# Patient Record
Sex: FEMALE | Race: WHITE | NOT HISPANIC OR LATINO | Employment: PART TIME | URBAN - METROPOLITAN AREA
[De-identification: names, ages, dates, MRNs, and addresses within clinical notes are randomized per-mention and may not be internally consistent; named-entity substitution may affect disease eponyms.]

---

## 2017-01-10 ENCOUNTER — TELEPHONE (OUTPATIENT)
Dept: BARIATRICS | Facility: CLINIC | Age: 67
End: 2017-01-10

## 2017-01-30 ENCOUNTER — OFFICE VISIT (OUTPATIENT)
Dept: BARIATRICS | Facility: CLINIC | Age: 67
End: 2017-01-30
Payer: COMMERCIAL

## 2017-01-30 VITALS
BODY MASS INDEX: 30.02 KG/M2 | HEIGHT: 62 IN | SYSTOLIC BLOOD PRESSURE: 122 MMHG | DIASTOLIC BLOOD PRESSURE: 70 MMHG | HEART RATE: 76 BPM | WEIGHT: 163.13 LBS

## 2017-01-30 DIAGNOSIS — R73.09 ELEVATED GLUCOSE: ICD-10-CM

## 2017-01-30 DIAGNOSIS — E78.00 PURE HYPERCHOLESTEROLEMIA: ICD-10-CM

## 2017-01-30 DIAGNOSIS — E66.3 OVERWEIGHT (BMI 25.0-29.9): Primary | ICD-10-CM

## 2017-01-30 DIAGNOSIS — I10 ESSENTIAL HYPERTENSION: ICD-10-CM

## 2017-01-30 PROCEDURE — 99999 PR PBB SHADOW E&M-EST. PATIENT-LVL III: CPT | Mod: PBBFAC,,, | Performed by: INTERNAL MEDICINE

## 2017-01-30 PROCEDURE — 99213 OFFICE O/P EST LOW 20 MIN: CPT | Mod: S$GLB,,, | Performed by: INTERNAL MEDICINE

## 2017-01-30 RX ORDER — TRIAMTERENE AND HYDROCHLOROTHIAZIDE 37.5; 25 MG/1; MG/1
CAPSULE ORAL
Qty: 90 CAPSULE | Refills: 3 | Status: SHIPPED | OUTPATIENT
Start: 2017-01-30 | End: 2017-12-19 | Stop reason: SDUPTHER

## 2017-01-30 RX ORDER — DIETHYLPROPION HYDROCHLORIDE 75 MG/1
75 TABLET, EXTENDED RELEASE ORAL DAILY
Qty: 30 TABLET | Refills: 0 | Status: SHIPPED | OUTPATIENT
Start: 2017-01-30 | End: 2017-03-02 | Stop reason: SDUPTHER

## 2017-01-30 NOTE — PROGRESS NOTES
"Subjective:       Patient ID: Carmen Tamayo is a 66 y.o. female.    Chief Complaint: Follow-up    HPI Comments: Pt here today for follow up. Shehas lost 9 lbs. Has been on diethylpropion. Denies significant SE.  She feels like she has been doing well with watching what she eats and increased chores at home and steps.     Review of Systems   Constitutional: Negative for chills and fever.   Respiratory: Negative for apnea and shortness of breath.         + snores   Cardiovascular: Negative for chest pain and leg swelling.   Gastrointestinal: Negative for constipation and diarrhea.        Denies HB   Genitourinary: Negative for dysuria.   Musculoskeletal: Positive for arthralgias. Negative for back pain.        Left foot OA   Neurological: Negative for dizziness and light-headedness.   Psychiatric/Behavioral: Negative for dysphoric mood. The patient is not nervous/anxious.         Doing well on Lexapro       Objective:       Visit Vitals    /70    Pulse 76    Ht 5' 2" (1.575 m)    Wt 74 kg (163 lb 2.3 oz)    BMI 29.84 kg/m2       Physical Exam   Constitutional: She is oriented to person, place, and time. She appears well-developed. No distress.   Obese     HENT:   Head: Normocephalic and atraumatic.   Eyes: EOM are normal. Pupils are equal, round, and reactive to light. No scleral icterus.   Neck: Normal range of motion. Neck supple.   Cardiovascular: Normal rate and normal heart sounds.  Exam reveals no gallop and no friction rub.    No murmur heard.  Pulmonary/Chest: Effort normal and breath sounds normal. No respiratory distress. She has no wheezes.   Musculoskeletal: Normal range of motion. She exhibits no edema.   Neurological: She is alert and oriented to person, place, and time. No cranial nerve deficit.   Skin: Skin is warm and dry. No erythema.   Psychiatric: She has a normal mood and affect. Her behavior is normal. Judgment normal.   Vitals reviewed.      Assessment:       1. Overweight " (BMI 25.0-29.9)    2. Pure hypercholesterolemia    3. Elevated glucose    4. Essential hypertension        Plan:         1. Overweight (BMI 25.0-29.9)  Doing well  - diethylpropion 75 mg TbSR; Take 75 mg by mouth once daily.  Dispense: 30 tablet; Refill: 0    2. Pure hypercholesterolemia  Recheck after 10% TBW lost.       3. Elevated glucose  Recheck after 10% TBW lost.       4. Essential hypertension  The current medical regimen is effective;  continue present plan and medications.        3 meals a day made up of the following:  Unlimited green vegetables, tomatoes, mushrooms, spaghetti squash, cauliflower, meat, poultry, seafood, eggs and hard cheeses.   Avoid fried foods  Dressings, seasonings, condiments, etc should have less than 2 g sugars.    beans or nuts can have 1 x a day.   1-2 servings of citrus fruits, berries, pineapple or melon a day (1/2 cup)  Milk and plain yogurt    Can use a protein bar or shake for a meal.    [email or web address]   moderate carb intake    Add some type of resistance training 2-3 days a week. These can be body weight exercises, light weight or elastic bands. [employer] and [employer] are great sources for free work out plans and videos.     Patient warned of common side effects of diethylpropion including anxiety, insomnia, palpitations and increased blood pressure. It was also explained that it is for short-term usage along with diet and exercise, and that stopping the medication without making lifestyle changes will result in regain of weight. Patient states understanding. Caution with age > 64yo discussed.        Return in about 4 weeks

## 2017-01-30 NOTE — PATIENT INSTRUCTIONS
3 meals a day made up of the following:  Unlimited green vegetables, tomatoes, mushrooms, spaghetti squash, cauliflower, meat, poultry, seafood, eggs and hard cheeses.   Avoid fried foods  Dressings, seasonings, condiments, etc should have less than 2 g sugars.    beans or nuts can have 1 x a day.   1-2 servings of citrus fruits, berries, pineapple or melon a day (1/2 cup)  Milk and plain yogurt    Can use a protein bar or shake for a meal.    [email or web address]   moderate carb intake    Add some type of resistance training 2-3 days a week. These can be body weight exercises, light weight or elastic bands. [employer] and [employer] are great sources for free work out plans and videos.     Patient warned of common side effects of diethylpropion including anxiety, insomnia, palpitations and increased blood pressure. It was also explained that it is for short-term usage along with diet and exercise, and that stopping the medication without making lifestyle changes will result in regain of weight. Patient states understanding. Caution with age > 66yo discussed.        Return in about 4 weeks

## 2017-01-30 NOTE — MR AVS SNAPSHOT
[facility] Bariatric Surgery  [address]  [place] [address]  Phone: [phone]  Fax: [phone]                  Carmen Tamayo   2017 1:30 PM   Office Visit    Description:  Female : 1950   Provider:  Bri Wild MD   Department:  [facility] - Bariatric Surgery           Reason for Visit     Follow-up           Diagnoses this Visit        Comments    Overweight (BMI 25.0-29.9)    -  Primary     Pure hypercholesterolemia         Elevated glucose         Essential hypertension                To Do List           Future Appointments        Provider Department Dept Phone    2017 11:30 AM [facility] MAMMO2 SCREEN [facility] [phone]    3/2/2017 1:00 PM Bri Wild MD [facility] Bariatric [facility] [phone]      Goals (5 Years of Data)     None       These Medications        Disp Refills Start End    diethylpropion 75 mg TbSR 30 tablet 0 2017     Take 75 mg by mouth once daily. - Oral    Pharmacy: [facility] - [place], [place] - [address] Ph #: [phone]         [facility]     [facility] Nurse Care [facility] -  Assistance  Registered nurses in the [facility] provide clinical advisement, health education, appointment booking, and other advisory services.  Call for this free service at [phone].             Medications           Message regarding Medications     Verify the changes and/or additions to your medication regime listed below are the same as discussed with your clinician today.  If any of these changes or additions are incorrect, please notify your healthcare provider.             Verify that the below list of medications is an accurate representation of the medications you are currently taking.  If none reported, the list may be blank. If incorrect, please contact your healthcare provider. Carry this list with you in case of emergency.           Current  "Medications     aspirin (ECOTRIN) 81 MG EC tablet Take 81 mg by mouth once daily.      biotin 300 mcg Tab Take 1 tablet by mouth once daily.    diclofenac (VOLTAREN) 75 MG EC tablet TAKE ONE TABLET BY MOUTH TWICE A DAY    diethylpropion 75 mg TbSR Take 75 mg by mouth once daily.    escitalopram oxalate (LEXAPRO) 10 MG tablet Take 1 tablet (10 mg total) by mouth once daily.    metoprolol succinate (TOPROL-XL) 50 MG 24 hr tablet TAKE ONE TABLET BY MOUTH EVERY DAY    simvastatin (ZOCOR) 40 MG tablet Take 1 tablet (40 mg total) by mouth every evening.    tretinoin (RETIN-A) 0.1 % cream APPLY A THIN FILM TO AFFECTED AREA EVERY EVENING AFTER MOISTURIZING.    triamterene-hydrochlorothiazide 37.5-25 mg (DYAZIDE) 37.5-25 mg per capsule Take 1 capsule by mouth once daily.    vitamin D 1000 units Tab Take 2,000 mg by mouth 2 (two) times daily.            Clinical Reference Information           Vital Signs - Last Recorded  Most recent update: 1/30/2017  1:43 PM by Smith Nails MA    BP Pulse Ht Wt BMI    122/70 76 5' 2" (1.575 m) 74 kg (163 lb 2.3 oz) 29.84 kg/m2      Blood Pressure          Most Recent Value    BP  122/70      Allergies as of 1/30/2017     No Known Allergies      Immunizations Administered on Date of Encounter - 1/30/2017     None      Instructions    3 meals a day made up of the following:  Unlimited green vegetables, tomatoes, mushrooms, spaghetti squash, cauliflower, meat, poultry, seafood, eggs and hard cheeses.   Avoid fried foods  Dressings, seasonings, condiments, etc should have less than 2 g sugars.    beans or nuts can have 1 x a day.   1-2 servings of citrus fruits, berries, pineapple or melon a day (1/2 cup)  Milk and plain yogurt    Can use a protein bar or shake for a meal.    [email or web address]   moderate carb intake    Add some type of resistance training 2-3 days a week. These can be body weight exercises, light weight or elastic bands. [employer] and [employer] are great sources for free work " out plans and videos.     Patient warned of common side effects of diethylpropion including anxiety, insomnia, palpitations and increased blood pressure. It was also explained that it is for short-term usage along with diet and exercise, and that stopping the medication without making lifestyle changes will result in regain of weight. Patient states understanding. Caution with age > 66yo discussed.        Return in about 4 weeks

## 2017-02-21 ENCOUNTER — HOSPITAL ENCOUNTER (OUTPATIENT)
Dept: RADIOLOGY | Facility: HOSPITAL | Age: 67
Discharge: HOME | End: 2017-02-21
Attending: INTERNAL MEDICINE
Payer: COMMERCIAL

## 2017-02-21 DIAGNOSIS — Z12.31 ENCOUNTER FOR SCREENING MAMMOGRAM FOR BREAST CANCER: ICD-10-CM

## 2017-02-21 PROCEDURE — 77067 SCR MAMMO BI INCL CAD: CPT | Mod: 26,,, | Performed by: RADIOLOGY

## 2017-02-21 PROCEDURE — 77063 BREAST TOMOSYNTHESIS BI: CPT | Mod: 26,,, | Performed by: RADIOLOGY

## 2017-02-21 PROCEDURE — 77067 SCR MAMMO BI INCL CAD: CPT | Mod: TC

## 2017-03-02 ENCOUNTER — OFFICE VISIT (OUTPATIENT)
Dept: BARIATRICS | Facility: CLINIC | Age: 67
End: 2017-03-02
Payer: COMMERCIAL

## 2017-03-02 VITALS
HEIGHT: 62 IN | HEART RATE: 76 BPM | DIASTOLIC BLOOD PRESSURE: 76 MMHG | BODY MASS INDEX: 30.02 KG/M2 | SYSTOLIC BLOOD PRESSURE: 128 MMHG | WEIGHT: 163.13 LBS

## 2017-03-02 DIAGNOSIS — I10 ESSENTIAL HYPERTENSION: ICD-10-CM

## 2017-03-02 DIAGNOSIS — E66.3 OVERWEIGHT (BMI 25.0-29.9): Primary | ICD-10-CM

## 2017-03-02 PROCEDURE — 99999 PR PBB SHADOW E&M-EST. PATIENT-LVL III: CPT | Mod: PBBFAC,,, | Performed by: INTERNAL MEDICINE

## 2017-03-02 PROCEDURE — 99213 OFFICE O/P EST LOW 20 MIN: CPT | Mod: S$GLB,,, | Performed by: INTERNAL MEDICINE

## 2017-03-02 RX ORDER — DIETHYLPROPION HYDROCHLORIDE 75 MG/1
75 TABLET, EXTENDED RELEASE ORAL DAILY
Qty: 30 TABLET | Refills: 0 | Status: SHIPPED | OUTPATIENT
Start: 2017-03-02 | End: 2017-03-30

## 2017-03-02 NOTE — PROGRESS NOTES
"Subjective:       Patient ID: Carmen Tamayo is a 66 y.o. female.    Chief Complaint: Follow-up    HPI Comments: Pt here today for follow up.Weight is unchanged from previous visit, net 9 lbs neg total. Has been on diethylpropion. Denies significant SE.  Was home for 10 days, and feels she was eating different, and not as much walking. Was under stress as well.     Review of Systems   Constitutional: Negative for chills and fever.   Respiratory: Negative for apnea and shortness of breath.         + snores   Cardiovascular: Negative for chest pain and leg swelling.   Gastrointestinal: Negative for constipation and diarrhea.        Denies HB   Genitourinary: Negative for dysuria.   Musculoskeletal: Positive for arthralgias. Negative for back pain.        Left foot OA   Neurological: Negative for dizziness and light-headedness.   Psychiatric/Behavioral: Negative for dysphoric mood. The patient is not nervous/anxious.         Doing well on Lexapro       Objective:       /76  Pulse 76  Ht 5' 2" (1.575 m)  Wt 74 kg (163 lb 2.3 oz)  BMI 29.84 kg/m2    Physical Exam   Constitutional: She is oriented to person, place, and time. She appears well-developed. No distress.   Obese     HENT:   Head: Normocephalic and atraumatic.   Eyes: EOM are normal. Pupils are equal, round, and reactive to light. No scleral icterus.   Neck: Normal range of motion. Neck supple.   Cardiovascular: Normal rate.    Pulmonary/Chest: Effort normal.   Musculoskeletal: Normal range of motion. She exhibits no edema.   Neurological: She is alert and oriented to person, place, and time. No cranial nerve deficit.   Skin: Skin is warm and dry. No erythema.   Psychiatric: She has a normal mood and affect. Her behavior is normal. Judgment normal.   Vitals reviewed.      Assessment:       1. Overweight (BMI 25.0-29.9)    2. Essential hypertension        Plan:          1. Overweight (BMI 25.0-29.9)  She is back to her routine  - diethylpropion " 75 mg TbSR; Take 75 mg by mouth once daily.  Dispense: 30 tablet; Refill: 0    2. Essential hypertension  Expect improvement with weight loss          3 meals a day made up of the following:  Unlimited green vegetables, tomatoes, mushrooms, spaghetti squash, cauliflower, meat, poultry, seafood, eggs and hard cheeses.   Avoid fried foods  Dressings, seasonings, condiments, etc should have less than 2 g sugars.    beans or nuts can have 1 x a day.   1-2 servings of citrus fruits, berries, pineapple or melon a day (1/2 cup)  Milk and plain yogurt    Can use a protein bar or shake for a meal.    [email or web address]   moderate carb intake    Add some type of resistance training 2-3 days a week. These can be body weight exercises, light weight or elastic bands. fish[employer]me and [employer] are great sources for free work out plans and videos.     Patient warned of common side effects of diethylpropion including anxiety, insomnia, palpitations and increased blood pressure. It was also explained that it is for short-term usage along with diet and exercise, and that stopping the medication without making lifestyle changes will result in regain of weight. Patient states understanding. Caution with age > 64yo discussed.        Return in about 4 weeks

## 2017-03-02 NOTE — PATIENT INSTRUCTIONS
3 meals a day made up of the following:  Unlimited green vegetables, tomatoes, mushrooms, spaghetti squash, cauliflower, meat, poultry, seafood, eggs and hard cheeses.   Avoid fried foods  Dressings, seasonings, condiments, etc should have less than 2 g sugars.    beans or nuts can have 1 x a day.   1-2 servings of citrus fruits, berries, pineapple or melon a day (1/2 cup)  Milk and plain yogurt    Can use a protein bar or shake for a meal.    [email or web address]   moderate carb intake    Add some type of resistance training 2-3 days a week. These can be body weight exercises, light weight or elastic bands. [employer] and [employer] are great sources for free work out plans and videos.     Patient warned of common side effects of diethylpropion including anxiety, insomnia, palpitations and increased blood pressure. It was also explained that it is for short-term usage along with diet and exercise, and that stopping the medication without making lifestyle changes will result in regain of weight. Patient states understanding. Caution with age > 64yo discussed.        Return in about 4 weeks

## 2017-03-02 NOTE — MR AVS SNAPSHOT
[facility] - Bariatric Surgery  [address]  [place] [address]  Phone: [phone]  Fax: [phone]                  Carmen Tamayo   3/2/2017 1:00 PM   Office Visit    Description:  Female : 1950   Provider:  Bri Wild MD   Department:  [facility] - Bariatric Surgery           Reason for Visit     Follow-up           Diagnoses this Visit        Comments    Overweight (BMI 25.0-29.9)    -  Primary     Essential hypertension                To Do List           Future Appointments        Provider Department Dept Phone    2017 1:30 PM Bri Wild MD [facility] Bariatric Surgery [phone]      Goals (5 Years of Data)     None       These Medications        Disp Refills Start End    diethylpropion 75 mg TbSR 30 tablet 0 3/2/2017     Take 75 mg by mouth once daily. - Oral    Pharmacy: [facility] - [place], [place] - [address] Ph #: [phone]         [facility]     [facility] Nurse Care [facility] -  Assistance  Registered nurses in the [facility] provide clinical advisement, health education, appointment booking, and other advisory services.  Call for this free service at [phone].             Medications           Message regarding Medications     Verify the changes and/or additions to your medication regime listed below are the same as discussed with your clinician today.  If any of these changes or additions are incorrect, please notify your healthcare provider.             Verify that the below list of medications is an accurate representation of the medications you are currently taking.  If none reported, the list may be blank. If incorrect, please contact your healthcare provider. Carry this list with you in case of emergency.           Current Medications     aspirin (ECOTRIN) 81 MG EC tablet Take 81 mg by mouth once daily.      biotin 300 mcg Tab Take 1 tablet by mouth once daily.     diclofenac (VOLTAREN) 75 MG EC tablet TAKE ONE TABLET BY MOUTH TWICE A DAY    diethylpropion 75 mg TbSR Take 75 mg by mouth once daily.    escitalopram oxalate (LEXAPRO) 10 MG tablet Take 1 tablet (10 mg total) by mouth once daily.    metoprolol succinate (TOPROL-XL) 50 MG 24 hr tablet TAKE ONE TABLET BY MOUTH EVERY DAY    simvastatin (ZOCOR) 40 MG tablet Take 1 tablet (40 mg total) by mouth every evening.    tretinoin (RETIN-A) 0.1 % cream APPLY A THIN FILM TO AFFECTED AREA EVERY EVENING AFTER MOISTURIZING.    triamterene-hydrochlorothiazide 37.5-25 mg (DYAZIDE) 37.5-25 mg per capsule TAKE ONE CAPSULE BY MOUTH EVERY DAY    vitamin D 1000 units Tab Take 2,000 mg by mouth 2 (two) times daily.            Clinical Reference Information           Your Vitals Were     BP                   128/76           Blood Pressure          Most Recent Value    BP  128/76      Allergies as of 3/2/2017     No Known Allergies      Immunizations Administered on Date of Encounter - 3/2/2017     None      Instructions    3 meals a day made up of the following:  Unlimited green vegetables, tomatoes, mushrooms, spaghetti squash, cauliflower, meat, poultry, seafood, eggs and hard cheeses.   Avoid fried foods  Dressings, seasonings, condiments, etc should have less than 2 g sugars.    beans or nuts can have 1 x a day.   1-2 servings of citrus fruits, berries, pineapple or melon a day (1/2 cup)  Milk and plain yogurt    Can use a protein bar or shake for a meal.    [email or web address]   moderate carb intake    Add some type of resistance training 2-3 days a week. These can be body weight exercises, light weight or elastic bands. [employer] and [employer] are great sources for free work out plans and videos.     Patient warned of common side effects of diethylpropion including anxiety, insomnia, palpitations and increased blood pressure. It was also explained that it is for short-term usage along with diet and exercise, and that stopping the  medication without making lifestyle changes will result in regain of weight. Patient states understanding. Caution with age > 64yo discussed.        Return in about 4 weeks          Language Assistance Services     ATTENTION: Language assistance services are available, free of charge. Please call [phone].      ATENCIÓN: Si habla español, tiene a patel disposición servicios gratuitos de asistencia lingüística. Llame al [phone].     CHÚ Ý: N?u b?n nói Ti?ng Vi?t, có các d?ch v? h? tr? ngôn ng? mi?n phí dành cho b?n. G?i s? [phone].         [facility] complies with applicable Federal civil rights laws and does not discriminate on the basis of race, color, national origin, age, disability, or sex.

## 2017-03-30 ENCOUNTER — TELEPHONE (OUTPATIENT)
Dept: BARIATRICS | Facility: CLINIC | Age: 67
End: 2017-03-30

## 2017-03-30 DIAGNOSIS — E66.3 OVERWEIGHT (BMI 25.0-29.9): ICD-10-CM

## 2017-03-30 RX ORDER — PHENTERMINE HYDROCHLORIDE 37.5 MG/1
37.5 TABLET ORAL
Qty: 30 TABLET | Refills: 0 | Status: SHIPPED | OUTPATIENT
Start: 2017-03-30 | End: 2017-04-29

## 2017-03-30 NOTE — TELEPHONE ENCOUNTER
Pt will run out of her Medication before her next appt can she please get a refill. Her apt is Tuesday

## 2017-03-30 NOTE — TELEPHONE ENCOUNTER
Since she has been on her current med for 3 months I will have to switch her to a different one. I would usually go to phentermine. Side effects are similar to what she is on. She would take a half tab until I see her Tuesday.

## 2017-04-05 RX ORDER — DICLOFENAC SODIUM 10 MG/G
2 GEL TOPICAL 4 TIMES DAILY
Qty: 500 G | Refills: 4 | Status: SHIPPED | OUTPATIENT
Start: 2017-04-05 | End: 2017-04-06 | Stop reason: SDUPTHER

## 2017-04-06 RX ORDER — DICLOFENAC SODIUM 10 MG/G
2 GEL TOPICAL 4 TIMES DAILY
Qty: 500 G | Refills: 4 | Status: SHIPPED | OUTPATIENT
Start: 2017-04-06 | End: 2017-05-06

## 2017-05-02 ENCOUNTER — TELEPHONE (OUTPATIENT)
Dept: BARIATRICS | Facility: CLINIC | Age: 67
End: 2017-05-02

## 2017-05-02 RX ORDER — PHENTERMINE HYDROCHLORIDE 37.5 MG/1
37.5 TABLET ORAL
Qty: 30 TABLET | Refills: 0 | Status: SHIPPED | OUTPATIENT
Start: 2017-05-02 | End: 2017-05-08 | Stop reason: SDUPTHER

## 2017-05-02 NOTE — TELEPHONE ENCOUNTER
Pt will run out of meds before her next appt May 8th she would like to  a script. please call and let her know ext [phone] or [phone]

## 2017-05-08 ENCOUNTER — OFFICE VISIT (OUTPATIENT)
Dept: BARIATRICS | Facility: CLINIC | Age: 67
End: 2017-05-08
Payer: COMMERCIAL

## 2017-05-08 VITALS
DIASTOLIC BLOOD PRESSURE: 70 MMHG | BODY MASS INDEX: 28.97 KG/M2 | WEIGHT: 157.44 LBS | SYSTOLIC BLOOD PRESSURE: 130 MMHG | HEIGHT: 62 IN

## 2017-05-08 DIAGNOSIS — E66.3 OVERWEIGHT (BMI 25.0-29.9): Primary | ICD-10-CM

## 2017-05-08 PROCEDURE — 99999 PR PBB SHADOW E&M-EST. PATIENT-LVL III: CPT | Mod: PBBFAC,,, | Performed by: INTERNAL MEDICINE

## 2017-05-08 PROCEDURE — 99213 OFFICE O/P EST LOW 20 MIN: CPT | Mod: S$GLB,,, | Performed by: INTERNAL MEDICINE

## 2017-05-08 RX ORDER — PHENTERMINE HYDROCHLORIDE 37.5 MG/1
37.5 TABLET ORAL
Qty: 30 TABLET | Refills: 0 | Status: SHIPPED | OUTPATIENT
Start: 2017-05-08 | End: 2017-06-07

## 2017-05-08 NOTE — MR AVS SNAPSHOT
[facility] Bariatric Surgery  [address]  [place] [address]  Phone: [phone]  Fax: [phone]                  Carmen Tamayo   2017 1:30 PM   Office Visit    Description:  Female : 1950   Provider:  Bri Wild MD   Department:  [facility] - Bariatric Surgery           Reason for Visit     Follow-up           Diagnoses this Visit        Comments    Overweight (BMI 25.0-29.9)    -  Primary            To Do List           Future Appointments        Provider Department Dept Phone    2017 1:30 PM Bri Wild MD [facility] Bariatric Surgery [phone]      Goals (5 Years of Data)     None       These Medications        Disp Refills Start End    phentermine (ADIPEX-P) 37.5 mg tablet 30 tablet 0 2017    Take 1 tablet (37.5 mg total) by mouth before breakfast. - Oral    Pharmacy: [facility] - [place], [place] - [address] Ph #: [phone]         [facility]     [facility] Nurse Care Line -  Assistance  Unless otherwise directed by your provider, please contact [facility], our nurse care line that is available for  assistance.     Registered nurses in the [facility] provide: appointment scheduling, clinical advisement, health education, and other advisory services.  Call: [phone] (toll free)               Medications           Message regarding Medications     Verify the changes and/or additions to your medication regime listed below are the same as discussed with your clinician today.  If any of these changes or additions are incorrect, please notify your healthcare provider.             Verify that the below list of medications is an accurate representation of the medications you are currently taking.  If none reported, the list may be blank. If incorrect, please contact your healthcare provider. Carry this list with you in case of emergency.          "  Current Medications     aspirin (ECOTRIN) 81 MG EC tablet Take 81 mg by mouth once daily.      biotin 300 mcg Tab Take 1 tablet by mouth once daily.    escitalopram oxalate (LEXAPRO) 10 MG tablet Take 1 tablet (10 mg total) by mouth once daily.    metoprolol succinate (TOPROL-XL) 50 MG 24 hr tablet TAKE ONE TABLET BY MOUTH EVERY DAY    simvastatin (ZOCOR) 40 MG tablet Take 1 tablet (40 mg total) by mouth every evening.    tretinoin (RETIN-A) 0.1 % cream APPLY A THIN FILM TO AFFECTED AREA EVERY EVENING AFTER MOISTURIZING.    triamterene-hydrochlorothiazide 37.5-25 mg (DYAZIDE) 37.5-25 mg per capsule TAKE ONE CAPSULE BY MOUTH EVERY DAY    vitamin D 1000 units Tab Take 2,000 mg by mouth 2 (two) times daily.     diclofenac (VOLTAREN) 75 MG EC tablet TAKE ONE TABLET BY MOUTH TWICE A DAY    phentermine (ADIPEX-P) 37.5 mg tablet Take 1 tablet (37.5 mg total) by mouth before breakfast.           Clinical Reference Information           Your Vitals Were     BP Height Weight BMI       130/70 5' 2" (1.575 m) 71.4 kg (157 lb 6.5 oz) 28.79 kg/m2       Blood Pressure          Most Recent Value    BP  130/70      Allergies as of 5/8/2017     No Known Allergies      Immunizations Administered on Date of Encounter - 5/8/2017     None      Instructions    3 meals a day made up of the following:  Unlimited green vegetables, tomatoes, mushrooms, spaghetti squash, cauliflower, meat, poultry, seafood, eggs and hard cheeses.   Milk and plain yogurt  Dressings, seasonings, condiments, etc should have less than 2 g sugars.   beans or nuts can have 1 x a day.   1-2 servings of citrus fruits, berries, pineapple or melon a day (1/2 cup)  Avoid fried foods    No grains, rice, pasta, potatoes or bread    No soda, sweet tea, juices or lemonade    Can use a protein bar or shake for a meal.    [email or web address]   moderate carb intake    Add some type of resistance training 2-3 days a week. These can be body weight exercises, light weight or " elastic bands. [employer] and [employer] are great sources for free work out plans and videos.     Patient warned of common side effects of phentermine including anxiety, insomnia, palpitations and increased blood pressure. It was also explained that it is for short-term usage along with diet and exercise, and that stopping the medication without making lifestyle changes will result in regain of weight. Patient states understanding.   Caution with age > 64yo discussed.        Return in about 6 weeks          Language Assistance Services     ATTENTION: Language assistance services are available, free of charge. Please call [phone].      ATENCIÓN: Si habla español, tiene a patel disposición servicios gratuitos de asistencia lingüística. Llame al [phone].     ERICA Ý: N?u b?n nói Ti?ng Vi?t, có các d?ch v? h? tr? ngôn ng? mi?n phí dành cho b?n. G?i s? [phone].         [facility] complies with applicable Federal civil rights laws and does not discriminate on the basis of race, color, national origin, age, disability, or sex.

## 2017-05-08 NOTE — PROGRESS NOTES
"Subjective:       Patient ID: Carmen Tamayo is a 66 y.o. female.    Chief Complaint: Follow-up    HPI Comments: Pt here today for follow up. Has lost 6 lbs from previous visit, net 15 lbs neg total. Has been on phentermine in the past couple of weeks. . Denies significant SE. She finds that it is working well. She has a garden she is working on and a bike she can start riding.     Review of Systems   Constitutional: Negative for chills and fever.   Respiratory: Negative for apnea and shortness of breath.         + snores   Cardiovascular: Negative for chest pain and leg swelling.   Gastrointestinal: Negative for constipation and diarrhea.        Denies HB   Genitourinary: Negative for dysuria.   Musculoskeletal: Positive for arthralgias. Negative for back pain.        Left foot OA   Neurological: Negative for dizziness and light-headedness.   Psychiatric/Behavioral: Negative for dysphoric mood. The patient is not nervous/anxious.         Doing well on Lexapro       Objective:       /70  Ht 5' 2" (1.575 m)  Wt 71.4 kg (157 lb 6.5 oz)  BMI 28.79 kg/m2    Physical Exam   Constitutional: She is oriented to person, place, and time. She appears well-developed. No distress.   Obese     HENT:   Head: Normocephalic and atraumatic.   Eyes: EOM are normal. Pupils are equal, round, and reactive to light. No scleral icterus.   Neck: Normal range of motion. Neck supple.   Cardiovascular: Normal rate.    Pulmonary/Chest: Effort normal.   Musculoskeletal: Normal range of motion. She exhibits no edema.   Neurological: She is alert and oriented to person, place, and time. No cranial nerve deficit.   Skin: Skin is warm and dry. No erythema.   Psychiatric: She has a normal mood and affect. Her behavior is normal. Judgment normal.   Vitals reviewed.      Assessment:       No diagnosis found.    Plan:          1. Overweight (BMI 25.0-29.9)  She is back to her routine  - diethylpropion 75 mg TbSR; Take 75 mg by mouth once " daily.  Dispense: 30 tablet; Refill: 0    2. Essential hypertension  Expect improvement with weight loss          3 meals a day made up of the following:  Unlimited green vegetables, tomatoes, mushrooms, spaghetti squash, cauliflower, meat, poultry, seafood, eggs and hard cheeses.   Milk and plain yogurt  Dressings, seasonings, condiments, etc should have less than 2 g sugars.   beans or nuts can have 1 x a day.   1-2 servings of citrus fruits, berries, pineapple or melon a day (1/2 cup)  Avoid fried foods    No grains, rice, pasta, potatoes or bread    No soda, sweet tea, juices or lemonade    Can use a protein bar or shake for a meal.    [email or web address]   moderate carb intake    Add some type of resistance training 2-3 days a week. These can be body weight exercises, light weight or elastic bands. [employer] and [email or web address] are great sources for free work out plans and videos.     Patient warned of common side effects of phentermine including anxiety, insomnia, palpitations and increased blood pressure. It was also explained that it is for short-term usage along with diet and exercise, and that stopping the medication without making lifestyle changes will result in regain of weight. Patient states understanding.   Caution with age > 66yo discussed.        Return in about 6 weeks

## 2017-05-08 NOTE — PATIENT INSTRUCTIONS
3 meals a day made up of the following:  Unlimited green vegetables, tomatoes, mushrooms, spaghetti squash, cauliflower, meat, poultry, seafood, eggs and hard cheeses.   Milk and plain yogurt  Dressings, seasonings, condiments, etc should have less than 2 g sugars.   beans or nuts can have 1 x a day.   1-2 servings of citrus fruits, berries, pineapple or melon a day (1/2 cup)  Avoid fried foods    No grains, rice, pasta, potatoes or bread    No soda, sweet tea, juices or lemonade    Can use a protein bar or shake for a meal.    [email or web address]   moderate carb intake    Add some type of resistance training 2-3 days a week. These can be body weight exercises, light weight or elastic bands. [employer] and [employer] are great sources for free work out plans and videos.     Patient warned of common side effects of phentermine including anxiety, insomnia, palpitations and increased blood pressure. It was also explained that it is for short-term usage along with diet and exercise, and that stopping the medication without making lifestyle changes will result in regain of weight. Patient states understanding.   Caution with age > 66yo discussed.        Return in about 6 weeks

## 2017-05-09 ENCOUNTER — TELEPHONE (OUTPATIENT)
Dept: INTERNAL MEDICINE | Facility: CLINIC | Age: 67
End: 2017-05-09

## 2017-05-09 DIAGNOSIS — E55.9 MILD VITAMIN D DEFICIENCY: ICD-10-CM

## 2017-05-09 DIAGNOSIS — R73.09 ELEVATED GLUCOSE: Primary | ICD-10-CM

## 2017-05-09 DIAGNOSIS — I10 ESSENTIAL HYPERTENSION: ICD-10-CM

## 2017-05-09 NOTE — TELEPHONE ENCOUNTER
----- Message from Tomasa Noble MA sent at 5/9/2017  1:25 PM CDT -----  Contact: [phone]  An appointment for an annual physical has been scheduled for 6/22/17.    The patient is requesting prior labs.    A lab appointment is scheduled for 6/13/17.  Please link labs to the scheduled appointment.    If the lab appointment is not appropriate, please cancel appointment and notify the patient.    Thank you!

## 2017-05-09 NOTE — TELEPHONE ENCOUNTER
Procedures Ordered This Visit     CBC W/ AUTO DIFFERENTIAL         Cancel    Comprehensive metabolic panel         Cancel    Hemoglobin A1c         Cancel    Lipid panel         Cancel    VITAMIN D

## 2017-05-19 ENCOUNTER — TELEPHONE (OUTPATIENT)
Dept: INTERNAL MEDICINE | Facility: CLINIC | Age: 67
End: 2017-05-19

## 2017-05-19 NOTE — TELEPHONE ENCOUNTER
Left message and in regards to rescheduling appointment on 6/22.     Dr Edmondson will not be in office

## 2017-06-13 ENCOUNTER — LAB VISIT (OUTPATIENT)
Dept: LAB | Facility: HOSPITAL | Age: 67
End: 2017-06-13
Payer: COMMERCIAL

## 2017-06-13 DIAGNOSIS — I10 ESSENTIAL HYPERTENSION: ICD-10-CM

## 2017-06-13 DIAGNOSIS — R73.09 ELEVATED GLUCOSE: ICD-10-CM

## 2017-06-13 DIAGNOSIS — E55.9 MILD VITAMIN D DEFICIENCY: ICD-10-CM

## 2017-06-13 LAB
25(OH)D3+25(OH)D2 SERPL-MCNC: 72 NG/ML
ALBUMIN SERPL BCP-MCNC: 3.9 G/DL
ALP SERPL-CCNC: 98 U/L
ALT SERPL W/O P-5'-P-CCNC: 15 U/L
ANION GAP SERPL CALC-SCNC: 11 MMOL/L
AST SERPL-CCNC: 20 U/L
BASOPHILS # BLD AUTO: 0.04 K/UL
BASOPHILS NFR BLD: 0.5 %
BILIRUB SERPL-MCNC: 0.4 MG/DL
BUN SERPL-MCNC: 16 MG/DL
CALCIUM SERPL-MCNC: 9.9 MG/DL
CHLORIDE SERPL-SCNC: 103 MMOL/L
CHOLEST/HDLC SERPL: 3 {RATIO}
CO2 SERPL-SCNC: 23 MMOL/L
CREAT SERPL-MCNC: 0.8 MG/DL
DIFFERENTIAL METHOD: NORMAL
EOSINOPHIL # BLD AUTO: 0.1 K/UL
EOSINOPHIL NFR BLD: 1.8 %
ERYTHROCYTE [DISTWIDTH] IN BLOOD BY AUTOMATED COUNT: 12.3 %
EST. GFR  (AFRICAN AMERICAN): >60 ML/MIN/1.73 M^2
EST. GFR  (NON AFRICAN AMERICAN): >60 ML/MIN/1.73 M^2
ESTIMATED AVG GLUCOSE: 117 MG/DL
GLUCOSE SERPL-MCNC: 102 MG/DL
HBA1C MFR BLD HPLC: 5.7 %
HCT VFR BLD AUTO: 38.8 %
HDL/CHOLESTEROL RATIO: 32.9 %
HDLC SERPL-MCNC: 216 MG/DL
HDLC SERPL-MCNC: 71 MG/DL
HGB BLD-MCNC: 12.8 G/DL
LDLC SERPL CALC-MCNC: 129 MG/DL
LYMPHOCYTES # BLD AUTO: 1.8 K/UL
LYMPHOCYTES NFR BLD: 24.9 %
MCH RBC QN AUTO: 29.4 PG
MCHC RBC AUTO-ENTMCNC: 33 %
MCV RBC AUTO: 89 FL
MONOCYTES # BLD AUTO: 0.5 K/UL
MONOCYTES NFR BLD: 7.2 %
NEUTROPHILS # BLD AUTO: 4.8 K/UL
NEUTROPHILS NFR BLD: 65.5 %
NONHDLC SERPL-MCNC: 145 MG/DL
PLATELET # BLD AUTO: 349 K/UL
PMV BLD AUTO: 9.2 FL
POTASSIUM SERPL-SCNC: 4.8 MMOL/L
PROT SERPL-MCNC: 7.8 G/DL
RBC # BLD AUTO: 4.35 M/UL
SODIUM SERPL-SCNC: 137 MMOL/L
TRIGL SERPL-MCNC: 80 MG/DL
WBC # BLD AUTO: 7.35 K/UL

## 2017-06-13 PROCEDURE — 83036 HEMOGLOBIN GLYCOSYLATED A1C: CPT

## 2017-06-13 PROCEDURE — 85025 COMPLETE CBC W/AUTO DIFF WBC: CPT

## 2017-06-13 PROCEDURE — 80061 LIPID PANEL: CPT

## 2017-06-13 PROCEDURE — 80053 COMPREHEN METABOLIC PANEL: CPT

## 2017-06-13 PROCEDURE — 82306 VITAMIN D 25 HYDROXY: CPT

## 2017-06-13 PROCEDURE — 36415 COLL VENOUS BLD VENIPUNCTURE: CPT

## 2017-06-21 ENCOUNTER — OFFICE VISIT (OUTPATIENT)
Dept: INTERNAL MEDICINE | Facility: CLINIC | Age: 67
End: 2017-06-21
Payer: COMMERCIAL

## 2017-06-21 ENCOUNTER — OFFICE VISIT (OUTPATIENT)
Dept: BARIATRICS | Facility: CLINIC | Age: 67
End: 2017-06-21
Payer: COMMERCIAL

## 2017-06-21 VITALS
BODY MASS INDEX: 28.88 KG/M2 | SYSTOLIC BLOOD PRESSURE: 124 MMHG | HEIGHT: 62 IN | DIASTOLIC BLOOD PRESSURE: 72 MMHG | WEIGHT: 156.94 LBS | HEART RATE: 76 BPM

## 2017-06-21 VITALS
BODY MASS INDEX: 29.17 KG/M2 | SYSTOLIC BLOOD PRESSURE: 128 MMHG | OXYGEN SATURATION: 97 % | WEIGHT: 158.5 LBS | HEIGHT: 62 IN | DIASTOLIC BLOOD PRESSURE: 78 MMHG | HEART RATE: 82 BPM

## 2017-06-21 DIAGNOSIS — E66.3 OVERWEIGHT (BMI 25.0-29.9): Primary | ICD-10-CM

## 2017-06-21 DIAGNOSIS — M17.12 OSTEOARTHRITIS OF LEFT KNEE, UNSPECIFIED OSTEOARTHRITIS TYPE: ICD-10-CM

## 2017-06-21 DIAGNOSIS — R73.09 ELEVATED GLUCOSE: ICD-10-CM

## 2017-06-21 DIAGNOSIS — E78.00 PURE HYPERCHOLESTEROLEMIA: ICD-10-CM

## 2017-06-21 DIAGNOSIS — I10 ESSENTIAL HYPERTENSION: Primary | ICD-10-CM

## 2017-06-21 PROBLEM — M17.9 OSTEOARTHRITIS OF KNEE: Status: ACTIVE | Noted: 2017-06-21

## 2017-06-21 PROCEDURE — 1159F MED LIST DOCD IN RCRD: CPT | Mod: S$GLB,,, | Performed by: INTERNAL MEDICINE

## 2017-06-21 PROCEDURE — 99213 OFFICE O/P EST LOW 20 MIN: CPT | Mod: S$GLB,,, | Performed by: INTERNAL MEDICINE

## 2017-06-21 PROCEDURE — 99397 PER PM REEVAL EST PAT 65+ YR: CPT | Mod: S$GLB,,, | Performed by: INTERNAL MEDICINE

## 2017-06-21 PROCEDURE — 99999 PR PBB SHADOW E&M-EST. PATIENT-LVL III: CPT | Mod: PBBFAC,,, | Performed by: INTERNAL MEDICINE

## 2017-06-21 RX ORDER — PHENTERMINE HYDROCHLORIDE 37.5 MG/1
37.5 TABLET ORAL
Qty: 30 TABLET | Refills: 0 | Status: SHIPPED | OUTPATIENT
Start: 2017-06-21 | End: 2017-07-21

## 2017-06-21 RX ORDER — DICLOFENAC SODIUM 10 MG/G
2 GEL TOPICAL DAILY
COMMUNITY
End: 2017-06-21 | Stop reason: SDUPTHER

## 2017-06-21 RX ORDER — DIETHYLPROPION HYDROCHLORIDE 75 MG/1
75 TABLET, EXTENDED RELEASE ORAL DAILY
Qty: 30 TABLET | Refills: 0 | Status: SHIPPED | OUTPATIENT
Start: 2017-06-21 | End: 2017-08-16

## 2017-06-21 RX ORDER — DICLOFENAC SODIUM 10 MG/G
2 GEL TOPICAL DAILY
Qty: 5 TUBE | Refills: 3 | Status: SHIPPED | OUTPATIENT
Start: 2017-06-21 | End: 2019-01-14 | Stop reason: SDUPTHER

## 2017-06-21 RX ORDER — IBUPROFEN 800 MG/1
800 TABLET ORAL EVERY 6 HOURS PRN
COMMUNITY
End: 2018-04-06

## 2017-06-21 NOTE — PROGRESS NOTES
"Subjective:       Patient ID: Carmen Tamayo is a 67 y.o. female.    Chief Complaint: Follow-up    Pt here today for follow up. Has lost 1 lbs from previous visit, net 16 lbs neg total. Has been on phentermine. Had oral surgery last week on ibuprofen and Amoxicillin. Has had some constipation. Has taken ducolax.      . Denies significant SE. She finds that it is working well. She has a garden she is working on and a bike she can start riding.       Review of Systems   Constitutional: Negative for chills and fever.   Respiratory: Negative for apnea and shortness of breath.         + snores   Cardiovascular: Negative for chest pain and leg swelling.   Gastrointestinal: Negative for constipation and diarrhea.        Denies HB   Genitourinary: Negative for dysuria.   Musculoskeletal: Positive for arthralgias. Negative for back pain.        Left foot OA   Neurological: Negative for dizziness and light-headedness.   Psychiatric/Behavioral: Negative for dysphoric mood. The patient is not nervous/anxious.         Doing well on Lexapro       Objective:       /72   Pulse 76   Ht 5' 2" (1.575 m)   Wt 71.2 kg (156 lb 15.5 oz)   BMI 28.71 kg/m²     Physical Exam   Constitutional: She is oriented to person, place, and time. She appears well-developed. No distress.   Obese     HENT:   Head: Normocephalic and atraumatic.   Eyes: EOM are normal. Pupils are equal, round, and reactive to light. No scleral icterus.   Neck: Normal range of motion. Neck supple.   Cardiovascular: Normal rate.    Pulmonary/Chest: Effort normal.   Musculoskeletal: Normal range of motion. She exhibits no edema.   Neurological: She is alert and oriented to person, place, and time. No cranial nerve deficit.   Skin: Skin is warm and dry. No erythema.   Psychiatric: She has a normal mood and affect. Her behavior is normal. Judgment normal.   Vitals reviewed.      Assessment:       1. Overweight (BMI 25.0-29.9)        Plan:          1. Overweight " (BMI 25.0-29.9)  Soft foods list given for while she is recovering from dental work.               3 meals a day made up of the following:  Unlimited green vegetables, tomatoes, mushrooms, spaghetti squash, cauliflower, meat, poultry, seafood, eggs and hard cheeses.   Milk and plain yogurt  Dressings, seasonings, condiments, etc should have less than 2 g sugars.   beans or nuts can have 1 x a day.   1-2 servings of citrus fruits, berries, pineapple or melon a day (1/2 cup)  Avoid fried foods    No grains, rice, pasta, potatoes or bread    No soda, sweet tea, juices or lemonade    Can use a protein bar or shake for a meal.    [email or web address]   moderate carb intake    Add some type of resistance training 2-3 days a week. These can be body weight exercises, light weight or elastic bands. [employer] and [employer] are great sources for free work out plans and videos.     Patient warned of common side effects of phentermine including anxiety, insomnia, palpitations and increased blood pressure. It was also explained that it is for short-term usage along with diet and exercise, and that stopping the medication without making lifestyle changes will result in regain of weight. Patient states understanding.   Caution with age > 64yo discussed.        Return in about 6 weeks

## 2017-06-21 NOTE — PATIENT INSTRUCTIONS
Patient warned of common side effects of phentermine including anxiety, insomnia, palpitations and increased blood pressure. It was also explained that it is for short-term usage along with diet and exercise, and that stopping the medication without making lifestyle changes will result in regain of weight. Patient states understanding.     Patient warned of common side effects of diethylpropion including anxiety, insomnia, palpitations and increased blood pressure. It was also explained that it is for short-term usage along with diet and exercise, and that stopping the medication without making lifestyle changes will result in regain of weight. Patient states understanding.      Bariatric Soft Diet             Adding Vegetables and Fruits:    As long as you are consuming >80g total protein daily from combination of foods and protein drinks, you may start adding small bites of fruits and vegetables to your meals. Cooked, tender vegetables and ripe fruits without the peel are tolerated best.    Avoid fruit canned in syrup, sugary fruit juices, and vegetables cooked with oil, butter or giordano.  Bariatric SOFT Diet    EAT THESE FOODS AVOID THESE FOODS   High in Protein: High in Fat/Sugar:   ? Canned tuna or chicken (packed in water)  ? Lean ground turkey breast or ground round  ? Turkey or chicken (no skin); cooked tender and cut in small pieces  ? Lean pork or beef (cook in crock pot until very tender; cut in small pieces  ? Scrambled, poached, or boiled eggs  ? Baked, broiled, grilled or boiled fish and seafood (not fried!)  ? Silken tofu, Edamame (soybeans)  ? Beans, hummus and lentils  ? Lean deli meats (turkey and chicken breast, ham, roast beef)  ? 1% or Skim Milk, Lactaid, or Soymilk  ? Low-fat or fat-free cottage cheese, soft cheese, mozzarella string cheese, or ricotta  ? Light yogurt, Greek yogurt, SF pudding High fat milk (whole, 2%)  Butter, margarine, oil, mayonnaise  Sour cream, cream cheese, salad  dressing  Ice Cream  Cakes, cookies, pies, desserts  Candy  Luncheon meats (bologna, salami, chopped ham)  Sausage, Anaya  Gravy  Fried Foods  ___________________________________  Tough/Crunchy--------------------------------  Tough or dry meats  Corn   Granola/cereal with nuts  Shredded Coconut    Raw veggies  Lettuce  Plain, Unsalted Nuts and Seeds  Protein bars with 0-4 grams of sugar   As long as you are getting >80g PRO: Starchy Carbohydrates. At goal weight, some may include whole grains in small amounts.   Cooked tender vegetables without peel  Ripe fruits without peel  Frozen fruits with no added sugar  Fruit canned in its own juice or in water  Fat free, sugar free, frozen yogurt White and wheat Bread, Rice, Pasta   Cereals (including grits, oatmeal)   Crackers, Pretzels, Chips, Granola  Corn, Popcorn, Peas  White Potatoes, Sweet potatoes  Flour and corn tortillas     Fluids: Always Avoid:   Skim/1% milk, Lactaid, Soymilk  Water and Sugar-free beverages  (decaf and non-carbonated)  Decaf coffee & decaf tea  Sugary drinks  Carbonated drinks  Alcohol  Drinking through straws     Protein Content of Foods Recommended after                   Weight Loss Surgery    Food Name Portion Calories Protein (gms)   Almonds (unsalted) 1/4 cup 160 6   [place] milk, unsweetened 1 cup 30  1   Beef, Roast 1 oz 46 8   Beef, Steak, sirloin, trimmed 1 oz 55 9   Catfish, broiled or baked 1 oz 30 5   Cheese, American FF 1 oz 40 6   Cheese, Cottage 1% fat ¼ cup 41 7   Cheese, Parmesan, grated ¼ cup 128 12   Cheese, Mozzarella, part skim 1 oz 78 8   Cheese, part skim Ricotta ¼ cup 90 8   Chicken, white breast w/o skin 1 oz 46 9   Chicken, leg w/o skin 1 oz 54 7   Crab, steamed ¼ cup  40 9   Crawfish tails, boiled ¼ cup 35 8   Edamame, shelled ¼ cup 50 4   Egg 1 78 6   Ham, lean 5% 1 oz 44 7   Hamburger, lean 1 oz 56 7   Hummus ¼ cup 100 5   Lobster, steamed 1 oz 26 5   Milk, skim or 1%, soy  1 cup 90 8   Pork Tenderloin 1 oz 46 7    Pudding, SF 1 serv 60 2   Red beans ¼ cup 56 4   Refried beans, fat free ¼ cup 65 4   [place], baked 1 oz 52 7   Shrimp, steamed 1 oz 28 6   Soymilk, plain ½ cup 40 3   Tilapia, white fish, cooked 1 oz 36 8   Tofu ¼ cup 47 5   Trout 1 oz 48 7   Tuna, canned in water 1 oz 37 8   Turkey, white meat 1 oz 35 7   Veal Loin 1 oz 50 7   Yogurt, SF, frozen vanilla 3 oz 72 3.5   Yogurt, Fruit, FF, light 3 oz 40 2.5   Yogurt, Greek 3 oz 70 8     *Abbreviations: SF=sugar free, LF=low fat, FF= fat free, gms=grams  *3oz of cooked meat/protein = size of deck of cards or ladies palm   *1oz cheese = 1inch cube or 1 slice American cheese    Sample Menu for Bariatric Soft Diet              3 meals + 2 protein drinks  Remember: No drinking with meals.    Time of Day Day 1 Day 2   7am:    1 egg (or ¼ cup Egg Beaters) ¼ cup low-fat cottage cheese, 1 tbsp berries   8am: 1 cup water/SF beverage     9am: 1 cup water/SF beverage     10am:  Protein drink  Protein drink   11am: 1 cup water/SF beverage     12pm:    1-2 oz grilled shrimp, ¼ cup green beans   1-2oz canned chicken, shredded cheese, 1 tbsp salsa   1pm: 1 cup water/SF beverage     3pm:  Protein drink   Protein drink   4pm: 1 cup water/SF beverage     6pm:  ½ cup low fat chili, 1oz low-fat cheese, ¼ cup broccoli 2 oz grilled fish,  ¼  cup lima beans   7pm: 1 cup water/SF beverage       This sample menu provides approx. 80g protein total, including about 40g protein from foods and at least 40g protein from protein drinks.  Drinking protein drinks daily helps decrease muscle loss, increase weight loss, and prevent hair loss.    ? Sip fluids continuously in between meals.    ? For fluids: 1 cup = 8 oz   ? For food: ¼ cup = 4 tablespoons = 1oz  ? 3oz meat is approx. the size of a deck of cards.    ? A food scale will help you determine portion size (Can be purchased at [employer])

## 2017-06-21 NOTE — PROGRESS NOTES
"Subjective:       Patient ID: Carmen Tamayo is a 67 y.o. female.    Chief Complaint: Follow-up    HPI   Patient is here for a six-month follow-up of hypertension and hypercholesterolemia as well as an elevated glucose.  We reviewed her medication and she is taking them regularly.  She has lost about 14 pounds since the last appointment.    Diabetes type 2    /78   Pulse 82   Ht 5' 2" (1.575 m)   Wt 71.9 kg (158 lb 8.2 oz)   SpO2 97%   BMI 28.99 kg/m² ,   Hemoglobin A1C   Date Value Ref Range Status   06/13/2017 5.7 (H) 4.0 - 5.6 % Final     Comment:     According to ADA guidelines, hemoglobin A1c <7.0% represents  optimal control in non-pregnant diabetic patients. Different  metrics may apply to specific patient populations.   Standards of Medical Care in Diabetes-2016.  For the purpose of screening for the presence of diabetes:  <5.7%     Consistent with the absence of diabetes  5.7-6.4%  Consistent with increasing risk for diabetes   (prediabetes)  >or=6.5%  Consistent with diabetes  Currently, no consensus exists for use of hemoglobin A1c  for diagnosis of diabetes for children.  This Hemoglobin A1c assay has significant interference with fetal   hemoglobin   (HbF). The results are invalid for patients with abnormal amounts of   HbF,   including those with known Hereditary Persistence   of Fetal Hemoglobin. Heterozygous hemoglobin variants (HbAS, HbAC,   HbAD, HbAE, HbA2) do not significantly interfere with this assay;   however, presence of multiple variants in a sample may impact the %   interference.     12/13/2016 5.5 4.5 - 6.2 % Final     Comment:     According to ADA guidelines, hemoglobin A1C <7.0% represents  optimal control in non-pregnant diabetic patients.  Different  metrics may apply to specific populations.   Standards of Medical Care in Diabetes - 2016.  For the purpose of screening for the presence of diabetes:  <5.7%     Consistent with the absence of diabetes  5.7-6.4%  " Consistent with increasing risk for diabetes   (prediabetes)  >or=6.5%  Consistent with diabetes  Currently no consensus exists for use of hemoglobin A1C  for diagnosis of diabetes for children.     06/08/2016 5.3 4.5 - 6.2 % Final   ,   Creatinine   Date Value Ref Range Status   06/13/2017 0.8 0.5 - 1.4 mg/dL Final   12/13/2016 0.8 0.5 - 1.4 mg/dL Final   06/08/2016 0.9 0.5 - 1.4 mg/dL Final       Lab Results   Component Value Date    LDLCALC 129.0 06/13/2017    LDLCALC 80.0 12/13/2016    LDLCALC 94.8 06/08/2016         Review of Systems   Constitutional: Negative for chills, fever and unexpected weight change.   HENT: Negative for trouble swallowing.    Respiratory: Negative for cough, shortness of breath and wheezing.    Cardiovascular: Negative for chest pain and palpitations.   Gastrointestinal: Negative for abdominal distention, abdominal pain, blood in stool and vomiting.   Musculoskeletal: Negative for back pain.       Objective:      Physical Exam   Constitutional: She is oriented to person, place, and time. She appears well-developed and well-nourished. No distress.   Neck: Carotid bruit is not present. No thyromegaly present.   Cardiovascular: Normal rate, regular rhythm and normal heart sounds.  PMI is not displaced.    Pulmonary/Chest: Effort normal and breath sounds normal. No respiratory distress.   Abdominal: Soft. Bowel sounds are normal. She exhibits no distension. There is no tenderness.   Musculoskeletal: She exhibits no edema.   Neurological: She is alert and oriented to person, place, and time.       Assessment:       1. Essential hypertension    2. Pure hypercholesterolemia    3. Elevated glucose    4. Osteoarthritis of left knee, unspecified osteoarthritis type        Plan:       Carmen was seen today for follow-up.    Diagnoses and all orders for this visit:    Essential hypertension: Well controlled continue same medications    Pure hypercholesterolemia well controlled continue same  medication    Elevated glucose A1c has improved    Osteoarthritis of left knee, unspecified osteoarthritis type diclofenac prescribed      Return in about 6 months (around 12/20/2017) for  Annual exam.    New Prescriptions    No medications on file       Modified Medications    No medications on file       No orders of the defined types were placed in this encounter.      Labs, studies and consults associated with this visit were reviewed

## 2017-07-06 DIAGNOSIS — E78.5 HYPERLIPIDEMIA: ICD-10-CM

## 2017-07-06 RX ORDER — SIMVASTATIN 40 MG/1
TABLET, FILM COATED ORAL
Qty: 90 TABLET | Refills: 3 | Status: SHIPPED | OUTPATIENT
Start: 2017-07-06 | End: 2018-10-05 | Stop reason: SDUPTHER

## 2017-07-09 RX ORDER — ESCITALOPRAM OXALATE 10 MG/1
TABLET ORAL
Qty: 30 TABLET | Refills: 12 | Status: SHIPPED | OUTPATIENT
Start: 2017-07-09 | End: 2017-07-18 | Stop reason: SDUPTHER

## 2017-07-18 RX ORDER — ESCITALOPRAM OXALATE 10 MG/1
10 TABLET ORAL DAILY
Qty: 30 TABLET | Refills: 12 | Status: SHIPPED | OUTPATIENT
Start: 2017-07-18 | End: 2018-08-20

## 2017-07-18 NOTE — TELEPHONE ENCOUNTER
----- Message from Thomas Bartlett sent at 7/18/2017 10:15 AM CDT -----  Contact: Self [phone]  Type: Rx    Name of medication(s): escitalopram oxalate (LEXAPRO) 10 MG tablet    Is this a refill? New rx? Yes    Who prescribed medication? Dr Edmondson    Pharmacy Name, Phone, & Location: [facility]     Comments:stated that she's no longer in [place], and would like it  sent to the [facility] [place], she's completely out, advice    Thanks

## 2017-08-16 ENCOUNTER — OFFICE VISIT (OUTPATIENT)
Dept: BARIATRICS | Facility: CLINIC | Age: 67
End: 2017-08-16
Payer: COMMERCIAL

## 2017-08-16 VITALS
BODY MASS INDEX: 28.48 KG/M2 | SYSTOLIC BLOOD PRESSURE: 126 MMHG | HEIGHT: 62 IN | HEART RATE: 73 BPM | DIASTOLIC BLOOD PRESSURE: 78 MMHG | WEIGHT: 154.75 LBS

## 2017-08-16 DIAGNOSIS — I10 ESSENTIAL HYPERTENSION: ICD-10-CM

## 2017-08-16 DIAGNOSIS — E66.3 OVERWEIGHT (BMI 25.0-29.9): Primary | ICD-10-CM

## 2017-08-16 PROCEDURE — 3078F DIAST BP <80 MM HG: CPT | Mod: S$GLB,,, | Performed by: INTERNAL MEDICINE

## 2017-08-16 PROCEDURE — 99213 OFFICE O/P EST LOW 20 MIN: CPT | Mod: S$GLB,,, | Performed by: INTERNAL MEDICINE

## 2017-08-16 PROCEDURE — 1159F MED LIST DOCD IN RCRD: CPT | Mod: S$GLB,,, | Performed by: INTERNAL MEDICINE

## 2017-08-16 PROCEDURE — 3074F SYST BP LT 130 MM HG: CPT | Mod: S$GLB,,, | Performed by: INTERNAL MEDICINE

## 2017-08-16 PROCEDURE — 1126F AMNT PAIN NOTED NONE PRSNT: CPT | Mod: S$GLB,,, | Performed by: INTERNAL MEDICINE

## 2017-08-16 PROCEDURE — 99999 PR PBB SHADOW E&M-EST. PATIENT-LVL III: CPT | Mod: PBBFAC,,, | Performed by: INTERNAL MEDICINE

## 2017-08-16 RX ORDER — PHENTERMINE HYDROCHLORIDE 37.5 MG/1
37.5 TABLET ORAL
Qty: 30 TABLET | Refills: 1 | Status: SHIPPED | OUTPATIENT
Start: 2017-08-16 | End: 2017-09-15

## 2017-08-16 NOTE — PROGRESS NOTES
"Subjective:       Patient ID: Carmen Tamayo is a 67 y.o. female.    Chief Complaint: Follow-up    Pt here today for follow up. Has lost 2 lbs from previous visit, net 18 lbs neg total. Has been on diethylpropion x 1 months.    . Denies significant SE. She finds that it is working well. She has a garden she is working on and a bike she can start riding, but the weather has not allowed her to do it much.        Review of Systems   Constitutional: Negative for chills and fever.   Respiratory: Negative for apnea and shortness of breath.         + snores   Cardiovascular: Negative for chest pain and leg swelling.   Gastrointestinal: Negative for constipation and diarrhea.        Denies HB   Genitourinary: Negative for dysuria.   Musculoskeletal: Positive for arthralgias. Negative for back pain.        Left foot OA   Neurological: Negative for dizziness and light-headedness.   Psychiatric/Behavioral: Negative for dysphoric mood. The patient is not nervous/anxious.         Doing well on Lexapro       Objective:       /78   Pulse 73   Ht 5' 2" (1.575 m)   Wt 70.2 kg (154 lb 12.2 oz)   BMI 28.31 kg/m²     Physical Exam   Constitutional: She is oriented to person, place, and time. She appears well-developed. No distress.   Obese     HENT:   Head: Normocephalic and atraumatic.   Eyes: EOM are normal. Pupils are equal, round, and reactive to light. No scleral icterus.   Neck: Normal range of motion. Neck supple.   Cardiovascular: Normal rate.    Pulmonary/Chest: Effort normal.   Musculoskeletal: Normal range of motion. She exhibits no edema.   Neurological: She is alert and oriented to person, place, and time. No cranial nerve deficit.   Skin: Skin is warm and dry. No erythema.   Psychiatric: She has a normal mood and affect. Her behavior is normal. Judgment normal.   Vitals reviewed.      Assessment:       No diagnosis found.    Plan:          1. Overweight (BMI 25.0-29.9)   She is doing well. Increasing " exercise will help weight loss to be a bit quicker.     Comfort food recipes given.           3 meals a day made up of the following:  Unlimited green vegetables, tomatoes, mushrooms, spaghetti squash, cauliflower, meat, poultry, seafood, eggs and hard cheeses.   Milk and plain yogurt  Dressings, seasonings, condiments, etc should have less than 2 g sugars.   beans or nuts can have 1 x a day.   1-2 servings of citrus fruits, berries, pineapple or melon a day (1/2 cup)  Avoid fried foods    No grains, rice, pasta, potatoes or bread    No soda, sweet tea, juices or lemonade    Can use a protein bar or shake for a meal.    [email or web address]   moderate carb intake    Add some type of resistance training 2-3 days a week. These can be body weight exercises, light weight or elastic bands. [employer] and [employer] are great sources for free work out plans and videos.     Patient warned of common side effects of phentermine including anxiety, insomnia, palpitations and increased blood pressure. It was also explained that it is for short-term usage along with diet and exercise, and that stopping the medication without making lifestyle changes will result in regain of weight. Patient states understanding.   Caution with age > 64yo discussed.        Return in about 8 weeks

## 2017-08-16 NOTE — PATIENT INSTRUCTIONS
3 meals a day made up of the following:  Unlimited green vegetables, tomatoes, mushrooms, spaghetti squash, cauliflower, meat, poultry, seafood, eggs and hard cheeses.   Milk and plain yogurt  Dressings, seasonings, condiments, etc should have less than 2 g sugars.   beans or nuts can have 1 x a day.   1-2 servings of citrus fruits, berries, pineapple or melon a day (1/2 cup)  Avoid fried foods    No grains, rice, pasta, potatoes or bread    No soda, sweet tea, juices or lemonade    Can use a protein bar or shake for a meal.    [email or web address] Healthy World   moderate carb intake    Add some type of resistance training 2-3 days a week. These can be body weight exercises, light weight or elastic bands. "[employer] and [employer] are great sources for free work out plans and videos.     Patient warned of common side effects of phentermine including anxiety, insomnia, palpitations and increased blood pressure. It was also explained that it is for short-term usage along with diet and exercise, and that stopping the medication without making lifestyle changes will result in regain of weight. Patient states understanding.   Caution with age > 66yo discussed.       Lower Carb Comfort Food Dupes      Skinny Bell Pepper Ezequiel Boats  Yields: 18 boats  Servin boats  Calories: 145  Total Fat: 9g  Saturated Fat: 4g  Trans Fat: 0g  Cholesterol: 50mg  Sodium: 293mg  Carbohydrates: 4g  Fiber: 1g  Sugars: 2g  Protein: 13g  SmartPoints: 4     Ingredients   1 pound lean ground turkey   1 teaspoons chili powder   1 teaspoon cumin   1/2 teaspoon black pepper   1/4 teaspoon kosher or sea salt   3/4 cup salsa, no sugar added   1 cup grated cheddar cheese, reduced-fat   3 bell peppers  Directions  Remove seeds, core, and membrane from bell peppers then slice each one into 6 verticle pieces where they dip down. Set sliced bell peppers aside.  Cook ground turkey over medium-high heat, breaking up as it cooks. Cook until the turkey  loses it's pink color and is cooked through. Drain off any fat.  Preheat oven to 375 degrees.  Combine cooked turkey with spices and salsa. Evenly distribute mixture into the bell pepper boats, top with cheese. Bake on a parchment lined baking sheet for 10 minutes or until cheese is melted and peppers are hot.  NOTE: If you prefer much softer bell peppers, add a few tablespoons water to the bottom of a large casserole dish, add filled nachos, cover tightly with foil and bake 15 minutes.  Remove from the oven and add additional toppings, If desired.  Optional ingredients: sliced Jalepeno peppers, diced avocado, fat-free Greek yogurt or sour cream, or sliced green onions.    [place] Chicken Spaghetti Squash  Yields: 4 servings  Calories: 457  Total Fat: 23g  Saturated Fat: 8g  Trans Fat: 0g  Cholesterol: 201mg  Sodium: 1146mg  Carbohydrates: 19g  Fiber: 4g  Sugar: 9g  Protein: 44g  SmartPoints: 13    Ingredients   1 large spaghetti squash   1 small onion, diced   2 medium carrots, diced   2 celery stalks, diced   1 pound cooked chicken, shredded   1/2 cup hot sauce   1/4 cup Homemade Ranch dressing   1/2 teaspoon garlic powder   salt and pepper to taste   1 cup low-fat shredded cheddar cheese   2 eggs   1/4 cup green onion, chopped  Directions  Preheat oven to 400 degrees F and spray a baking sheet with cooking spray.  Slice your spaghetti squash in half lengthwise and scoop out the seeds, then spray the cut side of the squash with a little olive oil cooking spray and place cut side down on the baking pan.  Roast spaghetti squash for 30-45 minutes or until it is tender.  While the squash is cooking, sauté the onion, celery, and carrots until softened and mostly cooked through.  In a large bowl, combine the sauteed vegetables, chicken, hot sauce, ranch dressing, garlic powder, salt, pepper, eggs, and cheese.  Once the squash is cooked through, allow to cool slightly then use a fork to scrape the  insides into your chicken mixture, making sure not to tear the skins.  Make sure that the spaghetti squash is well incorporated with the other ingredients, then divide the mixture between the spaghetti squash halves.  Bake at 350 degrees for 30-35 minutes, or until hot and bubbly.  Remove from the oven and garnish with the green onions and additional ranch or hot sauce if desired.    Vikashclaudine Zucchini Boats  Servings: 8 zucchini boats  Ingredients   Report this ad    4 medium zucchini (2 1/2 lbs), sliced into halves through the length*   1 cup (8.6 oz) part-skim ricotta cheese   1 large egg   1 1/2 Tbsp chopped fresh parsley , plus more for garnish   1 1/4 cups (5 oz) shredded mozzarella cheese   1/2 cup (2 oz) finely shredded parmesan cheese   8 oz 93% lean ground beef or lean ground turkey   4 tsp olive oil , divided   Salt and freshly ground black pepper   1 3/4 cup roasted garlic marinara sauce (low sugar)   1 Tbsp chopped fresh basil , plus more for garnish  Instructions  1. Preheat oven to 400 degrees. Using a spoon, scoop centers from zucchini while leaving a 1/4-inch rim to create boats. Set aside.  2. In a mixing bowl stir together ricotta cheese, egg and 1 1/2 Tbsp of the parsley. Season lightly with salt and pepper. Stir in 1/2 cup of the mozzarella cheese and the parmesan cheese. Set aside.  3. Heat 2 tsp of the olive oil in a large non-stick skillet over medium-high heat. Crumble beef into pan, season with salt and pepper and cook, stirring occasionally and breaking up beef when stirring, until browned (there shouldn't be any excess fat but if you happened to use a fattier beef then just drain excess rendered fat). Stir in marinara sauce and 1 Tbsp of the basil, remove from heat.  4. To assemble boats, brush both sides of of zucchini lightly with remaining 2 tsp olive oil and place in two baking pans (I used a 13 by 9 and a 9 by 9). Divide cheese mixture among zucchini spooning about 2 1/2  Tbsp into each, then spread cheese mixture into and even layer. Divide sauce among zucchini adding a few heaping spoonfuls to each. Cover baking dishes with foil and place in oven side by side and bake in preheated oven 30 minutes. Remove from oven, sprinkle tops with remaining 3/4 cup mozzarella, return to oven and bake until cheese has melted and zucchini is tender, about 5 minutes. Sprinkle tops with with fresh basil and parsley and serve warm.  5. *Look for zucchini that is wider and more uniform in width. The skinnier zucchini won't fit much filling.  6. Recipe source: [employer]    Chicken Avocado Lime Soup  Prep Time: 15 minutes  Cook Time: 20 minutes  Ingredients   Report this ad    1 1/2 lbs boneless skinless chicken breasts*   1 Tbsp olive oil   1 cup chopped green onions (including whites, mince the whites)   2 jalapeños , seeded and minced (leave seeds if you want soup spicy, omit if you don't like heat)   2 cloves garlic , minced   4 (14.5 oz) cans low-sodium chicken broth   2 Shayla tomatoes , seeded and diced   1/2 tsp ground cumin   Salt and freshly ground black pepper   1/3 cup chopped cilantro   3 Tbsp fresh lime juice   3 medium avocados , peeled, cored and diced   Tortilla chips , migel joe cheese, sour cream for serving (optional)    Instructions  1. In a large pot heat 1 Tbsp olive oil over medium heat. Once hot, add green onions and jalapenos and saute until tender, about 2 minutes, adding garlic during last 30 seconds of sauteing. Add chicken broth, tomatoes, cumin, season with salt and pepper to taste and add chicken breasts. Bring mixture to a boil over medium-high heat. Then reduce heat to medium, cover with lid and allow to cook, stirring occasionally, until chicken has cooked through 10 - 15 minutes (cook time will vary based on thickness of chicken breasts). Reduce burner to warm heat, remove chicken from pan and let rest on a cutting board 5 minutes, then shred  "chicken and return to soup. Stir in cilantro and lime juice. Add avocados to soup just before serving (if you don't plan on serving the soup right away, I would recommend adding the avocados to each bowl individually, about 1/2 an avocado per serving). Serve with tortilla chips, cheese and sour cream if desired.  2. *For thicker chicken breasts, cut breasts in half through the length (thickness) of the breasts, they will cook faster and more evenly.  3. Recipe Source: adapted slightly from [facility]        CAULIFLOWER "MAC" AND CHEESE    INGREDIENTS   1 small head cauliflower cut into small florets about 5-6 cups   1/2 small onion, diced   1 teaspoon olive oil   Kosher salt   Freshly ground black pepper   2 tablespoons parsley   1 teaspoon paprika   2 tablespoons butter   2 tablespoons flour   1 1/4 cups milk, (whole milk or coconut is best)   1/2 teaspoon granulated garlic   1 teaspoon mustard (optional)   1/2 teaspoon paprika   2 1/2 cups grated extra sharp cheddar cheese (reserve 1/2 cup)     PREPARATION  1. Preheat oven to 400°F. Place cauliflower florets on a baking sheet, mix with diced onion and oil, sprinkle with salt and pepper. Roast for 20-25 minutes tossing half way through until browned.  2. Warm the milk in the microwave, so it is just heated through (this helps prevent the cheese sauce from clumping). Heat a medium saucepan over medium med-high heat. Add 2 tablespoons butter and flour then whisk for 2 minutes (until a smooth tiffany is made), add milk and continue whisking until sauce thickens. Once smooth add salt and pepper and other spices. Then add the cheese reserving 1/2 cup. Mix with spatula and add cauliflower, stir gently. Now add the reserved cheese, mix just enough to evenly distribute.   4. Place mixture into a 8x8 inch greased casserole dish and cover with paprika and parsley. Bake in oven for 20 minutes until toasty and bubbly.         "

## 2017-09-08 ENCOUNTER — TELEPHONE (OUTPATIENT)
Dept: BARIATRICS | Facility: CLINIC | Age: 67
End: 2017-09-08

## 2017-09-12 NOTE — TELEPHONE ENCOUNTER
[facility] and they have it on file they will fill for pt and I called pt and left her a msg to let her know she can  in pharm

## 2017-10-16 ENCOUNTER — TELEPHONE (OUTPATIENT)
Dept: INTERNAL MEDICINE | Facility: CLINIC | Age: 67
End: 2017-10-16

## 2017-10-16 DIAGNOSIS — Z00.00 WELLNESS EXAMINATION: Primary | ICD-10-CM

## 2017-10-16 NOTE — TELEPHONE ENCOUNTER
Procedures Ordered This Visit    CBC auto differential              Comprehensive metabolic panel              Hemoglobin A1c              Lipid panel

## 2017-10-16 NOTE — TELEPHONE ENCOUNTER
----- Message from Roseline Condon sent at 10/16/2017 10:16 AM CDT -----  Contact: Pt at [phone]  Doctor appointment and lab have been scheduled.  Please link lab orders to the lab appointment.  Date of doctor appointment:  1/9  Physical or EP:  physical  Date of lab appointment:  1/3  Comments:

## 2017-10-24 ENCOUNTER — TELEPHONE (OUTPATIENT)
Dept: BARIATRICS | Facility: CLINIC | Age: 67
End: 2017-10-24

## 2017-10-25 ENCOUNTER — OFFICE VISIT (OUTPATIENT)
Dept: BARIATRICS | Facility: CLINIC | Age: 67
End: 2017-10-25
Payer: COMMERCIAL

## 2017-10-25 VITALS
WEIGHT: 156.06 LBS | HEART RATE: 87 BPM | SYSTOLIC BLOOD PRESSURE: 130 MMHG | BODY MASS INDEX: 28.72 KG/M2 | DIASTOLIC BLOOD PRESSURE: 60 MMHG | HEIGHT: 62 IN

## 2017-10-25 DIAGNOSIS — E66.3 OVERWEIGHT (BMI 25.0-29.9): Primary | ICD-10-CM

## 2017-10-25 PROCEDURE — 99999 PR PBB SHADOW E&M-EST. PATIENT-LVL III: CPT | Mod: PBBFAC,,, | Performed by: INTERNAL MEDICINE

## 2017-10-25 PROCEDURE — 99213 OFFICE O/P EST LOW 20 MIN: CPT | Mod: S$GLB,,, | Performed by: INTERNAL MEDICINE

## 2017-10-25 RX ORDER — DIETHYLPROPION HYDROCHLORIDE 75 MG/1
75 TABLET, EXTENDED RELEASE ORAL DAILY
Qty: 30 TABLET | Refills: 2 | Status: SHIPPED | OUTPATIENT
Start: 2017-10-25 | End: 2018-01-18

## 2017-10-25 NOTE — PROGRESS NOTES
"Subjective:       Patient ID: Carmen Tamayo is a 67 y.o. female.    Chief Complaint: Follow-up    Pt here today for follow up. Has gained 2 lbs from previous visit, net 16 lbs neg total. Has been on phentermine x 3 months.    . Denies significant SE. She finds that it is working well. She has a garden she is working on and a bike she can start riding, but the weather has not allowed her to do it much.        Review of Systems   Constitutional: Negative for chills and fever.   Respiratory: Negative for apnea and shortness of breath.         + snores   Cardiovascular: Negative for chest pain and leg swelling.   Gastrointestinal: Negative for constipation and diarrhea.        Denies HB   Genitourinary: Negative for dysuria.   Musculoskeletal: Positive for arthralgias. Negative for back pain.        Left foot OA   Neurological: Negative for dizziness and light-headedness.   Psychiatric/Behavioral: Negative for dysphoric mood. The patient is not nervous/anxious.         Doing well on Lexapro       Objective:       /60   Pulse 87   Ht 5' 2" (1.575 m)   Wt 70.8 kg (156 lb 1.4 oz)   BMI 28.55 kg/m²     Physical Exam   Constitutional: She is oriented to person, place, and time. She appears well-developed. No distress.   Obese     HENT:   Head: Normocephalic and atraumatic.   Eyes: EOM are normal. Pupils are equal, round, and reactive to light. No scleral icterus.   Neck: Normal range of motion. Neck supple.   Cardiovascular: Normal rate.    Pulmonary/Chest: Effort normal.   Musculoskeletal: Normal range of motion. She exhibits no edema.   Neurological: She is alert and oriented to person, place, and time. No cranial nerve deficit.   Skin: Skin is warm and dry. No erythema.   Psychiatric: She has a normal mood and affect. Her behavior is normal. Judgment normal.   Vitals reviewed.      Assessment:       1. Overweight (BMI 25.0-29.9)        Plan:          1. Overweight (BMI 25.0-29.9)   Will se how she is " progressing in a few months, and decide if we need to change course with meds.           3 meals a day made up of the following:  Unlimited green vegetables, tomatoes, mushrooms, spaghetti squash, cauliflower, meat, poultry, seafood, eggs and hard cheeses.   Milk and plain yogurt  Dressings, seasonings, condiments, etc should have less than 2 g sugars.   beans or nuts can have 1 x a day.   1-2 servings of citrus fruits, berries, pineapple or melon a day (1/2 cup)  Avoid fried foods    No grains, rice, pasta, potatoes or bread    No soda, sweet tea, juices or lemonade    Can use a protein bar or shake for a meal.    [email or web address]   moderate carb intake    Add some type of resistance training 2-3 days a week. These can be body weight exercises, light weight or elastic bands. [employer] and [employer] are great sources for free work out plans and videos.     Patient warned of common side effects of phentermine including anxiety, insomnia, palpitations and increased blood pressure. It was also explained that it is for short-term usage along with diet and exercise, and that stopping the medication without making lifestyle changes will result in regain of weight. Patient states understanding.   Caution with age > 64yo discussed.

## 2017-10-25 NOTE — PATIENT INSTRUCTIONS
3 meals a day made up of the following:  Unlimited green vegetables, tomatoes, mushrooms, spaghetti squash, cauliflower, meat, poultry, seafood, eggs and hard cheeses.   Milk and plain yogurt  Dressings, seasonings, condiments, etc should have less than 2 g sugars.   beans or nuts can have 1 x a day.   1-2 servings of citrus fruits, berries, pineapple or melon a day (1/2 cup)  Avoid fried foods    No grains, rice, pasta, potatoes or bread    No soda, sweet tea, juices or lemonade    Can use a protein bar or shake for a meal.    Www.dietdoctor[employer]   moderate carb intake    Add some type of resistance training 2-3 days a week. These can be body weight exercises, light weight or elastic bands. [employer] and [employer] are great sources for free work out plans and videos.     Patient warned of common side effects of diethylpropion including anxiety, insomnia, palpitations and increased blood pressure. It was also explained that it is for short-term usage along with diet and exercise, and that stopping the medication without making lifestyle changes will result in regain of weight. Patient states understanding.    Weight loss medications are controlled substances.  They require routine follow up. Prescription or pills that are lost or destroyed will not be replaced.      Caution with age > 64yo discussed.     Helpful tips to survive the holidays:  - Dont save yourself for the meal: Make sure you continue to eat high protein small meals every 3-4 hours to ensure to do not become over-hungry. Avoid temptation by not showing up to a holiday party or gathering hungry.   - Plan ahead. Bring a dish to a party if you know there may not be an appropriate option.   - Choose sugar-free drinks: Stick to water or other sugar-free beverages and make sure you are getting 6-8 cups of fluid each day (but not with meals!). Avoid alcohol, carbonated beverages, and high-fat/high-sugar beverages like hot chocolate and eggnog. Try sugar-free  hot cocoa made with skim milk or water, or sugar-free spiced tea to add some holiday flair to your beverage (see sugar-free mulled cider recipe below)  - Take your time: Eat mindfully. Dont graze on food throughout the day. Sit down to enjoy your small meals. Chew slowly and thoroughly. Cut your food into small pieces before eating.  - Listen to your body: Stop eating as soon as you feel full. Do not feel pressured to try certain (or all) foods or to eat all of the food on your plate. Listen to your hunger cues.   - REMEMBER: Make your holidays about spending time with family and friends instead of focusing gatherings around food.  - Keep up your exercise routine: Make sure you continue to get regular exercise throughout the holiday season. Encourage friends and family to be active by taking a walk together after a meal, to look at holiday lights, or to window-shop.    Good Holiday Meal Options:  - Roasted Turkey, NO skin. Use low sodium broth instead of gravy.   - Stuffed Bell Peppers made WITHOUT bread crumbs or Rice. Try using parmesan cheese instead  - Gumbo, NO rice. Try picking out mostly the meat/seafood and vegetables with little broth.   - Green Bean Casserole made with 98% fat free cream of mushroom soup and crushed almonds/pecans instead of fried onions  - Side salad w/ low fat dressing. Try a different kind of salad maybe use Kale or spinach.   - Roasted non-starchy vegetables like brussel sprouts, broccoli, green beans, zucchini, butternut squash, cauliflower  - Cauliflower Mash (steam or roast cauliflower, puree w/ low fat cheese, dash of fat free milk and 2-3 sprays of I cant believe its not butter spray. Add garlic powder and black pepper to season). Use Low sodium broth instead of gravy.   - Try Loaded Cauliflower Mash (Make cauliflower like above cauliflower mash. Top with diced turkey giordano, ¼ cup low fat cheddar cheese and bake @ 350* F for 5-10 minutes, until cheese is melted. Top with minced  chives, black pepper and garlic to taste).   - Homemade cranberry sauce using Splenda or another alternative sweetener. Boil fresh cranberries and add splenda to taste. Boil until cranberries break open and then simmer until it reaches the consistency you want (less time for more watery sauce and simmer for longer to create a thicker sauce).   - Deviled eggs: make using low fat gottlieb, mustard, DILL relish (not sweet relish).   - Vegetable tray w/ Greek yogurt Ranch Dip. Mix 1 packet of [place] ranch dip mix w/ 16 oz low fat plain greek yogurt.     Good Holiday Dessert Options:  - High protein Pumpkin Cheesecake (see recipe below)  - Pumpkin Whip (see recipe below)  - Quest Apple Pie or Cinnamon Roll flavored protein bar (warm in microwave for 10-15 seconds)  - Eggnog Protein shake (see recipe below)  - Fresh fruit w/ low fat cheese  - Sugar-free Jello Parfaits. Layer Red and Green sugar-free jello in cups and top w/ 2 tbsp Sugar-free cool-whip    Pumpkin Cheesecake    8 ounces fat free cream cheese, softened   2 scoops of vanilla protein powder (<4 g sugar per serving)   ¼ tsp Fine salt   2 eggs, at room temperature   1/3 cup fat free sour cream  1/3 cup fat free half and half  1 15 -ounce can pure pumpkin puree   1 tablespoon pumpkin pie spice, plus more for dusting   Unsalted nuts, crushed  *Add splenda to taste    Directions     1. Preheat the oven to 300 degrees F. Line 18 muffin cups with paper liners. Sprinkle 1 tsp crushed unsalted nuts at the bottom of each of muffin cup liner.     2. In a large bowl, beat the cream cheese, vanilla protein powder and 1/4 teaspoon fine salt on medium-high speed until smooth and creamy, 2 to 3 minutes. Scrape down the sides, reduce speed to low and beat in the eggs, 1 at a time, until combined. Beat in 1/3 cup fat free sour cream and fat free half and half. Stir in the pumpkin puree and pumpkin pie spice until smooth. Divide evenly among cookie-lined paper cups, filling  almost all the way to the top.     3. Bake until the filling is just set, 40 to 45 minutes. A sharp knife inserted into the center will come out moist, but clean. Cool completely in tins on a wire rack. Refrigerate until cold, 4 hours, or overnight. Top with a dusting of pumpkin pie spice.    Recipe altered from the following recipe: http://www.[employer]/recipes/food-network-jorge/mini-pumpkin-cheesecakes-recipe.print.html?oc=linkback    Pumpkin Whip    Box of sugar-free vanilla pudding  Can of pumpkin puree  Pumpkin Pie spice (sprinkle to taste)  ½ cup of sugar-free Cool Whip    Directions:  Make sugar-free pudding according to package directions using fat free or 1% milk. Stir in pumpkin and cool whip. Add pumpkin pie spice to taste.     Egg Nog Protein shake    8 oz skim or 1% milk  1 scoop vanilla protein powder  1 tbsp sugar-free vanilla pudding mix  ½ tsp butter flavor extract  ½ tsp rum extract  ½ tsp cinnamon     Shake together or blend with ice and serve.     Sugar-Free Mulled Cider    3 oz diet cran-apple juice  6 oz water  1 packet sugar-free apple cider mix  ½ tsp apple pie spice  ½ tsp butter flavor extract  1 tbsp Sugar-free Syrup    Mix together. Warm if needed and serve w/ orange wedge and cinnamon stick.       Eating well to be healthy and lose weight does not have to be hard. It also does not have to be time consuming or expensive. There a lots of ways you can work in healthy choices into your day. Many of these are easy, quick and even family friendly!    Homemade hazelnut au lait  Brew your favorite brand of hazelnut flavored coffee ([employer] makes a good one). Microwave 1/2 cup of milk that fits your eating plan (whole, skim or sugar-free almond milk can all work). Add half to 1 oz sugar free hazelnut syrup.     Quick and easy breakfast  1-2 boiled eggs or mini-frittatas with a tangerine. The boiled eggs and mini-frittatas can both be made ahead and last for up to 4 days in the  refrigerator. Bonus if you portion them out in ready to go containers or zipper bags.     Breakfast Egg Muffins with Giordano and Spinach  Makes 12 muffins  Ingredients    6 eggs  ¼ cup milk  ¼ teaspoon salt  2 cups grated cheddar cheese  3/4 cup spinach, cooked and drained (about 8 oz fresh spinach)  6 giordano slices, cooked, drained of fat, and chopped  1/2 cup grated Parmesan cheese (optional)    Instructions      Preheat oven to 350 degrees. Use a regular 12-cup muffin pan. Spray the muffin pan with non-stick cooking spray.  In a large bowl, beat eggs until smooth. Add milk, salt, Cheddar cheese and mix. Stir spinach, cooked giordano into the egg mixture. Ladle the egg mixture into greased muffin cups ¾ full.  Top each muffin cup with grated Parmesan cheese.  Bake for 25 minutes. Remove from the oven, let the muffins cool for 30 minutes before removing them from the pan.      Be a brown bagger! When you make dinner, plan for an extra helping. When you serve your plate for dinner, serve an additional helping into a container that you can take with you the next day. If you don't have a refrigerator available during the day, an insulated lunch bag and ice packs will help you safely store you lunch.     Cold Brewed Iced tea. Fill a pitcher with 64 oz filtered water. Add either 4 regular tea bags of your choice or a large iced tea bag. Refrigerate over night then remove the tea bags. The tea will not be bitter and is super flavorful. Get creative! Try combinations like green tea and hibiscus tea or black tea with lemon zinger. Add orange or lemon slices for even more flavor.     Snack wisely. Protein filled snacks will fill you up, allowing you to get by with fewer calories. String cheese, pork skins (chicharrones), turkey pepperoni, or celery with cream cheese will all fit the bill.       Ditch the take out. Turkey tacos (with or without a low carb tortilla), burgers (without the bun), or fun stir fries are all quick and  easy. The whole family will be happy, and you can save calories and money.      Orange Chicken Stir botello with asparagus   Makes 6 servings  Ingredients:    1.5 lbs boneless skinless chicken breast/tenders, diced into 1-inch pieces  1 Tbsp extra virgin olive or avocado oil, divided  2 lb asparagus, end portions trimmed and remainder diced into 1 1/2-inch pieces  1 small yellow onion, sliced into thin strips  8 oz button mushrooms, sliced  1 Tbsp peeled and finely grated fresh phan  4 cloves garlic, minced  1/2 cup low-sodium chicken broth  Juice of 2 fresh oranges  2 Tbsp low sodium soy sauce  2 Tbsp cornstarch  Sea salt and freshly ground black pepper    Directions:    In a 12-inch non-stick wok, heat 1/2 oil over moderately high heat. Once oil is hot, add diced chicken and season lightly with salt and pepper. Sauté until cooked through, tossing occasionally, about 5-6 minutes.  Place chicken on a large plate and set aside. Return wok, reduce to medium-high heat, add remaining oil.  Once oil is hot, add asparagus, yellow onion and mushrooms, and sauté until tender-crisp, about 4 - 5 minutes, adding in garlic and phan during the last 1 minute of sautéing.  Meanwhile, in a mixing bowl whisk together chicken broth, orange juice, soy sauce and cornstarch until well blended.  Pour chicken broth mixture into skillet with veggies, season with salt and pepper to taste, and bring mixture to a light boil, stirring constantly. Allow mixture to gently boil, stirring constantly, until thickened, about 1 minute.  Toss chicken into mixture and serve immediately over cauliflower rice or Shirataki noodles.      Skinny Chicken Tortilla Soup  Makes 7 servings    2 teaspoons olive oil  1 cup onion, chopped (about 1 small)  2 cups celery, sliced (about 4 medium stalks)  4 garlic cloves, minced  4 medium tomatoes, chopped  2 cups water  4 cups low-sodium organic chicken broth  3 cups chopped and/or shredded rotisserie chicken,  skinless  2 cups sliced carrots (about 3 medium)  1 teaspoon dried oregano leaves  2 teaspoons chili powder  1 teaspoon garlic powder  2 teaspoons cumin  ½ teaspoon cayenne pepper (add less or omit, if you don't want a spicy soup)  ½ teaspoon sea salt + more to taste  ½ teaspoon pepper + more to taste    Directions:   Put all ingredients into a large crock pot. Cook on low for 5-6 hours.     Optional garnish with chopped avocado, chopped fresh cilantro, crumbled Cotija cheese, sour cream, Greek yogurt, your favorite hot sauce.           Vegan Avocado Banana Chocolate Pudding  Makes 4 servings  Ingredients    1 1/2 ripe avocados  2 ripe bananas  6 tbsp raw cacao powder or unsweetened cocoa powder  2-3 tbsp maple syrup (or calorie free sweetener)  1/4 cup almond milk  Instructions    Blend everything together in a  until the consistency is smooth and velvety. Taste and see if more sweetener is needed and stir to make sure everything is evenly mixed. Blend a second time if needed.  Top with banana slices, raw cacao nibs, almond butter, or any other toppings and enjoy!

## 2017-11-13 DIAGNOSIS — E66.3 OVERWEIGHT (BMI 25.0-29.9): ICD-10-CM

## 2017-11-13 RX ORDER — DIETHYLPROPION HYDROCHLORIDE 75 MG/1
75 TABLET, EXTENDED RELEASE ORAL DAILY
Qty: 30 TABLET | Refills: 2 | Status: CANCELLED | OUTPATIENT
Start: 2017-11-13

## 2017-12-08 RX ORDER — METOPROLOL SUCCINATE 50 MG/1
TABLET, EXTENDED RELEASE ORAL
Qty: 90 TABLET | Refills: 3 | Status: SHIPPED | OUTPATIENT
Start: 2017-12-08 | End: 2019-01-21

## 2017-12-19 RX ORDER — TRIAMTERENE AND HYDROCHLOROTHIAZIDE 37.5; 25 MG/1; MG/1
CAPSULE ORAL
Qty: 90 CAPSULE | Refills: 3 | Status: SHIPPED | OUTPATIENT
Start: 2017-12-19 | End: 2019-01-08 | Stop reason: SDUPTHER

## 2017-12-20 DIAGNOSIS — L81.4 LENTIGO: Primary | ICD-10-CM

## 2017-12-20 RX ORDER — TRETINOIN 1 MG/G
CREAM TOPICAL
Qty: 45 G | Refills: 3 | Status: SHIPPED | OUTPATIENT
Start: 2017-12-20 | End: 2019-01-14 | Stop reason: SDUPTHER

## 2018-01-02 ENCOUNTER — DOCUMENTATION ONLY (OUTPATIENT)
Dept: DERMATOLOGY | Facility: CLINIC | Age: 68
End: 2018-01-02

## 2018-01-03 ENCOUNTER — LAB VISIT (OUTPATIENT)
Dept: LAB | Facility: HOSPITAL | Age: 68
End: 2018-01-03
Attending: INTERNAL MEDICINE
Payer: COMMERCIAL

## 2018-01-03 DIAGNOSIS — Z00.00 WELLNESS EXAMINATION: ICD-10-CM

## 2018-01-03 LAB
ALBUMIN SERPL BCP-MCNC: 4 G/DL
ALP SERPL-CCNC: 93 U/L
ALT SERPL W/O P-5'-P-CCNC: 24 U/L
ANION GAP SERPL CALC-SCNC: 8 MMOL/L
AST SERPL-CCNC: 25 U/L
BASOPHILS # BLD AUTO: 0.04 K/UL
BASOPHILS NFR BLD: 0.6 %
BILIRUB SERPL-MCNC: 0.5 MG/DL
BUN SERPL-MCNC: 13 MG/DL
CALCIUM SERPL-MCNC: 9.7 MG/DL
CHLORIDE SERPL-SCNC: 100 MMOL/L
CHOLEST SERPL-MCNC: 237 MG/DL
CHOLEST/HDLC SERPL: 3 {RATIO}
CO2 SERPL-SCNC: 28 MMOL/L
CREAT SERPL-MCNC: 0.7 MG/DL
DIFFERENTIAL METHOD: ABNORMAL
EOSINOPHIL # BLD AUTO: 0.2 K/UL
EOSINOPHIL NFR BLD: 2.5 %
ERYTHROCYTE [DISTWIDTH] IN BLOOD BY AUTOMATED COUNT: 12.4 %
EST. GFR  (AFRICAN AMERICAN): >60 ML/MIN/1.73 M^2
EST. GFR  (NON AFRICAN AMERICAN): >60 ML/MIN/1.73 M^2
ESTIMATED AVG GLUCOSE: 111 MG/DL
GLUCOSE SERPL-MCNC: 127 MG/DL
HBA1C MFR BLD HPLC: 5.5 %
HCT VFR BLD AUTO: 37.9 %
HDLC SERPL-MCNC: 78 MG/DL
HDLC SERPL: 32.9 %
HGB BLD-MCNC: 12.8 G/DL
LDLC SERPL CALC-MCNC: 142.4 MG/DL
LYMPHOCYTES # BLD AUTO: 1.6 K/UL
LYMPHOCYTES NFR BLD: 25.3 %
MCH RBC QN AUTO: 29.8 PG
MCHC RBC AUTO-ENTMCNC: 33.8 G/DL
MCV RBC AUTO: 88 FL
MONOCYTES # BLD AUTO: 0.6 K/UL
MONOCYTES NFR BLD: 9.6 %
NEUTROPHILS # BLD AUTO: 4 K/UL
NEUTROPHILS NFR BLD: 61.8 %
NONHDLC SERPL-MCNC: 159 MG/DL
PLATELET # BLD AUTO: 314 K/UL
PMV BLD AUTO: 9.1 FL
POTASSIUM SERPL-SCNC: 4.4 MMOL/L
PROT SERPL-MCNC: 7.5 G/DL
RBC # BLD AUTO: 4.29 M/UL
SODIUM SERPL-SCNC: 136 MMOL/L
TRIGL SERPL-MCNC: 83 MG/DL
WBC # BLD AUTO: 6.49 K/UL

## 2018-01-03 PROCEDURE — 85025 COMPLETE CBC W/AUTO DIFF WBC: CPT

## 2018-01-03 PROCEDURE — 80061 LIPID PANEL: CPT

## 2018-01-03 PROCEDURE — 83036 HEMOGLOBIN GLYCOSYLATED A1C: CPT

## 2018-01-03 PROCEDURE — 80053 COMPREHEN METABOLIC PANEL: CPT

## 2018-01-03 PROCEDURE — 36415 COLL VENOUS BLD VENIPUNCTURE: CPT

## 2018-01-09 ENCOUNTER — OFFICE VISIT (OUTPATIENT)
Dept: INTERNAL MEDICINE | Facility: CLINIC | Age: 68
End: 2018-01-09
Payer: COMMERCIAL

## 2018-01-09 VITALS
BODY MASS INDEX: 29.21 KG/M2 | DIASTOLIC BLOOD PRESSURE: 78 MMHG | WEIGHT: 158.75 LBS | HEIGHT: 62 IN | OXYGEN SATURATION: 96 % | HEART RATE: 78 BPM | SYSTOLIC BLOOD PRESSURE: 124 MMHG

## 2018-01-09 DIAGNOSIS — Z23 IMMUNIZATION DUE: ICD-10-CM

## 2018-01-09 DIAGNOSIS — I10 ESSENTIAL HYPERTENSION: ICD-10-CM

## 2018-01-09 DIAGNOSIS — Z00.00 PHYSICAL EXAM: Primary | ICD-10-CM

## 2018-01-09 DIAGNOSIS — Z83.719 FAMILY HISTORY OF COLONIC POLYPS: ICD-10-CM

## 2018-01-09 DIAGNOSIS — R73.09 ELEVATED GLUCOSE: ICD-10-CM

## 2018-01-09 DIAGNOSIS — Z12.11 COLON CANCER SCREENING: ICD-10-CM

## 2018-01-09 DIAGNOSIS — Z12.31 ENCOUNTER FOR SCREENING MAMMOGRAM FOR BREAST CANCER: ICD-10-CM

## 2018-01-09 DIAGNOSIS — E78.00 PURE HYPERCHOLESTEROLEMIA: ICD-10-CM

## 2018-01-09 PROCEDURE — 90471 IMMUNIZATION ADMIN: CPT | Mod: S$GLB,,, | Performed by: INTERNAL MEDICINE

## 2018-01-09 PROCEDURE — 99999 PR PBB SHADOW E&M-EST. PATIENT-LVL IV: CPT | Mod: PBBFAC,,, | Performed by: INTERNAL MEDICINE

## 2018-01-09 PROCEDURE — 90732 PPSV23 VACC 2 YRS+ SUBQ/IM: CPT | Mod: S$GLB,,, | Performed by: INTERNAL MEDICINE

## 2018-01-09 PROCEDURE — 99397 PER PM REEVAL EST PAT 65+ YR: CPT | Mod: 25,S$GLB,, | Performed by: INTERNAL MEDICINE

## 2018-01-09 NOTE — PROGRESS NOTES
Subjective:      Patient ID: Carmen Tamayo is a 67 y.o. female.    Chief Complaint: Annual Exam    HPI:  HPI   Patient is here for her annual exam:    Annual exam: 1/9/2018  Colonoscopy: 8/2009 with follow up 8/2014: Sister had polyps. 1/27/2015:likely follow up 5 years ( although 10 years is on the colonoscopy). 1/2018 FIT  Mammogram: due 2/2017  Gyn: Dr. Mary Xie scheduled  Optho:2/2016 [place]: Dr. Darin Izquierdo due in 5/2018, needs follow up with Dr. Dunne. due  Derm: Dr. Chacko  appt 2018  Flu:2017  Tetanus: 12/15/2015  Shingles done  Pneumovax: 66 due today  Prevnar 65 6/15/2016  Bone Density:  Carotid ultrasound: 1/15/2014 normal    Labs:  Lipids:needs adherence to medication  CBC : normal  CMP: normal  A1C 5.5      Patient Active Problem List   Diagnosis    Hypertension    Hyperlipidemia    Anxiety    Screening for colon cancer    Primary osteoarthritis of left foot    Age-related macular degeneration, dry, both eyes    Posterior vitreous detachment of both eyes    Nuclear cataract of both eyes    Elevated glucose    Osteoarthritis of knee    Family history of colonic polyps     Past Medical History:   Diagnosis Date    Anxiety     Hyperlipidemia     Hypertension     Mitral valve prolapse      Past Surgical History:   Procedure Laterality Date    TONSILLECTOMY       Family History   Problem Relation Age of Onset    Cancer Mother      lung    Stroke Mother     Heart disease Father     Diabetes Father     Diabetes Brother     Aortic aneurysm Brother      surgery 5/2012    Kidney disease Brother      on dialysis    Melanoma Neg Hx     Breast cancer Neg Hx     Colon cancer Neg Hx     Ovarian cancer Neg Hx      Review of Systems   Constitutional: Negative for chills, fever and unexpected weight change.   HENT: Negative for ear pain, nosebleeds, sore throat, trouble swallowing and voice change.    Eyes: Negative for photophobia and visual disturbance.  "  Respiratory: Negative for cough, shortness of breath and wheezing.    Cardiovascular: Negative for chest pain, palpitations and leg swelling.   Gastrointestinal: Negative for abdominal distention, abdominal pain and blood in stool.   Genitourinary: Negative for difficulty urinating, dysuria, flank pain and hematuria.   Musculoskeletal: Negative for back pain, gait problem and joint swelling.   Skin: Negative for color change and rash.   Neurological: Negative for seizures and headaches.   Hematological: Negative for adenopathy. Does not bruise/bleed easily.   Psychiatric/Behavioral: Negative for dysphoric mood and suicidal ideas.     Objective:     Vitals:    01/09/18 1303   BP: 124/78   Pulse: 78   SpO2: 96%   Weight: 72 kg (158 lb 11.7 oz)   Height: 5' 2" (1.575 m)   PainSc: 0-No pain     Body mass index is 29.03 kg/m².  Physical Exam   Constitutional: She is oriented to person, place, and time. She appears well-developed and well-nourished. No distress.   HENT:   Right Ear: Tympanic membrane and ear canal normal.   Left Ear: Tympanic membrane and ear canal normal.   Nose: No sinus tenderness or nasal deformity.   Mouth/Throat: No oropharyngeal exudate or posterior oropharyngeal erythema.   Eyes: Conjunctivae, EOM and lids are normal. Pupils are equal, round, and reactive to light.   Neck: Carotid bruit is not present. No thyromegaly present.   Cardiovascular: Normal rate, regular rhythm and intact distal pulses.    Pulmonary/Chest: Effort normal and breath sounds normal. No respiratory distress.   Abdominal: Soft. Bowel sounds are normal. There is no tenderness.   Musculoskeletal: She exhibits no edema or tenderness.   Lymphadenopathy:        Head (right side): No submandibular adenopathy present.        Head (left side): No submandibular adenopathy present.     She has no cervical adenopathy.     She has no axillary adenopathy.        Right: No inguinal adenopathy present.        Left: No inguinal adenopathy " present.   Neurological: She is alert and oriented to person, place, and time. She has normal strength.   Skin: Skin is intact. No lesion noted.   Psychiatric: She has a normal mood and affect. Her speech is normal and behavior is normal.     Assessment:     1. Physical exam    2. Colon cancer screening    3. Encounter for screening mammogram for breast cancer    4. Elevated glucose    5. Pure hypercholesterolemia    6. Essential hypertension    7. Family history of colonic polyps    8. Immunization due      Plan:   Carmen was seen today for annual exam.    Diagnoses and all orders for this visit:    Physical exam: updated and reviewed    Colon cancer screening  -     Fecal Immunochemical Test (iFOBT); Future    Encounter for screening mammogram for breast cancer  -     Mammo Digital Screening Bilat with Tomosynthesis CAD; Future    Elevated glucose: A1C in normal range    Pure hypercholesterolemia: need adherence to meds    Essential hypertension: well controlled    Family history of colonic polyps: FIT Test    Immunization due  -     (In Office Administered) Pneumococcal Polysaccharide Vaccine (23 Valent) (SQ/IM)      Return in about 6 months (around 7/9/2018) for Follow up 6 months, Follow up with cbc, cmp, and lipid.     Medication List          Accurate as of 1/9/18  1:35 PM. If you have any questions, ask your nurse or doctor.               CONTINUE taking these medications    aspirin 81 MG EC tablet  Commonly known as:  ECOTRIN     biotin 300 mcg Tab     diclofenac sodium 1 % Gel  Apply 2 g topically once daily.     diethylpropion 75 mg Tbsr  Take 75 mg by mouth once daily.     escitalopram oxalate 10 MG tablet  Commonly known as:  LEXAPRO  Take 1 tablet (10 mg total) by mouth once daily.     ibuprofen 800 MG tablet  Commonly known as:  ADVIL,MOTRIN     metoprolol succinate 50 MG 24 hr tablet  Commonly known as:  TOPROL-XL  TAKE 1 TABLET BY MOUTH DAILY     simvastatin 40 MG tablet  Commonly known as:   ZOCOR  TAKE 1 TABLET BY MOUTH EVERY EVENING.     tretinoin 0.1 % cream  Commonly known as:  RETIN-A  APPLY A THIN FILM TO AFFECTED AREA EVERY EVENING AFTER MOISTURIZING.     triamterene-hydrochlorothiazide 37.5-25 mg 37.5-25 mg per capsule  Commonly known as:  DYAZIDE  TAKE ONE CAPSULE BY MOUTH EVERY DAY     vitamin D 1000 units Tab

## 2018-01-11 ENCOUNTER — OFFICE VISIT (OUTPATIENT)
Dept: DERMATOLOGY | Facility: CLINIC | Age: 68
End: 2018-01-11
Payer: COMMERCIAL

## 2018-01-11 DIAGNOSIS — D18.00 ANGIOMA: ICD-10-CM

## 2018-01-11 DIAGNOSIS — D22.9 NEVUS: Primary | ICD-10-CM

## 2018-01-11 DIAGNOSIS — L82.1 SK (SEBORRHEIC KERATOSIS): ICD-10-CM

## 2018-01-11 DIAGNOSIS — D48.5 NEOPLASM OF UNCERTAIN BEHAVIOR OF SKIN: ICD-10-CM

## 2018-01-11 DIAGNOSIS — L81.4 LENTIGO: ICD-10-CM

## 2018-01-11 PROCEDURE — 99999 PR PBB SHADOW E&M-EST. PATIENT-LVL II: CPT | Mod: PBBFAC,,, | Performed by: DERMATOLOGY

## 2018-01-11 PROCEDURE — 99214 OFFICE O/P EST MOD 30 MIN: CPT | Mod: S$GLB,,, | Performed by: DERMATOLOGY

## 2018-01-11 NOTE — PROGRESS NOTES
"  Subjective:       Patient ID:  Carmen Tamayo is a 67 y.o. female who presents for   Chief Complaint   Patient presents with    Lesion    Skin Check     Patient is here today for a "mole" check.   Pt has a history of  moderate sun exposure in the past.   Pt recalls several blistering sunburns in the past- non  Pt has history of tanning bed use- non  Pt has  had moles removed in the past- non  Pt has history of melanoma in first degree relatives-  None  C/o lesion chin x 2 motnhs.  Denies pain or bleeding. No tx.        Lesion         Review of Systems   Constitutional: Negative for fever, chills, fatigue and malaise.   Skin: Positive for daily sunscreen use.   Hematologic/Lymphatic: Bruises/bleeds easily.        Objective:    Physical Exam   Constitutional: She appears well-developed and well-nourished. No distress.   Neurological: She is alert and oriented to person, place, and time. She is not disoriented.   Psychiatric: She has a normal mood and affect.   Skin:   Areas Examined (abnormalities noted in diagram):   Scalp / Hair Palpated and Inspected  Head / Face Inspection Performed  Neck Inspection Performed  Chest / Axilla Inspection Performed  Abdomen Inspection Performed  Genitals / Buttocks / Groin Inspection Performed  Back Inspection Performed  RUE Inspected  LUE Inspection Performed  RLE Inspected  LLE Inspection Performed  Nails and Digits Inspection Performed                           Diagram Legend     Erythematous scaling macule/papule c/w actinic keratosis       Vascular papule c/w angioma      Pigmented verrucoid papule/plaque c/w seborrheic keratosis      Yellow umbilicated papule c/w sebaceous hyperplasia      Irregularly shaped tan macule c/w lentigo     1-2 mm smooth white papules consistent with Milia      Movable subcutaneous cyst with punctum c/w epidermal inclusion cyst      Subcutaneous movable cyst c/w pilar cyst      Firm pink to brown papule c/w dermatofibroma      Pedunculated " fleshy papule(s) c/w skin tag(s)      Evenly pigmented macule c/w junctional nevus     Mildly variegated pigmented, slightly irregular-bordered macule c/w mildly atypical nevus      Flesh colored to evenly pigmented papule c/w intradermal nevus       Pink pearly papule/plaque c/w basal cell carcinoma      Erythematous hyperkeratotic cursted plaque c/w SCC      Surgical scar with no sign of skin cancer recurrence      Open and closed comedones      Inflammatory papules and pustules      Verrucoid papule consistent consistent with wart     Erythematous eczematous patches and plaques     Dystrophic onycholytic nail with subungual debris c/w onychomycosis     Umbilicated papule    Erythematous-base heme-crusted tan verrucoid plaque consistent with inflamed seborrheic keratosis     Erythematous Silvery Scaling Plaque c/w Psoriasis     See annotation      Assessment / Plan:        Nevus  Discussed ABCDE's of nevi.  Monitor for new mole or moles that are becoming bigger, darker, irritated, or developing irregular borders. Brochure provided.    Lentigo  This is a benign hyperpigmented sun induced lesion. Daily sun protection will reduce the number of new lesions. Treatment of these benign lesions are considered cosmetic.  The nature of sun-induced photo-aging and skin cancers is discussed.  Sun avoidance, protective clothing, and the use of 30-SPF sunscreens is advised. Observe closely for skin damage/changes, and call if such occurs.    Angioma  These are benign vascular lesions that are inherited.  Treatment is not necessary.    SK (seborrheic keratosis)  These are benign inherited growths without a malignant potential. Reassurance given to patient. No treatment is necessary.     Neoplasm of uncertain behavior of skin- excoriated papule post scalp  D/c manipuulation   If does not resolve will do bx     Lip rhytides- refer to dr bennett, cont retin a to this area    Total body skin examination performed today including at  least 12 points as noted in physical examination. No lesions suspicious for malignancy noted.               Return in about 1 year (around 1/11/2019) for sooner if excoriation on post scalp does not resolve.

## 2018-01-18 ENCOUNTER — OFFICE VISIT (OUTPATIENT)
Dept: BARIATRICS | Facility: CLINIC | Age: 68
End: 2018-01-18
Payer: COMMERCIAL

## 2018-01-18 VITALS
WEIGHT: 157.63 LBS | SYSTOLIC BLOOD PRESSURE: 120 MMHG | HEART RATE: 73 BPM | DIASTOLIC BLOOD PRESSURE: 66 MMHG | HEIGHT: 62 IN | BODY MASS INDEX: 29.01 KG/M2

## 2018-01-18 DIAGNOSIS — E66.3 OVERWEIGHT (BMI 25.0-29.9): ICD-10-CM

## 2018-01-18 PROCEDURE — 99999 PR PBB SHADOW E&M-EST. PATIENT-LVL III: CPT | Mod: PBBFAC,,, | Performed by: INTERNAL MEDICINE

## 2018-01-18 PROCEDURE — 99213 OFFICE O/P EST LOW 20 MIN: CPT | Mod: S$GLB,,, | Performed by: INTERNAL MEDICINE

## 2018-01-18 RX ORDER — DIETHYLPROPION HYDROCHLORIDE 75 MG/1
75 TABLET, EXTENDED RELEASE ORAL DAILY
Qty: 30 TABLET | Refills: 2 | Status: SHIPPED | OUTPATIENT
Start: 2018-01-18 | End: 2018-04-30

## 2018-01-18 NOTE — PATIENT INSTRUCTIONS
3 meals a day made up of the following:  Unlimited green vegetables, tomatoes, mushrooms, spaghetti squash, cauliflower, meat, poultry, seafood, eggs and hard cheeses.   Milk and plain yogurt  Dressings, seasonings, condiments, etc should have less than 2 g sugars.   beans or nuts can have 1 x a day.   1-2 servings of citrus fruits, berries, pineapple or melon a day (1/2 cup)  Avoid fried foods    No grains, rice, pasta, potatoes or bread    No soda, sweet tea, juices or lemonade    Can use a protein bar or shake for a meal.    [email or web address]   moderate carb intake    Add some type of resistance training 2-3 days a week. These can be body weight exercises, light weight or elastic bands. [employer] and [employer] are great sources for free work out plans and videos.     Patient warned of common side effects of diethylpropion including anxiety, insomnia, palpitations and increased blood pressure. It was also explained that it is for short-term usage along with diet and exercise, and that stopping the medication without making lifestyle changes will result in regain of weight. Patient states understanding.  Caution with age > 66yo discussed.     Weight loss medications are controlled substances.  They require routine follow up. Prescription or pills that are lost or destroyed will not be replaced.       Dinner in Under 30 minutes and 400 calories.     Baked Chicken breast with Broccoli Cheese Casserole  2-3 chicken breast (approximately 6-8 oz each)    2 tsp each garlic and herb . Wilfredo seasoning   2 tsp your favorite creole seasoning  2 tablespoons of balsamic vinegar  2 - 10 oz packages of frozen broccoli  1 egg   ½ cup skim milk  ½ tsp paprika   ½ tsp cayenne pepper   1 can fat free cream of mushroom soup.   1 .5 cups of shredded cheddar cheese    Pre-heat oven to 450 degrees. Toss 2-3 chicken breast (approximately 6-8 oz each) in 2 tsp each garlic and herb . Dash seasoning, 2 tsp your favorite creole  seasoning, and 2 tablespoons of balsamic vinegar. This can also be done up to 24 hours ahead of time, and the chicken can be left in the refrigerator to marinate. Place on a baking sheet sprayed with non-stick cooking spray and bake on 450 degrees for 10 min, then reduce heat to 350 degrees and cook an addition 10-15 minutes until chicken is cooked through.   While chicken is baking, microwave safe dish, thaw 2 - 10 oz packages of frozen broccoli. Drain any excess water, season with salt, pepper, all-purpose seasoning of your choice. In another bowl, whisk together 1 egg, ½ cup skim milk, ½ tsp paprika, ½ tsp cayenne pepper and 1 can fat free cream of mushroom soup. Combine broccoli, soup mixture, 1 cup of shredded cheddar cheese in baking dish sprayed with cooking spray. Top with remaining cheese. Bake in the oven with chicken for about 20 min or until set cheese is melted and quevedo.     Makes 4 servings. 389 calories per serving.10 g fat, 13 g carbs, 54g protein     Sheet Pan [place] Shrimp  1 pound large shrimp, shelled, peeled and deveined.   2 tablespoon olive oil  The zest and the juice of 1 lime  ½ tsp chili powder  ½-1 tsp cayenne pepper  1 tsp cumin  ½ tsp dry oregano  1 red bell pepper  1 green bell pepper  1 red onion  2 cups mushrooms, quartered  1 avocado  Whole dom lettuce leaves  Preheat oven to 375 degrees.  In a bowl combine shrimp, lime juice, lime zest, cumin, cayenne pepper, chili powder, and a pinch of salt. Set aside.   Slice peppers and onions into thin strips. Toss in 1 tablespoon of olive oil. Sprinkle with salt and pepper and arrange in a single layer over 1/3 of a large sheet pan  Toss mushrooms with remaining olive oil, oregano, salt and pepper. Arrange in single layer on sheet pan. Place sheet pan in oven for about 15-20 minutes until vegetables are softened, cooked about ¾ of the way through. Remove the sheet pan from oven.  Change setting on oven to low broil.  If needed,  rearrange vegetables to make room for shrimp. Arrange the shrimp in a single layer on the sheet pan and return pan to oven. After 5 minutes, flip shrimp. Cook 3-5 additional minutes, or until  Shrimp are opaque.   Serve shrimp and cooked vegetables on lettuce leaves with sliced avocado.   Makes 4 servings. Calories per servin; 23g fat, 19 g carbohydrates, 27g protein.    Zoodles Primavera with Seasoned Ricotta  8 cups zucchini noodles. These can be purchased already prepared, or you can make them on your own with whole zucchini and a vegetable spiralizer.   1.5 cups cherry tomatoes, quartered  2 cups sliced mushrooms  1 cup chopped fresh broccoli  1 cup frozen peas and carrots  2 cloves garlic, finely chopped  16 oz part skim ricotta cheese  2 oz Neufchatel cream cream cheese, cut into small cubes  Juice and zest of 1 lemon  1 pinch red pepper flakes    2 tsp Italian seasoning  4-5 leaves fresh basil, thinly sliced  1 tablespoon of olive oil  Parmesan cheese for topping (optional)     In a bowl, combine ricotta cheese, 1 tsp Italian seasoning, ½ teaspoon lemon zest. Season with salt and pepper to taste. Mix well. Fold in half of fresh basil. Set aside.   Heat a large skillet to medium high. Add olive oil. Sautee mushrooms until starting to become tender. Season them with salt and pepper while cooking.  Add broccoli and peas and carrots. Reduce heat to medium. Cover pan and cook for 4-5 minutes until broccoli is tender and peas and carrots are warmed through. Add Neufchatel cheese, Italian seasoning, red pepper flakes, 1 tsp lemon zest and lemon juice. Stir until a smooth sauce is formed. Add zucchini noodles (zoodles) to skillet and toss in sauce, then add cherry tomatoes.  Separate zoodle and vegetables into 4 equal portions. Serve each with a ¼ cup serving of seasoned ricotta cheese. Sprinkle with grated parmesan cheese or fresh basil,  if desired.   Makes 4 servings. Calories per servin calories, 32 g  carbs, 33 g protein, 18 g fat

## 2018-01-18 NOTE — PROGRESS NOTES
"Subjective:       Patient ID: Carmen Khan is a 67 y.o. female.    Chief Complaint: Follow-up    Pt here today for follow up. Has gained 1 lbs from previous visit, net 15 lbs neg total. Has been on phenterminex 1 months. Filled 12/14/17. Has a little more constipation.. She finds that it is working well. She has a garden she is working on and a bike she can start riding, but the weather has not allowed her to do it much.        Review of Systems   Constitutional: Negative for chills and fever.   Respiratory: Negative for apnea and shortness of breath.         + snores   Cardiovascular: Negative for chest pain and leg swelling.   Gastrointestinal: Negative for constipation and diarrhea.        Denies HB   Genitourinary: Negative for dysuria.   Musculoskeletal: Positive for arthralgias. Negative for back pain.        Left foot OA   Neurological: Negative for dizziness and light-headedness.   Psychiatric/Behavioral: Negative for dysphoric mood. The patient is not nervous/anxious.         Doing well on Lexapro       Objective:       /66   Pulse 73   Ht 5' 2" (1.575 m)   Wt 71.5 kg (157 lb 10.1 oz)   BMI 28.83 kg/m²     Physical Exam   Constitutional: She is oriented to person, place, and time. She appears well-developed. No distress.   Obese     HENT:   Head: Normocephalic and atraumatic.   Eyes: EOM are normal. Pupils are equal, round, and reactive to light. No scleral icterus.   Neck: Normal range of motion. Neck supple.   Cardiovascular: Normal rate.    Pulmonary/Chest: Effort normal.   Musculoskeletal: Normal range of motion. She exhibits no edema.   Neurological: She is alert and oriented to person, place, and time. No cranial nerve deficit.   Skin: Skin is warm and dry. No erythema.   Psychiatric: She has a normal mood and affect. Her behavior is normal. Judgment normal.   Vitals reviewed.      Assessment:       1. Overweight (BMI 25.0-29.9)        Plan:            Carmen was seen today for " follow-up.    Diagnoses and all orders for this visit:    Overweight (BMI 25.0-29.9)  -     diethylpropion 75 mg TbSR; Take 75 mg by mouth once daily.              3 meals a day made up of the following:  Unlimited green vegetables, tomatoes, mushrooms, spaghetti squash, cauliflower, meat, poultry, seafood, eggs and hard cheeses.   Milk and plain yogurt  Dressings, seasonings, condiments, etc should have less than 2 g sugars.   beans or nuts can have 1 x a day.   1-2 servings of citrus fruits, berries, pineapple or melon a day (1/2 cup)  Avoid fried foods    No grains, rice, pasta, potatoes or bread    No soda, sweet tea, juices or lemonade    Can use a protein bar or shake for a meal.    [email or web address]   moderate carb intake    Add some type of resistance training 2-3 days a week. These can be body weight exercises, light weight or elastic bands. [employer] and [employer] are great sources for free work out plans and videos.     Patient warned of common side effects of diethylpropion including anxiety, insomnia, palpitations and increased blood pressure. It was also explained that it is for short-term usage along with diet and exercise, and that stopping the medication without making lifestyle changes will result in regain of weight. Patient states understanding.  Caution with age > 66yo discussed.     Weight loss medications are controlled substances.  They require routine follow up. Prescription or pills that are lost or destroyed will not be replaced.

## 2018-01-26 RX ORDER — TRIAMTERENE AND HYDROCHLOROTHIAZIDE 37.5; 25 MG/1; MG/1
CAPSULE ORAL
Qty: 90 CAPSULE | Refills: 3 | Status: SHIPPED | OUTPATIENT
Start: 2018-01-26 | End: 2018-07-11 | Stop reason: SDUPTHER

## 2018-02-06 ENCOUNTER — TELEPHONE (OUTPATIENT)
Dept: PHYSICAL MEDICINE AND REHAB | Facility: CLINIC | Age: 68
End: 2018-02-06

## 2018-02-06 NOTE — TELEPHONE ENCOUNTER
Dr. MITCHELL the patient would like to know if there is a brace that she could get to wear for bruise ribs,  The patient states she had a fall and still have tenderness in the area with bruising.  Pleas give the patient a call @ ext. [phone] or [phone].  Thanks

## 2018-02-06 NOTE — TELEPHONE ENCOUNTER
----- Message from Vahid Blakely sent at 2/6/2018 10:56 AM CST -----  Contact: Patient ext  [phone] cell @ [phone]  Patient is requesting a return call about a brace to wear for a bruised rib, pls call

## 2018-02-07 ENCOUNTER — PATIENT MESSAGE (OUTPATIENT)
Dept: PHYSICAL MEDICINE AND REHAB | Facility: CLINIC | Age: 68
End: 2018-02-07

## 2018-02-15 ENCOUNTER — HOSPITAL ENCOUNTER (OUTPATIENT)
Dept: RADIOLOGY | Facility: HOSPITAL | Age: 68
Discharge: HOME | End: 2018-02-15
Attending: PHYSICAL MEDICINE & REHABILITATION
Payer: COMMERCIAL

## 2018-02-15 ENCOUNTER — OFFICE VISIT (OUTPATIENT)
Dept: PHYSICAL MEDICINE AND REHAB | Facility: CLINIC | Age: 68
End: 2018-02-15
Payer: COMMERCIAL

## 2018-02-15 VITALS
DIASTOLIC BLOOD PRESSURE: 72 MMHG | HEART RATE: 94 BPM | BODY MASS INDEX: 25.33 KG/M2 | WEIGHT: 157.63 LBS | HEIGHT: 66 IN | SYSTOLIC BLOOD PRESSURE: 131 MMHG

## 2018-02-15 DIAGNOSIS — M79.602 ARM PAIN, MUSCULOSKELETAL, LEFT: ICD-10-CM

## 2018-02-15 DIAGNOSIS — R07.81 RIB PAIN ON LEFT SIDE: ICD-10-CM

## 2018-02-15 DIAGNOSIS — R07.81 RIB PAIN ON LEFT SIDE: Primary | ICD-10-CM

## 2018-02-15 DIAGNOSIS — M79.10 PAIN IN THE MUSCLES: ICD-10-CM

## 2018-02-15 PROCEDURE — 1125F AMNT PAIN NOTED PAIN PRSNT: CPT | Mod: S$GLB,,, | Performed by: PHYSICAL MEDICINE & REHABILITATION

## 2018-02-15 PROCEDURE — 1159F MED LIST DOCD IN RCRD: CPT | Mod: S$GLB,,, | Performed by: PHYSICAL MEDICINE & REHABILITATION

## 2018-02-15 PROCEDURE — 71101 X-RAY EXAM UNILAT RIBS/CHEST: CPT | Mod: 26,,, | Performed by: RADIOLOGY

## 2018-02-15 PROCEDURE — 73060 X-RAY EXAM OF HUMERUS: CPT | Mod: TC,LT

## 2018-02-15 PROCEDURE — 73060 X-RAY EXAM OF HUMERUS: CPT | Mod: 26,LT,, | Performed by: RADIOLOGY

## 2018-02-15 PROCEDURE — 99203 OFFICE O/P NEW LOW 30 MIN: CPT | Mod: S$GLB,,, | Performed by: PHYSICAL MEDICINE & REHABILITATION

## 2018-02-15 PROCEDURE — 3008F BODY MASS INDEX DOCD: CPT | Mod: S$GLB,,, | Performed by: PHYSICAL MEDICINE & REHABILITATION

## 2018-02-15 PROCEDURE — 99999 PR PBB SHADOW E&M-EST. PATIENT-LVL III: CPT | Mod: PBBFAC,,, | Performed by: PHYSICAL MEDICINE & REHABILITATION

## 2018-02-15 PROCEDURE — 71101 X-RAY EXAM UNILAT RIBS/CHEST: CPT | Mod: TC

## 2018-02-15 RX ORDER — DICLOFENAC SODIUM 10 MG/G
4 GEL TOPICAL 4 TIMES DAILY
Qty: 500 G | Refills: 3 | Status: SHIPPED | OUTPATIENT
Start: 2018-02-15 | End: 2018-05-16

## 2018-02-15 NOTE — PROGRESS NOTES
Subjective:       Patient ID: Carmen Khan is a 67 y.o. female.    Chief Complaint: rib cage pain    HPI   Mrs. Hawley is coming first time to clinic for Left sided rib cage pain for 2 weeks.  Self referred.  She fell in her house, she tripped and fell onto tile floor and landed on left side, and injured her left sided chest and upper arm.  She sustained a few bruises on back of shoulder blade, on hand, and at left sided rib cage.   It was a much intense pain, she did not go to ED.     Pain intensity: Current pain is 6, the worst pain was 10/10, best pain 3-4/10. .  Pain Duration: ~ 2 weeks.  Pain Timing: constant, severe pain.  Pain Location: localized at left side of rib cage, with  Radiation to front, epigastrium.  Denies having any SOB, or difficulties with breathing.  Pain Quality: sharp pain  Pain Context:decreasing in intensity.  Pain Severity:now is moderate, and before was a severe pain.  Modifying Factors: Biofreeze,   Worsening factors: sitting, driving,   Alleviating factors:laying, rest comfortable.  Associated Signs and Symptoms: bruising on spot that she fell, on Lt mid arm,   And at Lt shoulder blade.    She reports that she was a few days out of work, and wore a stretchy belt that helped.  She was using Tylenol prn, Arnica topical cream, and Biofreeze.  Since, it is still hurting, she decided to come in to check if she has broken ribs.    Negative red flags:  No fever, chills, no fractures, no progressive neurological deficits,   No saddle anesthesia, no BB incontinence, no CA, no other medical cause of back pain. ( ( no abdominal pain, no substernal chest pain, dysuria, headaches, pelvic pain), perianal numbness, tingling, weakness or weight loss).     She is here for evaluation and treatment.      Past Medical History:   Diagnosis Date    Anxiety     Hyperlipidemia     Hypertension     Mitral valve prolapse        Past Surgical History:   Procedure Laterality Date    TONSILLECTOMY          Family History   Problem Relation Age of Onset    Cancer Mother      lung    Stroke Mother     Heart disease Father     Diabetes Father     Diabetes Brother     Aortic aneurysm Brother      surgery 5/2012    Kidney disease Brother      on dialysis    Melanoma Neg Hx     Breast cancer Neg Hx     Colon cancer Neg Hx     Ovarian cancer Neg Hx        Social History     Social History    Marital status:      Spouse name: N/A    Number of children: N/A    Years of education: N/A     Occupational History     [employer]     Social History Main Topics    Smoking status: Never Smoker    Smokeless tobacco: Never Used    Alcohol use 8.4 oz/week     14 Glasses of wine per week      Comment: daily    Drug use: No    Sexual activity: Yes     Partners: Male     Birth control/ protection: Post-menopausal     Other Topics Concern    Not on file     Social History Narrative    No narrative on file       Current Outpatient Prescriptions   Medication Sig Dispense Refill    aspirin (ECOTRIN) 81 MG EC tablet Take 81 mg by mouth once daily.        biotin 300 mcg Tab Take 1 tablet by mouth once daily.      diclofenac sodium 1 % Gel Apply 2 g topically once daily. 5 Tube 3    diethylpropion 75 mg TbSR Take 75 mg by mouth once daily. 30 tablet 2    escitalopram oxalate (LEXAPRO) 10 MG tablet Take 1 tablet (10 mg total) by mouth once daily. 30 tablet 12    ibuprofen (ADVIL,MOTRIN) 800 MG tablet Take 800 mg by mouth every 6 (six) hours as needed for Pain.      metoprolol succinate (TOPROL-XL) 50 MG 24 hr tablet TAKE 1 TABLET BY MOUTH DAILY 90 tablet 3    simvastatin (ZOCOR) 40 MG tablet TAKE 1 TABLET BY MOUTH EVERY EVENING. 90 tablet 3    tretinoin (RETIN-A) 0.1 % cream APPLY A THIN FILM TO AFFECTED AREA EVERY EVENING AFTER MOISTURIZING. 45 g 3    triamterene-hydrochlorothiazide 37.5-25 mg (DYAZIDE) 37.5-25 mg per capsule TAKE ONE CAPSULE BY MOUTH EVERY DAY 90 capsule 3     triamterene-hydrochlorothiazide 37.5-25 mg (DYAZIDE) 37.5-25 mg per capsule TAKE ONE CAPSULE BY MOUTH EVERY DAY 90 capsule 3    vitamin D 1000 units Tab Take 2,000 mg by mouth once daily.        No current facility-administered medications for this visit.        Review of patient's allergies indicates:  No Known Allergies    Review of Systems   Constitutional: Negative for appetite change, chills, fatigue, fever and unexpected weight change.   HENT: Negative for drooling, trouble swallowing and voice change.    Eyes: Negative for pain and visual disturbance.   Respiratory: Negative for shortness of breath and wheezing.    Cardiovascular: Negative for chest pain and palpitations.   Gastrointestinal: Negative for abdominal distention, abdominal pain, constipation and diarrhea.   Genitourinary: Negative for difficulty urinating.   Musculoskeletal: Negative for arthralgias, back pain, gait problem, joint swelling, myalgias and neck stiffness.               Skin: Negative for color change and rash.   Neurological: Negative for dizziness, facial asymmetry, speech difficulty, weakness, light-headedness and numbness. Headaches:     Hematological: Negative for adenopathy.   Psychiatric/Behavioral: Negative for behavioral problems, confusion and sleep disturbance. The patient is not nervous/anxious.          Objective:      Physical Exam    GENERAL: The patient is alert, oriented, pleasant.  HEENT: PERRLA  NECK: supple, no masses,    CV: S1S2, RRR, no murmurs, rales.  LUNGS: CTA bilateral, no auscultatory rib no friction, no rubs, crackles,  Nor Wheezing. Breath sounds normal, and normal symmetrical  chest expansion.  ABDOMEN: soft, non-tender, non distended, bowel sounds nl.  EXTREMITIES: no edema, +2 pulses DP, b/l  SKIN: no skin rash, no skin breakdowns, few bruising on Lt shoulder blade, and Lt mid humerus at posterior aspect, in late stage.  MUSCULOSKELETAL:   Gait : normal, walks with no AD.  Cervical spine: full AROM  in cervical spine.  No paravertebral cervical musculature tenderness, b/l.  No  tenderness in upper trapezius muscles, b/l.   + mild tenderness in Lt shoulder blade with small bruise.  Lumbar spine, full range of motion in all planes.  Negative facet loading b/l.  Straight leg raising Negative bilaterally.   Full range of motion in all joints x4 extremities, including Lt UE.  Muscle strength 5/5 throughout x4 extremities.   No  joint laxity throughout x4 extremities.   NEUROLOGIC: Cranial nerves II through XII intact.   Deep tendon reflexes is normal, +2 in the upper and lower extremities bilaterally.   Muscle tone is normal.   Sensory is intact to light touch and pinprick throughout x4 extremities.       Assessment:       1. Rib pain on left side    2. Pain in the muscles        Plan:        Rib pain on left side  -     X-Ray Ribs 3 Views Left; Future  -     X-Ray Humerus 2 View Left; Future  -     diclofenac sodium (VOLTAREN) 1 % Gel; Apply 4 g topically 4 (four) times daily.  Dispense: 500 g; Refill: 3    Pain in the muscles  -     X-Ray Ribs 3 Views Left; Future  -     X-Ray Humerus 2 View Left; Future  -     diclofenac sodium (VOLTAREN) 1 % Gel; Apply 4 g topically 4 (four) times daily.  Dispense: 500 g; Refill: 3    Arm pain, musculoskeletal, left  -     X-Ray Humerus 2 View Left; Future  -     diclofenac sodium (VOLTAREN) 1 % Gel; Apply 4 g topically 4 (four) times daily.  Dispense: 500 g; Refill: 3    Mrs. Hawley is coming for Left rib cage, shoulder blade, and left arm pain, after a fall in house, likely muscular bruising, but need to r/o ribs fx, and humerus fx.  -- will order Xray of Left sided rib cage,and Lt humerus to r/o fx.  -- topical creams, resume Biofreeze, Voltaren gel topical, and Tylenol prn.      RTC in 1 month.     Total time spent face to face with patient was 30 minutes.   More than 50% of that time was spent in counseling on diagnosis , prognosis and treatment options.   All questions  were answered, and patient voiced understanding.

## 2018-02-26 ENCOUNTER — OFFICE VISIT (OUTPATIENT)
Dept: OPHTHALMOLOGY | Facility: CLINIC | Age: 68
End: 2018-02-26
Payer: COMMERCIAL

## 2018-02-26 DIAGNOSIS — H35.3132 NONEXUDATIVE AGE-RELATED MACULAR DEGENERATION, BILATERAL, INTERMEDIATE DRY STAGE: Primary | ICD-10-CM

## 2018-02-26 DIAGNOSIS — H43.813 POSTERIOR VITREOUS DETACHMENT OF BOTH EYES: ICD-10-CM

## 2018-02-26 PROCEDURE — 92226 PR SPECIAL EYE EXAM, SUBSEQUENT: CPT | Mod: 59,RT,S$GLB, | Performed by: OPHTHALMOLOGY

## 2018-02-26 PROCEDURE — 92014 COMPRE OPH EXAM EST PT 1/>: CPT | Mod: S$GLB,,, | Performed by: OPHTHALMOLOGY

## 2018-02-26 PROCEDURE — 99999 PR PBB SHADOW E&M-EST. PATIENT-LVL II: CPT | Mod: PBBFAC,,, | Performed by: OPHTHALMOLOGY

## 2018-02-26 PROCEDURE — 92134 CPTRZ OPH DX IMG PST SGM RTA: CPT | Mod: S$GLB,,, | Performed by: OPHTHALMOLOGY

## 2018-02-26 NOTE — PROGRESS NOTES
HPI     Last eye exam was 4/19/16 with Dr. Caal.  Patient states missed last years appointment. No vision changes. Sees an   eye doctor in [place] and was told he saw some retinal changes and   wanted her to go back to see Dr. Caal.  Patient denies diplopia, headaches, flashes/floaters, and pain.        OCT  Drusen OU, no SRF/Edema      A/P    1. Dry Macular Degeneration OU  - drusen OU  - Areds/Amsler Grid      2. NSC OU  Monitor      3. PVD OU  Monitor        RTC 1 year OCT

## 2018-04-06 ENCOUNTER — OFFICE VISIT (OUTPATIENT)
Dept: OBSTETRICS AND GYNECOLOGY | Facility: CLINIC | Age: 68
End: 2018-04-06
Payer: COMMERCIAL

## 2018-04-06 VITALS
DIASTOLIC BLOOD PRESSURE: 82 MMHG | SYSTOLIC BLOOD PRESSURE: 128 MMHG | BODY MASS INDEX: 30.55 KG/M2 | WEIGHT: 166 LBS | HEIGHT: 62 IN

## 2018-04-06 DIAGNOSIS — Z01.419 WELL WOMAN EXAM WITH ROUTINE GYNECOLOGICAL EXAM: Primary | ICD-10-CM

## 2018-04-06 PROCEDURE — 99397 PER PM REEVAL EST PAT 65+ YR: CPT | Mod: S$GLB,,, | Performed by: OBSTETRICS & GYNECOLOGY

## 2018-04-06 PROCEDURE — 99999 PR PBB SHADOW E&M-EST. PATIENT-LVL III: CPT | Mod: PBBFAC,,, | Performed by: OBSTETRICS & GYNECOLOGY

## 2018-04-06 NOTE — PROGRESS NOTES
History & Physical  Gynecology      SUBJECTIVE:     Chief Complaint: Well Woman       History of Present Illness:  Annual Exam-Postmenopausal  Patient presents for annual exam. The patient has no complaints today. Patient denies post-menopausal vaginal bleeding. The patient is not sexually active. The patient is not taking hormone replacement therapy.  The patient participates in regular exercise: yes.  She does not smoke.     GYN screening history: last pap: approximate date  and was normal  Mammogram history:   Colonoscopy history: , scheduled for next year  Dexa history:     FH:   Breast cancer: none  Colon cancer: none  Ovarian cancer: none    Review of patient's allergies indicates:  No Known Allergies    Past Medical History:   Diagnosis Date    Anxiety     Hyperlipidemia     Hypertension     Mitral valve prolapse      Past Surgical History:   Procedure Laterality Date    TONSILLECTOMY       OB History      Para Term  AB Living    0              SAB TAB Ectopic Multiple Live Births                     Family History   Problem Relation Age of Onset    Cancer Mother      lung    Stroke Mother     Heart disease Father     Diabetes Father     Diabetes Brother     Aortic aneurysm Brother      surgery 2012    Kidney disease Brother      on dialysis    Melanoma Neg Hx     Breast cancer Neg Hx     Colon cancer Neg Hx     Ovarian cancer Neg Hx      Social History   Substance Use Topics    Smoking status: Never Smoker    Smokeless tobacco: Never Used    Alcohol use 8.4 oz/week     14 Glasses of wine per week      Comment: daily       Current Outpatient Prescriptions   Medication Sig    aspirin (ECOTRIN) 81 MG EC tablet Take 81 mg by mouth once daily.      biotin 300 mcg Tab Take 1 tablet by mouth once daily.    diclofenac sodium (VOLTAREN) 1 % Gel Apply 4 g topically 4 (four) times daily.    diclofenac sodium 1 % Gel Apply 2 g topically once daily.     diethylpropion 75 mg TbSR Take 75 mg by mouth once daily.    escitalopram oxalate (LEXAPRO) 10 MG tablet Take 1 tablet (10 mg total) by mouth once daily.    metoprolol succinate (TOPROL-XL) 50 MG 24 hr tablet TAKE 1 TABLET BY MOUTH DAILY    simvastatin (ZOCOR) 40 MG tablet TAKE 1 TABLET BY MOUTH EVERY EVENING.    tretinoin (RETIN-A) 0.1 % cream APPLY A THIN FILM TO AFFECTED AREA EVERY EVENING AFTER MOISTURIZING.    triamterene-hydrochlorothiazide 37.5-25 mg (DYAZIDE) 37.5-25 mg per capsule TAKE ONE CAPSULE BY MOUTH EVERY DAY    triamterene-hydrochlorothiazide 37.5-25 mg (DYAZIDE) 37.5-25 mg per capsule TAKE ONE CAPSULE BY MOUTH EVERY DAY    vitamin D 1000 units Tab Take 2,000 mg by mouth once daily.      No current facility-administered medications for this visit.        Review of Systems:  Review of Systems   Constitutional: Negative for activity change, appetite change and fever.   Respiratory: Negative for shortness of breath.    Cardiovascular: Negative for chest pain.   Gastrointestinal: Negative for abdominal pain, constipation, diarrhea, nausea and vomiting.   Genitourinary: Negative for menorrhagia, menstrual problem, pelvic pain, vaginal bleeding, vaginal discharge and vaginal pain.   Neurological: Negative for numbness and headaches.   Breast: Negative for breast pain and nipple discharge       OBJECTIVE:     Physical Exam:  Physical Exam   Constitutional: She is oriented to person, place, and time. She appears well-developed and well-nourished.   Neck: Normal range of motion. Neck supple. No tracheal deviation present. No thyromegaly present.   Cardiovascular: Normal rate, regular rhythm and normal heart sounds.    Pulmonary/Chest: Effort normal and breath sounds normal. Right breast exhibits no inverted nipple, no mass, no nipple discharge, no skin change and no tenderness. Left breast exhibits no inverted nipple, no mass, no nipple discharge, no skin change and no tenderness. Breasts are  symmetrical.   Abdominal: Soft.   Genitourinary: Vagina normal and uterus normal. No labial fusion. There is no rash, tenderness, lesion or injury on the right labia. There is no rash, tenderness, lesion or injury on the left labia. Uterus is not deviated, not enlarged, not fixed and not tender. Cervix exhibits no motion tenderness, no discharge and no friability. Right adnexum displays no mass, no tenderness and no fullness. Left adnexum displays no mass, no tenderness and no fullness. No erythema, tenderness or bleeding in the vagina. No foreign body in the vagina. No signs of injury around the vagina. No vaginal discharge found.   Neurological: She is alert and oriented to person, place, and time.   Psychiatric: She has a normal mood and affect. Her behavior is normal. Judgment and thought content normal.   Nursing note and vitals reviewed.      Chaperoned by: Janie    ASSESSMENT:       ICD-10-CM ICD-9-CM    1. Well woman exam with routine gynecological exam Z01.419 V72.31           Plan:      Carmen was seen today for well woman.    Diagnoses and all orders for this visit:    Well woman exam with routine gynecological exam        No orders of the defined types were placed in this encounter.      Well Woman:   - Pap smear: no longer required  - Mammogram: scheduled  - Colonoscopy: scheduled  - Dexa: due 12/2018  - Immunizations: with PCP  - Labs: with PCP  - Exercise counseling provided      Follow up in one year for annual, or prn.    Maggi Graham

## 2018-04-23 ENCOUNTER — HOSPITAL ENCOUNTER (OUTPATIENT)
Dept: RADIOLOGY | Facility: HOSPITAL | Age: 68
Discharge: HOME | End: 2018-04-23
Attending: INTERNAL MEDICINE
Payer: COMMERCIAL

## 2018-04-23 DIAGNOSIS — Z12.31 ENCOUNTER FOR SCREENING MAMMOGRAM FOR BREAST CANCER: ICD-10-CM

## 2018-04-23 PROCEDURE — 77067 SCR MAMMO BI INCL CAD: CPT | Mod: TC

## 2018-04-23 PROCEDURE — 77063 BREAST TOMOSYNTHESIS BI: CPT | Mod: 26,,, | Performed by: RADIOLOGY

## 2018-04-23 PROCEDURE — 77067 SCR MAMMO BI INCL CAD: CPT | Mod: 26,,, | Performed by: RADIOLOGY

## 2018-04-30 ENCOUNTER — OFFICE VISIT (OUTPATIENT)
Dept: BARIATRICS | Facility: CLINIC | Age: 68
End: 2018-04-30
Payer: COMMERCIAL

## 2018-04-30 VITALS
HEART RATE: 76 BPM | DIASTOLIC BLOOD PRESSURE: 64 MMHG | SYSTOLIC BLOOD PRESSURE: 140 MMHG | HEIGHT: 62 IN | BODY MASS INDEX: 30.63 KG/M2 | WEIGHT: 166.44 LBS

## 2018-04-30 DIAGNOSIS — E66.9 OBESITY (BMI 30.0-34.9): Primary | ICD-10-CM

## 2018-04-30 PROCEDURE — 3077F SYST BP >= 140 MM HG: CPT | Mod: CPTII,S$GLB,, | Performed by: INTERNAL MEDICINE

## 2018-04-30 PROCEDURE — 99999 PR PBB SHADOW E&M-EST. PATIENT-LVL III: CPT | Mod: PBBFAC,,, | Performed by: INTERNAL MEDICINE

## 2018-04-30 PROCEDURE — 99213 OFFICE O/P EST LOW 20 MIN: CPT | Mod: S$GLB,,, | Performed by: INTERNAL MEDICINE

## 2018-04-30 PROCEDURE — 3078F DIAST BP <80 MM HG: CPT | Mod: CPTII,S$GLB,, | Performed by: INTERNAL MEDICINE

## 2018-04-30 RX ORDER — PHENTERMINE HYDROCHLORIDE 37.5 MG/1
TABLET ORAL
Qty: 30 TABLET | Refills: 1 | Status: SHIPPED | OUTPATIENT
Start: 2018-04-30 | End: 2018-08-13 | Stop reason: SDUPTHER

## 2018-04-30 RX ORDER — TOPIRAMATE 50 MG/1
50 CAPSULE, EXTENDED RELEASE ORAL DAILY
Qty: 30 CAPSULE | Refills: 3 | Status: SHIPPED | OUTPATIENT
Start: 2018-04-30 | End: 2018-08-13 | Stop reason: SDUPTHER

## 2018-04-30 NOTE — PATIENT INSTRUCTIONS
Patient was informed that topiramate is used for migraine prevention and seizures. Weight loss is a common side effect that is well documented. She understands this. She was informed of the potential side effects such as serious and possibly fatal rash in which case the medication should be discontinued immediately. Paresthesias, forgetfulness, fatigue, kidney stones, GI symptoms, and changes in lab values such as electrolytes, blood counts and kidney function.    Patient warned of common side effects of phentermine including anxiety, insomnia, palpitations and increased blood pressure. It was also explained that it is for short-term usage along with diet and exercise, and that stopping the medication without making lifestyle changes will result in regain of weight. Patient states understanding.     Weight loss medications are controlled substances.  They require routine follow up. Prescription or pills that are lost or destroyed will not be replaced.       Start phentermine with 1/2 pill a day       3 meals a day made up of the following:  Unlimited green vegetables, tomatoes, mushrooms, spaghetti squash, cauliflower, meat, poultry, seafood, eggs and hard cheeses.   Milk and plain yogurt  Dressings, seasonings, condiments, etc should have less than 2 g sugars.   Beans (1-1.5 cups) or nuts (1/4 cup) can have 1 x a day.   1-2 servings of citrus fruits, berries, pineapple or melon a day (1/2 cup)  Avoid fried foods    No grains, rice, pasta, potatoes, bread, corn, peas, oatmeal, grits, tortillas, crackers, chips    No soda, sweet tea, juices or lemonade    Www.dietdoctor.com for recipes. Moderate carb intake      Add some type of resistance training 2-3 days a week. These can be body weight exercises, light weight or elastic bands. [employer] and [employer] are great sources for free work out plans and videos.

## 2018-04-30 NOTE — PROGRESS NOTES
"Subjective:       Patient ID: Carmen Khan is a 67 y.o. female.    Chief Complaint: Follow-up    Pt here today for follow up. Has gained 9lbs from previous visit, net 6 lbs neg total. Has been on diethylpropion filled  4/2/18   checked today . Thinks she got a little over confident and was not watching her food choices as much.       Review of Systems   Constitutional: Negative for chills and fever.   Respiratory: Negative for apnea and shortness of breath.         + snores   Cardiovascular: Negative for chest pain and leg swelling.   Gastrointestinal: Negative for constipation and diarrhea.        Denies HB   Genitourinary: Negative for dysuria.   Musculoskeletal: Positive for arthralgias. Negative for back pain.        Left foot OA   Neurological: Negative for dizziness and light-headedness.   Psychiatric/Behavioral: Negative for dysphoric mood. The patient is not nervous/anxious.         Doing well on Lexapro       Objective:       BP (!) 140/64   Pulse 76   Ht 5' 2" (1.575 m)   Wt 75.5 kg (166 lb 7.2 oz)   BMI 30.44 kg/m²     Physical Exam   Constitutional: She is oriented to person, place, and time. She appears well-developed. No distress.   Obese     HENT:   Head: Normocephalic and atraumatic.   Eyes: EOM are normal. Pupils are equal, round, and reactive to light. No scleral icterus.   Neck: Normal range of motion. Neck supple.   Cardiovascular: Normal rate.    Pulmonary/Chest: Effort normal.   Musculoskeletal: Normal range of motion. She exhibits no edema.   Neurological: She is alert and oriented to person, place, and time. No cranial nerve deficit.   Skin: Skin is warm and dry. No erythema.   Psychiatric: She has a normal mood and affect. Her behavior is normal. Judgment normal.   Vitals reviewed.      Assessment:       1. Obesity (BMI 30.0-34.9)        Plan:            Carmen was seen today for follow-up.    Diagnoses and all orders for this visit:    Obesity (BMI 30.0-34.9)  -     " phentermine (ADIPEX-P) 37.5 mg tablet; 1/2 tab po daily.    -     topiramate (TROKENDI XR) 50 mg Cp24; Take 50 mg by mouth once daily.        Patient was informed that topiramate is used for migraine prevention and seizures. Weight loss is a common side effect that is well documented. She understands this. She was informed of the potential side effects such as serious and possibly fatal rash in which case the medication should be discontinued immediately. Paresthesias, forgetfulness, fatigue, kidney stones, GI symptoms, and changes in lab values such as electrolytes, blood counts and kidney function.    Patient warned of common side effects of phentermine including anxiety, insomnia, palpitations and increased blood pressure. It was also explained that it is for short-term usage along with diet and exercise, and that stopping the medication without making lifestyle changes will result in regain of weight. Patient states understanding.     Weight loss medications are controlled substances.  They require routine follow up. Prescription or pills that are lost or destroyed will not be replaced.       Start phentermine with 1/2 pill a day       3 meals a day made up of the following:  Unlimited green vegetables, tomatoes, mushrooms, spaghetti squash, cauliflower, meat, poultry, seafood, eggs and hard cheeses.   Milk and plain yogurt  Dressings, seasonings, condiments, etc should have less than 2 g sugars.   Beans (1-1.5 cups) or nuts (1/4 cup) can have 1 x a day.   1-2 servings of citrus fruits, berries, pineapple or melon a day (1/2 cup)  Avoid fried foods    No grains, rice, pasta, potatoes, bread, corn, peas, oatmeal, grits, tortillas, crackers, chips    No soda, sweet tea, juices or lemonade    Www.dietdoctor.com for recipes. Moderate carb intake      Add some type of resistance training 2-3 days a week. These can be body weight exercises, light weight or elastic bands. [employer] and [employer] are great sources for  free work out plans and videos.

## 2018-05-08 ENCOUNTER — OFFICE VISIT (OUTPATIENT)
Dept: DERMATOLOGY | Facility: CLINIC | Age: 68
End: 2018-05-08
Payer: COMMERCIAL

## 2018-05-08 VITALS — BODY MASS INDEX: 30.55 KG/M2 | HEIGHT: 62 IN | WEIGHT: 166 LBS

## 2018-05-08 DIAGNOSIS — S01.81XA LACERATION OF FOREHEAD, INITIAL ENCOUNTER: Primary | ICD-10-CM

## 2018-05-08 PROCEDURE — 12031 INTMD RPR S/A/T/EXT 2.5 CM/<: CPT | Mod: S$GLB,,, | Performed by: DERMATOLOGY

## 2018-05-08 PROCEDURE — 99999 PR PBB SHADOW E&M-EST. PATIENT-LVL IV: CPT | Mod: PBBFAC,,, | Performed by: DERMATOLOGY

## 2018-05-08 PROCEDURE — 99499 UNLISTED E&M SERVICE: CPT | Mod: S$GLB,,, | Performed by: DERMATOLOGY

## 2018-05-08 RX ORDER — OXYCODONE AND ACETAMINOPHEN 5; 325 MG/1; MG/1
1 TABLET ORAL
Qty: 20 TABLET | Refills: 0 | Status: SHIPPED | OUTPATIENT
Start: 2018-05-08 | End: 2018-06-07 | Stop reason: SDUPTHER

## 2018-05-08 NOTE — PROGRESS NOTES
New patient is a 68 y/o female who works here in [employer] and her  recently had Mohs with Dr. Rashid. She stated that last night she tripped over a hammock footing and fell lacerating the left upper forehead near hairline. No loss of consciousness. (+) moderate pain. Has taken extra strength Tylenol with much relief.  States she has had a hard time getting bleeding to stop.    L upper forehead near hairline with a 2 cm clean laceration down to subcutaneous tissue.  Wound cleaned and irrigated with sterile water.  Rec suture closure after verbal consent.  Etoh prep, 3 cc 1% lido with epi 1;100,000, sterile prep with Hibiclens, sterile drape, 4-0 Vicryl x 1, 4-0 Prolene simple interrupted sutures x 6, pressure bandage, no complications.   Discussed wound care.  Patient was placed on Percocet 5 prn pain.    RTC in 2 weeks for suture removal.

## 2018-05-09 ENCOUNTER — OFFICE VISIT (OUTPATIENT)
Dept: PODIATRY | Facility: CLINIC | Age: 68
End: 2018-05-09
Payer: COMMERCIAL

## 2018-05-09 VITALS — HEIGHT: 62 IN | BODY MASS INDEX: 30 KG/M2 | WEIGHT: 163 LBS

## 2018-05-09 DIAGNOSIS — M19.079 ARTHRITIS OF FOOT: ICD-10-CM

## 2018-05-09 DIAGNOSIS — M20.12 HALLUX VALGUS OF LEFT FOOT: Primary | ICD-10-CM

## 2018-05-09 PROCEDURE — 99999 PR PBB SHADOW E&M-EST. PATIENT-LVL III: CPT | Mod: PBBFAC,,, | Performed by: PODIATRIST

## 2018-05-09 PROCEDURE — 99202 OFFICE O/P NEW SF 15 MIN: CPT | Mod: S$GLB,,, | Performed by: PODIATRIST

## 2018-05-09 NOTE — PROGRESS NOTES
Subjective:      Patient ID: Carmen Khan is a 67 y.o. female.    Chief Complaint: Foot Problem (foot issues/ bunion)    Bunion and crooked toe left foot.  Gradual onset, worsening over past several years, aggravated by increased weight bearing, shoe gear, pressure.  voltaren gel works for arthritis in midfoot when needed.  Bunion does not hurt.     Review of Systems   Constitution: Negative for chills, diaphoresis, fever, malaise/fatigue and night sweats.   Cardiovascular: Negative for claudication, cyanosis, leg swelling and syncope.   Skin: Negative for color change, dry skin, nail changes, rash, suspicious lesions and unusual hair distribution.   Musculoskeletal: Positive for joint pain. Negative for falls, joint swelling, muscle cramps, muscle weakness and stiffness.   Gastrointestinal: Negative for constipation, diarrhea, nausea and vomiting.   Neurological: Negative for brief paralysis, disturbances in coordination, focal weakness, numbness, paresthesias, sensory change and tremors.           Objective:      Physical Exam   Constitutional: She is oriented to person, place, and time. She appears well-developed and well-nourished. She is cooperative.   Oriented to time, place, and person.   Cardiovascular:   Pulses:       Dorsalis pedis pulses are 1+ on the right side, and 1+ on the left side.        Posterior tibial pulses are 1+ on the right side, and 1+ on the left side.   Capillary fill time 3-5 seconds.  All toes warm to touch.      Negative lower extremity edema bilateral.    Negative elevational pallor and dependent rubor bilateral.     Musculoskeletal:   Normal angle, base, station of gait. Decreased stride length, early heel off, moderately propulsive toe off bilateral.    Visible and palpable bunion without pain at dorsomedial 1st metatarsal head left.  Hallux abducted left partially reducible, tracks laterally without being track bound.  No ecchymosis, erythema, edema, or cardinal signs  infection or signs of trauma same foot.    Deep pain in arch with prolonged weight bearing not elicited today in office exam.    All ten toes without clubbing, cyanosis, or signs of ischemia.      No pain to palpation bilateral lower extremities.      Range of motion, stability, muscle strength, and muscle tone are age and health appropriate normal bilateral feet and legs.       Lymphadenopathy:   Negative lymphadenopathy bilateral popliteal fossa and tarsal tunnel.  Negative lymphangitic streaking bilateral foot/ankle bilateral.     Neurological: She is alert and oriented to person, place, and time. She has normal strength. She is not disoriented. She displays no atrophy and no tremor. No sensory deficit. She exhibits normal muscle tone.   Reflex Scores:       Patellar reflexes are 2+ on the right side and 2+ on the left side.       Achilles reflexes are 2+ on the right side and 2+ on the left side.    Negative tinel sign to percussion sural, superficial peroneal, deep peroneal, saphenous, and posterior tibial nerves right and left ankles and feet.     Skin: Skin is warm, dry and intact. No abrasion, no bruising, no burn, no ecchymosis, no laceration, no lesion, no petechiae and no rash noted. She is not diaphoretic. No cyanosis or erythema. No pallor. Nails show no clubbing.   Skin is normal age and health appropriate color, turgor, texture, and temperature bilateral lower extremities without ulceration, hyperpigmentation, discoloration, masses nodules or cords palpated.  No ecchymosis, erythema, edema, or cardinal signs of infection bilateral lower extremities.               Assessment:       Encounter Diagnoses   Name Primary?    Hallux valgus of left foot Yes    Arthritis of foot          Plan:       Carmen was seen today for foot problem.    Diagnoses and all orders for this visit:    Hallux valgus of left foot    Arthritis of foot      I counseled the patient on her conditions, their implications and  medical management.        Discussed conservative treatment with shoes of adequate dimensions, material, and style to alleviate symptoms and delay or prevent surgical intervention.    otc nsaid prn per label.          Follow-up if symptoms worsen or fail to improve.

## 2018-05-21 ENCOUNTER — OFFICE VISIT (OUTPATIENT)
Dept: DERMATOLOGY | Facility: CLINIC | Age: 68
End: 2018-05-21
Payer: COMMERCIAL

## 2018-05-21 DIAGNOSIS — Z09 POSTOP CHECK: Primary | ICD-10-CM

## 2018-05-21 PROCEDURE — 99024 POSTOP FOLLOW-UP VISIT: CPT | Mod: S$GLB,,, | Performed by: DERMATOLOGY

## 2018-05-21 NOTE — PROGRESS NOTES
67 y.o. female patient is here for suture removal    Patient reports no problems.    WOUND PE:  The L upper forehead sutures intact. Wound healing well. Good skin edges. No signs or symptoms of infection.        IMPRESSION:  Healing site from repair of laceration L upper forehead, postop day # 13.    PLAN:  Sutures removed today. Steri-strips applied.  Continue wound care.  Keep moist with Aquaphor.    RTC:  prn

## 2018-06-07 ENCOUNTER — PROCEDURE VISIT (OUTPATIENT)
Dept: DERMATOLOGY | Facility: CLINIC | Age: 68
End: 2018-06-07
Payer: COMMERCIAL

## 2018-06-07 VITALS
BODY MASS INDEX: 30 KG/M2 | SYSTOLIC BLOOD PRESSURE: 183 MMHG | HEART RATE: 75 BPM | HEIGHT: 62 IN | WEIGHT: 163 LBS | DIASTOLIC BLOOD PRESSURE: 89 MMHG

## 2018-06-07 DIAGNOSIS — S01.81XA LACERATION OF EYEBROW AND FOREHEAD, RIGHT, INITIAL ENCOUNTER: Primary | ICD-10-CM

## 2018-06-07 DIAGNOSIS — S01.111A LACERATION OF EYEBROW AND FOREHEAD, RIGHT, INITIAL ENCOUNTER: Primary | ICD-10-CM

## 2018-06-07 DIAGNOSIS — S01.81XA LACERATION OF FOREHEAD, INITIAL ENCOUNTER: ICD-10-CM

## 2018-06-07 PROCEDURE — 99499 UNLISTED E&M SERVICE: CPT | Mod: S$GLB,,, | Performed by: DERMATOLOGY

## 2018-06-07 PROCEDURE — 12051 INTMD RPR FACE/MM 2.5 CM/<: CPT | Mod: S$GLB,,, | Performed by: DERMATOLOGY

## 2018-06-07 RX ORDER — OXYCODONE AND ACETAMINOPHEN 5; 325 MG/1; MG/1
1 TABLET ORAL
Qty: 20 TABLET | Refills: 0 | Status: SHIPPED | OUTPATIENT
Start: 2018-06-07 | End: 2018-07-18

## 2018-06-07 NOTE — PROGRESS NOTES
Established patient is a 69 y/o female last seen here for suture removal for a repair of laceration L upper forehead on 5/21/18. She stated that last night while at her friend's condo, she fell outside near the car and while getting up, the edge of the car door cut her face near the eyebrow. No loss of consciousness. (+) moderate pain. Has some relief with Tylenol and an ice pack over the area.      R suprabrow with a 1.5 cm clean laceration down to deep subcutaneous tissue.  Wound cleaned and irrigated with sterile water.  Rec suture closure after verbal consent.  Etoh prep, 3 cc 1% lido with epi 1;100,000, sterile prep with Hibiclens, sterile drape, wound edges freshened with #15 blade, electrocoagulation, 5-0 Vicryl x 2 to close deep subcutaneous tissue, 6-0 Prolene simple interrupted sutures x 6, pressure bandage, no complications.   Discussed wound care.  Patient already has Percocet 5 prn pain.     RTC in 1 week for suture removal.

## 2018-06-14 ENCOUNTER — OFFICE VISIT (OUTPATIENT)
Dept: DERMATOLOGY | Facility: CLINIC | Age: 68
End: 2018-06-14
Payer: COMMERCIAL

## 2018-06-14 DIAGNOSIS — Z09 POSTOP CHECK: Primary | ICD-10-CM

## 2018-06-14 PROCEDURE — 99024 POSTOP FOLLOW-UP VISIT: CPT | Mod: S$GLB,,, | Performed by: DERMATOLOGY

## 2018-06-14 NOTE — PROGRESS NOTES
67 y.o. female patient is here for suture removal     Patient reports no problems.     WOUND PE:  The right forehead sutures intact. Wound healing well. Good skin edges. No signs or symptoms of infection.           IMPRESSION:  Healing site from repair of laceration right forehead, postop day # 7.     PLAN:  Sutures removed today. Steri-strips applied.  Continue wound care.  Keep moist with Aquaphor.     RTC:  prn

## 2018-07-11 ENCOUNTER — OFFICE VISIT (OUTPATIENT)
Dept: INTERNAL MEDICINE | Facility: CLINIC | Age: 68
End: 2018-07-11
Payer: COMMERCIAL

## 2018-07-11 VITALS
BODY MASS INDEX: 29.94 KG/M2 | WEIGHT: 162.69 LBS | TEMPERATURE: 98 F | DIASTOLIC BLOOD PRESSURE: 72 MMHG | HEART RATE: 89 BPM | SYSTOLIC BLOOD PRESSURE: 138 MMHG | OXYGEN SATURATION: 97 % | HEIGHT: 62 IN

## 2018-07-11 DIAGNOSIS — J06.9 UPPER RESPIRATORY TRACT INFECTION, UNSPECIFIED TYPE: Primary | ICD-10-CM

## 2018-07-11 PROCEDURE — 99213 OFFICE O/P EST LOW 20 MIN: CPT | Mod: 25,S$GLB,, | Performed by: NURSE PRACTITIONER

## 2018-07-11 PROCEDURE — 99999 PR PBB SHADOW E&M-EST. PATIENT-LVL IV: CPT | Mod: PBBFAC,,, | Performed by: NURSE PRACTITIONER

## 2018-07-11 PROCEDURE — 3078F DIAST BP <80 MM HG: CPT | Mod: CPTII,S$GLB,, | Performed by: NURSE PRACTITIONER

## 2018-07-11 PROCEDURE — 96372 THER/PROPH/DIAG INJ SC/IM: CPT | Mod: S$GLB,,, | Performed by: NURSE PRACTITIONER

## 2018-07-11 PROCEDURE — 3075F SYST BP GE 130 - 139MM HG: CPT | Mod: CPTII,S$GLB,, | Performed by: NURSE PRACTITIONER

## 2018-07-11 RX ORDER — BETAMETHASONE SODIUM PHOSPHATE AND BETAMETHASONE ACETATE 3; 3 MG/ML; MG/ML
6 INJECTION, SUSPENSION INTRA-ARTICULAR; INTRALESIONAL; INTRAMUSCULAR; SOFT TISSUE
Status: COMPLETED | OUTPATIENT
Start: 2018-07-11 | End: 2018-07-11

## 2018-07-11 RX ADMIN — BETAMETHASONE SODIUM PHOSPHATE AND BETAMETHASONE ACETATE 6 MG: 3; 3 INJECTION, SUSPENSION INTRA-ARTICULAR; INTRALESIONAL; INTRAMUSCULAR; SOFT TISSUE at 12:07

## 2018-07-11 NOTE — PROGRESS NOTES
"Subjective:       Patient ID: Carmen Khan is a 68 y.o. female.    Chief Complaint: Cough; Nasal Congestion; and Sore Throat    HPI:  69 yo female that presents to clinic today with complaints of cough and congestion.    States that the symptoms started about 2 days ago.  Reports that she recently returned from a trip to [place].  States that she feels like everything is in "her throat."  States that she feels like she is having trouble "coughing stuff up."  States that she is also losing her voice.  She has been taking OTC dayquil, vitamin c and chloraseptic spray and states minimal relief.    Denies any fever, SOB, chest pain, n/v or dizziness.  States that her appetite and energy level are okay.    Review of Systems   Constitutional: Negative for activity change, appetite change, fatigue and fever.   HENT: Positive for congestion, postnasal drip, rhinorrhea, sore throat and voice change. Negative for ear discharge, ear pain, sinus pain, sinus pressure and trouble swallowing.    Respiratory: Positive for cough. Negative for apnea, shortness of breath and wheezing.    Cardiovascular: Negative for chest pain, palpitations and leg swelling.   Gastrointestinal: Negative for abdominal distention, abdominal pain, constipation, diarrhea, nausea and vomiting.   Musculoskeletal: Negative for arthralgias, back pain, myalgias, neck pain and neck stiffness.   Skin: Negative for color change and rash.   Neurological: Negative for dizziness, light-headedness, numbness and headaches.   Psychiatric/Behavioral: Negative for behavioral problems.       Objective:      Physical Exam   Constitutional: She is oriented to person, place, and time. She appears well-developed and well-nourished. No distress.   HENT:   Head: Normocephalic and atraumatic.   Right Ear: External ear normal.   Left Ear: External ear normal.   Nose: Rhinorrhea present. Right sinus exhibits no maxillary sinus tenderness and no frontal sinus tenderness. " Left sinus exhibits no maxillary sinus tenderness and no frontal sinus tenderness.   + post nasal drip and mild erythema to oropharynx   Neck: Normal range of motion. Neck supple. No thyromegaly present.   Cardiovascular: Normal rate, regular rhythm, normal heart sounds and intact distal pulses.    No murmur heard.  Pulmonary/Chest: Effort normal and breath sounds normal. No respiratory distress. She has no wheezes. She has no rales.   Abdominal: Soft. Bowel sounds are normal. She exhibits no distension and no mass. There is no tenderness.   Lymphadenopathy:     She has no cervical adenopathy.   Neurological: She is alert and oriented to person, place, and time. No sensory deficit.   Skin: Skin is warm and dry. No erythema.   Psychiatric: Her behavior is normal.       Assessment:       1. Upper respiratory tract infection, unspecified type        Plan:       1. Upper respiratory tract infection, unspecified type    -Vitals are stable in clinic.  -Appears to be viral.  -Will give 6mg IM of betamethasone in clinic today to help with symptom relief.  -Encouraged the use of OTC mucinex DM to help with symptoms.  -Recommended use of daily Claritin.  -Encouraged use of warm salt water gargles to help with sore throat relief.  -Encouraged to increase water intake and get plenty of rest.

## 2018-07-12 ENCOUNTER — LAB VISIT (OUTPATIENT)
Dept: LAB | Facility: HOSPITAL | Age: 68
End: 2018-07-12
Attending: INTERNAL MEDICINE
Payer: COMMERCIAL

## 2018-07-12 DIAGNOSIS — I10 ESSENTIAL HYPERTENSION: ICD-10-CM

## 2018-07-12 DIAGNOSIS — E78.00 PURE HYPERCHOLESTEROLEMIA: ICD-10-CM

## 2018-07-12 LAB
ALBUMIN SERPL BCP-MCNC: 3.8 G/DL
ALP SERPL-CCNC: 115 U/L
ALT SERPL W/O P-5'-P-CCNC: 15 U/L
ANION GAP SERPL CALC-SCNC: 9 MMOL/L
AST SERPL-CCNC: 15 U/L
BASOPHILS # BLD AUTO: 0.02 K/UL
BASOPHILS NFR BLD: 0.2 %
BILIRUB SERPL-MCNC: 0.3 MG/DL
BUN SERPL-MCNC: 12 MG/DL
CALCIUM SERPL-MCNC: 10.4 MG/DL
CHLORIDE SERPL-SCNC: 104 MMOL/L
CHOLEST SERPL-MCNC: 179 MG/DL
CHOLEST/HDLC SERPL: 2.6 {RATIO}
CO2 SERPL-SCNC: 26 MMOL/L
CREAT SERPL-MCNC: 1.1 MG/DL
DIFFERENTIAL METHOD: ABNORMAL
EOSINOPHIL # BLD AUTO: 0 K/UL
EOSINOPHIL NFR BLD: 0 %
ERYTHROCYTE [DISTWIDTH] IN BLOOD BY AUTOMATED COUNT: 12 %
EST. GFR  (AFRICAN AMERICAN): >60 ML/MIN/1.73 M^2
EST. GFR  (NON AFRICAN AMERICAN): 52.1 ML/MIN/1.73 M^2
GLUCOSE SERPL-MCNC: 123 MG/DL
HCT VFR BLD AUTO: 37.2 %
HDLC SERPL-MCNC: 69 MG/DL
HDLC SERPL: 38.5 %
HGB BLD-MCNC: 12.2 G/DL
IMM GRANULOCYTES # BLD AUTO: 0.04 K/UL
IMM GRANULOCYTES NFR BLD AUTO: 0.4 %
LDLC SERPL CALC-MCNC: 97.8 MG/DL
LYMPHOCYTES # BLD AUTO: 1.1 K/UL
LYMPHOCYTES NFR BLD: 10.3 %
MCH RBC QN AUTO: 29.3 PG
MCHC RBC AUTO-ENTMCNC: 32.8 G/DL
MCV RBC AUTO: 89 FL
MONOCYTES # BLD AUTO: 0.6 K/UL
MONOCYTES NFR BLD: 5.2 %
NEUTROPHILS # BLD AUTO: 9 K/UL
NEUTROPHILS NFR BLD: 83.9 %
NONHDLC SERPL-MCNC: 110 MG/DL
NRBC BLD-RTO: 0 /100 WBC
PLATELET # BLD AUTO: 340 K/UL
PMV BLD AUTO: 9.9 FL
POTASSIUM SERPL-SCNC: 4.4 MMOL/L
PROT SERPL-MCNC: 7.7 G/DL
RBC # BLD AUTO: 4.16 M/UL
SODIUM SERPL-SCNC: 139 MMOL/L
TRIGL SERPL-MCNC: 61 MG/DL
WBC # BLD AUTO: 10.73 K/UL

## 2018-07-12 PROCEDURE — 36415 COLL VENOUS BLD VENIPUNCTURE: CPT

## 2018-07-12 PROCEDURE — 80061 LIPID PANEL: CPT

## 2018-07-12 PROCEDURE — 80053 COMPREHEN METABOLIC PANEL: CPT

## 2018-07-12 PROCEDURE — 85025 COMPLETE CBC W/AUTO DIFF WBC: CPT

## 2018-07-18 ENCOUNTER — HOSPITAL ENCOUNTER (OUTPATIENT)
Dept: RADIOLOGY | Facility: HOSPITAL | Age: 68
Discharge: HOME | End: 2018-07-18
Attending: INTERNAL MEDICINE
Payer: COMMERCIAL

## 2018-07-18 ENCOUNTER — OFFICE VISIT (OUTPATIENT)
Dept: INTERNAL MEDICINE | Facility: CLINIC | Age: 68
End: 2018-07-18
Payer: COMMERCIAL

## 2018-07-18 VITALS
DIASTOLIC BLOOD PRESSURE: 82 MMHG | WEIGHT: 162.25 LBS | HEIGHT: 62 IN | OXYGEN SATURATION: 98 % | HEART RATE: 85 BPM | BODY MASS INDEX: 29.86 KG/M2 | SYSTOLIC BLOOD PRESSURE: 124 MMHG

## 2018-07-18 DIAGNOSIS — R05.9 COUGH: ICD-10-CM

## 2018-07-18 DIAGNOSIS — R05.9 COUGH: Primary | ICD-10-CM

## 2018-07-18 DIAGNOSIS — R73.09 ELEVATED GLUCOSE: ICD-10-CM

## 2018-07-18 DIAGNOSIS — I10 ESSENTIAL HYPERTENSION: ICD-10-CM

## 2018-07-18 DIAGNOSIS — E78.00 PURE HYPERCHOLESTEROLEMIA: ICD-10-CM

## 2018-07-18 PROCEDURE — 99214 OFFICE O/P EST MOD 30 MIN: CPT | Mod: S$GLB,,, | Performed by: INTERNAL MEDICINE

## 2018-07-18 PROCEDURE — 99999 PR PBB SHADOW E&M-EST. PATIENT-LVL III: CPT | Mod: PBBFAC,,, | Performed by: INTERNAL MEDICINE

## 2018-07-18 PROCEDURE — 71046 X-RAY EXAM CHEST 2 VIEWS: CPT | Mod: 26,,, | Performed by: RADIOLOGY

## 2018-07-18 PROCEDURE — 3074F SYST BP LT 130 MM HG: CPT | Mod: CPTII,S$GLB,, | Performed by: INTERNAL MEDICINE

## 2018-07-18 PROCEDURE — 71046 X-RAY EXAM CHEST 2 VIEWS: CPT | Mod: TC

## 2018-07-18 PROCEDURE — 3079F DIAST BP 80-89 MM HG: CPT | Mod: CPTII,S$GLB,, | Performed by: INTERNAL MEDICINE

## 2018-07-18 NOTE — PROGRESS NOTES
Subjective:      Patient ID: Carmen Khan is a 68 y.o. female.    Chief Complaint: Follow-up  Hyperrtension, hyperlipidemia, cough  HPI:  HPI   Patient is here for follow up of hypertension, hyperlipidemia and has a new cough.    Hypertension: well controlled, doing better on the medication    Hyperlipidemia: doing well    Coughing, somewhat worse over the last several weeks.    Patient Active Problem List   Diagnosis    Hypertension    Hyperlipidemia    Anxiety    Screening for colon cancer    Primary osteoarthritis of left foot    Age-related macular degeneration, dry, both eyes    Posterior vitreous detachment of both eyes    Nuclear cataract of both eyes    Elevated glucose    Osteoarthritis of knee    Family history of colonic polyps    Rib pain on left side    Pain in the muscles    Arm pain, musculoskeletal, left    Nonexudative age-related macular degeneration, bilateral, intermediate dry stage     Past Medical History:   Diagnosis Date    Anxiety     Hyperlipidemia     Hypertension     Mitral valve prolapse      Past Surgical History:   Procedure Laterality Date    TONSILLECTOMY       Family History   Problem Relation Age of Onset    Cancer Mother         lung    Stroke Mother     Heart disease Father     Diabetes Father     Diabetes Brother     Aortic aneurysm Brother         surgery 5/2012    Kidney disease Brother         on dialysis    Melanoma Neg Hx     Breast cancer Neg Hx     Colon cancer Neg Hx     Ovarian cancer Neg Hx      Review of Systems   Constitutional: Negative for chills, fatigue, fever and unexpected weight change.   HENT: Negative for trouble swallowing.    Respiratory: Negative for cough, shortness of breath and wheezing.    Cardiovascular: Negative for chest pain, palpitations and leg swelling.   Gastrointestinal: Negative for abdominal distention, abdominal pain, blood in stool and vomiting.     Objective:     Vitals:    07/18/18 1140   BP:  "124/82   Pulse: 85   SpO2: 98%   Weight: 73.6 kg (162 lb 4.1 oz)   Height: 5' 2" (1.575 m)   PainSc: 0-No pain     Body mass index is 29.68 kg/m².  Physical Exam   Constitutional: She is oriented to person, place, and time. She appears well-developed and well-nourished. No distress.   Neck: Carotid bruit is not present. No thyromegaly present.   Cardiovascular: Normal rate, regular rhythm and normal heart sounds.  PMI is not displaced.    Pulmonary/Chest: Effort normal and breath sounds normal. No respiratory distress.   Abdominal: Soft. Bowel sounds are normal. She exhibits no distension. There is no tenderness.   Musculoskeletal: She exhibits no edema.   Neurological: She is alert and oriented to person, place, and time.     Assessment:     1. Cough    2. Elevated glucose    3. Pure hypercholesterolemia    4. Essential hypertension      Plan:   Carmen was seen today for follow-up.    Diagnoses and all orders for this visit:    Cough  -     X-Ray Chest PA And Lateral; Future    Elevated glucose  -     Hemoglobin A1c; Future    Pure hypercholesterolemia: same medication    Essential hypertension  -     CBC auto differential; Future  -     Comprehensive metabolic panel; Future  -     Lipid panel; Future  -     TSH; Future      Follow-up in about 6 months (around 1/18/2019) for Annual exam.     Medication List          Accurate as of 7/18/18 11:59 PM. If you have any questions, ask your nurse or doctor.               CONTINUE taking these medications    aspirin 81 MG EC tablet  Commonly known as:  ECOTRIN     biotin 300 mcg Tab     diclofenac sodium 1 % Gel  Apply 2 g topically once daily.     escitalopram oxalate 10 MG tablet  Commonly known as:  LEXAPRO  Take 1 tablet (10 mg total) by mouth once daily.     metoprolol succinate 50 MG 24 hr tablet  Commonly known as:  TOPROL-XL  TAKE 1 TABLET BY MOUTH DAILY     phentermine 37.5 mg tablet  Commonly known as:  ADIPEX-P  take a 1/2 tablet by mouth daily.     simvastatin " 40 MG tablet  Commonly known as:  ZOCOR  TAKE 1 TABLET BY MOUTH EVERY EVENING.     tretinoin 0.1 % cream  Commonly known as:  RETIN-A  APPLY A THIN FILM TO AFFECTED AREA EVERY EVENING AFTER MOISTURIZING.     triamterene-hydrochlorothiazide 37.5-25 mg 37.5-25 mg per capsule  Commonly known as:  DYAZIDE  TAKE ONE CAPSULE BY MOUTH EVERY DAY     TROKENDI XR 50 mg Cp24  Generic drug:  topiramate  Take one tablet by mouth once daily.     vitamin D 1000 units Tab        STOP taking these medications    oxyCODONE-acetaminophen 5-325 mg per tablet  Commonly known as:  PERCOCET  Stopped by:  Tessa Edmondson MD

## 2018-08-13 ENCOUNTER — OFFICE VISIT (OUTPATIENT)
Dept: BARIATRICS | Facility: CLINIC | Age: 68
End: 2018-08-13
Payer: COMMERCIAL

## 2018-08-13 VITALS
HEART RATE: 82 BPM | BODY MASS INDEX: 29.78 KG/M2 | DIASTOLIC BLOOD PRESSURE: 74 MMHG | SYSTOLIC BLOOD PRESSURE: 136 MMHG | HEIGHT: 62 IN | WEIGHT: 161.81 LBS

## 2018-08-13 DIAGNOSIS — E66.3 OVERWEIGHT (BMI 25.0-29.9): ICD-10-CM

## 2018-08-13 PROCEDURE — 3078F DIAST BP <80 MM HG: CPT | Mod: CPTII,S$GLB,, | Performed by: INTERNAL MEDICINE

## 2018-08-13 PROCEDURE — 99213 OFFICE O/P EST LOW 20 MIN: CPT | Mod: S$GLB,,, | Performed by: INTERNAL MEDICINE

## 2018-08-13 PROCEDURE — 99999 PR PBB SHADOW E&M-EST. PATIENT-LVL III: CPT | Mod: PBBFAC,,, | Performed by: INTERNAL MEDICINE

## 2018-08-13 PROCEDURE — 3075F SYST BP GE 130 - 139MM HG: CPT | Mod: CPTII,S$GLB,, | Performed by: INTERNAL MEDICINE

## 2018-08-13 RX ORDER — TOPIRAMATE 50 MG/1
50 CAPSULE, EXTENDED RELEASE ORAL DAILY
Qty: 30 CAPSULE | Refills: 3 | Status: SHIPPED | OUTPATIENT
Start: 2018-08-13 | End: 2018-12-04 | Stop reason: SDUPTHER

## 2018-08-13 RX ORDER — PHENTERMINE HYDROCHLORIDE 37.5 MG/1
TABLET ORAL
Qty: 30 TABLET | Refills: 1 | Status: SHIPPED | OUTPATIENT
Start: 2018-08-13 | End: 2018-12-04 | Stop reason: SDUPTHER

## 2018-08-13 NOTE — PROGRESS NOTES
"Subjective:       Patient ID: Carmen Khan is a 68 y.o. female.    Chief Complaint: Follow-up    Pt here today for follow up. Has lost 5 lbs from previous visit, net 11 lbs neg. Has been on topiramte and phentermine, last filled 7/18/18.   checked today . We added the topiramate last OV. She does feel it has helped. SHe has been losing more steadily with it.       Review of Systems   Constitutional: Negative for chills and fever.   Respiratory: Negative for apnea and shortness of breath.         + snores   Cardiovascular: Negative for chest pain and leg swelling.   Gastrointestinal: Negative for constipation and diarrhea.        Denies HB   Genitourinary: Negative for dysuria.   Musculoskeletal: Positive for arthralgias. Negative for back pain.        Left foot OA   Neurological: Negative for dizziness and light-headedness.   Psychiatric/Behavioral: Negative for dysphoric mood. The patient is not nervous/anxious.         Doing well on Lexapro       Objective:       /74   Pulse 82   Ht 5' 2" (1.575 m)   Wt 73.4 kg (161 lb 13.1 oz)   BMI 29.60 kg/m²     Physical Exam   Constitutional: She is oriented to person, place, and time. She appears well-developed. No distress.   Obese     HENT:   Head: Normocephalic and atraumatic.   Eyes: EOM are normal. Pupils are equal, round, and reactive to light. No scleral icterus.   Neck: Normal range of motion. Neck supple.   Cardiovascular: Normal rate.   Pulmonary/Chest: Effort normal.   Musculoskeletal: Normal range of motion. She exhibits no edema.   Neurological: She is alert and oriented to person, place, and time. No cranial nerve deficit.   Skin: Skin is warm and dry. No erythema.   Psychiatric: She has a normal mood and affect. Her behavior is normal. Judgment normal.   Vitals reviewed.      Assessment:       1. Overweight (BMI 25.0-29.9)        Plan:            Carmen was seen today for follow-up.    Diagnoses and all orders for this " visit:    Overweight (BMI 25.0-29.9)  -     phentermine (ADIPEX-P) 37.5 mg tablet; Take one-half tablet by mouth once daily.    Other orders  -     topiramate (TROKENDI XR) 50 mg Cp24; Take one tablet by mouth once daily.            Patient was informed that topiramate is used for migraine prevention and seizures. Weight loss is a common side effect that is well documented. She understands this. She was informed of the potential side effects such as serious and possibly fatal rash in which case the medication should be discontinued immediately. Paresthesias, forgetfulness, fatigue, kidney stones, GI symptoms, and changes in lab values such as electrolytes, blood counts and kidney function.    Patient warned of common side effects of phentermine including anxiety, insomnia, palpitations and increased blood pressure. It was also explained that it is for short-term usage along with diet and exercise, and that stopping the medication without making lifestyle changes will result in regain of weight. Patient states understanding.     Weight loss medications are controlled substances.  They require routine follow up. Prescription or pills that are lost or destroyed will not be replaced.       Start phentermine with 1/2 pill a day       3 meals a day made up of the following:  Unlimited green vegetables, tomatoes, mushrooms, spaghetti squash, cauliflower, meat, poultry, seafood, eggs and hard cheeses.   Milk and plain yogurt  Dressings, seasonings, condiments, etc should have less than 2 g sugars.   Beans (1-1.5 cups) or nuts (1/4 cup) can have 1 x a day.   1-2 servings of citrus fruits, berries, pineapple or melon a day (1/2 cup)  Avoid fried foods    No grains, rice, pasta, potatoes, bread, corn, peas, oatmeal, grits, tortillas, crackers, chips    No soda, sweet tea, juices or lemonade    [email or web address].[email or web address] for recipes. Moderate carb intake      Add some type of resistance training 2-3 days a week. These can be body  weight exercises, light weight or elastic bands. [employer] and [employer] are great sources for free work out plans and videos.

## 2018-08-13 NOTE — PATIENT INSTRUCTIONS
Patient was informed that topiramate is used for migraine prevention and seizures. Weight loss is a common side effect that is well documented. She understands this. She was informed of the potential side effects such as serious and possibly fatal rash in which case the medication should be discontinued immediately. Paresthesias, forgetfulness, fatigue, kidney stones, GI symptoms, and changes in lab values such as electrolytes, blood counts and kidney function.    Patient warned of common side effects of phentermine including anxiety, insomnia, palpitations and increased blood pressure. It was also explained that it is for short-term usage along with diet and exercise, and that stopping the medication without making lifestyle changes will result in regain of weight. Patient states understanding.     Weight loss medications are controlled substances.  They require routine follow up. Prescription or pills that are lost or destroyed will not be replaced.       Continue phentermine with 1/2 pill a day     3 meals a day made up of the following:  Unlimited green vegetables, tomatoes, mushrooms, spaghetti squash, cauliflower, meat, poultry, seafood, eggs and hard cheeses.   Milk and plain yogurt  Dressings, seasonings, condiments, etc should have less than 2 g sugars.   Beans (1-1.5 cups) or nuts (1/4 cup) can have 1 x a day.   1-2 servings of citrus fruits, berries, pineapple or melon a day (1/2 cup)  Avoid fried foods    No grains, rice, pasta, potatoes, bread, corn, peas, oatmeal, grits, tortillas, crackers, chips    No soda, sweet tea, juices or lemonade    [email or web address].dietdoctor.com for recipes. Moderate carb intake      Add some type of resistance training 2-3 days a week. These can be body weight exercises, light weight or elastic bands. [employer] are great sources for free work out plans and videos.

## 2018-08-20 RX ORDER — ESCITALOPRAM OXALATE 10 MG/1
10 TABLET ORAL DAILY
Qty: 30 TABLET | Refills: 12 | Status: SHIPPED | OUTPATIENT
Start: 2018-08-20 | End: 2019-08-28 | Stop reason: SDUPTHER

## 2018-10-05 DIAGNOSIS — E78.5 HYPERLIPIDEMIA: ICD-10-CM

## 2018-10-05 RX ORDER — SIMVASTATIN 40 MG/1
TABLET, FILM COATED ORAL
Qty: 90 TABLET | Refills: 3 | Status: SHIPPED | OUTPATIENT
Start: 2018-10-05 | End: 2020-01-30 | Stop reason: SDUPTHER

## 2018-10-24 ENCOUNTER — PATIENT MESSAGE (OUTPATIENT)
Dept: OTHER | Facility: OTHER | Age: 68
End: 2018-10-24

## 2018-11-13 ENCOUNTER — OFFICE VISIT (OUTPATIENT)
Dept: PHYSICAL MEDICINE AND REHAB | Facility: CLINIC | Age: 68
End: 2018-11-13
Payer: COMMERCIAL

## 2018-11-13 VITALS
BODY MASS INDEX: 30.73 KG/M2 | DIASTOLIC BLOOD PRESSURE: 76 MMHG | HEART RATE: 112 BPM | HEIGHT: 62 IN | SYSTOLIC BLOOD PRESSURE: 143 MMHG | WEIGHT: 167 LBS

## 2018-11-13 DIAGNOSIS — M54.16 LUMBAR RADICULOPATHY: ICD-10-CM

## 2018-11-13 DIAGNOSIS — M54.41 CHRONIC RIGHT-SIDED LOW BACK PAIN WITH RIGHT-SIDED SCIATICA: Primary | ICD-10-CM

## 2018-11-13 DIAGNOSIS — M54.50 LOW BACK PAIN, NON-SPECIFIC: ICD-10-CM

## 2018-11-13 DIAGNOSIS — G89.29 CHRONIC RIGHT-SIDED LOW BACK PAIN WITH RIGHT-SIDED SCIATICA: Primary | ICD-10-CM

## 2018-11-13 PROBLEM — M54.40 CHRONIC RIGHT-SIDED LOW BACK PAIN WITH SCIATICA: Status: ACTIVE | Noted: 2018-11-13

## 2018-11-13 PROCEDURE — 3078F DIAST BP <80 MM HG: CPT | Mod: CPTII,S$GLB,, | Performed by: PHYSICAL MEDICINE & REHABILITATION

## 2018-11-13 PROCEDURE — 96372 THER/PROPH/DIAG INJ SC/IM: CPT | Mod: S$GLB,,, | Performed by: PHYSICAL MEDICINE & REHABILITATION

## 2018-11-13 PROCEDURE — 1101F PT FALLS ASSESS-DOCD LE1/YR: CPT | Mod: CPTII,S$GLB,, | Performed by: PHYSICAL MEDICINE & REHABILITATION

## 2018-11-13 PROCEDURE — 3077F SYST BP >= 140 MM HG: CPT | Mod: CPTII,S$GLB,, | Performed by: PHYSICAL MEDICINE & REHABILITATION

## 2018-11-13 PROCEDURE — 99999 PR PBB SHADOW E&M-EST. PATIENT-LVL IV: CPT | Mod: PBBFAC,,, | Performed by: PHYSICAL MEDICINE & REHABILITATION

## 2018-11-13 PROCEDURE — 99215 OFFICE O/P EST HI 40 MIN: CPT | Mod: 25,S$GLB,, | Performed by: PHYSICAL MEDICINE & REHABILITATION

## 2018-11-13 RX ORDER — TRAMADOL HYDROCHLORIDE 50 MG/1
50 TABLET ORAL EVERY 6 HOURS PRN
Qty: 60 TABLET | Refills: 1 | Status: SHIPPED | OUTPATIENT
Start: 2018-11-13 | End: 2018-11-29

## 2018-11-13 RX ORDER — GABAPENTIN 100 MG/1
100 CAPSULE ORAL 3 TIMES DAILY
Qty: 90 CAPSULE | Refills: 1 | Status: SHIPPED | OUTPATIENT
Start: 2018-11-13 | End: 2019-01-14

## 2018-11-13 RX ORDER — TRIAMCINOLONE ACETONIDE 40 MG/ML
40 INJECTION, SUSPENSION INTRA-ARTICULAR; INTRAMUSCULAR
Status: COMPLETED | OUTPATIENT
Start: 2018-11-13 | End: 2018-11-13

## 2018-11-13 RX ADMIN — TRIAMCINOLONE ACETONIDE 40 MG: 40 INJECTION, SUSPENSION INTRA-ARTICULAR; INTRAMUSCULAR at 04:11

## 2018-11-13 NOTE — PROGRESS NOTES
Subjective:       Patient ID: Carmen Khan is a 68 y.o. female.    Chief Complaint: Back Pain and Leg Pain    HPI   Carmen Gabriel returned to clinic for a new problem, an acute Right sided back pain x 3 weeks.  LCV 2/15/18.    Back Pain Description:  Length: pain is acute pain. Length >  3 week(s)  it  happened that she bend wrongly,and was picking some heavier object doing house work.   Intensity:  CURRENT   5-6/10,  AVERAGE  Pain 5-6/10. at BEST  3 /10 , At WORST  8-9/10 on the WORST day.   Location: pain is localized at  Rt buttock and running down the Rt leg.   It is mostly Back pain, and that's RT  leg pain.is sporadic.   Radiation: Positive to buttocks. Positive to RT  legs.   Timing : constant  morning pain , worse with activity, in evening.  QUALITY:  Dull/ ache, strong pressure like  Pain,   Goes down the leg ned. With stretched leg while driving, it is more to foot, to the bottom of foot.   NO burning, tingling.   Positive  leg weakness, when climbing stairs..   Worsening factors:  Prolong sitting, and driving makes it worse, Walking > 2-3 blocks made it worse.   Alleviating factors:  Laying on back is most comfortable.  Symptoms interfere with daily activity, sleeping and work.   Current medications:  Voltaren gel, Diclofenac 2 tabs daily.  Failed medications:  None   Prior procedures.   PT/OT: No  Patient denies night fever/night sweats, bowel incontinence, significant weight loss and significant motor weakness ( red flags).  Patient denies any suicidal or homicidal ideations.    She is here for evaluation and treatment.    Past Medical History:   Diagnosis Date    Anxiety     Hyperlipidemia     Hypertension     Mitral valve prolapse        Past Surgical History:   Procedure Laterality Date    COLONOSCOPY N/A 1/27/2015    Performed by Deniz Jones MD at [facility] (4TH FLR)    TONSILLECTOMY         Family History   Problem Relation Age of Onset    Cancer Mother         lung     Stroke Mother     Heart disease Father     Diabetes Father     Diabetes Brother     Aortic aneurysm Brother         surgery 5/2012    Kidney disease Brother         on dialysis    Melanoma Neg Hx     Breast cancer Neg Hx     Colon cancer Neg Hx     Ovarian cancer Neg Hx        Social History     Socioeconomic History    Marital status:      Spouse name: None    Number of children: None    Years of education: None    Highest education level: None   Social Needs    Financial resource strain: None    Food insecurity - worry: None    Food insecurity - inability: None    Transportation needs - medical: None    Transportation needs - non-medical: None   Occupational History     Employer: [employer]   Tobacco Use    Smoking status: Never Smoker    Smokeless tobacco: Never Used   Substance and Sexual Activity    Alcohol use: Yes     Alcohol/week: 8.4 oz     Types: 14 Glasses of wine per week     Comment: daily    Drug use: No    Sexual activity: Yes     Partners: Male     Birth control/protection: Post-menopausal   Other Topics Concern    Are you pregnant or think you may be? Not Asked    Breast-feeding Not Asked   Social History Narrative    None       Current Outpatient Medications   Medication Sig Dispense Refill    aspirin (ECOTRIN) 81 MG EC tablet Take 81 mg by mouth once daily.        biotin 300 mcg Tab Take 1 tablet by mouth once daily.      diclofenac sodium 1 % Gel Apply 2 g topically once daily. 5 Tube 3    escitalopram oxalate (LEXAPRO) 10 MG tablet Take 1 tablet (10 mg total) by mouth once daily. 30 tablet 12    metoprolol succinate (TOPROL-XL) 50 MG 24 hr tablet TAKE 1 TABLET BY MOUTH DAILY 90 tablet 3    phentermine (ADIPEX-P) 37.5 mg tablet Take 1/2 tablet by mouth once daily. 30 tablet 1    simvastatin (ZOCOR) 40 MG tablet TAKE 1 TABLET BY MOUTH EVERY EVENING. 90 tablet 3    topiramate (TROKENDI XR) 50 mg Cp24 Take one tablet by mouth once daily. 30  capsule 3    tretinoin (RETIN-A) 0.1 % cream APPLY A THIN FILM TO AFFECTED AREA EVERY EVENING AFTER MOISTURIZING. 45 g 3    triamterene-hydrochlorothiazide 37.5-25 mg (DYAZIDE) 37.5-25 mg per capsule TAKE ONE CAPSULE BY MOUTH EVERY DAY 90 capsule 3    triamterene-hydrochlorothiazide 37.5-25 mg (DYAZIDE) 37.5-25 mg per capsule take one capsule by mouth every day 90 capsule 3    vitamin D 1000 units Tab Take 2,000 mg by mouth once daily.        No current facility-administered medications for this visit.        Review of patient's allergies indicates:  No Known Allergies  Review of Systems   Constitutional: Negative for appetite change, chills, fatigue, fever and unexpected weight change.   HENT: Negative for drooling, trouble swallowing and voice change.    Eyes: Negative for pain and visual disturbance.   Respiratory: Negative for shortness of breath and wheezing.    Cardiovascular: Negative for chest pain and palpitations.   Gastrointestinal: Negative for abdominal distention, abdominal pain, constipation and diarrhea.   Genitourinary: Negative for difficulty urinating.   Musculoskeletal: Positive for back pain. Negative for arthralgias, gait problem, joint swelling, myalgias and neck stiffness.               Skin: Negative for color change and rash.   Neurological: Negative for dizziness, facial asymmetry, speech difficulty, weakness, light-headedness and numbness. Headaches:     Hematological: Negative for adenopathy.   Psychiatric/Behavioral: Negative for behavioral problems, confusion and sleep disturbance. The patient is not nervous/anxious.          Objective:      Physical Exam      The patient is alert, oriented x3, pleasant.    HEENT: PERRLA  NECK: supple , no masses.  LUNGS: CTA b/l  CV: S1S2, RRR  ABDOMEN: soft, NT/ND, BS+  EXTREMITIES: no c/c/e, +2 pulses.  SKIN: no breakdowns, no rash  MUSCULOSKELETAL EXAM:    Gait is within normal limits, no AD.  Lumbar spine flexion to 90, extension to 0, side  bending and rotation to about 35-40 degrees.   Min Positive facet loading on right side, negative on Left.  Positive tenderness at Rt sciatica notch,   Straight leg raising positive on Rt ( sitting), negative on left  Bilaterally.   No pain with internal rotation of bilateral hips.     negative Lior sign.  Cervical spine, full AROM in all planes.  Muscle strength 5/5 throughout x4 extremities.    No joint laxity  throughout x4 extremities.    NEUROLOGIC:  Cranial nerves II through XII intact.    Deep tendon reflexes +2 in bilateral upper extremities and decreased in bilateral lower extremity, +1 patellar and Achilles.   Sensory is intact to light touch and pinprick  throughout x4 extremities.       Assessment:       1. Lumbar radiculopathy    2. Chronic right-sided low back pain with right-sided sciatica        Plan:       Chronic right-sided low back pain with right-sided sciatica  -     X-Ray Lumbar 4-5 View with Bending Views; Future  -     MRI Lumbar Spine Without Contrast; Future  -     gabapentin (NEURONTIN) 100 MG capsule; Take 1 capsule (100 mg total) by mouth 3 (three) times daily.  Dispense: 90 capsule; Refill: 1  -     traMADol (ULTRAM) 50 mg tablet; Take 1 tablet (50 mg total) by mouth every 6 (six) hours as needed.  Dispense: 60 tablet; Refill: 1    Lumbar radiculopathy  -     X-Ray Lumbar 4-5 View with Bending Views; Future  -     MRI Lumbar Spine Without Contrast; Future  -     gabapentin (NEURONTIN) 100 MG capsule; Take 1 capsule (100 mg total) by mouth 3 (three) times daily.  Dispense: 90 capsule; Refill: 1  -     traMADol (ULTRAM) 50 mg tablet; Take 1 tablet (50 mg total) by mouth every 6 (six) hours as needed.  Dispense: 60 tablet; Refill: 1    Low back pain, non-specific  -     MRI Lumbar Spine Without Contrast; Future  -     gabapentin (NEURONTIN) 100 MG capsule; Take 1 capsule (100 mg total) by mouth 3 (three) times daily.  Dispense: 90 capsule; Refill: 1  -     traMADol (ULTRAM) 50 mg  "tablet; Take 1 tablet (50 mg total) by mouth every 6 (six) hours as needed.  Dispense: 60 tablet; Refill: 1      Patient with acute " sciatica" pain, lumbar radiculopathy running from RT buttock down the RT leg, in L5-S1 dermatomal distribution.     1  Dx:     Will order Xray LS to evaluate for spondylosis, vs spondylolisthesis     MRI to evaluate for acute Lumbar radiculopathy.    2.  Pain management:    -- Procedure description:   Steroid injection to Rt gluteal area.  The potential risks were explained to the patient and he voiced understanding and consented to proceed.  Time out was taken,  the correct patient, procedure, site, and position confirmed.  Under informed consent, and using a sterile technique, and prepped with iodine and alcohol and ethyl chloride was used to numb the skin.   Kenalog 40 mg,  LOT# [id number]  [id number], was given IM to o Rt gluteal muscle  using postero-lateral approach.   She  Reports no immediate side effects.   Tolerated procedure well.       -- will resume: NSAIDs, Diclofenac 2 tabs daily,   Add Gabapentin 100 mg, 1 cap in am/ 2 caps at bedtime, and Tramadol prn.     -- discussed about KYLE, she might need Rt L5-S1 TF KYLE, in future, if pain does not subside.       RTC in 2-3 weeks,.    Total time spent face to face with patient was 45 minutes.   More than 50% of that time was spent in counseling on diagnosis , prognosis and treatment options.   5-6 minutes were taken for procedure.   I also  patient  on common and most usual side effect of prescribed medications.    I reviewed Primary care , and other specialty's notes to better coordinate patient's  care.   All questions were answered, and patient voiced understanding.   "

## 2018-11-26 ENCOUNTER — HOSPITAL ENCOUNTER (OUTPATIENT)
Dept: RADIOLOGY | Facility: HOSPITAL | Age: 68
Discharge: HOME | End: 2018-11-26
Attending: PHYSICAL MEDICINE & REHABILITATION
Payer: COMMERCIAL

## 2018-11-26 DIAGNOSIS — G89.29 CHRONIC RIGHT-SIDED LOW BACK PAIN WITH RIGHT-SIDED SCIATICA: ICD-10-CM

## 2018-11-26 DIAGNOSIS — M54.41 CHRONIC RIGHT-SIDED LOW BACK PAIN WITH RIGHT-SIDED SCIATICA: ICD-10-CM

## 2018-11-26 DIAGNOSIS — M54.16 LUMBAR RADICULOPATHY: ICD-10-CM

## 2018-11-26 PROCEDURE — 72100 X-RAY EXAM L-S SPINE 2/3 VWS: CPT | Mod: 26,,, | Performed by: RADIOLOGY

## 2018-11-26 PROCEDURE — 72120 X-RAY BEND ONLY L-S SPINE: CPT | Mod: 26,,, | Performed by: RADIOLOGY

## 2018-11-26 PROCEDURE — 72100 X-RAY EXAM L-S SPINE 2/3 VWS: CPT | Mod: TC

## 2018-11-27 ENCOUNTER — PATIENT MESSAGE (OUTPATIENT)
Dept: PHYSICAL MEDICINE AND REHAB | Facility: CLINIC | Age: 68
End: 2018-11-27

## 2018-11-28 ENCOUNTER — HOSPITAL ENCOUNTER (OUTPATIENT)
Dept: RADIOLOGY | Facility: HOSPITAL | Age: 68
Discharge: HOME | End: 2018-11-28
Attending: PHYSICAL MEDICINE & REHABILITATION
Payer: COMMERCIAL

## 2018-11-28 DIAGNOSIS — M54.50 LOW BACK PAIN, NON-SPECIFIC: ICD-10-CM

## 2018-11-28 DIAGNOSIS — G89.29 CHRONIC RIGHT-SIDED LOW BACK PAIN WITH RIGHT-SIDED SCIATICA: ICD-10-CM

## 2018-11-28 DIAGNOSIS — M54.41 CHRONIC RIGHT-SIDED LOW BACK PAIN WITH RIGHT-SIDED SCIATICA: ICD-10-CM

## 2018-11-28 DIAGNOSIS — M54.16 LUMBAR RADICULOPATHY: ICD-10-CM

## 2018-11-28 PROCEDURE — 72148 MRI LUMBAR SPINE W/O DYE: CPT | Mod: 26,,, | Performed by: RADIOLOGY

## 2018-11-28 PROCEDURE — 72148 MRI LUMBAR SPINE W/O DYE: CPT | Mod: TC

## 2018-11-29 ENCOUNTER — HOSPITAL ENCOUNTER (OUTPATIENT)
Dept: RADIOLOGY | Facility: HOSPITAL | Age: 68
Discharge: HOME | End: 2018-11-29
Attending: PHYSICIAN ASSISTANT
Payer: COMMERCIAL

## 2018-11-29 ENCOUNTER — OFFICE VISIT (OUTPATIENT)
Dept: ORTHOPEDICS | Facility: CLINIC | Age: 68
End: 2018-11-29
Payer: COMMERCIAL

## 2018-11-29 ENCOUNTER — TELEPHONE (OUTPATIENT)
Dept: ORTHOPEDICS | Facility: CLINIC | Age: 68
End: 2018-11-29

## 2018-11-29 VITALS — HEIGHT: 62 IN | BODY MASS INDEX: 30.55 KG/M2 | WEIGHT: 166 LBS

## 2018-11-29 DIAGNOSIS — S92.351A CLOSED FRACTURE OF FIFTH METATARSAL BONE OF RIGHT FOOT, INITIAL ENCOUNTER: Primary | ICD-10-CM

## 2018-11-29 DIAGNOSIS — M79.671 RIGHT FOOT PAIN: ICD-10-CM

## 2018-11-29 PROCEDURE — 1101F PT FALLS ASSESS-DOCD LE1/YR: CPT | Mod: CPTII,S$GLB,, | Performed by: PHYSICIAN ASSISTANT

## 2018-11-29 PROCEDURE — 99213 OFFICE O/P EST LOW 20 MIN: CPT | Mod: S$GLB,,, | Performed by: PHYSICIAN ASSISTANT

## 2018-11-29 PROCEDURE — 73630 X-RAY EXAM OF FOOT: CPT | Mod: 26,RT,, | Performed by: RADIOLOGY

## 2018-11-29 PROCEDURE — 73630 X-RAY EXAM OF FOOT: CPT | Mod: TC,RT

## 2018-11-29 PROCEDURE — 99999 PR PBB SHADOW E&M-EST. PATIENT-LVL IV: CPT | Mod: PBBFAC,,, | Performed by: PHYSICIAN ASSISTANT

## 2018-11-29 RX ORDER — TRAMADOL HYDROCHLORIDE 50 MG/1
50 TABLET ORAL EVERY 4 HOURS PRN
Qty: 42 TABLET | Refills: 0 | Status: SHIPPED | OUTPATIENT
Start: 2018-11-29 | End: 2018-12-09

## 2018-11-29 NOTE — TELEPHONE ENCOUNTER
Ortho Telephone Triage Call  [phone]  Patient C/O: R foot pain s/p catching foot in hole in parking lot at [employer] last evening at 7 p. Pt states most pain to middle of R foot. 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   Resolution: Appt scheduled today with BYRON Gutierres PA-C at 10:00am with arrival at 9:45 am. Pt states understanding.

## 2018-11-29 NOTE — PROGRESS NOTES
Subjective:      Patient ID: Carmen Khan is a 68 y.o. female.    Chief Complaint: Pain of the Right Foot    HPI  68 year old female presents with chief complaint of right foot pain since yesterday. She was in a parking lot and there was a pothole and she fell. She had pain with WB, bruising, and swelling. Pain is at the lateral foot. It is worse with WB. She is walking on her heel. She took ibuprofen and tylenol which helped.  Review of Systems   Constitution: Negative for chills, fever and night sweats.   Cardiovascular: Negative for chest pain.   Respiratory: Negative for cough and shortness of breath.    Hematologic/Lymphatic: Does not bruise/bleed easily.   Gastrointestinal: Negative for heartburn.   Genitourinary: Negative for dysuria.   Neurological: Negative for numbness and paresthesias.   Psychiatric/Behavioral: Negative for altered mental status.   Allergic/Immunologic: Negative for persistent infections.         Objective:            General    Vitals reviewed.  Constitutional: She is oriented to person, place, and time. She appears well-developed and well-nourished.   Cardiovascular: Normal rate.    Neurological: She is alert and oriented to person, place, and time.         Right Ankle/Foot Exam     Inspection   Bruising: Foot - present    Range of Motion   Ankle Joint   Dorsiflexion: abnormal   Plantar flexion: abnormal   Subtalar Joint   Inversion: abnormal   Eversion: abnormal     Other   Sensation: normal    Comments:  Mild swelling at foot. TTP 5th MT.         Vascular Exam     Right Pulses  Dorsalis Pedis:      2+            X-ray: ordered and reviewed by myself. There is a nondisplaced fracture identified involving the base of the 5th metatarsal bone.  Mild DJD.  Slight hallux valgus.  No bone destruction identified.          Assessment:       Encounter Diagnosis   Name Primary?    Closed fracture of fifth metatarsal bone of right foot, initial encounter Yes          Plan:        Discussed treatment options with patient. She was placed in a boot. She is WBAT. Ice and elevate foot. Tramadol prescribed. RTC in 2 weeks for repeat x-ray.

## 2018-12-04 ENCOUNTER — OFFICE VISIT (OUTPATIENT)
Dept: BARIATRICS | Facility: CLINIC | Age: 68
End: 2018-12-04
Payer: COMMERCIAL

## 2018-12-04 VITALS
SYSTOLIC BLOOD PRESSURE: 142 MMHG | HEIGHT: 62 IN | HEART RATE: 76 BPM | BODY MASS INDEX: 29.98 KG/M2 | WEIGHT: 162.94 LBS | DIASTOLIC BLOOD PRESSURE: 82 MMHG

## 2018-12-04 DIAGNOSIS — E66.3 OVERWEIGHT (BMI 25.0-29.9): ICD-10-CM

## 2018-12-04 PROCEDURE — 99213 OFFICE O/P EST LOW 20 MIN: CPT | Mod: S$GLB,,, | Performed by: INTERNAL MEDICINE

## 2018-12-04 PROCEDURE — 99999 PR PBB SHADOW E&M-EST. PATIENT-LVL III: CPT | Mod: PBBFAC,,, | Performed by: INTERNAL MEDICINE

## 2018-12-04 PROCEDURE — 1101F PT FALLS ASSESS-DOCD LE1/YR: CPT | Mod: CPTII,S$GLB,, | Performed by: INTERNAL MEDICINE

## 2018-12-04 PROCEDURE — 3077F SYST BP >= 140 MM HG: CPT | Mod: CPTII,S$GLB,, | Performed by: INTERNAL MEDICINE

## 2018-12-04 PROCEDURE — 3079F DIAST BP 80-89 MM HG: CPT | Mod: CPTII,S$GLB,, | Performed by: INTERNAL MEDICINE

## 2018-12-04 RX ORDER — TOPIRAMATE 50 MG/1
50 CAPSULE, EXTENDED RELEASE ORAL DAILY
Qty: 30 CAPSULE | Refills: 3 | Status: SHIPPED | OUTPATIENT
Start: 2018-12-04 | End: 2019-03-15 | Stop reason: SDUPTHER

## 2018-12-04 RX ORDER — PHENTERMINE HYDROCHLORIDE 37.5 MG/1
TABLET ORAL
Qty: 30 TABLET | Refills: 1 | Status: SHIPPED | OUTPATIENT
Start: 2018-12-04 | End: 2019-03-15 | Stop reason: SDUPTHER

## 2018-12-04 NOTE — PATIENT INSTRUCTIONS
Patient was informed that topiramate is used for migraine prevention and seizures. Weight loss is a common side effect that is well documented. She understands this. She was informed of the potential side effects such as serious and possibly fatal rash in which case the medication should be discontinued immediately. Paresthesias, forgetfulness, fatigue, kidney stones, GI symptoms, and changes in lab values such as electrolytes, blood counts and kidney function.    Patient warned of common side effects of phentermine including anxiety, insomnia, palpitations and increased blood pressure. It was also explained that it is for short-term usage along with diet and exercise, and that stopping the medication without making lifestyle changes will result in regain of weight. Patient states understanding.     Weight loss medications are controlled substances.  They require routine follow up. Prescription or pills that are lost or destroyed will not be replaced.       Start phentermine with 1/2 pill a day       3 meals a day made up of the following:  Unlimited green vegetables, tomatoes, mushrooms, spaghetti squash, cauliflower, meat, poultry, seafood, eggs and hard cheeses.   Milk and plain yogurt  Dressings, seasonings, condiments, etc should have less than 2 g sugars.   Beans (1-1.5 cups) or nuts (1/4 cup) can have 1 x a day.   1-2 servings of citrus fruits, berries, pineapple or melon a day (1/2 cup)  Avoid fried foods    No grains, rice, pasta, potatoes, bread, corn, peas, oatmeal, grits, tortillas, crackers, chips    No soda, sweet tea, juices or lemonade    Www.dietdoctor.[employer] for recipes. Moderate carb intake      Add some type of resistance training 2-3 days a week. These can be body weight exercises, light weight or elastic bands. [employer] and [employer] are great sources for free work out plans and videos.       Helpful tips to survive the holidays:  - Dont save yourself for the meal: Make sure you continue to eat  high protein small meals every 3-4 hours to ensure to do not become over-hungry. Avoid temptation by not showing up to a holiday party or gathering hungry.   - Plan ahead. Bring a dish to a party if you know there may not be an appropriate option.   - Choose sugar-free drinks: Stick to water or other sugar-free beverages and make sure you are getting 6-8 cups of fluid each day (but not with meals!). Avoid alcohol, carbonated beverages, and high-fat/high-sugar beverages like hot chocolate and eggnog. Try sugar-free hot cocoa made with skim milk or water, or sugar-free spiced tea to add some holiday flair to your beverage (see sugar-free mulled cider recipe below)  - Take your time: Eat mindfully. Dont graze on food throughout the day. Sit down to enjoy your small meals. Chew slowly and thoroughly. Cut your food into small pieces before eating.  - Listen to your body: Stop eating as soon as you feel full. Do not feel pressured to try certain (or all) foods or to eat all of the food on your plate. Listen to your hunger cues.   - REMEMBER: Make your holidays about spending time with family and friends instead of focusing gatherings around food.  - Keep up your exercise routine: Make sure you continue to get regular exercise throughout the holiday season. Encourage friends and family to be active by taking a walk together after a meal, to look at holiday lights, or to window-shop.    Good Holiday Meal Options:  - Roasted Turkey, NO skin. Use low sodium broth instead of gravy.   - Stuffed Bell Peppers made WITHOUT bread crumbs or Rice. Try using parmesan cheese instead  - Gumbo, NO rice. Try picking out mostly the meat/seafood and vegetables with little broth.   - Green Bean Casserole made with 98% fat free cream of mushroom soup and crushed almonds/pecans instead of fried onions  - Side salad w/ low fat dressing. Try a different kind of salad maybe use Kale or spinach.   - Roasted non-starchy vegetables like brussel  sprouts, broccoli, green beans, zucchini, butternut squash, cauliflower  - Cauliflower Mash (steam or roast cauliflower, puree w/ low fat cheese, dash of fat free milk and 2-3 sprays of I cant believe its not butter spray. Add garlic powder and black pepper to season). Use Low sodium broth instead of gravy.   - Try Loaded Cauliflower Mash (Make cauliflower like above cauliflower mash. Top with diced turkey giordano, ¼ cup low fat cheddar cheese and bake @ 350* F for 5-10 minutes, until cheese is melted. Top with minced chives, black pepper and garlic to taste).   - Homemade cranberry sauce using Splenda or another alternative sweetener. Boil fresh cranberries and add splenda to taste. Boil until cranberries break open and then simmer until it reaches the consistency you want (less time for more watery sauce and simmer for longer to create a thicker sauce).   - Deviled eggs: make using low fat gottlieb, mustard, DILL relish (not sweet relish).   - Vegetable tray w/ Greek yogurt Ranch Dip. Mix 1 packet of [place] ranch dip mix w/ 16 oz low fat plain greek yogurt.     Good Holiday Dessert Options:  - High protein Pumpkin Cheesecake (see recipe below)  - Pumpkin Whip (see recipe below)  - Quest Apple Pie or Cinnamon Roll flavored protein bar (warm in microwave for 10-15 seconds)  - Eggnog Protein shake (see recipe below)  - Fresh fruit w/ low fat cheese  - Sugar-free Jello Parfaits. Layer Red and Green sugar-free jello in cups and top w/ 2 tbsp Sugar-free cool-whip    Pumpkin Cheesecake    8 ounces fat free cream cheese, softened   2 scoops of vanilla protein powder (<4 g sugar per serving)   ¼ tsp Fine salt   2 eggs, at room temperature   1/3 cup fat free sour cream  1/3 cup fat free half and half  1 15 -ounce can pure pumpkin puree   1 tablespoon pumpkin pie spice, plus more for dusting   Unsalted nuts, crushed  *Add splenda to taste    Directions     1. Preheat the oven to 300 degrees F. Line 18 muffin cups with  paper liners. Sprinkle 1 tsp crushed unsalted nuts at the bottom of each of muffin cup liner.     2. In a large bowl, beat the cream cheese, vanilla protein powder and 1/4 teaspoon fine salt on medium-high speed until smooth and creamy, 2 to 3 minutes. Scrape down the sides, reduce speed to low and beat in the eggs, 1 at a time, until combined. Beat in 1/3 cup fat free sour cream and fat free half and half. Stir in the pumpkin puree and pumpkin pie spice until smooth. Divide evenly among cookie-lined paper cups, filling almost all the way to the top.     3. Bake until the filling is just set, 40 to 45 minutes. A sharp knife inserted into the center will come out moist, but clean. Cool completely in tins on a wire rack. Refrigerate until cold, 4 hours, or overnight. Top with a dusting of pumpkin pie spice.    Recipe altered from the following recipe: http://www.[employer]/recipes/food-network-jorge/mini-pumpkin-cheesecakes-recipe.print.html?oc=linkback    Pumpkin Whip    Box of sugar-free vanilla pudding  Can of pumpkin puree  Pumpkin Pie spice (sprinkle to taste)  ½ cup of sugar-free Cool Whip    Directions:  Make sugar-free pudding according to package directions using fat free or 1% milk. Stir in pumpkin and cool whip. Add pumpkin pie spice to taste.     Egg Nog Protein shake    8 oz skim or 1% milk  1 scoop vanilla protein powder  1 tbsp sugar-free vanilla pudding mix  ½ tsp butter flavor extract  ½ tsp rum extract  ½ tsp cinnamon     Shake together or blend with ice and serve.     Sugar-Free Mulled Cider    3 oz diet cran-apple juice  6 oz water  1 packet sugar-free apple cider mix  ½ tsp apple pie spice  ½ tsp butter flavor extract  1 tbsp Sugar-free Syrup    Mix together. Warm if needed and serve w/ orange wedge and cinnamon stick.

## 2018-12-04 NOTE — PROGRESS NOTES
"Subjective:       Patient ID: Carmen Khan is a 68 y.o. female.    Chief Complaint: No chief complaint on file.    Pt here today for follow up. Has lost 5 lbs from previous visit, net 11 lbs neg. Has been on topiramte and phentermine, last filled 10/24/18.   checked today. Not taking daily. . We added the topiramate last OV.She has a broken foot currently and she had some sciatica just before that. Trying to stay as active as possible.       Review of Systems   Constitutional: Negative for chills and fever.   Respiratory: Negative for apnea and shortness of breath.         + snores   Cardiovascular: Negative for chest pain and leg swelling.   Gastrointestinal: Negative for constipation and diarrhea.        Denies HB   Genitourinary: Negative for dysuria.   Musculoskeletal: Positive for arthralgias. Negative for back pain.        Left foot OA   Neurological: Negative for dizziness and light-headedness.   Psychiatric/Behavioral: Negative for dysphoric mood. The patient is not nervous/anxious.         Doing well on Lexapro       Objective:       BP (!) 142/82 (BP Location: Right arm, Patient Position: Sitting, BP Method: Medium (Manual))   Pulse 76   Ht 5' 2" (1.575 m)   Wt 73.9 kg (162 lb 14.7 oz)   BMI 29.80 kg/m²     Physical Exam   Constitutional: She is oriented to person, place, and time. She appears well-developed. No distress.   Obese     HENT:   Head: Normocephalic and atraumatic.   Eyes: EOM are normal. Pupils are equal, round, and reactive to light. No scleral icterus.   Neck: Normal range of motion. Neck supple.   Cardiovascular: Normal rate.   Pulmonary/Chest: Effort normal.   Musculoskeletal: Normal range of motion. She exhibits no edema.   Neurological: She is alert and oriented to person, place, and time. No cranial nerve deficit.   Skin: Skin is warm and dry. No erythema.   Psychiatric: She has a normal mood and affect. Her behavior is normal. Judgment normal.   Vitals reviewed.    "   Assessment:       1. Overweight (BMI 25.0-29.9)        Plan:            Diagnoses and all orders for this visit:    Overweight (BMI 25.0-29.9)  -     phentermine (ADIPEX-P) 37.5 mg tablet; Take 1/2 tablet by mouth once daily.    Other orders  -     topiramate (TROKENDI XR) 50 mg Cp24; Take one tablet by mouth once daily.            Patient was informed that topiramate is used for migraine prevention and seizures. Weight loss is a common side effect that is well documented. She understands this. She was informed of the potential side effects such as serious and possibly fatal rash in which case the medication should be discontinued immediately. Paresthesias, forgetfulness, fatigue, kidney stones, GI symptoms, and changes in lab values such as electrolytes, blood counts and kidney function.    Patient warned of common side effects of phentermine including anxiety, insomnia, palpitations and increased blood pressure. It was also explained that it is for short-term usage along with diet and exercise, and that stopping the medication without making lifestyle changes will result in regain of weight. Patient states understanding.     Weight loss medications are controlled substances.  They require routine follow up. Prescription or pills that are lost or destroyed will not be replaced.       Start phentermine with 1/2 pill a day       3 meals a day made up of the following:  Unlimited green vegetables, tomatoes, mushrooms, spaghetti squash, cauliflower, meat, poultry, seafood, eggs and hard cheeses.   Milk and plain yogurt  Dressings, seasonings, condiments, etc should have less than 2 g sugars.   Beans (1-1.5 cups) or nuts (1/4 cup) can have 1 x a day.   1-2 servings of citrus fruits, berries, pineapple or melon a day (1/2 cup)  Avoid fried foods    No grains, rice, pasta, potatoes, bread, corn, peas, oatmeal, grits, tortillas, crackers, chips    No soda, sweet tea, juices or lemonade    Www.dietdoctor.com for recipes.  Moderate carb intake      Add some type of resistance training 2-3 days a week. These can be body weight exercises, light weight or elastic bands. [employer] and [employer] are great sources for free work out plans and videos.

## 2018-12-06 DIAGNOSIS — S92.351A CLOSED FRACTURE OF FIFTH METATARSAL BONE OF RIGHT FOOT, INITIAL ENCOUNTER: Primary | ICD-10-CM

## 2018-12-13 ENCOUNTER — HOSPITAL ENCOUNTER (OUTPATIENT)
Dept: RADIOLOGY | Facility: HOSPITAL | Age: 68
Discharge: HOME | End: 2018-12-13
Attending: PHYSICIAN ASSISTANT
Payer: COMMERCIAL

## 2018-12-13 ENCOUNTER — OFFICE VISIT (OUTPATIENT)
Dept: ORTHOPEDICS | Facility: CLINIC | Age: 68
End: 2018-12-13
Payer: COMMERCIAL

## 2018-12-13 VITALS — BODY MASS INDEX: 29.8 KG/M2 | HEIGHT: 62 IN

## 2018-12-13 DIAGNOSIS — S92.351D CLOSED FRACTURE OF FIFTH METATARSAL BONE OF RIGHT FOOT WITH ROUTINE HEALING, PHYSEAL INVOLVEMENT UNSPECIFIED, SUBSEQUENT ENCOUNTER: ICD-10-CM

## 2018-12-13 DIAGNOSIS — M79.641 RIGHT HAND PAIN: Primary | ICD-10-CM

## 2018-12-13 DIAGNOSIS — M25.561 RIGHT KNEE PAIN, UNSPECIFIED CHRONICITY: ICD-10-CM

## 2018-12-13 DIAGNOSIS — M79.672 LEFT FOOT PAIN: ICD-10-CM

## 2018-12-13 DIAGNOSIS — M17.11 PRIMARY OSTEOARTHRITIS OF RIGHT KNEE: ICD-10-CM

## 2018-12-13 DIAGNOSIS — M19.072 ARTHRITIS OF FOOT, LEFT: ICD-10-CM

## 2018-12-13 DIAGNOSIS — S92.351D CLOSED FRACTURE OF FIFTH METATARSAL BONE OF RIGHT FOOT WITH ROUTINE HEALING, PHYSEAL INVOLVEMENT UNSPECIFIED, SUBSEQUENT ENCOUNTER: Primary | ICD-10-CM

## 2018-12-13 PROCEDURE — 73564 X-RAY EXAM KNEE 4 OR MORE: CPT | Mod: 26,RT,, | Performed by: RADIOLOGY

## 2018-12-13 PROCEDURE — 73564 X-RAY EXAM KNEE 4 OR MORE: CPT | Mod: TC,RT

## 2018-12-13 PROCEDURE — 1101F PT FALLS ASSESS-DOCD LE1/YR: CPT | Mod: CPTII,S$GLB,, | Performed by: PHYSICIAN ASSISTANT

## 2018-12-13 PROCEDURE — 73562 X-RAY EXAM OF KNEE 3: CPT | Mod: 26,59,LT, | Performed by: RADIOLOGY

## 2018-12-13 PROCEDURE — 99999 PR PBB SHADOW E&M-EST. PATIENT-LVL IV: CPT | Mod: PBBFAC,,, | Performed by: PHYSICIAN ASSISTANT

## 2018-12-13 PROCEDURE — 99214 OFFICE O/P EST MOD 30 MIN: CPT | Mod: S$GLB,,, | Performed by: PHYSICIAN ASSISTANT

## 2018-12-13 PROCEDURE — 73630 X-RAY EXAM OF FOOT: CPT | Mod: 50,TC

## 2018-12-13 PROCEDURE — 73630 X-RAY EXAM OF FOOT: CPT | Mod: 26,50,, | Performed by: RADIOLOGY

## 2018-12-13 RX ORDER — TRAMADOL HYDROCHLORIDE 50 MG/1
50 TABLET ORAL EVERY 4 HOURS PRN
Qty: 42 TABLET | Refills: 0 | Status: SHIPPED | OUTPATIENT
Start: 2018-12-13 | End: 2018-12-23

## 2018-12-14 NOTE — PROGRESS NOTES
Subjective:      Patient ID: Carmen hKan is a 68 y.o. female.    Chief Complaint: No chief complaint on file.    HPI  Patient returns for f/u for her right 5th MT fracture that occurred on 11/28/18. She has been WBAT in the boot. She uses the knee scooter at times. She reports some increased pain today at the foot because she did a lot of walking yesterday. She has swelling. Her pain tends to be increased in the evenings. She takes tylenol during the day and tramadol at night. She also reports having some left foot pain since she fell. It is at the 1st MT. She has h/o OA. Tylenol and tramadol give some relief.   Patient also reports having some right knee pain and swelling. Tylenol and tramadol help that as well.   Review of Systems   Constitution: Negative for chills, fever and night sweats.   Cardiovascular: Negative for chest pain.   Respiratory: Negative for cough and shortness of breath.    Hematologic/Lymphatic: Does not bruise/bleed easily.   Skin: Negative for color change.   Gastrointestinal: Negative for heartburn.   Genitourinary: Negative for dysuria.   Neurological: Negative for numbness and paresthesias.   Psychiatric/Behavioral: Negative for altered mental status.   Allergic/Immunologic: Negative for persistent infections.         Objective:                    Right Ankle/Foot Exam     Inspection   Erythema: absent    Tenderness   The patient is tender to palpation of the fifth metatarsal base.    Range of Motion   Ankle Joint   Dorsiflexion: abnormal   Plantar flexion: abnormal   Subtalar Joint   Inversion: abnormal   Eversion: abnormal     Other   Sensation: normal    Comments:  Swelling present at foot.     Left Ankle/Foot Exam     Inspection  Erythema: absent    Range of Motion   The patient has normal left ankle ROM.     Other   Sensation: normal    Comments:  TTP 1st TMTj and 1st MTPj.       Vascular Exam     Right Pulses  Dorsalis Pedis:      2+          Left Pulses  Dorsalis Pedis:       2+            Right Knee Exam:  ROM 0-120 degrees  Diffuse TTP anterior knee  +crepitus  No effusion      X-ray foot: ordered and reviewed by myself. Right: There is a fracture of the proximal 5th metatarsal without significant interval healing change.  There is approximately 3 mm displacement.  Lisfranc articulation is congruent.  Hallux valgus with mild degenerative changes of the forefoot noted.  Soft tissues are unremarkable.  Left: No fracture or dislocation.  Lisfranc articulation is congruent.  There is hallux valgus with mild degenerative changes of the forefoot.  Advanced degenerative changes are seen at the 1st TMT joint.  Plantar calcaneal spur noted.    X-ray knee: ordered and reviewed by myself. Right: No fracture or dislocation.  No joint effusion.  Severe narrowing of medial tibiofemoral cartilage space accompanied by sclerosis and osteophyte production.      Assessment:       Encounter Diagnoses   Name Primary?    Closed fracture of fifth metatarsal bone of right foot with routine healing, physeal involvement unspecified, subsequent encounter Yes    Primary osteoarthritis of right knee     Arthritis of foot, left           Plan:       Discussed treatment options with patient. Tramadol refilled. She will ice and elevate. Continue WBAT in the boot for 2 more weeks then she will work on weaning out of the boot into good supportive shoe. She can use a cane as needed. RTC in 3 weeks for repeat right foot x-ray.

## 2019-01-03 ENCOUNTER — HOSPITAL ENCOUNTER (OUTPATIENT)
Dept: RADIOLOGY | Facility: HOSPITAL | Age: 69
Discharge: HOME | End: 2019-01-03
Attending: PLASTIC SURGERY
Payer: COMMERCIAL

## 2019-01-03 ENCOUNTER — OFFICE VISIT (OUTPATIENT)
Dept: ORTHOPEDICS | Facility: CLINIC | Age: 69
End: 2019-01-03
Payer: COMMERCIAL

## 2019-01-03 ENCOUNTER — HOSPITAL ENCOUNTER (OUTPATIENT)
Dept: RADIOLOGY | Facility: HOSPITAL | Age: 69
Discharge: HOME | End: 2019-01-03
Attending: PHYSICIAN ASSISTANT
Payer: COMMERCIAL

## 2019-01-03 VITALS — HEIGHT: 62 IN | BODY MASS INDEX: 29.98 KG/M2 | WEIGHT: 162.94 LBS

## 2019-01-03 DIAGNOSIS — M79.671 RIGHT FOOT PAIN: ICD-10-CM

## 2019-01-03 DIAGNOSIS — S92.351D CLOSED FRACTURE OF FIFTH METATARSAL BONE OF RIGHT FOOT WITH ROUTINE HEALING, PHYSEAL INVOLVEMENT UNSPECIFIED, SUBSEQUENT ENCOUNTER: Primary | ICD-10-CM

## 2019-01-03 DIAGNOSIS — M79.671 RIGHT FOOT PAIN: Primary | ICD-10-CM

## 2019-01-03 DIAGNOSIS — M79.641 RIGHT HAND PAIN: ICD-10-CM

## 2019-01-03 PROCEDURE — 99999 PR PBB SHADOW E&M-EST. PATIENT-LVL III: ICD-10-PCS | Mod: PBBFAC,,, | Performed by: PHYSICIAN ASSISTANT

## 2019-01-03 PROCEDURE — 99213 PR OFFICE/OUTPT VISIT, EST, LEVL III, 20-29 MIN: ICD-10-PCS | Mod: S$GLB,,, | Performed by: PHYSICIAN ASSISTANT

## 2019-01-03 PROCEDURE — 73630 X-RAY EXAM OF FOOT: CPT | Mod: 26,RT,, | Performed by: RADIOLOGY

## 2019-01-03 PROCEDURE — 99999 PR PBB SHADOW E&M-EST. PATIENT-LVL III: CPT | Mod: PBBFAC,,, | Performed by: PHYSICIAN ASSISTANT

## 2019-01-03 PROCEDURE — 99213 OFFICE O/P EST LOW 20 MIN: CPT | Mod: S$GLB,,, | Performed by: PHYSICIAN ASSISTANT

## 2019-01-03 PROCEDURE — 73130 X-RAY EXAM OF HAND: CPT | Mod: 26,RT,, | Performed by: RADIOLOGY

## 2019-01-03 PROCEDURE — 73130 X-RAY EXAM OF HAND: CPT | Mod: TC,RT

## 2019-01-03 PROCEDURE — 1101F PT FALLS ASSESS-DOCD LE1/YR: CPT | Mod: CPTII,S$GLB,, | Performed by: PHYSICIAN ASSISTANT

## 2019-01-03 PROCEDURE — 73630 XR FOOT COMPLETE 3 VIEW RIGHT: ICD-10-PCS | Mod: 26,RT,, | Performed by: RADIOLOGY

## 2019-01-03 PROCEDURE — 73130 XR HAND COMPLETE 3 VIEW RIGHT: ICD-10-PCS | Mod: 26,RT,, | Performed by: RADIOLOGY

## 2019-01-03 PROCEDURE — 73630 X-RAY EXAM OF FOOT: CPT | Mod: TC,RT

## 2019-01-03 PROCEDURE — 1101F PR PT FALLS ASSESS DOC 0-1 FALLS W/OUT INJ PAST YR: ICD-10-PCS | Mod: CPTII,S$GLB,, | Performed by: PHYSICIAN ASSISTANT

## 2019-01-03 NOTE — PROGRESS NOTES
Subjective:      Patient ID: Carmen Khan is a 68 y.o. female.    Chief Complaint: Pain of the Right Foot    HPI  Patient returns for f/u for her right 5th MT fracture that occurred on 11/28/18. She has been WBAT in the boot. She uses the knee scooter at work. She reports some mild pain at the foot. She takes tramadol prn which helps. Her swelling has gotten a little better. She does ROM exercises. She tried going into a tennis shoe recently but she could not tolerate it for long. She says her foot has gotten a little better since last visit.   Review of Systems   Constitution: Negative for chills, fever and night sweats.   Cardiovascular: Negative for chest pain.   Respiratory: Negative for cough and shortness of breath.    Hematologic/Lymphatic: Does not bruise/bleed easily.   Skin: Negative for color change.   Gastrointestinal: Negative for heartburn.   Genitourinary: Negative for dysuria.   Neurological: Negative for numbness and paresthesias.   Psychiatric/Behavioral: Negative for altered mental status.   Allergic/Immunologic: Negative for persistent infections.         Objective:            General    Vitals reviewed.  Constitutional: She is oriented to person, place, and time. She appears well-developed and well-nourished.   Cardiovascular: Normal rate.    Neurological: She is alert and oriented to person, place, and time.         Right Ankle/Foot Exam     Inspection   Erythema: absent    Tenderness   The patient is tender to palpation of the fifth metatarsal base.    Range of Motion   Ankle Joint   Dorsiflexion: abnormal   Plantar flexion: abnormal   Subtalar Joint   Inversion: abnormal   Eversion: abnormal     Other   Sensation: normal    Comments:  ROM is a little limited.         Vascular Exam     Right Pulses  Dorsalis Pedis:      2+                X-ray: ordered and reviewed by myself. Fracture involving the 5th metatarsal bone remain in unchanged position as compared to the previous study.       Assessment:       Encounter Diagnosis   Name Primary?    Closed fracture of fifth metatarsal bone of right foot with routine healing, physeal involvement unspecified, subsequent encounter Yes          Plan:       Discussed treatment options with patient. She will work on trying to wean out of the boot into a good supportive shoe as pain allows. She will go back into the boot if she develops increased pain. RTC in 2 weeks for repeat x-ray. May consider post-op shoe at that time if she is having a hard time outside of the boot.

## 2019-01-08 ENCOUNTER — OFFICE VISIT (OUTPATIENT)
Dept: ORTHOPEDICS | Facility: CLINIC | Age: 69
End: 2019-01-08
Payer: COMMERCIAL

## 2019-01-08 ENCOUNTER — LAB VISIT (OUTPATIENT)
Dept: LAB | Facility: HOSPITAL | Age: 69
End: 2019-01-08
Attending: INTERNAL MEDICINE
Payer: COMMERCIAL

## 2019-01-08 VITALS
DIASTOLIC BLOOD PRESSURE: 85 MMHG | HEART RATE: 93 BPM | BODY MASS INDEX: 29.81 KG/M2 | HEIGHT: 62 IN | WEIGHT: 162 LBS | SYSTOLIC BLOOD PRESSURE: 143 MMHG

## 2019-01-08 DIAGNOSIS — R73.09 ELEVATED GLUCOSE: ICD-10-CM

## 2019-01-08 DIAGNOSIS — M20.021 BOUTONNIERE DEFORMITY OF FINGER OF RIGHT HAND: Primary | ICD-10-CM

## 2019-01-08 DIAGNOSIS — I10 ESSENTIAL HYPERTENSION: ICD-10-CM

## 2019-01-08 LAB
ALBUMIN SERPL BCP-MCNC: 4 G/DL
ALP SERPL-CCNC: 109 U/L
ALT SERPL W/O P-5'-P-CCNC: 20 U/L
ANION GAP SERPL CALC-SCNC: 7 MMOL/L
AST SERPL-CCNC: 21 U/L
BASOPHILS # BLD AUTO: 0.05 K/UL
BASOPHILS NFR BLD: 0.6 %
BILIRUB SERPL-MCNC: 0.5 MG/DL
BUN SERPL-MCNC: 15 MG/DL
CALCIUM SERPL-MCNC: 9.7 MG/DL
CHLORIDE SERPL-SCNC: 98 MMOL/L
CHOLEST SERPL-MCNC: 212 MG/DL
CHOLEST/HDLC SERPL: 2.9 {RATIO}
CO2 SERPL-SCNC: 29 MMOL/L
CREAT SERPL-MCNC: 0.7 MG/DL
DIFFERENTIAL METHOD: ABNORMAL
EOSINOPHIL # BLD AUTO: 0.1 K/UL
EOSINOPHIL NFR BLD: 1.5 %
ERYTHROCYTE [DISTWIDTH] IN BLOOD BY AUTOMATED COUNT: 12.7 %
EST. GFR  (AFRICAN AMERICAN): >60 ML/MIN/1.73 M^2
EST. GFR  (NON AFRICAN AMERICAN): >60 ML/MIN/1.73 M^2
ESTIMATED AVG GLUCOSE: 120 MG/DL
GLUCOSE SERPL-MCNC: 110 MG/DL
HBA1C MFR BLD HPLC: 5.8 %
HCT VFR BLD AUTO: 41 %
HDLC SERPL-MCNC: 74 MG/DL
HDLC SERPL: 34.9 %
HGB BLD-MCNC: 13.5 G/DL
IMM GRANULOCYTES # BLD AUTO: 0.02 K/UL
IMM GRANULOCYTES NFR BLD AUTO: 0.2 %
LDLC SERPL CALC-MCNC: 122.2 MG/DL
LYMPHOCYTES # BLD AUTO: 1.8 K/UL
LYMPHOCYTES NFR BLD: 21.1 %
MCH RBC QN AUTO: 29.9 PG
MCHC RBC AUTO-ENTMCNC: 32.9 G/DL
MCV RBC AUTO: 91 FL
MONOCYTES # BLD AUTO: 0.6 K/UL
MONOCYTES NFR BLD: 7.5 %
NEUTROPHILS # BLD AUTO: 5.9 K/UL
NEUTROPHILS NFR BLD: 69.1 %
NONHDLC SERPL-MCNC: 138 MG/DL
NRBC BLD-RTO: 0 /100 WBC
PLATELET # BLD AUTO: 376 K/UL
PMV BLD AUTO: 8.9 FL
POTASSIUM SERPL-SCNC: 4.1 MMOL/L
PROT SERPL-MCNC: 7.8 G/DL
RBC # BLD AUTO: 4.51 M/UL
SODIUM SERPL-SCNC: 134 MMOL/L
TRIGL SERPL-MCNC: 79 MG/DL
TSH SERPL DL<=0.005 MIU/L-ACNC: 1.29 UIU/ML
WBC # BLD AUTO: 8.54 K/UL

## 2019-01-08 PROCEDURE — 85025 COMPLETE CBC W/AUTO DIFF WBC: CPT

## 2019-01-08 PROCEDURE — 80061 LIPID PANEL: CPT

## 2019-01-08 PROCEDURE — 36415 COLL VENOUS BLD VENIPUNCTURE: CPT

## 2019-01-08 PROCEDURE — 1100F PR PT FALLS ASSESS DOC 2+ FALLS/FALL W/INJURY/YR: ICD-10-PCS | Mod: CPTII,S$GLB,, | Performed by: ORTHOPAEDIC SURGERY

## 2019-01-08 PROCEDURE — 99999 PR PBB SHADOW E&M-EST. PATIENT-LVL III: CPT | Mod: PBBFAC,,, | Performed by: ORTHOPAEDIC SURGERY

## 2019-01-08 PROCEDURE — 3079F PR MOST RECENT DIASTOLIC BLOOD PRESSURE 80-89 MM HG: ICD-10-PCS | Mod: CPTII,S$GLB,, | Performed by: ORTHOPAEDIC SURGERY

## 2019-01-08 PROCEDURE — 84443 ASSAY THYROID STIM HORMONE: CPT

## 2019-01-08 PROCEDURE — 83036 HEMOGLOBIN GLYCOSYLATED A1C: CPT

## 2019-01-08 PROCEDURE — 99203 PR OFFICE/OUTPT VISIT, NEW, LEVL III, 30-44 MIN: ICD-10-PCS | Mod: S$GLB,,, | Performed by: ORTHOPAEDIC SURGERY

## 2019-01-08 PROCEDURE — 3288F FALL RISK ASSESSMENT DOCD: CPT | Mod: CPTII,S$GLB,, | Performed by: ORTHOPAEDIC SURGERY

## 2019-01-08 PROCEDURE — 3077F PR MOST RECENT SYSTOLIC BLOOD PRESSURE >= 140 MM HG: ICD-10-PCS | Mod: CPTII,S$GLB,, | Performed by: ORTHOPAEDIC SURGERY

## 2019-01-08 PROCEDURE — 1100F PTFALLS ASSESS-DOCD GE2>/YR: CPT | Mod: CPTII,S$GLB,, | Performed by: ORTHOPAEDIC SURGERY

## 2019-01-08 PROCEDURE — 99999 PR PBB SHADOW E&M-EST. PATIENT-LVL III: ICD-10-PCS | Mod: PBBFAC,,, | Performed by: ORTHOPAEDIC SURGERY

## 2019-01-08 PROCEDURE — 99203 OFFICE O/P NEW LOW 30 MIN: CPT | Mod: S$GLB,,, | Performed by: ORTHOPAEDIC SURGERY

## 2019-01-08 PROCEDURE — 80053 COMPREHEN METABOLIC PANEL: CPT

## 2019-01-08 PROCEDURE — 3077F SYST BP >= 140 MM HG: CPT | Mod: CPTII,S$GLB,, | Performed by: ORTHOPAEDIC SURGERY

## 2019-01-08 PROCEDURE — 3079F DIAST BP 80-89 MM HG: CPT | Mod: CPTII,S$GLB,, | Performed by: ORTHOPAEDIC SURGERY

## 2019-01-08 PROCEDURE — 3288F PR FALLS RISK ASSESSMENT DOCUMENTED: ICD-10-PCS | Mod: CPTII,S$GLB,, | Performed by: ORTHOPAEDIC SURGERY

## 2019-01-08 NOTE — PROGRESS NOTES
Boutonniere deformity s/p trauma of falling in pothole in parking lot. Sustained foot and knee injuries as well. Pt noticed she had a swollen, red, painful finger after the injury but did not realize her finger was injured until recently until it started bending .   Pt on exam has a flexible

## 2019-01-09 ENCOUNTER — CLINICAL SUPPORT (OUTPATIENT)
Dept: REHABILITATION | Facility: HOSPITAL | Age: 69
End: 2019-01-09
Attending: ORTHOPAEDIC SURGERY
Payer: COMMERCIAL

## 2019-01-09 DIAGNOSIS — M25.60 RANGE OF MOTION DEFICIT: ICD-10-CM

## 2019-01-09 DIAGNOSIS — R29.898 DECREASED GRIP STRENGTH OF RIGHT HAND: ICD-10-CM

## 2019-01-09 DIAGNOSIS — M20.021 BOUTONNIERE DEFORMITY OF FINGER OF RIGHT HAND: ICD-10-CM

## 2019-01-09 PROCEDURE — 97165 OT EVAL LOW COMPLEX 30 MIN: CPT

## 2019-01-09 NOTE — PROGRESS NOTES
CHIEF COMPLAINT: Right small finger.                                             HISTORY OF PRESENT ILLNESS:  The patient is a 68 y.o.  female who presents for evaluation of her right small finger PIP joint pain. Patient works in [employer]. Patient was walking and fell into hole approximately 6 weeks ago and sustained R 5th MT fracture being treated in a boot. Since that time, she has also had mild right small finger pain. She has also noted the inability to fully extend PIP. No prior pain before fall. Has noted a volar mass over soft tissues over P1 for several years.       Pain Quality: achy  Pain Context:unchanged  Pain Timing: intermittent  Pain Location:Dorsal small finger PIP  Pain Severity: mild  Aggrevating Factors: actvities  Previous Treatments: none  Associated Signs and Symptoms:none      Pain is affecting ADLs.       PAST MEDICAL HISTORY:   Past Medical History:   Diagnosis Date    Anxiety     Hyperlipidemia     Hypertension     Mitral valve prolapse      PAST SURGICAL HISTORY:   Past Surgical History:   Procedure Laterality Date    COLONOSCOPY N/A 1/27/2015    Performed by Deniz [facility] at [facility] (4TH F[facility])    TONSILLECTOMY       FAMILY HISTORY:   Family History   Problem Relation Age of Onset    Cancer Mother         lung    Stroke Mother     Heart disease Father     Diabetes Father     Diabetes Brother     Aortic aneurysm Brother         surgery 5/2012    Kidney disease Brother         on dialysis    Melanoma Neg Hx     Breast cancer Neg Hx     Colon cancer Neg Hx     Ovarian cancer Neg Hx      SOCIAL HISTORY:   Social History     Socioeconomic History    Marital status:      Spouse name: Not on file    Number of children: Not on file    Years of education: Not on file    Highest education level: Not on file   Social Needs    Financial resource strain: Not on file    Food insecurity - worry: Not on file    Food insecurity - inability: Not on  file    Transportation needs - medical: Not on file    Transportation needs - non-medical: Not on file   Occupational History     Employer: [employer]   Tobacco Use    Smoking status: Never Smoker    Smokeless tobacco: Never Used   Substance and Sexual Activity    Alcohol use: Yes     Alcohol/week: 8.4 oz     Types: 14 Glasses of wine per week     Comment: daily    Drug use: No    Sexual activity: Yes     Partners: Male     Birth control/protection: Post-menopausal   Other Topics Concern    Are you pregnant or think you may be? Not Asked    Breast-feeding Not Asked   Social History Narrative    Not on file       MEDICATIONS:   Current Outpatient Medications:     aspirin (ECOTRIN) 81 MG EC tablet, Take 81 mg by mouth once daily.  , Disp: , Rfl:     biotin 300 mcg Tab, Take 1 tablet by mouth once daily., Disp: , Rfl:     diclofenac sodium 1 % Gel, Apply 2 g topically once daily., Disp: 5 Tube, Rfl: 3    escitalopram oxalate (LEXAPRO) 10 MG tablet, Take 1 tablet (10 mg total) by mouth once daily., Disp: 30 tablet, Rfl: 12    metoprolol succinate (TOPROL-XL) 50 MG 24 hr tablet, TAKE 1 TABLET BY MOUTH DAILY, Disp: 90 tablet, Rfl: 3    phentermine (ADIPEX-P) 37.5 mg tablet, Take 1/2 tablet by mouth once daily., Disp: 30 tablet, Rfl: 1    simvastatin (ZOCOR) 40 MG tablet, TAKE 1 TABLET BY MOUTH EVERY EVENING., Disp: 90 tablet, Rfl: 3    topiramate (TROKENDI XR) 50 mg Cp24, Take one tablet by mouth once daily., Disp: 30 capsule, Rfl: 3    tretinoin (RETIN-A) 0.1 % cream, APPLY A THIN FILM TO AFFECTED AREA EVERY EVENING AFTER MOISTURIZING., Disp: 45 g, Rfl: 3    triamterene-hydrochlorothiazide 37.5-25 mg (DYAZIDE) 37.5-25 mg per capsule, take one capsule by mouth every day, Disp: 90 capsule, Rfl: 3    vitamin D 1000 units Tab, Take 2,000 mg by mouth once daily. , Disp: , Rfl:     gabapentin (NEURONTIN) 100 MG capsule, Take 1 capsule (100 mg total) by mouth 3 (three) times daily., Disp: 90  "capsule, Rfl: 1  ALLERGIES: Review of patient's allergies indicates:  No Known Allergies    VITAL SIGNS: BP (!) 143/85   Pulse 93   Ht 5' 2" (1.575 m)   Wt 73.5 kg (162 lb)   BMI 29.63 kg/m²      Review of Systems   Constitution: Negative for chills, fever, weakness and weight loss.   HENT: Negative for congestion.   Cardiovascular: Negative for chest pain and dyspnea on exertion.   Respiratory: Negative for cough and shortness of breath.   Hematologic/Lymphatic: Does not bruise/bleed easily.   Skin: Negative for rash and suspicious lesions.   Musculoskeletal: see HPI  Gastrointestinal: Negative for bowel incontinence, constipation,diarrhea, vomiting.   Genitourinary: Negative for bladder incontinence.   Neurological: Negative for numbness, paresthesias and sensory change.       PHYSICAL EXAMINATION:  General:  The patient is alert and oriented x 3.  Mood is pleasant.  Observation of ears, eyes and nose reveal no gross abnormalities.  No labored breathing observed.  Gait is coordinated. Patient can toe walk and heel walk without difficulty.     RUE:  Skin intact throughout  Volar soft tissue mass over P1  15 degree extensor lag, able to passively extend to neutral   Full flexion of small finger  FDP and FDS intact to all digits  Negative Zhang's test of small finger    EXTREMITY NEURO-VASCULAR EXAM    Sensation grossly intact to light touch all dermatomal regions.    DTR 2+ Biceps, Triceps, BR and Negative Sybils sign   Grossly intact motor function of AIN, PIN, Median, Ulnar , Radial nerves   Distal pulses radial and ulnar 2+, brisk cap refill, symmetric.    Compartments of forearm and hand are soft and compressible     NECK:  Painless FROM and spinous processes non-tender. Negative Spurlings sign.      XRAYS:  Hand series right,  were obtained and reviewed  No convincing fracture or dislocation is noted. 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 We discussed both surgical and non-surgical options.   Plan for full time extension splint (made today with Deborah hand therapist)  Follow-up in clinic in 6 weeks

## 2019-01-10 PROBLEM — R29.898 DECREASED GRIP STRENGTH OF RIGHT HAND: Status: ACTIVE | Noted: 2019-01-10

## 2019-01-10 PROBLEM — M20.021 BOUTONNIERE DEFORMITY OF FINGER OF RIGHT HAND: Status: ACTIVE | Noted: 2019-01-10

## 2019-01-10 PROBLEM — M25.60 RANGE OF MOTION DEFICIT: Status: ACTIVE | Noted: 2019-01-10

## 2019-01-10 NOTE — PROGRESS NOTES
"Occupational Therapy -Hand / Wrist  Evaluation    Late entry note secondary to walk in late afternoon on 2019    Patient: Carmen Khan  Date of Evaluation: 2019  Referring Physician:  Dr. YOU Rojas  Diagnosis: R small finger boutonniere deformity   1. 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 Range of motion deficit     3. Decreased  strength of right hand       MRN: [id number]    Referral Orders:   Eval and treat     Start Time: 5  End Time: 530  Total Time: 30     Hand dominance: Right    Occupation:  [employer] employee, [employer]   Working presently:  yes  Workmen's Compensation:  no    Date of onset: 6 weeks  Involved area:  Right small finger   Mechanism of Injury:   fall into hole   Past Medical History/Physical Systems Review: lumbar radiculopathy, macular degeneration, OA     Living status: n/a    Environmental Concerns/ Fall Risk:  None  Barriers to Learning: None   Cultural/Spiritual : None   Developmental/Education: None   Abuse/ Neglect: none   Nutritional Deficit: None   Language: None   Hearing/Vision Deficit: None   Other:     Subjective:  Pt reports I should have come in sooner, I didn't realize how bad I injured it"     Pain   At rest: 2 out of 10  With work/ Activity: 4 out of 10  Sleepin out of 10  Location of Pain : small finger      Patients goals for therapy are:  more motion,  strength     Objective:   Observation  :Swelling, Redness, flexion contracture of PIP     Sensation: intact   Scar / Wound: none   Edema:  Minimal   PP 5.3 CM   PIP 5.3 CM   MP 4.2 CM   DIP 4.1 CM   DP 3.8 CM         Range of Motion:         SMALL FINGER    MP  0/65    PIP 22/96    DIP 8/50                                           Manual Muscle Test:  FDP 3/5   FDS 3-/5   EDM 3-/5   ADM 3/5                                        Strength: (PHILL Dynamometer in lbs.), Average 3 trials, Position II    TBA    Pinch Strength: (Pinch Gauge in psis), Average 3 trials       NT " "    Functional Limitations:   Self Care / ADL: Limited use of R HAND  for ADLs, grooming, hygiene  Work/Activities: Limited use of R HAND  for writing, typing, gripping, holding things,   Leisure: Limited use of R hand  For driving      Treatment Included:   OT evaluation and instruction in written HEP including  blocking FDP, FDS, reverse blocking PIP, intrinsic +/-, flat and composite fist, digit ab/ad, "place and hold" for digit extension x 10 reps each, 3-5 x daily.  Reviewed modality use for pain/edema management.  Fabricated custom bi-valve static digit extension orthosis with DIP dorsal nicole to promote DIP flexion and PIP extension.  Instructed to wear 24/7 with removal for bathing/hygiene, HEP, simple home chores as needed; monitor for pressure points.  Patient requesting to be treated at [facility]; will contact therapist to arrange transfer. Patient reported good understanding of HEP, modality and orthotic use, wear and care schedule     Patient demonstrates good understanding of HEP/modality use for pain management.    Assessment:   Problem List : R hand        Decreased ROM   Decreased   strength --TBA  Decreased muscle strength   Decreased functional hand use   Increased pain   Edema         Profile and History Assessment of Occupational Performance Level of Clinical Decision Making Complexity Score   Occupational Profile:   Carmen Khan is a 68 y.o. female who lives with their family and is currently employed as [employer] employee, [employer] . Carmen Khan has difficulty with  grooming and dressing  driving/transportation management, shopping, phone/computer use and housework/household chores  affecting his/her daily functional abilities. His/her main goal for therapy is more motion and strength .     Comorbidities:   lumbar radiculopathy, macular degeneration, OA     Medical and Therapy History Review:   Brief               Performance Deficits    Physical:  Joint " Mobility  Joint Stability  Muscle Power/Strength  Muscle Endurance  Edema   Strength  Gross Motor Coordination  Pain    Cognitive:  No Deficits    Psychosocial:    Habits  Routines     Clinical Decision Making:  low    Assessment Process:  Problem-Focused Assessments    Modification/Need for Assistance:  Not Necessary    Intervention Selection:  Limited Treatment Options       low  Based on PMHX, co morbidities , data from assessments and functional level of assistance required with task and clinical presentation directly impacting function.       Goals: ( 6 weeks)   1)  Patient to be IND with HEP and modalities for pain management  2)  Increase ROM 3-5 degrees to increase functional hand use for ADLs/work/leisure activities  3)  Decrease edema .1-.3 mm to increase joint mobility /flexibility for functional hand use.   4)  Assess  and set goals accordingly     Plan:   Patient to be treated by Occupational Therapy    1   times per week for   6-8                   weeks  during the certification period from   1/8/2019  to  3/8/2019    to achieve the established goals.  Treatment to include :  paraffin, fluidotherapy, manual therapy/joint mobilizations, orthotic fabrication/fitting/training,   modalities for pain management, US 3mhz, therapeutic exercises/activities,        strengthening,    scar and edema management, as well as any other treatments deemed necessary based on the patient's needs or progress.               I certify the need for these services furnished under this plan of treatment and while under my care    ____________________________________                         __________________  Physician/Referring Practitioner                                               Date of Signature

## 2019-01-14 ENCOUNTER — PATIENT MESSAGE (OUTPATIENT)
Dept: ADMINISTRATIVE | Facility: OTHER | Age: 69
End: 2019-01-14

## 2019-01-14 ENCOUNTER — OFFICE VISIT (OUTPATIENT)
Dept: INTERNAL MEDICINE | Facility: CLINIC | Age: 69
End: 2019-01-14
Payer: COMMERCIAL

## 2019-01-14 ENCOUNTER — DOCUMENTATION ONLY (OUTPATIENT)
Dept: INTERNAL MEDICINE | Facility: CLINIC | Age: 69
End: 2019-01-14

## 2019-01-14 VITALS
HEIGHT: 62 IN | OXYGEN SATURATION: 97 % | HEART RATE: 88 BPM | BODY MASS INDEX: 31 KG/M2 | DIASTOLIC BLOOD PRESSURE: 86 MMHG | WEIGHT: 168.44 LBS | SYSTOLIC BLOOD PRESSURE: 142 MMHG

## 2019-01-14 DIAGNOSIS — Z12.11 COLON CANCER SCREENING: ICD-10-CM

## 2019-01-14 DIAGNOSIS — Z12.31 ENCOUNTER FOR SCREENING MAMMOGRAM FOR BREAST CANCER: ICD-10-CM

## 2019-01-14 DIAGNOSIS — Z00.00 WELLNESS EXAMINATION: ICD-10-CM

## 2019-01-14 DIAGNOSIS — M81.0 POSTMENOPAUSAL BONE LOSS: ICD-10-CM

## 2019-01-14 DIAGNOSIS — E78.00 PURE HYPERCHOLESTEROLEMIA: ICD-10-CM

## 2019-01-14 DIAGNOSIS — I10 ESSENTIAL HYPERTENSION: ICD-10-CM

## 2019-01-14 DIAGNOSIS — Z83.719 FAMILY HISTORY OF COLONIC POLYPS: ICD-10-CM

## 2019-01-14 DIAGNOSIS — L81.4 LENTIGO: ICD-10-CM

## 2019-01-14 DIAGNOSIS — Z00.00 PHYSICAL EXAM: Primary | ICD-10-CM

## 2019-01-14 DIAGNOSIS — R73.09 ELEVATED GLUCOSE: ICD-10-CM

## 2019-01-14 DIAGNOSIS — R39.89 ABNORMAL URINE COLOR: ICD-10-CM

## 2019-01-14 LAB
BILIRUB UR QL STRIP: NEGATIVE
CLARITY UR REFRACT.AUTO: CLEAR
COLOR UR AUTO: NORMAL
GLUCOSE UR QL STRIP: NEGATIVE
HGB UR QL STRIP: NEGATIVE
KETONES UR QL STRIP: NEGATIVE
LEUKOCYTE ESTERASE UR QL STRIP: NEGATIVE
NITRITE UR QL STRIP: NEGATIVE
PH UR STRIP: 7 [PH] (ref 5–8)
PROT UR QL STRIP: NEGATIVE
SP GR UR STRIP: 1.01 (ref 1–1.03)
URN SPEC COLLECT METH UR: NORMAL

## 2019-01-14 PROCEDURE — 99999 PR PBB SHADOW E&M-EST. PATIENT-LVL IV: CPT | Mod: PBBFAC,,, | Performed by: INTERNAL MEDICINE

## 2019-01-14 PROCEDURE — 3079F DIAST BP 80-89 MM HG: CPT | Mod: CPTII,S$GLB,, | Performed by: INTERNAL MEDICINE

## 2019-01-14 PROCEDURE — 99397 PER PM REEVAL EST PAT 65+ YR: CPT | Mod: S$GLB,,, | Performed by: INTERNAL MEDICINE

## 2019-01-14 PROCEDURE — 3079F PR MOST RECENT DIASTOLIC BLOOD PRESSURE 80-89 MM HG: ICD-10-PCS | Mod: CPTII,S$GLB,, | Performed by: INTERNAL MEDICINE

## 2019-01-14 PROCEDURE — 99397 PR PREVENTIVE VISIT,EST,65 & OVER: ICD-10-PCS | Mod: S$GLB,,, | Performed by: INTERNAL MEDICINE

## 2019-01-14 PROCEDURE — 3077F PR MOST RECENT SYSTOLIC BLOOD PRESSURE >= 140 MM HG: ICD-10-PCS | Mod: CPTII,S$GLB,, | Performed by: INTERNAL MEDICINE

## 2019-01-14 PROCEDURE — 3077F SYST BP >= 140 MM HG: CPT | Mod: CPTII,S$GLB,, | Performed by: INTERNAL MEDICINE

## 2019-01-14 PROCEDURE — 81003 URINALYSIS AUTO W/O SCOPE: CPT

## 2019-01-14 PROCEDURE — 99999 PR PBB SHADOW E&M-EST. PATIENT-LVL IV: ICD-10-PCS | Mod: PBBFAC,,, | Performed by: INTERNAL MEDICINE

## 2019-01-14 RX ORDER — TRETINOIN 1 MG/G
CREAM TOPICAL
Qty: 45 G | Refills: 3 | Status: SHIPPED | OUTPATIENT
Start: 2019-01-14 | End: 2023-09-25 | Stop reason: CLARIF

## 2019-01-14 RX ORDER — DICLOFENAC SODIUM 10 MG/G
2 GEL TOPICAL 4 TIMES DAILY
Qty: 1 TUBE | Refills: 6 | Status: SHIPPED | OUTPATIENT
Start: 2019-01-14 | End: 2020-11-04 | Stop reason: SDUPTHER

## 2019-01-14 NOTE — PROGRESS NOTES
Subjective:      Patient ID: Carmen Khan is a 68 y.o. female.    Chief Complaint: Annual Exam    HPI:  HPI   Patient is here for her annual exam. She had a fall November 28, 2018 in front of [employer] in [place]. She reports that she fracture the right fifth metatarsal and ruptured the tendon on the right fifth finger.    Annual exam: 1/14/2019  Colonoscopy: 8/2009 with follow up 8/2014: Sister had polyps. 1/27/2015:likely follow up 5 years ( although 10 years is on the colonoscopy). 1/2018 FIT 1/2019 FIT  Mammogram: 4/2019 due  Gyn:  To be scheduled  Optho:2018 [facility]: Dr. Darin Izquierdo , needs follow up with Dr. Dunne. Due in 2/2019  Derm: Dr. Chacko  appt 2019  Flu:done  Tetanus: 12/15/2015  Shingles done  Shingrix  Pneumovax 23:1/9/2018  Prevnar 13 6/15/2016  Bone Density:  Carotid ultrasound: 1/15/2014 normal     Labs:reviewed    Patient Active Problem List   Diagnosis    Hypertension    Hyperlipidemia    Anxiety    Screening for colon cancer    Primary osteoarthritis of left foot    Age-related macular degeneration, dry, both eyes    Posterior vitreous detachment of both eyes    Nuclear cataract of both eyes    Elevated glucose    Osteoarthritis of knee    Family history of colonic polyps    Rib pain on left side    Pain in the muscles    Arm pain, musculoskeletal, left    Nonexudative age-related macular degeneration, bilateral, intermediate dry stage    Lumbar radiculopathy    Chronic right-sided low back pain with sciatica   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    Range of motion deficit    Decreased  strength of right hand     Past Medical History:   Diagnosis Date    Anxiety     Hyperlipidemia     Hypertension     Mitral valve prolapse      Past Surgical History:   Procedure Laterality Date    COLONOSCOPY N/A 1/27/2015    Performed by [facility] at [facility] (4TH FLR)    TONSILLECTOMY       Family History   Problem Relation Age of Onset     "Cancer Mother         lung    Stroke Mother     Heart disease Father     Diabetes Father     Diabetes Brother     Aortic aneurysm Brother         surgery 5/2012    Kidney disease Brother         on dialysis    Melanoma Neg Hx     Breast cancer Neg Hx     Colon cancer Neg Hx     Ovarian cancer Neg Hx      Review of Systems   Constitutional: Negative for chills, fatigue, fever and unexpected weight change.   HENT: Negative for trouble swallowing.    Respiratory: Negative for cough, shortness of breath and wheezing.    Cardiovascular: Negative for chest pain, palpitations and leg swelling.   Gastrointestinal: Negative for abdominal distention, abdominal pain, blood in stool and vomiting.     Objective:     Vitals:    01/14/19 1144   BP: (!) 142/86   Pulse: 88   SpO2: 97%   Weight: 76.4 kg (168 lb 6.9 oz)   Height: 5' 2" (1.575 m)   PainSc:   6   PainLoc: Generalized     Body mass index is 30.81 kg/m².  Physical Exam   Constitutional: She is oriented to person, place, and time. She appears well-developed and well-nourished. No distress.   Neck: Carotid bruit is not present. No thyromegaly present.   Cardiovascular: Normal rate, regular rhythm and normal heart sounds. PMI is not displaced.   Pulmonary/Chest: Effort normal and breath sounds normal. No respiratory distress.   Abdominal: Soft. Bowel sounds are normal. She exhibits no distension. There is no tenderness.   Musculoskeletal: She exhibits no edema.   Neurological: She is alert and oriented to person, place, and time.     Assessment:     1. Physical exam    2. Wellness examination    3. Colon cancer screening    4. Encounter for screening mammogram for breast cancer    5. Postmenopausal bone loss    6. Lentigo    7. Abnormal urine color    8. Elevated glucose    9. Family history of colonic polyps    10. Pure hypercholesterolemia    11. Essential hypertension      Plan:   Carmen was seen today for annual exam.    Diagnoses and all orders for this " visit:    Physical exam    Wellness examination    Colon cancer screening  -     Fecal Immunochemical Test (iFOBT); Future    Encounter for screening mammogram for breast cancer  -     Mammo Digital Screening Bilat w/ Dale; Future    Postmenopausal bone loss  -     DXA Bone Density Spine And Hip; Future    Lentigo  -     tretinoin (RETIN-A) 0.1 % cream; APPLY A THIN FILM TO AFFECTED AREA EVERY EVENING AFTER MOISTURIZING.    Abnormal urine color  -     Urinalysis    Elevated glucose    Family history of colonic polyps    Pure hypercholesterolemia    Essential hypertension    Other orders  -     diclofenac sodium (VOLTAREN) 1 % Gel; Apply 2 g topically 4 (four) times daily.        Problem List Items Addressed This Visit     Hypertension    Hyperlipidemia    Elevated glucose    Family history of colonic polyps      Other Visit Diagnoses     Physical exam    -  Primary    Wellness examination        Colon cancer screening        Relevant Orders    Fecal Immunochemical Test (iFOBT)    Encounter for screening mammogram for breast cancer        Relevant Orders    Mammo Digital Screening Bilat w/ Dale    Postmenopausal bone loss        Relevant Orders    DXA Bone Density Spine And Hip    Lentigo        Relevant Medications    tretinoin (RETIN-A) 0.1 % cream    Abnormal urine color        Relevant Orders    Urinalysis        Orders Placed This Encounter   Procedures    Mammo Digital Screening Bilat w/ Dale     Standing Status:   Future     Standing Expiration Date:   3/15/2020     Order Specific Question:   May the Radiologist modify the order per protocol to meet the clinical needs of the patient?     Answer:   Yes    DXA Bone Density Spine And Hip     Standing Status:   Future     Standing Expiration Date:   1/14/2020     Order Specific Question:   Reason for Exam:     Answer:   screening for osteoporosis    Fecal Immunochemical Test (iFOBT)     Standing Status:   Future     Standing Expiration Date:   3/14/2020     Urinalysis     Order Specific Question:   Collection Type     Answer:   Urine, Clean Catch     Follow-up in about 6 months (around 7/14/2019) for Follow up 6 months.     Medication List           Accurate as of 1/14/19  8:19 PM. If you have any questions, ask your nurse or doctor.               CHANGE how you take these medications    diclofenac sodium 1 % Gel  Commonly known as:  VOLTAREN  Apply 2 g topically 4 (four) times daily.  What changed:    · how to take this  · when to take this  Changed by:  Tessa Edmondson MD        CONTINUE taking these medications    aspirin 81 MG EC tablet  Commonly known as:  ECOTRIN     biotin 300 mcg Tab     escitalopram oxalate 10 MG tablet  Commonly known as:  LEXAPRO  Take 1 tablet (10 mg total) by mouth once daily.     metoprolol succinate 50 MG 24 hr tablet  Commonly known as:  TOPROL-XL  TAKE 1 TABLET BY MOUTH DAILY     phentermine 37.5 mg tablet  Commonly known as:  ADIPEX-P  Take 1/2 tablet by mouth once daily.     simvastatin 40 MG tablet  Commonly known as:  ZOCOR  TAKE 1 TABLET BY MOUTH EVERY EVENING.     tretinoin 0.1 % cream  Commonly known as:  RETIN-A  APPLY A THIN FILM TO AFFECTED AREA EVERY EVENING AFTER MOISTURIZING.     triamterene-hydrochlorothiazide 37.5-25 mg 37.5-25 mg per capsule  Commonly known as:  DYAZIDE  take one capsule by mouth every day     TROKENDI XR 50 mg Cp24  Generic drug:  topiramate  Take one tablet by mouth once daily.     vitamin D 1000 units Tab  Commonly known as:  VITAMIN D3        STOP taking these medications    gabapentin 100 MG capsule  Commonly known as:  NEURONTIN  Stopped by:  Tessa Edmondson MD           Where to Get Your Medications      These medications were sent to [facility] [address], [place] [address]    Hours:  Mon-Fri 7a-7p, Sat 10a-4p, Sun 10a-4p Phone:  [phone]   · diclofenac sodium 1 % Gel  · tretinoin 0.1 % cream

## 2019-01-14 NOTE — PATIENT INSTRUCTIONS
Please make the Gyn and Derm appt.  Gyn:[facility]  Derm:Dr. Jazlyn Chacko  Make appt with Dr. Dunne 2/2019      On Back Order  New shingles vaccine: SHINGRIX ( 2018) not live, 90%,  2 shots, one at day zero and the 2nd at 2-6 months: any pharmacy can give it.    Bone Density    Hypertension Digital Program: complete on boarding questionnaire

## 2019-01-16 ENCOUNTER — CLINICAL SUPPORT (OUTPATIENT)
Dept: REHABILITATION | Facility: HOSPITAL | Age: 69
End: 2019-01-16
Attending: ORTHOPAEDIC SURGERY
Payer: COMMERCIAL

## 2019-01-16 DIAGNOSIS — R29.898 DECREASED GRIP STRENGTH OF RIGHT HAND: ICD-10-CM

## 2019-01-16 DIAGNOSIS — M25.60 RANGE OF MOTION DEFICIT: ICD-10-CM

## 2019-01-16 DIAGNOSIS — M20.021 BOUTONNIERE DEFORMITY OF FINGER OF RIGHT HAND: ICD-10-CM

## 2019-01-16 PROCEDURE — 97110 THERAPEUTIC EXERCISES: CPT

## 2019-01-16 PROCEDURE — 97018 PARAFFIN BATH THERAPY: CPT

## 2019-01-16 NOTE — PROGRESS NOTES
"                            Occupational Therapy Daily Treatment Note     Date: 1/16/2019  Name: Carmen Aguilar Khan  Clinic Number: [id number]    Therapy Diagnosis:   Encounter Diagnoses   Name Primary?   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   Response to previous treatment:"Splint is helping."  Functional change: None noted    Pain: 0/10  Location: right small finger      Objective   Observation  :Swelling, Redness, flexion contracture of PIP      Sensation: intact   Scar / Wound: none   Edema:  Minimal   PP 5.3 CM   PIP 5.3 CM   MP 4.2 CM   DIP 4.1 CM   DP 3.8 CM         Range of Motion:         SMALL FINGER               MP  0/65               PIP 22/96               DIP 8/50                                           Manual Muscle Test:  FDP 3/5   FDS 3-/5   EDM 3-/5   ADM 3/5                                        Strength: (PHILL Dynamometer in lbs.), Average 3 trials, Position II               TBA     Pinch Strength: (Pinch Gauge in psis), Average 3 trials                NT        Carmen received the following supervised modalities after being cleared for contradictions for 15 minutes:   -Patient received paraffin bath with " moist heat to right hand for 15 minutes to increase blood flow, circulation, pain management and for tissue elasticity prior to therex.     Elvia received manual therapy after being cleared for contradictions for 5 minutes:  STM to right small finger    Carmen received therapeutic exercises for 25 minutes including:  -A/AAROM as follows: jt blocking, TGES, finger ext, finger abd/add x 10 reps ea  -towel scrunches x 10 reps        Home Exercises and Education Provided     Education provided:   - Home program as listed in  Media section  - Progress towards goals     Written Home Exercises Provided: Patient instructed to cont prior HEP and today's HEP.  Exercises were reviewed and Carmen was able to demonstrate them prior to the end of the session.  Carmen demonstrated good  understanding of the education provided.     See EMR under Patient Instructions for exercises provided 1/16/2019.     Carmen demonstrated good  understanding of the education provided.         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    Pt would continue to benefit from skilled OT.      Carmen is progressing well towards her goals and there are no updates to goals at this time. Pt prognosis is Good.     Pt will continue to benefit from skilled outpatient occupational therapy to address the deficits listed in the problem list on initial evaluation provide pt/family education and to maximize pt's level of independence in the home and community environment.     Anticipated barriers to occupational therapy: none    Pt's spiritual, cultural and educational needs considered and pt agreeable to plan of care and goals.    Goals:  Long term:  ( 6 weeks)   1)  Patient to be IND with HEP and modalities for pain management. Met 1/26/2019  2)  Increase ROM 3-5 degrees to increase functional  hand use for ADLs/work/leisure activities- progressing  3)  Decrease edema .1-.3 mm to increase joint mobility /flexibility for functional hand use. - progressing  4)  Assess  and set goals accordingly -progressing       Plan   Patient to be treated by Occupational Therapy    1   times per week for   6                  weeks  during the certification period from   1/8/2019  to  3/8/2019    to achieve the established goals.  Treatment to include :  paraffin, fluidotherapy, manual therapy/joint mobilizations, orthotic fabrication/fitting/training,   modalities for pain management, US 3mhz, therapeutic exercises/activities,        strengthening,    scar and edema management, as well as any other treatments deemed necessary based on the patient's needs or progress.                  Discussed Plan of Care with patient: Yes  Updates/Grading for next session: Advance as tolerated.      Selam Flro, OT

## 2019-01-16 NOTE — PATIENT INSTRUCTIONS
"[facility] - OCCUPATIONAL THERAPY  HOME EXERCISE PROGRAM         Complete the following exercises with 10 repetitions each, 4 x/day.     AROM: DIP Flexion / Extension  Pinch middle knuckle to prevent bending. Bend end knuckle until stretch is felt.   Hold 3 seconds. Relax. Straighten finger as far as possible.    AROM: PIP Flexion / Extension  Pinch bottom knuckle  to prevent bending. Actively bend middle knuckle until stretch is felt.   Hold 3 seconds. Relax. Straighten finger as far as possible.    AROM: Isolated PIP Flexion  Bend only middle joint of your finger, keeping other fingers straight with other hand.    AROM: Isolated MCP Flexion / Extension ("Wave")   Bend only your large, bottom knuckles. Hold 3 seconds. Keep the tips of your fingers straight. Straighten fingers.    AROM: Isolated IPJ Flexion / Extension ("Hook")  Bend only your middle and end knuckles. Hold 3 seconds.   Straighten your fingers.     AROM: MCP and PIP Flexion / Extension ("Straight Fist")  Bend your bottom and middle knuckles, keeping the tips of your fingers straight. Try to touch the pads of your fingers on your palm. Hold 3 seconds. Straighten your fingers.     AROM: Composite Flexion / Extension ("Full Fist")  Bend every joint in your hand into a fist. Hold 3 seconds. Straighten your fingers.     AROM: Composte Extension ("Finger Lifts")  Lift your finger off of the table one at a time. Hold 3 seconds. Relax your finger.    AROM: Abduction / Adduction  With hand flat on table, spread all fingers apart, then bring them together as close as possible.    Copyright © I. All rights reserved.     Therapist: WILD Wright, HAZEL    "

## 2019-01-17 ENCOUNTER — OFFICE VISIT (OUTPATIENT)
Dept: ORTHOPEDICS | Facility: CLINIC | Age: 69
End: 2019-01-17
Payer: COMMERCIAL

## 2019-01-17 ENCOUNTER — HOSPITAL ENCOUNTER (OUTPATIENT)
Dept: RADIOLOGY | Facility: HOSPITAL | Age: 69
Discharge: HOME | End: 2019-01-17
Attending: PHYSICIAN ASSISTANT
Payer: COMMERCIAL

## 2019-01-17 VITALS — WEIGHT: 168.44 LBS | HEIGHT: 62 IN | BODY MASS INDEX: 31 KG/M2

## 2019-01-17 DIAGNOSIS — M79.671 RIGHT FOOT PAIN: Primary | ICD-10-CM

## 2019-01-17 DIAGNOSIS — S92.351D CLOSED FRACTURE OF FIFTH METATARSAL BONE OF RIGHT FOOT WITH ROUTINE HEALING, PHYSEAL INVOLVEMENT UNSPECIFIED, SUBSEQUENT ENCOUNTER: ICD-10-CM

## 2019-01-17 DIAGNOSIS — M79.671 RIGHT FOOT PAIN: ICD-10-CM

## 2019-01-17 PROCEDURE — 73630 X-RAY EXAM OF FOOT: CPT | Mod: TC,RT

## 2019-01-17 PROCEDURE — 99999 PR PBB SHADOW E&M-EST. PATIENT-LVL IV: ICD-10-PCS | Mod: PBBFAC,,, | Performed by: PHYSICIAN ASSISTANT

## 2019-01-17 PROCEDURE — 99213 OFFICE O/P EST LOW 20 MIN: CPT | Mod: S$GLB,,, | Performed by: PHYSICIAN ASSISTANT

## 2019-01-17 PROCEDURE — 1101F PT FALLS ASSESS-DOCD LE1/YR: CPT | Mod: CPTII,S$GLB,, | Performed by: PHYSICIAN ASSISTANT

## 2019-01-17 PROCEDURE — 73630 X-RAY EXAM OF FOOT: CPT | Mod: 26,RT,, | Performed by: RADIOLOGY

## 2019-01-17 PROCEDURE — 73630 XR FOOT COMPLETE 3 VIEW RIGHT: ICD-10-PCS | Mod: 26,RT,, | Performed by: RADIOLOGY

## 2019-01-17 PROCEDURE — 99999 PR PBB SHADOW E&M-EST. PATIENT-LVL IV: CPT | Mod: PBBFAC,,, | Performed by: PHYSICIAN ASSISTANT

## 2019-01-17 PROCEDURE — 1101F PR PT FALLS ASSESS DOC 0-1 FALLS W/OUT INJ PAST YR: ICD-10-PCS | Mod: CPTII,S$GLB,, | Performed by: PHYSICIAN ASSISTANT

## 2019-01-17 PROCEDURE — 99213 PR OFFICE/OUTPT VISIT, EST, LEVL III, 20-29 MIN: ICD-10-PCS | Mod: S$GLB,,, | Performed by: PHYSICIAN ASSISTANT

## 2019-01-17 RX ORDER — TRAMADOL HYDROCHLORIDE 50 MG/1
50 TABLET ORAL EVERY 4 HOURS PRN
Qty: 42 TABLET | Refills: 0 | Status: SHIPPED | OUTPATIENT
Start: 2019-01-17 | End: 2019-01-27

## 2019-01-17 NOTE — PROGRESS NOTES
Subjective:      Patient ID: Carmen Khan is a 68 y.o. female.    Chief Complaint: Follow-up (right foot)    HPI  Patient returns for f/u for her right 5th MT fracture that occurred on 11/28/18. She has been WBAT in the boot. She uses the knee scooter at work. She has worn a regular shoe at times, but she has had to resume the boot again after a little while due to pain. She takes tramadol at night which helps. She does ROM exercises. She has intermittent swelling. Her foot pain has gotten a little better since last visit.   Review of Systems   Constitution: Negative for chills, fever and night sweats.   Cardiovascular: Negative for chest pain.   Respiratory: Negative for cough and shortness of breath.    Hematologic/Lymphatic: Does not bruise/bleed easily.   Skin: Negative for color change.   Gastrointestinal: Negative for heartburn.   Genitourinary: Negative for dysuria.   Neurological: Negative for numbness and paresthesias.   Psychiatric/Behavioral: Negative for altered mental status.   Allergic/Immunologic: Negative for persistent infections.         Objective:            General    Vitals reviewed.  Constitutional: She is oriented to person, place, and time. She appears well-developed and well-nourished.   Cardiovascular: Normal rate.    Neurological: She is alert and oriented to person, place, and time.         Right Ankle/Foot Exam     Inspection   Erythema: absent    Tenderness   The patient is tender to palpation of the fifth metatarsal base.    Range of Motion   Ankle Joint   Dorsiflexion: abnormal   Plantar flexion: abnormal   Subtalar Joint   Inversion: abnormal   Eversion: abnormal     Other   Sensation: normal    Comments:  Mild TTP. ROM still limited but has improved since last visit.         Vascular Exam     Right Pulses  Dorsalis Pedis:      2+              X-ray: ordered and reviewed by myself. Fracture base 5th metatarsal without significant callus.  Fracture line remains quite  visible.        Assessment:       Encounter Diagnoses   Name Primary?    Right foot pain Yes    Closed fracture of fifth metatarsal bone of right foot with routine healing, physeal involvement unspecified, subsequent encounter           Plan:       Discussed treatment options with patient. Order placed for PT. Continue ROM exercises. Tramadol refilled. She was given a post-op shoe. If she has increased pain with this, she can resume the boot. But she will be working on weaning out of this over the next couple of weeks. RTC in 4 weeks for repeat x-ray.

## 2019-01-21 ENCOUNTER — TELEPHONE (OUTPATIENT)
Dept: PHARMACY | Facility: CLINIC | Age: 69
End: 2019-01-21

## 2019-01-21 RX ORDER — METOPROLOL SUCCINATE 50 MG/1
TABLET, EXTENDED RELEASE ORAL
Qty: 90 TABLET | Refills: 3 | Status: CANCELLED | OUTPATIENT
Start: 2019-01-21

## 2019-01-21 RX ORDER — METOPROLOL SUCCINATE 50 MG/1
TABLET, EXTENDED RELEASE ORAL
Qty: 90 TABLET | Refills: 3 | Status: SHIPPED | OUTPATIENT
Start: 2019-01-21 | End: 2019-10-10

## 2019-01-23 ENCOUNTER — CLINICAL SUPPORT (OUTPATIENT)
Dept: REHABILITATION | Facility: HOSPITAL | Age: 69
End: 2019-01-23
Attending: PHYSICIAN ASSISTANT
Payer: COMMERCIAL

## 2019-01-23 ENCOUNTER — CLINICAL SUPPORT (OUTPATIENT)
Dept: REHABILITATION | Facility: HOSPITAL | Age: 69
End: 2019-01-23
Attending: ORTHOPAEDIC SURGERY
Payer: COMMERCIAL

## 2019-01-23 DIAGNOSIS — M20.021 BOUTONNIERE DEFORMITY OF FINGER OF RIGHT HAND: ICD-10-CM

## 2019-01-23 DIAGNOSIS — M79.672 LEFT FOOT PAIN: ICD-10-CM

## 2019-01-23 DIAGNOSIS — M25.60 RANGE OF MOTION DEFICIT: ICD-10-CM

## 2019-01-23 DIAGNOSIS — R29.898 DECREASED GRIP STRENGTH OF RIGHT HAND: ICD-10-CM

## 2019-01-23 PROCEDURE — 97110 THERAPEUTIC EXERCISES: CPT

## 2019-01-23 PROCEDURE — 97161 PT EVAL LOW COMPLEX 20 MIN: CPT | Performed by: PHYSICAL THERAPIST

## 2019-01-23 PROCEDURE — 97110 THERAPEUTIC EXERCISES: CPT | Performed by: PHYSICAL THERAPIST

## 2019-01-23 PROCEDURE — 97140 MANUAL THERAPY 1/> REGIONS: CPT | Performed by: PHYSICAL THERAPIST

## 2019-01-23 PROCEDURE — 97022 WHIRLPOOL THERAPY: CPT

## 2019-01-23 NOTE — PROGRESS NOTES
"                            Occupational Therapy Daily Treatment Note     Date: 1/23/2019  Name: Carmen Aguilar Khan  Clinic Number: [id number]    Therapy Diagnosis:   Encounter Diagnoses   Name Primary?   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   Response to previous treatment:"Splint is helping."  Functional change: None noted    Pain: 0/10  Location: right small finger      Objective   Observation  :Swelling, Redness, flexion contracture of PIP      Sensation: intact   Scar / Wound: none   Edema:  Minimal   PP 5.3 CM   PIP 5.3 CM   MP 4.2 CM   DIP 4.1 CM   DP 3.8 CM         Range of Motion:         SMALL FINGER               MP  0/65               PIP 22/96               DIP 8/50                                           Manual Muscle Test:  FDP 3/5   FDS 3-/5   EDM 3-/5   ADM 3/5                                        Strength: (PHILL Dynamometer in lbs.), Average 3 trials, Position II               TBA     Pinch Strength: (Pinch Gauge in psis), Average 3 trials                NT        Carmen received the following supervised modalities after being cleared for contradictions for 15 minutes:   -Patient received fluidotherapy to " right hand for 15 minutes to increase blood flow, circulation, pain management and for tissue elasticity prior to therex.     Elvia received manual therapy after being cleared for contradictions for 5 minutes:  STM to right small finger    Carmen received therapeutic exercises for 25 minutes including:  -A/AAROM as follows: jt blocking, TGES, finger ext, finger abd/add x 10 reps ea  -towel scrunches x 10 reps  -isopheres x 3 min  -small pom pom scoops with small finger x 3 containers        Home Exercises and Education Provided     Education provided:   - Home program as listed in  Media section  - Progress towards goals     Written Home Exercises Provided: Patient instructed to cont prior HEP and today's HEP.  Exercises were reviewed and Carmen was able to demonstrate them prior to the end of the session.  Carmen demonstrated good  understanding of the education provided.     See EMR under Patient Instructions for exercises provided 1/16/2019.     Carmen demonstrated good  understanding of the education provided.         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 Pt tolerated all activities with mild complaints of pain.    Pt would continue to benefit from skilled OT.      Carmen is progressing well towards her goals and there are no updates to goals at this time. Pt prognosis is Good.     Pt will continue to benefit from skilled outpatient occupational therapy to address the deficits listed in the problem list on initial evaluation provide pt/family education and to maximize pt's level of independence in the home and community environment.     Anticipated barriers to occupational therapy: none    Pt's spiritual, cultural and educational needs considered and pt agreeable to plan of care and goals.    Goals:  Long term:  ( 6 weeks)   1)  Patient to  be IND with HEP and modalities for pain management. Met 1/26/2019  2)  Increase ROM 3-5 degrees to increase functional hand use for ADLs/work/leisure activities- progressing  3)  Decrease edema .1-.3 mm to increase joint mobility /flexibility for functional hand use. - progressing  4)  Assess  and set goals accordingly -progressing       Plan   Patient to be treated by Occupational Therapy    1   times per week for   6                  weeks  during the certification period from   1/8/2019  to  3/8/2019    to achieve the established goals.  Treatment to include :  paraffin, fluidotherapy, manual therapy/joint mobilizations, orthotic fabrication/fitting/training,   modalities for pain management, US 3mhz, therapeutic exercises/activities,        strengthening,    scar and edema management, as well as any other treatments deemed necessary based on the patient's needs or progress.                  Discussed Plan of Care with patient: Yes  Updates/Grading for next session: Advance as tolerated.      Selam Flor, OT

## 2019-01-25 ENCOUNTER — CLINICAL SUPPORT (OUTPATIENT)
Dept: REHABILITATION | Facility: HOSPITAL | Age: 69
End: 2019-01-25
Attending: ORTHOPAEDIC SURGERY
Payer: COMMERCIAL

## 2019-01-25 DIAGNOSIS — M79.672 LEFT FOOT PAIN: ICD-10-CM

## 2019-01-25 PROCEDURE — 97110 THERAPEUTIC EXERCISES: CPT | Performed by: PHYSICAL THERAPIST

## 2019-01-25 PROCEDURE — 97140 MANUAL THERAPY 1/> REGIONS: CPT | Performed by: PHYSICAL THERAPIST

## 2019-01-25 NOTE — PROGRESS NOTES
Physical Therapy Daily Treatment Note     Name: Carmen Aguilar [facility]  Clinic Number: [id number]    Therapy Diagnosis:   Encounter Diagnosis   Name Primary?    Left foot pain      Physician: Zayra Gutierres PA-C    Visit Date: 1/25/2019    Physician Orders: PT Eval and Treat   Medical Diagnosis from Referral: Closed fracture of fifth metatarsal bone of right foot with routine healing, physeal involvement unspecified, subsequent encounter  Evaluation Date: 1/23/2019  Authorization Period Expiration: 01/17/2020  Plan of Care Expiration: 3/15/19  Visit # / Visits authorized: 2/ 15     Time In: 1214  Time Out: 1308  Total Billable Time: 54 minutes     Precautions: Standard    Subjective     Pt reports: Pt states she has only been able to do the stretching exercises once since her initial evaluation.  She was not compliant with home exercise program.  Response to previous treatment: good  Functional change: none    Pain: 3/10  Location: right feet      Objective     Carmen received therapeutic exercises to develop strength, ROM and flexibility for 42 minutes including:  Ankle 4 way, GTB 2x20 each  Fitter board circles, 3 mins;  Calf stretch with towel 5x30 sec  Shuttle, 1 band, 5 mins of squats  B bridge 3x10  SLR 3x10    Carmen received the following manual therapy techniques: Joint mobilizations were applied to the: R foot/ankle for 12 minutes, including:  TC distraction Gr IV  GT mobilization for ext Gr III  Midfoot adduction mobilization Gr III  ST figure 8 mobilization     Home Exercises Provided and Patient Education Provided     Education provided:   - continue with HEP more consistently    Written Home Exercises Provided: Patient instructed to cont prior HEP.  Exercises were reviewed and Carmen was able to demonstrate them prior to the end of the session.  Carmen demonstrated good  understanding of the education provided.     See EMR under Patient Instructions for exercises provided prior  visit.    Assessment     Pt presents with mild improvement of R ankle TC mobility and edema. Pt educated to increase compliance with HEP to improve joint mobility and ROM.     Carmen is progressing well towards her goals.   Pt prognosis is Good.     Pt will continue to benefit from skilled outpatient physical therapy to address the deficits listed in the problem list box on initial evaluation, provide pt/family education and to maximize pt's level of independence in the home and community environment.     Pt's spiritual, cultural and educational needs considered and pt agreeable to plan of care and goals.     Anticipated barriers to physical therapy: none    Goals:  Short Term Goals: 4 weeks   1. Pt will be independent with HEP in 2 weeks. (progressing, not met)  2. Pt will improve R ankle ROM by 25% to allow normalized gait pattern. (progressing, not met)  3. Pt will be able to perform SLS for 10 sec on R. (progressing, not met)     Long Term Goals: 8 weeks   1. Pt will be able to walk without surgical shoe for 10 mins with less than 2/10 pain. (progressing, not met)           2. Pt will be able to stand without surgical shoe for 30 mins with less than 2/10 pain. (progressing, not met)  3. Pt will be able to resume bike riding for exercise. (progressing, not met)    Plan     Continue with PT focused on improved joint mobility and gentle foot and ankle strengthening. Begin foot intrinsic strengthening at next visit.     Gerry Solorzano, PT

## 2019-01-25 NOTE — PROGRESS NOTES
Outpatient Therapy Updated Plan of Care     Visit Date: 1/23/2019  Name: Carmen Khan  Clinic Number: [id number]    Therapy Diagnosis: No diagnosis found.  Physician: Zayra Gutierres PA-C    Physician Orders: ***  Medical Diagnosis: ***  Evaluation Date: ***    Total Visits Received: ***  Cancelled Visits: ***  No Show Visits: ***    Current Certification Period:  *** to ***  Precautions:  ***  Visits from Evaluation Date:  ***  Functional Level Prior to Evaluation:  ***    Subjective     Update: ***    Objective     Update: ***    Assessment     Update: ***    Previous Short Term Goals Status:   ***  New Short Term Goals Status:   ***  Long Term Goal Status:   {DESC LONG TERM GOAL:18010}  Reasons for Recertification of Therapy:   ***    Plan     Updated Certification Period: 1/23/2019 to ***  Recommended Treatment Plan: *** times per week for *** weeks: {TX PLAN:02367}  Other Recommendations: ***    Gerry Solorzano, PT  1/23/2019      I CERTIFY THE NEED FOR THESE SERVICES FURNISHED UNDER THIS PLAN OF TREATMENT AND WHILE UNDER MY CARE    Physician's comments:        Physician's Signature: ___________________________________________________

## 2019-01-25 NOTE — PLAN OF CARE
[facility]  Physical Therapy Initial Evaluation    Name: Carmen Khan  Clinic Number: [id number]    Therapy Diagnosis:   Encounter Diagnosis   Name Primary?    Left foot pain      Physician: Zayra Gutierres PA-C    Physician Orders: PT Eval and Treat   Medical Diagnosis from Referral: Closed fracture of fifth metatarsal bone of right foot with routine healing, physeal involvement unspecified, subsequent encounter  Evaluation Date: 1/23/2019  Authorization Period Expiration: 01/17/2020  Plan of Care Expiration: 3/15/19  Visit # / Visits authorized: 1/ 15    Time In: 1701  Time Out: 1803  Total Billable Time: 62 minutes    Precautions: Standard    Subjective   Date of onset: 11/28/18  History of current condition - Carmen reports: She slipped in a pothole while walking out of a store in the dark. She went to the MD the next day and x-rays showed R 5th metatarsal fracture and R Boutonniere fracture. She is currently receiving OT for the Boutonniere fracture. Currently, has difficulty with walking, stairs, driving. Pt lives in [place] and has to drive for 1.5 hours a day and has significant swelling in her foot. 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 She is currently in a surgical boot, she was in a boot for 7 weeks prior.           Past Medical History:   Diagnosis Date    Anxiety     Hyperlipidemia     Hypertension     Mitral valve prolapse      Carmen Khan  has a past surgical history that includes Tonsillectomy.    Carmen has a current medication list which includes the following prescription(s): aspirin, biotin, diclofenac sodium, escitalopram oxalate, metoprolol succinate, phentermine, simvastatin, topiramate, tramadol, tretinoin, triamterene-hydrochlorothiazide 37.5-25 mg, and vitamin d.    Review of patient's allergies indicates:  No Known Allergies     Imaging,: x-ray    Prior Therapy:  "none  Social History: lives with their spouse  Occupation: works in [employer] at [employer]  Prior Level of Function: pain-free with all ADLs and recreational activites  Current Level of Function: walking in surgical shoe, pain with walking, stairs, swelling with sustained sitting    Pain:  Current 3/10, worst 6/10, best 3/10   Location: right feet   Description: Aching, Deep and Sharp  Aggravating Factors: Sitting, Standing and Walking  Easing Factors: massage, relaxation, ice and rest    Pts goals: walk, standing, drive, ride bike without pain    Objective   Observation: mild edema noted     Active Range of Motion:   Ankle Right Left   DF (knee extended) 0 5   DF (knee bent) 0 8   Plantarflexion 45 60   Inversion 10 25   Eversion 5 15      Strength:  Ankle Right Left   Dorsiflexion 3+/5 4/5   Plantarflexion 2/5 3+/5   Inversion 3-/5 4/5   Eversion 3-/5 4/5     Joint Mobility: decreased TC mobility R  Decreased ST mobility, medial and lateral R  Decreased R cuboid mobility     Palpation: TTP along lateral aspect R foot (fracture site)    Sensation: normal    Flexibility: moderate loss of R calf flexibility     Functional Tests:   SLS EO: unable on R, 3 sec on L  SLS EC: not tested  Double leg squat: not tested  Single leg calf raise: L 2, R not tested              CMS Impairment/Limitation/Restriction for FOTO 78% disabled Survey    Therapist reviewed FOTO scores for Carmen Khan on 1/23/2019.   FOTO documents entered into [employer] - see Media section.    Limitation Score: 78%  Category: Mobility         TREATMENT   Treatment Time In: 1701  Treatment Time Out: 1802  Total Treatment time separate from Evaluation: 42 minutes    Carmen received therapeutic exercises to develop strength, endurance, ROM and flexibility for 28 minutes including:  Resisted ankle PF GTB 3x20  Calf stretch with towel 5x30"  Ankle ABC's x 5    Carmen received the following manual therapy techniques: Joint mobilizations and " Soft tissue Mobilization were applied to the: R ankle for 13 minutes, including:  TC mobilizations Gr III  GT mobilization for ext Gr III  ST figure 8 mobilization    Home Exercises and Patient Education Provided    Education provided re: diagnosis, prognosis, activity modification, goals for therapy, role of therapy for care, exercises/HEP    Written Home Exercises Provided: yes.  Exercises were reviewed and Carmen was able to demonstrate them prior to the end of the session.   Pt received a written copy of exercises to perform at home. Carmen demonstrated good  understanding of the education provided.     See EMR under patient instructions for exercises given.   Assessment   Carmen is a 68 y.o. female referred to outpatient Physical Therapy with a medical diagnosis of Closed fracture of fifth metatarsal bone of right foot with routine healing, physeal involvement unspecified, subsequent encounter.     Pt presents with decreased R ankle and foot joint mobility, weakness of foot intrinsics and calf, poor gait/balance, mild to moderate joint edema, and pain. Fracture site appears to be healing normally.     Pt prognosis is Excellent.   Pt will benefit from skilled outpatient Physical Therapy to address the deficits stated above and in the chart below, provide pt/family education, and to maximize pt's level of independence.     Plan of care discussed with patient: Yes  Pt's spiritual, cultural and educational needs considered and patient is agreeable to the plan of care and goals as stated below:     Anticipated Barriers for therapy: none    Medical Necessity is demonstrated by the following  History  Co-morbidities and personal factors that may impact the plan of care Co-morbidities:   anxiety    Personal Factors:   age     low   Examination  Body Structures and Functions, activity limitations and participation restrictions that may impact the plan of care Body Regions:   lower extremities    Body Systems:     ROM  strength  balance  gait    Participation Restrictions:   none    Activity limitations:   Learning and applying knowledge  no deficits    General Tasks and Commands  no deficits    Communication  no deficits    Mobility  walking  driving (bike, car, motorcycle)    Self care  no deficits    Domestic Life  shopping  cooking  doing house work (cleaning house, washing dishes, laundry)    Interactions/Relationships  no deficits    Life Areas  no deficits    Community and Social Life  no deficits         low   Clinical Presentation stable and uncomplicated low   Decision Making/ Complexity Score: low     Goals:  Short Term Goals: 4 weeks   1. Pt will be independent with HEP in 2 weeks.   2. Pt will improve R ankle ROM by 25% to allow normalized gait pattern.   3. Pt will be able to perform SLS for 10 sec on R.     Long Term Goals: 8 weeks   1. Pt will be able to walk without surgical shoe for 10 mins with less than 2/10 pain.    2. Pt will be able to stand without surgical shoe for 30 mins with less than 2/10 pain.   3. Pt will be able to resume bike riding for exercise.    Plan   Plan of care Certification: 1/23/2019 to 3/20/18.    Outpatient Physical Therapy 2 times weekly for 8 weeks to include the following interventions: Manual Therapy, Neuromuscular Re-ed, Therapeutic Activites and Therapeutic Exercise.     Gerry Solorzano, PT

## 2019-01-28 ENCOUNTER — CLINICAL SUPPORT (OUTPATIENT)
Dept: REHABILITATION | Facility: HOSPITAL | Age: 69
End: 2019-01-28
Attending: ORTHOPAEDIC SURGERY
Payer: COMMERCIAL

## 2019-01-28 DIAGNOSIS — M79.672 LEFT FOOT PAIN: ICD-10-CM

## 2019-01-28 PROCEDURE — 97140 MANUAL THERAPY 1/> REGIONS: CPT | Performed by: PHYSICAL THERAPIST

## 2019-01-28 PROCEDURE — 97112 NEUROMUSCULAR REEDUCATION: CPT | Performed by: PHYSICAL THERAPIST

## 2019-01-28 PROCEDURE — 97110 THERAPEUTIC EXERCISES: CPT | Performed by: PHYSICAL THERAPIST

## 2019-01-29 ENCOUNTER — TELEPHONE (OUTPATIENT)
Dept: ORTHOPEDICS | Facility: CLINIC | Age: 69
End: 2019-01-29

## 2019-01-29 NOTE — TELEPHONE ENCOUNTER
Tried calling patient regarding an ankle compression sleeve. No answer. Left vm stating that our DME dept does not have ankle compression sleeves but she can get one from [employer] or try [employer].

## 2019-01-30 ENCOUNTER — CLINICAL SUPPORT (OUTPATIENT)
Dept: REHABILITATION | Facility: HOSPITAL | Age: 69
End: 2019-01-30
Attending: ORTHOPAEDIC SURGERY
Payer: COMMERCIAL

## 2019-01-30 DIAGNOSIS — R29.898 DECREASED GRIP STRENGTH OF RIGHT HAND: ICD-10-CM

## 2019-01-30 DIAGNOSIS — M20.021 BOUTONNIERE DEFORMITY OF FINGER OF RIGHT HAND: ICD-10-CM

## 2019-01-30 DIAGNOSIS — M25.60 RANGE OF MOTION DEFICIT: ICD-10-CM

## 2019-01-30 PROCEDURE — 97110 THERAPEUTIC EXERCISES: CPT

## 2019-01-30 PROCEDURE — 97022 WHIRLPOOL THERAPY: CPT

## 2019-01-30 NOTE — PROGRESS NOTES
Physical Therapy Daily Treatment Note     [facility]  Clinic Number: [id number]    Therapy Diagnosis:   Encounter Diagnosis   Name Primary?    Left foot pain      Physician: Zayra Gutierres PA-C    Visit Date: 1/28/2019    Physician Orders: PT Eval and Treat   Medical Diagnosis from Referral: Closed fracture of fifth metatarsal bone of right foot with routine healing, physeal involvement unspecified, subsequent encounter  Evaluation Date: 1/23/2019  Authorization Period Expiration: 01/17/2020  Plan of Care Expiration: 3/15/19  Visit # / Visits authorized: 3/ 15     Time In: 1404  Time Out: 1503  Total Billable Time: 59 minutes     Precautions: Standard    Subjective     Pt reports: Pt states she has felt like she is walking better and has less pain. Swelling continues to be an issue.   She was compliant with home exercise program.  Response to previous treatment: good  Functional change: none    Pain: 2/10  Location: right feet      Objective     Carmen received therapeutic exercises to develop strength, ROM and flexibility for 36 minutes including:  Ankle 4 way, GTB 2x20 each  Calf stretch with towel 5x30 sec  Shuttle, 1 band, 5 mins of squats  B bridge 3x10  SLR 3x10  Clam 1 3x12    Carmen received neuromuscular re-education to improve muscle firing patterns for 9 mins including;  Foot intrinsic retraining 6 mins  Fitter board circles, 3 mins;    Carmen received the following manual therapy techniques: Joint mobilizations were applied to the: R foot/ankle for 12 minutes, including:  TC distraction Gr IV  GT mobilization for ext Gr III  Midfoot adduction mobilization Gr III  ST figure 8 mobilization     Home Exercises Provided and Patient Education Provided     Education provided:   - continue with HEP more consistently    Written Home Exercises Provided: Patient instructed to cont prior HEP.  Exercises were reviewed and Carmen was able to demonstrate them prior to the end of the session.   Carmen demonstrated good  understanding of the education provided.     See EMR under Patient Instructions for exercises provided prior visit.    Assessment     Pt continues to make good progress in joint mobility. She presented with increased TC/ST stiffness, but improved with manual therapy. Foot intrinsic strengthening added today with good performance.    Carmen is progressing well towards her goals.   Pt prognosis is Good.     Pt will continue to benefit from skilled outpatient physical therapy to address the deficits listed in the problem list box on initial evaluation, provide pt/family education and to maximize pt's level of independence in the home and community environment.     Pt's spiritual, cultural and educational needs considered and pt agreeable to plan of care and goals.     Anticipated barriers to physical therapy: none    Goals:  Short Term Goals: 4 weeks   1. Pt will be independent with HEP in 2 weeks. (progressing, not met)  2. Pt will improve R ankle ROM by 25% to allow normalized gait pattern. (progressing, not met)  3. Pt will be able to perform SLS for 10 sec on R. (progressing, not met)     Long Term Goals: 8 weeks   1. Pt will be able to walk without surgical shoe for 10 mins with less than 2/10 pain. (progressing, not met)           2. Pt will be able to stand without surgical shoe for 30 mins with less than 2/10 pain. (progressing, not met)  3. Pt will be able to resume bike riding for exercise. (progressing, not met)    Plan     Progress foot intrinsic strengthening in various planes and with increased weight bearing.     Gerry Solorzano, PT

## 2019-01-30 NOTE — PROGRESS NOTES
"                            Occupational Therapy Daily Treatment Note     Date: 1/30/2019  Name: Carmen Aguilar Khan  Clinic Number: [id number]    Therapy Diagnosis:   Encounter Diagnoses   Name Primary?   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   Response to previous treatment:"I'm improving"  Functional change: Able to  objects    Pain: 0/10 at best, 5/10 at worst  Location: right small finger      Objective   Observation  :Swelling,  flexion contracture of PIP      Sensation: intact   Scar / Wound: none   Edema:  Minimal   PP 5.3 CM   PIP 5.0 CM   MP 4.2 CM   DIP 4.1 CM   DP 3.8 CM         Range of Motion:         SMALL FINGER               MP  0/80              PIP 8/99              DIP 5/70                                          Manual Muscle Test:  FDP 5/5   FDS 5/5   EDM 5/5   ADM 5/5                                        Strength: (PHILL Dynamometer in lbs.), Average 3 trials, Position II               Left      Right                24         13     Carmen received the following supervised modalities after being cleared for contradictions for 15 minutes:   -Patient received fluidotherapy to right hand for 15 minutes to " increase blood flow, circulation, pain management and for tissue elasticity prior to therex.     Elvia received manual therapy after being cleared for contradictions for 5 minutes:  STM to right small finger    Carmen received therapeutic exercises for 40 minutes including:  -A/AAROM as follows: jt blocking, TGES, finger ext, finger abd/add x 10 reps ea  -towel scrunches x 10 reps  -isopheres x 3 min  -small pom pom scoops with small finger x 2 containers  -red putty as follows: 2' molding, 30 grasps        Home Exercises and Education Provided     Education provided:   - Home program as listed in  Media section  - Progress towards goals: Excellent    Written Home Exercises Provided: Patient instructed to cont prior HEP and today's HEP.  Exercises were reviewed and Carmen was able to demonstrate them prior to the end of the session.  Carmen demonstrated good  understanding of the education provided.     See EMR under Patient Instructions for exercises provided 1/30/2019.    Carmen demonstrated good  understanding of the education provided.         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 Pt tolerated all activities with no complaints of pain. Advanced strengthening ex. Pt has made significant gains with increasing AROM of her small finger and decreasing edema. PIP/DIP ext lag is improving.     Pt would continue to benefit from skilled OT.      Carmen is progressing well towards her goals and there are no updates to goals at this time. Pt prognosis is Good.     Pt will continue to benefit from skilled outpatient occupational therapy to address the deficits listed in the problem list on initial evaluation provide pt/family education and to maximize pt's level of independence in the home and community environment.     Anticipated barriers to  occupational therapy: none    Pt's spiritual, cultural and educational needs considered and pt agreeable to plan of care and goals.    Goals:  Long term:  ( 6 weeks)   1)  Patient to be IND with HEP and modalities for pain management. Met 1/26/2019  2)  Increase ROM 3-5 degrees to increase functional hand use for ADLs/work/leisure activities- Met 1/30/2019  3)  Decrease edema .1-.3 mm to increase joint mobility /flexibility for functional hand use. - Met 1/30/2019  4)  Assess  and set goals accordingly -Met 1/30/2019    New Goals:  1) Pt will have 0/10 pain complaints with functional use.  2) Pt will return to all ADLS using her right hand with no difficulty.      Plan   Patient to be treated by Occupational Therapy    1   times per week for   6-8                  weeks  during the certification period from   1/8/2019  to  3/8/2019    to achieve the established goals.  Treatment to include :  paraffin, fluidotherapy, manual therapy/joint mobilizations, orthotic fabrication/fitting/training,   modalities for pain management, US 3mhz, therapeutic exercises/activities,        strengthening,    scar and edema management, as well as any other treatments deemed necessary based on the patient's needs or progress.                  Discussed Plan of Care with patient: Yes  Updates/Grading for next session: Advance as tolerated.      Sealm Flor, OT

## 2019-01-30 NOTE — PATIENT INSTRUCTIONS
[facility]  OCCUPATIONAL THERAPY  HOME EXERCISE PROGRAM     Complete the following strengthening program 1x/day.                               WILD Wright, PARISAT  Certified Hand Therapist  Occupational Therapist

## 2019-02-01 ENCOUNTER — CLINICAL SUPPORT (OUTPATIENT)
Dept: REHABILITATION | Facility: HOSPITAL | Age: 69
End: 2019-02-01
Attending: ORTHOPAEDIC SURGERY
Payer: COMMERCIAL

## 2019-02-01 DIAGNOSIS — M79.672 LEFT FOOT PAIN: ICD-10-CM

## 2019-02-01 PROCEDURE — 97140 MANUAL THERAPY 1/> REGIONS: CPT | Performed by: PHYSICAL THERAPIST

## 2019-02-01 PROCEDURE — 97110 THERAPEUTIC EXERCISES: CPT | Performed by: PHYSICAL THERAPIST

## 2019-02-01 PROCEDURE — 97112 NEUROMUSCULAR REEDUCATION: CPT | Performed by: PHYSICAL THERAPIST

## 2019-02-01 NOTE — PROGRESS NOTES
Physical Therapy Daily Treatment Note     Name: Carmen Aguilar [facility]  Clinic Number: [id number]    Therapy Diagnosis:   Encounter Diagnosis   Name Primary?    Left foot pain      Physician: Zayra Gutierres PA-C    Visit Date: 2/1/2019    Physician Orders: PT Eval and Treat   Medical Diagnosis from Referral: Closed fracture of fifth metatarsal bone of right foot with routine healing, physeal involvement unspecified, subsequent encounter  Evaluation Date: 1/23/2019  Authorization Period Expiration: 01/17/2020  Plan of Care Expiration: 3/15/19  Visit # / Visits authorized: 4/ 15     Time In: 1202  Time Out: 1256  Total Billable Time: 54 minutes     Precautions: Standard    Subjective     Pt reports: Pt states she has been on her feet quite a bit, but the foot does not really hurt, only her calf. She states she forgets she had an injury at times.   Response to previous treatment: good  Functional change: none    Pain: 1/10  Location: right feet      Objective     Carmen received therapeutic exercises to develop strength, ROM and flexibility for 28 minutes including:  Ankle 4 way, GTB 2x20 each  Calf stretch with towel 5x30 sec  Shuttle, 1 band, 3x20 mins of squats  B bridge 3x10  Clam 1 3x12    Carmen received neuromuscular re-education to improve muscle firing patterns for 14 mins including;  Foot intrinsic retraining 6 mins  Fitter board circles, 3 mins;  Biodex balance machine 5 mins;    Carmen received the following manual therapy techniques: Joint mobilizations were applied to the: R foot/ankle for 12 minutes, including:  TC distraction Gr IV  GT mobilization for ext Gr III  Midfoot adduction mobilization Gr III  ST figure 8 mobilization     Home Exercises Provided and Patient Education Provided     Education provided:   - continue with HEP more consistently    Written Home Exercises Provided: Patient instructed to cont prior HEP.  Exercises were reviewed and Carmen was able to demonstrate them prior to the  end of the session.  Carmen demonstrated good  understanding of the education provided.     See EMR under Patient Instructions for exercises provided prior visit.    Assessment     TC mobility showing a good improvement each week. Minimal to no swelling at lateral foot. Single leg and balance exercises added today with excellent tolerance.     Carmen is progressing well towards her goals.   Pt prognosis is Good.     Pt will continue to benefit from skilled outpatient physical therapy to address the deficits listed in the problem list box on initial evaluation, provide pt/family education and to maximize pt's level of independence in the home and community environment.     Pt's spiritual, cultural and educational needs considered and pt agreeable to plan of care and goals.     Anticipated barriers to physical therapy: none    Goals:  Short Term Goals: 4 weeks   1. Pt will be independent with HEP in 2 weeks. (MET)  2. Pt will improve R ankle ROM by 25% to allow normalized gait pattern. (Met)  3. Pt will be able to perform SLS for 10 sec on R. (progressing, not met)     Long Term Goals: 8 weeks   1. Pt will be able to walk without surgical shoe for 10 mins with less than 2/10 pain. (progressing, not met)           2. Pt will be able to stand without surgical shoe for 30 mins with less than 2/10 pain. (progressing, not met)  3. Pt will be able to resume bike riding for exercise. (progressing, not met)    Plan     Progress standing balance exercises. Administer NORMAN.     Gerry Solorzano, PT

## 2019-02-04 ENCOUNTER — CLINICAL SUPPORT (OUTPATIENT)
Dept: REHABILITATION | Facility: HOSPITAL | Age: 69
End: 2019-02-04
Attending: ORTHOPAEDIC SURGERY
Payer: COMMERCIAL

## 2019-02-04 DIAGNOSIS — M79.672 LEFT FOOT PAIN: ICD-10-CM

## 2019-02-04 PROCEDURE — 97140 MANUAL THERAPY 1/> REGIONS: CPT

## 2019-02-04 PROCEDURE — 97110 THERAPEUTIC EXERCISES: CPT

## 2019-02-04 PROCEDURE — 97112 NEUROMUSCULAR REEDUCATION: CPT

## 2019-02-04 NOTE — PROGRESS NOTES
Physical Therapy Daily Treatment Note     Name: Carmen Aguilar [facility]  Clinic Number: [id number]    Therapy Diagnosis:   Encounter Diagnosis   Name Primary?    Left foot pain      Physician: Zayra Gutierres PA-C    Visit Date: 2/4/2019    Physician Orders: PT Eval and Treat   Medical Diagnosis from Referral: Closed fracture of fifth metatarsal bone of right foot with routine healing, physeal involvement unspecified, subsequent encounter  Evaluation Date: 1/23/2019  Authorization Period Expiration: 01/17/2020  Plan of Care Expiration: 3/15/19  Visit # / Visits authorized: 5/ 15     Time In: 1635  Time Out: 1735  Total Billable Time: 40 minutes     Precautions: Standard    Subjective     Pt reports: quite a bit of activity over the weekend. Min pain today.   Response to previous treatment: good  Functional change: improved gait with walking shoe    Pain: 3/10  Location: right feet      Objective   Min edema and TTP    Carmen received therapeutic exercises to develop strength, ROM and flexibility for 20 minutes including:  Stationary bike for 10' for increased circulation, ROM, and endurance  Ankle 4 way, GTB 2x20 each  Calf stretch with towel 5x30 sec  Shuttle, 1 band, 3x20 mins of squats  B bridge 3x10  Clam 1 3x12    Carmen received neuromuscular re-education to improve muscle firing patterns for 10 including;  Fitter board circles 30x 4 ways   Cumulocity balance machine 5 mins    Carmen received the following manual therapy techniques: Joint mobilizations were applied to the: R foot/ankle for 10 minutes, including: ankle mobs     Moist heat for 10' L  Foot for increased circulation and tissue healing  Cold pack for 10' L foot for decreased circulation, edema and pain.    Home Exercises Provided and Patient Education Provided     Education provided:   - continue with HEP more consistently    Written Home Exercises Provided: Patient instructed to cont prior HEP.  Exercises were reviewed and Carmen was able to  demonstrate them prior to the end of the session.  Carmen demonstrated good  understanding of the education provided.         Assessment     TC mobility showing a good improvement each week. Minimal to no swelling at lateral foot. Single leg and balance exercises added today with excellent tolerance.     Carmen is progressing well towards her goals.   Pt prognosis is Good.     Pt will continue to benefit from skilled outpatient physical therapy to address the deficits listed in the problem list box on initial evaluation, provide pt/family education and to maximize pt's level of independence in the home and community environment.     Pt's spiritual, cultural and educational needs considered and pt agreeable to plan of care and goals.     Anticipated barriers to physical therapy: none    Goals:  Short Term Goals: 4 weeks   1. Pt will be independent with HEP in 2 weeks. (MET)  2. Pt will improve R ankle ROM by 25% to allow normalized gait pattern. (Met)  3. Pt will be able to perform SLS for 10 sec on R. (progressing, not met)     Long Term Goals: 8 weeks   1. Pt will be able to walk without surgical shoe for 10 mins with less than 2/10 pain. (progressing, not met)           2. Pt will be able to stand without surgical shoe for 30 mins with less than 2/10 pain. (progressing, not met)  3. Pt will be able to resume bike riding for exercise. (progressing, not met)    Plan     Progress standing balance exercises. Administer FOTO.     Balta Rivera, PTA, STS

## 2019-02-06 ENCOUNTER — CLINICAL SUPPORT (OUTPATIENT)
Dept: REHABILITATION | Facility: HOSPITAL | Age: 69
End: 2019-02-06
Attending: ORTHOPAEDIC SURGERY
Payer: COMMERCIAL

## 2019-02-06 DIAGNOSIS — M25.60 RANGE OF MOTION DEFICIT: ICD-10-CM

## 2019-02-06 DIAGNOSIS — M20.021 BOUTONNIERE DEFORMITY OF FINGER OF RIGHT HAND: ICD-10-CM

## 2019-02-06 DIAGNOSIS — R29.898 DECREASED GRIP STRENGTH OF RIGHT HAND: ICD-10-CM

## 2019-02-06 PROCEDURE — 97022 WHIRLPOOL THERAPY: CPT

## 2019-02-06 PROCEDURE — 97110 THERAPEUTIC EXERCISES: CPT

## 2019-02-06 NOTE — PROGRESS NOTES
"                            Occupational Therapy Daily Treatment Note     Date: 2/6/2019  Name: Carmen Khan  Clinic Number: [id number]    Therapy Diagnosis:   Encounter Diagnoses   Name Primary?   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 Pt reports that she does not wear her plastic orthotic as much because it looks dirty.  Response to previous treatment:"I'm improving"  Functional change: Able to  objects    Pain: 0/10 at best, 5/10 at worst  Location: right small finger      Objective   Observation  :Swelling,  flexion contracture of PIP      Sensation: intact   Scar / Wound: none   Edema:  Minimal   PP 5.3 CM   PIP 5.0 CM   MP 4.2 CM   DIP 4.1 CM   DP 3.8 CM         Range of Motion:         SMALL FINGER               MP  0/80              PIP 8/99              DIP 5/70                                          Manual Muscle Test:  FDP 5/5   FDS 5/5   EDM 5/5   ADM 5/5                                        Strength: (PHILL Dynamometer in lbs.), Average 3 trials, Position II               Left      Right                24         13     Carmen received the following supervised modalities after being cleared for " contradictions for 15 minutes:   -Patient received fluidotherapy to right hand for 15 minutes to increase blood flow, circulation, pain management and for tissue elasticity prior to therex.     Barabara received manual therapy after being cleared for contradictions for 5 minutes:  STM to right small finger    Carmen received therapeutic exercises for 40 minutes including:  -A/AAROM as follows: jt blocking, TGES, finger ext, finger abd/add x 10 reps ea  -towel scrunches x 10 reps  -isopheres x 3 min  -small pom pom scoops with small finger x 2 containers  -small pom poms 3 containers with yellow cp  -red putty as follows: 2' molding, 30 grasps (at home)  -3 yellow bands on hand gripper x 30 reps        Home Exercises and Education Provided     Education provided:   - Home program as listed in  Media section  - Progress towards goals: Excellent    Written Home Exercises Provided: Patient instructed to cont prior HEP and today's HEP.  Exercises were reviewed and Carmen was able to demonstrate them prior to the end of the session.  Carmen demonstrated good  understanding of the education provided.     See EMR under Patient Instructions for exercises provided 1/30/2019.    Carmen demonstrated good  understanding of the education provided.         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 Pt tolerated all activities with no complaints of pain. Advanced strengthening ex. Custom fabricated static small gutter orthotic for night use and periodically throughout the day. Pt demonstrates good understanding of wear, care and precautions.    Pt would continue to benefit from skilled OT.      Carmen is progressing well towards her goals and there are no updates to goals at this time. Pt prognosis is Good.     Pt will continue to benefit from skilled outpatient  occupational therapy to address the deficits listed in the problem list on initial evaluation provide pt/family education and to maximize pt's level of independence in the home and community environment.     Anticipated barriers to occupational therapy: none    Pt's spiritual, cultural and educational needs considered and pt agreeable to plan of care and goals.    Goals:  Long term:  ( 6 weeks)   1)  Patient to be IND with HEP and modalities for pain management. Met 1/26/2019  2)  Increase ROM 3-5 degrees to increase functional hand use for ADLs/work/leisure activities- Met 1/30/2019  3)  Decrease edema .1-.3 mm to increase joint mobility /flexibility for functional hand use. - Met 1/30/2019  4)  Assess  and set goals accordingly -Met 1/30/2019    New Goals:  1) Pt will have 0/10 pain complaints with functional use.  2) Pt will return to all ADLS using her right hand with no difficulty.      Plan   Patient to be treated by Occupational Therapy    1   times per week for   6-8                  weeks  during the certification period from   1/8/2019  to  3/8/2019    to achieve the established goals.  Treatment to include :  paraffin, fluidotherapy, manual therapy/joint mobilizations, orthotic fabrication/fitting/training,   modalities for pain management, US 3mhz, therapeutic exercises/activities,        strengthening,    scar and edema management, as well as any other treatments deemed necessary based on the patient's needs or progress.                  Discussed Plan of Care with patient: Yes  Updates/Grading for next session: Advance as tolerated.      Selam Flor, OT

## 2019-02-08 ENCOUNTER — CLINICAL SUPPORT (OUTPATIENT)
Dept: REHABILITATION | Facility: HOSPITAL | Age: 69
End: 2019-02-08
Attending: ORTHOPAEDIC SURGERY
Payer: COMMERCIAL

## 2019-02-08 DIAGNOSIS — M79.672 LEFT FOOT PAIN: ICD-10-CM

## 2019-02-08 PROCEDURE — 97110 THERAPEUTIC EXERCISES: CPT

## 2019-02-08 PROCEDURE — 97140 MANUAL THERAPY 1/> REGIONS: CPT

## 2019-02-08 NOTE — PROGRESS NOTES
Physical Therapy Daily Treatment Note     Name: Carmen Khan  Clinic Number: [id number]    Therapy Diagnosis:   Encounter Diagnosis   Name Primary?    Left foot pain      Physician: Zayra Gutierres PA-C    Visit Date: 2/8/2019    Physician Orders: PT Eval and Treat   Medical Diagnosis from Referral: Closed fracture of fifth metatarsal bone of right foot with routine healing, physeal involvement unspecified, subsequent encounter  Evaluation Date: 1/23/2019  Authorization Period Expiration: 01/17/2020  Plan of Care Expiration: 3/15/19  Visit # / Visits authorized: 6/ 15     Time In: 1240  Time Out: 1330  Total Billable Time: 40 minutes     Precautions: Standard    Subjective     Pt reports: lip of shoe boot caught the floor the other day causing bruising dorsum of foot.   Response to previous treatment: no problems reported   Compliant with HEP and RICE   Functional change: improved gait with walking shoe    Pain: 3/10  Location: right feet      Objective   Min edema and TTP  10' late for tx      Carmen received therapeutic exercises to develop strength, ROM and flexibility for 15 minutes including:  Stationary bike for 10' for increased circulation, ROM, and endurance  Ankle 4 way, GTB 30 each  Calf stretch with towel 5x30 sec  Shuttle, 1 band, 3x20 mins of squats  B bridge 3x10 np  Clam 1 3x12 np    Carmen received neuromuscular re-education to improve muscle firing patterns for 5 including;  Fitter board circles 30x 4 ways   Biodex balance machine 5 mins np    Carmen received the following manual therapy techniques: Joint mobilizations were applied to the: R foot/ankle for 10 minutes, including: ankle mobs     Moist heat for 10' L  Foot for increased circulation and tissue healing  Cold pack for 10' L foot for decreased circulation, edema and pain.    Home Exercises Provided and Patient Education Provided     Education provided:   - continue with HEP more consistently    Written Home Exercises  Provided: Patient instructed to cont prior HEP.  Exercises were reviewed and Carmen was able to demonstrate them prior to the end of the session.  Carmen demonstrated good  understanding of the education provided.         Assessment     Pt tolerating tx well. Difficulty eversion on fitter. Continue to progress as tolerated.    Carmen is progressing well towards her goals.   Pt prognosis is Good.     Pt will continue to benefit from skilled outpatient physical therapy to address the deficits listed in the problem list box on initial evaluation, provide pt/family education and to maximize pt's level of independence in the home and community environment.     Pt's spiritual, cultural and educational needs considered and pt agreeable to plan of care and goals.     Anticipated barriers to physical therapy: none    Goals:  Short Term Goals: 4 weeks   1. Pt will be independent with HEP in 2 weeks. (MET)  2. Pt will improve R ankle ROM by 25% to allow normalized gait pattern. (Met)  3. Pt will be able to perform SLS for 10 sec on R. (progressing, not met)     Long Term Goals: 8 weeks   1. Pt will be able to walk without surgical shoe for 10 mins with less than 2/10 pain. (progressing, not met)           2. Pt will be able to stand without surgical shoe for 30 mins with less than 2/10 pain. (progressing, not met)  3. Pt will be able to resume bike riding for exercise. (progressing, not met)    Plan     Progress standing balance exercises. Administer NORMAN.     Balta Rivera, PTA, STS

## 2019-02-11 ENCOUNTER — CLINICAL SUPPORT (OUTPATIENT)
Dept: REHABILITATION | Facility: HOSPITAL | Age: 69
End: 2019-02-11
Attending: ORTHOPAEDIC SURGERY
Payer: COMMERCIAL

## 2019-02-11 DIAGNOSIS — M79.672 LEFT FOOT PAIN: ICD-10-CM

## 2019-02-11 PROCEDURE — 97112 NEUROMUSCULAR REEDUCATION: CPT | Performed by: PHYSICAL THERAPIST

## 2019-02-11 PROCEDURE — 97110 THERAPEUTIC EXERCISES: CPT | Performed by: PHYSICAL THERAPIST

## 2019-02-11 PROCEDURE — 97140 MANUAL THERAPY 1/> REGIONS: CPT | Performed by: PHYSICAL THERAPIST

## 2019-02-12 NOTE — PROGRESS NOTES
Physical Therapy Daily Treatment Note     Name: Carmen Aguilar [facility]  Clinic Number: [id number]    Therapy Diagnosis:   Encounter Diagnosis   Name Primary?    Left foot pain      Physician: Zayra Gutierres PA-C    Visit Date: 2/11/2019    Physician Orders: PT Eval and Treat   Medical Diagnosis from Referral: Closed fracture of fifth metatarsal bone of right foot with routine healing, physeal involvement unspecified, subsequent encounter  Evaluation Date: 1/23/2019  Authorization Period Expiration: 01/17/2020  Plan of Care Expiration: 3/15/19  Visit # / Visits authorized: 7/ 15     Time In: 1606  Time Out: 1702  Total Billable Time: 56 minutes     Precautions: Standard    Subjective     Pt reports: soreness in her foot with walking this weekend.   Response to previous treatment: no problems reported   Compliant with HEP and RICE   Functional change: improved gait with walking shoe    Pain: 2/10  Location: right feet      Objective     Carmen received therapeutic exercises to develop strength, ROM and flexibility for 26 minutes including:  Stationary bike for 10' for increased circulation, ROM, and endurance  Calf stretch with towel 5x30 sec  Shuttle, 1 band, 3x20 mins of squats  B bridge 3x10   Clam 1 3x12   Half kneeling TC self mobilization 2x20  B calf raise on shuttle 1 band    Carmen received neuromuscular re-education to improve muscle firing patterns for 16 including;  Fitter board circles 30x 4 ways   Biodex balance machine 5 mins  Towel scrunch 10x    Carmen received the following manual therapy techniques: Joint mobilizations were applied to the: R foot/ankle for 14 minutes, including:  TC AP joint mobilizations, Gr IV  ST figure 8 mobilization  Forefoot abd/add mobilization Gr III     Home Exercises Provided and Patient Education Provided     Education provided:   - continue with HEP more consistently    Written Home Exercises Provided: Patient instructed to cont prior HEP.  Exercises were reviewed  and Carmen was able to demonstrate them prior to the end of the session.  Carmen demonstrated good  understanding of the education provided.         Assessment     Pt was educated she is progressing well and to refocus her efforts on improving DF ROM and TC mobility at home.     Carmen is progressing well towards her goals.   Pt prognosis is Good.     Pt will continue to benefit from skilled outpatient physical therapy to address the deficits listed in the problem list box on initial evaluation, provide pt/family education and to maximize pt's level of independence in the home and community environment.     Pt's spiritual, cultural and educational needs considered and pt agreeable to plan of care and goals.     Anticipated barriers to physical therapy: none    Goals:  Short Term Goals: 4 weeks   1. Pt will be independent with HEP in 2 weeks. (MET)  2. Pt will improve R ankle ROM by 25% to allow normalized gait pattern. (Met)  3. Pt will be able to perform SLS for 10 sec on R. (progressing, not met)     Long Term Goals: 8 weeks   1. Pt will be able to walk without surgical shoe for 10 mins with less than 2/10 pain. (progressing, not met)           2. Pt will be able to stand without surgical shoe for 30 mins with less than 2/10 pain. (progressing, not met)  3. Pt will be able to resume bike riding for exercise. (progressing, not met)    Plan     Progress calf strengthening as tolerated.      Gerry Solorzano, PT, DPT

## 2019-02-13 ENCOUNTER — CLINICAL SUPPORT (OUTPATIENT)
Dept: REHABILITATION | Facility: HOSPITAL | Age: 69
End: 2019-02-13
Attending: ORTHOPAEDIC SURGERY
Payer: COMMERCIAL

## 2019-02-13 DIAGNOSIS — M25.60 RANGE OF MOTION DEFICIT: ICD-10-CM

## 2019-02-13 DIAGNOSIS — M20.021 BOUTONNIERE DEFORMITY OF FINGER OF RIGHT HAND: ICD-10-CM

## 2019-02-13 DIAGNOSIS — R29.898 DECREASED GRIP STRENGTH OF RIGHT HAND: ICD-10-CM

## 2019-02-13 PROCEDURE — 97110 THERAPEUTIC EXERCISES: CPT

## 2019-02-13 PROCEDURE — 97022 WHIRLPOOL THERAPY: CPT

## 2019-02-13 NOTE — PROGRESS NOTES
"                            Occupational Therapy Daily Treatment Note     Date: 2/13/2019  Name: Carmen Khan  Clinic Number: [id number]    Therapy Diagnosis:   Encounter Diagnoses   Name Primary?   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 Pt reports that she does not wear her plastic orthotic as much because it looks dirty.  Response to previous treatment:"I'm improving"  Functional change: Able to  objects    Pain: 0/10 at best, 4/10 at worst  Location: right small finger      Objective   Observation  :Swelling,  flexion contracture of PIP      Sensation: intact   Scar / Wound: none   Edema:  Minimal   PP 5.3 CM   PIP 5.0 CM   MP 4.2 CM   DIP 4.1 CM   DP 3.8 CM         Range of Motion:         SMALL FINGER               MP  0/80              PIP 8/99              DIP 5/70                                          Manual Muscle Test:  FDP 5/5   FDS 5/5   EDM 5/5   ADM 5/5                                        Strength: (PHILL Dynamometer in lbs.), Average 3 trials, Position II               Left      Right                24         13     aCrmen received the following supervised modalities after being cleared for " contradictions for 15 minutes:   -Patient received fluidotherapy to right hand for 15 minutes to increase blood flow, circulation, pain management and for tissue elasticity prior to therex.     Barabara received manual therapy after being cleared for contradictions for 5 minutes:  STM to right small finger    Carmen received therapeutic exercises for 40 minutes including:  -A/AAROM as follows: jt blocking, TGES, finger ext, finger abd/add x 10 reps ea  -towel scrunches x 10 reps  -isopheres x 3 min  -green digi-extend x 30 reps  -small pom poms 3 containers with red cp  -red putty as follows: 2' molding, 30 grasps   -2 yellow and 1 red band on hand gripper x 30 reps        Home Exercises and Education Provided     Education provided:   - Home program as listed in  Media section  - Progress towards goals: Excellent    Written Home Exercises Provided: Patient instructed to cont prior HEP and today's HEP.  Exercises were reviewed and Carmen was able to demonstrate them prior to the end of the session.  Carmen demonstrated good  understanding of the education provided.     See EMR under Patient Instructions for exercises provided 1/30/2019.    Carmen demonstrated good  understanding of the education provided.         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 Pt tolerated all activities with no complaints of pain. Advanced strengthening exercises. Pt's AROM of her right small finger continues to improve. Pain has decreased and her strength continues to improve as well.    Pt would continue to benefit from skilled OT.      Carmen is progressing well towards her goals and there are no updates to goals at this time. Pt prognosis is Good.     Pt will continue to benefit from skilled outpatient occupational therapy to address the deficits listed in the problem  list on initial evaluation provide pt/family education and to maximize pt's level of independence in the home and community environment.     Anticipated barriers to occupational therapy: none    Pt's spiritual, cultural and educational needs considered and pt agreeable to plan of care and goals.    Goals:  Long term:  ( 6 weeks)   1)  Patient to be IND with HEP and modalities for pain management. Met 1/26/2019  2)  Increase ROM 3-5 degrees to increase functional hand use for ADLs/work/leisure activities- Met 1/30/2019  3)  Decrease edema .1-.3 mm to increase joint mobility /flexibility for functional hand use. - Met 1/30/2019  4)  Assess  and set goals accordingly -Met 1/30/2019    New Goals:  1) Pt will have 0/10 pain complaints with functional use.-progressing  2) Pt will return to all ADLS using her right hand with no difficulty.-progressing      Plan: Consult with physician. Recommend cont OT for 1 more month.   Patient to be treated by Occupational Therapy    1   times per week for   6-8                  weeks  during the certification period from   1/8/2019  to  3/8/2019    to achieve the established goals.  Treatment to include :  paraffin, fluidotherapy, manual therapy/joint mobilizations, orthotic fabrication/fitting/training,   modalities for pain management, US 3mhz, therapeutic exercises/activities,        strengthening,    scar and edema management, as well as any other treatments deemed necessary based on the patient's needs or progress.                  Discussed Plan of Care with patient: Yes  Updates/Grading for next session: Advance as tolerated.      Selam Flor, OT

## 2019-02-14 ENCOUNTER — OFFICE VISIT (OUTPATIENT)
Dept: ORTHOPEDICS | Facility: CLINIC | Age: 69
End: 2019-02-14
Payer: COMMERCIAL

## 2019-02-14 ENCOUNTER — HOSPITAL ENCOUNTER (OUTPATIENT)
Dept: RADIOLOGY | Facility: HOSPITAL | Age: 69
Discharge: HOME | End: 2019-02-14
Attending: PHYSICIAN ASSISTANT
Payer: COMMERCIAL

## 2019-02-14 VITALS — HEIGHT: 63 IN | BODY MASS INDEX: 30.51 KG/M2 | WEIGHT: 172.19 LBS

## 2019-02-14 DIAGNOSIS — R52 PAIN: ICD-10-CM

## 2019-02-14 DIAGNOSIS — S92.351D CLOSED FRACTURE OF FIFTH METATARSAL BONE OF RIGHT FOOT WITH ROUTINE HEALING, PHYSEAL INVOLVEMENT UNSPECIFIED, SUBSEQUENT ENCOUNTER: Primary | ICD-10-CM

## 2019-02-14 PROCEDURE — 1101F PR PT FALLS ASSESS DOC 0-1 FALLS W/OUT INJ PAST YR: ICD-10-PCS | Mod: CPTII,S$GLB,, | Performed by: PHYSICIAN ASSISTANT

## 2019-02-14 PROCEDURE — 73630 X-RAY EXAM OF FOOT: CPT | Mod: 26,RT,, | Performed by: RADIOLOGY

## 2019-02-14 PROCEDURE — 99213 OFFICE O/P EST LOW 20 MIN: CPT | Mod: S$GLB,,, | Performed by: PHYSICIAN ASSISTANT

## 2019-02-14 PROCEDURE — 99999 PR PBB SHADOW E&M-EST. PATIENT-LVL III: ICD-10-PCS | Mod: PBBFAC,,, | Performed by: PHYSICIAN ASSISTANT

## 2019-02-14 PROCEDURE — 99999 PR PBB SHADOW E&M-EST. PATIENT-LVL III: CPT | Mod: PBBFAC,,, | Performed by: PHYSICIAN ASSISTANT

## 2019-02-14 PROCEDURE — 99213 PR OFFICE/OUTPT VISIT, EST, LEVL III, 20-29 MIN: ICD-10-PCS | Mod: S$GLB,,, | Performed by: PHYSICIAN ASSISTANT

## 2019-02-14 PROCEDURE — 1101F PT FALLS ASSESS-DOCD LE1/YR: CPT | Mod: CPTII,S$GLB,, | Performed by: PHYSICIAN ASSISTANT

## 2019-02-14 PROCEDURE — 73630 X-RAY EXAM OF FOOT: CPT | Mod: TC,RT

## 2019-02-14 PROCEDURE — 73630 XR FOOT COMPLETE 3 VIEW RIGHT: ICD-10-PCS | Mod: 26,RT,, | Performed by: RADIOLOGY

## 2019-02-14 NOTE — PROGRESS NOTES
Subjective:      Patient ID: Carmen Khan is a 68 y.o. female.    Chief Complaint: No chief complaint on file.    HPI  Patient returns for f/u for her right 5th MT fracture that occurred on 11/28/18. She has been WBAT in the post-op shoe since last visit. She has been weaning away from the knee scooter. She hasn't used it since earlier this week. She hardly takes the tramadol. She is doing PT/HEP and that is going well. She has some soreness the next day after therapy. Still has some swelling. Her pain has gotten better since last visit. Pain is 2-3/10.   Review of Systems   Constitution: Negative for chills, fever and night sweats.   Cardiovascular: Negative for chest pain.   Respiratory: Negative for cough and shortness of breath.    Hematologic/Lymphatic: Does not bruise/bleed easily.   Skin: Negative for color change.   Gastrointestinal: Negative for heartburn.   Genitourinary: Negative for dysuria.   Neurological: Negative for numbness and paresthesias.   Psychiatric/Behavioral: Negative for altered mental status.   Allergic/Immunologic: Negative for persistent infections.         Objective:            General    Vitals reviewed.  Constitutional: She is oriented to person, place, and time. She appears well-developed and well-nourished.   Cardiovascular: Normal rate.    Neurological: She is alert and oriented to person, place, and time.         Right Ankle/Foot Exam     Inspection   Erythema: absent    Tenderness   The patient is tender to palpation of the fifth metatarsal base.    Range of Motion   Ankle Joint   Dorsiflexion: normal   Plantar flexion: normal   Subtalar Joint   Inversion: abnormal   Eversion: abnormal     Other   Sensation: normal    Comments:  Mild TTP. Mild swelling at foot.         Vascular Exam     Right Pulses  Dorsalis Pedis:      2+              X-ray: ordered and reviewed by myself. 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        Assessment:       Encounter  Diagnoses   Name Primary?    Pain     Closed fracture of fifth metatarsal bone of right foot with routine healing, physeal involvement unspecified, subsequent encounter Yes          Plan:       Patient's foot is getting better. She will continue PT/HEP. Tylenol as needed. She will work on weaning out of the post-op shoe into a good supportive shoe. RTC in 4 weeks for repeat x-ray.

## 2019-02-15 ENCOUNTER — CLINICAL SUPPORT (OUTPATIENT)
Dept: REHABILITATION | Facility: HOSPITAL | Age: 69
End: 2019-02-15
Attending: ORTHOPAEDIC SURGERY
Payer: COMMERCIAL

## 2019-02-15 DIAGNOSIS — M79.672 LEFT FOOT PAIN: ICD-10-CM

## 2019-02-15 PROCEDURE — 97110 THERAPEUTIC EXERCISES: CPT | Performed by: PHYSICAL THERAPIST

## 2019-02-15 PROCEDURE — 97140 MANUAL THERAPY 1/> REGIONS: CPT | Performed by: PHYSICAL THERAPIST

## 2019-02-15 NOTE — PROGRESS NOTES
Physical Therapy Daily Treatment Note     Name: Carmen Aguilar [facility]  Clinic Number: [id number]    Therapy Diagnosis:   Encounter Diagnosis   Name Primary?    Left foot pain      Physician: Zayra Gutierres PA-C    Visit Date: 2/15/2019    Physician Orders: PT Eval and Treat   Medical Diagnosis from Referral: Closed fracture of fifth metatarsal bone of right foot with routine healing, physeal involvement unspecified, subsequent encounter  Evaluation Date: 1/23/2019  Authorization Period Expiration: 01/17/2020  Plan of Care Expiration: 3/15/19  Visit # / Visits authorized: 8/ 15     Time In: 1414  Time Out: 1511  Total Billable Time: 57 minutes     Precautions: Standard    Subjective     Pt reports: she saw her doctor yesterday and she was happy with her progress. She was educated to use walking shoe for long distances.      Response to previous treatment: no problems reported   Compliant with HEP   Functional change: improved gait with walking in normal shoes    Pain: 2/10  Location: right feet      Objective     Carmen received therapeutic exercises to develop strength, ROM and flexibility for 42 minutes including:  Stationary bike for 10' for increased circulation, ROM, and endurance  Calf stretch with towel 5x30 sec  Shuttle, 1 band, 3x20 mins of squats  B bridge 3x10   Clam 1 3x12   Half kneeling TC self mobilization 2x20  B calf raise on shuttle 1 band, 3x20  Resisted PF GTB with feet elevated 5 mins;       Carmen received the following manual therapy techniques: Joint mobilizations were applied to the: R foot/ankle for 14 minutes, including:  TC AP joint mobilizations, Gr IV  ST figure 8 mobilization  Forefoot abd/add mobilization Gr III   STM to improve edema    Home Exercises Provided and Patient Education Provided     Education provided:   - continue with HEP more consistently    Written Home Exercises Provided: Patient instructed to cont prior HEP.  Exercises were reviewed and Carmen was able to  demonstrate them prior to the end of the session.  Carmen demonstrated good  understanding of the education provided.         Assessment     Pt presents with significant increased in pitting edema in L foot likely due to increased weight bearing. Pt was educated once again to purchase a compression garment to improve swelling and elevate foot when possible.     Carmen is progressing well towards her goals.   Pt prognosis is Good.     Pt will continue to benefit from skilled outpatient physical therapy to address the deficits listed in the problem list box on initial evaluation, provide pt/family education and to maximize pt's level of independence in the home and community environment.     Pt's spiritual, cultural and educational needs considered and pt agreeable to plan of care and goals.     Anticipated barriers to physical therapy: none    Goals:  Short Term Goals: 4 weeks   1. Pt will be independent with HEP in 2 weeks. (MET)  2. Pt will improve R ankle ROM by 25% to allow normalized gait pattern. (Met)  3. Pt will be able to perform SLS for 10 sec on R. (progressing, not met)     Long Term Goals: 8 weeks   1. Pt will be able to walk without surgical shoe for 10 mins with less than 2/10 pain. (progressing, not met)           2. Pt will be able to stand without surgical shoe for 30 mins with less than 2/10 pain. (progressing, not met)  3. Pt will be able to resume bike riding for exercise. (progressing, not met)    Plan     Progress calf strengthening as tolerated.  Manage edema as much as possible.     Gerry Solorzano, PT, DPT

## 2019-02-18 ENCOUNTER — CLINICAL SUPPORT (OUTPATIENT)
Dept: REHABILITATION | Facility: HOSPITAL | Age: 69
End: 2019-02-18
Attending: ORTHOPAEDIC SURGERY
Payer: COMMERCIAL

## 2019-02-18 DIAGNOSIS — M79.672 LEFT FOOT PAIN: ICD-10-CM

## 2019-02-18 PROCEDURE — 97110 THERAPEUTIC EXERCISES: CPT | Performed by: PHYSICAL THERAPIST

## 2019-02-18 PROCEDURE — 97140 MANUAL THERAPY 1/> REGIONS: CPT | Performed by: PHYSICAL THERAPIST

## 2019-02-18 PROCEDURE — 97112 NEUROMUSCULAR REEDUCATION: CPT | Performed by: PHYSICAL THERAPIST

## 2019-02-19 ENCOUNTER — TELEPHONE (OUTPATIENT)
Dept: ORTHOPEDICS | Facility: CLINIC | Age: 69
End: 2019-02-19

## 2019-02-19 NOTE — TELEPHONE ENCOUNTER
----- Message from Ayaan Berman sent at 2/19/2019  9:45 AM CST -----  Contact: CELESTINA REN [[id number]]  Name of Who is Calling: CELESTINA REN [[id number]]      What is the request in detail: Patient would like to speak with staff in regards to rescheduling her appointment with Dr. Rojas. Patient was offered NP she refused, next available is in April states that is too far. Please call      Can the clinic reply by [facility]: yes      What Number to Call Back if not in [facility]: [phone] (office) or [phone]

## 2019-02-20 NOTE — PROGRESS NOTES
Physical Therapy Daily Treatment Note     Name: Carmen Aguilar Khan  Clinic Number: [id number]    Therapy Diagnosis:   Encounter Diagnosis   Name Primary?    Left foot pain      Physician: Zayra Gutierres PA-C    Visit Date: 2/18/2019    Physician Orders: PT Eval and Treat   Medical Diagnosis from Referral: Closed fracture of fifth metatarsal bone of right foot with routine healing, physeal involvement unspecified, subsequent encounter  Evaluation Date: 1/23/2019  Authorization Period Expiration: 01/17/2020  Plan of Care Expiration: 3/15/19  Visit # / Visits authorized: 9/ 15     Time In: 1603  Time Out: 1701  Total Billable Time: 58 minutes     Precautions: Standard    Subjective     Pt reports: She cannot find a compression garment at any stores. She has worn her surgical shoe for long distances and the swelling seems to be better.     Response to previous treatment: no problems reported   Compliant with HEP   Functional change: improved gait with walking in normal shoes    Pain: 1/10  Location: right feet      Objective     Carmen received therapeutic exercises to develop strength, ROM and flexibility for 31 minutes including:  Stationary bike for 10' for increased circulation, ROM, and endurance  Calf stretch with towel 5x30 sec  B bridge 3x10   Clam 1 3x12   Half kneeling TC self mobilization 2x20  B calf raise on shuttle 1 band, 3x20  Squat at edge of mat, 3x15    Carmen received the following manual therapy techniques: Joint mobilizations were applied to the: R foot/ankle for 14 minutes, including:  TC AP joint mobilizations, Gr IV  ST figure 8 mobilization  Forefoot abd/add mobilization Gr III   STM to improve edema    Carmen received the following neuromuscular re-education to improve balance and proprioception for 12 minutes:   Steam boats on Airex, OTB 2x10 abduction and extension each;  SLS on Airex 5x30 sec B    Home Exercises Provided and Patient Education Provided     Education provided:   -  continue with HEP more consistently    Written Home Exercises Provided: Patient instructed to cont prior HEP.  Exercises were reviewed and Carmen was able to demonstrate them prior to the end of the session.  Carmen demonstrated good  understanding of the education provided.         Assessment   L 5th metatarsal fracture  Pt presented with improved edema as compared to last visit. Balance exercises were progressed today with moderate to significant challenge. Resisted plantarflexion tolerance is improving.     Carmen is progressing well towards her goals.   Pt prognosis is Good.     Pt will continue to benefit from skilled outpatient physical therapy to address the deficits listed in the problem list box on initial evaluation, provide pt/family education and to maximize pt's level of independence in the home and community environment.     Pt's spiritual, cultural and educational needs considered and pt agreeable to plan of care and goals.     Anticipated barriers to physical therapy: none    Goals:  Short Term Goals: 4 weeks   1. Pt will be independent with HEP in 2 weeks. (MET)  2. Pt will improve R ankle ROM by 25% to allow normalized gait pattern. (Met)  3. Pt will be able to perform SLS for 10 sec on R. (progressing, not met)     Long Term Goals: 8 weeks   1. Pt will be able to walk without surgical shoe for 10 mins with less than 2/10 pain. (Met)           2. Pt will be able to stand without surgical shoe for 30 mins with less than 2/10 pain. (progressing, not met)  3. Pt will be able to resume bike riding for exercise. (progressing, not met)    Plan     Progress calf strengthening as tolerated, functional movements, such as squatting, step ups/downs.    Gerry Solorzano, PT, DPT

## 2019-02-22 ENCOUNTER — CLINICAL SUPPORT (OUTPATIENT)
Dept: REHABILITATION | Facility: HOSPITAL | Age: 69
End: 2019-02-22
Attending: ORTHOPAEDIC SURGERY
Payer: COMMERCIAL

## 2019-02-22 DIAGNOSIS — M79.672 LEFT FOOT PAIN: ICD-10-CM

## 2019-02-22 PROCEDURE — 97112 NEUROMUSCULAR REEDUCATION: CPT | Performed by: PHYSICAL THERAPIST

## 2019-02-22 PROCEDURE — 97110 THERAPEUTIC EXERCISES: CPT | Performed by: PHYSICAL THERAPIST

## 2019-02-22 PROCEDURE — 97140 MANUAL THERAPY 1/> REGIONS: CPT | Performed by: PHYSICAL THERAPIST

## 2019-02-25 NOTE — PROGRESS NOTES
Physical Therapy Daily Treatment Note     Name: Carmen Aguilar Khan  Clinic Number: [id number]    Therapy Diagnosis:   Encounter Diagnosis   Name Primary?    Left foot pain      Physician: Zayra Gutierres PA-C    Visit Date: 2/22/2019    Physician Orders: PT Eval and Treat   Medical Diagnosis from Referral: Closed fracture of fifth metatarsal bone of right foot with routine healing, physeal involvement unspecified, subsequent encounter  Evaluation Date: 1/23/2019  Authorization Period Expiration: 01/17/2020  Plan of Care Expiration: 3/15/19  Visit # / Visits authorized: 10/ 15     Time In: 1514  Time Out: 1611  Total Billable Time: 57 minutes     Precautions: Standard    Subjective     Pt reports: she found a compression garment at [employer] and it has really helped.      Response to previous treatment: no problems reported   Compliant with HEP   Functional change: improved gait with walking in normal shoes    Pain: 0/10  Location: right feet      Objective     Carmen received therapeutic exercises to develop strength, ROM and flexibility for 31 minutes including:  Stationary bike for 10' for increased circulation, ROM, and endurance  Calf stretch with towel 5x30 sec  B bridge 3x10   Clam 1 3x12   Half kneeling TC self mobilization 2x20  B calf raise at edge of mat, 3x20  Squat at edge of mat, 3x15    Carmen received the following manual therapy techniques: Joint mobilizations were applied to the: R foot/ankle for 14 minutes, including:  TC AP joint mobilizations, Gr IV  ST figure 8 mobilization  Forefoot abd/add mobilization Gr III   STM to improve edema    Carmen received the following neuromuscular re-education to improve balance and proprioception for 14 minutes:   Steam boats on Airex, OTB 2x10 abduction and extension each;  SLS on Airex 5x30 sec B;  Biodex game 5 mins;     Home Exercises Provided and Patient Education Provided     Education provided:   - continue with HEP more consistently    Written Home  Exercises Provided: Patient instructed to cont prior HEP.  Exercises were reviewed and Carmen was able to demonstrate them prior to the end of the session.  Carmen demonstrated good  understanding of the education provided.         Assessment   L 5th metatarsal fracture  Pt was able to perform full body calf raises today for the first time suggesting improved calf function. Continue with single leg balance and calf raises as tolerated.     Carmen is progressing well towards her goals.   Pt prognosis is Good.     Pt will continue to benefit from skilled outpatient physical therapy to address the deficits listed in the problem list box on initial evaluation, provide pt/family education and to maximize pt's level of independence in the home and community environment.     Pt's spiritual, cultural and educational needs considered and pt agreeable to plan of care and goals.     Anticipated barriers to physical therapy: none    Goals:  Short Term Goals: 4 weeks   1. Pt will be independent with HEP in 2 weeks. (MET)  2. Pt will improve R ankle ROM by 25% to allow normalized gait pattern. (Met)  3. Pt will be able to perform SLS for 10 sec on R. (progressing, not met)     Long Term Goals: 8 weeks   1. Pt will be able to walk without surgical shoe for 10 mins with less than 2/10 pain. (Met)           2. Pt will be able to stand without surgical shoe for 30 mins with less than 2/10 pain. (progressing, not met)  3. Pt will be able to resume bike riding for exercise. (progressing, not met)    Plan     Progress calf strengthening as tolerated, functional movements, such as squatting, step ups/downs.    Gerry Solorzano, PT, DPT

## 2019-02-27 ENCOUNTER — CLINICAL SUPPORT (OUTPATIENT)
Dept: REHABILITATION | Facility: HOSPITAL | Age: 69
End: 2019-02-27
Attending: ORTHOPAEDIC SURGERY
Payer: COMMERCIAL

## 2019-02-27 DIAGNOSIS — R29.898 DECREASED GRIP STRENGTH OF RIGHT HAND: ICD-10-CM

## 2019-02-27 DIAGNOSIS — M20.021 BOUTONNIERE DEFORMITY OF FINGER OF RIGHT HAND: ICD-10-CM

## 2019-02-27 DIAGNOSIS — M25.60 RANGE OF MOTION DEFICIT: ICD-10-CM

## 2019-02-27 PROCEDURE — 97022 WHIRLPOOL THERAPY: CPT

## 2019-02-27 PROCEDURE — 97110 THERAPEUTIC EXERCISES: CPT

## 2019-02-27 NOTE — PROGRESS NOTES
"                            Occupational Therapy Daily Treatment Note     Date: 2/27/2019  Name: Carmen Khan  Clinic Number: [id number]    Therapy Diagnosis:   Encounter Diagnoses   Name Primary?   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 Pt reports that she does not wear her plastic orthotic as much because it looks dirty.  Response to previous treatment:"I'm improving"  Functional change: Able to  objects    Pain: 0/10 at best, 4/10 at worst  Location: right small finger      Objective   Observation  :Swelling,  flexion contracture of PIP      Sensation: intact   Scar / Wound: none   Edema:  Minimal   PP 5.3 CM   PIP 5.0 CM   MP 4.2 CM   DIP 4.1 CM   DP 3.8 CM         Range of Motion:         SMALL FINGER               MP  0/80              PIP 8/99              DIP 5/70                                          Manual Muscle Test:  FDP 5/5   FDS 5/5   EDM 5/5   ADM 5/5                                        Strength: (PHILL Dynamometer in lbs.), Average 3 trials, Position II               Left      Right                24         13     Carmen received the following supervised modalities after being cleared for " contradictions for 10 minutes:   -Patient received fluidotherapy to right hand for 15 minutes to increase blood flow, circulation, pain management and for tissue elasticity prior to therex.     Kdra received manual therapy after being cleared for contradictions for 5 minutes:  STM to right small finger    Carmen received therapeutic exercises for 40 minutes including:  -A/AAROM as follows: jt blocking, TGES, finger ext, finger abd/add x 10 reps ea  -towel scrunches x 10 reps  -isopheres x 3 min  -green digi-extend x 30 reps  -small pom poms 3 containers with red cp  -red putty as follows: 2' molding, 30 grasps   -2 yellow and 1 red band on hand gripper x 30 reps        Home Exercises and Education Provided     Education provided:   - Home program as listed in  Media section  - Progress towards goals: Excellent    Written Home Exercises Provided: Patient instructed to cont prior HEP and today's HEP.  Exercises were reviewed and Carmen was able to demonstrate them prior to the end of the session.  Carmen demonstrated good  understanding of the education provided.     See EMR under Patient Instructions for exercises provided 1/30/2019.    Carmen demonstrated good  understanding of the education provided.         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 Pt tolerated all activities with no complaints of pain. Advanced strengthening exercises. Pt's AROM of her right small finger continues to improve. Pain has decreased and her strength continues to improve as well.    Pt would continue to benefit from skilled OT.      Carmen is progressing well towards her goals and there are no updates to goals at this time. Pt prognosis is Good.     Pt will continue to benefit from skilled outpatient occupational therapy to address the deficits listed in the problem  list on initial evaluation provide pt/family education and to maximize pt's level of independence in the home and community environment.     Anticipated barriers to occupational therapy: none    Pt's spiritual, cultural and educational needs considered and pt agreeable to plan of care and goals.    Goals:  Long term:  ( 6 weeks)   1)  Patient to be IND with HEP and modalities for pain management. Met 1/26/2019  2)  Increase ROM 3-5 degrees to increase functional hand use for ADLs/work/leisure activities- Met 1/30/2019  3)  Decrease edema .1-.3 mm to increase joint mobility /flexibility for functional hand use. - Met 1/30/2019  4)  Assess  and set goals accordingly -Met 1/30/2019    New Goals:  1) Pt will have 0/10 pain complaints with functional use.-progressing  2) Pt will return to all ADLS using her right hand with no difficulty.-progressing      Plan: Consult with physician. Recommend cont OT for 1 more month.   Patient to be treated by Occupational Therapy    1   times per week for   6-8                  weeks  during the certification period from   1/8/2019  to  3/8/2019    to achieve the established goals.  Treatment to include :  paraffin, fluidotherapy, manual therapy/joint mobilizations, orthotic fabrication/fitting/training,   modalities for pain management, US 3mhz, therapeutic exercises/activities,        strengthening,    scar and edema management, as well as any other treatments deemed necessary based on the patient's needs or progress.                  Discussed Plan of Care with patient: Yes  Updates/Grading for next session: Advance as tolerated.      Leni Sutton, OT

## 2019-03-06 ENCOUNTER — CLINICAL SUPPORT (OUTPATIENT)
Dept: REHABILITATION | Facility: HOSPITAL | Age: 69
End: 2019-03-06
Attending: ORTHOPAEDIC SURGERY
Payer: COMMERCIAL

## 2019-03-06 DIAGNOSIS — M25.60 RANGE OF MOTION DEFICIT: ICD-10-CM

## 2019-03-06 DIAGNOSIS — M20.021 BOUTONNIERE DEFORMITY OF FINGER OF RIGHT HAND: ICD-10-CM

## 2019-03-06 DIAGNOSIS — R29.898 DECREASED GRIP STRENGTH OF RIGHT HAND: ICD-10-CM

## 2019-03-06 PROCEDURE — 97022 WHIRLPOOL THERAPY: CPT

## 2019-03-06 PROCEDURE — 97110 THERAPEUTIC EXERCISES: CPT

## 2019-03-06 NOTE — PROGRESS NOTES
"                            Occupational Therapy Re-evaluation Note     Date: 3/6/2019  Name: Carmen Khan  Clinic Number: [id number]    Therapy Diagnosis:   Encounter Diagnoses   Name Primary?   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   Response to previous treatment:"I'm improving"  Functional change: Able to  objects    Pain: 0/10 at best, 4/10 at worst  Location: right small finger      Objective   Observation  :Swelling,  flexion contracture of PIP      Sensation: intact   Scar / Wound: none   Edema:  Minimal   PP 5.3 CM   PIP 5.0 CM   MP 4.2 CM   DIP 4.1 CM   DP 3.8 CM         Range of Motion:         SMALL FINGER               MP  0/80              PIP 8/99              DIP 5/70                                          Manual Muscle Test:  FDP 5/5   FDS 5/5   EDM 5/5   ADM 5/5                                        Strength: (PHILL Dynamometer in lbs.), Average 3 trials, Position II               Left      Right                24         13     Carmen received the following supervised modalities after being cleared for contradictions for 10 minutes:   -Patient received fluidotherapy to right hand for 15 minutes to increase " blood flow, circulation, pain management and for tissue elasticity prior to therex.     Elvia received manual therapy after being cleared for contradictions for 5 minutes:  STM to right small finger    Carmen received therapeutic exercises for 40 minutes including:  -A/AAROM as follows: jt blocking, TGES, finger ext, finger abd/add x 10 reps ea  -towel scrunches x 10 reps  -isopheres x 3 min  -green digi-extend x 30 reps  -large pom poms 3 containers with green cp  -red putty as follows: 2' molding, 30 grasps   -3 yellow and 1 red band on hand gripper x 30 reps        Home Exercises and Education Provided     Education provided:   - Home program as listed in  Media section  - Progress towards goals: Excellent    Written Home Exercises Provided: Patient instructed to cont prior HEP and today's HEP.  Exercises were reviewed and Carmen was able to demonstrate them prior to the end of the session.  Carmen demonstrated good  understanding of the education provided.     See EMR under Patient Instructions for exercises provided 1/30/2019.    Carmen demonstrated good  understanding of the education provided.         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 Pt tolerated all activities with no complaints of pain.     Pt would continue to benefit from skilled OT.      Carmen is progressing well towards her goals and there are no updates to goals at this time. Pt prognosis is Good.     Pt will continue to benefit from skilled outpatient occupational therapy to address the deficits listed in the problem list on initial evaluation provide pt/family education and to maximize pt's level of independence in the home and community environment.     Anticipated barriers to occupational therapy: none    Pt's spiritual, cultural and educational needs considered and pt  agreeable to plan of care and goals.    Goals:  Long term:  ( 6 weeks)   1)  Patient to be IND with HEP and modalities for pain management. Met 1/26/2019  2)  Increase ROM 3-5 degrees to increase functional hand use for ADLs/work/leisure activities- Met 1/30/2019  3)  Decrease edema .1-.3 mm to increase joint mobility /flexibility for functional hand use. - Met 1/30/2019  4)  Assess  and set goals accordingly -Met 1/30/2019    New Goals:  1) Pt will have 0/10 pain complaints with functional use.-progressing  2) Pt will return to all ADLS using her right hand with no difficulty.-progressing      Plan: Cont with OT   Patient to be treated by Occupational Therapy    1   times per week for   6-8                  weeks  during the certification period from   3/6/2019  to  4/29/2019    to achieve the established goals.  Treatment to include :  paraffin, fluidotherapy, manual therapy/joint mobilizations, orthotic fabrication/fitting/training,   modalities for pain management, US 3mhz, therapeutic exercises/activities,        strengthening,    scar and edema management, as well as any other treatments deemed necessary based on the patient's needs or progress.                  Discussed Plan of Care with patient: Yes  Updates/Grading for next session: Advance as tolerated.      Selam Flor, OT

## 2019-03-06 NOTE — PLAN OF CARE
"                          Occupational Therapy Re-evaluation Note     Date: 3/6/2019  Name: Carmen Khan  Clinic Number: [id number]    Therapy Diagnosis:   Encounter Diagnoses   Name Primary?   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   Response to previous treatment:"I'm improving"  Functional change: Able to  objects    Pain: 0/10 at best, 4/10 at worst  Location: right small finger      Objective   Observation  :Swelling,  flexion contracture of PIP      Sensation: intact   Scar / Wound: none   Edema:  Minimal   PP 5.3 CM   PIP 5.0 CM   MP 4.2 CM   DIP 4.1 CM   DP 3.8 CM         Range of Motion:         SMALL FINGER               MP  0/80              PIP 8/99              DIP 5/70                                          Manual Muscle Test:  FDP 5/5   FDS 5/5   EDM 5/5   ADM 5/5                                        Strength: (PHILL Dynamometer in lbs.), Average 3 trials, Position II               Left      Right                24         13     Carmen received the following supervised modalities after being cleared for contradictions for 10 minutes:   -Patient received fluidotherapy to right hand for 15 minutes to increase blood " flow, circulation, pain management and for tissue elasticity prior to therex.     Elvia received manual therapy after being cleared for contradictions for 5 minutes:  STM to right small finger    Carmen received therapeutic exercises for 40 minutes including:  -A/AAROM as follows: jt blocking, TGES, finger ext, finger abd/add x 10 reps ea  -towel scrunches x 10 reps  -isopheres x 3 min  -green digi-extend x 30 reps  -large pom poms 3 containers with green cp  -red putty as follows: 2' molding, 30 grasps   -3 yellow and 1 red band on hand gripper x 30 reps        Home Exercises and Education Provided     Education provided:   - Home program as listed in  Media section  - Progress towards goals: Excellent    Written Home Exercises Provided: Patient instructed to cont prior HEP and today's HEP.  Exercises were reviewed and Carmen was able to demonstrate them prior to the end of the session.  Carmen demonstrated good  understanding of the education provided.     See EMR under Patient Instructions for exercises provided 1/30/2019.    Carmen demonstrated good  understanding of the education provided.         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 Pt tolerated all activities with no complaints of pain.     Pt would continue to benefit from skilled OT.      Carmen is progressing well towards her goals and there are no updates to goals at this time. Pt prognosis is Good.     Pt will continue to benefit from skilled outpatient occupational therapy to address the deficits listed in the problem list on initial evaluation provide pt/family education and to maximize pt's level of independence in the home and community environment.     Anticipated barriers to occupational therapy: none    Pt's spiritual, cultural and educational needs considered and pt  agreeable to plan of care and goals.    Goals:  Long term:  ( 6 weeks)   1)  Patient to be IND with HEP and modalities for pain management. Met 1/26/2019  2)  Increase ROM 3-5 degrees to increase functional hand use for ADLs/work/leisure activities- Met 1/30/2019  3)  Decrease edema .1-.3 mm to increase joint mobility /flexibility for functional hand use. - Met 1/30/2019  4)  Assess  and set goals accordingly -Met 1/30/2019    New Goals:  1) Pt will have 0/10 pain complaints with functional use.-progressing  2) Pt will return to all ADLS using her right hand with no difficulty.-progressing      Plan: Cont with OT   Patient to be treated by Occupational Therapy    1   times per week for   6-8                  weeks  during the certification period from   3/6/2019  to  4/29/2019    to achieve the established goals.  Treatment to include :  paraffin, fluidotherapy, manual therapy/joint mobilizations, orthotic fabrication/fitting/training,   modalities for pain management, US 3mhz, therapeutic exercises/activities,        strengthening,    scar and edema management, as well as any other treatments deemed necessary based on the patient's needs or progress.                  Discussed Plan of Care with patient: Yes  Updates/Grading for next session: Advance as tolerated.      Selam Flor OT     I,________________________________, certify the above occupational therapy services are deemed medically necessary for the certification period listed above.

## 2019-03-11 ENCOUNTER — CLINICAL SUPPORT (OUTPATIENT)
Dept: REHABILITATION | Facility: HOSPITAL | Age: 69
End: 2019-03-11
Attending: ORTHOPAEDIC SURGERY
Payer: COMMERCIAL

## 2019-03-11 ENCOUNTER — LAB VISIT (OUTPATIENT)
Dept: LAB | Facility: HOSPITAL | Age: 69
End: 2019-03-11
Attending: INTERNAL MEDICINE
Payer: COMMERCIAL

## 2019-03-11 DIAGNOSIS — Z12.11 COLON CANCER SCREENING: ICD-10-CM

## 2019-03-11 DIAGNOSIS — M79.672 LEFT FOOT PAIN: ICD-10-CM

## 2019-03-11 PROCEDURE — 97140 MANUAL THERAPY 1/> REGIONS: CPT | Performed by: PHYSICAL THERAPIST

## 2019-03-11 PROCEDURE — 82274 ASSAY TEST FOR BLOOD FECAL: CPT

## 2019-03-11 PROCEDURE — 97110 THERAPEUTIC EXERCISES: CPT | Performed by: PHYSICAL THERAPIST

## 2019-03-12 LAB — HEMOCCULT STL QL IA: NEGATIVE

## 2019-03-12 NOTE — PROGRESS NOTES
Physical Therapy Daily Treatment Note     Name: Carmen Angelesell  Clinic Number: [id number]    Therapy Diagnosis:   Encounter Diagnosis   Name Primary?    Left foot pain      Physician: Zayra Gutierres PA-C    Visit Date: 3/11/2019    Physician Orders: PT Eval and Treat   Medical Diagnosis from Referral: Closed fracture of fifth metatarsal bone of right foot with routine healing, physeal involvement unspecified, subsequent encounter  Evaluation Date: 1/23/2019  Authorization Period Expiration: 01/17/2020  Plan of Care Expiration: 3/15/19  Visit # / Visits authorized: 10/ 15     Time In: 1514  Time Out: 1611  Total Billable Time: 57 minutes     Precautions: Standard    Subjective     Pt reports: She is back to wearing a normal shoe and walking normally for work. Foot gets sore by the end of the day; however.     Response to previous treatment: no problems reported   Compliant with HEP   Functional change: improved gait with walking in normal shoes    Pain: 2/10  Location: right feet      Objective     Observation: mild edema noted      Active Range of Motion:   Ankle Right Left   DF (knee extended) 5 5   DF (knee bent) 6 8   Plantarflexion 60 60   Inversion 20 25   Eversion 10 15      Strength:  Ankle Right Left   Dorsiflexion 4/5 4/5   Plantarflexion 3/5 3+/5   Inversion 4/5 4/5   Eversion 4/5 4/5      Joint Mobility: decreased TC mobility R (improving)  Decreased ST mobility, medial and lateral R (improving)  Decreased R cuboid mobility (normalized)     Palpation: TTP along lateral aspect R foot (fracture site) - improving     Sensation: normal     Flexibility: moderate loss of R calf flexibility      Functional Tests:   SLS EO: 5 sec R, 8 sec on L  SLS EC: not tested  Double leg squat: decreased depth, mild decrease in weight bearing on R  Single leg calf raise: L 5, R 3      Carmen received therapeutic exercises to develop strength, ROM and flexibility for 42 minutes including:  Stationary bike for 10'  for increased circulation, ROM, and endurance  B bridge 3x10   Clam 1 3x12   Half kneeling TC self mobilization 2x20  calf raise at edge of mat, 3x20, up with 2, hold with 1, down with 2;  Squat at edge of mat, 3x15;  Step overs 12 inch step 3 mins;  Step ups 12 inch step, 3 mins;    Carmen received the following manual therapy techniques: Joint mobilizations were applied to the: R foot/ankle for 14 minutes, including:  TC AP joint mobilizations, Gr IV  ST figure 8 mobilization  Forefoot abd/add mobilization Gr III   STM to improve edema    Carmen received the following neuromuscular re-education to improve balance and proprioception for 0 minutes:     Home Exercises Provided and Patient Education Provided     Education provided:   - continue with HEP more consistently    Written Home Exercises Provided: Patient instructed to cont prior HEP.  Exercises were reviewed and Carmen was able to demonstrate them prior to the end of the session.  Carmen demonstrated good  understanding of the education provided.         Assessment   L 5th metatarsal fracture  Pt is progressing well towards goals. She continues to present with mild edema likely due to excessive walking in an unsupportive shoe and decreased calf function. Mild to no pain at R lateral foot with calf raises. Chief deficit is calf strength and balance.     Carmen is progressing well towards her goals.   Pt prognosis is Good.     Pt will continue to benefit from skilled outpatient physical therapy to address the deficits listed in the problem list box on initial evaluation, provide pt/family education and to maximize pt's level of independence in the home and community environment.     Pt's spiritual, cultural and educational needs considered and pt agreeable to plan of care and goals.     Anticipated barriers to physical therapy: none    Goals:  Short Term Goals: 4 weeks   1. Pt will be independent with HEP in 2 weeks. (MET)  2. Pt will improve R ankle ROM  by 25% to allow normalized gait pattern. (Met)  3. Pt will be able to perform SLS for 10 sec on R. (progressing, not met)     Long Term Goals: 8 weeks   1. Pt will be able to walk without surgical shoe for 10 mins with less than 2/10 pain. (Met)           2. Pt will be able to stand without surgical shoe for 30 mins with less than 2/10 pain. (Met)  3. Pt will be able to resume bike riding for exercise. (progressing, not met)    Plan   Plan of care Certification: 3/11/2019 to 4/11/18.  Progress calf strengthening as tolerated, functional movements, such as squatting, step ups/downs.    Gerry Solorzano, PT, DPT

## 2019-03-13 ENCOUNTER — CLINICAL SUPPORT (OUTPATIENT)
Dept: REHABILITATION | Facility: HOSPITAL | Age: 69
End: 2019-03-13
Attending: ORTHOPAEDIC SURGERY
Payer: COMMERCIAL

## 2019-03-13 DIAGNOSIS — R29.898 DECREASED GRIP STRENGTH OF RIGHT HAND: ICD-10-CM

## 2019-03-13 DIAGNOSIS — M25.60 RANGE OF MOTION DEFICIT: ICD-10-CM

## 2019-03-13 DIAGNOSIS — M20.021 BOUTONNIERE DEFORMITY OF FINGER OF RIGHT HAND: ICD-10-CM

## 2019-03-13 PROCEDURE — 97110 THERAPEUTIC EXERCISES: CPT

## 2019-03-13 PROCEDURE — 97022 WHIRLPOOL THERAPY: CPT

## 2019-03-13 NOTE — PROGRESS NOTES
"                            Occupational Therapy Re-evaluation Note     Date: 3/13/2019  Name: Carmen Khan  Clinic Number: [id number]    Therapy Diagnosis:   Encounter Diagnoses   Name Primary?   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   Response to previous treatment:"I'm improving"  Functional change: Able to  objects    Pain: 0/10 at best, 4/10 at worst  Location: right small finger      Objective   Observation  :Swelling,  flexion contracture of PIP      Sensation: intact   Scar / Wound: none   Edema:  Minimal   PP 5.3 CM   PIP 5.0 CM   MP 4.2 CM   DIP 4.1 CM   DP 3.8 CM         Range of Motion:         SMALL FINGER               MP  0/80              PIP 8/99              DIP 5/70                                          Manual Muscle Test:  FDP 5/5   FDS 5/5   EDM 5/5   ADM 5/5                                        Strength: (PHILL Dynamometer in lbs.), Average 3 trials, Position II               Left      Right                24         13     Carmen received the following supervised modalities after being cleared for contradictions for 15 minutes:   -Patient received paraffin and moist heat to right hand for 15 " minutes to increase blood flow, circulation, pain management and for tissue elasticity prior to therex.     Elvia received manual therapy after being cleared for contradictions for 5 minutes:  STM to right small finger    Carmen received therapeutic exercises for 40 minutes including:  -A/AAROM as follows: jt blocking, TGES, finger ext, finger abd/add x 10 reps ea  -towel scrunches x 10 reps  -isopheres x 3 min  -green digi-extend x 30 reps  -large pom poms 3 containers with green cp  -red putty as follows: 2' molding, 30 grasps   -3 yellow and 1 red band on hand gripper x 30 reps        Home Exercises and Education Provided     Education provided:   - Home program as listed in  Media section  - Progress towards goals: Excellent    Written Home Exercises Provided: Patient instructed to cont prior HEP.  Exercises were reviewed and Carmen was able to demonstrate them prior to the end of the session.  Carmen demonstrated good  understanding of the education provided.     See EMR under Patient Instructions for exercises provided 1/30/2019.    Carmen demonstrated good  understanding of the education provided.         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 Pt tolerated all activities with no complaints of pain.     Pt would continue to benefit from skilled OT.      Carmen is progressing well towards her goals and there are no updates to goals at this time. Pt prognosis is Good.     Pt will continue to benefit from skilled outpatient occupational therapy to address the deficits listed in the problem list on initial evaluation provide pt/family education and to maximize pt's level of independence in the home and community environment.     Anticipated barriers to occupational therapy: none    Pt's spiritual, cultural and educational needs considered and  pt agreeable to plan of care and goals.    Goals:  Long term:  ( 6 weeks)   1)  Patient to be IND with HEP and modalities for pain management. Met 1/26/2019  2)  Increase ROM 3-5 degrees to increase functional hand use for ADLs/work/leisure activities- Met 1/30/2019  3)  Decrease edema .1-.3 mm to increase joint mobility /flexibility for functional hand use. - Met 1/30/2019  4)  Assess  and set goals accordingly -Met 1/30/2019    New Goals:  1) Pt will have 0/10 pain complaints with functional use.-progressing  2) Pt will return to all ADLS using her right hand with no difficulty.-progressing      Plan: Cont with OT   Patient to be treated by Occupational Therapy    1   times per week for   6-8                  weeks  during the certification period from   3/6/2019  to  4/29/2019    to achieve the established goals.  Treatment to include :  paraffin, fluidotherapy, manual therapy/joint mobilizations, orthotic fabrication/fitting/training,   modalities for pain management, US 3mhz, therapeutic exercises/activities,        strengthening,    scar and edema management, as well as any other treatments deemed necessary based on the patient's needs or progress.                  Discussed Plan of Care with patient: Yes  Updates/Grading for next session: Advance as tolerated.      Selam Flor, OT

## 2019-03-15 ENCOUNTER — OFFICE VISIT (OUTPATIENT)
Dept: BARIATRICS | Facility: CLINIC | Age: 69
End: 2019-03-15
Payer: COMMERCIAL

## 2019-03-15 ENCOUNTER — CLINICAL SUPPORT (OUTPATIENT)
Dept: REHABILITATION | Facility: HOSPITAL | Age: 69
End: 2019-03-15
Attending: ORTHOPAEDIC SURGERY
Payer: COMMERCIAL

## 2019-03-15 VITALS
HEIGHT: 63 IN | HEART RATE: 86 BPM | BODY MASS INDEX: 30 KG/M2 | DIASTOLIC BLOOD PRESSURE: 70 MMHG | SYSTOLIC BLOOD PRESSURE: 140 MMHG | WEIGHT: 169.31 LBS

## 2019-03-15 DIAGNOSIS — E66.3 OVERWEIGHT (BMI 25.0-29.9): ICD-10-CM

## 2019-03-15 DIAGNOSIS — M79.672 LEFT FOOT PAIN: ICD-10-CM

## 2019-03-15 PROCEDURE — 3077F PR MOST RECENT SYSTOLIC BLOOD PRESSURE >= 140 MM HG: ICD-10-PCS | Mod: CPTII,S$GLB,, | Performed by: INTERNAL MEDICINE

## 2019-03-15 PROCEDURE — 3077F SYST BP >= 140 MM HG: CPT | Mod: CPTII,S$GLB,, | Performed by: INTERNAL MEDICINE

## 2019-03-15 PROCEDURE — 99999 PR PBB SHADOW E&M-EST. PATIENT-LVL III: ICD-10-PCS | Mod: PBBFAC,,, | Performed by: INTERNAL MEDICINE

## 2019-03-15 PROCEDURE — 3078F PR MOST RECENT DIASTOLIC BLOOD PRESSURE < 80 MM HG: ICD-10-PCS | Mod: CPTII,S$GLB,, | Performed by: INTERNAL MEDICINE

## 2019-03-15 PROCEDURE — 1101F PR PT FALLS ASSESS DOC 0-1 FALLS W/OUT INJ PAST YR: ICD-10-PCS | Mod: CPTII,S$GLB,, | Performed by: INTERNAL MEDICINE

## 2019-03-15 PROCEDURE — 1101F PT FALLS ASSESS-DOCD LE1/YR: CPT | Mod: CPTII,S$GLB,, | Performed by: INTERNAL MEDICINE

## 2019-03-15 PROCEDURE — 99213 PR OFFICE/OUTPT VISIT, EST, LEVL III, 20-29 MIN: ICD-10-PCS | Mod: S$GLB,,, | Performed by: INTERNAL MEDICINE

## 2019-03-15 PROCEDURE — 3078F DIAST BP <80 MM HG: CPT | Mod: CPTII,S$GLB,, | Performed by: INTERNAL MEDICINE

## 2019-03-15 PROCEDURE — 97110 THERAPEUTIC EXERCISES: CPT

## 2019-03-15 PROCEDURE — 99213 OFFICE O/P EST LOW 20 MIN: CPT | Mod: S$GLB,,, | Performed by: INTERNAL MEDICINE

## 2019-03-15 PROCEDURE — 99999 PR PBB SHADOW E&M-EST. PATIENT-LVL III: CPT | Mod: PBBFAC,,, | Performed by: INTERNAL MEDICINE

## 2019-03-15 RX ORDER — PHENTERMINE HYDROCHLORIDE 37.5 MG/1
TABLET ORAL
Qty: 30 TABLET | Refills: 1 | Status: SHIPPED | OUTPATIENT
Start: 2019-03-15 | End: 2019-07-01 | Stop reason: SDUPTHER

## 2019-03-15 RX ORDER — TOPIRAMATE 50 MG/1
50 CAPSULE, EXTENDED RELEASE ORAL DAILY
Qty: 30 CAPSULE | Refills: 3 | Status: SHIPPED | OUTPATIENT
Start: 2019-03-15 | End: 2019-07-01 | Stop reason: SDUPTHER

## 2019-03-15 NOTE — PROGRESS NOTES
"Subjective:       Patient ID: Carmen Khan is a 68 y.o. female.    Chief Complaint: Follow-up    Pt here today for follow up. Has gained 7 lbs from previous visit, net neg 4 lbs neg. Has been on topiramte and phentermine, last filled 12/4/18.   checked today. Not taking daily.  We added the topiramate last. She is still dealing broken foot. In PT 2 times a week. Just getting around has been difficult.       Review of Systems   Constitutional: Negative for chills and fever.   Respiratory: Negative for apnea and shortness of breath.         + snores   Cardiovascular: Negative for chest pain and leg swelling.   Gastrointestinal: Negative for constipation and diarrhea.        Denies HB   Genitourinary: Negative for dysuria.   Musculoskeletal: Positive for arthralgias. Negative for back pain.        Left foot OA   Neurological: Negative for dizziness and light-headedness.   Psychiatric/Behavioral: Negative for dysphoric mood. The patient is not nervous/anxious.         Doing well on Lexapro       Objective:       BP (!) 140/70   Pulse 86   Ht 5' 3" (1.6 m)   Wt 76.8 kg (169 lb 5 oz)   BMI 29.99 kg/m²     Physical Exam   Constitutional: She is oriented to person, place, and time. She appears well-developed. No distress.   Obese     HENT:   Head: Normocephalic and atraumatic.   Eyes: EOM are normal. Pupils are equal, round, and reactive to light. No scleral icterus.   Neck: Normal range of motion. Neck supple.   Cardiovascular: Normal rate.   Pulmonary/Chest: Effort normal.   Musculoskeletal: Normal range of motion. She exhibits no edema.   Neurological: She is alert and oriented to person, place, and time. No cranial nerve deficit.   Skin: Skin is warm and dry. No erythema.   Psychiatric: She has a normal mood and affect. Her behavior is normal. Judgment normal.   Vitals reviewed.      Assessment:       1. Overweight (BMI 25.0-29.9)        Plan:            Carmen was seen today for follow-up.    Diagnoses " and all orders for this visit:    Overweight (BMI 25.0-29.9)  -     phentermine (ADIPEX-P) 37.5 mg tablet; Take 1/2 tablet by mouth once daily.    Other orders  -     topiramate (TROKENDI XR) 50 mg Cp24; Take one tablet by mouth once daily.            Patient was informed that topiramate is used for migraine prevention and seizures. Weight loss is a common side effect that is well documented. She understands this. She was informed of the potential side effects such as serious and possibly fatal rash in which case the medication should be discontinued immediately. Paresthesias, forgetfulness, fatigue, kidney stones, GI symptoms, and changes in lab values such as electrolytes, blood counts and kidney function.    Patient warned of common side effects of phentermine including anxiety, insomnia, palpitations and increased blood pressure. It was also explained that it is for short-term usage along with diet and exercise, and that stopping the medication without making lifestyle changes will result in regain of weight. Patient states understanding.     Weight loss medications are controlled substances.  They require routine follow up. Prescription or pills that are lost or destroyed will not be replaced.       Start phentermine with 1/2 pill a day       3 meals a day made up of the following:  Unlimited green vegetables, tomatoes, mushrooms, spaghetti squash, cauliflower, meat, poultry, seafood, eggs and hard cheeses.   Milk and plain yogurt  Dressings, seasonings, condiments, etc should have less than 2 g sugars.   Beans (1-1.5 cups) or nuts (1/4 cup) can have 1 x a day.   1-2 servings of citrus fruits, berries, pineapple or melon a day (1/2 cup)  Avoid fried foods    No grains, rice, pasta, potatoes, bread, corn, peas, oatmeal, grits, tortillas, crackers, chips    No soda, sweet tea, juices or lemonade    Www.dietdoctor.com for recipes. Moderate carb intake      Add some type of resistance training 2-3 days a week.  These can be body weight exercises, light weight or elastic bands. [employer] are great sources for free work out plans and videos.

## 2019-03-15 NOTE — PROGRESS NOTES
"  Physical Therapy Daily Treatment Note     Name: Carmen Khan  Clinic Number: [id number]    Therapy Diagnosis:   Encounter Diagnosis   Name Primary?    Left foot pain      Physician: Zayra Gutierres PA-C    Visit Date: 3/15/2019    Physician Orders: PT Eval and Treat   Medical Diagnosis from Referral: Closed fracture of fifth metatarsal bone of right foot with routine healing, physeal involvement unspecified, subsequent encounter  Evaluation Date: 1/23/2019  Authorization Period Expiration: 01/17/2020  Plan of Care Expiration: 3/15/19  Visit # / Visits authorized: 111/ 15     Time In: 1245  Time Out: 1330  Total Billable Time: 35     Precautions: Standard    Subjective     Pt reports: min soreness in B calves today.   Response to previous treatment: pain and soreness in B calves   Compliant with HEP   Functional change: improved gait     Pain: 3/10  Location: right feet      Objective     Carmen received therapeutic exercises to develop strength, ROM and flexibility for 35 minutes including:  Stationary bike for 10' for increased circulation, ROM, and endurance  biodex balance squats 3x10  Calf raises on step w/stretch 2x10  B bridge 3x10/3"   Toe walks/heels walks 4 laps ea width of turf        Carmen received the following manual therapy techniques: Joint mobilizations were applied to the: R foot/ankle for 10 minutes, including:  Ankle mobs and stretching     Carmen received the following neuromuscular re-education to improve balance and proprioception for 0 minutes:     Home Exercises Provided and Patient Education Provided     Education provided:   - continue with HEP more consistently    Written Home Exercises Provided: Patient instructed to cont prior HEP.  Exercises were reviewed and Carmen was able to demonstrate them prior to the end of the session.  Carmen demonstrated good  understanding of the education provided.         Assessment     Pt is progressing well towards goals. Continued with " calf strengthening and balance activity. VC/TC for correcting form/technique.      Carmen is progressing well towards her goals.   Pt prognosis is Good.     Pt will continue to benefit from skilled outpatient physical therapy to address the deficits listed in the problem list box on initial evaluation, provide pt/family education and to maximize pt's level of independence in the home and community environment.     Pt's spiritual, cultural and educational needs considered and pt agreeable to plan of care and goals.     Anticipated barriers to physical therapy: none    Goals:  Short Term Goals: 4 weeks   1. Pt will be independent with HEP in 2 weeks. (MET)  2. Pt will improve R ankle ROM by 25% to allow normalized gait pattern. (Met)  3. Pt will be able to perform SLS for 10 sec on R. (progressing, not met)     Long Term Goals: 8 weeks   1. Pt will be able to walk without surgical shoe for 10 mins with less than 2/10 pain. (Met)           2. Pt will be able to stand without surgical shoe for 30 mins with less than 2/10 pain. (Met)  3. Pt will be able to resume bike riding for exercise. (progressing, not met)    Plan   Plan of care Certification: 3/11/2019 to 4/11/18.  Progress calf strengthening as tolerated, functional movements, such as squatting, step ups/downs.    Balta Rivera, PTA, STS

## 2019-03-15 NOTE — PATIENT INSTRUCTIONS
Patient was informed that topiramate is used for migraine prevention and seizures. Weight loss is a common side effect that is well documented. She understands this. She was informed of the potential side effects such as serious and possibly fatal rash in which case the medication should be discontinued immediately. Paresthesias, forgetfulness, fatigue, kidney stones, GI symptoms, and changes in lab values such as electrolytes, blood counts and kidney function.    Patient warned of common side effects of phentermine including anxiety, insomnia, palpitations and increased blood pressure. It was also explained that it is for short-term usage along with diet and exercise, and that stopping the medication without making lifestyle changes will result in regain of weight. Patient states understanding.     Weight loss medications are controlled substances.  They require routine follow up. Prescription or pills that are lost or destroyed will not be replaced.       Start phentermine with 1/2 pill a day       3 meals a day made up of the following:  Unlimited green vegetables, tomatoes, mushrooms, spaghetti squash, cauliflower, meat, poultry, seafood, eggs and hard cheeses.   Milk and plain yogurt  Dressings, seasonings, condiments, etc should have less than 2 g sugars.   Beans (1-1.5 cups) or nuts (1/4 cup) can have 1 x a day.   1-2 servings of citrus fruits, berries, pineapple or melon a day (1/2 cup)  Avoid fried foods    No grains, rice, pasta, potatoes, bread, corn, peas, oatmeal, grits, tortillas, crackers, chips    No soda, sweet tea, juices or lemonade    Www.dietdoctor.[employer] for recipes. Moderate carb intake      Add some type of resistance training 2-3 days a week. These can be body weight exercises, light weight or elastic bands. [employer] and [employer] are great sources for free work out plans and videos.       January 28, 2019  Hot Spinach and Artichoke Dip (Recipe)  BY KIM MONTES DE OCA RD, CSSD    This creamy spinach  dip can double as a protein-rich, gluten-free side dish, game day appetizer or parade route snack.  Calories 85 calories    Fat 3 grams saturated fat    Prep Time 5 minutes  Cook Time10 minutes  Yield Serves 5-10 people  Ingredients   1/2 cup onion, finely chopped   3 (10 ounce) packs of frozen chopped spinach, thawed and squeezed dry   1 (8 ounce) package reduced-fat cream cheese   1 (8 ounce) carton fat-free plain Greek yogurt   1/2 cup Parmesan cheese, grated   1 (14 ounce) can artichoke hearts   1/4 teaspoon salt (optional)   1/4 teaspoon black pepper   1/8 teaspoon crushed red pepper flakes  Instructions  1. Lightly coat a skillet with cooking spray.  2. Cook and stir onion over medium heat until transparent (about 5 minutes).  3. Add spinach. Cook until thoroughly heated (about 1-2 minutes).  4. Reduce heat and add cream cheese. Stir until melted and smooth.  5. Stir in Greek yogurt, Parmesan cheese and artichokes. Remove from heat.  6. Season with salt (optional) and black and red pepper.  7. Serve with raw vegetables.                          January 31, 2019  Pizza Cups (Recipe)    Trying to eat better but craving pizza? These protein-packed pizza cups will do the trick.    Calories 65 calories per cup  Fat 2.7 grams per cup  Prep Time5 minutes  Cook Time 25 minutes  Yield 12 pizza cups  Ingredients   1 ½ cups liquid egg whites   2 large eggs   1 cup fat free or low moisture shredded mozzarella cheese   37 turkey pepperonis (25 chopped and 12 sliced)   ¼ cup Parmesan cheese   ¼ tsp oregano   ¼ tsp black pepper  Instructions  1. Preheat oven to 350 degrees Fahrenheit.  2. Chop up 25 turkey pepperonis into small pieces. In a bowl, combine all ingredients except the remaining 12 sliced turkey pepperoni.  3. Spray a muffin hylton with nonstick spray.  4. Place a whole sliced turkey pepperoni in the bottom of each of the 12 muffin molds.  5. Pour or spoon in the mixture in your bowl into each muffin  mold evenly ¾ full.  6. Bake in the oven for 20-25 minutes. Place a toothpick in the center of the pizza cup to ensure all liquid egg has cooked. For a crispier pizza cup, leave in the oven a little longer.  7. Let cool for a few minutes before serving. Enjoy!

## 2019-03-18 ENCOUNTER — OFFICE VISIT (OUTPATIENT)
Dept: ORTHOPEDICS | Facility: CLINIC | Age: 69
End: 2019-03-18
Payer: COMMERCIAL

## 2019-03-18 ENCOUNTER — CLINICAL SUPPORT (OUTPATIENT)
Dept: REHABILITATION | Facility: HOSPITAL | Age: 69
End: 2019-03-18
Attending: ORTHOPAEDIC SURGERY
Payer: COMMERCIAL

## 2019-03-18 ENCOUNTER — HOSPITAL ENCOUNTER (OUTPATIENT)
Dept: RADIOLOGY | Facility: HOSPITAL | Age: 69
Discharge: HOME | End: 2019-03-18
Attending: PHYSICIAN ASSISTANT
Payer: COMMERCIAL

## 2019-03-18 VITALS — WEIGHT: 171.5 LBS | BODY MASS INDEX: 30.39 KG/M2 | HEIGHT: 63 IN

## 2019-03-18 DIAGNOSIS — S92.351D CLOSED FRACTURE OF FIFTH METATARSAL BONE OF RIGHT FOOT WITH ROUTINE HEALING, PHYSEAL INVOLVEMENT UNSPECIFIED, SUBSEQUENT ENCOUNTER: ICD-10-CM

## 2019-03-18 DIAGNOSIS — S92.351D CLOSED FRACTURE OF FIFTH METATARSAL BONE OF RIGHT FOOT WITH ROUTINE HEALING, PHYSEAL INVOLVEMENT UNSPECIFIED, SUBSEQUENT ENCOUNTER: Primary | ICD-10-CM

## 2019-03-18 DIAGNOSIS — M20.021 BOUTONNIERE DEFORMITY OF FINGER OF RIGHT HAND: ICD-10-CM

## 2019-03-18 DIAGNOSIS — M25.60 RANGE OF MOTION DEFICIT: ICD-10-CM

## 2019-03-18 DIAGNOSIS — R29.898 DECREASED GRIP STRENGTH OF RIGHT HAND: ICD-10-CM

## 2019-03-18 PROCEDURE — 97110 THERAPEUTIC EXERCISES: CPT

## 2019-03-18 PROCEDURE — 99213 OFFICE O/P EST LOW 20 MIN: CPT | Mod: S$GLB,,, | Performed by: PHYSICIAN ASSISTANT

## 2019-03-18 PROCEDURE — 73630 X-RAY EXAM OF FOOT: CPT | Mod: TC,RT

## 2019-03-18 PROCEDURE — 1101F PT FALLS ASSESS-DOCD LE1/YR: CPT | Mod: CPTII,S$GLB,, | Performed by: PHYSICIAN ASSISTANT

## 2019-03-18 PROCEDURE — 99213 PR OFFICE/OUTPT VISIT, EST, LEVL III, 20-29 MIN: ICD-10-PCS | Mod: S$GLB,,, | Performed by: PHYSICIAN ASSISTANT

## 2019-03-18 PROCEDURE — 99999 PR PBB SHADOW E&M-EST. PATIENT-LVL III: CPT | Mod: PBBFAC,,, | Performed by: PHYSICIAN ASSISTANT

## 2019-03-18 PROCEDURE — 73630 X-RAY EXAM OF FOOT: CPT | Mod: 26,RT,, | Performed by: RADIOLOGY

## 2019-03-18 PROCEDURE — 99999 PR PBB SHADOW E&M-EST. PATIENT-LVL III: ICD-10-PCS | Mod: PBBFAC,,, | Performed by: PHYSICIAN ASSISTANT

## 2019-03-18 PROCEDURE — 1101F PR PT FALLS ASSESS DOC 0-1 FALLS W/OUT INJ PAST YR: ICD-10-PCS | Mod: CPTII,S$GLB,, | Performed by: PHYSICIAN ASSISTANT

## 2019-03-18 PROCEDURE — 97022 WHIRLPOOL THERAPY: CPT

## 2019-03-18 PROCEDURE — 73630 XR FOOT COMPLETE 3 VIEW RIGHT: ICD-10-PCS | Mod: 26,RT,, | Performed by: RADIOLOGY

## 2019-03-18 RX ORDER — TRAMADOL HYDROCHLORIDE 50 MG/1
50 TABLET ORAL EVERY 6 HOURS PRN
Qty: 28 TABLET | Refills: 0 | Status: SHIPPED | OUTPATIENT
Start: 2019-03-18 | End: 2019-03-28

## 2019-03-18 RX ORDER — DICLOFENAC SODIUM 75 MG/1
75 TABLET, DELAYED RELEASE ORAL 2 TIMES DAILY
Qty: 28 TABLET | Refills: 0 | Status: SHIPPED | OUTPATIENT
Start: 2019-03-18 | End: 2019-04-17 | Stop reason: ALTCHOICE

## 2019-03-18 NOTE — PROGRESS NOTES
"                            Occupational Therapy Re-evaluation Note     Date: 3/18/2019  Name: Carmen Khan  Clinic Number: [id number]    Therapy Diagnosis:   Encounter Diagnoses   Name Primary?   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   Response to previous treatment:"I'm improving"  Functional change: Able to  objects    Pain: 0/10 at best, 4/10 at worst  Location: right small finger      Objective   Observation  :Swelling,  flexion contracture of PIP      Sensation: intact   Scar / Wound: none   Edema:  Minimal   PP 5.3 CM   PIP 5.0 CM   MP 4.2 CM   DIP 4.1 CM   DP 3.8 CM         Range of Motion:         SMALL FINGER               MP  0/80              PIP 8/99              DIP 5/70                                          Manual Muscle Test:  FDP 5/5   FDS 5/5   EDM 5/5   ADM 5/5                                        Strength: (PHILL Dynamometer in lbs.), Average 3 trials, Position II               Left      Right                24         13     Carmen received the following supervised modalities after being cleared for contradictions for 15 minutes:   -Patient received paraffin and moist heat to right hand for 15 " minutes to increase blood flow, circulation, pain management and for tissue elasticity prior to therex.     Elvia received manual therapy after being cleared for contradictions for 5 minutes:  STM to right small finger    Carmen received therapeutic exercises for 40 minutes including:  -A/AAROM as follows: jt blocking, TGES, finger ext, finger abd/add x 10 reps ea  -towel scrunches x 10 reps  -isopheres x 3 min  -green digi-extend x 30 reps  -large pom poms 3 containers with green cp  -red putty as follows: 2' molding, 30 grasps, 30 pinches with thumb/small finger   -3 yellow and 1 red band on hand gripper x 30 reps        Home Exercises and Education Provided     Education provided:   - Home program as listed in  Media section  - Progress towards goals: Excellent    Written Home Exercises Provided: Patient instructed to cont prior HEP.  Exercises were reviewed and Carmen was able to demonstrate them prior to the end of the session.  Carmen demonstrated good  understanding of the education provided.     See EMR under Patient Instructions for exercises provided 1/30/2019.    Carmen demonstrated good  understanding of the education provided.         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 Pt tolerated all activities with no complaints of pain.     Pt would continue to benefit from skilled OT.      Carmen is progressing well towards her goals and there are no updates to goals at this time. Pt prognosis is Good.     Pt will continue to benefit from skilled outpatient occupational therapy to address the deficits listed in the problem list on initial evaluation provide pt/family education and to maximize pt's level of independence in the home and community environment.     Anticipated barriers to occupational therapy: none    Pt's spiritual, cultural  and educational needs considered and pt agreeable to plan of care and goals.    Goals:  Long term:  ( 6 weeks)   1)  Patient to be IND with HEP and modalities for pain management. Met 1/26/2019  2)  Increase ROM 3-5 degrees to increase functional hand use for ADLs/work/leisure activities- Met 1/30/2019  3)  Decrease edema .1-.3 mm to increase joint mobility /flexibility for functional hand use. - Met 1/30/2019  4)  Assess  and set goals accordingly -Met 1/30/2019    New Goals:  1) Pt will have 0/10 pain complaints with functional use.-progressing  2) Pt will return to all ADLS using her right hand with no difficulty.-progressing      Plan: Cont with OT   Patient to be treated by Occupational Therapy    1   times per week for   6-8                  weeks  during the certification period from   3/6/2019  to  4/29/2019    to achieve the established goals.  Treatment to include :  paraffin, fluidotherapy, manual therapy/joint mobilizations, orthotic fabrication/fitting/training,   modalities for pain management, US 3mhz, therapeutic exercises/activities,        strengthening,    scar and edema management, as well as any other treatments deemed necessary based on the patient's needs or progress.                  Discussed Plan of Care with patient: Yes  Updates/Grading for next session: Advance as tolerated.      Selam Flor, OT

## 2019-03-18 NOTE — PROGRESS NOTES
Subjective:      Patient ID: Carmen Khan is a 68 y.o. female.    Chief Complaint: Follow-up of the Right Foot    HPI  Patient returns for f/u for her right 5th MT fracture that occurred on 11/28/18. She has been wearing her regular shoes for over 2 weeks. She has d/c the knee scooter and the post-op shoe. She reports pain at the foot with cold weather. She still has swelling. She drives 60 miles to work and does a lot of walking at work. She takes tylenol prn and tramadol 1-2 times/week. She does PT/HEP. Her pain is getting a little better.   Review of Systems   Constitution: Negative for chills, fever and night sweats.   Cardiovascular: Negative for chest pain.   Respiratory: Negative for cough and shortness of breath.    Hematologic/Lymphatic: Does not bruise/bleed easily.   Skin: Negative for color change.   Gastrointestinal: Negative for heartburn.   Genitourinary: Negative for dysuria.   Neurological: Negative for numbness and paresthesias.   Psychiatric/Behavioral: Negative for altered mental status.   Allergic/Immunologic: Negative for persistent infections.         Objective:            General    Vitals reviewed.  Constitutional: She is oriented to person, place, and time. She appears well-developed and well-nourished.   Cardiovascular: Normal rate.    Neurological: She is alert and oriented to person, place, and time.         Right Ankle/Foot Exam     Inspection   Erythema: absent    Range of Motion   Ankle Joint   Dorsiflexion: normal   Plantar flexion: normal   Subtalar Joint   Inversion: abnormal   Eversion: abnormal     Other   Sensation: normal    Comments:  Mild swelling. Mild TTP 5th MT base. Subtalar ROM is a little limited.         Vascular Exam     Right Pulses  Dorsalis Pedis:      2+              X-ray: ordered and reviewed by myself. Healing fracture involving the base of the 5th metatarsal bone remain unchanged position as compared to the previous study.  Fracture is still well  identified.  Mild DJD and hallux valgus.        Assessment:       Encounter Diagnosis   Name Primary?    Closed fracture of fifth metatarsal bone of right foot with routine healing, physeal involvement unspecified, subsequent encounter Yes          Plan:       Patient will wear good supportive shoes. She can try a compression sock for the swelling. Elevate. Tramadol refilled. She will take diclofenac BID x 2 weeks. Continue PT/HEP. RTC prn.

## 2019-03-22 ENCOUNTER — CLINICAL SUPPORT (OUTPATIENT)
Dept: REHABILITATION | Facility: HOSPITAL | Age: 69
End: 2019-03-22
Attending: ORTHOPAEDIC SURGERY
Payer: COMMERCIAL

## 2019-03-22 DIAGNOSIS — M79.672 LEFT FOOT PAIN: ICD-10-CM

## 2019-03-22 PROCEDURE — 97112 NEUROMUSCULAR REEDUCATION: CPT | Performed by: PHYSICAL THERAPIST

## 2019-03-22 PROCEDURE — 97140 MANUAL THERAPY 1/> REGIONS: CPT | Performed by: PHYSICAL THERAPIST

## 2019-03-22 PROCEDURE — 97110 THERAPEUTIC EXERCISES: CPT | Performed by: PHYSICAL THERAPIST

## 2019-03-22 NOTE — PROGRESS NOTES
"  Physical Therapy Daily Treatment Note     Name: Carmen Khan  Clinic Number: [id number]    Therapy Diagnosis:   Encounter Diagnosis   Name Primary?    Left foot pain      Physician: Zayra Gutierres PA-C    Visit Date: 3/22/2019    Physician Orders: PT Eval and Treat   Medical Diagnosis from Referral: Closed fracture of fifth metatarsal bone of right foot with routine healing, physeal involvement unspecified, subsequent encounter  Evaluation Date: 1/23/2019  Authorization Period Expiration: 01/17/2020  Plan of Care Expiration: 4/11/19  Visit # / Visits authorized: 12/ 15     Time In: 1204  Time Out: 1258  Total Billable Time: 54 mins     Precautions: Standard    Subjective     Pt reports: she saw the PA last week and was discharged. She was told she could continue therapy if she felt it would be of benefit. Pt states she thinks she needs to come at least once a week.     Response to previous treatment: pain and soreness in B calves   Compliant with HEP   Functional change: improved gait     Pain: 1/10  Location: right feet      Objective     Carmen received therapeutic exercises to develop strength, ROM and flexibility for 31 minutes including:  Calf raises on step w/stretch 2x10  B bridge with PF 3x10/3"   Toe walks/heels walks 4 laps ea width of turf    Static half kneeling TC stretch, 20x10";  sidelying hip abd with GTB around ankles 3x10;    Carmen received the following manual therapy techniques: Joint mobilizations were applied to the: R foot/ankle for 14 minutes, including:  ST figure 8 mobilization Gr III;  TC AP mobilizations Gr IV;  Proximal tibfib mobilization on R Gr IV;    Carmen received the following neuromuscular re-education to improve balance and proprioception for 13 minutes:   SLS 5x30" on Airex;  BOSU squat 3x12;    Home Exercises Provided and Patient Education Provided     Education provided:   - continue with HEP more consistently    Written Home Exercises Provided: Patient " instructed to cont prior HEP.  Exercises were reviewed and Camren was able to demonstrate them prior to the end of the session.  Carmen demonstrated good  understanding of the education provided.     Assessment     TC mobility continues to be limited, which prompted additional education on HEP performance. Calf function is steadily improving.     Carmen is progressing well towards her goals.   Pt prognosis is Good.     Pt will continue to benefit from skilled outpatient physical therapy to address the deficits listed in the problem list box on initial evaluation, provide pt/family education and to maximize pt's level of independence in the home and community environment.     Pt's spiritual, cultural and educational needs considered and pt agreeable to plan of care and goals.     Anticipated barriers to physical therapy: none    Goals:  Short Term Goals: 4 weeks   1. Pt will be independent with HEP in 2 weeks. (MET)  2. Pt will improve R ankle ROM by 25% to allow normalized gait pattern. (Met)  3. Pt will be able to perform SLS for 10 sec on R. (progressing, not met)     Long Term Goals: 8 weeks   1. Pt will be able to walk without surgical shoe for 10 mins with less than 2/10 pain. (Met)           2. Pt will be able to stand without surgical shoe for 30 mins with less than 2/10 pain. (Met)  3. Pt will be able to resume bike riding for exercise. (progressing, not met)    Plan   Plan of care Certification: 3/11/2019 to 4/11/18.  Progress calf strengthening as tolerated, balance activities.     Gerry Solorzano, PT, DPT

## 2019-03-29 ENCOUNTER — CLINICAL SUPPORT (OUTPATIENT)
Dept: REHABILITATION | Facility: HOSPITAL | Age: 69
End: 2019-03-29
Attending: ORTHOPAEDIC SURGERY
Payer: COMMERCIAL

## 2019-03-29 DIAGNOSIS — M79.672 LEFT FOOT PAIN: ICD-10-CM

## 2019-03-29 PROCEDURE — 97110 THERAPEUTIC EXERCISES: CPT | Performed by: PHYSICAL THERAPIST

## 2019-03-29 PROCEDURE — 97140 MANUAL THERAPY 1/> REGIONS: CPT | Performed by: PHYSICAL THERAPIST

## 2019-03-29 PROCEDURE — 97112 NEUROMUSCULAR REEDUCATION: CPT | Performed by: PHYSICAL THERAPIST

## 2019-03-29 NOTE — PROGRESS NOTES
"  Physical Therapy Daily Treatment Note     Name: Carmen Aguilar Khan  Clinic Number: [id number]    Therapy Diagnosis:   Encounter Diagnosis   Name Primary?    Left foot pain      Physician: Zayra Gutierres PA-C    Visit Date: 3/29/2019    Physician Orders: PT Eval and Treat   Medical Diagnosis from Referral: Closed fracture of fifth metatarsal bone of right foot with routine healing, physeal involvement unspecified, subsequent encounter  Evaluation Date: 1/23/2019  Authorization Period Expiration: 01/17/2020  Plan of Care Expiration: 4/11/19  Visit # / Visits authorized: 13/ 15     Time In: 1304  Time Out: 1358  Total Billable Time: 54 mins     Precautions: Standard    Subjective     Pt reports: soreness in her foot at the end of the day, but otherwise no pain.     Response to previous treatment: improved pain   Compliant with HEP   Functional change: improved gait     Pain: 1/10  Location: right feet      Objective     Carmen received therapeutic exercises to develop strength, ROM and flexibility for 28 minutes including:  Alternating leg extension in bridge, 3x15;   Static half kneeling TC stretch, 20x10";  sidelying hip abd with GTB around ankles 3x10;  Steam boats on Airex pad, GTB, abd/ext, 3x15;  Sneaky lunge, 3x10;    Carmen received the following manual therapy techniques: Joint mobilizations were applied to the: R foot/ankle for 14 minutes, including:  ST figure 8 mobilization Gr III;  TC AP mobilizations Gr IV;  Proximal tibfib mobilization on R Gr IV;    Carmen received the following neuromuscular re-education to improve balance and proprioception for 12 minutes:   SLS 5x30" on Airex;  BOSU squat 3x12;  End range calf raise, 3x20 on Airex;    Home Exercises Provided and Patient Education Provided     Education provided:   - continue with HEP more consistently    Written Home Exercises Provided: Patient instructed to cont prior HEP.  Exercises were reviewed and Carmen was able to demonstrate them " prior to the end of the session.  Carmen demonstrated good  understanding of the education provided.     Assessment   Pt is making excellent progress towards goals. Soreness at end of day is likely due to muscle weakness and should improve with strengthening of calf.     Carmen is progressing well towards her goals.   Pt prognosis is Good.     Pt will continue to benefit from skilled outpatient physical therapy to address the deficits listed in the problem list box on initial evaluation, provide pt/family education and to maximize pt's level of independence in the home and community environment.     Pt's spiritual, cultural and educational needs considered and pt agreeable to plan of care and goals.     Anticipated barriers to physical therapy: none    Goals:  Short Term Goals: 4 weeks   1. Pt will be independent with HEP in 2 weeks. (MET)  2. Pt will improve R ankle ROM by 25% to allow normalized gait pattern. (Met)  3. Pt will be able to perform SLS for 10 sec on R. (progressing, not met)     Long Term Goals: 8 weeks   1. Pt will be able to walk without surgical shoe for 10 mins with less than 2/10 pain. (Met)           2. Pt will be able to stand without surgical shoe for 30 mins with less than 2/10 pain. (Met)  3. Pt will be able to resume bike riding for exercise. (progressing, not met)    Plan   Plan of care Certification: 3/11/2019 to 4/11/18.  Progress calf strengthening as tolerated, balance activities.     Gerry Solorzano, PT, DPT

## 2019-04-02 ENCOUNTER — TELEPHONE (OUTPATIENT)
Dept: ORTHOPEDICS | Facility: CLINIC | Age: 69
End: 2019-04-02

## 2019-04-03 ENCOUNTER — CLINICAL SUPPORT (OUTPATIENT)
Dept: REHABILITATION | Facility: HOSPITAL | Age: 69
End: 2019-04-03
Attending: ORTHOPAEDIC SURGERY
Payer: COMMERCIAL

## 2019-04-03 DIAGNOSIS — M79.672 LEFT FOOT PAIN: ICD-10-CM

## 2019-04-03 PROCEDURE — 97110 THERAPEUTIC EXERCISES: CPT

## 2019-04-03 PROCEDURE — 97140 MANUAL THERAPY 1/> REGIONS: CPT

## 2019-04-03 PROCEDURE — 97112 NEUROMUSCULAR REEDUCATION: CPT

## 2019-04-03 NOTE — PROGRESS NOTES
"  Physical Therapy Daily Treatment Note     Name: Carmen Aguilar Khan  Clinic Number: [id number]    Therapy Diagnosis:   Encounter Diagnosis   Name Primary?    Left foot pain      Physician: Zayra Gutierres PA-C    Visit Date: 4/3/2019    Physician Orders: PT Eval and Treat   Medical Diagnosis from Referral: Closed fracture of fifth metatarsal bone of right foot with routine healing, physeal involvement unspecified, subsequent encounter  Evaluation Date: 1/23/2019  Authorization Period Expiration: 01/17/2020  Plan of Care Expiration: 4/11/19  Visit # / Visits authorized: 14/ 15     Time In: 1320  Time Out: 1400  Total Billable Time: 54 mins     Precautions: Standard    Subjective     Pt reports: increased pain due to "walking a lot this morning due to work".   Response to previous treatment: decreased pain   Compliant with HEP   Functional change: improved gait   Pain: 2-3/10  Location: right feet      Objective   Pt 20' late for tx today    Carmen received therapeutic exercises to develop strength, ROM and flexibility for 20 minutes including:  Elliptical 5' for increased ROM, circulation, and endurance  Alternating leg extension in bridge, 3x15  sidelying hip abd with GTB around ankles 3x10;    Not today  Static half kneeling TC stretch, 20x10";  Steam boats on Airex pad, GTB, abd/ext, 3x15;  Sneaky lunge, 3x10;    Carmen received the following manual therapy techniques: Joint mobilizations were applied to the: R foot/ankle for 10 minutes, including: ankle mobs and stretching     Carmen received the following neuromuscular re-education to improve balance and proprioception for 10 minutes:   Bosu marching   Tandem stance on foam B 2x/30" ea   R SLS on foam 2x/1'     Home Exercises Provided and Patient Education Provided     Education provided:   - continue with HEP more consistently  4'  Written Home Exercises Provided: Patient instructed to cont prior HEP.  Exercises were reviewed and Carmen was able to " demonstrate them prior to the end of the session.  Carmen demonstrated good  understanding of the education provided.     Assessment   Pt is making excellent progress towards goals. Soreness at end of day is likely due to muscle weakness and should improve with strengthening of calf.     Carmen is progressing well towards her goals.   Pt prognosis is Good.     Pt will continue to benefit from skilled outpatient physical therapy to address the deficits listed in the problem list box on initial evaluation, provide pt/family education and to maximize pt's level of independence in the home and community environment.     Pt's spiritual, cultural and educational needs considered and pt agreeable to plan of care and goals.     Anticipated barriers to physical therapy: none    Goals:  Short Term Goals: 4 weeks   1. Pt will be independent with HEP in 2 weeks. (MET)  2. Pt will improve R ankle ROM by 25% to allow normalized gait pattern. (Met)  3. Pt will be able to perform SLS for 10 sec on R. (progressing, not met)     Long Term Goals: 8 weeks   1. Pt will be able to walk without surgical shoe for 10 mins with less than 2/10 pain. (Met)           2. Pt will be able to stand without surgical shoe for 30 mins with less than 2/10 pain. (Met)  3. Pt will be able to resume bike riding for exercise. (progressing, not met)    Plan   Plan of care Certification: 3/11/2019 to 4/11/18.  Progress calf strengthening as tolerated, balance activities.     Balta Rivera, PTA, STS

## 2019-04-03 NOTE — TELEPHONE ENCOUNTER
----- Message from Zakia Rausch LPN sent at 4/2/2019  5:58 PM CDT -----  Dr. Radha Lopez does not want any 11:45AM appointments. I have moved the appointments, and edited the template to reflect this. Please contact the patient, and inform the patient that the appointment is scheduled for 11:30AM instead of 11:45AM.    Thank you!

## 2019-04-08 ENCOUNTER — CLINICAL SUPPORT (OUTPATIENT)
Dept: REHABILITATION | Facility: HOSPITAL | Age: 69
End: 2019-04-08
Attending: ORTHOPAEDIC SURGERY
Payer: COMMERCIAL

## 2019-04-08 DIAGNOSIS — M79.672 LEFT FOOT PAIN: ICD-10-CM

## 2019-04-08 PROCEDURE — 97112 NEUROMUSCULAR REEDUCATION: CPT | Performed by: PHYSICAL THERAPIST

## 2019-04-08 PROCEDURE — 97140 MANUAL THERAPY 1/> REGIONS: CPT | Performed by: PHYSICAL THERAPIST

## 2019-04-08 PROCEDURE — 97110 THERAPEUTIC EXERCISES: CPT | Performed by: PHYSICAL THERAPIST

## 2019-04-09 NOTE — PROGRESS NOTES
"  Physical Therapy Daily Treatment Note     Name: Carmen Aguilar [facility]  Clinic Number: [id number]    Therapy Diagnosis:   Encounter Diagnosis   Name Primary?    Left foot pain      Physician: Zayra Gutierres PA-C    Visit Date: 4/8/2019    Physician Orders: PT Eval and Treat   Medical Diagnosis from Referral: Closed fracture of fifth metatarsal bone of right foot with routine healing, physeal involvement unspecified, subsequent encounter  Evaluation Date: 1/23/2019  Authorization Period Expiration: 01/17/2020  Plan of Care Expiration: 4/11/19  Visit # / Visits authorized: 15/ 15     Time In: 1603  Time Out: 1659  Total Billable Time: 56 mins     Precautions: Standard    Subjective     Pt reports: soreness at the end of the day, but otherwise her foot feels fine.   Response to previous treatment: decreased pain   Compliant with HEP   Functional change: improved gait     Pain: 2/10  Location: right feet      Objective     Carmen received therapeutic exercises to develop strength, ROM and flexibility for 26 minutes including:  Alternating leg extension in bridge, 3x15  sidelying hip abd with GTB around ankles 3x10;  Static half kneeling TC stretch, 20x10";  Steam boats on Airex pad, GTB, abd/ext, 3x15;  Sneaky lunge, 3x10;    Carmen received the following manual therapy techniques: Joint mobilizations were applied to the: R foot/ankle for 12 minutes, including:  TC mobilization Gr IV for DF;  ST figure 8 mobilization;  Great toe mobilization for extension, Gr IV;    Carmen received the following neuromuscular re-education to improve balance and proprioception for 16 minutes:   SLS on Airex 5x30";  Calf raises on Airex, up with 2, down with 1, 3x15;  Single leg quarter squat 3x15    Home Exercises Provided and Patient Education Provided     Education provided:   - continue with HEP more consistently    Written Home Exercises Provided: Patient instructed to cont prior HEP.  Exercises were reviewed and Carmen was " able to demonstrate them prior to the end of the session.  Carmen demonstrated good  understanding of the education provided.     Assessment   Pt was re-educated on HEP and importance for long term gains. Pt will discontinue therapy at this time and continue with HEP.    Carmen is progressing well towards her goals.   Pt prognosis is Good.     Pt will continue to benefit from skilled outpatient physical therapy to address the deficits listed in the problem list box on initial evaluation, provide pt/family education and to maximize pt's level of independence in the home and community environment.     Pt's spiritual, cultural and educational needs considered and pt agreeable to plan of care and goals.     Anticipated barriers to physical therapy: none    Goals:  Short Term Goals: 4 weeks   1. Pt will be independent with HEP in 2 weeks. (MET)  2. Pt will improve R ankle ROM by 25% to allow normalized gait pattern. (Met)  3. Pt will be able to perform SLS for 10 sec on R. (Met)     Long Term Goals: 8 weeks   1. Pt will be able to walk without surgical shoe for 10 mins with less than 2/10 pain. (Met)           2. Pt will be able to stand without surgical shoe for 30 mins with less than 2/10 pain. (Met)  3. Pt will be able to resume bike riding for exercise. (Met)    Plan   Plan of care Certification: 3/11/2019 to 4/11/18.  Discontinue at this time.    Gerry Solorzano, PT, DPT

## 2019-04-11 ENCOUNTER — OFFICE VISIT (OUTPATIENT)
Dept: ORTHOPEDICS | Facility: CLINIC | Age: 69
End: 2019-04-11
Payer: COMMERCIAL

## 2019-04-11 VITALS
HEART RATE: 69 BPM | SYSTOLIC BLOOD PRESSURE: 161 MMHG | DIASTOLIC BLOOD PRESSURE: 91 MMHG | BODY MASS INDEX: 30.3 KG/M2 | WEIGHT: 171 LBS | HEIGHT: 63 IN

## 2019-04-11 DIAGNOSIS — M20.021 BOUTONNIERE DEFORMITY OF FINGER OF RIGHT HAND: Primary | ICD-10-CM

## 2019-04-11 PROCEDURE — 1100F PTFALLS ASSESS-DOCD GE2>/YR: CPT | Mod: CPTII,S$GLB,, | Performed by: ORTHOPAEDIC SURGERY

## 2019-04-11 PROCEDURE — 3288F FALL RISK ASSESSMENT DOCD: CPT | Mod: CPTII,S$GLB,, | Performed by: ORTHOPAEDIC SURGERY

## 2019-04-11 PROCEDURE — 99999 PR PBB SHADOW E&M-EST. PATIENT-LVL III: CPT | Mod: PBBFAC,,, | Performed by: ORTHOPAEDIC SURGERY

## 2019-04-11 PROCEDURE — 3288F PR FALLS RISK ASSESSMENT DOCUMENTED: ICD-10-PCS | Mod: CPTII,S$GLB,, | Performed by: ORTHOPAEDIC SURGERY

## 2019-04-11 PROCEDURE — 99999 PR PBB SHADOW E&M-EST. PATIENT-LVL III: ICD-10-PCS | Mod: PBBFAC,,, | Performed by: ORTHOPAEDIC SURGERY

## 2019-04-11 PROCEDURE — 3080F DIAST BP >= 90 MM HG: CPT | Mod: CPTII,S$GLB,, | Performed by: ORTHOPAEDIC SURGERY

## 2019-04-11 PROCEDURE — 3080F PR MOST RECENT DIASTOLIC BLOOD PRESSURE >= 90 MM HG: ICD-10-PCS | Mod: CPTII,S$GLB,, | Performed by: ORTHOPAEDIC SURGERY

## 2019-04-11 PROCEDURE — 3077F PR MOST RECENT SYSTOLIC BLOOD PRESSURE >= 140 MM HG: ICD-10-PCS | Mod: CPTII,S$GLB,, | Performed by: ORTHOPAEDIC SURGERY

## 2019-04-11 PROCEDURE — 3077F SYST BP >= 140 MM HG: CPT | Mod: CPTII,S$GLB,, | Performed by: ORTHOPAEDIC SURGERY

## 2019-04-11 PROCEDURE — 99213 OFFICE O/P EST LOW 20 MIN: CPT | Mod: S$GLB,,, | Performed by: ORTHOPAEDIC SURGERY

## 2019-04-11 PROCEDURE — 99213 PR OFFICE/OUTPT VISIT, EST, LEVL III, 20-29 MIN: ICD-10-PCS | Mod: S$GLB,,, | Performed by: ORTHOPAEDIC SURGERY

## 2019-04-11 PROCEDURE — 1100F PR PT FALLS ASSESS DOC 2+ FALLS/FALL W/INJURY/YR: ICD-10-PCS | Mod: CPTII,S$GLB,, | Performed by: ORTHOPAEDIC SURGERY

## 2019-04-11 NOTE — PROGRESS NOTES
CHIEF COMPLAINT: Right small finger.                                             HISTORY OF PRESENT ILLNESS 1/8/19:  The patient is a 68 y.o.  female who presents for evaluation of her right small finger PIP joint pain. Patient works in [employer]. Patient was walking and fell into hole approximately 6 weeks ago and sustained R 5th MT fracture being treated in a boot. Since that time, she has also had mild right small finger pain. She has also noted the inability to fully extend PIP. No prior pain before fall. Has noted a volar mass over soft tissues over P1 for several years.    Interval History 4/11/19  Patient here for follow-up of right small finger volar plate injury. Has been doing OT at [facility]. Pain has been significantly improving. Has had some trouble with splint compliance and has takes it off to write cards and type.       Pain Quality: achy  Pain Context:unchanged  Pain Timing: intermittent  Pain Location:Dorsal small finger PIP  Pain Severity: mild  Aggrevating Factors: actvities   Previous Treatments: none  Associated Signs and Symptoms:none      Pain is affecting ADLs.       PAST MEDICAL HISTORY:   Past Medical History:   Diagnosis Date    Anxiety     Hyperlipidemia     Hypertension     Mitral valve prolapse      PAST SURGICAL HISTORY:   Past Surgical History:   Procedure Laterality Date    COLONOSCOPY N/A 1/27/2015    Performed by Deniz Jones MD at [facility] (4TH FLR)    TONSILLECTOMY       FAMILY HISTORY:   Family History   Problem Relation Age of Onset    Cancer Mother         lung    Stroke Mother     Heart disease Father     Diabetes Father     Diabetes Brother     Aortic aneurysm Brother         surgery 5/2012    Kidney disease Brother         on dialysis    Melanoma Neg Hx     Breast cancer Neg Hx     Colon cancer Neg Hx     Ovarian cancer Neg Hx      SOCIAL HISTORY:   Social History     Socioeconomic History    Marital status:      Spouse name:  Not on file    Number of children: Not on file    Years of education: Not on file    Highest education level: Not on file   Occupational History     Employer: [employer]   Social Needs    Financial resource strain: Not on file    Food insecurity:     Worry: Not on file     Inability: Not on file    Transportation needs:     Medical: Not on file     Non-medical: Not on file   Tobacco Use    Smoking status: Never Smoker    Smokeless tobacco: Never Used   Substance and Sexual Activity    Alcohol use: Yes     Alcohol/week: 8.4 oz     Types: 14 Glasses of wine per week     Comment: daily    Drug use: No    Sexual activity: Yes     Partners: Male     Birth control/protection: Post-menopausal   Lifestyle    Physical activity:     Days per week: Not on file     Minutes per session: Not on file    Stress: Not on file   Relationships    Social connections:     Talks on phone: Not on file     Gets together: Not on file     Attends Restorationist service: Not on file     Active member of club or organization: Not on file     Attends meetings of clubs or organizations: Not on file     Relationship status: Not on file   Other Topics Concern    Are you pregnant or think you may be? Not Asked    Breast-feeding Not Asked   Social History Narrative    Not on file       MEDICATIONS:   Current Outpatient Medications:     aspirin (ECOTRIN) 81 MG EC tablet, Take 81 mg by mouth once daily.  , Disp: , Rfl:     biotin 300 mcg Tab, Take 1 tablet by mouth once daily., Disp: , Rfl:     diclofenac (VOLTAREN) 75 MG EC tablet, Take 1 tablet (75 mg total) by mouth 2 (two) times daily., Disp: 28 tablet, Rfl: 0    diclofenac sodium (VOLTAREN) 1 % Gel, Apply 2 g topically 4 (four) times daily., Disp: 1 Tube, Rfl: 6    escitalopram oxalate (LEXAPRO) 10 MG tablet, Take 1 tablet (10 mg total) by mouth once daily., Disp: 30 tablet, Rfl: 12    metoprolol succinate (TOPROL-XL) 50 MG 24 hr tablet, TAKE 1 TABLET BY MOUTH  "DAILY, Disp: 90 tablet, Rfl: 3    phentermine (ADIPEX-P) 37.5 mg tablet, Take 1/2 tablet by mouth once daily., Disp: 30 tablet, Rfl: 1    simvastatin (ZOCOR) 40 MG tablet, TAKE 1 TABLET BY MOUTH EVERY EVENING., Disp: 90 tablet, Rfl: 3    topiramate (TROKENDI XR) 50 mg Cp24, Take one tablet by mouth once daily., Disp: 30 capsule, Rfl: 3    tretinoin (RETIN-A) 0.1 % cream, APPLY A THIN FILM TO AFFECTED AREA EVERY EVENING AFTER MOISTURIZING., Disp: 45 g, Rfl: 3    vitamin D 1000 units Tab, Take 2,000 mg by mouth once daily. , Disp: , Rfl:     triamterene-hydrochlorothiazide 37.5-25 mg (DYAZIDE) 37.5-25 mg per capsule, take one capsule by mouth every day, Disp: 90 capsule, Rfl: 3  ALLERGIES: Review of patient's allergies indicates:  No Known Allergies    VITAL SIGNS: BP (!) 161/91   Pulse 69   Ht 5' 3" (1.6 m)   Wt 77.6 kg (171 lb)   BMI 30.29 kg/m²      Review of Systems   Constitution: Negative for chills, fever, weakness and weight loss.   HENT: Negative for congestion.   Cardiovascular: Negative for chest pain and dyspnea on exertion.   Respiratory: Negative for cough and shortness of breath.   Hematologic/Lymphatic: Does not bruise/bleed easily.   Skin: Negative for rash and suspicious lesions.   Musculoskeletal: see HPI  Gastrointestinal: Negative for bowel incontinence, constipation,diarrhea, vomiting.   Genitourinary: Negative for bladder incontinence.   Neurological: Negative for numbness, paresthesias and sensory change.       PHYSICAL EXAMINATION:  General:  The patient is alert and oriented x 3.  Mood is pleasant.  Observation of ears, eyes and nose reveal no gross abnormalities.  No labored breathing observed.  Gait is coordinated. Patient can toe walk and heel walk without difficulty.     RUE:  Skin intact throughout  Volar soft tissue mass over P1  15 degree extensor lag, able to passively extend a little more  Full flexion of small finger  FDP and FDS intact to all digits  Negative Zhang's test " of small finger    EXTREMITY NEURO-VASCULAR EXAM    Sensation grossly intact to light touch all dermatomal regions.    DTR 2+ Biceps, Triceps, BR and Negative Sybils sign   Grossly intact motor function of AIN, PIN, Median, Ulnar , Radial nerves   Distal pulses radial and ulnar 2+, brisk cap refill, symmetric.    Compartments of forearm and hand are soft and compressible     NECK:  Painless FROM and spinous processes non-tender. Negative Spurlings sign.      XRAYS:  Hand series right,  were obtained and reviewed  No convincing fracture or dislocation is noted. 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 Unlikely to be able to improve PIP extension at this point in time  Follow-up PRN

## 2019-04-12 ENCOUNTER — CLINICAL SUPPORT (OUTPATIENT)
Dept: REHABILITATION | Facility: HOSPITAL | Age: 69
End: 2019-04-12
Attending: ORTHOPAEDIC SURGERY
Payer: COMMERCIAL

## 2019-04-12 ENCOUNTER — OFFICE VISIT (OUTPATIENT)
Dept: OBSTETRICS AND GYNECOLOGY | Facility: CLINIC | Age: 69
End: 2019-04-12
Payer: COMMERCIAL

## 2019-04-12 VITALS
HEIGHT: 63 IN | SYSTOLIC BLOOD PRESSURE: 128 MMHG | BODY MASS INDEX: 30.12 KG/M2 | WEIGHT: 170 LBS | DIASTOLIC BLOOD PRESSURE: 82 MMHG

## 2019-04-12 DIAGNOSIS — Z01.419 WELL WOMAN EXAM WITH ROUTINE GYNECOLOGICAL EXAM: Primary | ICD-10-CM

## 2019-04-12 DIAGNOSIS — M25.60 RANGE OF MOTION DEFICIT: ICD-10-CM

## 2019-04-12 DIAGNOSIS — M20.021 BOUTONNIERE DEFORMITY OF FINGER OF RIGHT HAND: ICD-10-CM

## 2019-04-12 DIAGNOSIS — R29.898 DECREASED GRIP STRENGTH OF RIGHT HAND: ICD-10-CM

## 2019-04-12 PROCEDURE — 3079F DIAST BP 80-89 MM HG: CPT | Mod: CPTII,S$GLB,, | Performed by: OBSTETRICS & GYNECOLOGY

## 2019-04-12 PROCEDURE — 99397 PER PM REEVAL EST PAT 65+ YR: CPT | Mod: S$GLB,,, | Performed by: OBSTETRICS & GYNECOLOGY

## 2019-04-12 PROCEDURE — 99397 PR PREVENTIVE VISIT,EST,65 & OVER: ICD-10-PCS | Mod: S$GLB,,, | Performed by: OBSTETRICS & GYNECOLOGY

## 2019-04-12 PROCEDURE — 99999 PR PBB SHADOW E&M-EST. PATIENT-LVL III: CPT | Mod: PBBFAC,,, | Performed by: OBSTETRICS & GYNECOLOGY

## 2019-04-12 PROCEDURE — 99999 PR PBB SHADOW E&M-EST. PATIENT-LVL III: ICD-10-PCS | Mod: PBBFAC,,, | Performed by: OBSTETRICS & GYNECOLOGY

## 2019-04-12 PROCEDURE — 97110 THERAPEUTIC EXERCISES: CPT

## 2019-04-12 PROCEDURE — 97022 WHIRLPOOL THERAPY: CPT

## 2019-04-12 PROCEDURE — 3079F PR MOST RECENT DIASTOLIC BLOOD PRESSURE 80-89 MM HG: ICD-10-PCS | Mod: CPTII,S$GLB,, | Performed by: OBSTETRICS & GYNECOLOGY

## 2019-04-12 PROCEDURE — 3074F SYST BP LT 130 MM HG: CPT | Mod: CPTII,S$GLB,, | Performed by: OBSTETRICS & GYNECOLOGY

## 2019-04-12 PROCEDURE — 3074F PR MOST RECENT SYSTOLIC BLOOD PRESSURE < 130 MM HG: ICD-10-PCS | Mod: CPTII,S$GLB,, | Performed by: OBSTETRICS & GYNECOLOGY

## 2019-04-12 NOTE — PROGRESS NOTES
History & Physical  Gynecology      SUBJECTIVE:     Chief Complaint: Well Woman       History of Present Illness:  Annual Exam-Postmenopausal  Patient presents for annual exam. The patient has no complaints today. Patient denies post-menopausal vaginal bleeding. The patient is not sexually active. The patient is not taking hormone replacement therapy.  The patient participates in regular exercise: yes.  She does not smoke.     GYN screening history: last pap: approximate date  and was normal  Mammogram history: ordered  Colonoscopy history: , repeat 10 years  Dexa history: ordered    FH:   Breast cancer: neg  Colon cancer: neg  Ovarian cancer: neg    Review of patient's allergies indicates:  No Known Allergies    Past Medical History:   Diagnosis Date    Anxiety     Hyperlipidemia     Hypertension     Mitral valve prolapse      Past Surgical History:   Procedure Laterality Date    COLONOSCOPY N/A 2015    Performed by Deniz Jones MD at [facility] ([address] [place] [address])    TONSILLECTOMY       OB History        0    Para        Term   0            AB        Living           SAB        TAB        Ectopic        Multiple        Live Births                   Family History   Problem Relation Age of Onset    Cancer Mother         lung    Stroke Mother     Heart disease Father     Diabetes Father     Diabetes Brother     Aortic aneurysm Brother         surgery 2012    Kidney disease Brother         on dialysis    Melanoma Neg Hx     Breast cancer Neg Hx     Colon cancer Neg Hx     Ovarian cancer Neg Hx      Social History     Tobacco Use    Smoking status: Never Smoker    Smokeless tobacco: Never Used   Substance Use Topics    Alcohol use: Yes     Alcohol/week: 8.4 oz     Types: 14 Glasses of wine per week     Comment: daily    Drug use: No       Current Outpatient Medications   Medication Sig    aspirin (ECOTRIN) 81 MG EC tablet Take 81 mg by mouth once daily.      biotin 300 mcg  Tab Take 1 tablet by mouth once daily.    diclofenac (VOLTAREN) 75 MG EC tablet Take 1 tablet (75 mg total) by mouth 2 (two) times daily.    diclofenac sodium (VOLTAREN) 1 % Gel Apply 2 g topically 4 (four) times daily.    escitalopram oxalate (LEXAPRO) 10 MG tablet Take 1 tablet (10 mg total) by mouth once daily.    metoprolol succinate (TOPROL-XL) 50 MG 24 hr tablet TAKE 1 TABLET BY MOUTH DAILY    phentermine (ADIPEX-P) 37.5 mg tablet Take 1/2 tablet by mouth once daily.    simvastatin (ZOCOR) 40 MG tablet TAKE 1 TABLET BY MOUTH EVERY EVENING.    topiramate (TROKENDI XR) 50 mg Cp24 Take one tablet by mouth once daily.    tretinoin (RETIN-A) 0.1 % cream APPLY A THIN FILM TO AFFECTED AREA EVERY EVENING AFTER MOISTURIZING.    vitamin D 1000 units Tab Take 2,000 mg by mouth once daily.     triamterene-hydrochlorothiazide 37.5-25 mg (DYAZIDE) 37.5-25 mg per capsule take one capsule by mouth every day     No current facility-administered medications for this visit.        Review of Systems:  Review of Systems   Constitutional: Negative for appetite change, fever and unexpected weight change.   Respiratory: Negative for shortness of breath.    Cardiovascular: Negative for chest pain.   Gastrointestinal: Negative for nausea and vomiting.   Genitourinary: Negative for dyspareunia, frequency, genital sores, pelvic pain, urgency, vaginal bleeding, vaginal discharge, vaginal pain, urinary incontinence, postcoital bleeding, postmenopausal bleeding and vaginal odor.        OBJECTIVE:     Physical Exam:  Physical Exam   Constitutional: She is oriented to person, place, and time. She appears well-developed and well-nourished.   Neck: Normal range of motion. Neck supple. No tracheal deviation present. No thyromegaly present.   Cardiovascular: Normal rate, regular rhythm and normal heart sounds.   Pulmonary/Chest: Effort normal and breath sounds normal. Right breast exhibits no inverted nipple, no mass, no nipple  discharge, no skin change and no tenderness. Left breast exhibits no inverted nipple, no mass, no nipple discharge, no skin change and no tenderness. Breasts are symmetrical.   Abdominal: Soft.   Genitourinary: Vagina normal and uterus normal. No labial fusion. There is no rash, tenderness, lesion or injury on the right labia. There is no rash, tenderness, lesion or injury on the left labia. Uterus is not deviated, not enlarged, not fixed and not tender. Cervix exhibits no motion tenderness, no discharge and no friability. Right adnexum displays no mass, no tenderness and no fullness. Left adnexum displays no mass, no tenderness and no fullness. No erythema, tenderness or bleeding in the vagina. No foreign body in the vagina. No signs of injury around the vagina. No vaginal discharge found.   Genitourinary Comments: Urethra: normal appearing urethra with no masses, tenderness or lesions  Urethral meatus: normal size, anterior vaginal wall with no prolapse, no lesions   Neurological: She is alert and oriented to person, place, and time.   Psychiatric: She has a normal mood and affect. Her behavior is normal. Judgment and thought content normal.   Nursing note and vitals reviewed.      Chaperoned by: Janie    ASSESSMENT:       ICD-10-CM ICD-9-CM    1. Well woman exam with routine gynecological exam Z01.419 V72.31           Plan:      Carmen was seen today for well woman.    Diagnoses and all orders for this visit:    Well woman exam with routine gynecological exam        No orders of the defined types were placed in this encounter.      Well Woman:   - Pap smear: no longer required  - Mammogram: ordered  - Colonoscopy: up to date  - Dexa: ordered  - Immunizations: with pcp  - Labs: with pcp  - Exercise counseling provided      Follow up in one year for annual, or prn.    Maggi Graham

## 2019-04-12 NOTE — PROGRESS NOTES
I have personally taken the history and examined this patient. I agree with the resident's note as stated above. Pt had volar plate in jury. Pt has been busy and was unable to f/u in clinic. Her job also prevents her from wearing her finger splint as indicated. Pt has a mild flexion contracture at small finger PIP joint with appropriate swelling. She has a composite fist, NTTP over PIP joint  Plan for HEP, return to activities, F/U PRN

## 2019-04-12 NOTE — PROGRESS NOTES
"                            Occupational Therapy Discharge Note     Date: 4/12/2019  Name: Carmen Khan  Clinic Number: [id number]    Therapy Diagnosis:   Encounter Diagnoses   Name Primary?   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  Subjective     Pt reports: she was compliant with home exercise program given last session.   Response to previous treatment:"I'm improving"  Functional change: Able to  objects    Pain: 0/10 at best, 4/10 at worst  Location: right small finger      Objective   Observation  :Swelling,  flexion contracture of PIP      Sensation: intact   Scar / Wound: none   Edema:  Minimal   PP 5.3 CM   PIP 5.0 CM   MP 4.2 CM   DIP 4.1 CM   DP 3.8 CM         Range of Motion:         SMALL FINGER               MP  0/80              PIP 8/99              DIP 5/70                                          Manual Muscle Test:  FDP 5/5   FDS 5/5   EDM 5/5   ADM 5/5                                        Strength: (PHILL Dynamometer in lbs.), Average 3 trials, Position II               Left      Right                24         13     Carmen received the following supervised modalities after being cleared for contradictions for 15 minutes:   -Patient received paraffin " and moist heat to right hand for 15 minutes to increase blood flow, circulation, pain management and for tissue elasticity prior to therex.     Elvia received manual therapy after being cleared for contradictions for 5 minutes:  STM to right small finger    Carmen received therapeutic exercises for 20 minutes including:  -A/AAROM as follows: jt blocking, TGES, finger ext, finger abd/add x 10 reps ea  -towel scrunches x 10 reps (NT - time constraints)  -isopheres x 3 min (NT- time constraints)  -green digi-extend x 30 reps  -large pom poms 3 containers with green cp   -red putty as follows: 2' molding, 30 grasps, 30 pinches with thumb/small finger (NT - time constrints)  -3 yellow and 1 red band on hand gripper x 30 reps (NT- time contraints)        Home Exercises and Education Provided     Education provided:   - Home program as listed in  Media section  - Progress towards goals: Excellent    Written Home Exercises Provided: Patient instructed to cont prior HEP.  Exercises were reviewed and Carmen was able to demonstrate them prior to the end of the session.  Carmen demonstrated good  understanding of the education provided.     See EMR under Patient Instructions for exercises provided 1/30/2019.    Carmen demonstrated good  understanding of the education provided.         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 Pt tolerated all activities with no complaints of pain.        Carmen is progressing well towards her goals and there are no updates to goals at this time. Pt prognosis is Good.     Anticipated barriers to occupational therapy: none    Pt's spiritual, cultural and educational needs considered and pt agreeable to plan of care and goals.    Goals:  Long term:  ( 6 weeks)   1)  Patient to be IND with HEP and modalities for pain management.  Met 1/26/2019  2)  Increase ROM 3-5 degrees to increase functional hand use for ADLs/work/leisure activities- Met 1/30/2019  3)  Decrease edema .1-.3 mm to increase joint mobility /flexibility for functional hand use. - Met 1/30/2019  4)  Assess  and set goals accordingly -Met 1/30/2019    New Goals:  1) Pt will have 0/10 pain complaints with functional use.-progressing  2) Pt will return to all ADLS using her right hand with no difficulty.-progressing      Plan: Discharge from OT per MD directives.   Patient to be treated by Occupational Therapy    1   times per week for   6-8                  weeks  during the certification period from   3/6/2019  to  4/29/2019    to achieve the established goals.  Treatment to include :  paraffin, fluidotherapy, manual therapy/joint mobilizations, orthotic fabrication/fitting/training,   modalities for pain management, US 3mhz, therapeutic exercises/activities,        strengthening,    scar and edema management, as well as any other treatments deemed necessary based on the patient's needs or progress.                  Discussed Plan of Care with patient: Yes  Updates/Grading for next session: N/A      Selam Flor, OT

## 2019-04-15 RX ORDER — TRIAMTERENE AND HYDROCHLOROTHIAZIDE 37.5; 25 MG/1; MG/1
CAPSULE ORAL DAILY
Qty: 90 CAPSULE | Refills: 3 | Status: SHIPPED | OUTPATIENT
Start: 2019-04-15 | End: 2020-07-13 | Stop reason: SDUPTHER

## 2019-04-17 ENCOUNTER — OFFICE VISIT (OUTPATIENT)
Dept: INTERNAL MEDICINE | Facility: CLINIC | Age: 69
End: 2019-04-17
Payer: COMMERCIAL

## 2019-04-17 VITALS
HEART RATE: 84 BPM | BODY MASS INDEX: 30.31 KG/M2 | WEIGHT: 171.06 LBS | OXYGEN SATURATION: 96 % | SYSTOLIC BLOOD PRESSURE: 138 MMHG | HEIGHT: 63 IN | DIASTOLIC BLOOD PRESSURE: 80 MMHG

## 2019-04-17 DIAGNOSIS — S69.90XD FINGER INJURY, UNSPECIFIED LATERALITY, SUBSEQUENT ENCOUNTER: ICD-10-CM

## 2019-04-17 DIAGNOSIS — I10 ESSENTIAL HYPERTENSION: Primary | ICD-10-CM

## 2019-04-17 DIAGNOSIS — R73.09 ELEVATED GLUCOSE: ICD-10-CM

## 2019-04-17 PROCEDURE — 99999 PR PBB SHADOW E&M-EST. PATIENT-LVL III: CPT | Mod: PBBFAC,,, | Performed by: INTERNAL MEDICINE

## 2019-04-17 PROCEDURE — 3075F SYST BP GE 130 - 139MM HG: CPT | Mod: CPTII,S$GLB,, | Performed by: INTERNAL MEDICINE

## 2019-04-17 PROCEDURE — 3075F PR MOST RECENT SYSTOLIC BLOOD PRESS GE 130-139MM HG: ICD-10-PCS | Mod: CPTII,S$GLB,, | Performed by: INTERNAL MEDICINE

## 2019-04-17 PROCEDURE — 1101F PR PT FALLS ASSESS DOC 0-1 FALLS W/OUT INJ PAST YR: ICD-10-PCS | Mod: CPTII,S$GLB,, | Performed by: INTERNAL MEDICINE

## 2019-04-17 PROCEDURE — 3079F PR MOST RECENT DIASTOLIC BLOOD PRESSURE 80-89 MM HG: ICD-10-PCS | Mod: CPTII,S$GLB,, | Performed by: INTERNAL MEDICINE

## 2019-04-17 PROCEDURE — 3079F DIAST BP 80-89 MM HG: CPT | Mod: CPTII,S$GLB,, | Performed by: INTERNAL MEDICINE

## 2019-04-17 PROCEDURE — 99213 OFFICE O/P EST LOW 20 MIN: CPT | Mod: S$GLB,,, | Performed by: INTERNAL MEDICINE

## 2019-04-17 PROCEDURE — 99999 PR PBB SHADOW E&M-EST. PATIENT-LVL III: ICD-10-PCS | Mod: PBBFAC,,, | Performed by: INTERNAL MEDICINE

## 2019-04-17 PROCEDURE — 99213 PR OFFICE/OUTPT VISIT, EST, LEVL III, 20-29 MIN: ICD-10-PCS | Mod: S$GLB,,, | Performed by: INTERNAL MEDICINE

## 2019-04-17 PROCEDURE — 1101F PT FALLS ASSESS-DOCD LE1/YR: CPT | Mod: CPTII,S$GLB,, | Performed by: INTERNAL MEDICINE

## 2019-04-17 NOTE — PROGRESS NOTES
Subjective:      Patient ID: Carmen Khan is a 68 y.o. female.    Chief Complaint: Follow-up    HPI:  HPI   Patient comes in today and is here from follow-up of medical problems. She has completed therapy on her feet believes they are somewhat better.  She had a recent evaluation in orthopedic hand which notes that secondary to not being able to wear the splint consistently is unlikely that the finger will straighten.    The patient's blood pressure was initially elevated but upon recheck was within acceptable limits I suspect in some ways it is affected by the phentermine.      Patient Active Problem List   Diagnosis    Hypertension    Hyperlipidemia    Anxiety    Screening for colon cancer    Primary osteoarthritis of left foot    Age-related macular degeneration, dry, both eyes    Posterior vitreous detachment of both eyes    Nuclear cataract of both eyes    Elevated glucose    Osteoarthritis of knee    Family history of colonic polyps    Rib pain on left side    Pain in the muscles    Arm pain, musculoskeletal, left    Nonexudative age-related macular degeneration, bilateral, intermediate dry stage    Lumbar radiculopathy    Chronic right-sided low back pain with sciatica    Left foot pain     Past Medical History:   Diagnosis Date    Anxiety     Hyperlipidemia     Hypertension     Mitral valve prolapse      Past Surgical History:   Procedure Laterality Date    COLONOSCOPY N/A 1/27/2015    Performed by Deniz Jones MD at [facility] (4TH FLR)    TONSILLECTOMY       Family History   Problem Relation Age of Onset    Cancer Mother         lung    Stroke Mother     Heart disease Father     Diabetes Father     Diabetes Brother     Aortic aneurysm Brother         surgery 5/2012    Kidney disease Brother         on dialysis    Melanoma Neg Hx     Breast cancer Neg Hx     Colon cancer Neg Hx     Ovarian cancer Neg Hx      Review of Systems   Constitutional: Negative for  "activity change, chills, fever and unexpected weight change.   Respiratory: Negative for cough, chest tightness, shortness of breath and wheezing.    Cardiovascular: Negative for chest pain, palpitations and leg swelling.     Objective:     Vitals:    04/17/19 1333 04/17/19 1410   BP: (!) 142/80 138/80   Pulse: 84    SpO2: 96%    Weight: 77.6 kg (171 lb 1.2 oz)    Height: 5' 3" (1.6 m)    PainSc: 0-No pain      Body mass index is 30.3 kg/m².  Physical Exam   Constitutional: She appears well-developed and well-nourished.   Neck: No JVD present. No thyromegaly present.   Cardiovascular: Normal rate, normal heart sounds and intact distal pulses.   Pulmonary/Chest: Effort normal and breath sounds normal. No respiratory distress.     Assessment:     1. Essential hypertension    2. Finger injury, unspecified laterality, subsequent encounter    3. Elevated glucose      Plan:   Carmen was seen today for follow-up.    Diagnoses and all orders for this visit:    Essential hypertension:  Well controlled same medication  -     CBC auto differential; Future  -     Comprehensive metabolic panel; Future  -     Lipid panel; Future    Finger injury, unspecified laterality, subsequent encounter:  Finger we will always have a contracture    Elevated glucose:  Follow-up in 6 months with A1c  -     Hemoglobin A1c; Future        Problem List Items Addressed This Visit     Hypertension - Primary    Relevant Orders    CBC auto differential    Comprehensive metabolic panel    Lipid panel    Elevated glucose    Relevant Orders    Hemoglobin A1c      Other Visit Diagnoses     Finger injury, unspecified laterality, subsequent encounter            Orders Placed This Encounter   Procedures    CBC auto differential     Standing Status:   Future     Standing Expiration Date:   12/17/2019    Comprehensive metabolic panel     Standing Status:   Future     Standing Expiration Date:   12/17/2019    Hemoglobin A1c     Standing Status:   Future     " Standing Expiration Date:   12/17/2019    Lipid panel     Standing Status:   Future     Standing Expiration Date:   12/17/2019     Follow up in about 6 months (around 10/17/2019) for Follow up cbc, cmp, lipid a1c .     Medication List           Accurate as of 4/17/19  5:27 PM. If you have any questions, ask your nurse or doctor.               CHANGE how you take these medications    diclofenac sodium 1 % Gel  Commonly known as:  VOLTAREN  Apply 2 g topically 4 (four) times daily.  What changed:  Another medication with the same name was removed. Continue taking this medication, and follow the directions you see here.  Changed by:  Tessa Edmondson MD        CONTINUE taking these medications    aspirin 81 MG EC tablet  Commonly known as:  ECOTRIN     biotin 300 mcg Tab     escitalopram oxalate 10 MG tablet  Commonly known as:  LEXAPRO  Take 1 tablet (10 mg total) by mouth once daily.     metoprolol succinate 50 MG 24 hr tablet  Commonly known as:  TOPROL-XL  TAKE 1 TABLET BY MOUTH DAILY     phentermine 37.5 mg tablet  Commonly known as:  ADIPEX-P  Take 1/2 tablet by mouth once daily.     simvastatin 40 MG tablet  Commonly known as:  ZOCOR  TAKE 1 TABLET BY MOUTH EVERY EVENING.     tretinoin 0.1 % cream  Commonly known as:  RETIN-A  APPLY A THIN FILM TO AFFECTED AREA EVERY EVENING AFTER MOISTURIZING.     triamterene-hydrochlorothiazide 37.5-25 mg 37.5-25 mg per capsule  Commonly known as:  DYAZIDE  take one capsule by mouth every day     TROKENDI XR 50 mg Cp24  Generic drug:  topiramate  Take one tablet by mouth once daily.     vitamin D 1000 units Tab  Commonly known as:  VITAMIN D3

## 2019-04-25 ENCOUNTER — HOSPITAL ENCOUNTER (OUTPATIENT)
Dept: RADIOLOGY | Facility: CLINIC | Age: 69
Discharge: HOME | End: 2019-04-25
Attending: INTERNAL MEDICINE
Payer: COMMERCIAL

## 2019-04-25 DIAGNOSIS — M81.0 POSTMENOPAUSAL BONE LOSS: ICD-10-CM

## 2019-04-25 PROCEDURE — 77080 DXA BONE DENSITY AXIAL: CPT | Mod: TC

## 2019-04-25 PROCEDURE — 77080 DXA BONE DENSITY AXIAL: CPT | Mod: 26,,, | Performed by: INTERNAL MEDICINE

## 2019-04-25 PROCEDURE — 77080 DEXA BONE DENSITY SPINE HIP: ICD-10-PCS | Mod: 26,,, | Performed by: INTERNAL MEDICINE

## 2019-06-17 ENCOUNTER — PATIENT MESSAGE (OUTPATIENT)
Dept: ADMINISTRATIVE | Facility: OTHER | Age: 69
End: 2019-06-17

## 2019-07-01 ENCOUNTER — OFFICE VISIT (OUTPATIENT)
Dept: BARIATRICS | Facility: CLINIC | Age: 69
End: 2019-07-01
Payer: COMMERCIAL

## 2019-07-01 VITALS
DIASTOLIC BLOOD PRESSURE: 80 MMHG | SYSTOLIC BLOOD PRESSURE: 130 MMHG | HEART RATE: 75 BPM | HEIGHT: 63 IN | BODY MASS INDEX: 29.84 KG/M2 | WEIGHT: 168.44 LBS

## 2019-07-01 DIAGNOSIS — E66.3 OVERWEIGHT (BMI 25.0-29.9): Primary | ICD-10-CM

## 2019-07-01 PROCEDURE — 3079F DIAST BP 80-89 MM HG: CPT | Mod: CPTII,S$GLB,, | Performed by: INTERNAL MEDICINE

## 2019-07-01 PROCEDURE — 3075F PR MOST RECENT SYSTOLIC BLOOD PRESS GE 130-139MM HG: ICD-10-PCS | Mod: CPTII,S$GLB,, | Performed by: INTERNAL MEDICINE

## 2019-07-01 PROCEDURE — 99999 PR PBB SHADOW E&M-EST. PATIENT-LVL III: ICD-10-PCS | Mod: PBBFAC,,, | Performed by: INTERNAL MEDICINE

## 2019-07-01 PROCEDURE — 3079F PR MOST RECENT DIASTOLIC BLOOD PRESSURE 80-89 MM HG: ICD-10-PCS | Mod: CPTII,S$GLB,, | Performed by: INTERNAL MEDICINE

## 2019-07-01 PROCEDURE — 3075F SYST BP GE 130 - 139MM HG: CPT | Mod: CPTII,S$GLB,, | Performed by: INTERNAL MEDICINE

## 2019-07-01 PROCEDURE — 99999 PR PBB SHADOW E&M-EST. PATIENT-LVL III: CPT | Mod: PBBFAC,,, | Performed by: INTERNAL MEDICINE

## 2019-07-01 PROCEDURE — 1101F PT FALLS ASSESS-DOCD LE1/YR: CPT | Mod: CPTII,S$GLB,, | Performed by: INTERNAL MEDICINE

## 2019-07-01 PROCEDURE — 99213 OFFICE O/P EST LOW 20 MIN: CPT | Mod: S$GLB,,, | Performed by: INTERNAL MEDICINE

## 2019-07-01 PROCEDURE — 1101F PR PT FALLS ASSESS DOC 0-1 FALLS W/OUT INJ PAST YR: ICD-10-PCS | Mod: CPTII,S$GLB,, | Performed by: INTERNAL MEDICINE

## 2019-07-01 PROCEDURE — 99213 PR OFFICE/OUTPT VISIT, EST, LEVL III, 20-29 MIN: ICD-10-PCS | Mod: S$GLB,,, | Performed by: INTERNAL MEDICINE

## 2019-07-01 RX ORDER — PHENTERMINE HYDROCHLORIDE 37.5 MG/1
TABLET ORAL
Qty: 30 TABLET | Refills: 1 | Status: SHIPPED | OUTPATIENT
Start: 2019-07-01 | End: 2019-10-15

## 2019-07-01 RX ORDER — TOPIRAMATE 50 MG/1
50 CAPSULE, EXTENDED RELEASE ORAL DAILY
Qty: 30 CAPSULE | Refills: 3 | Status: SHIPPED | OUTPATIENT
Start: 2019-07-01 | End: 2019-10-15 | Stop reason: SDUPTHER

## 2019-07-01 NOTE — PROGRESS NOTES
"Subjective:       Patient ID: Carmen Khan is a 69 y.o. female.    Chief Complaint: Follow-up    Pt here today for follow up. Has lost 1 lbs from previous visit, net neg 1 lbs neg. Has been on topiramte and phentermine, last filled 3/15/19.   checked today. Trying to take more regularly.  She has on trokendi as well. . She is released from PT for her broken foot. Just getting around has been better..     Review of Systems   Constitutional: Negative for chills and fever.   Respiratory: Negative for apnea and shortness of breath.         + snores   Cardiovascular: Negative for chest pain and leg swelling.   Gastrointestinal: Negative for constipation and diarrhea.        Denies HB   Genitourinary: Negative for dysuria.   Musculoskeletal: Positive for arthralgias. Negative for back pain.        Left foot OA   Neurological: Negative for dizziness and light-headedness.   Psychiatric/Behavioral: Negative for dysphoric mood. The patient is not nervous/anxious.         Doing well on Lexapro       Objective:       /80   Pulse 75   Ht 5' 3" (1.6 m)   Wt 76.4 kg (168 lb 6.9 oz)   BMI 29.84 kg/m²     Physical Exam   Constitutional: She is oriented to person, place, and time. She appears well-developed. No distress.   Obese     HENT:   Head: Normocephalic and atraumatic.   Eyes: Pupils are equal, round, and reactive to light. EOM are normal. No scleral icterus.   Neck: Normal range of motion. Neck supple.   Cardiovascular: Normal rate.   Pulmonary/Chest: Effort normal.   Musculoskeletal: Normal range of motion. She exhibits no edema.   Neurological: She is alert and oriented to person, place, and time. No cranial nerve deficit.   Skin: Skin is warm and dry. No erythema.   Psychiatric: She has a normal mood and affect. Her behavior is normal. Judgment normal.   Vitals reviewed.      Assessment:       1. Overweight (BMI 25.0-29.9)        Plan:            Carmen was seen today for follow-up.    Diagnoses and " all orders for this visit:    Overweight (BMI 25.0-29.9)  -     phentermine (ADIPEX-P) 37.5 mg tablet; Take 1/2 tablet by mouth once daily.    Other orders  -     topiramate (TROKENDI XR) 50 mg Cp24; Take one tablet by mouth once daily.            Patient was informed that topiramate is used for migraine prevention and seizures. Weight loss is a common side effect that is well documented. She understands this. She was informed of the potential side effects such as serious and possibly fatal rash in which case the medication should be discontinued immediately. Paresthesias, forgetfulness, fatigue, kidney stones, GI symptoms, and changes in lab values such as electrolytes, blood counts and kidney function.    Patient warned of common side effects of phentermine including anxiety, insomnia, palpitations and increased blood pressure. It was also explained that it is for short-term usage along with diet and exercise, and that stopping the medication without making lifestyle changes will result in regain of weight. Patient states understanding.     Weight loss medications are controlled substances.  They require routine follow up. Prescription or pills that are lost or destroyed will not be replaced.       Continue phentermine with 1/2 pill a day       3 meals a day made up of the following:  Unlimited green vegetables, tomatoes, mushrooms, spaghetti squash, cauliflower, meat, poultry, seafood, eggs and hard cheeses.   Milk and plain yogurt  Dressings, seasonings, condiments, etc should have less than 2 g sugars.   Beans (1-1.5 cups) or nuts (1/4 cup) can have 1 x a day.   1-2 servings of citrus fruits, berries, pineapple or melon a day (1/2 cup)  Avoid fried foods    No grains, rice, pasta, potatoes, bread, corn, peas, oatmeal, grits, tortillas, crackers, chips    No soda, sweet tea, juices or lemonade    Www.dietdoctor.com for recipes. Moderate carb intake      Add some type of resistance training 2-3 days a week. These  can be body weight exercises, light weight or elastic bands. [employer] and [employer] are great sources for free work out plans and videos.

## 2019-07-01 NOTE — PATIENT INSTRUCTIONS
Patient was informed that topiramate is used for migraine prevention and seizures. Weight loss is a common side effect that is well documented. She understands this. She was informed of the potential side effects such as serious and possibly fatal rash in which case the medication should be discontinued immediately. Paresthesias, forgetfulness, fatigue, kidney stones, GI symptoms, and changes in lab values such as electrolytes, blood counts and kidney function.    Patient warned of common side effects of phentermine including anxiety, insomnia, palpitations and increased blood pressure. It was also explained that it is for short-term usage along with diet and exercise, and that stopping the medication without making lifestyle changes will result in regain of weight. Patient states understanding.     Weight loss medications are controlled substances.  They require routine follow up. Prescription or pills that are lost or destroyed will not be replaced.       Continue phentermine with 1/2 pill a day       3 meals a day made up of the following:  Unlimited green vegetables, tomatoes, mushrooms, spaghetti squash, cauliflower, meat, poultry, seafood, eggs and hard cheeses.   Milk and plain yogurt  Dressings, seasonings, condiments, etc should have less than 2 g sugars.   Beans (1-1.5 cups) or nuts (1/4 cup) can have 1 x a day.   1-2 servings of citrus fruits, berries, pineapple or melon a day (1/2 cup)  Avoid fried foods    No grains, rice, pasta, potatoes, bread, corn, peas, oatmeal, grits, tortillas, crackers, chips    No soda, sweet tea, juices or lemonade    Www.dietdoctor.com for recipes. Moderate carb intake      5-Day Menu Plan: 800-1000 Calories    DAY 1     Breakfast  1 boiled egg  Small apple    Snack  ¼ cup almonds    Lunch  Morning star zach  1 cup green beans    Dinner  3 oz salmon  1 cup cooked carrots    Snack  SF jello    DAY 2    Breakfast  2 eggs with 2 tbsp salsa    Snack  6 oz greek yogurt  ½ cup  Peaches canned (in its own juice)    Lunch  [place]:Tuna and mustard   Pear cup (no sugar added)    Dinner  Baked fish  1 cup cooked yellow squash    Snack  SF popsicle            DAY 3    Breakfast   Protein drink: 1 scoop whey powder + 6oz soy milk    Snack  ¼ cup almonds    Lunch  Tuna Salad: 3oz canned tuna in water, 1 egg, and 1 tsp light gottlieb)  Pineapple cup (no sugar added)    Dinner  Baked shrimp  1 cup grilled eggplant    Snack  8oz Chocolate Soy Milk      DAY 4    Breakfast  2 eggs, morning star zach    Snack  1 cheese stick    Lunch    Snack  1/4 cup almonds  Peach cup (no sugar added)    Dinner  3oz baked fish  1 cup cooked green beans          DAY 5    Breakfast  Protein drink: 1 scoop whey powder + 6oz soy milk    Snack   ¼ cup almonds  1 apple    Lunch  1 cup tuna salad: (3oz canned tuna in water, 1 egg, 1 tsp light gottlieb)    Snack  3 oz greek yogurt  Fruit cup (no sugar added)    Dinner  Omelet: 2 eggs, grilled shrimp, bell pepper and onion

## 2019-07-17 ENCOUNTER — HOSPITAL ENCOUNTER (OUTPATIENT)
Dept: RADIOLOGY | Facility: HOSPITAL | Age: 69
Discharge: HOME | End: 2019-07-17
Attending: INTERNAL MEDICINE

## 2019-07-17 ENCOUNTER — OFFICE VISIT (OUTPATIENT)
Dept: INTERNAL MEDICINE | Facility: CLINIC | Age: 69
End: 2019-07-17
Payer: COMMERCIAL

## 2019-07-17 VITALS
WEIGHT: 168 LBS | DIASTOLIC BLOOD PRESSURE: 82 MMHG | HEART RATE: 95 BPM | BODY MASS INDEX: 29.76 KG/M2 | SYSTOLIC BLOOD PRESSURE: 136 MMHG | OXYGEN SATURATION: 99 %
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
## 2019-07-17 PROCEDURE — 71110 X-RAY EXAM RIBS BIL 3 VIEWS: CPT | Mod: TC

## 2019-07-17 PROCEDURE — 3079F DIAST BP 80-89 MM HG: CPT | Mod: CPTII,S$GLB,, | Performed by: INTERNAL MEDICINE

## 2019-07-17 PROCEDURE — 1101F PR PT FALLS ASSESS DOC 0-1 FALLS W/OUT INJ PAST YR: ICD-10-PCS | Mod: CPTII,S$GLB,, | Performed by: INTERNAL MEDICINE

## 2019-07-17 PROCEDURE — 99214 OFFICE O/P EST MOD 30 MIN: CPT | Mod: S$GLB,,, | Performed by: INTERNAL MEDICINE

## 2019-07-17 PROCEDURE — 99999 PR PBB SHADOW E&M-EST. PATIENT-LVL IV: ICD-10-PCS | Mod: PBBFAC,,, | Performed by: INTERNAL MEDICINE

## 2019-07-17 PROCEDURE — 99999 PR PBB SHADOW E&M-EST. PATIENT-LVL IV: CPT | Mod: PBBFAC,,, | Performed by: INTERNAL MEDICINE

## 2019-07-17 PROCEDURE — 73630 XR FOOT COMPLETE 3 VIEW RIGHT: ICD-10-PCS | Mod: 26,RT,, | Performed by: RADIOLOGY

## 2019-07-17 PROCEDURE — 71110 X-RAY EXAM RIBS BIL 3 VIEWS: CPT | Mod: 26,,, | Performed by: RADIOLOGY

## 2019-07-17 PROCEDURE — 99214 PR OFFICE/OUTPT VISIT, EST, LEVL IV, 30-39 MIN: ICD-10-PCS | Mod: S$GLB,,, | Performed by: INTERNAL MEDICINE

## 2019-07-17 PROCEDURE — 3075F PR MOST RECENT SYSTOLIC BLOOD PRESS GE 130-139MM HG: ICD-10-PCS | Mod: CPTII,S$GLB,, | Performed by: INTERNAL MEDICINE

## 2019-07-17 PROCEDURE — 71110 XR RIBS 3 VIEWS BILATERAL: ICD-10-PCS | Mod: 26,,, | Performed by: RADIOLOGY

## 2019-07-17 PROCEDURE — 1101F PT FALLS ASSESS-DOCD LE1/YR: CPT | Mod: CPTII,S$GLB,, | Performed by: INTERNAL MEDICINE

## 2019-07-17 PROCEDURE — 3079F PR MOST RECENT DIASTOLIC BLOOD PRESSURE 80-89 MM HG: ICD-10-PCS | Mod: CPTII,S$GLB,, | Performed by: INTERNAL MEDICINE

## 2019-07-17 PROCEDURE — 3075F SYST BP GE 130 - 139MM HG: CPT | Mod: CPTII,S$GLB,, | Performed by: INTERNAL MEDICINE

## 2019-07-17 PROCEDURE — 73630 X-RAY EXAM OF FOOT: CPT | Mod: 26,RT,, | Performed by: RADIOLOGY

## 2019-07-17 PROCEDURE — 73630 X-RAY EXAM OF FOOT: CPT | Mod: TC,RT
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
## 2019-07-23 ENCOUNTER — TELEPHONE (OUTPATIENT)
Dept: ORTHOPEDICS | Facility: CLINIC | Age: 69
End: 2019-07-23

## 2019-07-23 NOTE — TELEPHONE ENCOUNTER
Spoke with pt.  Advised that I will discuss with Dr Quinn and get back to her regarding next step directions.

## 2019-07-23 NOTE — TELEPHONE ENCOUNTER
"----- Message from Najma Bergman LPN sent at 7/23/2019 10:09 AM CDT -----  Nalini -  Received this IM from Carmen Khan:    [7/23/2019 9:52 AM]  Carmen Hawley:    Good Morning Najma, I had x-rays of my right foot (the one I broke in November - the Metatarsal).  It has been bothering me; Dr. Edmondson ordered an x-ray last week 7/17 and told me to see Dr. Quinn.  Would you be kind enough to send him a message and if I need an appointment please schedule or put me on his "wait List".  My MRN is #[id number]; extension [phone] or cell:  [phone].  Thank you. Carmen     I let her know that  is in surgery today and that you would best be able to assist with an appt. She understands and said there is no curtis.  Thank you,  Najma  "

## 2019-07-26 ENCOUNTER — TELEPHONE (OUTPATIENT)
Dept: ORTHOPEDICS | Facility: CLINIC | Age: 69
End: 2019-07-26

## 2019-07-26 NOTE — TELEPHONE ENCOUNTER
Left voice mail for pt to return my call regarding scheduling an appointment to see Dr Quinn for right foot pain    Pending return call from pt.

## 2019-07-29 ENCOUNTER — TELEPHONE (OUTPATIENT)
Dept: ORTHOPEDICS | Facility: CLINIC | Age: 69
End: 2019-07-29

## 2019-07-31 ENCOUNTER — OFFICE VISIT (OUTPATIENT)
Dept: DERMATOLOGY | Facility: CLINIC | Age: 69
End: 2019-07-31
Payer: COMMERCIAL

## 2019-07-31 DIAGNOSIS — L81.4 LENTIGO: ICD-10-CM

## 2019-07-31 DIAGNOSIS — S40.812A ABRASION OF LEFT UPPER EXTREMITY, INITIAL ENCOUNTER: ICD-10-CM

## 2019-07-31 DIAGNOSIS — D22.9 NEVUS: ICD-10-CM

## 2019-07-31 DIAGNOSIS — D18.00 ANGIOMA: ICD-10-CM

## 2019-07-31 DIAGNOSIS — L82.1 SEBORRHEIC KERATOSES: ICD-10-CM

## 2019-07-31 DIAGNOSIS — L72.0 MILIA: Primary | ICD-10-CM

## 2019-07-31 PROCEDURE — 1101F PT FALLS ASSESS-DOCD LE1/YR: CPT | Mod: CPTII,S$GLB,, | Performed by: DERMATOLOGY

## 2019-07-31 PROCEDURE — 99214 PR OFFICE/OUTPT VISIT, EST, LEVL IV, 30-39 MIN: ICD-10-PCS | Mod: S$GLB,,, | Performed by: DERMATOLOGY

## 2019-07-31 PROCEDURE — 99999 PR PBB SHADOW E&M-EST. PATIENT-LVL II: ICD-10-PCS | Mod: PBBFAC,,, | Performed by: DERMATOLOGY

## 2019-07-31 PROCEDURE — 99999 PR PBB SHADOW E&M-EST. PATIENT-LVL II: CPT | Mod: PBBFAC,,, | Performed by: DERMATOLOGY

## 2019-07-31 PROCEDURE — 99214 OFFICE O/P EST MOD 30 MIN: CPT | Mod: S$GLB,,, | Performed by: DERMATOLOGY

## 2019-07-31 PROCEDURE — 1101F PR PT FALLS ASSESS DOC 0-1 FALLS W/OUT INJ PAST YR: ICD-10-PCS | Mod: CPTII,S$GLB,, | Performed by: DERMATOLOGY

## 2019-07-31 NOTE — PROGRESS NOTES
"  Subjective:       Patient ID:  Carmen Khan is a 69 y.o. female who presents for   Chief Complaint   Patient presents with    Skin Check    Lesion     Patient is here today for a "mole" check.   Pt has a history of  normal sun exposure in the past.   Pt recalls several blistering sunburns in the past- no  Pt has history of tanning bed use- no  Pt has  had moles removed in the past- no  Pt has history of melanoma in first degree relatives-  No  C/o white cyst under left eyelid.  Denies pain or bleeding. No tx.  Also has newer brown spots all over face since last visit.  Denies pain or bleeding. No tx,.     Lesion         Review of Systems   Constitutional: Negative for fever, chills, fatigue and malaise.   Skin: Positive for daily sunscreen use.   Hematologic/Lymphatic: Does not bruise/bleed easily.        Objective:    Physical Exam   Constitutional: She appears well-developed and well-nourished. No distress.   Neurological: She is alert and oriented to person, place, and time. She is not disoriented.   Psychiatric: She has a normal mood and affect.   Skin:   Areas Examined (abnormalities noted in diagram):   Scalp / Hair Palpated and Inspected  Head / Face Inspection Performed  Neck Inspection Performed  Chest / Axilla Inspection Performed  Abdomen Inspection Performed  Genitals / Buttocks / Groin Inspection Performed  Back Inspection Performed  RUE Inspected  LUE Inspection Performed  RLE Inspected  LLE Inspection Performed  Nails and Digits Inspection Performed                           Diagram Legend     Erythematous scaling macule/papule c/w actinic keratosis       Vascular papule c/w angioma      Pigmented verrucoid papule/plaque c/w seborrheic keratosis      Yellow umbilicated papule c/w sebaceous hyperplasia      Irregularly shaped tan macule c/w lentigo     1-2 mm smooth white papules consistent with Milia      Movable subcutaneous cyst with punctum c/w epidermal inclusion cyst      " Subcutaneous movable cyst c/w pilar cyst      Firm pink to brown papule c/w dermatofibroma      Pedunculated fleshy papule(s) c/w skin tag(s)      Evenly pigmented macule c/w junctional nevus     Mildly variegated pigmented, slightly irregular-bordered macule c/w mildly atypical nevus      Flesh colored to evenly pigmented papule c/w intradermal nevus       Pink pearly papule/plaque c/w basal cell carcinoma      Erythematous hyperkeratotic cursted plaque c/w SCC      Surgical scar with no sign of skin cancer recurrence      Open and closed comedones      Inflammatory papules and pustules      Verrucoid papule consistent consistent with wart     Erythematous eczematous patches and plaques     Dystrophic onycholytic nail with subungual debris c/w onychomycosis     Umbilicated papule    Erythematous-base heme-crusted tan verrucoid plaque consistent with inflamed seborrheic keratosis     Erythematous Silvery Scaling Plaque c/w Psoriasis     See annotation      Assessment / Plan:        Milia  Reassurance given to patient. No treatment is necessary.   Treatment of benign, asymptomatic lesions may be considered cosmetic.      Lentigo  This is a benign hyperpigmented sun induced lesion. Daily sun protection will reduce the number of new lesions. Treatment of these benign lesions are considered cosmetic.  The nature of sun-induced photo-aging and skin cancers is discussed.  Sun avoidance, protective clothing, and the use of 30-SPF sunscreens is advised. Observe closely for skin damage/changes, and call if such occurs.    Seborrheic keratoses  These are benign inherited growths without a malignant potential. Reassurance given to patient. No treatment is necessary.     Nevus  Discussed ABCDE's of nevi.  Monitor for new mole or moles that are becoming bigger, darker, irritated, or developing irregular borders. Brochure provided.    Angioma  These are benign vascular lesions that are inherited.  Treatment is not  necessary.    Abrasion of left upper extremity, initial encounter  Reassurance.   Healing well.  No signs of infection     Total body skin examination performed today including at least 12 points as noted in physical examination. No lesions suspicious for malignancy noted.             Follow up in about 1 year (around 7/31/2020).

## 2019-08-09 ENCOUNTER — OFFICE VISIT (OUTPATIENT)
Dept: ORTHOPEDICS | Facility: CLINIC | Age: 69
End: 2019-08-09
Payer: COMMERCIAL

## 2019-08-09 DIAGNOSIS — S92.354D CLOSED NONDISPLACED FRACTURE OF FIFTH METATARSAL BONE OF RIGHT FOOT WITH ROUTINE HEALING, SUBSEQUENT ENCOUNTER: Primary | ICD-10-CM

## 2019-08-09 PROCEDURE — 99999 PR PBB SHADOW E&M-EST. PATIENT-LVL II: ICD-10-PCS | Mod: PBBFAC,,, | Performed by: ORTHOPAEDIC SURGERY

## 2019-08-09 PROCEDURE — 99213 OFFICE O/P EST LOW 20 MIN: CPT | Mod: S$GLB,,, | Performed by: ORTHOPAEDIC SURGERY

## 2019-08-09 PROCEDURE — 99999 PR PBB SHADOW E&M-EST. PATIENT-LVL II: CPT | Mod: PBBFAC,,, | Performed by: ORTHOPAEDIC SURGERY

## 2019-08-09 PROCEDURE — 1101F PR PT FALLS ASSESS DOC 0-1 FALLS W/OUT INJ PAST YR: ICD-10-PCS | Mod: CPTII,S$GLB,, | Performed by: ORTHOPAEDIC SURGERY

## 2019-08-09 PROCEDURE — 99213 PR OFFICE/OUTPT VISIT, EST, LEVL III, 20-29 MIN: ICD-10-PCS | Mod: S$GLB,,, | Performed by: ORTHOPAEDIC SURGERY

## 2019-08-09 PROCEDURE — 1101F PT FALLS ASSESS-DOCD LE1/YR: CPT | Mod: CPTII,S$GLB,, | Performed by: ORTHOPAEDIC SURGERY

## 2019-08-09 NOTE — LETTER
August 9, 2019      Tessa Edmondson MD  [address]  [facility] [address]           [facility]  [address]  [facility] [address]  Phone: [phone]          Patient: Carmen Khan   MR Number: [id number]   YOB: 1950   Date of Visit: 8/9/2019       Dear Dr. Tessa Edmondson:    Thank you for referring Carmen Khan to me for evaluation. Attached you will find relevant portions of my assessment and plan of care.    If you have questions, please do not hesitate to call me. I look forward to following Carmen Khan along with you.    Sincerely,    Gerald Quinn MD    Enclosure  CC:  No Recipients    If you would like to receive this communication electronically, please contact [email or web address] or [phone] to request more information on [employer] access.    For providers and/or their staff who would like to refer a patient to [facility], please contact us through our one-stop-shop provider referral line, [facility], at [phone].    If you feel you have received this communication in error or would no longer like to receive these types of communications, please e-mail [email or web address]
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
## 2019-08-28 RX ORDER — ESCITALOPRAM OXALATE 10 MG/1
10 TABLET ORAL DAILY
Qty: 30 TABLET | Refills: 12 | Status: SHIPPED | OUTPATIENT
Start: 2019-08-28 | End: 2020-12-14 | Stop reason: SDUPTHER

## 2019-09-11 ENCOUNTER — PATIENT MESSAGE (OUTPATIENT)
Dept: INTERNAL MEDICINE | Facility: CLINIC | Age: 69
End: 2019-09-11

## 2019-09-11 DIAGNOSIS — M54.2 NECK PAIN: Primary | ICD-10-CM

## 2019-09-11 NOTE — TELEPHONE ENCOUNTER
Please get her in with me next week.  She can have a cervical ( neck) xray done prior to the appt.  Orders are in. GML

## 2019-09-18 ENCOUNTER — TELEPHONE (OUTPATIENT)
Dept: INTERNAL MEDICINE | Facility: CLINIC | Age: 69
End: 2019-09-18

## 2019-09-18 NOTE — TELEPHONE ENCOUNTER
----- Message from Adrianne Rosado sent at 9/18/2019  9:59 AM CDT -----  Contact: Pt self Ext# [phone]  Patient is returning a phone call.  Who left a message for the patient:   Does patient know what this is regarding:    Comments: Patient said she received a call the other day and she would like a call back please.

## 2019-09-19 ENCOUNTER — HOSPITAL ENCOUNTER (OUTPATIENT)
Dept: RADIOLOGY | Facility: HOSPITAL | Age: 69
Discharge: HOME | End: 2019-09-19
Attending: INTERNAL MEDICINE
Payer: COMMERCIAL

## 2019-09-19 ENCOUNTER — OFFICE VISIT (OUTPATIENT)
Dept: INTERNAL MEDICINE | Facility: CLINIC | Age: 69
End: 2019-09-19
Payer: COMMERCIAL

## 2019-09-19 VITALS
HEIGHT: 63 IN | SYSTOLIC BLOOD PRESSURE: 144 MMHG | DIASTOLIC BLOOD PRESSURE: 86 MMHG | BODY MASS INDEX: 30.39 KG/M2 | HEART RATE: 105 BPM | OXYGEN SATURATION: 98 % | WEIGHT: 171.5 LBS

## 2019-09-19 DIAGNOSIS — M54.2 PAIN OF CERVICAL SPINE: Primary | ICD-10-CM

## 2019-09-19 DIAGNOSIS — M54.2 NECK PAIN: ICD-10-CM

## 2019-09-19 DIAGNOSIS — I10 ESSENTIAL HYPERTENSION: ICD-10-CM

## 2019-09-19 PROCEDURE — 99214 OFFICE O/P EST MOD 30 MIN: CPT | Mod: S$GLB,,, | Performed by: INTERNAL MEDICINE

## 2019-09-19 PROCEDURE — 72052 X-RAY EXAM NECK SPINE 6/>VWS: CPT | Mod: TC

## 2019-09-19 PROCEDURE — 3079F PR MOST RECENT DIASTOLIC BLOOD PRESSURE 80-89 MM HG: ICD-10-PCS | Mod: CPTII,S$GLB,, | Performed by: INTERNAL MEDICINE

## 2019-09-19 PROCEDURE — 99999 PR PBB SHADOW E&M-EST. PATIENT-LVL V: CPT | Mod: PBBFAC,,, | Performed by: INTERNAL MEDICINE

## 2019-09-19 PROCEDURE — 99214 PR OFFICE/OUTPT VISIT, EST, LEVL IV, 30-39 MIN: ICD-10-PCS | Mod: S$GLB,,, | Performed by: INTERNAL MEDICINE

## 2019-09-19 PROCEDURE — 72052 X-RAY EXAM NECK SPINE 6/>VWS: CPT | Mod: 26,,, | Performed by: RADIOLOGY

## 2019-09-19 PROCEDURE — 1101F PR PT FALLS ASSESS DOC 0-1 FALLS W/OUT INJ PAST YR: ICD-10-PCS | Mod: CPTII,S$GLB,, | Performed by: INTERNAL MEDICINE

## 2019-09-19 PROCEDURE — 99999 PR PBB SHADOW E&M-EST. PATIENT-LVL V: ICD-10-PCS | Mod: PBBFAC,,, | Performed by: INTERNAL MEDICINE

## 2019-09-19 PROCEDURE — 3077F PR MOST RECENT SYSTOLIC BLOOD PRESSURE >= 140 MM HG: ICD-10-PCS | Mod: CPTII,S$GLB,, | Performed by: INTERNAL MEDICINE

## 2019-09-19 PROCEDURE — 3079F DIAST BP 80-89 MM HG: CPT | Mod: CPTII,S$GLB,, | Performed by: INTERNAL MEDICINE

## 2019-09-19 PROCEDURE — 1101F PT FALLS ASSESS-DOCD LE1/YR: CPT | Mod: CPTII,S$GLB,, | Performed by: INTERNAL MEDICINE

## 2019-09-19 PROCEDURE — 3077F SYST BP >= 140 MM HG: CPT | Mod: CPTII,S$GLB,, | Performed by: INTERNAL MEDICINE

## 2019-09-19 PROCEDURE — 72052 XR CERVICAL SPINE 5 VIEW WITH FLEX AND EXT: ICD-10-PCS | Mod: 26,,, | Performed by: RADIOLOGY
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
## 2019-10-09 ENCOUNTER — PATIENT MESSAGE (OUTPATIENT)
Dept: ADMINISTRATIVE | Facility: OTHER | Age: 69
End: 2019-10-09

## 2019-10-10 ENCOUNTER — OFFICE VISIT (OUTPATIENT)
Dept: INTERNAL MEDICINE | Facility: CLINIC | Age: 69
End: 2019-10-10
Payer: COMMERCIAL

## 2019-10-10 VITALS
HEIGHT: 63 IN | OXYGEN SATURATION: 99 % | HEART RATE: 82 BPM | DIASTOLIC BLOOD PRESSURE: 76 MMHG | SYSTOLIC BLOOD PRESSURE: 134 MMHG | WEIGHT: 170.19 LBS | BODY MASS INDEX: 30.16 KG/M2

## 2019-10-10 DIAGNOSIS — I10 ESSENTIAL HYPERTENSION: Primary | ICD-10-CM

## 2019-10-10 DIAGNOSIS — F41.9 ANXIETY: ICD-10-CM

## 2019-10-10 PROCEDURE — 99213 PR OFFICE/OUTPT VISIT, EST, LEVL III, 20-29 MIN: ICD-10-PCS | Mod: S$GLB,,, | Performed by: INTERNAL MEDICINE

## 2019-10-10 PROCEDURE — 1101F PT FALLS ASSESS-DOCD LE1/YR: CPT | Mod: CPTII,S$GLB,, | Performed by: INTERNAL MEDICINE

## 2019-10-10 PROCEDURE — 3075F PR MOST RECENT SYSTOLIC BLOOD PRESS GE 130-139MM HG: ICD-10-PCS | Mod: CPTII,S$GLB,, | Performed by: INTERNAL MEDICINE

## 2019-10-10 PROCEDURE — 99213 OFFICE O/P EST LOW 20 MIN: CPT | Mod: S$GLB,,, | Performed by: INTERNAL MEDICINE

## 2019-10-10 PROCEDURE — 1101F PR PT FALLS ASSESS DOC 0-1 FALLS W/OUT INJ PAST YR: ICD-10-PCS | Mod: CPTII,S$GLB,, | Performed by: INTERNAL MEDICINE

## 2019-10-10 PROCEDURE — 3075F SYST BP GE 130 - 139MM HG: CPT | Mod: CPTII,S$GLB,, | Performed by: INTERNAL MEDICINE

## 2019-10-10 PROCEDURE — 99999 PR PBB SHADOW E&M-EST. PATIENT-LVL IV: CPT | Mod: PBBFAC,,, | Performed by: INTERNAL MEDICINE

## 2019-10-10 PROCEDURE — 3078F PR MOST RECENT DIASTOLIC BLOOD PRESSURE < 80 MM HG: ICD-10-PCS | Mod: CPTII,S$GLB,, | Performed by: INTERNAL MEDICINE

## 2019-10-10 PROCEDURE — 3078F DIAST BP <80 MM HG: CPT | Mod: CPTII,S$GLB,, | Performed by: INTERNAL MEDICINE

## 2019-10-10 PROCEDURE — 99999 PR PBB SHADOW E&M-EST. PATIENT-LVL IV: ICD-10-PCS | Mod: PBBFAC,,, | Performed by: INTERNAL MEDICINE

## 2019-10-10 RX ORDER — METOPROLOL SUCCINATE 100 MG/1
100 TABLET, EXTENDED RELEASE ORAL DAILY
Qty: 90 TABLET | Refills: 3 | Status: SHIPPED | OUTPATIENT
Start: 2019-10-10 | End: 2020-12-14 | Stop reason: SDUPTHER

## 2019-10-10 RX ORDER — ESCITALOPRAM OXALATE 5 MG/1
5 TABLET ORAL DAILY
Qty: 90 TABLET | Refills: 3 | Status: SHIPPED | OUTPATIENT
Start: 2019-10-10 | End: 2021-09-14 | Stop reason: SDUPTHER

## 2019-10-10 NOTE — PROGRESS NOTES
Subjective:      Patient ID: Carmen Khan is a 69 y.o. female.    Chief Complaint: Follow-up and Medication Refill    HPI:  HPI   Patient is here for follow-up of hypertension.  At the last appointment I increased her metoprolol from 50-100mg.  The remainder of her hypertension medications are the same.  She is tolerating the medication her pulse is 82.  Her blood pressure is much better controlled.    In addition she states that she is noticing more anxiety.  She would like to increase her escitalopram to 15 mg daily  Patient Active Problem List   Diagnosis    Hypertension    Hyperlipidemia    Anxiety    Screening for colon cancer    Primary osteoarthritis of left foot    Age-related macular degeneration, dry, both eyes    Posterior vitreous detachment of both eyes    Nuclear cataract of both eyes    Elevated glucose    Osteoarthritis of knee    Family history of colonic polyps    Rib pain on left side    Pain in the muscles    Arm pain, musculoskeletal, left    Nonexudative age-related macular degeneration, bilateral, intermediate dry stage    Lumbar radiculopathy    Chronic right-sided low back pain with sciatica    Left foot pain     Past Medical History:   Diagnosis Date    Anxiety     Hyperlipidemia     Hypertension     Mitral valve prolapse      Past Surgical History:   Procedure Laterality Date    TONSILLECTOMY       Family History   Problem Relation Age of Onset    Cancer Mother         lung    Stroke Mother     Heart disease Father     Diabetes Father     Diabetes Brother     Aortic aneurysm Brother         surgery 5/2012    Kidney disease Brother         on dialysis    Melanoma Neg Hx     Breast cancer Neg Hx     Colon cancer Neg Hx     Ovarian cancer Neg Hx      Review of Systems   Constitutional: Negative for activity change, chills, fever and unexpected weight change.   Respiratory: Negative for cough, chest tightness, shortness of breath and wheezing.   "  Cardiovascular: Negative for chest pain, palpitations and leg swelling.   Musculoskeletal: Positive for neck pain.   Neurological: Negative for headaches.     Objective:     Vitals:    10/10/19 1306 10/10/19 1317   BP: (!) 140/80 134/76   Pulse: 82    SpO2: 99%    Weight: 77.2 kg (170 lb 3.1 oz)    Height: 5' 3" (1.6 m)    PainSc: 0-No pain      Body mass index is 30.15 kg/m².  Physical Exam   Constitutional: She appears well-developed and well-nourished.   Neck: No JVD present. No thyromegaly present.   Cardiovascular: Normal rate, normal heart sounds and intact distal pulses.   Pulmonary/Chest: Effort normal and breath sounds normal. No respiratory distress.     Assessment:     1. Essential hypertension    2. Anxiety      Plan:   Carmen was seen today for follow-up and medication refill.    Diagnoses and all orders for this visit:    Essential hypertension  Comments:  Continue metoprolol succinate  mg daily in addition to other blood pressure medications.  Patient is to join the digital Hypertension program.    Anxiety  Comments:  Escitalopram is increased to 15 mg daily    Other orders  -     metoprolol succinate (TOPROL-XL) 100 MG 24 hr tablet; Take 1 tablet (100 mg total) by mouth once daily.  -     escitalopram oxalate (LEXAPRO) 5 MG Tab; Take 1 tablet (5 mg total) by mouth once daily. Take with Escitalopram 10mg once daily        Problem List Items Addressed This Visit     Hypertension - Primary    Anxiety        No orders of the defined types were placed in this encounter.    Follow up in about 3 months (around 1/10/2020) for Follow up hypertension.     Medication List           Accurate as of October 10, 2019  7:01 PM. If you have any questions, ask your nurse or doctor.               CHANGE how you take these medications    * escitalopram oxalate 10 MG tablet  Commonly known as:  LEXAPRO  Take 1 tablet (10 mg total) by mouth once daily.  What changed:  Another medication with the same name was " added. Make sure you understand how and when to take each.  Changed by:  Tessa Edmondson MD     * escitalopram oxalate 5 MG Tab  Commonly known as:  LEXAPRO  Take 1 tablet (5 mg total) by mouth once daily. Take with Escitalopram 10mg once daily  What changed:  You were already taking a medication with the same name, and this prescription was added. Make sure you understand how and when to take each.  Changed by:  Tessa Edmondson MD     metoprolol succinate 100 MG 24 hr tablet  Commonly known as:  TOPROL-XL  Take 1 tablet (100 mg total) by mouth once daily.  What changed:    · medication strength  · how much to take  · how to take this  · when to take this  Changed by:  Tessa Edmondson MD         * This list has 2 medication(s) that are the same as other medications prescribed for you. Read the directions carefully, and ask your doctor or other care provider to review them with you.            CONTINUE taking these medications    aspirin 81 MG EC tablet  Commonly known as:  ECOTRIN     biotin 300 mcg Tab     diclofenac sodium 1 % Gel  Commonly known as:  VOLTAREN  Apply 2 g topically 4 (four) times daily.     phentermine 37.5 mg tablet  Commonly known as:  ADIPEX-P  Take 1/2 tablet by mouth once daily.     simvastatin 40 MG tablet  Commonly known as:  ZOCOR  TAKE 1 TABLET BY MOUTH EVERY EVENING.     tretinoin 0.1 % cream  Commonly known as:  RETIN-A  APPLY A THIN FILM TO AFFECTED AREA EVERY EVENING AFTER MOISTURIZING.     triamterene-hydrochlorothiazide 37.5-25 mg 37.5-25 mg per capsule  Commonly known as:  DYAZIDE  take one capsule by mouth every day     TROKENDI XR 50 mg Cp24  Generic drug:  topiramate  Take one tablet by mouth once daily.     vitamin D 1000 units Tab  Commonly known as:  VITAMIN D3           Where to Get Your Medications      These medications were sent to [facility]  [address] [place], [place] [address] [address]    Hours:  Mon-Fri 7a-7p, Sat 10a-4p, Sun 10a-4p Phone:  [phone]    · escitalopram oxalate 5 MG Tab  · metoprolol succinate 100 MG 24 hr tablet

## 2019-10-14 ENCOUNTER — PATIENT OUTREACH (OUTPATIENT)
Dept: ADMINISTRATIVE | Facility: OTHER | Age: 69
End: 2019-10-14

## 2019-10-15 ENCOUNTER — OFFICE VISIT (OUTPATIENT)
Dept: BARIATRICS | Facility: CLINIC | Age: 69
End: 2019-10-15
Payer: COMMERCIAL

## 2019-10-15 ENCOUNTER — IMMUNIZATION (OUTPATIENT)
Dept: PHARMACY | Facility: CLINIC | Age: 69
End: 2019-10-15
Payer: COMMERCIAL

## 2019-10-15 VITALS
HEART RATE: 79 BPM | WEIGHT: 167.31 LBS | BODY MASS INDEX: 29.64 KG/M2 | HEIGHT: 63 IN | DIASTOLIC BLOOD PRESSURE: 80 MMHG | SYSTOLIC BLOOD PRESSURE: 143 MMHG

## 2019-10-15 DIAGNOSIS — E66.3 OVERWEIGHT (BMI 25.0-29.9): Primary | ICD-10-CM

## 2019-10-15 DIAGNOSIS — I10 ESSENTIAL HYPERTENSION: ICD-10-CM

## 2019-10-15 PROCEDURE — 1101F PT FALLS ASSESS-DOCD LE1/YR: CPT | Mod: CPTII,S$GLB,, | Performed by: INTERNAL MEDICINE

## 2019-10-15 PROCEDURE — 3077F SYST BP >= 140 MM HG: CPT | Mod: CPTII,S$GLB,, | Performed by: INTERNAL MEDICINE

## 2019-10-15 PROCEDURE — 99213 PR OFFICE/OUTPT VISIT, EST, LEVL III, 20-29 MIN: ICD-10-PCS | Mod: S$GLB,,, | Performed by: INTERNAL MEDICINE

## 2019-10-15 PROCEDURE — 99213 OFFICE O/P EST LOW 20 MIN: CPT | Mod: S$GLB,,, | Performed by: INTERNAL MEDICINE

## 2019-10-15 PROCEDURE — 99999 PR PBB SHADOW E&M-EST. PATIENT-LVL III: CPT | Mod: PBBFAC,,, | Performed by: INTERNAL MEDICINE

## 2019-10-15 PROCEDURE — 99999 PR PBB SHADOW E&M-EST. PATIENT-LVL III: ICD-10-PCS | Mod: PBBFAC,,, | Performed by: INTERNAL MEDICINE

## 2019-10-15 PROCEDURE — 3077F PR MOST RECENT SYSTOLIC BLOOD PRESSURE >= 140 MM HG: ICD-10-PCS | Mod: CPTII,S$GLB,, | Performed by: INTERNAL MEDICINE

## 2019-10-15 PROCEDURE — 3079F DIAST BP 80-89 MM HG: CPT | Mod: CPTII,S$GLB,, | Performed by: INTERNAL MEDICINE

## 2019-10-15 PROCEDURE — 3079F PR MOST RECENT DIASTOLIC BLOOD PRESSURE 80-89 MM HG: ICD-10-PCS | Mod: CPTII,S$GLB,, | Performed by: INTERNAL MEDICINE

## 2019-10-15 PROCEDURE — 1101F PR PT FALLS ASSESS DOC 0-1 FALLS W/OUT INJ PAST YR: ICD-10-PCS | Mod: CPTII,S$GLB,, | Performed by: INTERNAL MEDICINE

## 2019-10-15 RX ORDER — TOPIRAMATE 50 MG/1
50 CAPSULE, EXTENDED RELEASE ORAL DAILY
Qty: 30 CAPSULE | Refills: 3 | Status: SHIPPED | OUTPATIENT
Start: 2019-10-15 | End: 2020-02-05

## 2019-10-15 RX ORDER — DIETHYLPROPION HYDROCHLORIDE 75 MG/1
75 TABLET, EXTENDED RELEASE ORAL DAILY
Qty: 30 TABLET | Refills: 2 | Status: SHIPPED | OUTPATIENT
Start: 2019-10-15 | End: 2020-02-05
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
## 2019-10-15 NOTE — PATIENT INSTRUCTIONS
Patient was informed that topiramate is used for migraine prevention and seizures. Weight loss is a common side effect that is well documented. She understands this. She was informed of the potential side effects such as serious and possibly fatal rash in which case the medication should be discontinued immediately. Paresthesias, forgetfulness, fatigue, kidney stones, GI symptoms, and changes in lab values such as electrolytes, blood counts and kidney function.    Patient warned of common side effects of diethylpropion including anxiety, insomnia, palpitations and increased blood pressure. It was also explained that it is for short-term usage along with diet and exercise, and that stopping the medication without making lifestyle changes will result in regain of weight. Patient states understanding.    Weight loss medications are controlled substances.  They require routine follow up. Prescription or pills that are lost or destroyed will not be replaced.         3 meals a day made up of the following:  Unlimited green vegetables, tomatoes, mushrooms, spaghetti squash, cauliflower, meat, poultry, seafood, eggs and hard cheeses.   Milk and plain yogurt  Dressings, seasonings, condiments, etc should have less than 2 g sugars.   Beans (1-1.5 cups) or nuts (1/4 cup) can have 1 x a day.   1-2 servings of citrus fruits, berries, pineapple or melon a day (1/2 cup)  Avoid fried foods    No grains, rice, pasta, potatoes, bread, corn, peas, oatmeal, grits, tortillas, crackers, chips    No soda, sweet tea, juices or lemonade    [email or web address].[email or web address] for recipes. Moderate carb intake      Add some type of resistance training 2-3 days a week. These can be body weight exercises, light weight or elastic bands. [employer] and [employer] are great sources for free work out plans and videos.     Helpful tips to survive the holidays:  - Dont save yourself for the meal: Make sure you continue to eat high protein small meals every 3-4 hours  to ensure to do not become over-hungry. Avoid temptation by not showing up to a holiday party or gathering hungry.   - Plan ahead. Bring a dish to a party if you know there may not be an appropriate option.   - Choose sugar-free drinks: Stick to water or other sugar-free beverages and make sure you are getting 6-8 cups of fluid each day (but not with meals!). Avoid alcohol, carbonated beverages, and high-fat/high-sugar beverages like hot chocolate and eggnog. Try sugar-free hot cocoa made with skim milk or water, or sugar-free spiced tea to add some holiday flair to your beverage (see sugar-free mulled cider recipe below)  - Take your time: Eat mindfully. Dont graze on food throughout the day. Sit down to enjoy your small meals. Chew slowly and thoroughly. Cut your food into small pieces before eating.  - Listen to your body: Stop eating as soon as you feel full. Do not feel pressured to try certain (or all) foods or to eat all of the food on your plate. Listen to your hunger cues.   - REMEMBER: Make your holidays about spending time with family and friends instead of focusing gatherings around food.  - Keep up your exercise routine: Make sure you continue to get regular exercise throughout the holiday season. Encourage friends and family to be active by taking a walk together after a meal, to look at holiday lights, or to window-shop.    Good Holiday Meal Options:  - Roasted Turkey, NO skin. Use low sodium broth instead of gravy.   - Stuffed Bell Peppers made WITHOUT bread crumbs or Rice. Try using parmesan cheese instead  - Gumbo, NO rice. Try picking out mostly the meat/seafood and vegetables with little broth.   - Green Bean Casserole made with 98% fat free cream of mushroom soup and crushed almonds/pecans instead of fried onions  - Side salad w/ low fat dressing. Try a different kind of salad maybe use Kale or spinach.   - Roasted non-starchy vegetables like brussel sprouts, broccoli, green beans, zucchini,  butternut squash, cauliflower  - Cauliflower Mash (steam or roast cauliflower, puree w/ low fat cheese, dash of fat free milk and 2-3 sprays of I cant believe its not butter spray. Add garlic powder and black pepper to season). Use Low sodium broth instead of gravy.   - Try Loaded Cauliflower Mash (Make cauliflower like above cauliflower mash. Top with diced turkey giordano, ¼ cup low fat cheddar cheese and bake @ 350* F for 5-10 minutes, until cheese is melted. Top with minced chives, black pepper and garlic to taste).   - Homemade cranberry sauce using Splenda or another alternative sweetener. Boil fresh cranberries and add splenda to taste. Boil until cranberries break open and then simmer until it reaches the consistency you want (less time for more watery sauce and simmer for longer to create a thicker sauce).   - Deviled eggs: make using low fat gottlieb, mustard, DILL relish (not sweet relish).   - Vegetable tray w/ Greek yogurt Ranch Dip. Mix 1 packet of [place] ranch dip mix w/ 16 oz low fat plain greek yogurt.     Good Holiday Dessert Options:  - High protein Pumpkin Cheesecake (see recipe below)  - Pumpkin Whip (see recipe below)  - Quest Apple Pie or Cinnamon Roll flavored protein bar (warm in microwave for 10-15 seconds)  - Eggnog Protein shake (see recipe below)  - Fresh fruit w/ low fat cheese  - Sugar-free Jello Parfaits. Layer Red and Green sugar-free jello in cups and top w/ 2 tbsp Sugar-free cool-whip    Pumpkin Cheesecake    8 ounces fat free cream cheese, softened   2 scoops of vanilla protein powder (<4 g sugar per serving)   ¼ tsp Fine salt   2 eggs, at room temperature   1/3 cup fat free sour cream  1/3 cup fat free half and half  1 15 -ounce can pure pumpkin puree   1 tablespoon pumpkin pie spice, plus more for dusting   Unsalted nuts, crushed  *Add splenda to taste    Directions     1. Preheat the oven to 300 degrees F. Line 18 muffin cups with paper liners. Sprinkle 1 tsp crushed unsalted  nuts at the bottom of each of muffin cup liner.     2. In a large bowl, beat the cream cheese, vanilla protein powder and 1/4 teaspoon fine salt on medium-high speed until smooth and creamy, 2 to 3 minutes. Scrape down the sides, reduce speed to low and beat in the eggs, 1 at a time, until combined. Beat in 1/3 cup fat free sour cream and fat free half and half. Stir in the pumpkin puree and pumpkin pie spice until smooth. Divide evenly among cookie-lined paper cups, filling almost all the way to the top.     3. Bake until the filling is just set, 40 to 45 minutes. A sharp knife inserted into the center will come out moist, but clean. Cool completely in tins on a wire rack. Refrigerate until cold, 4 hours, or overnight. Top with a dusting of pumpkin pie spice.    Recipe altered from the following recipe: http://www.i2we.[employer]/recipes/food-network-jorge/mini-pumpkin-cheesecakes-recipe.print.html?oc=linkback    Pumpkin Whip    Box of sugar-free vanilla pudding  Can of pumpkin puree  Pumpkin Pie spice (sprinkle to taste)  ½ cup of sugar-free Cool Whip    Directions:  Make sugar-free pudding according to package directions using fat free or 1% milk. Stir in pumpkin and cool whip. Add pumpkin pie spice to taste.     Egg Nog Protein shake    8 oz skim or 1% milk  1 scoop vanilla protein powder  1 tbsp sugar-free vanilla pudding mix  ½ tsp butter flavor extract  ½ tsp rum extract  ½ tsp cinnamon     Shake together or blend with ice and serve.     Sugar-Free Mulled Cider    3 oz diet cran-apple juice  6 oz water  1 packet sugar-free apple cider mix  ½ tsp apple pie spice  ½ tsp butter flavor extract  1 tbsp Sugar-free Syrup    Mix together. Warm if needed and serve w/ orange wedge and cinnamon stick.

## 2019-10-17 ENCOUNTER — HOSPITAL ENCOUNTER (OUTPATIENT)
Dept: RADIOLOGY | Facility: HOSPITAL | Age: 69
Discharge: HOME | End: 2019-10-17
Attending: INTERNAL MEDICINE
Payer: COMMERCIAL

## 2019-10-17 DIAGNOSIS — Z12.31 ENCOUNTER FOR SCREENING MAMMOGRAM FOR BREAST CANCER: ICD-10-CM

## 2019-10-17 PROCEDURE — 77063 MAMMO DIGITAL SCREENING BILAT WITH TOMOSYNTHESIS_CAD: ICD-10-PCS | Mod: 26,,, | Performed by: RADIOLOGY

## 2019-10-17 PROCEDURE — 77067 SCR MAMMO BI INCL CAD: CPT | Mod: TC

## 2019-10-17 PROCEDURE — 77067 SCR MAMMO BI INCL CAD: CPT | Mod: 26,,, | Performed by: RADIOLOGY

## 2019-10-17 PROCEDURE — 77063 BREAST TOMOSYNTHESIS BI: CPT | Mod: 26,,, | Performed by: RADIOLOGY

## 2019-10-17 PROCEDURE — 77067 MAMMO DIGITAL SCREENING BILAT WITH TOMOSYNTHESIS_CAD: ICD-10-PCS | Mod: 26,,, | Performed by: RADIOLOGY

## 2019-10-18 ENCOUNTER — PATIENT OUTREACH (OUTPATIENT)
Dept: OTHER | Facility: OTHER | Age: 69
End: 2019-10-18

## 2019-10-21 ENCOUNTER — PATIENT OUTREACH (OUTPATIENT)
Dept: ADMINISTRATIVE | Facility: OTHER | Age: 69
End: 2019-10-21

## 2019-10-21 ENCOUNTER — INITIAL CONSULT (OUTPATIENT)
Dept: ORTHOPEDICS | Facility: CLINIC | Age: 69
End: 2019-10-21
Payer: COMMERCIAL

## 2019-10-21 VITALS — HEIGHT: 63 IN | BODY MASS INDEX: 30.2 KG/M2 | WEIGHT: 170.44 LBS

## 2019-10-21 DIAGNOSIS — M50.30 DDD (DEGENERATIVE DISC DISEASE), CERVICAL: ICD-10-CM

## 2019-10-21 DIAGNOSIS — M51.36 DDD (DEGENERATIVE DISC DISEASE), LUMBAR: Primary | ICD-10-CM

## 2019-10-21 PROCEDURE — 1101F PT FALLS ASSESS-DOCD LE1/YR: CPT | Mod: CPTII,S$GLB,, | Performed by: ORTHOPAEDIC SURGERY

## 2019-10-21 PROCEDURE — 1101F PR PT FALLS ASSESS DOC 0-1 FALLS W/OUT INJ PAST YR: ICD-10-PCS | Mod: CPTII,S$GLB,, | Performed by: ORTHOPAEDIC SURGERY

## 2019-10-21 PROCEDURE — 99999 PR PBB SHADOW E&M-EST. PATIENT-LVL III: CPT | Mod: PBBFAC,,, | Performed by: ORTHOPAEDIC SURGERY

## 2019-10-21 PROCEDURE — 99213 OFFICE O/P EST LOW 20 MIN: CPT | Mod: S$GLB,,, | Performed by: ORTHOPAEDIC SURGERY

## 2019-10-21 PROCEDURE — 99213 PR OFFICE/OUTPT VISIT, EST, LEVL III, 20-29 MIN: ICD-10-PCS | Mod: S$GLB,,, | Performed by: ORTHOPAEDIC SURGERY

## 2019-10-21 PROCEDURE — 99999 PR PBB SHADOW E&M-EST. PATIENT-LVL III: ICD-10-PCS | Mod: PBBFAC,,, | Performed by: ORTHOPAEDIC SURGERY

## 2019-10-21 RX ORDER — MELOXICAM 15 MG/1
15 TABLET ORAL DAILY
Qty: 30 TABLET | Refills: 1 | Status: SHIPPED | OUTPATIENT
Start: 2019-10-21 | End: 2020-03-25
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
## 2019-10-21 NOTE — LETTER
October 28, 2019      Tessa Edmondson MD  [address]  [place] [address]           [facility]  [address]  [place] [address]  Phone: [phone]          Patient: Carmen Khan   MR Number: [id number]   YOB: 1950   Date of Visit: 10/21/2019       Dear Dr. Tessa Edmondson:    Thank you for referring Carmen Khan to me for evaluation. Attached you will find relevant portions of my assessment and plan of care.    If you have questions, please do not hesitate to call me. I look forward to following Carmen Khan along with you.    Sincerely,    Romario Blanchard MD    Enclosure  CC:  No Recipients    If you would like to receive this communication electronically, please contact [email or web address] or [phone] to request more information on [employer] access.    For providers and/or their staff who would like to refer a patient to [facility], please contact us through our one-stop-shop provider referral line, [facility], at [phone].    If you feel you have received this communication in error or would no longer like to receive these types of communications, please e-mail [email or web address]

## 2019-12-13 ENCOUNTER — PATIENT MESSAGE (OUTPATIENT)
Dept: PHARMACY | Facility: CLINIC | Age: 69
End: 2019-12-13

## 2020-01-30 ENCOUNTER — OFFICE VISIT (OUTPATIENT)
Dept: DERMATOLOGY | Facility: CLINIC | Age: 70
End: 2020-01-30
Payer: COMMERCIAL

## 2020-01-30 DIAGNOSIS — D22.9 NEVUS: Primary | ICD-10-CM

## 2020-01-30 DIAGNOSIS — E78.5 HYPERLIPIDEMIA: ICD-10-CM

## 2020-01-30 DIAGNOSIS — D23.10 HIDROCYSTOMA OF EYELID, RIGHT: ICD-10-CM

## 2020-01-30 PROCEDURE — 1101F PR PT FALLS ASSESS DOC 0-1 FALLS W/OUT INJ PAST YR: ICD-10-PCS | Mod: CPTII,S$GLB,, | Performed by: DERMATOLOGY

## 2020-01-30 PROCEDURE — 1126F PR PAIN SEVERITY QUANTIFIED, NO PAIN PRESENT: ICD-10-PCS | Mod: S$GLB,,, | Performed by: DERMATOLOGY

## 2020-01-30 PROCEDURE — 99213 OFFICE O/P EST LOW 20 MIN: CPT | Mod: S$GLB,,, | Performed by: DERMATOLOGY

## 2020-01-30 PROCEDURE — 1101F PT FALLS ASSESS-DOCD LE1/YR: CPT | Mod: CPTII,S$GLB,, | Performed by: DERMATOLOGY

## 2020-01-30 PROCEDURE — 99213 PR OFFICE/OUTPT VISIT, EST, LEVL III, 20-29 MIN: ICD-10-PCS | Mod: S$GLB,,, | Performed by: DERMATOLOGY

## 2020-01-30 PROCEDURE — 99999 PR PBB SHADOW E&M-EST. PATIENT-LVL III: ICD-10-PCS | Mod: PBBFAC,,, | Performed by: DERMATOLOGY

## 2020-01-30 PROCEDURE — 1126F AMNT PAIN NOTED NONE PRSNT: CPT | Mod: S$GLB,,, | Performed by: DERMATOLOGY

## 2020-01-30 PROCEDURE — 99999 PR PBB SHADOW E&M-EST. PATIENT-LVL III: CPT | Mod: PBBFAC,,, | Performed by: DERMATOLOGY

## 2020-01-30 PROCEDURE — 1159F PR MEDICATION LIST DOCUMENTED IN MEDICAL RECORD: ICD-10-PCS | Mod: S$GLB,,, | Performed by: DERMATOLOGY

## 2020-01-30 PROCEDURE — 1159F MED LIST DOCD IN RCRD: CPT | Mod: S$GLB,,, | Performed by: DERMATOLOGY

## 2020-01-30 RX ORDER — SIMVASTATIN 40 MG/1
TABLET, FILM COATED ORAL
Qty: 90 TABLET | Refills: 3 | Status: SHIPPED | OUTPATIENT
Start: 2020-01-30 | End: 2021-01-19 | Stop reason: SDUPTHER

## 2020-01-30 NOTE — PROGRESS NOTES
Subjective:       Patient ID:  Carmen Khan is a 69 y.o. female who presents for   Chief Complaint   Patient presents with    Spot     Pt here today for spots on right eyelid, and right cheek, x 2mo, denies any symptoms, no tx      Review of Systems   Constitutional: Negative for fever, chills, fatigue and malaise.   Skin: Positive for daily sunscreen use and activity-related sunscreen use. Negative for recent sunburn.   Hematologic/Lymphatic: Does not bruise/bleed easily.        Objective:    Physical Exam   Skin:   Areas Examined (abnormalities noted in diagram):   Head / Face Inspection Performed              Diagram Legend     Erythematous scaling macule/papule c/w actinic keratosis       Vascular papule c/w angioma      Pigmented verrucoid papule/plaque c/w seborrheic keratosis      Yellow umbilicated papule c/w sebaceous hyperplasia      Irregularly shaped tan macule c/w lentigo     1-2 mm smooth white papules consistent with Milia      Movable subcutaneous cyst with punctum c/w epidermal inclusion cyst      Subcutaneous movable cyst c/w pilar cyst      Firm pink to brown papule c/w dermatofibroma      Pedunculated fleshy papule(s) c/w skin tag(s)      Evenly pigmented macule c/w junctional nevus     Mildly variegated pigmented, slightly irregular-bordered macule c/w mildly atypical nevus      Flesh colored to evenly pigmented papule c/w intradermal nevus       Pink pearly papule/plaque c/w basal cell carcinoma      Erythematous hyperkeratotic cursted plaque c/w SCC      Surgical scar with no sign of skin cancer recurrence      Open and closed comedones      Inflammatory papules and pustules      Verrucoid papule consistent consistent with wart     Erythematous eczematous patches and plaques     Dystrophic onycholytic nail with subungual debris c/w onychomycosis     Umbilicated papule    Erythematous-base heme-crusted tan verrucoid plaque consistent with inflamed seborrheic keratosis      Erythematous Silvery Scaling Plaque c/w Psoriasis     See annotation      Assessment / Plan:        Nevus  Discussed ABCDE's of nevi.  Monitor for new mole or moles that are becoming bigger, darker, irritated, or developing irregular borders. Brochure provided.    Hidrocystoma of eyelid, right  Area nicked with 11 blade after alcohol prep and drained . Discussed with patient these lesions are benign and often recur           Follow up if symptoms worsen or fail to improve.

## 2020-02-05 ENCOUNTER — OFFICE VISIT (OUTPATIENT)
Dept: BARIATRICS | Facility: CLINIC | Age: 70
End: 2020-02-05
Payer: COMMERCIAL

## 2020-02-05 VITALS
BODY MASS INDEX: 30.04 KG/M2 | SYSTOLIC BLOOD PRESSURE: 142 MMHG | HEART RATE: 71 BPM | WEIGHT: 169.56 LBS | DIASTOLIC BLOOD PRESSURE: 70 MMHG | HEIGHT: 63 IN

## 2020-02-05 DIAGNOSIS — E66.9 OBESITY, CLASS I, BMI 30.0-34.9 (SEE ACTUAL BMI): Primary | ICD-10-CM

## 2020-02-05 PROCEDURE — 1159F MED LIST DOCD IN RCRD: CPT | Mod: S$GLB,,, | Performed by: INTERNAL MEDICINE

## 2020-02-05 PROCEDURE — 1101F PR PT FALLS ASSESS DOC 0-1 FALLS W/OUT INJ PAST YR: ICD-10-PCS | Mod: CPTII,S$GLB,, | Performed by: INTERNAL MEDICINE

## 2020-02-05 PROCEDURE — 1101F PT FALLS ASSESS-DOCD LE1/YR: CPT | Mod: CPTII,S$GLB,, | Performed by: INTERNAL MEDICINE

## 2020-02-05 PROCEDURE — 3077F PR MOST RECENT SYSTOLIC BLOOD PRESSURE >= 140 MM HG: ICD-10-PCS | Mod: CPTII,S$GLB,, | Performed by: INTERNAL MEDICINE

## 2020-02-05 PROCEDURE — 3077F SYST BP >= 140 MM HG: CPT | Mod: CPTII,S$GLB,, | Performed by: INTERNAL MEDICINE

## 2020-02-05 PROCEDURE — 3078F PR MOST RECENT DIASTOLIC BLOOD PRESSURE < 80 MM HG: ICD-10-PCS | Mod: CPTII,S$GLB,, | Performed by: INTERNAL MEDICINE

## 2020-02-05 PROCEDURE — 99999 PR PBB SHADOW E&M-EST. PATIENT-LVL III: CPT | Mod: PBBFAC,,, | Performed by: INTERNAL MEDICINE

## 2020-02-05 PROCEDURE — 99213 OFFICE O/P EST LOW 20 MIN: CPT | Mod: S$GLB,,, | Performed by: INTERNAL MEDICINE

## 2020-02-05 PROCEDURE — 1126F AMNT PAIN NOTED NONE PRSNT: CPT | Mod: S$GLB,,, | Performed by: INTERNAL MEDICINE

## 2020-02-05 PROCEDURE — 99213 PR OFFICE/OUTPT VISIT, EST, LEVL III, 20-29 MIN: ICD-10-PCS | Mod: S$GLB,,, | Performed by: INTERNAL MEDICINE

## 2020-02-05 PROCEDURE — 99999 PR PBB SHADOW E&M-EST. PATIENT-LVL III: ICD-10-PCS | Mod: PBBFAC,,, | Performed by: INTERNAL MEDICINE

## 2020-02-05 PROCEDURE — 3078F DIAST BP <80 MM HG: CPT | Mod: CPTII,S$GLB,, | Performed by: INTERNAL MEDICINE

## 2020-02-05 PROCEDURE — 1159F PR MEDICATION LIST DOCUMENTED IN MEDICAL RECORD: ICD-10-PCS | Mod: S$GLB,,, | Performed by: INTERNAL MEDICINE

## 2020-02-05 PROCEDURE — 1126F PR PAIN SEVERITY QUANTIFIED, NO PAIN PRESENT: ICD-10-PCS | Mod: S$GLB,,, | Performed by: INTERNAL MEDICINE

## 2020-02-05 RX ORDER — TOPIRAMATE 50 MG/1
50 TABLET, FILM COATED ORAL DAILY
Qty: 60 TABLET | Refills: 3 | Status: SHIPPED | OUTPATIENT
Start: 2020-02-05 | End: 2020-07-21 | Stop reason: SDUPTHER

## 2020-02-05 RX ORDER — PHENTERMINE HYDROCHLORIDE 37.5 MG/1
37.5 TABLET ORAL EVERY MORNING
Qty: 30 TABLET | Refills: 1 | Status: SHIPPED | OUTPATIENT
Start: 2020-02-05 | End: 2020-07-21 | Stop reason: SDUPTHER
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
## 2020-02-05 NOTE — PATIENT INSTRUCTIONS
Patient was informed that topiramate is used for migraine prevention and seizures. Weight loss is a common side effect that is well documented. S/he understands this. S/he was informed of the potential side effects such as serious and possibly fatal rash in which case the medication should be discontinued immediately. Paresthesias, forgetfulness, fatigue, kidney stones, GI symptoms, and changes in lab values such as electrolytes, blood counts and kidney function.    Patient warned of common side effects of phentermine including anxiety, insomnia, palpitations and increased blood pressure. It was also explained that it is for short-term usage along with diet and exercise, and that stopping the medication without making lifestyle changes will result in regain of weight. Patient states understanding.     Weight loss medications are controlled substances.  They require routine follow up. Prescription or pills that are lost or destroyed will not be replaced.     Start phentermine with 1/2 pill a day.      3 meals a day made up of the following:  Unlimited green vegetables, tomatoes, mushrooms, spaghetti squash, cauliflower, meat, poultry, seafood, eggs and hard cheeses.   Milk and plain yogurt  Dressings, seasonings, condiments, etc should have less than 2 g sugars.   Beans (1-1.5 cups) or nuts (1/4 cup) can have 1 x a day.   1-2 servings of citrus fruits, berries, pineapple or melon a day (1/2 cup)  Avoid fried foods    No grains, rice, pasta, potatoes, bread, corn, peas, oatmeal, grits, tortillas, crackers, chips    No soda, sweet tea, juices or lemonade    Www.dietdoctor.com for recipes. Moderate carb intake      Add some type of resistance training 2-3 days a week. These can be body weight exercises, light weight or elastic bands. [employer] and [employer] are great sources for free work out plans and videos.     Dinner in Under 30 minutes and 400 calories.     Baked Chicken breast with Broccoli Cheese Casserole  2-3  chicken breast (approximately 6-8 oz each)    2 tsp each garlic and herb Mrs. Villalobos seasoning   2 tsp your favorite creole seasoning  2 tablespoons of balsamic vinegar  2 - 10 oz packages of frozen broccoli  1 egg   ½ cup skim milk  ½ tsp paprika   ½ tsp cayenne pepper   1 can fat free cream of mushroom soup.   1 .5 cups of shredded cheddar cheese    Pre-heat oven to 450 degrees. Toss 2-3 chicken breast (approximately 6-8 oz each) in 2 tsp each garlic and herb Mrs. Dash seasoning, 2 tsp your favorite creole seasoning, and 2 tablespoons of balsamic vinegar. This can also be done up to 24 hours ahead of time, and the chicken can be left in the refrigerator to marinate. Place on a baking sheet sprayed with non-stick cooking spray and bake on 450 degrees for 10 min, then reduce heat to 350 degrees and cook an addition 10-15 minutes until chicken is cooked through.   While chicken is baking, microwave safe dish, thaw 2 - 10 oz packages of frozen broccoli. Drain any excess water, season with salt, pepper, all-purpose seasoning of your choice. In another bowl, whisk together 1 egg, ½ cup skim milk, ½ tsp paprika, ½ tsp cayenne pepper and 1 can fat free cream of mushroom soup. Combine broccoli, soup mixture, 1 cup of shredded cheddar cheese in baking dish sprayed with cooking spray. Top with remaining cheese. Bake in the oven with chicken for about 20 min or until set cheese is melted and quevedo.     Makes 4 servings. 389 calories per serving.10 g fat, 13 g carbs, 54g protein     Sheet Pan [place] Shrimp  1 pound large shrimp, shelled, peeled and deveined.   2 tablespoon olive oil  The zest and the juice of 1 lime  ½ tsp chili powder  ½-1 tsp cayenne pepper  1 tsp cumin  ½ tsp dry oregano  1 red bell pepper  1 green bell pepper  1 red onion  2 cups mushrooms, quartered  1 avocado  Whole dom lettuce leaves  Preheat oven to 375 degrees.  In a bowl combine shrimp, lime juice, lime zest, cumin, cayenne pepper, chili powder,  and a pinch of salt. Set aside.   Slice peppers and onions into thin strips. Toss in 1 tablespoon of olive oil. Sprinkle with salt and pepper and arrange in a single layer over 1/3 of a large sheet pan  Toss mushrooms with remaining olive oil, oregano, salt and pepper. Arrange in single layer on sheet pan. Place sheet pan in oven for about 15-20 minutes until vegetables are softened, cooked about ¾ of the way through. Remove the sheet pan from oven.  Change setting on oven to low broil.  If needed, rearrange vegetables to make room for shrimp. Arrange the shrimp in a single layer on the sheet pan and return pan to oven. After 5 minutes, flip shrimp. Cook 3-5 additional minutes, or until  Shrimp are opaque.   Serve shrimp and cooked vegetables on lettuce leaves with sliced avocado.   Makes 4 servings. Calories per servin; 23g fat, 19 g carbohydrates, 27g protein.    Zoodles Primavera with Seasoned Ricotta  8 cups zucchini noodles. These can be purchased already prepared, or you can make them on your own with whole zucchini and a vegetable spiralizer.   1.5 cups cherry tomatoes, quartered  2 cups sliced mushrooms  1 cup chopped fresh broccoli  1 cup frozen peas and carrots  2 cloves garlic, finely chopped  16 oz part skim ricotta cheese  2 oz Neufchatel cream cream cheese, cut into small cubes  Juice and zest of 1 lemon  1 pinch red pepper flakes    2 tsp Italian seasoning  4-5 leaves fresh basil, thinly sliced  1 tablespoon of olive oil  Parmesan cheese for topping (optional)     In a bowl, combine ricotta cheese, 1 tsp Italian seasoning, ½ teaspoon lemon zest. Season with salt and pepper to taste. Mix well. Fold in half of fresh basil. Set aside.   Heat a large skillet to medium high. Add olive oil. Sautee mushrooms until starting to become tender. Season them with salt and pepper while cooking.  Add broccoli and peas and carrots. Reduce heat to medium. Cover pan and cook for 4-5 minutes until broccoli is  tender and peas and carrots are warmed through. Add Neufchatel cheese, Italian seasoning, red pepper flakes, 1 tsp lemon zest and lemon juice. Stir until a smooth sauce is formed. Add zucchini noodles (zoodles) to skillet and toss in sauce, then add cherry tomatoes.  Separate zoodle and vegetables into 4 equal portions. Serve each with a ¼ cup serving of seasoned ricotta cheese. Sprinkle with grated parmesan cheese or fresh basil,  if desired.   Makes 4 servings. Calories per servin calories, 32 g carbs, 33 g protein, 18 g fat

## 2020-02-13 NOTE — PROGRESS NOTES
Digital Medicine: Health  Introduction    Introduced Carmen Khan to Digital Medicine. Discussed health  role and recommended lifestyle modifications.    Initial enrollment.    Overall, Mrs. Carmen Khan is feeling well. Reviewed BP monitoring technique, she will start submitting readings today.    She does not have any question or concern at this time.    The history is provided by the patient.     HYPERTENSION  Our goal is to get BP to consistently below 130/80mmHg and make the process convenient so patient can avoid extra trips to the office. Getting your blood pressure below 130/80mmHg (definition of control) will reduce your risk for heart attack, kidney failure, stroke and death (as well as kidney failure, eye disease, & dementia)      Reviewed that the Digital Medicine care team - consisting of a clinician and a health  - will follow the most current evidence-based national guidelines for treating your condition.  The health  will focus on lifestyle modifications and motivation while the clinician will focus on medication therapy.  The care team will review all data on a regular basis and reach out as needed.      Explained that one of the key parts of the program is communication with the care team.  Asked patient to respond to outreach attempts and complete questionnaires.  Stressed importance of medication adherence.    Reviewed non-pharmacologic therapies and impact on BP.      Explained that we expect patient to obtain several blood pressures per week at random times of day.  Instructed patient not to allow anyone else to use phone and monitoring device.  Confirmed appropriate BP monitoring technique.      Explained to patient that the digital medicine team is not available for emergencies.  Patient will call [facility] on-call ([phone] or [phone]) or [phone] if needed.            Last 5 Patient Entered Readings                                      Current 30 Day Average:       Recent Readings 1/9/2020 1/9/2020 12/6/2019 11/22/2019 11/21/2019    SBP (mmHg) 145 142 149 138 110    DBP (mmHg) 78 85 76 67 66    Pulse 90 91 84 85 77            INTERVENTION(S)  recommended diet modifications, recommend physical activity, reviewed monitoring technique and encouragement/support    PLAN  continue monitoring          Topic    Lipid (Cholesterol) Test        Reviewed the importance of self-monitoring, medication adherence, and that the health  can be used as a resource for lifestyle modifications to help reduce or maintain a healthy lifestyle.    Sent link to [facility] webpages and my contact information via [employer] for future questions. Follow up scheduled.             Diet Screening       Per pt request- will discuss dietary habits next follow up.    Physical Activity Screening       Per pt request will discuss exercise habits during follow up.      SDOH

## 2020-02-17 ENCOUNTER — PATIENT OUTREACH (OUTPATIENT)
Dept: ADMINISTRATIVE | Facility: OTHER | Age: 70
End: 2020-02-17

## 2020-02-18 ENCOUNTER — OFFICE VISIT (OUTPATIENT)
Dept: OPHTHALMOLOGY | Facility: CLINIC | Age: 70
End: 2020-02-18
Payer: COMMERCIAL

## 2020-02-18 DIAGNOSIS — H43.813 POSTERIOR VITREOUS DETACHMENT OF BOTH EYES: ICD-10-CM

## 2020-02-18 DIAGNOSIS — H35.3132 NONEXUDATIVE AGE-RELATED MACULAR DEGENERATION, BILATERAL, INTERMEDIATE DRY STAGE: Primary | ICD-10-CM

## 2020-02-18 PROCEDURE — 92134 POSTERIOR SEGMENT OCT RETINA (OCULAR COHERENCE TOMOGRAPHY)-BOTH EYES: ICD-10-PCS | Mod: S$GLB,,, | Performed by: OPHTHALMOLOGY

## 2020-02-18 PROCEDURE — 99999 PR PBB SHADOW E&M-EST. PATIENT-LVL III: CPT | Mod: PBBFAC,,, | Performed by: OPHTHALMOLOGY

## 2020-02-18 PROCEDURE — 92014 COMPRE OPH EXAM EST PT 1/>: CPT | Mod: S$GLB,,, | Performed by: OPHTHALMOLOGY

## 2020-02-18 PROCEDURE — 92134 CPTRZ OPH DX IMG PST SGM RTA: CPT | Mod: S$GLB,,, | Performed by: OPHTHALMOLOGY

## 2020-02-18 PROCEDURE — 92014 PR EYE EXAM, EST PATIENT,COMPREHESV: ICD-10-PCS | Mod: S$GLB,,, | Performed by: OPHTHALMOLOGY

## 2020-02-18 PROCEDURE — 92202 PR OPHTHALMOSCOPY, EXT, W/DRAW OPTIC NERVE/MACULA, I&R, UNI/BI: ICD-10-PCS | Mod: S$GLB,,, | Performed by: OPHTHALMOLOGY

## 2020-02-18 PROCEDURE — 92202 OPSCPY EXTND ON/MAC DRAW: CPT | Mod: S$GLB,,, | Performed by: OPHTHALMOLOGY

## 2020-02-18 PROCEDURE — 99999 PR PBB SHADOW E&M-EST. PATIENT-LVL III: ICD-10-PCS | Mod: PBBFAC,,, | Performed by: OPHTHALMOLOGY

## 2020-02-18 NOTE — PROGRESS NOTES
HPI     12 mo OCT   DLS- 02/26/2018 Dr. Caal     Pt sts has not noticed personally any changes with vision since last seen   but on 07/25/2019 seen [facility] Dr. Darin Mcbride that noticed drusen changes   OU and was referred back for follow up.   Denies Pain   (-)Flashes (-)Floaters  (-)Photophobia  (-)Glare    At's PRN   Taking [facility]         OCT  Drusen OU, no SRF/Edema      A/P    1. Dry Macular Degeneration OU  - drusen OU  - Areds/Amsler Grid      2. NSC OU  Monitor      3. PVD OU  Monitor        RTC 2 year OCT

## 2020-03-12 ENCOUNTER — PATIENT OUTREACH (OUTPATIENT)
Dept: OTHER | Facility: OTHER | Age: 70
End: 2020-03-12

## 2020-03-25 ENCOUNTER — PATIENT OUTREACH (OUTPATIENT)
Dept: OTHER | Facility: OTHER | Age: 70
End: 2020-03-25

## 2020-03-25 NOTE — PROGRESS NOTES
Digital Medicine: Clinician Introduction    Carmen Khan is a 69 y.o. female who is newly enrolled in the Digital Medicine Clinic. Pertinent PMH includes HTN and HLD. Patient reports adherence to triamterene-hydrochlorothiazide and metoprolol and denies missed doses. She reports having difficulty with blood pressure control a few months ago however, everything has been going well since metoprolol was increased from 50 to 100 mg. Patient has not been checking blood pressure regularly and she apologized.     The following information was reviewed and updated:  Preferred pharmacy   [facility]  [address] [address]  Phone: [phone] Fax: [phone]    [id number] - [place], [place] - [address] [address] [address] [facility] [address]  [address]  [place] [address]  Phone: [phone] Fax: [phone]    [id number] - [place], [place] - [address]  [address]  [facility] [address]  Phone: [phone] Fax: [phone]      Patient prefers a 90 days supply.     Review of patient's allergies indicates:  No Known Allergies    The history is provided by the patient. No  was used.     HYPERTENSION  Our goal is to get BP to consistently below 130/80mmHg and make the process convenient so patient can avoid extra trips to the office. Getting your blood pressure below 130/80mmHg (definition of control) will reduce your risk for heart attack, kidney failure, stroke and death (as well as kidney failure, eye disease, & dementia)      Reviewed non-pharmacologic therapies and impact on BP      Explained that we expect patient to obtain several blood pressures per week at random times of day.  Instructed patient not to allow anyone else to use phone and monitoring device.  Confirmed appropriate BP monitoring technique.      Explained to patient that the digital medicine team is not available for emergencies.  Patient will call [facility] on-call  ([phone] or [phone]) or [phone] if needed.    Patient's BP goal is 130/80. Patients BP average is 121/62 mmHg, which is at or below goal, per 2017 ACC/AHA Hypertension Guidelines.      Med Review complete.    Allergies reviewed.      Last 5 Patient Entered Readings                                      Current 30 Day Average: 121/62     Recent Readings 3/5/2020 3/4/2020 2/20/2020 1/9/2020 1/9/2020    SBP (mmHg) 114 128 119 145 142    DBP (mmHg) 62 62 67 78 85    Pulse 78 80 76 90 91                Assessment:  Patient's current 30-day average is at goal of <130/80 mmHg.       Plan:  Continue current regimen.  Encouraged more frequent readings.   Patients health , Lynette Rodriguez, will follow-up as scheduled.    I will continue to monitor regularly and will follow-up in 6-8 weeks, sooner if blood pressure begins to trend upward or downward.     Patient has my contact information and knows to call with any concerns or clinical changes.          Current Medication Regimen:  Hypertension Medications             metoprolol succinate (TOPROL-XL) 100 MG 24 hr tablet Take 1 tablet (100 mg total) by mouth once daily.    triamterene-hydrochlorothiazide 37.5-25 mg (DYAZIDE) 37.5-25 mg per capsule take one capsule by mouth every day            Reviewed the importance of self-monitoring, medication adherence, and that the health  can be used as a resource for lifestyle modifications to help reduce or maintain a healthy lifestyle.    Sent link to [facility] webpages and my contact information via [employer] for future questions. Follow up scheduled.

## 2020-04-02 NOTE — PROGRESS NOTES
Digital Medicine: Health  Follow-Up    Mrs. Carmen Khan, reports feeling fine but is stressed about the recent pandemic covid19. She attributes her BP spikes due to deadlines she has to meet for her job and is under a lot of stress. Talked about self care ideas to help manage stress. Reviewed BP technique and discovered that pt is checking BP before taking HTN meds. Advised pt to relax for 2-5 min and to sit  in a firm chair with her feet flat on the floor and take HTN an hr before checking BP. Pt voiced understanding.    Pt does not have any question or concerns at this time.     Follow up complete.          INTERVENTION(S)  encouragement/support    PLAN  continue monitoring    Pt has my contact information and knows to reach out for question or concerns.    HC follow up 4 wks.          Topic    Lipid (Cholesterol) Test        Last 5 Patient Entered Readings                                      Current 30 Day Average: 133/71     Recent Readings 4/1/2020 3/31/2020 3/30/2020 3/28/2020 3/27/2020    SBP (mmHg) 139 126 131 116 145    DBP (mmHg) 78 68 60 65 74    Pulse 69 82 65 65 68                      Diet Screening   No change to diet.  She has the following dietary restrictions: low sodium diet    Barriers to a Healthy Diet: no barriers to healthy eating    Physical Activity Screening   No change to exercise routine.    When asked if exercising, patient responded: yes    Medication Adherence Screening   She did not miss a dose this month.      SDOH

## 2020-04-06 ENCOUNTER — PATIENT MESSAGE (OUTPATIENT)
Dept: BARIATRICS | Facility: CLINIC | Age: 70
End: 2020-04-06

## 2020-04-06 ENCOUNTER — PATIENT MESSAGE (OUTPATIENT)
Dept: OTHER | Facility: OTHER | Age: 70
End: 2020-04-06

## 2020-04-06 ENCOUNTER — PATIENT MESSAGE (OUTPATIENT)
Dept: INTERNAL MEDICINE | Facility: CLINIC | Age: 70
End: 2020-04-06

## 2020-04-15 ENCOUNTER — TELEPHONE (OUTPATIENT)
Dept: BARIATRICS | Facility: CLINIC | Age: 70
End: 2020-04-15

## 2020-04-15 NOTE — TELEPHONE ENCOUNTER
Spoke with pt. In regards to converting appointment into a tele visit? Pt stated. She would rather wait. Asked was she okay on medication. Was informed. She just  a RX. Appointment has been rescheduled.

## 2020-04-21 DIAGNOSIS — Z01.84 ANTIBODY RESPONSE EXAMINATION: ICD-10-CM

## 2020-05-01 ENCOUNTER — PATIENT OUTREACH (OUTPATIENT)
Dept: OTHER | Facility: OTHER | Age: 70
End: 2020-05-01

## 2020-05-13 ENCOUNTER — PATIENT MESSAGE (OUTPATIENT)
Dept: INTERNAL MEDICINE | Facility: CLINIC | Age: 70
End: 2020-05-13

## 2020-05-15 ENCOUNTER — LAB VISIT (OUTPATIENT)
Dept: LAB | Facility: HOSPITAL | Age: 70
End: 2020-05-15
Attending: INTERNAL MEDICINE
Payer: COMMERCIAL

## 2020-05-15 DIAGNOSIS — Z01.84 ANTIBODY RESPONSE EXAMINATION: ICD-10-CM

## 2020-05-15 LAB — SARS-COV-2 IGG SERPLBLD QL IA.RAPID: NEGATIVE

## 2020-05-15 PROCEDURE — 36415 COLL VENOUS BLD VENIPUNCTURE: CPT

## 2020-05-15 PROCEDURE — 86769 SARS-COV-2 COVID-19 ANTIBODY: CPT

## 2020-05-26 RX ORDER — DICLOFENAC SODIUM 75 MG/1
75 TABLET, DELAYED RELEASE ORAL 2 TIMES DAILY
Qty: 28 TABLET | Refills: 0 | Status: SHIPPED | OUTPATIENT
Start: 2020-05-26 | End: 2021-04-30 | Stop reason: SDUPTHER

## 2020-06-24 NOTE — PROGRESS NOTES
Digital Medicine: Health  Follow-Up    Called patient for HC follow up.    She feels well, without any complaints. 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 She has an appointment coming up with Dr. Edmondson. She is focusing on trying to lose weight.          INTERVENTION(S)  encouragement/support    PLAN  continue monitoring          Topic    Lipid (Cholesterol) Test        Last 5 Patient Entered Readings                                      Current 30 Day Average: 129/74     Recent Readings 6/21/2020 6/19/2020 6/11/2020 6/9/2020 6/8/2020    SBP (mmHg) 134 136 119 129 158    DBP (mmHg) 86 74 70 64 84    Pulse 87 74 82 68 66                      Diet Screening   No change to diet.      Physical Activity Screening   When asked if exercising, patient responded: yes    She is planning on getting more movement by riding her bicycle, she went out to [employer] to purchase a helmet.       SDOH

## 2020-07-09 ENCOUNTER — LAB VISIT (OUTPATIENT)
Dept: LAB | Facility: HOSPITAL | Age: 70
End: 2020-07-09
Payer: COMMERCIAL

## 2020-07-09 DIAGNOSIS — I10 ESSENTIAL HYPERTENSION: ICD-10-CM

## 2020-07-09 DIAGNOSIS — R73.09 ELEVATED GLUCOSE: ICD-10-CM

## 2020-07-09 LAB
ALBUMIN SERPL BCP-MCNC: 4 G/DL (ref 3.5–5.2)
ALP SERPL-CCNC: 112 U/L (ref 55–135)
ALT SERPL W/O P-5'-P-CCNC: 27 U/L (ref 10–44)
ANION GAP SERPL CALC-SCNC: 9 MMOL/L (ref 8–16)
AST SERPL-CCNC: 33 U/L (ref 10–40)
BASOPHILS # BLD AUTO: 0.07 K/UL (ref 0–0.2)
BASOPHILS NFR BLD: 1 % (ref 0–1.9)
BILIRUB SERPL-MCNC: 0.5 MG/DL (ref 0.1–1)
BUN SERPL-MCNC: 10 MG/DL (ref 8–23)
CALCIUM SERPL-MCNC: 9.8 MG/DL (ref 8.7–10.5)
CHLORIDE SERPL-SCNC: 101 MMOL/L (ref 95–110)
CHOLEST SERPL-MCNC: 202 MG/DL (ref 120–199)
CHOLEST/HDLC SERPL: 2.8 {RATIO} (ref 2–5)
CO2 SERPL-SCNC: 25 MMOL/L (ref 23–29)
CREAT SERPL-MCNC: 0.9 MG/DL (ref 0.5–1.4)
DIFFERENTIAL METHOD: ABNORMAL
EOSINOPHIL # BLD AUTO: 0.2 K/UL (ref 0–0.5)
EOSINOPHIL NFR BLD: 2.5 % (ref 0–8)
ERYTHROCYTE [DISTWIDTH] IN BLOOD BY AUTOMATED COUNT: 12.2 % (ref 11.5–14.5)
EST. GFR  (AFRICAN AMERICAN): >60 ML/MIN/1.73 M^2
EST. GFR  (NON AFRICAN AMERICAN): >60 ML/MIN/1.73 M^2
ESTIMATED AVG GLUCOSE: 128 MG/DL (ref 68–131)
GLUCOSE SERPL-MCNC: 113 MG/DL (ref 70–110)
HBA1C MFR BLD HPLC: 6.1 % (ref 4–5.6)
HCT VFR BLD AUTO: 39.3 % (ref 37–48.5)
HDLC SERPL-MCNC: 71 MG/DL (ref 40–75)
HDLC SERPL: 35.1 % (ref 20–50)
HGB BLD-MCNC: 12.9 G/DL (ref 12–16)
IMM GRANULOCYTES # BLD AUTO: 0.02 K/UL (ref 0–0.04)
IMM GRANULOCYTES NFR BLD AUTO: 0.3 % (ref 0–0.5)
LDLC SERPL CALC-MCNC: 110.8 MG/DL (ref 63–159)
LYMPHOCYTES # BLD AUTO: 1.8 K/UL (ref 1–4.8)
LYMPHOCYTES NFR BLD: 26.7 % (ref 18–48)
MCH RBC QN AUTO: 29.9 PG (ref 27–31)
MCHC RBC AUTO-ENTMCNC: 32.8 G/DL (ref 32–36)
MCV RBC AUTO: 91 FL (ref 82–98)
MONOCYTES # BLD AUTO: 0.6 K/UL (ref 0.3–1)
MONOCYTES NFR BLD: 8.2 % (ref 4–15)
NEUTROPHILS # BLD AUTO: 4.2 K/UL (ref 1.8–7.7)
NEUTROPHILS NFR BLD: 61.3 % (ref 38–73)
NONHDLC SERPL-MCNC: 131 MG/DL
NRBC BLD-RTO: 0 /100 WBC
PLATELET # BLD AUTO: 325 K/UL (ref 150–350)
PMV BLD AUTO: 8.9 FL (ref 9.2–12.9)
POTASSIUM SERPL-SCNC: 4.1 MMOL/L (ref 3.5–5.1)
PROT SERPL-MCNC: 7.7 G/DL (ref 6–8.4)
RBC # BLD AUTO: 4.32 M/UL (ref 4–5.4)
SODIUM SERPL-SCNC: 135 MMOL/L (ref 136–145)
TRIGL SERPL-MCNC: 101 MG/DL (ref 30–150)
WBC # BLD AUTO: 6.79 K/UL (ref 3.9–12.7)

## 2020-07-09 PROCEDURE — 85025 COMPLETE CBC W/AUTO DIFF WBC: CPT

## 2020-07-09 PROCEDURE — 83036 HEMOGLOBIN GLYCOSYLATED A1C: CPT

## 2020-07-09 PROCEDURE — 80053 COMPREHEN METABOLIC PANEL: CPT

## 2020-07-09 PROCEDURE — 36415 COLL VENOUS BLD VENIPUNCTURE: CPT

## 2020-07-09 PROCEDURE — 80061 LIPID PANEL: CPT

## 2020-07-13 RX ORDER — TRIAMTERENE AND HYDROCHLOROTHIAZIDE 37.5; 25 MG/1; MG/1
CAPSULE ORAL DAILY
Qty: 90 CAPSULE | Refills: 3 | Status: SHIPPED | OUTPATIENT
Start: 2020-07-13 | End: 2021-01-19 | Stop reason: SDUPTHER

## 2020-07-14 ENCOUNTER — OFFICE VISIT (OUTPATIENT)
Dept: INTERNAL MEDICINE | Facility: CLINIC | Age: 70
End: 2020-07-14
Payer: COMMERCIAL

## 2020-07-14 VITALS
OXYGEN SATURATION: 98 % | BODY MASS INDEX: 30.16 KG/M2 | HEART RATE: 94 BPM | DIASTOLIC BLOOD PRESSURE: 60 MMHG | SYSTOLIC BLOOD PRESSURE: 132 MMHG | WEIGHT: 170.19 LBS | HEIGHT: 63 IN

## 2020-07-14 DIAGNOSIS — E78.5 HYPERLIPIDEMIA, UNSPECIFIED HYPERLIPIDEMIA TYPE: ICD-10-CM

## 2020-07-14 DIAGNOSIS — Z00.00 WELLNESS EXAMINATION: ICD-10-CM

## 2020-07-14 DIAGNOSIS — Z01.419 ENCOUNTER FOR ANNUAL ROUTINE GYNECOLOGICAL EXAMINATION: ICD-10-CM

## 2020-07-14 DIAGNOSIS — Z12.11 COLON CANCER SCREENING: ICD-10-CM

## 2020-07-14 DIAGNOSIS — R73.09 ELEVATED GLUCOSE: ICD-10-CM

## 2020-07-14 DIAGNOSIS — I10 ESSENTIAL HYPERTENSION: ICD-10-CM

## 2020-07-14 DIAGNOSIS — E55.9 MILD VITAMIN D DEFICIENCY: ICD-10-CM

## 2020-07-14 DIAGNOSIS — Z00.00 PHYSICAL EXAM: Primary | ICD-10-CM

## 2020-07-14 DIAGNOSIS — Z12.31 ENCOUNTER FOR SCREENING MAMMOGRAM FOR BREAST CANCER: ICD-10-CM

## 2020-07-14 PROCEDURE — 99397 PR PREVENTIVE VISIT,EST,65 & OVER: ICD-10-PCS | Mod: S$GLB,,, | Performed by: INTERNAL MEDICINE

## 2020-07-14 PROCEDURE — 3075F PR MOST RECENT SYSTOLIC BLOOD PRESS GE 130-139MM HG: ICD-10-PCS | Mod: CPTII,S$GLB,, | Performed by: INTERNAL MEDICINE

## 2020-07-14 PROCEDURE — 3078F DIAST BP <80 MM HG: CPT | Mod: CPTII,S$GLB,, | Performed by: INTERNAL MEDICINE

## 2020-07-14 PROCEDURE — 99999 PR PBB SHADOW E&M-EST. PATIENT-LVL V: CPT | Mod: PBBFAC,,, | Performed by: INTERNAL MEDICINE

## 2020-07-14 PROCEDURE — 99999 PR PBB SHADOW E&M-EST. PATIENT-LVL V: ICD-10-PCS | Mod: PBBFAC,,, | Performed by: INTERNAL MEDICINE

## 2020-07-14 PROCEDURE — 99397 PER PM REEVAL EST PAT 65+ YR: CPT | Mod: S$GLB,,, | Performed by: INTERNAL MEDICINE

## 2020-07-14 PROCEDURE — 3075F SYST BP GE 130 - 139MM HG: CPT | Mod: CPTII,S$GLB,, | Performed by: INTERNAL MEDICINE

## 2020-07-14 PROCEDURE — 3078F PR MOST RECENT DIASTOLIC BLOOD PRESSURE < 80 MM HG: ICD-10-PCS | Mod: CPTII,S$GLB,, | Performed by: INTERNAL MEDICINE

## 2020-07-14 NOTE — PROGRESS NOTES
Subjective:      Patient ID: Carmen Hawley is a 70 y.o. female.    Chief Complaint: Annual Exam    HPI:  HPI   Patient is here for  Annual Exam  She reports that she is not having any problems    Review 7/14/2020  Colonoscopy: 8/2009 with follow up 8/2014: Sister had polyps. 1/27/2015:likely follow up 5 years ( although 10 years is on the colonoscopy). 1/2018 FIT 1/2019 FIT  Mammogram: Order in  Gyn:  To be scheduled  Optho: [facility]: Dr. Darin Izquierdo , needs follow up with Dr. Dunne.due in 2022  Derm: Dr. Chacko  patient will make appt  Flu:yearly  Tetanus: 12/15/2015  Shingles done  Shingrix: needs 2/2  Pneumovax 23:1/9/2018  Prevnar 13 6/15/2016  Bone Density: 4/2019  Carotid ultrasound: 1/15/2014 normal     Patient Active Problem List   Diagnosis    Hypertension    Hyperlipidemia    Anxiety    Screening for colon cancer    Primary osteoarthritis of left foot    Age-related macular degeneration, dry, both eyes    Posterior vitreous detachment of both eyes    Nuclear cataract of both eyes    Elevated glucose    Osteoarthritis of knee    Family history of colonic polyps    Rib pain on left side    Pain in the muscles    Arm pain, musculoskeletal, left    Nonexudative age-related macular degeneration, bilateral, intermediate dry stage    Lumbar radiculopathy    Chronic right-sided low back pain with sciatica    Left foot pain     Past Medical History:   Diagnosis Date    Anxiety     Hyperlipidemia     Hypertension     Mitral valve prolapse      Past Surgical History:   Procedure Laterality Date    TONSILLECTOMY       Family History   Problem Relation Age of Onset    Cancer Mother         lung    Stroke Mother     Heart disease Father     Diabetes Father     Diabetes Brother     Aortic aneurysm Brother         surgery 5/2012    Kidney disease Brother         on dialysis    Melanoma Neg Hx     Breast cancer Neg Hx     Colon cancer Neg Hx     Ovarian cancer Neg Hx   "    Review of Systems   Constitutional: Negative for chills, fever and unexpected weight change.   HENT: Negative for trouble swallowing.    Respiratory: Negative for cough, shortness of breath and wheezing.    Cardiovascular: Negative for chest pain and palpitations.   Gastrointestinal: Negative for abdominal distention, abdominal pain, blood in stool and vomiting.   Musculoskeletal: Negative for back pain.     Objective:     Vitals:    07/14/20 1352   BP: 132/60   Pulse: 94   SpO2: 98%   Weight: 77.2 kg (170 lb 3.1 oz)   Height: 5' 3" (1.6 m)   PainSc: 0-No pain     Body mass index is 30.15 kg/m².  Physical Exam  Constitutional:       General: She is not in acute distress.     Appearance: She is well-developed.   Neck:      Thyroid: No thyromegaly.      Vascular: No carotid bruit.   Cardiovascular:      Rate and Rhythm: Normal rate and regular rhythm.      Chest Wall: PMI is not displaced.      Heart sounds: Normal heart sounds.   Pulmonary:      Effort: Pulmonary effort is normal. No respiratory distress.      Breath sounds: Normal breath sounds.   Abdominal:      General: Bowel sounds are normal. There is no distension.      Palpations: Abdomen is soft.      Tenderness: There is no abdominal tenderness.   Neurological:      Mental Status: She is alert and oriented to person, place, and time.       Assessment:     1. Physical exam    2. Encounter for screening mammogram for breast cancer    3. Colon cancer screening    4. Encounter for annual routine gynecological examination    5. Essential hypertension    6. Hyperlipidemia, unspecified hyperlipidemia type    7. Elevated glucose    8. Wellness examination    9. Mild vitamin D deficiency      Plan:   Carmen was seen today for annual exam.    Diagnoses and all orders for this visit:    Physical exam    Encounter for screening mammogram for breast cancer  -     Mammo Digital Screening Bilat w/ [facility]; Future    Colon cancer screening  -     Case request GI: " COLONOSCOPY    Encounter for annual routine gynecological examination  -     Ambulatory referral/consult to Obstetrics / Gynecology; Future    Essential hypertension  Comments:  Well controlled, same medications  Orders:  -     CBC auto differential; Future  -     Comprehensive metabolic panel; Future    Hyperlipidemia, unspecified hyperlipidemia type  Comments:  Labs reviewed no change in medication  Orders:  -     Lipid Panel; Future    Elevated glucose  Comments:  Continues to follow  Orders:  -     Hemoglobin A1C; Future    Wellness examination    Mild vitamin D deficiency  -     Vitamin D; Future        Problem List Items Addressed This Visit     Hypertension    Relevant Orders    CBC auto differential    Comprehensive metabolic panel    Hyperlipidemia    Relevant Orders    Lipid Panel    Elevated glucose    Relevant Orders    Hemoglobin A1C      Other Visit Diagnoses     Physical exam    -  Primary    Encounter for screening mammogram for breast cancer        Relevant Orders    Mammo Digital Screening Bilat w/ [facility]    Colon cancer screening        Relevant Orders    Case request GI: COLONOSCOPY (Completed)    Encounter for annual routine gynecological examination        Relevant Orders    Ambulatory referral/consult to Obstetrics / Gynecology    Wellness examination        Mild vitamin D deficiency        Relevant Orders    Vitamin D        Orders Placed This Encounter   Procedures    Mammo Digital Screening Bilat w/ [facility]     Standing Status:   Future     Standing Expiration Date:   9/13/2021     Order Specific Question:   May the Radiologist modify the order per protocol to meet the clinical needs of the patient?     Answer:   Yes    CBC auto differential     Standing Status:   Future     Standing Expiration Date:   10/14/2021    Comprehensive metabolic panel     Standing Status:   Future     Standing Expiration Date:   10/14/2021    Vitamin D     Standing Status:   Future     Standing Expiration Date:    10/14/2021    Lipid Panel     Standing Status:   Future     Standing Expiration Date:   10/14/2021    Hemoglobin A1C     Standing Status:   Future     Standing Expiration Date:   10/14/2021    Ambulatory referral/consult to Obstetrics / Gynecology     Standing Status:   Future     Standing Expiration Date:   8/14/2021     Referral Priority:   Routine     Referral Type:   Consultation     Referral Reason:   Specialty Services Required     Requested Specialty:   Obstetrics and Gynecology     Number of Visits Requested:   1    Case request GI: COLONOSCOPY     Order Specific Question:   Pre-op Diagnosis     Answer:   Screening [[id number]]     Order Specific Question:   CPT Code:     Answer:   VT COLORECTAL CANCER SCREEN RESULTS DOCUMENT/REVIEW [3017F]     Order Specific Question:   Case Referring Provider     Answer:   UMA FOWLER [[id number]]     Order Specific Question:   CPT Code:     Answer:   VT COLON CA SCRN NOT HI RSK IND []     Order Specific Question:   Medical Necessity:     Answer:   Medically Non-Urgent [100]     Order Specific Question:   Is an on-site pathologist required for this procedure?     Answer:   N/A     Follow up in about 6 months (around 1/14/2021) for Follow up in 6 month: cbc, cmp, lipid A1C.     Medication List          Accurate as of July 14, 2020  2:22 PM. If you have any questions, ask your nurse or doctor.            CONTINUE taking these medications    aspirin 81 MG EC tablet  Commonly known as: ECOTRIN     biotin 300 mcg Tab     chlorhexidine 0.12 % solution  Commonly known as: PERIDEX  rinse with 1/2 ounce (15ml) for thirty seconds and expectorate after breakfast and at bedtime. Do not eat or drink for at least 30 minutes following     diclofenac sodium 1 % Gel  Commonly known as: VOLTAREN  Apply 2 g topically 4 (four) times daily.     * escitalopram oxalate 10 MG tablet  Commonly known as: LEXAPRO  Take 1 tablet (10 mg total) by mouth once daily.     * escitalopram oxalate 5 MG  Tab  Commonly known as: LEXAPRO  Take 1 tablet (5 mg total) by mouth once daily. Take with Escitalopram 10mg once daily     FLUZONE HIGH-DOSE 2019-20 (PF) 180 mcg/0.5 mL Syrg  Generic drug: flu vacc [id number](65yr up)PF  Inject 0.5 ml into muscle once for 1 dose.     metoprolol succinate 100 MG 24 hr tablet  Commonly known as: TOPROL-XL  Take 1 tablet (100 mg total) by mouth once daily.     phentermine 37.5 mg tablet  Commonly known as: ADIPEX-P  TAKE 1/2 TABLET BY MOUTH ONCE EVERY MORNING.     SHINGRIX (PF) 50 mcg/0.5 mL injection  Generic drug: varicella-zoster [id number] (PF)  Inject into the muscle.     simvastatin 40 MG tablet  Commonly known as: ZOCOR  TAKE 1 TABLET BY MOUTH EVERY EVENING.     topiramate 50 MG tablet  Commonly known as: TOPAMAX  Take 1 tablet (50 mg total) by mouth once daily.     tretinoin 0.1 % cream  Commonly known as: RETIN-A  APPLY A THIN FILM TO AFFECTED AREA EVERY EVENING AFTER MOISTURIZING.     triamterene-hydrochlorothiazide 37.5-25 mg 37.5-25 mg per capsule  Commonly known as: DYAZIDE  take one capsule by mouth every day     vitamin D 1000 units Tab  Commonly known as: VITAMIN D3         * This list has 2 medication(s) that are the same as other medications prescribed for you. Read the directions carefully, and ask your doctor or other care provider to review them with you.

## 2020-07-15 ENCOUNTER — PATIENT OUTREACH (OUTPATIENT)
Dept: OTHER | Facility: OTHER | Age: 70
End: 2020-07-15

## 2020-07-15 NOTE — PROGRESS NOTES
"Digital Medicine: Clinician Follow-Up    Called patient for digital medicine follow up. She is doing well overall. Says that appointment with Dr. Edmondson went "great". No medications were adjusted and her blood pressure was better. Patient states that she is still doing well on metoprolol and Dyazide.     The history is provided by the patient.   Follow-up reason(s): routine follow up.     Patient readings are stable         Last 5 Patient Entered Readings                                      Current 30 Day Average: 127/71     Recent Readings 7/12/2020 7/8/2020 7/2/2020 6/28/2020 6/25/2020    SBP (mmHg) 139 113 131 132 104    DBP (mmHg) 67 64 75 74 59    Pulse 82 75 71 71 69             Screenings    ASSESSMENT(S)  Patients BP average is 127/71 mmHg, which is at goal. Patient's BP goal is less than or equal to 130/80 per 2017 ACC/AHA Hypertension Guidelines.      PLAN  Continue current therapy: Mild hyponatremia noted. Recommend monitoring every 6-12 months as patient on diuretic and SSRI.    Patient verbalizes understanding. Patient did not express questions or concerns and patient has contact information if needed.          There are no preventive care reminders to display for this patient.      Hypertension Medications             metoprolol succinate (TOPROL-XL) 100 MG 24 hr tablet Take 1 tablet (100 mg total) by mouth once daily.    triamterene-hydrochlorothiazide 37.5-25 mg (DYAZIDE) 37.5-25 mg per capsule take one capsule by mouth every day                "

## 2020-07-21 ENCOUNTER — OFFICE VISIT (OUTPATIENT)
Dept: BARIATRICS | Facility: CLINIC | Age: 70
End: 2020-07-21
Payer: COMMERCIAL

## 2020-07-21 ENCOUNTER — PATIENT OUTREACH (OUTPATIENT)
Dept: OTHER | Facility: OTHER | Age: 70
End: 2020-07-21

## 2020-07-21 VITALS
BODY MASS INDEX: 29.84 KG/M2 | DIASTOLIC BLOOD PRESSURE: 82 MMHG | WEIGHT: 168.44 LBS | HEART RATE: 94 BPM | HEIGHT: 63 IN | SYSTOLIC BLOOD PRESSURE: 140 MMHG

## 2020-07-21 DIAGNOSIS — E66.9 OBESITY, CLASS I, BMI 30.0-34.9 (SEE ACTUAL BMI): ICD-10-CM

## 2020-07-21 PROCEDURE — 3008F BODY MASS INDEX DOCD: CPT | Mod: CPTII,S$GLB,, | Performed by: INTERNAL MEDICINE

## 2020-07-21 PROCEDURE — 99999 PR PBB SHADOW E&M-EST. PATIENT-LVL IV: CPT | Mod: PBBFAC,,, | Performed by: INTERNAL MEDICINE

## 2020-07-21 PROCEDURE — 3077F PR MOST RECENT SYSTOLIC BLOOD PRESSURE >= 140 MM HG: ICD-10-PCS | Mod: CPTII,S$GLB,, | Performed by: INTERNAL MEDICINE

## 2020-07-21 PROCEDURE — 3077F SYST BP >= 140 MM HG: CPT | Mod: CPTII,S$GLB,, | Performed by: INTERNAL MEDICINE

## 2020-07-21 PROCEDURE — 1126F PR PAIN SEVERITY QUANTIFIED, NO PAIN PRESENT: ICD-10-PCS | Mod: S$GLB,,, | Performed by: INTERNAL MEDICINE

## 2020-07-21 PROCEDURE — 99999 PR PBB SHADOW E&M-EST. PATIENT-LVL IV: ICD-10-PCS | Mod: PBBFAC,,, | Performed by: INTERNAL MEDICINE

## 2020-07-21 PROCEDURE — 1101F PT FALLS ASSESS-DOCD LE1/YR: CPT | Mod: CPTII,S$GLB,, | Performed by: INTERNAL MEDICINE

## 2020-07-21 PROCEDURE — 1101F PR PT FALLS ASSESS DOC 0-1 FALLS W/OUT INJ PAST YR: ICD-10-PCS | Mod: CPTII,S$GLB,, | Performed by: INTERNAL MEDICINE

## 2020-07-21 PROCEDURE — 1159F MED LIST DOCD IN RCRD: CPT | Mod: S$GLB,,, | Performed by: INTERNAL MEDICINE

## 2020-07-21 PROCEDURE — 99213 OFFICE O/P EST LOW 20 MIN: CPT | Mod: S$GLB,,, | Performed by: INTERNAL MEDICINE

## 2020-07-21 PROCEDURE — 1159F PR MEDICATION LIST DOCUMENTED IN MEDICAL RECORD: ICD-10-PCS | Mod: S$GLB,,, | Performed by: INTERNAL MEDICINE

## 2020-07-21 PROCEDURE — 99213 PR OFFICE/OUTPT VISIT, EST, LEVL III, 20-29 MIN: ICD-10-PCS | Mod: S$GLB,,, | Performed by: INTERNAL MEDICINE

## 2020-07-21 PROCEDURE — 3079F PR MOST RECENT DIASTOLIC BLOOD PRESSURE 80-89 MM HG: ICD-10-PCS | Mod: CPTII,S$GLB,, | Performed by: INTERNAL MEDICINE

## 2020-07-21 PROCEDURE — 1126F AMNT PAIN NOTED NONE PRSNT: CPT | Mod: S$GLB,,, | Performed by: INTERNAL MEDICINE

## 2020-07-21 PROCEDURE — 3008F PR BODY MASS INDEX (BMI) DOCUMENTED: ICD-10-PCS | Mod: CPTII,S$GLB,, | Performed by: INTERNAL MEDICINE

## 2020-07-21 PROCEDURE — 3079F DIAST BP 80-89 MM HG: CPT | Mod: CPTII,S$GLB,, | Performed by: INTERNAL MEDICINE

## 2020-07-21 RX ORDER — TOPIRAMATE 50 MG/1
50 TABLET, FILM COATED ORAL DAILY
Qty: 60 TABLET | Refills: 3 | Status: SHIPPED | OUTPATIENT
Start: 2020-07-21 | End: 2021-03-10 | Stop reason: SDUPTHER

## 2020-07-21 RX ORDER — PHENTERMINE HYDROCHLORIDE 37.5 MG/1
37.5 TABLET ORAL EVERY MORNING
Qty: 30 TABLET | Refills: 1 | Status: SHIPPED | OUTPATIENT
Start: 2020-07-21 | End: 2021-03-10

## 2020-07-21 NOTE — PROGRESS NOTES
"Subjective:       Patient ID: Carmen Hawley is a 70 y.o. female.    Chief Complaint: Follow-up    Pt here today for follow up. Has  Lost 1lbs from previous visit, net neg 1 lbs. Has been on topiramte and diethylpropion , last filled 4/3/2020  checked today. Trying to get more exercise with golfing. Her foot and neck have been getting better.  She has been on trokendi as well.  She is enrolled in digital HTN.  BP has been good. Pt was rushing to appt. Has been doing a lot of garden work.   She has been working 2 days a week in office and rest of time from home.     Follow-up  Associated symptoms include arthralgias. Pertinent negatives include no chest pain, chills or fever.     Review of Systems   Constitutional: Negative for chills and fever.   Respiratory: Negative for apnea and shortness of breath.         + snores   Cardiovascular: Negative for chest pain and leg swelling.   Gastrointestinal: Negative for constipation and diarrhea.        Denies HB   Genitourinary: Negative for dysuria.   Musculoskeletal: Positive for arthralgias. Negative for back pain.        Left foot OA   Neurological: Negative for dizziness and light-headedness.   Psychiatric/Behavioral: Negative for dysphoric mood. The patient is not nervous/anxious.         Doing well on Lexapro       Objective:       BP (!) 140/82   Pulse 94   Ht 5' 3" (1.6 m)   Wt 76.4 kg (168 lb 6.9 oz)   BMI 29.84 kg/m²     Physical Exam  Vitals signs reviewed.   Constitutional:       General: She is not in acute distress.     Appearance: She is well-developed.      Comments: Obese     HENT:      Head: Normocephalic and atraumatic.   Eyes:      General: No scleral icterus.     Pupils: Pupils are equal, round, and reactive to light.   Neck:      Musculoskeletal: Normal range of motion and neck supple.   Cardiovascular:      Rate and Rhythm: Normal rate.   Pulmonary:      Effort: Pulmonary effort is normal.   Musculoskeletal: Normal range of motion.   Skin:   "   General: Skin is warm and dry.      Findings: No erythema.   Neurological:      Mental Status: She is alert and oriented to person, place, and time.      Cranial Nerves: No cranial nerve deficit.   Psychiatric:         Behavior: Behavior normal.         Judgment: Judgment normal.         Assessment:       1. Obesity, Class I, BMI 30.0-34.9 (see actual BMI)        Plan:            Carmen was seen today for follow-up.    Diagnoses and all orders for this visit:    Obesity, Class I, BMI 30.0-34.9 (see actual BMI)  -     phentermine (ADIPEX-P) 37.5 mg tablet; TAKE 1/2 TABLET BY MOUTH ONCE EVERY MORNING.    Other orders  -     topiramate (TOPAMAX) 50 MG tablet; Take 1 tablet (50 mg total) by mouth once daily.           Patient was informed that topiramate is used for migraine prevention and seizures. Weight loss is a common side effect that is well documented. S/he understands this. S/he was informed of the potential side effects such as serious and possibly fatal rash in which case the medication should be discontinued immediately. Paresthesias, forgetfulness, fatigue, kidney stones, GI symptoms, and changes in lab values such as electrolytes, blood counts and kidney function.    Patient warned of common side effects of phentermine including anxiety, insomnia, palpitations and increased blood pressure. It was also explained that it is for short-term usage along with diet and exercise, and that stopping the medication without making lifestyle changes will result in regain of weight. Patient states understanding.     Weight loss medications are controlled substances.  They require routine follow up. Prescription or pills that are lost or destroyed will not be replaced.     Start phentermine with 1/2 pill a day.                  3 meals a day made up of the following:  Unlimited green vegetables, tomatoes, mushrooms, spaghetti squash, cauliflower, meat, poultry, seafood, eggs and hard cheeses.   Milk and plain  yogurt  Dressings, seasonings, condiments, etc should have less than 2 g sugars.   Beans (1-1.5 cups) or nuts (1/4 cup) can have 1 x a day.   1-2 servings of citrus fruits, berries, pineapple or melon a day (1/2 cup)  Avoid fried foods    No grains, rice, pasta, potatoes, bread, corn, peas, oatmeal, grits, tortillas, crackers, chips    No soda, sweet tea, juices or lemonade    Www.dietdoctor[employer] for recipes. Moderate carb intake      Add some type of resistance training 2-3 days a week. These can be body weight exercises, light weight or elastic bands. [employer] and [employer] are great sources for free work out plans and videos.         30 min recipes given.

## 2020-07-21 NOTE — PATIENT INSTRUCTIONS
Patient was informed that topiramate is used for migraine prevention and seizures. Weight loss is a common side effect that is well documented. S/he understands this. S/he was informed of the potential side effects such as serious and possibly fatal rash in which case the medication should be discontinued immediately. Paresthesias, forgetfulness, fatigue, kidney stones, GI symptoms, and changes in lab values such as electrolytes, blood counts and kidney function.    Patient warned of common side effects of phentermine including anxiety, insomnia, palpitations and increased blood pressure. It was also explained that it is for short-term usage along with diet and exercise, and that stopping the medication without making lifestyle changes will result in regain of weight. Patient states understanding.     Weight loss medications are controlled substances.  They require routine follow up. Prescription or pills that are lost or destroyed will not be replaced.     Start phentermine with 1/2 pill a day.      Continue 800-1000 jeremiah a day.       [facility]  Tips to Stay on Track During COVID-19      DO DON'T   Keep up with your food log  Maintaining your caloric intake and diet quality is critical for achieving your health and weight loss goals.  Download the [place] PrintToPeer and use [facility] Code 39544 to track food and fluid intake Feel Discouraged - You can do this!  There are a lot of changes happening in our world but don't let them discourage you. Focus on the future and remind yourself of all the work and effort you've put in so far.     Keep protein-rich foods stocked up  buy chicken and turkey in bulk and freeze them, keep dry or canned beans on hand, get the largest quantity of eggs available. Make sure you are getting your required protein intake (between  grams EACH DAY).   Drink fluid during meals or 30 minutes before/after eating  Your regular routine may have  changed which may have caused some of your meal or snack times to change.  keep track of when you consume any liquid and time your meals accordingly. This will also help you make sure you are staying hydrated throughout the day   Continue with your protein drinks or bars  Order online or use grocery delivery service. Always make sure you order more WELL BEFORE you are running low, especially now when deliveries may take longer to arrive.  Remember to check to make sure your protein shakes or bars have 4 gms of sugar or less.     Keep table sugar around  You may be more tempted to add it to your drinks or food if it is visible and easily accessible.   Try new recipes  Use this time to experiment in the kitchen and find some different healthy dishes you enjoy - you can use the nutrition booklet to help guide you.  If you cannot find your copy, please download it from our website @ [email or web address]  Click here to download [facility]'s Nutrition Binder.   Add tough or crunchy foods back into your diet too quickly  Raw veggies are great snack foods but adding them in too quickly after surgery will cause pain and discomfort   Eat your meals slowly and intentionally  You shouldn't have to rush out to be anywhere so really take your time and aim for each meal to last around 20-30 minutes.   Drink sugary/bubbly drinks or alcohol    It may be tempting especially if you are surrounded by others without these limitations, but these beverages will prevent weight loss and cause gastric  pain/discomfort     Listen to your body - try to recognize when you feel full.  Learning your body's signals can be difficult but it is a key step in your weight loss journey. While you are is this process of working on this step, be sure portion out the recommended quantities of foods and meals/snacks to prevent overeating.   Eat your meals using electronics  This is especially  difficult at home where your use of computers, phones, and TVs are pretty much unregulated. Designate a meal spot or spots where there is limited distractions and you can focus on your food - maybe your kitchen table or on an outside porch or deck.   Continue taking vitamins and minerals  Take them at the same time each day and keep a log of when you take your supplements to make sure you don't miss any. These supplements are essential for preventing malnutrition and other health problems that will deter your progress.   'Save' your appetite   You may feel like holding out from food as long as possible so you can eat a large meal later on, but your body needs energy throughout the day in order to properly fuel itself and keep you alert.  Try eating small meals throughout the day - use these meals as mini breaks from work or projects you are doing at home.   Stay active!  It is so important for both your physical and mental health that you get regular physical activity. Even if you can no longer physically go to the gym or to workout classes there are tons of online resources to keep you moving. Incorporate a specific exercise time into your daily home routine and keep a journal of your activity.   Order food delivery or take out   Most places are now offering delivery services, but it can still be difficult to find and choose healthy options. Choose to do a grocery store  or delivery instead- cooking food at home is less expensive then eating out!   Review the resources you've been provided and keep in touch with your healthcare team.  Let them know if you are struggling or experiencing any problems - they are here to help!  Call us to schedule a telehealth visit at [phone] Isolate yourself   It may be called 'social distancing' but that does not mean we can't still connect with one another. Phone calls or group video chats are great ways to keep in touch while staying at home. Any method of getting  regular social time with friends and family will help to remind you that you're not in this alone.     Grocery List    Items to Keep Stocked in Your Kitchen  PROTEINS (Lean)    Eggs  Beans (canned and/or dried)  Skinless chicken/turkey   Tuna/Midway (canned and/or pouch)  Tofu or Tempeh  Peter Veggie Burgers  Fish or shrimp (fresh or frozen)  Ground Beef (90% lean)  Steaks  Chobani Greek Yogurt  Cabot Cottage Cheese  Hummus  Low-fat cheeses (Laughing Cow, Baby Bell, mozzarella string cheese)  Fairlife Non-fat Milk    Vegetables (non-starchy)  *veggies can be fresh or frozen    Broccoli   Cauliflower   Carrots   Onions   Cabbage   Radishes   Zucchini   Okra   Greens   Peppers   Spinach   Turnips   Mushrooms   Tomatoes   Celery   Lettuce   Asparagus  Eggplant   Green Onions   Kale    Fruits  *Fruits can be fresh or frozen    Apples  Oranges  Pears  Kiwi  Melon  Berries  Peaches  Unsweetened Applesauce    *Avoid fruits canned In syrup  *Stick to 1-2 servings of fruit/day   Vitamins    Flintstones Complete  Chewables    Super B-Complex tablets with 50 mg Thiamine    Nature's Way liquid Calcium Citrate + Vit D     Sublingual Vitamin B12   Other    Sugar-free Popsicles    Sugar-free Jello    Crystal Lite    Low Fat Condiments     Decaf Coffee/Tea           Foods to Avoid Having Around the House  Butter/Margarine Cookies Candy Chips  Pretzels Grits  Granola Popcorn Anaya Corn  Bread Alcohol Soda  Pasta  Rice  Cake/Pie Sausage Potatoes Ice Cream                 Tricks to Prevent Emotional Eating During Quarantine      Keep 'trigger foods' out of the house   Keep yourself distracted with work, games, music, or whatever hobby you enjoy. This may be the time to try a new activity!   Try fighting stress with breathing techniques, yoga, meditation, or prayer   Mix up your meals with a variety of dishes   Keep up with your food diary   Call or video chat with a friend or family   Plan your meals for specific time and  try for smaller meals throughout the day   Pre-portion all meals and snacks    Step outside for some fresh air or do a quick exercise activity to reset yourself    *Avoid negative thoughts about yourself - if you have a slip-up, you are not a failure. Forgive yourself and focus on learning from it so you can prevent it for the future              Ways to Stay Active While Staying Inside      Youtube Videos - free exercise and gym classes at any fitness level   Apps -tons of fitness apps are currently offering free trial periods and offer workouts that don't require equipment   Chores around the house, such as cleaning or gardening   Walk up and down the stairs   Video-chat with your regular workout pushpa and do an online class together   Make a playlist of your favorite fun songs and dance around - no need to worry about knowing any serious dance moves, just jump around get your heart rate up!    While you are limited to working out at home, you may find it easier to do short, mini workouts multiple times a day instead of all at once. Try to still get at least 30 minutes of physical activity in each day!   Physical Health = Mental Health    The health of both your mind and body are equally important -be sure you are taking the time to care for both. It is likely that the current health concerns and quarantine mandates caused significant changes to your normal routine. Although this can feel overwhelming and seem difficult to manage, there are ways you can take to manage these feelings and keep your mental and physical health journey on track. Here are some tips for self-care during quarantine:     Meditate, take deep breaths - find any practice that will help you center yourself.   Move around your house throughout the day. Avoid staying in the same seat or room for too long and try to work in an area of your house that get lots of sunlight if possible.   Get fresh air for a bit every day - being outside  is a great way to improve your mood! You don't necessarily have to go far from your house. You could even just hang out on your porch for a while or take a walk around the block.    Get good sleep and maintain a regular sleep schedule   Connect with others. While we aren't able to physically be with others right now it is still so important to socialize and interact with other people, even if it is being done remotely.    Keep yourself busy. Make a list of tasks that you want to complete around the house, start a new book, do some art projects, try journaling.       Exercises for diastasis recti    Start by performing these rehab exercises daily for at least 4-6 weeks before resuming traditional abdominal exercises like crunches.     1. Abdominal Drawing In    When getting in and out of bed, lifting or carrying or getting up from a chair, pull in your stomach as if you were trying to fit into a tight pair of jeans. You should be able to hold this abdominal muscle contraction and breathe normally for 10 seconds. This exercise will safely start to engage and strengthen your abdominal muscles without compromising them.    2. Belly Breathing    Lie flat on your back on a mat. Take a deep inhale, allowing your belly and then your chest to fully inflate. Then, slowly and forcefully exhale, drawing your abdominal wall in as if you had a corset on as you do so.    3. Heel Slides With Alternating Arms    Lie flat on your back on a mat, with your knees bent and feet flat on the floor. Straighten your arms and raise them directly over your shoulders. Exhale, and slowly extend one leg out in front of you, letting it hover a few inches above the floor, and simultaneously extend the opposite arm back above the head, just off of the floor. Inhale and slowly return to start. Repeat on the opposite side. Work to keep your hips and core stable through the entire movement.    4. Quadruped Abdominal    Get on all fours (hands under  your shoulders and knees under hips) and pull your shoulders wide and away from your ears to form a flat back. From here, take a slow, deep inhale, allowing your abdominal wall to relax and expand toward the floor. Then exhale, drawing the muscles up and in while maintaining a flat back.

## 2020-09-01 DIAGNOSIS — Z12.11 SCREENING FOR COLORECTAL CANCER: Primary | ICD-10-CM

## 2020-09-01 DIAGNOSIS — Z01.818 PRE-OP TESTING: Primary | ICD-10-CM

## 2020-09-01 DIAGNOSIS — Z12.12 SCREENING FOR COLORECTAL CANCER: Primary | ICD-10-CM

## 2020-09-01 RX ORDER — SODIUM, POTASSIUM,MAG SULFATES 17.5-3.13G
SOLUTION, RECONSTITUTED, ORAL ORAL
Qty: 1 KIT | Refills: 0 | Status: SHIPPED | OUTPATIENT
Start: 2020-09-01 | End: 2021-07-21

## 2020-09-09 ENCOUNTER — IMMUNIZATION (OUTPATIENT)
Dept: PHARMACY | Facility: CLINIC | Age: 70
End: 2020-09-09
Payer: COMMERCIAL

## 2020-09-14 NOTE — PROGRESS NOTES
"Digital Medicine: Health  Follow-Up     Patient reports feeling " ok ".    She discussed that as on 9/1 she was notified that her hours at work were cut to 24 hrs part time and after 34 years working in the same position she's having a " difficult " time dealing with the change. She also notes some increased [place]     The history is provided by the patient.                   Diet-no change to diet    No change to diet.  Patient reports eating or drinking the following: Patient did not report changes to diet.       Physical Activity-Not assessed             Addressed any questions or concerns and patient has my contact information if needed prior to next outreach.   Explained the importance of self-monitoring and medication adherence. Encouraged the patient to communicate with their health  for lifestyle modifications to help improve or maintain a healthy lifestyle.            There are no preventive care reminders to display for this patient.    Last 5 Patient Entered Readings                                      Current 30 Day Average: 127/67     Recent Readings 9/8/2020 9/6/2020 9/4/2020 8/28/2020 8/24/2020    SBP (mmHg) 117 126 149 115 134    DBP (mmHg) 64 65 81 60 68    Pulse 71 80 81 68 64               "

## 2020-09-30 ENCOUNTER — PATIENT MESSAGE (OUTPATIENT)
Dept: ENDOSCOPY | Facility: HOSPITAL | Age: 70
End: 2020-09-30

## 2020-10-01 NOTE — TELEPHONE ENCOUNTER
Called patient regarding rescheduling her COVID appt. Left message for her to return call to my direct line

## 2020-10-05 ENCOUNTER — LAB VISIT (OUTPATIENT)
Dept: PRIMARY CARE CLINIC | Facility: CLINIC | Age: 70
End: 2020-10-05
Payer: COMMERCIAL

## 2020-10-05 DIAGNOSIS — Z01.818 PRE-OP TESTING: ICD-10-CM

## 2020-10-05 PROCEDURE — U0003 INFECTIOUS AGENT DETECTION BY NUCLEIC ACID (DNA OR RNA); SEVERE ACUTE RESPIRATORY SYNDROME CORONAVIRUS 2 (SARS-COV-2) (CORONAVIRUS DISEASE [COVID-19]), AMPLIFIED PROBE TECHNIQUE, MAKING USE OF HIGH THROUGHPUT TECHNOLOGIES AS DESCRIBED BY CMS-2020-01-R: HCPCS

## 2020-10-06 LAB — SARS-COV-2 RNA RESP QL NAA+PROBE: NOT DETECTED

## 2020-10-08 ENCOUNTER — ANESTHESIA EVENT (OUTPATIENT)
Dept: ENDOSCOPY | Facility: HOSPITAL | Age: 70
End: 2020-10-08
Payer: COMMERCIAL

## 2020-10-08 ENCOUNTER — ANESTHESIA (OUTPATIENT)
Dept: ENDOSCOPY | Facility: HOSPITAL | Age: 70
End: 2020-10-08
Payer: COMMERCIAL

## 2020-10-08 ENCOUNTER — HOSPITAL ENCOUNTER (OUTPATIENT)
Facility: HOSPITAL | Age: 70
Discharge: HOME | End: 2020-10-08
Attending: COLON & RECTAL SURGERY | Admitting: COLON & RECTAL SURGERY
Payer: COMMERCIAL

## 2020-10-08 VITALS
WEIGHT: 165 LBS | RESPIRATION RATE: 18 BRPM | BODY MASS INDEX: 29.23 KG/M2 | OXYGEN SATURATION: 100 % | HEIGHT: 63 IN | HEART RATE: 74 BPM | SYSTOLIC BLOOD PRESSURE: 153 MMHG | TEMPERATURE: 99 F | DIASTOLIC BLOOD PRESSURE: 70 MMHG

## 2020-10-08 DIAGNOSIS — Z83.719 FAMILY HISTORY OF COLONIC POLYPS: Primary | ICD-10-CM

## 2020-10-08 DIAGNOSIS — Z83.719 ENCOUNTER FOR COLONOSCOPY IN PATIENT WITH FAMILY HISTORY OF COLON POLYPS: ICD-10-CM

## 2020-10-08 DIAGNOSIS — Z12.11 ENCOUNTER FOR COLONOSCOPY IN PATIENT WITH FAMILY HISTORY OF COLON POLYPS: ICD-10-CM

## 2020-10-08 PROCEDURE — G0105 COLORECTAL SCRN; HI RISK IND: HCPCS | Mod: ,,, | Performed by: COLON & RECTAL SURGERY

## 2020-10-08 PROCEDURE — 25000003 PHARM REV CODE 250: Performed by: NURSE ANESTHETIST, CERTIFIED REGISTERED

## 2020-10-08 PROCEDURE — 63600175 PHARM REV CODE 636 W HCPCS: Performed by: NURSE ANESTHETIST, CERTIFIED REGISTERED

## 2020-10-08 PROCEDURE — E9220 PRA ENDO ANESTHESIA: ICD-10-PCS | Mod: ,,, | Performed by: NURSE ANESTHETIST, CERTIFIED REGISTERED

## 2020-10-08 PROCEDURE — G0105 COLORECTAL SCRN; HI RISK IND: HCPCS | Performed by: COLON & RECTAL SURGERY

## 2020-10-08 PROCEDURE — 25000003 PHARM REV CODE 250: Performed by: COLON & RECTAL SURGERY

## 2020-10-08 PROCEDURE — 37000009 HC ANESTHESIA EA ADD 15 MINS: Performed by: COLON & RECTAL SURGERY

## 2020-10-08 PROCEDURE — 37000008 HC ANESTHESIA 1ST 15 MINUTES: Performed by: COLON & RECTAL SURGERY

## 2020-10-08 PROCEDURE — E9220 PRA ENDO ANESTHESIA: HCPCS | Mod: ,,, | Performed by: NURSE ANESTHETIST, CERTIFIED REGISTERED

## 2020-10-08 PROCEDURE — G0105 COLORECTAL SCRN; HI RISK IND: ICD-10-PCS | Mod: ,,, | Performed by: COLON & RECTAL SURGERY

## 2020-10-08 RX ORDER — SODIUM CHLORIDE 9 MG/ML
INJECTION, SOLUTION INTRAVENOUS CONTINUOUS
Status: DISCONTINUED | OUTPATIENT
Start: 2020-10-08 | End: 2020-10-08 | Stop reason: HOSPADM

## 2020-10-08 RX ORDER — PROPOFOL 10 MG/ML
VIAL (ML) INTRAVENOUS CONTINUOUS PRN
Status: DISCONTINUED | OUTPATIENT
Start: 2020-10-08 | End: 2020-10-08

## 2020-10-08 RX ORDER — LIDOCAINE HYDROCHLORIDE 20 MG/ML
INJECTION INTRAVENOUS
Status: DISCONTINUED | OUTPATIENT
Start: 2020-10-08 | End: 2020-10-08

## 2020-10-08 RX ORDER — PROPOFOL 10 MG/ML
VIAL (ML) INTRAVENOUS
Status: DISCONTINUED | OUTPATIENT
Start: 2020-10-08 | End: 2020-10-08

## 2020-10-08 RX ADMIN — SODIUM CHLORIDE: 0.9 INJECTION, SOLUTION INTRAVENOUS at 08:10

## 2020-10-08 RX ADMIN — SODIUM CHLORIDE: 0.9 INJECTION, SOLUTION INTRAVENOUS at 07:10

## 2020-10-08 RX ADMIN — PROPOFOL 50 MG: 10 INJECTION, EMULSION INTRAVENOUS at 07:10

## 2020-10-08 RX ADMIN — LIDOCAINE HYDROCHLORIDE 40 MG: 20 INJECTION, SOLUTION INTRAVENOUS at 07:10

## 2020-10-08 RX ADMIN — PROPOFOL 150 MCG/KG/MIN: 10 INJECTION, EMULSION INTRAVENOUS at 07:10

## 2020-10-08 NOTE — ANESTHESIA POSTPROCEDURE EVALUATION
Anesthesia Post Evaluation    Patient: Carmen Hawley    Procedure(s) Performed: Procedure(s) (LRB):  COLONOSCOPY (N/A)    Final Anesthesia Type: general    Patient location during evaluation: GI PACU  Patient participation: Yes- Able to Participate  Level of consciousness: awake and alert and oriented  Post-procedure vital signs: reviewed and stable  Pain management: adequate  Airway patency: patent    PONV status at discharge: No PONV  Anesthetic complications: no      Cardiovascular status: blood pressure returned to baseline and hemodynamically stable  Respiratory status: unassisted, spontaneous ventilation and room air  Hydration status: euvolemic  Follow-up not needed.          Vitals Value Taken Time   /70 10/08/20 0824   Temp 37.2 °C (98.9 °F) 10/08/20 0804   Pulse 74 10/08/20 0824   Resp 18 10/08/20 0824   SpO2 100 % 10/08/20 0824         Event Time   Out of Recovery 08:34:16         Pain/Juani Score: Juani Score: 10 (10/8/2020  8:24 AM)

## 2020-10-08 NOTE — DISCHARGE INSTRUCTIONS
Colonoscopy     A camera attached to a flexible tube with a viewing lens is used to take video pictures.     Colonoscopy is a test to view the inside of your lower digestive tract (colon and rectum). Sometimes it can show the last part of the small intestine (ileum). During the test, small pieces of tissue may be removed for testing. This is called a biopsy. Small growths, such as polyps, may also be removed.   Why is colonoscopy done?  The test is done to help look for colon cancer. And it can help find the source of abdominal pain, bleeding, and changes in bowel habits. It may be needed once a year, depending on factors such as your:  · Age  · Health history  · Family health history  · Symptoms  · Results from any prior colonoscopy  Risks and possible complications  These include:  · Bleeding               · A puncture or tear in the colon   · Risks of anesthesia  · A cancer lesion not being seen  Getting ready   To prepare for the test:  · Talk with your healthcare provider about the risks of the test (see below). Also ask your healthcare provider about alternatives to the test.  · Tell your healthcare provider about any medicines you take. Also tell him or her about any health conditions you may have.  · Make sure your rectum and colon are empty for the test. Follow the diet and bowel prep instructions exactly. If you dont, the test may need to be rescheduled.  · Plan for a friend or family member to drive you home after the test.     Colonoscopy provides an inside view of the entire colon.     You may discuss the results with your doctor right away or at a future visit.  During the test   The test is usually done in the hospital on an outpatient basis. This means you go home the same day. The procedure takes about 30 minutes. During that time:  · You are given relaxing (sedating) medicine through an IV line. You may be drowsy, or fully asleep.  · The healthcare provider will first give you a physical exam to  check for anal and rectal problems.  · Then the anus is lubricated and the scope inserted.  · If you are awake, you may have a feeling similar to needing to have a bowel movement. You may also feel pressure as air is pumped into the colon. Its OK to pass gas during the procedure.  · Biopsy, polyp removal, or other treatments may be done during the test.  After the test   You may have gas right after the test. It can help to try to pass it to help prevent later bloating. Your healthcare provider may discuss the results with you right away. Or you may need to schedule a follow-up visit to talk about the results. After the test, you can go back to your normal eating and other activities. You may be tired from the sedation and need to rest for a few hours.  Date Last Reviewed: 11/1/2016 © 2000-2017 [employer]. [address] [place] [address], [place] [address]. All rights reserved. This information is not intended as a substitute for professional medical care. Always follow your healthcare professional's instructions.

## 2020-10-08 NOTE — ANESTHESIA PREPROCEDURE EVALUATION
10/08/2020  Carmen Hawley is a 70 y.o., female.    Patient Active Problem List   Diagnosis    Hypertension    Hyperlipidemia    Anxiety    Screening for colon cancer    Primary osteoarthritis of left foot    Age-related macular degeneration, dry, both eyes    Posterior vitreous detachment of both eyes    Nuclear cataract of both eyes    Elevated glucose    Osteoarthritis of knee    Family history of colonic polyps    Rib pain on left side    Pain in the muscles    Arm pain, musculoskeletal, left    Nonexudative age-related macular degeneration, bilateral, intermediate dry stage    Lumbar radiculopathy    Chronic right-sided low back pain with sciatica    Left foot pain     Past Medical History:   Diagnosis Date    Anxiety     Hyperlipidemia     Hypertension     Mitral valve prolapse      Past Surgical History:   Procedure Laterality Date    TONSILLECTOMY           Anesthesia Evaluation    I have reviewed the Patient Summary Reports.      I have reviewed the Medications.     Review of Systems  Anesthesia Hx:   Denies Personal Hx of Anesthesia complications.       Physical Exam  General:  Well nourished    Airway/Jaw/Neck:  Airway Findings: Mouth Opening: Normal Tongue: Normal  General Airway Assessment: Adult  Mallampati: II  TM Distance: Normal, at least 6 cm      Dental:  Dental Findings: In tact   Chest/Lungs:  Chest/Lungs Findings: Clear to auscultation, Normal Respiratory Rate     Heart/Vascular:  Heart Findings: Rate: Normal  Rhythm: Regular Rhythm  Sounds: Normal        Mental Status:  Mental Status Findings:  Cooperative, Alert and Oriented         Anesthesia Plan  Type of Anesthesia, risks & benefits discussed:  Anesthesia Type:  general  Patient's Preference:   Intra-op Monitoring Plan: standard ASA monitors  Intra-op Monitoring Plan Comments:   Post Op Pain Control Plan:  per primary service following discharge from PACU  Post Op Pain Control Plan Comments:   Induction:   IV  Beta Blocker:  Patient is on a Beta-Blocker and has received one dose within the past 24 hours (No further documentation required).       Informed Consent: Patient understands risks and agrees with Anesthesia plan.  Questions answered. Anesthesia consent signed with patient.  ASA Score: 2     Day of Surgery Review of History & Physical: I have interviewed and examined the patient. I have reviewed the patient's H&P dated:  There are no significant changes.  H&P update referred to the provider.         Ready For Surgery From Anesthesia Perspective.

## 2020-10-08 NOTE — [employer] PATIENT INSTRUCTIONS
Discharge Summary/Instructions after an Endoscopic Procedure  Patient Name: Carmen Warren  Patient MRN: [id number]  Patient YOB: 1950 Thursday, October 8, 2020  Crispin Moon MD  RESTRICTIONS:  During your procedure today, you received medications for sedation.  These   medications may affect your judgment, balance and coordination.  Therefore,   for 24 hours, you have the following restrictions:   - DO NOT drive a car, operate machinery, make legal/financial decisions,   sign important papers or drink alcohol.    ACTIVITY:  Today: no heavy lifting, straining or running due to procedural   sedation/anesthesia.  The following day: return to full activity including work.  DIET:  Eat and drink normally unless instructed otherwise.     TREATMENT FOR COMMON SIDE EFFECTS:  - Mild abdominal pain, nausea, belching, bloating or excessive gas:  rest,   eat lightly and use a heating pad.  - Sore Throat: treat with throat lozenges and/or gargle with warm salt   water.  - Because air was used during the procedure, expelling large amounts of air   from your rectum or belching is normal.  - If a bowel prep was taken, you may not have a bowel movement for 1-3 days.    This is normal.  SYMPTOMS TO WATCH FOR AND REPORT TO YOUR PHYSICIAN:  1. Abdominal pain or bloating, other than gas cramps.  2. Chest pain.  3. Back pain.  4. Signs of infection such as: chills or fever occurring within 24 hours   after the procedure.  5. Rectal bleeding, which would show as bright red, maroon, or black stools.   (A tablespoon of blood from the rectum is not serious, especially if   hemorrhoids are present.)  6. Vomiting.  7. Weakness or dizziness.  GO DIRECTLY TO THE NEAREST EMERGENCY ROOM IF YOU HAVE ANY OF THE FOLLOWING:      Difficulty breathing              Chills and/or fever over 101 F   Persistent vomiting and/or vomiting blood   Severe abdominal pain   Severe chest pain   Black, tarry stools   Bleeding- more than one  tablespoon   Any other symptom or condition that you feel may need urgent attention  Your doctor recommends these additional instructions:  If any biopsies were taken, your doctors clinic will contact you in 1 to 2   weeks with any results.  - Discharge patient to home (ambulatory).   - Resume previous diet.   - Continue present medications.   - Repeat colonoscopy in 5 years for surveillance.  For questions, problems or results please call your physician - Crispin Moon MD at Work:  [phone].  [facility], EMERGENCY ROOM PHONE NUMBER: [phone]  IF A COMPLICATION OR EMERGENCY SITUATION ARISES AND YOU ARE UNABLE TO REACH   YOUR PHYSICIAN - GO DIRECTLY TO THE EMERGENCY ROOM.  Crispin Moon MD  10/8/2020 8:02:00 AM  This report has been verified and signed electronically.  [employer]

## 2020-10-08 NOTE — H&P
COLONOSCOPY HISTORY AND PHYSICAL EXAM    Procedure : Colonoscopy      INDICATIONS: family history of colon polyps    Family Hx of CRC: sister with multiple large polyps    Last Colonoscopy:  2015  Findings: normal       Past Medical History:   Diagnosis Date    Anxiety     Hyperlipidemia     Hypertension     Mitral valve prolapse      Sedation Problems: NO  Family History   Problem Relation Age of Onset    Cancer Mother         lung    Stroke Mother     Heart disease Father     Diabetes Father     Diabetes Brother     Aortic aneurysm Brother         surgery 5/2012    Kidney disease Brother         on dialysis    Melanoma Neg Hx     Breast cancer Neg Hx     Colon cancer Neg Hx     Ovarian cancer Neg Hx      Fam Hx of Sedation Problems: NO  Social History     Socioeconomic History    Marital status:      Spouse name: Not on file    Number of children: Not on file    Years of education: Not on file    Highest education level: Not on file   Occupational History     Employer: [employer]   Social Needs    Financial resource strain: Not very hard    Food insecurity     Worry: Never true     Inability: Never true    Transportation needs     Medical: No     Non-medical: No   Tobacco Use    Smoking status: Never Smoker    Smokeless tobacco: Never Used   Substance and Sexual Activity    Alcohol use: Yes     Alcohol/week: 14.0 standard drinks     Types: 14 Glasses of wine per week     Frequency: 4 or more times a week     Drinks per session: 5 or 6     Binge frequency: Less than monthly     Comment: daily    Drug use: No    Sexual activity: Yes     Partners: Male     Birth control/protection: Post-menopausal   Lifestyle    Physical activity     Days per week: 3 days     Minutes per session: 30 min    Stress: To some extent   Relationships    Social connections     Talks on phone: More than three times a week     Gets together: Three times a week     Attends Advent service:  "Not on file     Active member of club or organization: Yes     Attends meetings of clubs or organizations: 1 to 4 times per year     Relationship status:    Other Topics Concern    Are you pregnant or think you may be? Not Asked    Breast-feeding Not Asked   Social History Narrative    Not on file       Review of Systems - Negative except   Respiratory ROS: no dyspnea  Cardiovascular ROS: no exertional chest pain  Gastrointestinal ROS: NO abdominal discomfort,  NO rectal bleeding  Musculoskeletal ROS: no muscular pain  Neurological ROS: no recent stroke    Physical Exam:  /69 (BP Location: Right arm, Patient Position: Lying)   Pulse 82   Temp 97.9 °F (36.6 °C) (Temporal)   Resp 16   Ht 5' 3" (1.6 m)   Wt 74.8 kg (165 lb)   LMP  (LMP Unknown)   SpO2 100%   Breastfeeding No   BMI 29.23 kg/m²   General: no distress  Head: normocephalic  Mallampati Score   Neck: supple, symmetrical, trachea midline  Lungs:  clear to auscultation bilaterally and normal respiratory effort  Heart: regular rate and rhythm and no murmur  Abdomen: soft, non-tender non-distented; bowel sounds normal; no masses,  no organomegaly  Extremities: no cyanosis or edema, or clubbing    ASA:  II    PLAN  COLONOSCOPY.    SedationPlan :MAC    The details of the procedure, the possible need for biopsy or polypectomy and the potential risks including bleeding, perforation, missed polyps were discussed in detail.      "

## 2020-10-08 NOTE — TRANSFER OF CARE
"Anesthesia Transfer of Care Note    Patient: Carmen Hawley    Procedure(s) Performed: Procedure(s) (LRB):  COLONOSCOPY (N/A)    Patient location: GI    Anesthesia Type: general    Transport from OR: Transported from OR on room air with adequate spontaneous ventilation    Post pain: adequate analgesia    Post assessment: no apparent anesthetic complications and tolerated procedure well    Post vital signs: stable    Level of consciousness: awake    Nausea/Vomiting: no nausea/vomiting    Complications: none    Transfer of care protocol was followed      Last vitals:   Visit Vitals  /69 (BP Location: Right arm, Patient Position: Lying)   Pulse 82   Temp 36.6 °C (97.9 °F) (Temporal)   Resp 16   Ht 5' 3" (1.6 m)   Wt 74.8 kg (165 lb)   LMP  (LMP Unknown)   SpO2 100%   Breastfeeding No   BMI 29.23 kg/m²     "

## 2020-10-15 ENCOUNTER — PATIENT MESSAGE (OUTPATIENT)
Dept: BARIATRICS | Facility: CLINIC | Age: 70
End: 2020-10-15

## 2020-10-21 ENCOUNTER — PATIENT OUTREACH (OUTPATIENT)
Dept: ADMINISTRATIVE | Facility: OTHER | Age: 70
End: 2020-10-21

## 2020-10-21 NOTE — PROGRESS NOTES
LINKS immunization registry not responding  Care Everywhere updated  Health Maintenance updated  Chart reviewed for overdue Proactive [facility] ([facility]) health maintenance testing (CRS, Breast Ca, Diabetic Eye Exam)   Orders entered:N/A

## 2020-10-22 ENCOUNTER — PATIENT MESSAGE (OUTPATIENT)
Dept: BARIATRICS | Facility: CLINIC | Age: 70
End: 2020-10-22

## 2020-10-22 ENCOUNTER — HOSPITAL ENCOUNTER (OUTPATIENT)
Dept: RADIOLOGY | Facility: HOSPITAL | Age: 70
Discharge: HOME | End: 2020-10-22
Attending: INTERNAL MEDICINE
Payer: COMMERCIAL

## 2020-10-22 ENCOUNTER — PATIENT MESSAGE (OUTPATIENT)
Dept: ADMINISTRATIVE | Facility: OTHER | Age: 70
End: 2020-10-22

## 2020-10-22 DIAGNOSIS — Z12.31 ENCOUNTER FOR SCREENING MAMMOGRAM FOR BREAST CANCER: ICD-10-CM

## 2020-10-22 PROCEDURE — 77063 BREAST TOMOSYNTHESIS BI: CPT | Mod: 26,,, | Performed by: RADIOLOGY

## 2020-10-22 PROCEDURE — 77067 SCR MAMMO BI INCL CAD: CPT | Mod: TC

## 2020-10-22 PROCEDURE — 77067 SCR MAMMO BI INCL CAD: CPT | Mod: 26,,, | Performed by: RADIOLOGY

## 2020-10-22 PROCEDURE — 77063 MAMMO DIGITAL SCREENING BILAT WITH TOMOSYNTHESIS_CAD: ICD-10-PCS | Mod: 26,,, | Performed by: RADIOLOGY

## 2020-10-22 PROCEDURE — 77067 MAMMO DIGITAL SCREENING BILAT WITH TOMOSYNTHESIS_CAD: ICD-10-PCS | Mod: 26,,, | Performed by: RADIOLOGY

## 2020-11-02 ENCOUNTER — PATIENT OUTREACH (OUTPATIENT)
Dept: OTHER | Facility: OTHER | Age: 70
End: 2020-11-02

## 2020-11-04 RX ORDER — DICLOFENAC SODIUM 10 MG/G
2 GEL TOPICAL 4 TIMES DAILY
Qty: 1 TUBE | Refills: 6 | Status: SHIPPED | OUTPATIENT
Start: 2020-11-04 | End: 2021-03-25 | Stop reason: SDUPTHER

## 2020-11-04 RX ORDER — DICLOFENAC SODIUM 10 MG/G
2 GEL TOPICAL 4 TIMES DAILY
Qty: 1 TUBE | Refills: 6 | Status: CANCELLED | OUTPATIENT
Start: 2020-11-04

## 2020-11-17 NOTE — PROGRESS NOTES
Digital Medicine: Health  Follow-Up    The history is provided by the patient.             Reason for review: Blood pressure at goal      Additional Follow-up details: Patient had a few moments to check in as she was out running errands.    Patient feels well. She is pleased with her blood pressure and her current medications.   She reports no changes to lifestyle.                 Addressed patient questions and patient has my contact information if needed prior to next outreach. Patient verbalizes understanding.      Explained the importance of self-monitoring and medication adherence. Encouraged the patient to communicate with their health  for lifestyle modifications to help improve or maintain a healthy lifestyle.               There are no preventive care reminders to display for this patient.      Last 5 Patient Entered Readings                                      Current 30 Day Average: 131/67     Recent Readings 11/13/2020 11/9/2020 11/6/2020 11/5/2020 11/3/2020    SBP (mmHg) 127 136 132 124 145    DBP (mmHg) 72 67 72 54 62    Pulse 72 83 81 75 86

## 2020-12-14 RX ORDER — METOPROLOL SUCCINATE 100 MG/1
100 TABLET, EXTENDED RELEASE ORAL DAILY
Qty: 90 TABLET | Refills: 3 | Status: SHIPPED | OUTPATIENT
Start: 2020-12-14 | End: 2022-01-29 | Stop reason: SDUPTHER

## 2020-12-14 RX ORDER — ESCITALOPRAM OXALATE 10 MG/1
10 TABLET ORAL DAILY
Qty: 30 TABLET | Refills: 12 | Status: SHIPPED | OUTPATIENT
Start: 2020-12-14 | End: 2022-02-17 | Stop reason: SDUPTHER

## 2020-12-16 ENCOUNTER — OFFICE VISIT (OUTPATIENT)
Dept: OBSTETRICS AND GYNECOLOGY | Facility: CLINIC | Age: 70
End: 2020-12-16
Payer: COMMERCIAL

## 2020-12-16 VITALS
SYSTOLIC BLOOD PRESSURE: 164 MMHG | DIASTOLIC BLOOD PRESSURE: 84 MMHG | HEIGHT: 63 IN | BODY MASS INDEX: 30.08 KG/M2 | WEIGHT: 169.75 LBS

## 2020-12-16 DIAGNOSIS — Z01.419 ENCOUNTER FOR GYNECOLOGICAL EXAMINATION WITHOUT ABNORMAL FINDING: Primary | ICD-10-CM

## 2020-12-16 DIAGNOSIS — N95.2 POSTMENOPAUSAL ATROPHIC VAGINITIS: ICD-10-CM

## 2020-12-16 PROCEDURE — 1126F PR PAIN SEVERITY QUANTIFIED, NO PAIN PRESENT: ICD-10-PCS | Mod: S$GLB,,, | Performed by: OBSTETRICS & GYNECOLOGY

## 2020-12-16 PROCEDURE — 3079F PR MOST RECENT DIASTOLIC BLOOD PRESSURE 80-89 MM HG: ICD-10-PCS | Mod: CPTII,S$GLB,, | Performed by: OBSTETRICS & GYNECOLOGY

## 2020-12-16 PROCEDURE — 99397 PER PM REEVAL EST PAT 65+ YR: CPT | Mod: S$GLB,,, | Performed by: OBSTETRICS & GYNECOLOGY

## 2020-12-16 PROCEDURE — 3008F PR BODY MASS INDEX (BMI) DOCUMENTED: ICD-10-PCS | Mod: CPTII,S$GLB,, | Performed by: OBSTETRICS & GYNECOLOGY

## 2020-12-16 PROCEDURE — 3288F PR FALLS RISK ASSESSMENT DOCUMENTED: ICD-10-PCS | Mod: CPTII,S$GLB,, | Performed by: OBSTETRICS & GYNECOLOGY

## 2020-12-16 PROCEDURE — 3288F FALL RISK ASSESSMENT DOCD: CPT | Mod: CPTII,S$GLB,, | Performed by: OBSTETRICS & GYNECOLOGY

## 2020-12-16 PROCEDURE — 3077F SYST BP >= 140 MM HG: CPT | Mod: CPTII,S$GLB,, | Performed by: OBSTETRICS & GYNECOLOGY

## 2020-12-16 PROCEDURE — 99397 PR PREVENTIVE VISIT,EST,65 & OVER: ICD-10-PCS | Mod: S$GLB,,, | Performed by: OBSTETRICS & GYNECOLOGY

## 2020-12-16 PROCEDURE — 3008F BODY MASS INDEX DOCD: CPT | Mod: CPTII,S$GLB,, | Performed by: OBSTETRICS & GYNECOLOGY

## 2020-12-16 PROCEDURE — 99999 PR PBB SHADOW E&M-EST. PATIENT-LVL III: CPT | Mod: PBBFAC,,, | Performed by: OBSTETRICS & GYNECOLOGY

## 2020-12-16 PROCEDURE — 1101F PR PT FALLS ASSESS DOC 0-1 FALLS W/OUT INJ PAST YR: ICD-10-PCS | Mod: CPTII,S$GLB,, | Performed by: OBSTETRICS & GYNECOLOGY

## 2020-12-16 PROCEDURE — 1101F PT FALLS ASSESS-DOCD LE1/YR: CPT | Mod: CPTII,S$GLB,, | Performed by: OBSTETRICS & GYNECOLOGY

## 2020-12-16 PROCEDURE — 99999 PR PBB SHADOW E&M-EST. PATIENT-LVL III: ICD-10-PCS | Mod: PBBFAC,,, | Performed by: OBSTETRICS & GYNECOLOGY

## 2020-12-16 PROCEDURE — 3079F DIAST BP 80-89 MM HG: CPT | Mod: CPTII,S$GLB,, | Performed by: OBSTETRICS & GYNECOLOGY

## 2020-12-16 PROCEDURE — 3077F PR MOST RECENT SYSTOLIC BLOOD PRESSURE >= 140 MM HG: ICD-10-PCS | Mod: CPTII,S$GLB,, | Performed by: OBSTETRICS & GYNECOLOGY

## 2020-12-16 PROCEDURE — 1126F AMNT PAIN NOTED NONE PRSNT: CPT | Mod: S$GLB,,, | Performed by: OBSTETRICS & GYNECOLOGY

## 2020-12-16 RX ORDER — ESTRADIOL 0.1 MG/G
1 CREAM VAGINAL
Qty: 42.5 G | Refills: 3 | Status: SHIPPED | OUTPATIENT
Start: 2020-12-17 | End: 2022-03-24 | Stop reason: SDUPTHER

## 2020-12-16 NOTE — PROGRESS NOTES
Subjective:       Patient ID: Carmen Hawley is a 70 y.o. female.    Chief Complaint:  Well Woman      History of Present Illness  HPI    Carmen Hawley is a 70 y.o. female  new to me(patient of Dr. Graham/Dr. Rhoades) here for her annual GYN exam.    She describes her periods as stopped around age 56,   denies break through bleeding.   denies vaginal itching or irritation.  Denies vaginal discharge.  She is not currently sexually active.    History of abnormal pap: No  Last Pap: approximate date  and was normal  Last MMG: normal-2020-routine follow-up in 12 months  Last Colonoscopy:  colonoscopy a few weeks ago without abnormalities.(advised to return in 5 years)  denies domestic violence. She does feel safe at home.     Past Medical History:   Diagnosis Date    Anxiety     Hyperlipidemia     Hypertension     Mitral valve prolapse      Past Surgical History:   Procedure Laterality Date    COLONOSCOPY N/A 10/8/2020    Procedure: COLONOSCOPY;  Surgeon: Crispin Moon MD;  Location: [address];  Service: Endoscopy;  Laterality: N/A;  Family history of colon polyps  COVID test on 10/5/20 at 2nd floor -     TONSILLECTOMY       Social History     Socioeconomic History    Marital status:      Spouse name: Not on file    Number of children: Not on file    Years of education: Not on file    Highest education level: Not on file   Occupational History     Employer: [employer]   Social Needs    Financial resource strain: Not very hard    Food insecurity     Worry: Never true     Inability: Never true    Transportation needs     Medical: No     Non-medical: No   Tobacco Use    Smoking status: Never Smoker    Smokeless tobacco: Never Used   Substance and Sexual Activity    Alcohol use: Yes     Alcohol/week: 14.0 standard drinks     Types: 14 Glasses of wine per week     Frequency: 4 or more times a week     Drinks per session: 5 or 6     Binge  "frequency: Less than monthly     Comment: daily    Drug use: No    Sexual activity: Yes     Partners: Male     Birth control/protection: Post-menopausal     Comment:  since    Lifestyle    Physical activity     Days per week: 3 days     Minutes per session: 30 min    Stress: To some extent   Relationships    Social connections     Talks on phone: More than three times a week     Gets together: Three times a week     Attends Shinto service: Not on file     Active member of club or organization: Yes     Attends meetings of clubs or organizations: 1 to 4 times per year     Relationship status:    Other Topics Concern    Are you pregnant or think you may be? Not Asked    Breast-feeding Not Asked   Social History Narrative    Not on file     Family History   Problem Relation Age of Onset    Cancer Mother         lung    Stroke Mother     Heart disease Father     Diabetes Father     Diabetes Brother     Aortic aneurysm Brother         surgery 2012    Kidney disease Brother         on dialysis    Melanoma Neg Hx     Breast cancer Neg Hx     Colon cancer Neg Hx     Ovarian cancer Neg Hx      OB History        0    Para        Term   0            AB        Living           SAB        TAB        Ectopic        Multiple        Live Births                     BP (!) 164/84   Ht 5' 3" (1.6 m)   Wt 77 kg (169 lb 12.1 oz)   LMP 2006 (Approximate)   BMI 30.07 kg/m²         GYN & OB History  Patient's last menstrual period was 2006 (approximate).   Date of Last Pap: No result found    OB History    Para Term  AB Living   0   0         SAB TAB Ectopic Multiple Live Births                   Review of Systems  Review of Systems   Constitutional: Negative for activity change, appetite change, fatigue and unexpected weight change.   HENT: Negative.    Eyes: Negative for visual disturbance.   Respiratory: Negative for shortness of breath and wheezing.  "   Cardiovascular: Negative for chest pain, palpitations and leg swelling.   Gastrointestinal: Positive for bloating. Negative for abdominal pain and blood in stool.   Endocrine: Negative for diabetes, hair loss and hot flashes.   Genitourinary: Positive for frequency, urgency and vaginal dryness. Negative for decreased libido, dyspareunia, dysuria and hot flashes.   Musculoskeletal: Negative for back pain and joint swelling.   Integumentary:  Negative for acne, hair changes and nipple discharge.   Neurological: Negative for headaches.   Hematological: Does not bruise/bleed easily.   Psychiatric/Behavioral: Negative for depression and sleep disturbance. The patient is not nervous/anxious.    Breast: Negative for mastodynia and nipple discharge          Objective:      Physical Exam:   Constitutional: She is oriented to person, place, and time. She appears well-developed and well-nourished.    HENT:   Head: Normocephalic and atraumatic.    Eyes: Pupils are equal, round, and reactive to light. EOM are normal.    Neck: Normal range of motion. Neck supple.    Cardiovascular: Normal rate and regular rhythm.     Pulmonary/Chest: Effort normal and breath sounds normal.   BREASTS:  no mass, no tenderness, no deformity and no retraction. Right breast exhibits no inverted nipple, no mass, no nipple discharge, no skin change, no tenderness, no bleeding and no swelling. Left breast exhibits no inverted nipple, no mass, no nipple discharge, no skin change, no tenderness, no bleeding and no swelling. Breasts are symmetrical.              Abdominal: Soft. Bowel sounds are normal.     Genitourinary:    Pelvic exam was performed with patient supine.      Genitourinary Comments: PELVIC: Normal external genitalia without lesions.  Normal hair distribution.  Adequate perineal body, normal urethral meatus.  Vagina  Dry and poorly rugated, atrophic, without lesions or discharge.  Cervix pink, without lesions, discharge or tenderness.  No  significant cystocele or rectocele.  Bimanual exam shows uterus and adnexae to be not readily palpable secondary to habitus.  RECTAL:Deferred               Musculoskeletal: Normal range of motion and moves all extremeties.       Neurological: She is alert and oriented to person, place, and time.    Skin: Skin is warm and dry.    Psychiatric: She has a normal mood and affect.              Assessment:        1. Encounter for gynecological examination without abnormal finding    2. Postmenopausal atrophic vaginitis               Plan:      1. Encounter for gynecological examination without abnormal finding    COUNSELING:  The patient was counseled today on regular weight bearing exercise. Patient was counseled today on the new ACS guidelines for cervical cytology screening as well as the current recommendations for breast cancer screening. Counseling session lasted approximately 10 minutes, and all her questions were answered. She was advised to see her primary care physician for all other health maintenance.   FOLLOW-UP with me for next routine visit.         2. Postmenopausal atrophic vaginitis      - estradioL (ESTRACE) 0.01 % (0.1 mg/gram) vaginal cream; Place 1 g vaginally twice a week.  Dispense: 42.5 g; Refill: 3       Follow up in about 1 year (around 12/16/2021).

## 2020-12-29 ENCOUNTER — IMMUNIZATION (OUTPATIENT)
Dept: INTERNAL MEDICINE | Facility: CLINIC | Age: 70
End: 2020-12-29
Payer: COMMERCIAL

## 2020-12-29 ENCOUNTER — PATIENT OUTREACH (OUTPATIENT)
Dept: OTHER | Facility: OTHER | Age: 70
End: 2020-12-29

## 2020-12-29 DIAGNOSIS — Z23 NEED FOR VACCINATION: ICD-10-CM

## 2020-12-29 PROCEDURE — 91300 COVID-19, MRNA, LNP-S, PF, 30 MCG/0.3 ML DOSE VACCINE: ICD-10-PCS | Mod: ,,, | Performed by: INTERNAL MEDICINE

## 2020-12-29 PROCEDURE — 0001A COVID-19, MRNA, LNP-S, PF, 30 MCG/0.3 ML DOSE VACCINE: CPT | Mod: CV19,,, | Performed by: INTERNAL MEDICINE

## 2020-12-29 PROCEDURE — 0001A COVID-19, MRNA, LNP-S, PF, 30 MCG/0.3 ML DOSE VACCINE: ICD-10-PCS | Mod: CV19,,, | Performed by: INTERNAL MEDICINE

## 2020-12-29 PROCEDURE — 91300 COVID-19, MRNA, LNP-S, PF, 30 MCG/0.3 ML DOSE VACCINE: CPT | Mod: ,,, | Performed by: INTERNAL MEDICINE

## 2020-12-30 ENCOUNTER — PATIENT MESSAGE (OUTPATIENT)
Dept: INTERNAL MEDICINE | Facility: CLINIC | Age: 70
End: 2020-12-30

## 2021-01-07 ENCOUNTER — PATIENT OUTREACH (OUTPATIENT)
Dept: ADMINISTRATIVE | Facility: HOSPITAL | Age: 71
End: 2021-01-07

## 2021-01-07 ENCOUNTER — PATIENT MESSAGE (OUTPATIENT)
Dept: ADMINISTRATIVE | Facility: HOSPITAL | Age: 71
End: 2021-01-07

## 2021-01-12 ENCOUNTER — LAB VISIT (OUTPATIENT)
Dept: LAB | Facility: HOSPITAL | Age: 71
End: 2021-01-12
Attending: INTERNAL MEDICINE
Payer: COMMERCIAL

## 2021-01-12 DIAGNOSIS — R73.09 ELEVATED GLUCOSE: ICD-10-CM

## 2021-01-12 DIAGNOSIS — E55.9 MILD VITAMIN D DEFICIENCY: ICD-10-CM

## 2021-01-12 DIAGNOSIS — I10 ESSENTIAL HYPERTENSION: ICD-10-CM

## 2021-01-12 DIAGNOSIS — E78.5 HYPERLIPIDEMIA, UNSPECIFIED HYPERLIPIDEMIA TYPE: ICD-10-CM

## 2021-01-12 LAB
ALBUMIN SERPL BCP-MCNC: 4.1 G/DL (ref 3.5–5.2)
ALP SERPL-CCNC: 90 U/L (ref 55–135)
ALT SERPL W/O P-5'-P-CCNC: 25 U/L (ref 10–44)
ANION GAP SERPL CALC-SCNC: 8 MMOL/L (ref 8–16)
AST SERPL-CCNC: 34 U/L (ref 10–40)
BASOPHILS # BLD AUTO: 0.08 K/UL (ref 0–0.2)
BASOPHILS NFR BLD: 1.5 % (ref 0–1.9)
BILIRUB SERPL-MCNC: 0.8 MG/DL (ref 0.1–1)
BUN SERPL-MCNC: 12 MG/DL (ref 8–23)
CALCIUM SERPL-MCNC: 9.5 MG/DL (ref 8.7–10.5)
CHLORIDE SERPL-SCNC: 99 MMOL/L (ref 95–110)
CHOLEST SERPL-MCNC: 203 MG/DL (ref 120–199)
CHOLEST/HDLC SERPL: 3.4 {RATIO} (ref 2–5)
CO2 SERPL-SCNC: 27 MMOL/L (ref 23–29)
CREAT SERPL-MCNC: 0.7 MG/DL (ref 0.5–1.4)
DIFFERENTIAL METHOD: ABNORMAL
EOSINOPHIL # BLD AUTO: 0.1 K/UL (ref 0–0.5)
EOSINOPHIL NFR BLD: 2.3 % (ref 0–8)
ERYTHROCYTE [DISTWIDTH] IN BLOOD BY AUTOMATED COUNT: 12 % (ref 11.5–14.5)
EST. GFR  (AFRICAN AMERICAN): >60 ML/MIN/1.73 M^2
EST. GFR  (NON AFRICAN AMERICAN): >60 ML/MIN/1.73 M^2
ESTIMATED AVG GLUCOSE: 120 MG/DL (ref 68–131)
GLUCOSE SERPL-MCNC: 90 MG/DL (ref 70–110)
HBA1C MFR BLD HPLC: 5.8 % (ref 4.5–6.2)
HCT VFR BLD AUTO: 40.8 % (ref 37–48.5)
HDLC SERPL-MCNC: 59 MG/DL (ref 40–75)
HDLC SERPL: 29.1 % (ref 20–50)
HGB BLD-MCNC: 13.3 G/DL (ref 12–16)
IMM GRANULOCYTES # BLD AUTO: 0.01 K/UL (ref 0–0.04)
IMM GRANULOCYTES NFR BLD AUTO: 0.2 % (ref 0–0.5)
LDLC SERPL CALC-MCNC: 121.8 MG/DL (ref 63–159)
LYMPHOCYTES # BLD AUTO: 1.2 K/UL (ref 1–4.8)
LYMPHOCYTES NFR BLD: 24 % (ref 18–48)
MCH RBC QN AUTO: 29.2 PG (ref 27–31)
MCHC RBC AUTO-ENTMCNC: 32.6 G/DL (ref 32–36)
MCV RBC AUTO: 90 FL (ref 82–98)
MONOCYTES # BLD AUTO: 0.5 K/UL (ref 0.3–1)
MONOCYTES NFR BLD: 10.1 % (ref 4–15)
NEUTROPHILS # BLD AUTO: 3.2 K/UL (ref 1.8–7.7)
NEUTROPHILS NFR BLD: 61.9 % (ref 38–73)
NONHDLC SERPL-MCNC: 144 MG/DL
NRBC BLD-RTO: 0 /100 WBC
PLATELET # BLD AUTO: 363 K/UL (ref 150–350)
PMV BLD AUTO: 9.3 FL (ref 9.2–12.9)
POTASSIUM SERPL-SCNC: 4.8 MMOL/L (ref 3.5–5.1)
PROT SERPL-MCNC: 7.8 G/DL (ref 6–8.4)
RBC # BLD AUTO: 4.56 M/UL (ref 4–5.4)
SODIUM SERPL-SCNC: 134 MMOL/L (ref 136–145)
TRIGL SERPL-MCNC: 111 MG/DL (ref 30–150)
WBC # BLD AUTO: 5.17 K/UL (ref 3.9–12.7)

## 2021-01-12 PROCEDURE — 83036 HEMOGLOBIN GLYCOSYLATED A1C: CPT

## 2021-01-12 PROCEDURE — 85025 COMPLETE CBC W/AUTO DIFF WBC: CPT

## 2021-01-12 PROCEDURE — 80061 LIPID PANEL: CPT

## 2021-01-12 PROCEDURE — 80053 COMPREHEN METABOLIC PANEL: CPT

## 2021-01-12 PROCEDURE — 36415 COLL VENOUS BLD VENIPUNCTURE: CPT

## 2021-01-12 PROCEDURE — 82306 VITAMIN D 25 HYDROXY: CPT

## 2021-01-13 ENCOUNTER — PATIENT MESSAGE (OUTPATIENT)
Dept: BARIATRICS | Facility: CLINIC | Age: 71
End: 2021-01-13

## 2021-01-13 LAB — 25(OH)D3+25(OH)D2 SERPL-MCNC: 27 NG/ML (ref 30–96)

## 2021-01-19 ENCOUNTER — IMMUNIZATION (OUTPATIENT)
Dept: INTERNAL MEDICINE | Facility: CLINIC | Age: 71
End: 2021-01-19
Payer: COMMERCIAL

## 2021-01-19 ENCOUNTER — OFFICE VISIT (OUTPATIENT)
Dept: INTERNAL MEDICINE | Facility: CLINIC | Age: 71
End: 2021-01-19
Payer: COMMERCIAL

## 2021-01-19 VITALS
WEIGHT: 171.06 LBS | SYSTOLIC BLOOD PRESSURE: 128 MMHG | HEART RATE: 85 BPM | DIASTOLIC BLOOD PRESSURE: 60 MMHG | HEIGHT: 63 IN | BODY MASS INDEX: 30.31 KG/M2 | OXYGEN SATURATION: 99 %

## 2021-01-19 DIAGNOSIS — M81.0 POSTMENOPAUSAL BONE LOSS: ICD-10-CM

## 2021-01-19 DIAGNOSIS — R73.09 ELEVATED GLUCOSE: ICD-10-CM

## 2021-01-19 DIAGNOSIS — E78.5 HYPERLIPIDEMIA: ICD-10-CM

## 2021-01-19 DIAGNOSIS — E78.5 HYPERLIPIDEMIA, UNSPECIFIED HYPERLIPIDEMIA TYPE: ICD-10-CM

## 2021-01-19 DIAGNOSIS — Z83.719 FAMILY HISTORY OF COLONIC POLYPS: ICD-10-CM

## 2021-01-19 DIAGNOSIS — I10 ESSENTIAL HYPERTENSION: Primary | ICD-10-CM

## 2021-01-19 DIAGNOSIS — E55.9 MILD VITAMIN D DEFICIENCY: ICD-10-CM

## 2021-01-19 DIAGNOSIS — Z23 NEED FOR VACCINATION: Primary | ICD-10-CM

## 2021-01-19 PROCEDURE — 3008F PR BODY MASS INDEX (BMI) DOCUMENTED: ICD-10-PCS | Mod: CPTII,S$GLB,, | Performed by: INTERNAL MEDICINE

## 2021-01-19 PROCEDURE — 99999 PR PBB SHADOW E&M-EST. PATIENT-LVL IV: CPT | Mod: PBBFAC,,, | Performed by: INTERNAL MEDICINE

## 2021-01-19 PROCEDURE — 99214 OFFICE O/P EST MOD 30 MIN: CPT | Mod: S$GLB,,, | Performed by: INTERNAL MEDICINE

## 2021-01-19 PROCEDURE — 3078F PR MOST RECENT DIASTOLIC BLOOD PRESSURE < 80 MM HG: ICD-10-PCS | Mod: CPTII,S$GLB,, | Performed by: INTERNAL MEDICINE

## 2021-01-19 PROCEDURE — 3074F PR MOST RECENT SYSTOLIC BLOOD PRESSURE < 130 MM HG: ICD-10-PCS | Mod: CPTII,S$GLB,, | Performed by: INTERNAL MEDICINE

## 2021-01-19 PROCEDURE — 0002A COVID-19, MRNA, LNP-S, PF, 30 MCG/0.3 ML DOSE VACCINE: CPT | Mod: PBBFAC | Performed by: INTERNAL MEDICINE

## 2021-01-19 PROCEDURE — 1159F PR MEDICATION LIST DOCUMENTED IN MEDICAL RECORD: ICD-10-PCS | Mod: S$GLB,,, | Performed by: INTERNAL MEDICINE

## 2021-01-19 PROCEDURE — 99214 PR OFFICE/OUTPT VISIT, EST, LEVL IV, 30-39 MIN: ICD-10-PCS | Mod: S$GLB,,, | Performed by: INTERNAL MEDICINE

## 2021-01-19 PROCEDURE — 1126F PR PAIN SEVERITY QUANTIFIED, NO PAIN PRESENT: ICD-10-PCS | Mod: S$GLB,,, | Performed by: INTERNAL MEDICINE

## 2021-01-19 PROCEDURE — 99999 PR PBB SHADOW E&M-EST. PATIENT-LVL IV: ICD-10-PCS | Mod: PBBFAC,,, | Performed by: INTERNAL MEDICINE

## 2021-01-19 PROCEDURE — 3008F BODY MASS INDEX DOCD: CPT | Mod: CPTII,S$GLB,, | Performed by: INTERNAL MEDICINE

## 2021-01-19 PROCEDURE — 1126F AMNT PAIN NOTED NONE PRSNT: CPT | Mod: S$GLB,,, | Performed by: INTERNAL MEDICINE

## 2021-01-19 PROCEDURE — 1159F MED LIST DOCD IN RCRD: CPT | Mod: S$GLB,,, | Performed by: INTERNAL MEDICINE

## 2021-01-19 PROCEDURE — 3078F DIAST BP <80 MM HG: CPT | Mod: CPTII,S$GLB,, | Performed by: INTERNAL MEDICINE

## 2021-01-19 PROCEDURE — 91300 COVID-19, MRNA, LNP-S, PF, 30 MCG/0.3 ML DOSE VACCINE: CPT | Mod: PBBFAC | Performed by: INTERNAL MEDICINE

## 2021-01-19 PROCEDURE — 3074F SYST BP LT 130 MM HG: CPT | Mod: CPTII,S$GLB,, | Performed by: INTERNAL MEDICINE

## 2021-01-19 RX ORDER — SIMVASTATIN 40 MG/1
TABLET, FILM COATED ORAL
Qty: 90 TABLET | Refills: 3 | Status: SHIPPED | OUTPATIENT
Start: 2021-01-19 | End: 2022-06-20 | Stop reason: SDUPTHER

## 2021-01-19 RX ORDER — TRIAMTERENE AND HYDROCHLOROTHIAZIDE 37.5; 25 MG/1; MG/1
CAPSULE ORAL DAILY
Qty: 90 CAPSULE | Refills: 3 | Status: SHIPPED | OUTPATIENT
Start: 2021-01-19 | End: 2022-02-17 | Stop reason: SDUPTHER

## 2021-01-27 ENCOUNTER — PATIENT OUTREACH (OUTPATIENT)
Dept: ADMINISTRATIVE | Facility: HOSPITAL | Age: 71
End: 2021-01-27

## 2021-02-24 ENCOUNTER — PATIENT OUTREACH (OUTPATIENT)
Dept: OTHER | Facility: OTHER | Age: 71
End: 2021-02-24

## 2021-03-01 ENCOUNTER — PATIENT MESSAGE (OUTPATIENT)
Dept: BARIATRICS | Facility: CLINIC | Age: 71
End: 2021-03-01

## 2021-03-10 ENCOUNTER — OFFICE VISIT (OUTPATIENT)
Dept: BARIATRICS | Facility: CLINIC | Age: 71
End: 2021-03-10
Payer: COMMERCIAL

## 2021-03-10 VITALS
SYSTOLIC BLOOD PRESSURE: 124 MMHG | HEART RATE: 81 BPM | WEIGHT: 170 LBS | DIASTOLIC BLOOD PRESSURE: 64 MMHG | OXYGEN SATURATION: 98 % | BODY MASS INDEX: 30.11 KG/M2

## 2021-03-10 DIAGNOSIS — E66.9 OBESITY, CLASS I, BMI 30.0-34.9 (SEE ACTUAL BMI): ICD-10-CM

## 2021-03-10 PROCEDURE — 1126F AMNT PAIN NOTED NONE PRSNT: CPT | Mod: S$GLB,,, | Performed by: INTERNAL MEDICINE

## 2021-03-10 PROCEDURE — 3078F PR MOST RECENT DIASTOLIC BLOOD PRESSURE < 80 MM HG: ICD-10-PCS | Mod: CPTII,S$GLB,, | Performed by: INTERNAL MEDICINE

## 2021-03-10 PROCEDURE — 3008F PR BODY MASS INDEX (BMI) DOCUMENTED: ICD-10-PCS | Mod: CPTII,S$GLB,, | Performed by: INTERNAL MEDICINE

## 2021-03-10 PROCEDURE — 1159F PR MEDICATION LIST DOCUMENTED IN MEDICAL RECORD: ICD-10-PCS | Mod: S$GLB,,, | Performed by: INTERNAL MEDICINE

## 2021-03-10 PROCEDURE — 99999 PR PBB SHADOW E&M-EST. PATIENT-LVL IV: CPT | Mod: PBBFAC,,, | Performed by: INTERNAL MEDICINE

## 2021-03-10 PROCEDURE — 3078F DIAST BP <80 MM HG: CPT | Mod: CPTII,S$GLB,, | Performed by: INTERNAL MEDICINE

## 2021-03-10 PROCEDURE — 99213 OFFICE O/P EST LOW 20 MIN: CPT | Mod: S$GLB,,, | Performed by: INTERNAL MEDICINE

## 2021-03-10 PROCEDURE — 1159F MED LIST DOCD IN RCRD: CPT | Mod: S$GLB,,, | Performed by: INTERNAL MEDICINE

## 2021-03-10 PROCEDURE — 1101F PT FALLS ASSESS-DOCD LE1/YR: CPT | Mod: CPTII,S$GLB,, | Performed by: INTERNAL MEDICINE

## 2021-03-10 PROCEDURE — 99999 PR PBB SHADOW E&M-EST. PATIENT-LVL IV: ICD-10-PCS | Mod: PBBFAC,,, | Performed by: INTERNAL MEDICINE

## 2021-03-10 PROCEDURE — 3288F PR FALLS RISK ASSESSMENT DOCUMENTED: ICD-10-PCS | Mod: CPTII,S$GLB,, | Performed by: INTERNAL MEDICINE

## 2021-03-10 PROCEDURE — 1126F PR PAIN SEVERITY QUANTIFIED, NO PAIN PRESENT: ICD-10-PCS | Mod: S$GLB,,, | Performed by: INTERNAL MEDICINE

## 2021-03-10 PROCEDURE — 3074F SYST BP LT 130 MM HG: CPT | Mod: CPTII,S$GLB,, | Performed by: INTERNAL MEDICINE

## 2021-03-10 PROCEDURE — 3074F PR MOST RECENT SYSTOLIC BLOOD PRESSURE < 130 MM HG: ICD-10-PCS | Mod: CPTII,S$GLB,, | Performed by: INTERNAL MEDICINE

## 2021-03-10 PROCEDURE — 1101F PR PT FALLS ASSESS DOC 0-1 FALLS W/OUT INJ PAST YR: ICD-10-PCS | Mod: CPTII,S$GLB,, | Performed by: INTERNAL MEDICINE

## 2021-03-10 PROCEDURE — 3008F BODY MASS INDEX DOCD: CPT | Mod: CPTII,S$GLB,, | Performed by: INTERNAL MEDICINE

## 2021-03-10 PROCEDURE — 99213 PR OFFICE/OUTPT VISIT, EST, LEVL III, 20-29 MIN: ICD-10-PCS | Mod: S$GLB,,, | Performed by: INTERNAL MEDICINE

## 2021-03-10 PROCEDURE — 3288F FALL RISK ASSESSMENT DOCD: CPT | Mod: CPTII,S$GLB,, | Performed by: INTERNAL MEDICINE

## 2021-03-10 RX ORDER — TOPIRAMATE 50 MG/1
50 TABLET, FILM COATED ORAL DAILY
Qty: 90 TABLET | Refills: 1 | Status: SHIPPED | OUTPATIENT
Start: 2021-03-10 | End: 2021-06-30

## 2021-03-10 RX ORDER — DIETHYLPROPION HYDROCHLORIDE 75 MG/1
75 TABLET, EXTENDED RELEASE ORAL DAILY
Qty: 30 TABLET | Refills: 2 | Status: SHIPPED | OUTPATIENT
Start: 2021-03-10 | End: 2021-06-30

## 2021-03-25 ENCOUNTER — HOSPITAL ENCOUNTER (OUTPATIENT)
Dept: RADIOLOGY | Facility: HOSPITAL | Age: 71
Discharge: HOME | End: 2021-03-25
Attending: NURSE PRACTITIONER
Payer: COMMERCIAL

## 2021-03-25 ENCOUNTER — OFFICE VISIT (OUTPATIENT)
Dept: ORTHOPEDICS | Facility: CLINIC | Age: 71
End: 2021-03-25
Payer: COMMERCIAL

## 2021-03-25 DIAGNOSIS — M17.11 PRIMARY OSTEOARTHRITIS OF RIGHT KNEE: ICD-10-CM

## 2021-03-25 DIAGNOSIS — M25.561 RIGHT KNEE PAIN, UNSPECIFIED CHRONICITY: ICD-10-CM

## 2021-03-25 DIAGNOSIS — M25.561 RIGHT KNEE PAIN, UNSPECIFIED CHRONICITY: Primary | ICD-10-CM

## 2021-03-25 PROCEDURE — 1101F PT FALLS ASSESS-DOCD LE1/YR: CPT | Mod: CPTII,S$GLB,, | Performed by: NURSE PRACTITIONER

## 2021-03-25 PROCEDURE — 99999 PR PBB SHADOW E&M-EST. PATIENT-LVL III: CPT | Mod: PBBFAC,,, | Performed by: NURSE PRACTITIONER

## 2021-03-25 PROCEDURE — 99213 PR OFFICE/OUTPT VISIT, EST, LEVL III, 20-29 MIN: ICD-10-PCS | Mod: 25,S$GLB,, | Performed by: NURSE PRACTITIONER

## 2021-03-25 PROCEDURE — 73562 X-RAY EXAM OF KNEE 3: CPT | Mod: 26,LT,, | Performed by: RADIOLOGY

## 2021-03-25 PROCEDURE — 99999 PR PBB SHADOW E&M-EST. PATIENT-LVL III: ICD-10-PCS | Mod: PBBFAC,,, | Performed by: NURSE PRACTITIONER

## 2021-03-25 PROCEDURE — 20610 PR DRAIN/INJECT LARGE JOINT/BURSA: ICD-10-PCS | Mod: RT,S$GLB,, | Performed by: NURSE PRACTITIONER

## 2021-03-25 PROCEDURE — 3288F FALL RISK ASSESSMENT DOCD: CPT | Mod: CPTII,S$GLB,, | Performed by: NURSE PRACTITIONER

## 2021-03-25 PROCEDURE — 73564 X-RAY EXAM KNEE 4 OR MORE: CPT | Mod: 26,RT,, | Performed by: RADIOLOGY

## 2021-03-25 PROCEDURE — 1125F PR PAIN SEVERITY QUANTIFIED, PAIN PRESENT: ICD-10-PCS | Mod: S$GLB,,, | Performed by: NURSE PRACTITIONER

## 2021-03-25 PROCEDURE — 1125F AMNT PAIN NOTED PAIN PRSNT: CPT | Mod: S$GLB,,, | Performed by: NURSE PRACTITIONER

## 2021-03-25 PROCEDURE — 1159F MED LIST DOCD IN RCRD: CPT | Mod: S$GLB,,, | Performed by: NURSE PRACTITIONER

## 2021-03-25 PROCEDURE — 99213 OFFICE O/P EST LOW 20 MIN: CPT | Mod: 25,S$GLB,, | Performed by: NURSE PRACTITIONER

## 2021-03-25 PROCEDURE — 73564 X-RAY EXAM KNEE 4 OR MORE: CPT | Mod: TC,RT

## 2021-03-25 PROCEDURE — 20610 DRAIN/INJ JOINT/BURSA W/O US: CPT | Mod: RT,S$GLB,, | Performed by: NURSE PRACTITIONER

## 2021-03-25 PROCEDURE — 1101F PR PT FALLS ASSESS DOC 0-1 FALLS W/OUT INJ PAST YR: ICD-10-PCS | Mod: CPTII,S$GLB,, | Performed by: NURSE PRACTITIONER

## 2021-03-25 PROCEDURE — 1159F PR MEDICATION LIST DOCUMENTED IN MEDICAL RECORD: ICD-10-PCS | Mod: S$GLB,,, | Performed by: NURSE PRACTITIONER

## 2021-03-25 PROCEDURE — 3288F PR FALLS RISK ASSESSMENT DOCUMENTED: ICD-10-PCS | Mod: CPTII,S$GLB,, | Performed by: NURSE PRACTITIONER

## 2021-03-25 PROCEDURE — 73564 XR KNEE ORTHO RIGHT WITH FLEXION: ICD-10-PCS | Mod: 26,RT,, | Performed by: RADIOLOGY

## 2021-03-25 PROCEDURE — 73562 XR KNEE ORTHO RIGHT WITH FLEXION: ICD-10-PCS | Mod: 26,LT,, | Performed by: RADIOLOGY

## 2021-03-25 RX ORDER — DICLOFENAC SODIUM 10 MG/G
2 GEL TOPICAL 4 TIMES DAILY
Qty: 1 EACH | Refills: 6 | Status: SHIPPED | OUTPATIENT
Start: 2021-03-25 | End: 2022-02-17 | Stop reason: SDUPTHER

## 2021-03-25 RX ADMIN — TRIAMCINOLONE ACETONIDE 40 MG: 40 INJECTION, SUSPENSION INTRA-ARTICULAR; INTRAMUSCULAR at 10:03

## 2021-03-29 RX ORDER — TRIAMCINOLONE ACETONIDE 40 MG/ML
40 INJECTION, SUSPENSION INTRA-ARTICULAR; INTRAMUSCULAR
Status: COMPLETED | OUTPATIENT
Start: 2021-03-25 | End: 2021-03-25

## 2021-04-30 RX ORDER — DICLOFENAC SODIUM 75 MG/1
75 TABLET, DELAYED RELEASE ORAL 2 TIMES DAILY
Qty: 28 TABLET | Refills: 0 | Status: SHIPPED | OUTPATIENT
Start: 2021-04-30 | End: 2021-05-30

## 2021-05-07 ENCOUNTER — TELEPHONE (OUTPATIENT)
Dept: BARIATRICS | Facility: CLINIC | Age: 71
End: 2021-05-07

## 2021-05-26 ENCOUNTER — HOSPITAL ENCOUNTER (OUTPATIENT)
Dept: RADIOLOGY | Facility: CLINIC | Age: 71
Discharge: HOME | End: 2021-05-26
Attending: INTERNAL MEDICINE
Payer: COMMERCIAL

## 2021-05-26 DIAGNOSIS — M81.0 POSTMENOPAUSAL BONE LOSS: ICD-10-CM

## 2021-05-26 PROCEDURE — 77080 DEXA BONE DENSITY SPINE HIP: ICD-10-PCS | Mod: 26,,, | Performed by: INTERNAL MEDICINE

## 2021-05-26 PROCEDURE — 77080 DXA BONE DENSITY AXIAL: CPT | Mod: 26,,, | Performed by: INTERNAL MEDICINE

## 2021-05-26 PROCEDURE — 77080 DXA BONE DENSITY AXIAL: CPT | Mod: TC

## 2021-06-30 ENCOUNTER — OFFICE VISIT (OUTPATIENT)
Dept: BARIATRICS | Facility: CLINIC | Age: 71
End: 2021-06-30
Payer: COMMERCIAL

## 2021-06-30 VITALS
HEART RATE: 67 BPM | HEIGHT: 63 IN | OXYGEN SATURATION: 99 % | WEIGHT: 170.63 LBS | DIASTOLIC BLOOD PRESSURE: 78 MMHG | SYSTOLIC BLOOD PRESSURE: 124 MMHG | BODY MASS INDEX: 30.23 KG/M2

## 2021-06-30 DIAGNOSIS — E66.9 OBESITY, CLASS I, BMI 30.0-34.9 (SEE ACTUAL BMI): Primary | ICD-10-CM

## 2021-06-30 PROCEDURE — 3288F FALL RISK ASSESSMENT DOCD: CPT | Mod: CPTII,S$GLB,, | Performed by: INTERNAL MEDICINE

## 2021-06-30 PROCEDURE — 1101F PR PT FALLS ASSESS DOC 0-1 FALLS W/OUT INJ PAST YR: ICD-10-PCS | Mod: CPTII,S$GLB,, | Performed by: INTERNAL MEDICINE

## 2021-06-30 PROCEDURE — 3008F PR BODY MASS INDEX (BMI) DOCUMENTED: ICD-10-PCS | Mod: CPTII,S$GLB,, | Performed by: INTERNAL MEDICINE

## 2021-06-30 PROCEDURE — 99999 PR PBB SHADOW E&M-EST. PATIENT-LVL IV: ICD-10-PCS | Mod: PBBFAC,,, | Performed by: INTERNAL MEDICINE

## 2021-06-30 PROCEDURE — 1101F PT FALLS ASSESS-DOCD LE1/YR: CPT | Mod: CPTII,S$GLB,, | Performed by: INTERNAL MEDICINE

## 2021-06-30 PROCEDURE — 99214 OFFICE O/P EST MOD 30 MIN: CPT | Mod: S$GLB,,, | Performed by: INTERNAL MEDICINE

## 2021-06-30 PROCEDURE — 99214 PR OFFICE/OUTPT VISIT, EST, LEVL IV, 30-39 MIN: ICD-10-PCS | Mod: S$GLB,,, | Performed by: INTERNAL MEDICINE

## 2021-06-30 PROCEDURE — 1159F MED LIST DOCD IN RCRD: CPT | Mod: S$GLB,,, | Performed by: INTERNAL MEDICINE

## 2021-06-30 PROCEDURE — 3008F BODY MASS INDEX DOCD: CPT | Mod: CPTII,S$GLB,, | Performed by: INTERNAL MEDICINE

## 2021-06-30 PROCEDURE — 99999 PR PBB SHADOW E&M-EST. PATIENT-LVL IV: CPT | Mod: PBBFAC,,, | Performed by: INTERNAL MEDICINE

## 2021-06-30 PROCEDURE — 1126F PR PAIN SEVERITY QUANTIFIED, NO PAIN PRESENT: ICD-10-PCS | Mod: S$GLB,,, | Performed by: INTERNAL MEDICINE

## 2021-06-30 PROCEDURE — 3288F PR FALLS RISK ASSESSMENT DOCUMENTED: ICD-10-PCS | Mod: CPTII,S$GLB,, | Performed by: INTERNAL MEDICINE

## 2021-06-30 PROCEDURE — 1126F AMNT PAIN NOTED NONE PRSNT: CPT | Mod: S$GLB,,, | Performed by: INTERNAL MEDICINE

## 2021-06-30 PROCEDURE — 1159F PR MEDICATION LIST DOCUMENTED IN MEDICAL RECORD: ICD-10-PCS | Mod: S$GLB,,, | Performed by: INTERNAL MEDICINE

## 2021-06-30 RX ORDER — UBIDECARENONE 75 MG
500 CAPSULE ORAL DAILY
COMMUNITY

## 2021-07-14 ENCOUNTER — LAB VISIT (OUTPATIENT)
Dept: LAB | Facility: HOSPITAL | Age: 71
End: 2021-07-14
Attending: INTERNAL MEDICINE
Payer: COMMERCIAL

## 2021-07-14 DIAGNOSIS — R73.09 ELEVATED GLUCOSE: ICD-10-CM

## 2021-07-14 DIAGNOSIS — E55.9 MILD VITAMIN D DEFICIENCY: ICD-10-CM

## 2021-07-14 DIAGNOSIS — I10 ESSENTIAL HYPERTENSION: ICD-10-CM

## 2021-07-14 DIAGNOSIS — E78.5 HYPERLIPIDEMIA, UNSPECIFIED HYPERLIPIDEMIA TYPE: ICD-10-CM

## 2021-07-14 LAB
25(OH)D3+25(OH)D2 SERPL-MCNC: 37 NG/ML (ref 30–96)
ALBUMIN SERPL BCP-MCNC: 4 G/DL (ref 3.5–5.2)
ALP SERPL-CCNC: 95 U/L (ref 55–135)
ALT SERPL W/O P-5'-P-CCNC: 14 U/L (ref 10–44)
ANION GAP SERPL CALC-SCNC: 10 MMOL/L (ref 8–16)
AST SERPL-CCNC: 19 U/L (ref 10–40)
BASOPHILS # BLD AUTO: 0.07 K/UL (ref 0–0.2)
BASOPHILS NFR BLD: 1 % (ref 0–1.9)
BILIRUB SERPL-MCNC: 0.8 MG/DL (ref 0.1–1)
BUN SERPL-MCNC: 14 MG/DL (ref 8–23)
CALCIUM SERPL-MCNC: 10.2 MG/DL (ref 8.7–10.5)
CHLORIDE SERPL-SCNC: 98 MMOL/L (ref 95–110)
CHOLEST SERPL-MCNC: 192 MG/DL (ref 120–199)
CHOLEST/HDLC SERPL: 2.8 {RATIO} (ref 2–5)
CO2 SERPL-SCNC: 26 MMOL/L (ref 23–29)
CREAT SERPL-MCNC: 0.8 MG/DL (ref 0.5–1.4)
DIFFERENTIAL METHOD: ABNORMAL
EOSINOPHIL # BLD AUTO: 0.2 K/UL (ref 0–0.5)
EOSINOPHIL NFR BLD: 2.6 % (ref 0–8)
ERYTHROCYTE [DISTWIDTH] IN BLOOD BY AUTOMATED COUNT: 12.2 % (ref 11.5–14.5)
EST. GFR  (AFRICAN AMERICAN): >60 ML/MIN/1.73 M^2
EST. GFR  (NON AFRICAN AMERICAN): >60 ML/MIN/1.73 M^2
ESTIMATED AVG GLUCOSE: 111 MG/DL (ref 68–131)
GLUCOSE SERPL-MCNC: 103 MG/DL (ref 70–110)
HBA1C MFR BLD: 5.5 % (ref 4–5.6)
HCT VFR BLD AUTO: 38.2 % (ref 37–48.5)
HDLC SERPL-MCNC: 69 MG/DL (ref 40–75)
HDLC SERPL: 35.9 % (ref 20–50)
HGB BLD-MCNC: 12.9 G/DL (ref 12–16)
IMM GRANULOCYTES # BLD AUTO: 0.03 K/UL (ref 0–0.04)
IMM GRANULOCYTES NFR BLD AUTO: 0.4 % (ref 0–0.5)
LDLC SERPL CALC-MCNC: 108.6 MG/DL (ref 63–159)
LYMPHOCYTES # BLD AUTO: 1.3 K/UL (ref 1–4.8)
LYMPHOCYTES NFR BLD: 17.7 % (ref 18–48)
MCH RBC QN AUTO: 30.3 PG (ref 27–31)
MCHC RBC AUTO-ENTMCNC: 33.8 G/DL (ref 32–36)
MCV RBC AUTO: 90 FL (ref 82–98)
MONOCYTES # BLD AUTO: 0.6 K/UL (ref 0.3–1)
MONOCYTES NFR BLD: 8.5 % (ref 4–15)
NEUTROPHILS # BLD AUTO: 5 K/UL (ref 1.8–7.7)
NEUTROPHILS NFR BLD: 69.8 % (ref 38–73)
NONHDLC SERPL-MCNC: 123 MG/DL
NRBC BLD-RTO: 0 /100 WBC
PLATELET # BLD AUTO: 308 K/UL (ref 150–450)
PMV BLD AUTO: 9.4 FL (ref 9.2–12.9)
POTASSIUM SERPL-SCNC: 4.3 MMOL/L (ref 3.5–5.1)
PROT SERPL-MCNC: 7.9 G/DL (ref 6–8.4)
RBC # BLD AUTO: 4.26 M/UL (ref 4–5.4)
SODIUM SERPL-SCNC: 134 MMOL/L (ref 136–145)
TRIGL SERPL-MCNC: 72 MG/DL (ref 30–150)
WBC # BLD AUTO: 7.18 K/UL (ref 3.9–12.7)

## 2021-07-14 PROCEDURE — 36415 COLL VENOUS BLD VENIPUNCTURE: CPT | Performed by: INTERNAL MEDICINE

## 2021-07-14 PROCEDURE — 80061 LIPID PANEL: CPT | Performed by: INTERNAL MEDICINE

## 2021-07-14 PROCEDURE — 80053 COMPREHEN METABOLIC PANEL: CPT | Performed by: INTERNAL MEDICINE

## 2021-07-14 PROCEDURE — 82306 VITAMIN D 25 HYDROXY: CPT | Performed by: INTERNAL MEDICINE

## 2021-07-14 PROCEDURE — 83036 HEMOGLOBIN GLYCOSYLATED A1C: CPT | Performed by: INTERNAL MEDICINE

## 2021-07-14 PROCEDURE — 85025 COMPLETE CBC W/AUTO DIFF WBC: CPT | Performed by: INTERNAL MEDICINE

## 2021-07-19 ENCOUNTER — PATIENT OUTREACH (OUTPATIENT)
Dept: OTHER | Facility: OTHER | Age: 71
End: 2021-07-19

## 2021-07-21 ENCOUNTER — OFFICE VISIT (OUTPATIENT)
Dept: INTERNAL MEDICINE | Facility: CLINIC | Age: 71
End: 2021-07-21
Payer: COMMERCIAL

## 2021-07-21 VITALS
DIASTOLIC BLOOD PRESSURE: 70 MMHG | HEIGHT: 63 IN | BODY MASS INDEX: 29.61 KG/M2 | HEART RATE: 64 BPM | SYSTOLIC BLOOD PRESSURE: 130 MMHG | OXYGEN SATURATION: 99 % | WEIGHT: 167.13 LBS

## 2021-07-21 DIAGNOSIS — M25.569 CHRONIC KNEE PAIN, UNSPECIFIED LATERALITY: Primary | ICD-10-CM

## 2021-07-21 DIAGNOSIS — Z12.31 ENCOUNTER FOR SCREENING MAMMOGRAM FOR BREAST CANCER: ICD-10-CM

## 2021-07-21 DIAGNOSIS — R73.09 ELEVATED GLUCOSE: ICD-10-CM

## 2021-07-21 DIAGNOSIS — I10 ESSENTIAL HYPERTENSION: ICD-10-CM

## 2021-07-21 DIAGNOSIS — G89.29 CHRONIC KNEE PAIN, UNSPECIFIED LATERALITY: Primary | ICD-10-CM

## 2021-07-21 DIAGNOSIS — E78.5 HYPERLIPIDEMIA, UNSPECIFIED HYPERLIPIDEMIA TYPE: ICD-10-CM

## 2021-07-21 PROCEDURE — 99214 OFFICE O/P EST MOD 30 MIN: CPT | Mod: S$GLB,,, | Performed by: INTERNAL MEDICINE

## 2021-07-21 PROCEDURE — 3008F BODY MASS INDEX DOCD: CPT | Mod: CPTII,S$GLB,, | Performed by: INTERNAL MEDICINE

## 2021-07-21 PROCEDURE — 3075F SYST BP GE 130 - 139MM HG: CPT | Mod: CPTII,S$GLB,, | Performed by: INTERNAL MEDICINE

## 2021-07-21 PROCEDURE — 1126F AMNT PAIN NOTED NONE PRSNT: CPT | Mod: CPTII,S$GLB,, | Performed by: INTERNAL MEDICINE

## 2021-07-21 PROCEDURE — 1159F MED LIST DOCD IN RCRD: CPT | Mod: CPTII,S$GLB,, | Performed by: INTERNAL MEDICINE

## 2021-07-21 PROCEDURE — 99999 PR PBB SHADOW E&M-EST. PATIENT-LVL IV: CPT | Mod: PBBFAC,,, | Performed by: INTERNAL MEDICINE

## 2021-07-21 PROCEDURE — 3288F PR FALLS RISK ASSESSMENT DOCUMENTED: ICD-10-PCS | Mod: CPTII,S$GLB,, | Performed by: INTERNAL MEDICINE

## 2021-07-21 PROCEDURE — 1126F PR PAIN SEVERITY QUANTIFIED, NO PAIN PRESENT: ICD-10-PCS | Mod: CPTII,S$GLB,, | Performed by: INTERNAL MEDICINE

## 2021-07-21 PROCEDURE — 3288F FALL RISK ASSESSMENT DOCD: CPT | Mod: CPTII,S$GLB,, | Performed by: INTERNAL MEDICINE

## 2021-07-21 PROCEDURE — 1101F PT FALLS ASSESS-DOCD LE1/YR: CPT | Mod: CPTII,S$GLB,, | Performed by: INTERNAL MEDICINE

## 2021-07-21 PROCEDURE — 1159F PR MEDICATION LIST DOCUMENTED IN MEDICAL RECORD: ICD-10-PCS | Mod: CPTII,S$GLB,, | Performed by: INTERNAL MEDICINE

## 2021-07-21 PROCEDURE — 3075F PR MOST RECENT SYSTOLIC BLOOD PRESS GE 130-139MM HG: ICD-10-PCS | Mod: CPTII,S$GLB,, | Performed by: INTERNAL MEDICINE

## 2021-07-21 PROCEDURE — 3008F PR BODY MASS INDEX (BMI) DOCUMENTED: ICD-10-PCS | Mod: CPTII,S$GLB,, | Performed by: INTERNAL MEDICINE

## 2021-07-21 PROCEDURE — 3078F PR MOST RECENT DIASTOLIC BLOOD PRESSURE < 80 MM HG: ICD-10-PCS | Mod: CPTII,S$GLB,, | Performed by: INTERNAL MEDICINE

## 2021-07-21 PROCEDURE — 3078F DIAST BP <80 MM HG: CPT | Mod: CPTII,S$GLB,, | Performed by: INTERNAL MEDICINE

## 2021-07-21 PROCEDURE — 99999 PR PBB SHADOW E&M-EST. PATIENT-LVL IV: ICD-10-PCS | Mod: PBBFAC,,, | Performed by: INTERNAL MEDICINE

## 2021-07-21 PROCEDURE — 99214 PR OFFICE/OUTPT VISIT, EST, LEVL IV, 30-39 MIN: ICD-10-PCS | Mod: S$GLB,,, | Performed by: INTERNAL MEDICINE

## 2021-07-21 PROCEDURE — 1101F PR PT FALLS ASSESS DOC 0-1 FALLS W/OUT INJ PAST YR: ICD-10-PCS | Mod: CPTII,S$GLB,, | Performed by: INTERNAL MEDICINE

## 2021-07-21 RX ORDER — DICLOFENAC SODIUM 75 MG/1
75 TABLET, DELAYED RELEASE ORAL 2 TIMES DAILY
Qty: 60 TABLET | Refills: 1 | Status: SHIPPED | OUTPATIENT
Start: 2021-07-21 | End: 2022-10-10 | Stop reason: SDUPTHER

## 2021-07-27 ENCOUNTER — PATIENT MESSAGE (OUTPATIENT)
Dept: ORTHOPEDICS | Facility: CLINIC | Age: 71
End: 2021-07-27

## 2021-08-05 ENCOUNTER — OFFICE VISIT (OUTPATIENT)
Dept: ORTHOPEDICS | Facility: CLINIC | Age: 71
End: 2021-08-05
Payer: COMMERCIAL

## 2021-08-05 VITALS — BODY MASS INDEX: 29.47 KG/M2 | WEIGHT: 166.31 LBS | HEIGHT: 63 IN

## 2021-08-05 DIAGNOSIS — M17.11 PRIMARY OSTEOARTHRITIS OF RIGHT KNEE: Primary | ICD-10-CM

## 2021-08-05 DIAGNOSIS — G89.29 CHRONIC KNEE PAIN, UNSPECIFIED LATERALITY: ICD-10-CM

## 2021-08-05 DIAGNOSIS — M25.569 CHRONIC KNEE PAIN, UNSPECIFIED LATERALITY: ICD-10-CM

## 2021-08-05 PROCEDURE — 20610 PR DRAIN/INJECT LARGE JOINT/BURSA: ICD-10-PCS | Mod: RT,S$GLB,, | Performed by: ORTHOPAEDIC SURGERY

## 2021-08-05 PROCEDURE — 1101F PR PT FALLS ASSESS DOC 0-1 FALLS W/OUT INJ PAST YR: ICD-10-PCS | Mod: CPTII,S$GLB,, | Performed by: ORTHOPAEDIC SURGERY

## 2021-08-05 PROCEDURE — 20610 DRAIN/INJ JOINT/BURSA W/O US: CPT | Mod: RT,S$GLB,, | Performed by: ORTHOPAEDIC SURGERY

## 2021-08-05 PROCEDURE — 1125F PR PAIN SEVERITY QUANTIFIED, PAIN PRESENT: ICD-10-PCS | Mod: CPTII,S$GLB,, | Performed by: ORTHOPAEDIC SURGERY

## 2021-08-05 PROCEDURE — 3288F FALL RISK ASSESSMENT DOCD: CPT | Mod: CPTII,S$GLB,, | Performed by: ORTHOPAEDIC SURGERY

## 2021-08-05 PROCEDURE — 99214 OFFICE O/P EST MOD 30 MIN: CPT | Mod: 25,S$GLB,, | Performed by: ORTHOPAEDIC SURGERY

## 2021-08-05 PROCEDURE — 1159F MED LIST DOCD IN RCRD: CPT | Mod: CPTII,S$GLB,, | Performed by: ORTHOPAEDIC SURGERY

## 2021-08-05 PROCEDURE — 1101F PT FALLS ASSESS-DOCD LE1/YR: CPT | Mod: CPTII,S$GLB,, | Performed by: ORTHOPAEDIC SURGERY

## 2021-08-05 PROCEDURE — 1125F AMNT PAIN NOTED PAIN PRSNT: CPT | Mod: CPTII,S$GLB,, | Performed by: ORTHOPAEDIC SURGERY

## 2021-08-05 PROCEDURE — 1159F PR MEDICATION LIST DOCUMENTED IN MEDICAL RECORD: ICD-10-PCS | Mod: CPTII,S$GLB,, | Performed by: ORTHOPAEDIC SURGERY

## 2021-08-05 PROCEDURE — 99214 PR OFFICE/OUTPT VISIT, EST, LEVL IV, 30-39 MIN: ICD-10-PCS | Mod: 25,S$GLB,, | Performed by: ORTHOPAEDIC SURGERY

## 2021-08-05 PROCEDURE — 3288F PR FALLS RISK ASSESSMENT DOCUMENTED: ICD-10-PCS | Mod: CPTII,S$GLB,, | Performed by: ORTHOPAEDIC SURGERY

## 2021-08-05 PROCEDURE — 99999 PR PBB SHADOW E&M-EST. PATIENT-LVL III: ICD-10-PCS | Mod: PBBFAC,,, | Performed by: ORTHOPAEDIC SURGERY

## 2021-08-05 PROCEDURE — 3044F PR MOST RECENT HEMOGLOBIN A1C LEVEL <7.0%: ICD-10-PCS | Mod: CPTII,S$GLB,, | Performed by: ORTHOPAEDIC SURGERY

## 2021-08-05 PROCEDURE — 3008F PR BODY MASS INDEX (BMI) DOCUMENTED: ICD-10-PCS | Mod: CPTII,S$GLB,, | Performed by: ORTHOPAEDIC SURGERY

## 2021-08-05 PROCEDURE — 3044F HG A1C LEVEL LT 7.0%: CPT | Mod: CPTII,S$GLB,, | Performed by: ORTHOPAEDIC SURGERY

## 2021-08-05 PROCEDURE — 3008F BODY MASS INDEX DOCD: CPT | Mod: CPTII,S$GLB,, | Performed by: ORTHOPAEDIC SURGERY

## 2021-08-05 PROCEDURE — 99999 PR PBB SHADOW E&M-EST. PATIENT-LVL III: CPT | Mod: PBBFAC,,, | Performed by: ORTHOPAEDIC SURGERY

## 2021-08-05 RX ADMIN — TRIAMCINOLONE ACETONIDE 40 MG: 40 INJECTION, SUSPENSION INTRA-ARTICULAR; INTRAMUSCULAR at 08:08

## 2021-08-06 RX ORDER — TRIAMCINOLONE ACETONIDE 40 MG/ML
40 INJECTION, SUSPENSION INTRA-ARTICULAR; INTRAMUSCULAR
Status: DISCONTINUED | OUTPATIENT
Start: 2021-08-05 | End: 2021-08-06 | Stop reason: HOSPADM

## 2021-08-24 ENCOUNTER — CLINICAL SUPPORT (OUTPATIENT)
Dept: REHABILITATION | Facility: HOSPITAL | Age: 71
End: 2021-08-24
Attending: ORTHOPAEDIC SURGERY
Payer: COMMERCIAL

## 2021-08-24 DIAGNOSIS — M17.11 PRIMARY OSTEOARTHRITIS OF RIGHT KNEE: ICD-10-CM

## 2021-08-24 PROCEDURE — 97162 PT EVAL MOD COMPLEX 30 MIN: CPT | Mod: PN

## 2021-09-03 ENCOUNTER — CLINICAL SUPPORT (OUTPATIENT)
Dept: REHABILITATION | Facility: HOSPITAL | Age: 71
End: 2021-09-03
Attending: ORTHOPAEDIC SURGERY
Payer: COMMERCIAL

## 2021-09-03 DIAGNOSIS — M17.11 PRIMARY OSTEOARTHRITIS OF RIGHT KNEE: Primary | ICD-10-CM

## 2021-09-03 PROCEDURE — 97140 MANUAL THERAPY 1/> REGIONS: CPT | Mod: PN

## 2021-09-03 PROCEDURE — 97110 THERAPEUTIC EXERCISES: CPT | Mod: PN

## 2021-09-07 ENCOUNTER — CLINICAL SUPPORT (OUTPATIENT)
Dept: REHABILITATION | Facility: HOSPITAL | Age: 71
End: 2021-09-07
Attending: ORTHOPAEDIC SURGERY
Payer: COMMERCIAL

## 2021-09-07 DIAGNOSIS — M25.661 STIFFNESS OF KNEE JOINT, RIGHT: ICD-10-CM

## 2021-09-07 DIAGNOSIS — M17.11 PRIMARY OSTEOARTHRITIS OF RIGHT KNEE: Primary | ICD-10-CM

## 2021-09-07 PROCEDURE — 97140 MANUAL THERAPY 1/> REGIONS: CPT | Mod: PN

## 2021-09-07 PROCEDURE — 97110 THERAPEUTIC EXERCISES: CPT | Mod: PN

## 2021-09-10 ENCOUNTER — CLINICAL SUPPORT (OUTPATIENT)
Dept: REHABILITATION | Facility: HOSPITAL | Age: 71
End: 2021-09-10
Attending: ORTHOPAEDIC SURGERY
Payer: COMMERCIAL

## 2021-09-10 DIAGNOSIS — M25.661 STIFFNESS OF KNEE JOINT, RIGHT: Primary | ICD-10-CM

## 2021-09-10 PROCEDURE — 97140 MANUAL THERAPY 1/> REGIONS: CPT | Mod: PN,CQ

## 2021-09-10 PROCEDURE — 97110 THERAPEUTIC EXERCISES: CPT | Mod: PN,CQ

## 2021-09-14 ENCOUNTER — CLINICAL SUPPORT (OUTPATIENT)
Dept: REHABILITATION | Facility: HOSPITAL | Age: 71
End: 2021-09-14
Attending: ORTHOPAEDIC SURGERY
Payer: COMMERCIAL

## 2021-09-14 DIAGNOSIS — M25.661 STIFFNESS OF KNEE JOINT, RIGHT: Primary | ICD-10-CM

## 2021-09-14 PROCEDURE — 97110 THERAPEUTIC EXERCISES: CPT | Mod: PN,CQ

## 2021-09-14 RX ORDER — ESCITALOPRAM OXALATE 5 MG/1
5 TABLET ORAL DAILY
Qty: 90 TABLET | Refills: 3 | Status: SHIPPED | OUTPATIENT
Start: 2021-09-14 | End: 2022-09-14

## 2021-09-16 ENCOUNTER — OFFICE VISIT (OUTPATIENT)
Dept: ORTHOPEDICS | Facility: CLINIC | Age: 71
End: 2021-09-16
Payer: COMMERCIAL

## 2021-09-16 VITALS — BODY MASS INDEX: 30.12 KG/M2 | HEIGHT: 63 IN | WEIGHT: 170 LBS

## 2021-09-16 DIAGNOSIS — M17.11 PRIMARY OSTEOARTHRITIS OF RIGHT KNEE: Primary | ICD-10-CM

## 2021-09-16 PROCEDURE — 99999 PR PBB SHADOW E&M-EST. PATIENT-LVL III: ICD-10-PCS | Mod: PBBFAC,,, | Performed by: ORTHOPAEDIC SURGERY

## 2021-09-16 PROCEDURE — 1159F PR MEDICATION LIST DOCUMENTED IN MEDICAL RECORD: ICD-10-PCS | Mod: CPTII,S$GLB,, | Performed by: ORTHOPAEDIC SURGERY

## 2021-09-16 PROCEDURE — 1159F MED LIST DOCD IN RCRD: CPT | Mod: CPTII,S$GLB,, | Performed by: ORTHOPAEDIC SURGERY

## 2021-09-16 PROCEDURE — 3044F HG A1C LEVEL LT 7.0%: CPT | Mod: CPTII,S$GLB,, | Performed by: ORTHOPAEDIC SURGERY

## 2021-09-16 PROCEDURE — 99999 PR PBB SHADOW E&M-EST. PATIENT-LVL III: CPT | Mod: PBBFAC,,, | Performed by: ORTHOPAEDIC SURGERY

## 2021-09-16 PROCEDURE — 99212 PR OFFICE/OUTPT VISIT, EST, LEVL II, 10-19 MIN: ICD-10-PCS | Mod: S$GLB,,, | Performed by: ORTHOPAEDIC SURGERY

## 2021-09-16 PROCEDURE — 3008F BODY MASS INDEX DOCD: CPT | Mod: CPTII,S$GLB,, | Performed by: ORTHOPAEDIC SURGERY

## 2021-09-16 PROCEDURE — 1125F AMNT PAIN NOTED PAIN PRSNT: CPT | Mod: CPTII,S$GLB,, | Performed by: ORTHOPAEDIC SURGERY

## 2021-09-16 PROCEDURE — 3044F PR MOST RECENT HEMOGLOBIN A1C LEVEL <7.0%: ICD-10-PCS | Mod: CPTII,S$GLB,, | Performed by: ORTHOPAEDIC SURGERY

## 2021-09-16 PROCEDURE — 1125F PR PAIN SEVERITY QUANTIFIED, PAIN PRESENT: ICD-10-PCS | Mod: CPTII,S$GLB,, | Performed by: ORTHOPAEDIC SURGERY

## 2021-09-16 PROCEDURE — 3008F PR BODY MASS INDEX (BMI) DOCUMENTED: ICD-10-PCS | Mod: CPTII,S$GLB,, | Performed by: ORTHOPAEDIC SURGERY

## 2021-09-16 PROCEDURE — 99212 OFFICE O/P EST SF 10 MIN: CPT | Mod: S$GLB,,, | Performed by: ORTHOPAEDIC SURGERY

## 2021-09-17 ENCOUNTER — CLINICAL SUPPORT (OUTPATIENT)
Dept: REHABILITATION | Facility: HOSPITAL | Age: 71
End: 2021-09-17
Attending: ORTHOPAEDIC SURGERY
Payer: COMMERCIAL

## 2021-09-17 DIAGNOSIS — M25.661 STIFFNESS OF KNEE JOINT, RIGHT: Primary | ICD-10-CM

## 2021-09-17 PROCEDURE — 97110 THERAPEUTIC EXERCISES: CPT | Mod: PN,CQ

## 2021-09-21 ENCOUNTER — CLINICAL SUPPORT (OUTPATIENT)
Dept: REHABILITATION | Facility: HOSPITAL | Age: 71
End: 2021-09-21
Attending: ORTHOPAEDIC SURGERY
Payer: COMMERCIAL

## 2021-09-21 DIAGNOSIS — M25.661 STIFFNESS OF KNEE JOINT, RIGHT: ICD-10-CM

## 2021-09-21 PROCEDURE — 97110 THERAPEUTIC EXERCISES: CPT | Mod: PN

## 2021-09-24 ENCOUNTER — CLINICAL SUPPORT (OUTPATIENT)
Dept: REHABILITATION | Facility: HOSPITAL | Age: 71
End: 2021-09-24
Attending: ORTHOPAEDIC SURGERY
Payer: COMMERCIAL

## 2021-09-24 DIAGNOSIS — M25.661 STIFFNESS OF KNEE JOINT, RIGHT: ICD-10-CM

## 2021-09-24 PROCEDURE — 97140 MANUAL THERAPY 1/> REGIONS: CPT | Mod: PN

## 2021-09-28 ENCOUNTER — CLINICAL SUPPORT (OUTPATIENT)
Dept: REHABILITATION | Facility: HOSPITAL | Age: 71
End: 2021-09-28
Attending: ORTHOPAEDIC SURGERY
Payer: COMMERCIAL

## 2021-09-28 DIAGNOSIS — M25.661 STIFFNESS OF KNEE JOINT, RIGHT: ICD-10-CM

## 2021-09-28 DIAGNOSIS — M17.11 PRIMARY OSTEOARTHRITIS OF RIGHT KNEE: Primary | ICD-10-CM

## 2021-09-28 PROCEDURE — 97110 THERAPEUTIC EXERCISES: CPT | Mod: PN

## 2021-09-30 ENCOUNTER — OFFICE VISIT (OUTPATIENT)
Dept: BARIATRICS | Facility: CLINIC | Age: 71
End: 2021-09-30
Payer: COMMERCIAL

## 2021-09-30 VITALS
DIASTOLIC BLOOD PRESSURE: 69 MMHG | OXYGEN SATURATION: 98 % | SYSTOLIC BLOOD PRESSURE: 134 MMHG | BODY MASS INDEX: 29.72 KG/M2 | HEART RATE: 83 BPM | WEIGHT: 167.75 LBS

## 2021-09-30 DIAGNOSIS — E66.9 OBESITY, CLASS I, BMI 30.0-34.9 (SEE ACTUAL BMI): ICD-10-CM

## 2021-09-30 PROCEDURE — 1160F PR REVIEW ALL MEDS BY PRESCRIBER/CLIN PHARMACIST DOCUMENTED: ICD-10-PCS | Mod: CPTII,S$GLB,, | Performed by: INTERNAL MEDICINE

## 2021-09-30 PROCEDURE — 3044F PR MOST RECENT HEMOGLOBIN A1C LEVEL <7.0%: ICD-10-PCS | Mod: CPTII,S$GLB,, | Performed by: INTERNAL MEDICINE

## 2021-09-30 PROCEDURE — 99999 PR PBB SHADOW E&M-EST. PATIENT-LVL IV: CPT | Mod: PBBFAC,,, | Performed by: INTERNAL MEDICINE

## 2021-09-30 PROCEDURE — 3078F DIAST BP <80 MM HG: CPT | Mod: CPTII,S$GLB,, | Performed by: INTERNAL MEDICINE

## 2021-09-30 PROCEDURE — 99213 OFFICE O/P EST LOW 20 MIN: CPT | Mod: S$GLB,,, | Performed by: INTERNAL MEDICINE

## 2021-09-30 PROCEDURE — 1126F AMNT PAIN NOTED NONE PRSNT: CPT | Mod: CPTII,S$GLB,, | Performed by: INTERNAL MEDICINE

## 2021-09-30 PROCEDURE — 1159F MED LIST DOCD IN RCRD: CPT | Mod: CPTII,S$GLB,, | Performed by: INTERNAL MEDICINE

## 2021-09-30 PROCEDURE — 3008F PR BODY MASS INDEX (BMI) DOCUMENTED: ICD-10-PCS | Mod: CPTII,S$GLB,, | Performed by: INTERNAL MEDICINE

## 2021-09-30 PROCEDURE — 1160F RVW MEDS BY RX/DR IN RCRD: CPT | Mod: CPTII,S$GLB,, | Performed by: INTERNAL MEDICINE

## 2021-09-30 PROCEDURE — 1101F PR PT FALLS ASSESS DOC 0-1 FALLS W/OUT INJ PAST YR: ICD-10-PCS | Mod: CPTII,S$GLB,, | Performed by: INTERNAL MEDICINE

## 2021-09-30 PROCEDURE — 99213 PR OFFICE/OUTPT VISIT, EST, LEVL III, 20-29 MIN: ICD-10-PCS | Mod: S$GLB,,, | Performed by: INTERNAL MEDICINE

## 2021-09-30 PROCEDURE — 3288F FALL RISK ASSESSMENT DOCD: CPT | Mod: CPTII,S$GLB,, | Performed by: INTERNAL MEDICINE

## 2021-09-30 PROCEDURE — 3078F PR MOST RECENT DIASTOLIC BLOOD PRESSURE < 80 MM HG: ICD-10-PCS | Mod: CPTII,S$GLB,, | Performed by: INTERNAL MEDICINE

## 2021-09-30 PROCEDURE — 3075F PR MOST RECENT SYSTOLIC BLOOD PRESS GE 130-139MM HG: ICD-10-PCS | Mod: CPTII,S$GLB,, | Performed by: INTERNAL MEDICINE

## 2021-09-30 PROCEDURE — 3288F PR FALLS RISK ASSESSMENT DOCUMENTED: ICD-10-PCS | Mod: CPTII,S$GLB,, | Performed by: INTERNAL MEDICINE

## 2021-09-30 PROCEDURE — 1101F PT FALLS ASSESS-DOCD LE1/YR: CPT | Mod: CPTII,S$GLB,, | Performed by: INTERNAL MEDICINE

## 2021-09-30 PROCEDURE — 1126F PR PAIN SEVERITY QUANTIFIED, NO PAIN PRESENT: ICD-10-PCS | Mod: CPTII,S$GLB,, | Performed by: INTERNAL MEDICINE

## 2021-09-30 PROCEDURE — 1159F PR MEDICATION LIST DOCUMENTED IN MEDICAL RECORD: ICD-10-PCS | Mod: CPTII,S$GLB,, | Performed by: INTERNAL MEDICINE

## 2021-09-30 PROCEDURE — 3008F BODY MASS INDEX DOCD: CPT | Mod: CPTII,S$GLB,, | Performed by: INTERNAL MEDICINE

## 2021-09-30 PROCEDURE — 3075F SYST BP GE 130 - 139MM HG: CPT | Mod: CPTII,S$GLB,, | Performed by: INTERNAL MEDICINE

## 2021-09-30 PROCEDURE — 3044F HG A1C LEVEL LT 7.0%: CPT | Mod: CPTII,S$GLB,, | Performed by: INTERNAL MEDICINE

## 2021-09-30 PROCEDURE — 99999 PR PBB SHADOW E&M-EST. PATIENT-LVL IV: ICD-10-PCS | Mod: PBBFAC,,, | Performed by: INTERNAL MEDICINE

## 2021-10-05 ENCOUNTER — CLINICAL SUPPORT (OUTPATIENT)
Dept: REHABILITATION | Facility: HOSPITAL | Age: 71
End: 2021-10-05
Payer: COMMERCIAL

## 2021-10-05 DIAGNOSIS — M17.11 PRIMARY OSTEOARTHRITIS OF RIGHT KNEE: Primary | ICD-10-CM

## 2021-10-05 DIAGNOSIS — M25.661 STIFFNESS OF KNEE JOINT, RIGHT: ICD-10-CM

## 2021-10-05 PROCEDURE — 97140 MANUAL THERAPY 1/> REGIONS: CPT | Mod: PN

## 2021-10-05 PROCEDURE — 97110 THERAPEUTIC EXERCISES: CPT | Mod: PN

## 2021-10-06 ENCOUNTER — IMMUNIZATION (OUTPATIENT)
Dept: INTERNAL MEDICINE | Facility: CLINIC | Age: 71
End: 2021-10-06
Payer: COMMERCIAL

## 2021-10-06 DIAGNOSIS — Z23 NEED FOR VACCINATION: Primary | ICD-10-CM

## 2021-10-06 PROCEDURE — 91300 COVID-19, MRNA, LNP-S, PF, 30 MCG/0.3 ML DOSE VACCINE: CPT | Mod: PBBFAC | Performed by: INTERNAL MEDICINE

## 2021-10-06 PROCEDURE — 0003A COVID-19, MRNA, LNP-S, PF, 30 MCG/0.3 ML DOSE VACCINE: CPT | Mod: CV19,PBBFAC | Performed by: INTERNAL MEDICINE

## 2021-10-08 ENCOUNTER — CLINICAL SUPPORT (OUTPATIENT)
Dept: REHABILITATION | Facility: HOSPITAL | Age: 71
End: 2021-10-08
Payer: COMMERCIAL

## 2021-10-08 DIAGNOSIS — M25.661 STIFFNESS OF KNEE JOINT, RIGHT: ICD-10-CM

## 2021-10-08 PROCEDURE — 97110 THERAPEUTIC EXERCISES: CPT | Mod: PN

## 2021-10-08 PROCEDURE — 97140 MANUAL THERAPY 1/> REGIONS: CPT | Mod: PN

## 2021-10-12 ENCOUNTER — CLINICAL SUPPORT (OUTPATIENT)
Dept: REHABILITATION | Facility: HOSPITAL | Age: 71
End: 2021-10-12
Payer: COMMERCIAL

## 2021-10-12 DIAGNOSIS — M25.661 STIFFNESS OF KNEE JOINT, RIGHT: ICD-10-CM

## 2021-10-12 DIAGNOSIS — M17.11 PRIMARY OSTEOARTHRITIS OF RIGHT KNEE: Primary | ICD-10-CM

## 2021-10-12 PROCEDURE — 97140 MANUAL THERAPY 1/> REGIONS: CPT | Mod: KX,PN

## 2021-10-12 PROCEDURE — 97110 THERAPEUTIC EXERCISES: CPT | Mod: KX,PN

## 2021-10-22 ENCOUNTER — CLINICAL SUPPORT (OUTPATIENT)
Dept: REHABILITATION | Facility: HOSPITAL | Age: 71
End: 2021-10-22
Payer: COMMERCIAL

## 2021-10-22 DIAGNOSIS — M25.661 STIFFNESS OF KNEE JOINT, RIGHT: ICD-10-CM

## 2021-10-22 PROCEDURE — 97110 THERAPEUTIC EXERCISES: CPT | Mod: PN

## 2021-10-22 PROCEDURE — 97140 MANUAL THERAPY 1/> REGIONS: CPT | Mod: PN

## 2021-10-26 ENCOUNTER — CLINICAL SUPPORT (OUTPATIENT)
Dept: REHABILITATION | Facility: HOSPITAL | Age: 71
End: 2021-10-26
Payer: COMMERCIAL

## 2021-10-26 DIAGNOSIS — M17.11 PRIMARY OSTEOARTHRITIS OF RIGHT KNEE: Primary | ICD-10-CM

## 2021-10-26 DIAGNOSIS — M25.661 STIFFNESS OF KNEE JOINT, RIGHT: ICD-10-CM

## 2021-10-26 PROCEDURE — 97110 THERAPEUTIC EXERCISES: CPT | Mod: PN

## 2021-10-29 ENCOUNTER — CLINICAL SUPPORT (OUTPATIENT)
Dept: REHABILITATION | Facility: HOSPITAL | Age: 71
End: 2021-10-29
Payer: COMMERCIAL

## 2021-10-29 DIAGNOSIS — M25.661 STIFFNESS OF KNEE JOINT, RIGHT: ICD-10-CM

## 2021-10-29 PROCEDURE — 97140 MANUAL THERAPY 1/> REGIONS: CPT | Mod: PN

## 2021-11-09 ENCOUNTER — CLINICAL SUPPORT (OUTPATIENT)
Dept: REHABILITATION | Facility: HOSPITAL | Age: 71
End: 2021-11-09
Attending: ORTHOPAEDIC SURGERY
Payer: COMMERCIAL

## 2021-11-09 DIAGNOSIS — M17.11 PRIMARY OSTEOARTHRITIS OF RIGHT KNEE: Primary | ICD-10-CM

## 2021-11-09 DIAGNOSIS — M25.661 STIFFNESS OF KNEE JOINT, RIGHT: ICD-10-CM

## 2021-11-09 PROCEDURE — 97110 THERAPEUTIC EXERCISES: CPT | Mod: PN

## 2021-11-09 PROCEDURE — 97140 MANUAL THERAPY 1/> REGIONS: CPT | Mod: PN

## 2021-11-12 ENCOUNTER — CLINICAL SUPPORT (OUTPATIENT)
Dept: REHABILITATION | Facility: HOSPITAL | Age: 71
End: 2021-11-12
Attending: ORTHOPAEDIC SURGERY
Payer: COMMERCIAL

## 2021-11-12 DIAGNOSIS — M25.661 STIFFNESS OF KNEE JOINT, RIGHT: ICD-10-CM

## 2021-11-12 PROCEDURE — 97140 MANUAL THERAPY 1/> REGIONS: CPT | Mod: PN

## 2021-11-12 PROCEDURE — 97110 THERAPEUTIC EXERCISES: CPT | Mod: PN

## 2021-11-16 ENCOUNTER — PATIENT OUTREACH (OUTPATIENT)
Dept: OTHER | Facility: OTHER | Age: 71
End: 2021-11-16

## 2021-11-16 ENCOUNTER — CLINICAL SUPPORT (OUTPATIENT)
Dept: REHABILITATION | Facility: HOSPITAL | Age: 71
End: 2021-11-16
Attending: ORTHOPAEDIC SURGERY
Payer: COMMERCIAL

## 2021-11-16 ENCOUNTER — PATIENT MESSAGE (OUTPATIENT)
Dept: OTHER | Facility: OTHER | Age: 71
End: 2021-11-16
Payer: COMMERCIAL

## 2021-11-16 DIAGNOSIS — M79.10 PAIN IN THE MUSCLES: Primary | ICD-10-CM

## 2021-11-16 DIAGNOSIS — M17.11 PRIMARY OSTEOARTHRITIS OF RIGHT KNEE: ICD-10-CM

## 2021-11-16 DIAGNOSIS — M25.661 STIFFNESS OF KNEE JOINT, RIGHT: ICD-10-CM

## 2021-11-16 PROCEDURE — 97140 MANUAL THERAPY 1/> REGIONS: CPT | Mod: PN

## 2021-11-16 PROCEDURE — 97110 THERAPEUTIC EXERCISES: CPT | Mod: PN

## 2021-11-18 ENCOUNTER — OFFICE VISIT (OUTPATIENT)
Dept: ORTHOPEDICS | Facility: CLINIC | Age: 71
End: 2021-11-18
Payer: COMMERCIAL

## 2021-11-18 ENCOUNTER — HOSPITAL ENCOUNTER (OUTPATIENT)
Dept: RADIOLOGY | Facility: HOSPITAL | Age: 71
Discharge: HOME | End: 2021-11-18
Attending: NURSE PRACTITIONER
Payer: COMMERCIAL

## 2021-11-18 DIAGNOSIS — M25.559 HIP PAIN: ICD-10-CM

## 2021-11-18 DIAGNOSIS — M25.559 HIP PAIN: Primary | ICD-10-CM

## 2021-11-18 DIAGNOSIS — M54.31 SCIATICA OF RIGHT SIDE: ICD-10-CM

## 2021-11-18 PROCEDURE — 72110 X-RAY EXAM L-2 SPINE 4/>VWS: CPT | Mod: 26,,, | Performed by: RADIOLOGY

## 2021-11-18 PROCEDURE — 1160F PR REVIEW ALL MEDS BY PRESCRIBER/CLIN PHARMACIST DOCUMENTED: ICD-10-PCS | Mod: CPTII,S$GLB,, | Performed by: NURSE PRACTITIONER

## 2021-11-18 PROCEDURE — 3288F FALL RISK ASSESSMENT DOCD: CPT | Mod: CPTII,S$GLB,, | Performed by: NURSE PRACTITIONER

## 2021-11-18 PROCEDURE — 72110 X-RAY EXAM L-2 SPINE 4/>VWS: CPT | Mod: TC

## 2021-11-18 PROCEDURE — 99999 PR PBB SHADOW E&M-EST. PATIENT-LVL III: CPT | Mod: PBBFAC,,, | Performed by: NURSE PRACTITIONER

## 2021-11-18 PROCEDURE — 3288F PR FALLS RISK ASSESSMENT DOCUMENTED: ICD-10-PCS | Mod: CPTII,S$GLB,, | Performed by: NURSE PRACTITIONER

## 2021-11-18 PROCEDURE — 3044F HG A1C LEVEL LT 7.0%: CPT | Mod: CPTII,S$GLB,, | Performed by: NURSE PRACTITIONER

## 2021-11-18 PROCEDURE — 72110 XR LUMBAR SPINE AP AND LAT WITH FLEX/EXT: ICD-10-PCS | Mod: 26,,, | Performed by: RADIOLOGY

## 2021-11-18 PROCEDURE — 1160F RVW MEDS BY RX/DR IN RCRD: CPT | Mod: CPTII,S$GLB,, | Performed by: NURSE PRACTITIONER

## 2021-11-18 PROCEDURE — 1159F MED LIST DOCD IN RCRD: CPT | Mod: CPTII,S$GLB,, | Performed by: NURSE PRACTITIONER

## 2021-11-18 PROCEDURE — 99214 OFFICE O/P EST MOD 30 MIN: CPT | Mod: S$GLB,,, | Performed by: NURSE PRACTITIONER

## 2021-11-18 PROCEDURE — 1125F AMNT PAIN NOTED PAIN PRSNT: CPT | Mod: CPTII,S$GLB,, | Performed by: NURSE PRACTITIONER

## 2021-11-18 PROCEDURE — 99214 PR OFFICE/OUTPT VISIT, EST, LEVL IV, 30-39 MIN: ICD-10-PCS | Mod: S$GLB,,, | Performed by: NURSE PRACTITIONER

## 2021-11-18 PROCEDURE — 3044F PR MOST RECENT HEMOGLOBIN A1C LEVEL <7.0%: ICD-10-PCS | Mod: CPTII,S$GLB,, | Performed by: NURSE PRACTITIONER

## 2021-11-18 PROCEDURE — 1125F PR PAIN SEVERITY QUANTIFIED, PAIN PRESENT: ICD-10-PCS | Mod: CPTII,S$GLB,, | Performed by: NURSE PRACTITIONER

## 2021-11-18 PROCEDURE — 1101F PR PT FALLS ASSESS DOC 0-1 FALLS W/OUT INJ PAST YR: ICD-10-PCS | Mod: CPTII,S$GLB,, | Performed by: NURSE PRACTITIONER

## 2021-11-18 PROCEDURE — 99999 PR PBB SHADOW E&M-EST. PATIENT-LVL III: ICD-10-PCS | Mod: PBBFAC,,, | Performed by: NURSE PRACTITIONER

## 2021-11-18 PROCEDURE — 1101F PT FALLS ASSESS-DOCD LE1/YR: CPT | Mod: CPTII,S$GLB,, | Performed by: NURSE PRACTITIONER

## 2021-11-18 PROCEDURE — 1159F PR MEDICATION LIST DOCUMENTED IN MEDICAL RECORD: ICD-10-PCS | Mod: CPTII,S$GLB,, | Performed by: NURSE PRACTITIONER

## 2021-11-18 RX ORDER — METHYLPREDNISOLONE 4 MG/1
TABLET ORAL
Qty: 21 EACH | Refills: 0 | Status: SHIPPED | OUTPATIENT
Start: 2021-11-18 | End: 2021-12-09

## 2021-11-19 ENCOUNTER — CLINICAL SUPPORT (OUTPATIENT)
Dept: REHABILITATION | Facility: HOSPITAL | Age: 71
End: 2021-11-19
Attending: ORTHOPAEDIC SURGERY
Payer: COMMERCIAL

## 2021-11-19 DIAGNOSIS — M25.661 STIFFNESS OF KNEE JOINT, RIGHT: ICD-10-CM

## 2021-11-19 PROCEDURE — 97140 MANUAL THERAPY 1/> REGIONS: CPT | Mod: PN

## 2021-11-19 PROCEDURE — 97110 THERAPEUTIC EXERCISES: CPT | Mod: PN

## 2021-11-23 ENCOUNTER — CLINICAL SUPPORT (OUTPATIENT)
Dept: REHABILITATION | Facility: HOSPITAL | Age: 71
End: 2021-11-23
Attending: ORTHOPAEDIC SURGERY
Payer: COMMERCIAL

## 2021-11-23 DIAGNOSIS — M25.661 STIFFNESS OF KNEE JOINT, RIGHT: ICD-10-CM

## 2021-11-23 PROCEDURE — 97140 MANUAL THERAPY 1/> REGIONS: CPT | Mod: PN

## 2021-11-26 ENCOUNTER — CLINICAL SUPPORT (OUTPATIENT)
Dept: REHABILITATION | Facility: HOSPITAL | Age: 71
End: 2021-11-26
Attending: ORTHOPAEDIC SURGERY
Payer: COMMERCIAL

## 2021-11-26 DIAGNOSIS — M54.41 CHRONIC RIGHT-SIDED LOW BACK PAIN WITH RIGHT-SIDED SCIATICA: Primary | ICD-10-CM

## 2021-11-26 DIAGNOSIS — M25.661 STIFFNESS OF KNEE JOINT, RIGHT: ICD-10-CM

## 2021-11-26 DIAGNOSIS — G89.29 CHRONIC RIGHT-SIDED LOW BACK PAIN WITH RIGHT-SIDED SCIATICA: Primary | ICD-10-CM

## 2021-11-26 PROCEDURE — 97110 THERAPEUTIC EXERCISES: CPT | Mod: PN

## 2021-11-26 PROCEDURE — 97140 MANUAL THERAPY 1/> REGIONS: CPT | Mod: PN

## 2021-12-01 ENCOUNTER — OFFICE VISIT (OUTPATIENT)
Dept: ORTHOPEDICS | Facility: CLINIC | Age: 71
End: 2021-12-01
Payer: COMMERCIAL

## 2021-12-01 DIAGNOSIS — M70.61 TROCHANTERIC BURSITIS OF RIGHT HIP: Primary | ICD-10-CM

## 2021-12-01 PROCEDURE — 99999 PR PBB SHADOW E&M-EST. PATIENT-LVL III: ICD-10-PCS | Mod: PBBFAC,,, | Performed by: NURSE PRACTITIONER

## 2021-12-01 PROCEDURE — 99999 PR PBB SHADOW E&M-EST. PATIENT-LVL III: CPT | Mod: PBBFAC,,, | Performed by: NURSE PRACTITIONER

## 2021-12-01 PROCEDURE — 99499 UNLISTED E&M SERVICE: CPT | Mod: S$GLB,,, | Performed by: NURSE PRACTITIONER

## 2021-12-01 PROCEDURE — 20610 PR DRAIN/INJECT LARGE JOINT/BURSA: ICD-10-PCS | Mod: RT,S$GLB,, | Performed by: NURSE PRACTITIONER

## 2021-12-01 PROCEDURE — 99499 NO LOS: ICD-10-PCS | Mod: S$GLB,,, | Performed by: NURSE PRACTITIONER

## 2021-12-01 PROCEDURE — 20610 DRAIN/INJ JOINT/BURSA W/O US: CPT | Mod: RT,S$GLB,, | Performed by: NURSE PRACTITIONER

## 2021-12-01 RX ADMIN — TRIAMCINOLONE ACETONIDE 40 MG: 40 INJECTION, SUSPENSION INTRA-ARTICULAR; INTRAMUSCULAR at 04:12

## 2021-12-08 RX ORDER — TRIAMCINOLONE ACETONIDE 40 MG/ML
40 INJECTION, SUSPENSION INTRA-ARTICULAR; INTRAMUSCULAR
Status: COMPLETED | OUTPATIENT
Start: 2021-12-08 | End: 2021-12-08

## 2021-12-08 RX ADMIN — TRIAMCINOLONE ACETONIDE 40 MG: 40 INJECTION, SUSPENSION INTRA-ARTICULAR; INTRAMUSCULAR at 01:12

## 2021-12-10 RX ORDER — TRIAMCINOLONE ACETONIDE 40 MG/ML
40 INJECTION, SUSPENSION INTRA-ARTICULAR; INTRAMUSCULAR
Status: COMPLETED | OUTPATIENT
Start: 2021-12-01 | End: 2021-12-01

## 2021-12-16 ENCOUNTER — PATIENT MESSAGE (OUTPATIENT)
Dept: OTHER | Facility: OTHER | Age: 71
End: 2021-12-16
Payer: COMMERCIAL

## 2021-12-17 ENCOUNTER — CLINICAL SUPPORT (OUTPATIENT)
Dept: REHABILITATION | Facility: HOSPITAL | Age: 71
End: 2021-12-17
Attending: ORTHOPAEDIC SURGERY
Payer: COMMERCIAL

## 2021-12-17 DIAGNOSIS — M25.661 STIFFNESS OF KNEE JOINT, RIGHT: ICD-10-CM

## 2021-12-17 PROCEDURE — 97140 MANUAL THERAPY 1/> REGIONS: CPT | Mod: PN

## 2021-12-27 ENCOUNTER — PATIENT OUTREACH (OUTPATIENT)
Dept: ADMINISTRATIVE | Facility: OTHER | Age: 71
End: 2021-12-27
Payer: COMMERCIAL

## 2021-12-28 ENCOUNTER — CLINICAL SUPPORT (OUTPATIENT)
Dept: REHABILITATION | Facility: HOSPITAL | Age: 71
End: 2021-12-28
Attending: ORTHOPAEDIC SURGERY
Payer: COMMERCIAL

## 2021-12-28 DIAGNOSIS — M54.41 CHRONIC RIGHT-SIDED LOW BACK PAIN WITH RIGHT-SIDED SCIATICA: Primary | ICD-10-CM

## 2021-12-28 DIAGNOSIS — G89.29 CHRONIC RIGHT-SIDED LOW BACK PAIN WITH RIGHT-SIDED SCIATICA: Primary | ICD-10-CM

## 2021-12-28 DIAGNOSIS — M25.661 STIFFNESS OF KNEE JOINT, RIGHT: ICD-10-CM

## 2021-12-28 PROCEDURE — 97140 MANUAL THERAPY 1/> REGIONS: CPT | Mod: PN

## 2021-12-30 ENCOUNTER — OFFICE VISIT (OUTPATIENT)
Dept: BARIATRICS | Facility: CLINIC | Age: 71
End: 2021-12-30
Payer: COMMERCIAL

## 2021-12-30 VITALS
HEART RATE: 68 BPM | BODY MASS INDEX: 28.78 KG/M2 | OXYGEN SATURATION: 98 % | WEIGHT: 162.5 LBS | DIASTOLIC BLOOD PRESSURE: 84 MMHG | SYSTOLIC BLOOD PRESSURE: 136 MMHG

## 2021-12-30 DIAGNOSIS — E66.3 OVERWEIGHT (BMI 25.0-29.9): Primary | ICD-10-CM

## 2021-12-30 PROCEDURE — 99999 PR PBB SHADOW E&M-EST. PATIENT-LVL IV: CPT | Mod: PBBFAC,,, | Performed by: INTERNAL MEDICINE

## 2021-12-30 PROCEDURE — 99212 PR OFFICE/OUTPT VISIT, EST, LEVL II, 10-19 MIN: ICD-10-PCS | Mod: S$GLB,,, | Performed by: INTERNAL MEDICINE

## 2021-12-30 PROCEDURE — 1126F AMNT PAIN NOTED NONE PRSNT: CPT | Mod: CPTII,S$GLB,, | Performed by: INTERNAL MEDICINE

## 2021-12-30 PROCEDURE — 99212 OFFICE O/P EST SF 10 MIN: CPT | Mod: S$GLB,,, | Performed by: INTERNAL MEDICINE

## 2021-12-30 PROCEDURE — 3044F PR MOST RECENT HEMOGLOBIN A1C LEVEL <7.0%: ICD-10-PCS | Mod: CPTII,S$GLB,, | Performed by: INTERNAL MEDICINE

## 2021-12-30 PROCEDURE — 1101F PR PT FALLS ASSESS DOC 0-1 FALLS W/OUT INJ PAST YR: ICD-10-PCS | Mod: CPTII,S$GLB,, | Performed by: INTERNAL MEDICINE

## 2021-12-30 PROCEDURE — 1160F PR REVIEW ALL MEDS BY PRESCRIBER/CLIN PHARMACIST DOCUMENTED: ICD-10-PCS | Mod: CPTII,S$GLB,, | Performed by: INTERNAL MEDICINE

## 2021-12-30 PROCEDURE — 1159F PR MEDICATION LIST DOCUMENTED IN MEDICAL RECORD: ICD-10-PCS | Mod: CPTII,S$GLB,, | Performed by: INTERNAL MEDICINE

## 2021-12-30 PROCEDURE — 3079F DIAST BP 80-89 MM HG: CPT | Mod: CPTII,S$GLB,, | Performed by: INTERNAL MEDICINE

## 2021-12-30 PROCEDURE — 99999 PR PBB SHADOW E&M-EST. PATIENT-LVL IV: ICD-10-PCS | Mod: PBBFAC,,, | Performed by: INTERNAL MEDICINE

## 2021-12-30 PROCEDURE — 3075F PR MOST RECENT SYSTOLIC BLOOD PRESS GE 130-139MM HG: ICD-10-PCS | Mod: CPTII,S$GLB,, | Performed by: INTERNAL MEDICINE

## 2021-12-30 PROCEDURE — 3075F SYST BP GE 130 - 139MM HG: CPT | Mod: CPTII,S$GLB,, | Performed by: INTERNAL MEDICINE

## 2021-12-30 PROCEDURE — 1159F MED LIST DOCD IN RCRD: CPT | Mod: CPTII,S$GLB,, | Performed by: INTERNAL MEDICINE

## 2021-12-30 PROCEDURE — 3079F PR MOST RECENT DIASTOLIC BLOOD PRESSURE 80-89 MM HG: ICD-10-PCS | Mod: CPTII,S$GLB,, | Performed by: INTERNAL MEDICINE

## 2021-12-30 PROCEDURE — 1126F PR PAIN SEVERITY QUANTIFIED, NO PAIN PRESENT: ICD-10-PCS | Mod: CPTII,S$GLB,, | Performed by: INTERNAL MEDICINE

## 2021-12-30 PROCEDURE — 3288F PR FALLS RISK ASSESSMENT DOCUMENTED: ICD-10-PCS | Mod: CPTII,S$GLB,, | Performed by: INTERNAL MEDICINE

## 2021-12-30 PROCEDURE — 3008F PR BODY MASS INDEX (BMI) DOCUMENTED: ICD-10-PCS | Mod: CPTII,S$GLB,, | Performed by: INTERNAL MEDICINE

## 2021-12-30 PROCEDURE — 3008F BODY MASS INDEX DOCD: CPT | Mod: CPTII,S$GLB,, | Performed by: INTERNAL MEDICINE

## 2021-12-30 PROCEDURE — 1101F PT FALLS ASSESS-DOCD LE1/YR: CPT | Mod: CPTII,S$GLB,, | Performed by: INTERNAL MEDICINE

## 2021-12-30 PROCEDURE — 3288F FALL RISK ASSESSMENT DOCD: CPT | Mod: CPTII,S$GLB,, | Performed by: INTERNAL MEDICINE

## 2021-12-30 PROCEDURE — 1160F RVW MEDS BY RX/DR IN RCRD: CPT | Mod: CPTII,S$GLB,, | Performed by: INTERNAL MEDICINE

## 2021-12-30 PROCEDURE — 3044F HG A1C LEVEL LT 7.0%: CPT | Mod: CPTII,S$GLB,, | Performed by: INTERNAL MEDICINE

## 2022-01-04 ENCOUNTER — CLINICAL SUPPORT (OUTPATIENT)
Dept: REHABILITATION | Facility: HOSPITAL | Age: 72
End: 2022-01-04
Attending: ORTHOPAEDIC SURGERY
Payer: COMMERCIAL

## 2022-01-04 DIAGNOSIS — M25.661 STIFFNESS OF KNEE JOINT, RIGHT: ICD-10-CM

## 2022-01-04 DIAGNOSIS — M54.41 CHRONIC RIGHT-SIDED LOW BACK PAIN WITH RIGHT-SIDED SCIATICA: Primary | ICD-10-CM

## 2022-01-04 DIAGNOSIS — G89.29 CHRONIC RIGHT-SIDED LOW BACK PAIN WITH RIGHT-SIDED SCIATICA: Primary | ICD-10-CM

## 2022-01-04 PROCEDURE — 97110 THERAPEUTIC EXERCISES: CPT | Mod: PN

## 2022-01-04 PROCEDURE — 97140 MANUAL THERAPY 1/> REGIONS: CPT | Mod: PN

## 2022-01-04 NOTE — PROGRESS NOTES
"                                                    Physical Therapy Daily Note     Name: Carmen Hawley  Clinic Number: [id number]  Diagnosis:   Encounter Diagnoses   Name Primary?    Stiffness of knee joint, right     Chronic right-sided low back pain with right-sided sciatica Yes     Physician: Jean-Pierre Bennett III, *  Precautions: standard   Visit #: 24/24  PTA Visit #: 2  Time In:   1:55 pm       Time Out:  3:10 pm      Subjective     Pt reports:  "I really think the dry needling last time really helped me, I have very little pain in my hip or knee now."   Pain Scale: Carmen rates pain on a scale of 0-10 to be  1-2 currently. - right hip/knee     Objective     Carmen received individual therapeutic exercises to develop strength, ROM, posture and core stabilization for 45 minutes including:  Recumbent Bike - level 8 x 15 mins - BC  Quad sets - hold x 10 x 2 mins -focus on extending knee with mm activation    Achilles stretch on wedge - 30 sec x 2  Step-ups: 1.5 mins leading with each leg; green -  step with 1 riser  Lateral step up - just Right foot on step - activated quad mm to extend knee x 1.5 min each leg   Standing 3-way hip: twisted yellow band - hip abduction,extension and flexion x 15 each   Squats in // bars x 15   Hamstring stretch in sitting position - leg extended 30 sec hold x 3  Bridges x 15   S/L hip abuduction x 15   SAQ's over small gray roll x 15 - added 4#    Quantum:  hamstring curls Right Leg : 10# ; Left Leg 13# x 20   Quantum TKE: Right Leg - 13#    Left leg 23# x 20     Carmen received the following manual therapy techniques: Joint mobilizations and Soft tissue Mobilization were applied to the lumbar spine, right hip and knee x 25 mins   S/L flexion to lumbar spine on right /left side - L1/L5/S1 with blocking of segment above. Long axis distraction of right hip; posterior capsule stretching; Prone: MFR right piriformis to release mm; lumbar sidebending on right/left; Piriformis " stretch; quadratus stretch - bilateral.    Positioned in S/L on left side; palpation of posterior right hip very painful as well as trochanteric bursa; Dry Needling to right posterior hip, right  Trochanteric bursa and 2 points on ITB using 50mm needles.  LF-ES per protocol; Needles left in-situ x 20 mins. Removed needles, applied moist heat to same area x 10 mins.     Instructed Carmen to continue with HEP; do not wear her knee brace as this makes her hip worse,     Written Home Exercises Provided:   Quad sets, Standing hip - 3 way - given tband; sit <>stand; SLR, bridges and SAQ's.   Pt demo good understanding of the education provided. Carmen demonstrated good return demonstration of activities.     Education provided re:  Carmen verbalized good understanding of education provided.   No spiritual or educational barriers to learning provided    Assessment    Carmen demonstrates less tenderness In her lateral hip/trochanteric bursa. Improved hip/knee movement. Recommended continue with HEP; walking;  Last visit today; Carmen has demonstrated significant improvement in her knee - original referral; She developed trochanteric bursitis when she tried wearing her knee brace about a month ago - put the brace on backwards and walked around on it - developed lower back pain with increased right hip pain - developed bursitis.  Dry needling to hip - able to resolve pain/inflammation; She is now back to her usual self; pain at 1-2/10. Able to walk without increased pain; Able to perform all exercises at home independently. Ready for discharge from PT - continue at home.     Goals as follows:    Short Term Goals: 3 weeks   Pain: decrease pain in right knee to no more than 5/10 with activity. > Progressing   Demonstrate compliance with initial exercise program > MET      Long Term Goals: 6 weeks     Pain: Decrease pain to no more than 3/10 to allow for improved ability to perform daily/recreational activities >  Progressing   Strength: Improve strength in bilateral Hip to Knee by 1/2 grade  for improved LE stability > Progressing   ROM: Improve ROM - right knee extension to < -3 degrees; Flexion  - no loss of motion  > MET   Functional scale: Improve score on FOTO to  26% limitation  > Exceeded goal - limitation at 15%   Walking: Increase walking distance  to 2 miles without pain > MET  Postures: Increase sitting and/or standing duration to 30 mins  without pain > Progressing   Transfers: Perform all transfers without increased pain or limitation > Progressing   Exercise: demonstrate independence with home exercise program to maintain gains made in therapy.  > Progressing          Plan     Discharge from Skilled Physical Therapy at this time.  To continue with HEP.     Therapist: Susanna Rondon, PT  1/4/2022

## 2022-01-29 RX ORDER — METOPROLOL SUCCINATE 100 MG/1
100 TABLET, EXTENDED RELEASE ORAL DAILY
Qty: 90 TABLET | Refills: 3 | Status: CANCELLED | OUTPATIENT
Start: 2022-01-29 | End: 2023-01-29

## 2022-01-29 NOTE — TELEPHONE ENCOUNTER
No new care gaps identified.  Powered by [employer]. Reference number: [id number].   1/29/2022 2:47:28 PM CST

## 2022-01-31 DIAGNOSIS — I10 ESSENTIAL HYPERTENSION: Primary | ICD-10-CM

## 2022-02-01 RX ORDER — METOPROLOL SUCCINATE 100 MG/1
100 TABLET, EXTENDED RELEASE ORAL DAILY
Qty: 90 TABLET | Refills: 0 | Status: SHIPPED | OUTPATIENT
Start: 2022-02-01 | End: 2022-02-17 | Stop reason: SDUPTHER

## 2022-02-17 ENCOUNTER — OFFICE VISIT (OUTPATIENT)
Dept: INTERNAL MEDICINE | Facility: CLINIC | Age: 72
End: 2022-02-17
Payer: COMMERCIAL

## 2022-02-17 VITALS
BODY MASS INDEX: 28 KG/M2 | DIASTOLIC BLOOD PRESSURE: 70 MMHG | OXYGEN SATURATION: 98 % | HEIGHT: 63 IN | HEART RATE: 72 BPM | WEIGHT: 158 LBS | SYSTOLIC BLOOD PRESSURE: 138 MMHG

## 2022-02-17 DIAGNOSIS — E78.5 HYPERLIPIDEMIA, UNSPECIFIED HYPERLIPIDEMIA TYPE: ICD-10-CM

## 2022-02-17 DIAGNOSIS — M17.11 PRIMARY OSTEOARTHRITIS OF RIGHT KNEE: ICD-10-CM

## 2022-02-17 DIAGNOSIS — F41.9 ANXIETY: ICD-10-CM

## 2022-02-17 DIAGNOSIS — Z76.89 ENCOUNTER TO ESTABLISH CARE WITH NEW DOCTOR: Primary | ICD-10-CM

## 2022-02-17 DIAGNOSIS — H35.3130 BILATERAL NONEXUDATIVE AGE-RELATED MACULAR DEGENERATION, UNSPECIFIED STAGE: ICD-10-CM

## 2022-02-17 DIAGNOSIS — R73.09 ELEVATED GLUCOSE: ICD-10-CM

## 2022-02-17 DIAGNOSIS — I10 PRIMARY HYPERTENSION: ICD-10-CM

## 2022-02-17 PROCEDURE — 3008F PR BODY MASS INDEX (BMI) DOCUMENTED: ICD-10-PCS | Mod: CPTII,S$GLB,, | Performed by: INTERNAL MEDICINE

## 2022-02-17 PROCEDURE — 1101F PT FALLS ASSESS-DOCD LE1/YR: CPT | Mod: CPTII,S$GLB,, | Performed by: INTERNAL MEDICINE

## 2022-02-17 PROCEDURE — 3008F BODY MASS INDEX DOCD: CPT | Mod: CPTII,S$GLB,, | Performed by: INTERNAL MEDICINE

## 2022-02-17 PROCEDURE — 3075F SYST BP GE 130 - 139MM HG: CPT | Mod: CPTII,S$GLB,, | Performed by: INTERNAL MEDICINE

## 2022-02-17 PROCEDURE — 3288F PR FALLS RISK ASSESSMENT DOCUMENTED: ICD-10-PCS | Mod: CPTII,S$GLB,, | Performed by: INTERNAL MEDICINE

## 2022-02-17 PROCEDURE — 3078F PR MOST RECENT DIASTOLIC BLOOD PRESSURE < 80 MM HG: ICD-10-PCS | Mod: CPTII,S$GLB,, | Performed by: INTERNAL MEDICINE

## 2022-02-17 PROCEDURE — 3075F PR MOST RECENT SYSTOLIC BLOOD PRESS GE 130-139MM HG: ICD-10-PCS | Mod: CPTII,S$GLB,, | Performed by: INTERNAL MEDICINE

## 2022-02-17 PROCEDURE — 1159F MED LIST DOCD IN RCRD: CPT | Mod: CPTII,S$GLB,, | Performed by: INTERNAL MEDICINE

## 2022-02-17 PROCEDURE — 99999 PR PBB SHADOW E&M-EST. PATIENT-LVL V: CPT | Mod: PBBFAC,,, | Performed by: INTERNAL MEDICINE

## 2022-02-17 PROCEDURE — 1160F RVW MEDS BY RX/DR IN RCRD: CPT | Mod: CPTII,S$GLB,, | Performed by: INTERNAL MEDICINE

## 2022-02-17 PROCEDURE — 99213 PR OFFICE/OUTPT VISIT, EST, LEVL III, 20-29 MIN: ICD-10-PCS | Mod: S$GLB,,, | Performed by: INTERNAL MEDICINE

## 2022-02-17 PROCEDURE — 1160F PR REVIEW ALL MEDS BY PRESCRIBER/CLIN PHARMACIST DOCUMENTED: ICD-10-PCS | Mod: CPTII,S$GLB,, | Performed by: INTERNAL MEDICINE

## 2022-02-17 PROCEDURE — 99999 PR PBB SHADOW E&M-EST. PATIENT-LVL V: ICD-10-PCS | Mod: PBBFAC,,, | Performed by: INTERNAL MEDICINE

## 2022-02-17 PROCEDURE — 99213 OFFICE O/P EST LOW 20 MIN: CPT | Mod: S$GLB,,, | Performed by: INTERNAL MEDICINE

## 2022-02-17 PROCEDURE — 3078F DIAST BP <80 MM HG: CPT | Mod: CPTII,S$GLB,, | Performed by: INTERNAL MEDICINE

## 2022-02-17 PROCEDURE — 1159F PR MEDICATION LIST DOCUMENTED IN MEDICAL RECORD: ICD-10-PCS | Mod: CPTII,S$GLB,, | Performed by: INTERNAL MEDICINE

## 2022-02-17 PROCEDURE — 1101F PR PT FALLS ASSESS DOC 0-1 FALLS W/OUT INJ PAST YR: ICD-10-PCS | Mod: CPTII,S$GLB,, | Performed by: INTERNAL MEDICINE

## 2022-02-17 PROCEDURE — 3288F FALL RISK ASSESSMENT DOCD: CPT | Mod: CPTII,S$GLB,, | Performed by: INTERNAL MEDICINE

## 2022-02-17 PROCEDURE — 1126F PR PAIN SEVERITY QUANTIFIED, NO PAIN PRESENT: ICD-10-PCS | Mod: CPTII,S$GLB,, | Performed by: INTERNAL MEDICINE

## 2022-02-17 PROCEDURE — 1126F AMNT PAIN NOTED NONE PRSNT: CPT | Mod: CPTII,S$GLB,, | Performed by: INTERNAL MEDICINE

## 2022-02-17 RX ORDER — METOPROLOL SUCCINATE 100 MG/1
100 TABLET, EXTENDED RELEASE ORAL DAILY
Qty: 90 TABLET | Refills: 3 | Status: SHIPPED | OUTPATIENT
Start: 2022-02-17 | End: 2022-11-07 | Stop reason: SDUPTHER

## 2022-02-17 RX ORDER — DICLOFENAC SODIUM 10 MG/G
2 GEL TOPICAL 4 TIMES DAILY
Qty: 100 G | Refills: 6 | Status: SHIPPED | OUTPATIENT
Start: 2022-02-17 | End: 2022-10-26 | Stop reason: SDUPTHER

## 2022-02-17 RX ORDER — TRIAMTERENE AND HYDROCHLOROTHIAZIDE 37.5; 25 MG/1; MG/1
CAPSULE ORAL DAILY
Qty: 90 CAPSULE | Refills: 3 | Status: SHIPPED | OUTPATIENT
Start: 2022-02-17 | End: 2022-11-07 | Stop reason: SDUPTHER

## 2022-02-17 RX ORDER — ESCITALOPRAM OXALATE 10 MG/1
10 TABLET ORAL DAILY
Qty: 90 TABLET | Refills: 3 | Status: SHIPPED | OUTPATIENT
Start: 2022-02-17 | End: 2022-11-07 | Stop reason: SDUPTHER

## 2022-02-17 NOTE — PROGRESS NOTES
"INTERNAL MEDICINE INITIAL VISIT NOTE      CHIEF COMPLAINT     Chief Complaint   Patient presents with    Establish Care    Follow-up       HPI     Carmen Hawley is a 71 y.o. female with lumbar radiculopathy, anxiety, macular degeneration, HTN, HLD, elevated glucose, OA of knee/L foot, here today to establish care. PCP listed as Dr. Tessa Edmondson, transferring care.    Reports chronic R knee pain - recently saw Dr. Bennett. Advised to proceed with TKA, considering.     Past Medical History:  Past Medical History:   Diagnosis Date    Anxiety     Arthritis     Hyperlipidemia     Hypertension     Macular degeneration     Mitral valve prolapse        Review of Systems:  Review of Systems   Constitutional: Negative for chills and fever.   HENT: Negative for congestion.    Respiratory: Negative for cough and shortness of breath.    Cardiovascular: Negative for chest pain.   Gastrointestinal: Negative for constipation, nausea and vomiting.   Genitourinary: Negative for hematuria and urgency.   Musculoskeletal: Positive for joint pain (chronic R knee). Negative for falls.   Skin: Negative for rash.   Neurological: Negative for dizziness and loss of consciousness.       Health Maintenance:   Immunizations:   Influenza - complete  Tdap - 10/2018  Covid 19 - complete  HPV  Prevnar rec at 66 - 6/2016, Pneumovax 1/2018  Shingrix rec at 51 - complete    Cancer Screening:  PAP: 12/2016 NILM  Mammogram: schedule  Colonoscopy:  10/2020 diverticulosis, repeat 10/2025  DEXA:  5/2021 osteopenia, BMD increased      PHYSICAL EXAM     /70 (BP Location: Left arm, Patient Position: Sitting, BP Method: Medium (Manual))   Pulse 72   Ht 5' 3" (1.6 m)   Wt 71.7 kg (158 lb)   LMP 12/16/2006 (Approximate)   SpO2 98%   BMI 27.99 kg/m²     GEN - A+OX4, NAD   HEENT - PERRL, EOMI,   Neck - No thyromegaly or thyroid masses felt.  No cervical lymphadenopathy appreciated.  CV - RRR, no m/r/g  Chest - CTAB, no wheezing, crackles, or " george  Abd - S/NT/ND/+BS.   Ext - 2+BDP. No C/C/E.  Skin - Normal color and texture, no rash, no skin lesions.    ASSESSMENT/PLAN     Carmen Hawley is a 71 y.o. female with conditions as above.     Encounter to establish care with new doctor  Labs reviewed     Hyperlipidemia, unspecified hyperlipidemia type  Controlled, statin    Primary hypertension  Acceptable today, enrolled in dig HTN  Advised to submit additional readings, modify regimen as indicated  -     triamterene-hydrochlorothiazide 37.5-25 mg (DYAZIDE) 37.5-25 mg per capsule; take one capsule by mouth every day  Dispense: 90 capsule; Refill: 3  -     metoprolol succinate (TOPROL-XL) 100 MG 24 hr tablet; Take 1 tablet (100 mg total) by mouth once daily.  Dispense: 90 tablet; Refill: 3    Bilateral nonexudative age-related macular degeneration, unspecified stage  Follows with Dr. Caal, upcoming appt    Primary osteoarthritis of right knee  Follows with Orthopedics, considering TKA  -     diclofenac sodium (VOLTAREN) 1 % Gel; Apply 2 grams topically 4 (four) times daily.  Dispense: 100 g; Refill: 6    Elevated glucose  Controlled, 7/2021 HgbA1C 5.5    Anxiety  Stable, continue med  -     EScitalopram oxalate (LEXAPRO) 10 MG tablet; Take 1 tablet (10 mg total) by mouth once daily.  Dispense: 90 tablet; Refill: 3    HM as above    RTC in 6 mo, sooner if needed and depending on labs.    Holly Leon MD  Department of Internal Medicine - [facility]  03/06/2022

## 2022-03-24 ENCOUNTER — OFFICE VISIT (OUTPATIENT)
Dept: OBSTETRICS AND GYNECOLOGY | Facility: CLINIC | Age: 72
End: 2022-03-24
Payer: COMMERCIAL

## 2022-03-24 VITALS
SYSTOLIC BLOOD PRESSURE: 124 MMHG | HEIGHT: 63 IN | DIASTOLIC BLOOD PRESSURE: 84 MMHG | BODY MASS INDEX: 28.67 KG/M2 | WEIGHT: 161.81 LBS

## 2022-03-24 DIAGNOSIS — N95.2 POSTMENOPAUSAL ATROPHIC VAGINITIS: ICD-10-CM

## 2022-03-24 DIAGNOSIS — Z01.419 WELL WOMAN EXAM WITH ROUTINE GYNECOLOGICAL EXAM: Primary | ICD-10-CM

## 2022-03-24 PROCEDURE — 3074F PR MOST RECENT SYSTOLIC BLOOD PRESSURE < 130 MM HG: ICD-10-PCS | Mod: CPTII,S$GLB,, | Performed by: OBSTETRICS & GYNECOLOGY

## 2022-03-24 PROCEDURE — 1101F PT FALLS ASSESS-DOCD LE1/YR: CPT | Mod: CPTII,S$GLB,, | Performed by: OBSTETRICS & GYNECOLOGY

## 2022-03-24 PROCEDURE — 3008F PR BODY MASS INDEX (BMI) DOCUMENTED: ICD-10-PCS | Mod: CPTII,S$GLB,, | Performed by: OBSTETRICS & GYNECOLOGY

## 2022-03-24 PROCEDURE — 1101F PR PT FALLS ASSESS DOC 0-1 FALLS W/OUT INJ PAST YR: ICD-10-PCS | Mod: CPTII,S$GLB,, | Performed by: OBSTETRICS & GYNECOLOGY

## 2022-03-24 PROCEDURE — 3079F DIAST BP 80-89 MM HG: CPT | Mod: CPTII,S$GLB,, | Performed by: OBSTETRICS & GYNECOLOGY

## 2022-03-24 PROCEDURE — 1126F PR PAIN SEVERITY QUANTIFIED, NO PAIN PRESENT: ICD-10-PCS | Mod: CPTII,S$GLB,, | Performed by: OBSTETRICS & GYNECOLOGY

## 2022-03-24 PROCEDURE — 1159F PR MEDICATION LIST DOCUMENTED IN MEDICAL RECORD: ICD-10-PCS | Mod: CPTII,S$GLB,, | Performed by: OBSTETRICS & GYNECOLOGY

## 2022-03-24 PROCEDURE — 1159F MED LIST DOCD IN RCRD: CPT | Mod: CPTII,S$GLB,, | Performed by: OBSTETRICS & GYNECOLOGY

## 2022-03-24 PROCEDURE — 3074F SYST BP LT 130 MM HG: CPT | Mod: CPTII,S$GLB,, | Performed by: OBSTETRICS & GYNECOLOGY

## 2022-03-24 PROCEDURE — 99999 PR PBB SHADOW E&M-EST. PATIENT-LVL III: ICD-10-PCS | Mod: PBBFAC,,, | Performed by: OBSTETRICS & GYNECOLOGY

## 2022-03-24 PROCEDURE — 3079F PR MOST RECENT DIASTOLIC BLOOD PRESSURE 80-89 MM HG: ICD-10-PCS | Mod: CPTII,S$GLB,, | Performed by: OBSTETRICS & GYNECOLOGY

## 2022-03-24 PROCEDURE — 99397 PER PM REEVAL EST PAT 65+ YR: CPT | Mod: S$GLB,,, | Performed by: OBSTETRICS & GYNECOLOGY

## 2022-03-24 PROCEDURE — 99999 PR PBB SHADOW E&M-EST. PATIENT-LVL III: CPT | Mod: PBBFAC,,, | Performed by: OBSTETRICS & GYNECOLOGY

## 2022-03-24 PROCEDURE — 3288F FALL RISK ASSESSMENT DOCD: CPT | Mod: CPTII,S$GLB,, | Performed by: OBSTETRICS & GYNECOLOGY

## 2022-03-24 PROCEDURE — 3008F BODY MASS INDEX DOCD: CPT | Mod: CPTII,S$GLB,, | Performed by: OBSTETRICS & GYNECOLOGY

## 2022-03-24 PROCEDURE — 3288F PR FALLS RISK ASSESSMENT DOCUMENTED: ICD-10-PCS | Mod: CPTII,S$GLB,, | Performed by: OBSTETRICS & GYNECOLOGY

## 2022-03-24 PROCEDURE — 99397 PR PREVENTIVE VISIT,EST,65 & OVER: ICD-10-PCS | Mod: S$GLB,,, | Performed by: OBSTETRICS & GYNECOLOGY

## 2022-03-24 PROCEDURE — 1126F AMNT PAIN NOTED NONE PRSNT: CPT | Mod: CPTII,S$GLB,, | Performed by: OBSTETRICS & GYNECOLOGY

## 2022-03-24 RX ORDER — ESTRADIOL 0.1 MG/G
1 CREAM VAGINAL
Qty: 42.5 G | Refills: 3 | Status: SHIPPED | OUTPATIENT
Start: 2022-03-24 | End: 2024-03-14 | Stop reason: SDUPTHER

## 2022-03-24 NOTE — PROGRESS NOTES
History & Physical  Gynecology      SUBJECTIVE:     Chief Complaint: Annual Exam       History of Present Illness:  Annual Exam-Postmenopausal  Patient presents for annual exam. The patient has no complaints today. Patient denies post-menopausal vaginal bleeding. The patient is not sexually active. The patient is not taking hormone replacement therapy.  The patient participates in regular exercise: yes.  She does not smoke.     GYN screening history:  normal  Mammogram history: ,scheduled  Colonoscopy history:   Dexa history:     FH:   Breast cancer: neg  Colon cancer: neg  Ovarian cancer: neg    Review of patient's allergies indicates:  No Known Allergies    Past Medical History:   Diagnosis Date    Anxiety     Arthritis     Hyperlipidemia     Hypertension     Macular degeneration     Mitral valve prolapse      Past Surgical History:   Procedure Laterality Date    COLONOSCOPY N/A 10/8/2020    Procedure: COLONOSCOPY;  Surgeon: Crispin Moon MD;  Location: [facility] ([address] [place] [address]);  Service: Endoscopy;  Laterality: N/A;  Family history of colon polyps  COVID test on 10/5/20 at 2nd floor - sm    TONSILLECTOMY       OB History        0    Para        Term   0            AB        Living           SAB        IAB        Ectopic        Multiple        Live Births                   Family History   Problem Relation Age of Onset    Cancer Mother         lung    Stroke Mother     Heart disease Father     Diabetes Father     Diabetes Brother     Aortic aneurysm Brother         surgery 2012    Kidney disease Brother         on dialysis    Melanoma Neg Hx     Breast cancer Neg Hx     Colon cancer Neg Hx     Ovarian cancer Neg Hx      Social History     Tobacco Use    Smoking status: Never Smoker    Smokeless tobacco: Never Used   Substance Use Topics    Alcohol use: Yes     Comment: 7 drinks weekly     Drug use: No       Current Outpatient Medications   Medication Sig     aspirin (ECOTRIN) 81 MG EC tablet Take 81 mg by mouth once daily.      biotin 300 mcg Tab Take 1 tablet by mouth once daily.    cyanocobalamin 500 MCG tablet Take 500 mcg by mouth once daily.    diclofenac (VOLTAREN) 75 MG EC tablet Take 1 tablet (75 mg total) by mouth 2 (two) times daily.    diclofenac sodium (VOLTAREN) 1 % Gel Apply 2 grams topically 4 (four) times daily.    EScitalopram oxalate (LEXAPRO) 10 MG tablet Take 1 tablet (10 mg total) by mouth once daily.    EScitalopram oxalate (LEXAPRO) 5 MG Tab Take 1 tablet (5 mg total) by mouth once daily. Take with Escitalopram 10mg once daily    metoprolol succinate (TOPROL-XL) 100 MG 24 hr tablet Take 1 tablet (100 mg total) by mouth once daily.    naltrexone-bupropion (CONTRAVE) 8-90 mg TbSR Take 2 tablets by mouth 2 (two) times daily.    simvastatin (ZOCOR) 40 MG tablet TAKE 1 TABLET BY MOUTH EVERY EVENING.    tretinoin (RETIN-A) 0.1 % cream APPLY A THIN FILM TO AFFECTED AREA EVERY EVENING AFTER MOISTURIZING.    triamterene-hydrochlorothiazide 37.5-25 mg (DYAZIDE) 37.5-25 mg per capsule take one capsule by mouth every day    vitamin D 1000 units Tab Take 2,000 mg by mouth once daily.     estradioL (ESTRACE) 0.01 % (0.1 mg/gram) vaginal cream Place 1 gram vaginally twice a week.     No current facility-administered medications for this visit.       Review of Systems:  Review of Systems   Constitutional: Negative for appetite change, fever and unexpected weight change.   Respiratory: Negative for shortness of breath.    Cardiovascular: Negative for chest pain.   Gastrointestinal: Negative for nausea and vomiting.   Genitourinary: Negative for menorrhagia, menstrual problem, pelvic pain, vaginal bleeding, vaginal discharge and vaginal pain.   Integumentary:  Negative for breast mass.   Breast: Negative for lump and mass       OBJECTIVE:     Physical Exam:  Physical Exam  Vitals and nursing note reviewed.   Constitutional:       Appearance: She is  well-developed.   Neck:      Thyroid: No thyromegaly.      Trachea: No tracheal deviation.   Cardiovascular:      Rate and Rhythm: Normal rate and regular rhythm.      Heart sounds: Normal heart sounds.   Pulmonary:      Effort: Pulmonary effort is normal.      Breath sounds: Normal breath sounds.   Chest:   Breasts: Breasts are symmetrical.      Right: No inverted nipple, mass, nipple discharge, skin change or tenderness.      Left: No inverted nipple, mass, nipple discharge, skin change or tenderness.       Abdominal:      Palpations: Abdomen is soft.   Genitourinary:     General: Normal vulva.      Labia:         Right: No rash, tenderness, lesion or injury.         Left: No rash, tenderness, lesion or injury.       Urethra: No prolapse, urethral pain, urethral swelling or urethral lesion.      Vagina: Normal. No signs of injury and foreign body. No vaginal discharge, erythema, tenderness or bleeding.      Cervix: No cervical motion tenderness, discharge or friability.      Uterus: Not deviated, not enlarged, not fixed and not tender.       Adnexa:         Right: No mass, tenderness or fullness.          Left: No mass, tenderness or fullness.        Rectum: No anal fissure or external hemorrhoid.      Comments: Urethral meatus: normal size, anterior vaginal wall with no prolapse, no lesions  Bladder: no fullness, masses or tenderness  Musculoskeletal:      Cervical back: Normal range of motion and neck supple.   Neurological:      Mental Status: She is alert and oriented to person, place, and time.   Psychiatric:         Behavior: Behavior normal.         Thought Content: Thought content normal.         Judgment: Judgment normal.         Chaperoned by: Maribel    ASSESSMENT:       ICD-10-CM ICD-9-CM    1. Well woman exam with routine gynecological exam  Z01.419 V72.31    2. Postmenopausal atrophic vaginitis  N95.2 627.3 estradioL (ESTRACE) 0.01 % (0.1 mg/gram) vaginal cream          Plan:      Carmen was seen  today for annual exam.    Diagnoses and all orders for this visit:    Well woman exam with routine gynecological exam    Postmenopausal atrophic vaginitis  -     estradioL (ESTRACE) 0.01 % (0.1 mg/gram) vaginal cream; Place 1 gram vaginally twice a week.        No orders of the defined types were placed in this encounter.      Well Woman:   - Pap smear: no longer required  - Mammogram: scheduled  - Colonoscopy: up to date  - Dexa: up to date  - Immunizations: none required  - Labs: with pcp  - Exercise recommended  - estrace refilled    Follow up in one year for annual, or prn.    Maggi Graham

## 2022-04-06 NOTE — TELEPHONE ENCOUNTER
[facility] Daily Progress Note    Assessment/Plan:  Gardenia Muller is a 71 year  old female with history of HTN, T2DM, chronic atrial fibrillation on warfarin, diastolic CHF, coronary artery disease status post stenting (7/27/2020), peripheral artery disease, calculus cholecystitis status post cholecystectomy, depression, who came to [facility] on 8/11/2020 for abdominal pain and vomiting. CT scan revealed acute diverticulitis with perforation. on 8/14 showed increased size of abscess from 2x2cm to 4x3cm, though pt reports clinical improvement of her symptoms.. General surgery consulted - no current plans for operating room so interventional radiology was consulted for possible drainage which was done on 8/17/2020 by placing CT scan guided drain placement in the left pelvic diverticular abscess repeat CTAP      In the ED, she was hypoglycemic also and she was started on D5 normal saline. Hospitalization complicated by MAY, Nephrology consulted - Cr improving; no plans for dialysis at this time.      Assessment:  Acute perforated diverticulitis with diverticular abscess  Secondary peritonitis  On ciprofloxacin and metronidazole IV  WBC count not improving    Acute kidney injury  Creatinine has improved    Atrial fibrillation  Patient has tachycardia with hypotension -received 250 mL of normal saline earlier with with blood pressure is somewhat better and another has been ordered.  At the present time patient does not want any to relieve the rhythm and states that she had earlier and this should be done only if IV fluids treatment is not effective or medications are not effective.  Will monitor heart rate with IV digoxin and IV fluids and consult cardiology      Type 2 diabetes mellitus    Supratherapeutic INR  Is now better with INR decreasing to 1.5  We will discuss warfarin protocol with pharmacy as INR to be therapeutic with the drain is in    Coronary artery disease status post stent placement 7/27/2020  called to confirm appointment on 10/25/17       Congestive heart failure with preserved LV function    Right foot pain    Acute hypokalemia    Acute hypomagnesemia    Acute anemia    GERD   Depression          Plan:  Replace magnesium -give 2 g of magnesium IV x 1 with atrial fibrillation  Digoxin 0.5 mg IV x1  Consult cardiology regarding atrial fibrillation with hypotension  Bolus IV fluids with clear lungs      Code status:Full Code        Subjective:  Patient denies any significant chest pain neck pain jaw pain but states that she is somewhat short of breath without any dizziness    Review of Systems:   Denies any significant abdominal pain    Current Medications Reviewed via EHR List    Objective:  Vital signs in last 24 hours:  Temp:  [97.9  F (36.6  C)-98.7  F (37.1  C)] 98.7  F (37.1  C)  Heart Rate:  [] 125  Resp:  [18-20] 18  BP: ()/(31-58) 80/45  SpO2:  [94 %-100 %] 94 %    Intake/Output last 3 shifts:  I/O last 3 completed shifts:  In: 415 [Other:15; IV Piggyback:400]  Out: 2035 [Urine:2000; Drains:35]      Physical Exam:  EYES: EOM+    NECK: no JVD  HEART : S1 S2 irregular tachycardia is present  LUNGS: Clear to auscultation without Crepitations or Wheezing   ABDOMEN:  Soft, No tenderness, Bowel sounds are positive  CNS  Alert and Oriented x 3 moving all 4 limbs            Imaging:  Results for orders placed or performed during the hospital encounter of 08/11/20   XR Chest 2 Views    Impression    Negative chest. Lungs are clear. Coronary stenting.       CT Abdomen Pelvis Without Oral With IV Contrast    Impression    1.  Acute diverticulitis with a small contained perforation. No drainable abscess at this point.  2.  Biliary and pancreatic duct dilatation. No discrete pancreatic masses identified. Consider MRCP, ERCP, EUS.   CT Abdomen Pelvis Without Oral Without IV Contrast    Impression    1.  Again seen is sigmoid colon diverticulitis with a small contained perforation. A small fluid collection adjacent to the sigmoid colon has  increased in size to 4 cm x 3 cm (previously 2 cm x 2 cm). Percutaneous drainage would be difficult though may   be possible.  2.  Biliary and pancreatic ductal dilatation is not as well-seen as on the comparison study. See prior study for details.   CT Abscess Drain Pelvic    Impression    1. Stable diverticular abscess.  2. Successful CT-guided drain placement into left pelvic diverticular abscess.    Reference CPT Codes: 20467, 00002   XR Foot Right 2 VWS    Impression    Bones are demineralized. No definitive evidence of an acute fracture. No cortical destructive changes to suggest osteomyelitis. Scattered degenerative changes.     *Note: Due to a large number of results and/or encounters for the requested time period, some results have not been displayed. A complete set of results can be found in Results Review.         Lab Results:  Personally Reviewed.   Fingerstick Blood Glucose:   Recent Labs     08/17/20  1655 08/18/20  0801 08/18/20  1202 08/18/20  1807 08/18/20  2112 08/19/20  0841 08/19/20  1154   POCGLUFGR 116 77 83 131 101 85 125       Recent Results (from the past 24 hour(s))   POCT Glucose    Collection Time: 08/18/20  6:07 PM    Specimen: Blood   Result Value Ref Range    Glucose 131 70 - 139 mg/dL   POCT Glucose    Collection Time: 08/18/20  9:12 PM    Specimen: Blood   Result Value Ref Range    Glucose 101 70 - 139 mg/dL   Lactic Acid    Collection Time: 08/19/20 12:54 AM   Result Value Ref Range    Lactic Acid 1.6 0.7 - 2.0 mmol/L   Magnesium    Collection Time: 08/19/20  7:20 AM   Result Value Ref Range    Magnesium 1.5 (L) 1.8 - 2.6 mg/dL   Basic Metabolic Panel    Collection Time: 08/19/20  7:20 AM   Result Value Ref Range    Sodium 135 (L) 136 - 145 mmol/L    Potassium 3.3 (L) 3.5 - 5.0 mmol/L    Chloride 103 98 - 107 mmol/L    CO2 21 (L) 22 - 31 mmol/L    Anion Gap, Calculation 11 5 - 18 mmol/L    Glucose 95 70 - 125 mg/dL    Calcium 7.2 (L) 8.5 - 10.5 mg/dL    BUN 10 8 - 28 mg/dL     Creatinine 0.82 0.60 - 1.10 mg/dL    GFR MDRD Af Amer >60 >60 mL/min/1.73m2    GFR MDRD Non Af Amer >60 >60 mL/min/1.73m2   Phosphorus    Collection Time: 08/19/20  7:20 AM   Result Value Ref Range    Phosphorus 2.8 2.5 - 4.5 mg/dL   HM1 (CBC with Diff)    Collection Time: 08/19/20  7:20 AM   Result Value Ref Range    WBC 14.5 (H) 4.0 - 11.0 thou/uL    RBC 2.74 (L) 3.80 - 5.40 mill/uL    Hemoglobin 8.2 (L) 12.0 - 16.0 g/dL    Hematocrit 25.5 (L) 35.0 - 47.0 %    MCV 93 80 - 100 fL    MCH 29.9 27.0 - 34.0 pg    MCHC 32.2 32.0 - 36.0 g/dL    RDW 18.0 (H) 11.0 - 14.5 %    Platelets 136 (L) 140 - 440 thou/uL    MPV 12.4 8.5 - 12.5 fL    Neutrophils % 74 (H) 50 - 70 %    Lymphocytes % 10 (L) 20 - 40 %    Monocytes % 13 (H) 2 - 10 %    Eosinophils % 2 0 - 6 %    Basophils % 1 0 - 2 %    Immature Granulocyte % 1 (H) <=0 %    Neutrophils Absolute 10.7 (H) 2.0 - 7.7 thou/uL    Lymphocytes Absolute 1.4 0.8 - 4.4 thou/uL    Monocytes Absolute 1.9 (H) 0.0 - 0.9 thou/uL    Eosinophils Absolute 0.3 0.0 - 0.4 thou/uL    Basophils Absolute 0.1 0.0 - 0.2 thou/uL    Immature Granulocyte Absolute 0.2 (H) <=0.0 thou/uL   POCT Glucose    Collection Time: 08/19/20  8:41 AM    Specimen: Blood   Result Value Ref Range    Glucose 85 70 - 139 mg/dL   POCT Glucose    Collection Time: 08/19/20 11:54 AM    Specimen: Blood   Result Value Ref Range    Glucose 125 70 - 139 mg/dL           Advanced Care Planning  Discharge plan: Unknown at this time      Michael Abreu  Date: 8/19/2020  Time: 4:09 PM  Hospitalist Service  Part of this chart was created using a dictation software. Typographic errors, word substitutions, and Grammatical errors may unintentionally occur.     Detail Level: Simple Detail Level: Zone

## 2022-04-13 ENCOUNTER — TELEPHONE (OUTPATIENT)
Dept: INTERNAL MEDICINE | Facility: CLINIC | Age: 72
End: 2022-04-13
Payer: COMMERCIAL

## 2022-04-13 NOTE — TELEPHONE ENCOUNTER
Spoke with pt and informed her that Dr. Leon says for continued enrollment in dig HTN program, consent and readings must be submitted or she will be removed by program. Encouraged continued adherence. Pt verbalized understanding and stated that she wishes to remain in program. Asked how she could consent and get set back up. Gave her the ph# [phone], option 1 (per Lynette Rodriguez in digital medicine dept.) Confirmed.        ----- Message from Tessie Leon MD sent at 4/12/2022  4:52 PM CDT -----  Please inform pt for continued enrollment in dig HTN program, consent and readings must be submitted or she will be removed by program. Encourage continued adherence.   ----- Message -----  From: Lynette Rodriguez  Sent: 4/12/2022   2:29 PM CDT  To: MD Dr. Tessie Martinez     Your patient Ms. Carmen Hawley is enrolled in the HTN/DM Digital Medicine program. Unfortunately, we have been unable to maintain contact with him/her to continue monitoring, despite our best efforts.     If you still believe this patient is a good candidate for digital medicine, please reach out to him/her to encourage participation. If going forward we are unable to communicate with him/her, we will unfortunately have to discharge him/her from the program.    Please let me know if you have any questions.    Sincerely,  Lynette Rodriguez  [phone]

## 2022-04-14 ENCOUNTER — PATIENT MESSAGE (OUTPATIENT)
Dept: ADMINISTRATIVE | Facility: OTHER | Age: 72
End: 2022-04-14
Payer: COMMERCIAL

## 2022-04-14 ENCOUNTER — HOSPITAL ENCOUNTER (OUTPATIENT)
Dept: RADIOLOGY | Facility: HOSPITAL | Age: 72
Discharge: HOME | End: 2022-04-14
Attending: INTERNAL MEDICINE
Payer: COMMERCIAL

## 2022-04-14 ENCOUNTER — PATIENT MESSAGE (OUTPATIENT)
Dept: OTHER | Facility: OTHER | Age: 72
End: 2022-04-14
Payer: COMMERCIAL

## 2022-04-14 DIAGNOSIS — Z12.31 ENCOUNTER FOR SCREENING MAMMOGRAM FOR BREAST CANCER: ICD-10-CM

## 2022-04-14 PROCEDURE — 77063 MAMMO DIGITAL SCREENING BILAT WITH TOMO: ICD-10-PCS | Mod: 26,,, | Performed by: RADIOLOGY

## 2022-04-14 PROCEDURE — 77067 MAMMO DIGITAL SCREENING BILAT WITH TOMO: ICD-10-PCS | Mod: 26,,, | Performed by: RADIOLOGY

## 2022-04-14 PROCEDURE — 77063 BREAST TOMOSYNTHESIS BI: CPT | Mod: TC

## 2022-04-14 PROCEDURE — 77067 SCR MAMMO BI INCL CAD: CPT | Mod: 26,,, | Performed by: RADIOLOGY

## 2022-04-14 PROCEDURE — 77063 BREAST TOMOSYNTHESIS BI: CPT | Mod: 26,,, | Performed by: RADIOLOGY

## 2022-04-14 PROCEDURE — 77067 SCR MAMMO BI INCL CAD: CPT | Mod: TC

## 2022-04-27 ENCOUNTER — OFFICE VISIT (OUTPATIENT)
Dept: DERMATOLOGY | Facility: CLINIC | Age: 72
End: 2022-04-27
Payer: COMMERCIAL

## 2022-04-27 DIAGNOSIS — L81.4 LENTIGO: ICD-10-CM

## 2022-04-27 DIAGNOSIS — D18.01 CHERRY ANGIOMA: ICD-10-CM

## 2022-04-27 DIAGNOSIS — D22.9 NEVUS: ICD-10-CM

## 2022-04-27 DIAGNOSIS — L72.0 MILIA: ICD-10-CM

## 2022-04-27 DIAGNOSIS — L82.1 SK (SEBORRHEIC KERATOSIS): ICD-10-CM

## 2022-04-27 DIAGNOSIS — D23.10 HIDROCYSTOMA OF EYELID, RIGHT: Primary | ICD-10-CM

## 2022-04-27 PROCEDURE — 99999 PR PBB SHADOW E&M-EST. PATIENT-LVL III: ICD-10-PCS | Mod: PBBFAC,,, | Performed by: DERMATOLOGY

## 2022-04-27 PROCEDURE — 99999 PR PBB SHADOW E&M-EST. PATIENT-LVL III: CPT | Mod: PBBFAC,,, | Performed by: DERMATOLOGY

## 2022-04-27 PROCEDURE — 1126F AMNT PAIN NOTED NONE PRSNT: CPT | Mod: CPTII,S$GLB,, | Performed by: DERMATOLOGY

## 2022-04-27 PROCEDURE — 1159F PR MEDICATION LIST DOCUMENTED IN MEDICAL RECORD: ICD-10-PCS | Mod: CPTII,S$GLB,, | Performed by: DERMATOLOGY

## 2022-04-27 PROCEDURE — 3288F PR FALLS RISK ASSESSMENT DOCUMENTED: ICD-10-PCS | Mod: CPTII,S$GLB,, | Performed by: DERMATOLOGY

## 2022-04-27 PROCEDURE — 1126F PR PAIN SEVERITY QUANTIFIED, NO PAIN PRESENT: ICD-10-PCS | Mod: CPTII,S$GLB,, | Performed by: DERMATOLOGY

## 2022-04-27 PROCEDURE — 99213 PR OFFICE/OUTPT VISIT, EST, LEVL III, 20-29 MIN: ICD-10-PCS | Mod: S$GLB,,, | Performed by: DERMATOLOGY

## 2022-04-27 PROCEDURE — 1160F PR REVIEW ALL MEDS BY PRESCRIBER/CLIN PHARMACIST DOCUMENTED: ICD-10-PCS | Mod: CPTII,S$GLB,, | Performed by: DERMATOLOGY

## 2022-04-27 PROCEDURE — 3288F FALL RISK ASSESSMENT DOCD: CPT | Mod: CPTII,S$GLB,, | Performed by: DERMATOLOGY

## 2022-04-27 PROCEDURE — 1101F PT FALLS ASSESS-DOCD LE1/YR: CPT | Mod: CPTII,S$GLB,, | Performed by: DERMATOLOGY

## 2022-04-27 PROCEDURE — 1101F PR PT FALLS ASSESS DOC 0-1 FALLS W/OUT INJ PAST YR: ICD-10-PCS | Mod: CPTII,S$GLB,, | Performed by: DERMATOLOGY

## 2022-04-27 PROCEDURE — 1159F MED LIST DOCD IN RCRD: CPT | Mod: CPTII,S$GLB,, | Performed by: DERMATOLOGY

## 2022-04-27 PROCEDURE — 99213 OFFICE O/P EST LOW 20 MIN: CPT | Mod: S$GLB,,, | Performed by: DERMATOLOGY

## 2022-04-27 PROCEDURE — 1160F RVW MEDS BY RX/DR IN RCRD: CPT | Mod: CPTII,S$GLB,, | Performed by: DERMATOLOGY

## 2022-04-27 NOTE — PROGRESS NOTES
"  Subjective:       Patient ID:  Carmen Hawley is a 71 y.o. female who presents for   Chief Complaint   Patient presents with    Skin Check     tbse     Patient is here today for a "mole" check.   Pt has a history of  normal sun exposure in the past.   Pt recalls several blistering sunburns in the past- no  Pt has history of tanning bed use- no  Pt has  had moles removed in the past- no  Pt has history of melanoma in first degree relatives-  No    C/o white cyst to L eye. States it was drained before, and feels it is coming back  C/o "pimples" to face x 1yr. No prev tx      Review of Systems   Skin: Positive for daily sunscreen use and activity-related sunscreen use. Negative for tendency to form keloidal scars.   Hematologic/Lymphatic: Bruises/bleeds easily.        Objective:    Physical Exam   Constitutional: She appears well-developed and well-nourished. No distress.   Neurological: She is alert and oriented to person, place, and time. She is not disoriented.   Psychiatric: She has a normal mood and affect.   Skin:   Areas Examined (abnormalities noted in diagram):   Scalp / Hair Palpated and Inspected  Head / Face Inspection Performed  Neck Inspection Performed  Chest / Axilla Inspection Performed  Abdomen Inspection Performed  Genitals / Buttocks / Groin Inspection Performed  Back Inspection Performed  RUE Inspected  LUE Inspection Performed  RLE Inspected  LLE Inspection Performed  Nails and Digits Inspection Performed                           Diagram Legend     Erythematous scaling macule/papule c/w actinic keratosis       Vascular papule c/w angioma      Pigmented verrucoid papule/plaque c/w seborrheic keratosis      Yellow umbilicated papule c/w sebaceous hyperplasia      Irregularly shaped tan macule c/w lentigo     1-2 mm smooth white papules consistent with Milia      Movable subcutaneous cyst with punctum c/w epidermal inclusion cyst      Subcutaneous movable cyst c/w pilar cyst      Firm pink to " brown papule c/w dermatofibroma      Pedunculated fleshy papule(s) c/w skin tag(s)      Evenly pigmented macule c/w junctional nevus     Mildly variegated pigmented, slightly irregular-bordered macule c/w mildly atypical nevus      Flesh colored to evenly pigmented papule c/w intradermal nevus       Pink pearly papule/plaque c/w basal cell carcinoma      Erythematous hyperkeratotic cursted plaque c/w SCC      Surgical scar with no sign of skin cancer recurrence      Open and closed comedones      Inflammatory papules and pustules      Verrucoid papule consistent consistent with wart     Erythematous eczematous patches and plaques     Dystrophic onycholytic nail with subungual debris c/w onychomycosis     Umbilicated papule    Erythematous-base heme-crusted tan verrucoid plaque consistent with inflamed seborrheic keratosis     Erythematous Silvery Scaling Plaque c/w Psoriasis     See annotation      Assessment / Plan:        Hidrocystoma of eyelid, right  Reassurance given to patient. No treatment is necessary.   Treatment of benign, asymptomatic lesions may be considered cosmetic.    Milia  Reassurance given to patient. No treatment is necessary.   Treatment of benign, asymptomatic lesions may be considered cosmetic.    Nevus  Discussed ABCDE's of nevi.  Monitor for new mole or moles that are becoming bigger, darker, irritated, or developing irregular borders. Brochure provided. Instructed patient to observe lesion(s) for changes and follow up in clinic if changes are noted. Patient to monitor skin at home for new or changing lesions.     SK (seborrheic keratosis)  These are benign inherited growths without a malignant potential. Reassurance given to patient. No treatment is necessary.     Cherry angioma  These are benign vascular lesions that are inherited.  Treatment is not necessary.    Lentigo  This is a benign hyperpigmented sun induced lesion. Recommend daily sun protection/avoidance and use of at least SPF  30, broad spectrum sunscreen (OTC drug) will reduce the number of new lesions. Treatment of these benign lesions are considered cosmetic.    The nature of sun-induced photo-aging and skin cancers is discussed.  Sun avoidance, protective clothing, and the use of 30-SPF sunscreens is advised. Observe closely for skin damage/changes, and call if such occurs.    Total body skin examination performed today including at least 12 points as noted in physical examination. No lesions suspicious for malignancy noted.    Recommend daily sun protection/avoidance, use of at least SPF 30, broad spectrum sunscreen (OTC drug), skin self examinations, and routine physician surveillance to optimize early detection             Follow up in about 1 year (around 4/27/2023).

## 2022-04-28 ENCOUNTER — OFFICE VISIT (OUTPATIENT)
Dept: OPHTHALMOLOGY | Facility: CLINIC | Age: 72
End: 2022-04-28
Payer: COMMERCIAL

## 2022-04-28 DIAGNOSIS — H35.3132 NONEXUDATIVE AGE-RELATED MACULAR DEGENERATION, BILATERAL, INTERMEDIATE DRY STAGE: Primary | ICD-10-CM

## 2022-04-28 DIAGNOSIS — H43.813 POSTERIOR VITREOUS DETACHMENT OF BOTH EYES: ICD-10-CM

## 2022-04-28 PROCEDURE — 92134 POSTERIOR SEGMENT OCT RETINA (OCULAR COHERENCE TOMOGRAPHY)-BOTH EYES: ICD-10-PCS | Mod: S$GLB,,, | Performed by: OPHTHALMOLOGY

## 2022-04-28 PROCEDURE — 3288F FALL RISK ASSESSMENT DOCD: CPT | Mod: CPTII,S$GLB,, | Performed by: OPHTHALMOLOGY

## 2022-04-28 PROCEDURE — 99999 PR PBB SHADOW E&M-EST. PATIENT-LVL III: ICD-10-PCS | Mod: PBBFAC,,, | Performed by: OPHTHALMOLOGY

## 2022-04-28 PROCEDURE — 99999 PR PBB SHADOW E&M-EST. PATIENT-LVL III: CPT | Mod: PBBFAC,,, | Performed by: OPHTHALMOLOGY

## 2022-04-28 PROCEDURE — 92134 CPTRZ OPH DX IMG PST SGM RTA: CPT | Mod: S$GLB,,, | Performed by: OPHTHALMOLOGY

## 2022-04-28 PROCEDURE — 1101F PT FALLS ASSESS-DOCD LE1/YR: CPT | Mod: CPTII,S$GLB,, | Performed by: OPHTHALMOLOGY

## 2022-04-28 PROCEDURE — 1159F PR MEDICATION LIST DOCUMENTED IN MEDICAL RECORD: ICD-10-PCS | Mod: CPTII,S$GLB,, | Performed by: OPHTHALMOLOGY

## 2022-04-28 PROCEDURE — 1126F AMNT PAIN NOTED NONE PRSNT: CPT | Mod: CPTII,S$GLB,, | Performed by: OPHTHALMOLOGY

## 2022-04-28 PROCEDURE — 92014 PR EYE EXAM, EST PATIENT,COMPREHESV: ICD-10-PCS | Mod: S$GLB,,, | Performed by: OPHTHALMOLOGY

## 2022-04-28 PROCEDURE — 3288F PR FALLS RISK ASSESSMENT DOCUMENTED: ICD-10-PCS | Mod: CPTII,S$GLB,, | Performed by: OPHTHALMOLOGY

## 2022-04-28 PROCEDURE — 92014 COMPRE OPH EXAM EST PT 1/>: CPT | Mod: S$GLB,,, | Performed by: OPHTHALMOLOGY

## 2022-04-28 PROCEDURE — 1160F RVW MEDS BY RX/DR IN RCRD: CPT | Mod: CPTII,S$GLB,, | Performed by: OPHTHALMOLOGY

## 2022-04-28 PROCEDURE — 1101F PR PT FALLS ASSESS DOC 0-1 FALLS W/OUT INJ PAST YR: ICD-10-PCS | Mod: CPTII,S$GLB,, | Performed by: OPHTHALMOLOGY

## 2022-04-28 PROCEDURE — 1159F MED LIST DOCD IN RCRD: CPT | Mod: CPTII,S$GLB,, | Performed by: OPHTHALMOLOGY

## 2022-04-28 PROCEDURE — 1160F PR REVIEW ALL MEDS BY PRESCRIBER/CLIN PHARMACIST DOCUMENTED: ICD-10-PCS | Mod: CPTII,S$GLB,, | Performed by: OPHTHALMOLOGY

## 2022-04-28 PROCEDURE — 92201 PR OPHTHALMOSCOPY, EXT, W/RET DRAW/SCLERAL DEPR, I&R, UNI/BI: ICD-10-PCS | Mod: S$GLB,,, | Performed by: OPHTHALMOLOGY

## 2022-04-28 PROCEDURE — 92201 OPSCPY EXTND RTA DRAW UNI/BI: CPT | Mod: S$GLB,,, | Performed by: OPHTHALMOLOGY

## 2022-04-28 PROCEDURE — 1126F PR PAIN SEVERITY QUANTIFIED, NO PAIN PRESENT: ICD-10-PCS | Mod: CPTII,S$GLB,, | Performed by: OPHTHALMOLOGY

## 2022-04-28 NOTE — PROGRESS NOTES
HPI     Macular Degeneration      Additional comments:                 Comments     Patient here today for 2 yr f/u. [facility] stable ou. No pain. No f/f.    Systane ou prn          Last edited by Lisbeth Pimentel on 4/28/2022  1:25 PM. (History)        HPI     12 mo OCT   DLS- 02/26/2018 Dr. Caal     Pt sts has not noticed personally any changes with vision since last seen   but on 07/25/2019 seen [facility] Dr. Darin Mcbride that noticed drusen changes   OU and was referred back for follow up.   Denies Pain   (-)Flashes (-)Floaters  (-)Photophobia  (-)Glare    At's PRN   Taking [facility]         OCT  Drusen OU, no SRF/Edema      A/P    1. Dry Macular Degeneration OU  - drusen OU  - Areds/Amsler Grid      2. NSC OU  Monitor      3. PVD OU  Monitor    4. Superior K opacities  Prior recurrent erosions  Was told prior Holden's        RTC 2 year OCT

## 2022-05-09 ENCOUNTER — OFFICE VISIT (OUTPATIENT)
Dept: BARIATRICS | Facility: CLINIC | Age: 72
End: 2022-05-09
Payer: COMMERCIAL

## 2022-05-09 VITALS
WEIGHT: 159.38 LBS | OXYGEN SATURATION: 99 % | HEART RATE: 69 BPM | HEIGHT: 63 IN | SYSTOLIC BLOOD PRESSURE: 122 MMHG | DIASTOLIC BLOOD PRESSURE: 62 MMHG | BODY MASS INDEX: 28.24 KG/M2

## 2022-05-09 DIAGNOSIS — E66.9 OBESITY, CLASS I, BMI 30.0-34.9 (SEE ACTUAL BMI): ICD-10-CM

## 2022-05-09 PROCEDURE — 1101F PR PT FALLS ASSESS DOC 0-1 FALLS W/OUT INJ PAST YR: ICD-10-PCS | Mod: CPTII,S$GLB,, | Performed by: INTERNAL MEDICINE

## 2022-05-09 PROCEDURE — 1159F PR MEDICATION LIST DOCUMENTED IN MEDICAL RECORD: ICD-10-PCS | Mod: CPTII,S$GLB,, | Performed by: INTERNAL MEDICINE

## 2022-05-09 PROCEDURE — 99999 PR PBB SHADOW E&M-EST. PATIENT-LVL IV: ICD-10-PCS | Mod: PBBFAC,,, | Performed by: INTERNAL MEDICINE

## 2022-05-09 PROCEDURE — 99213 PR OFFICE/OUTPT VISIT, EST, LEVL III, 20-29 MIN: ICD-10-PCS | Mod: S$GLB,,, | Performed by: INTERNAL MEDICINE

## 2022-05-09 PROCEDURE — 1101F PT FALLS ASSESS-DOCD LE1/YR: CPT | Mod: CPTII,S$GLB,, | Performed by: INTERNAL MEDICINE

## 2022-05-09 PROCEDURE — 1159F MED LIST DOCD IN RCRD: CPT | Mod: CPTII,S$GLB,, | Performed by: INTERNAL MEDICINE

## 2022-05-09 PROCEDURE — 3288F PR FALLS RISK ASSESSMENT DOCUMENTED: ICD-10-PCS | Mod: CPTII,S$GLB,, | Performed by: INTERNAL MEDICINE

## 2022-05-09 PROCEDURE — 3074F PR MOST RECENT SYSTOLIC BLOOD PRESSURE < 130 MM HG: ICD-10-PCS | Mod: CPTII,S$GLB,, | Performed by: INTERNAL MEDICINE

## 2022-05-09 PROCEDURE — 3288F FALL RISK ASSESSMENT DOCD: CPT | Mod: CPTII,S$GLB,, | Performed by: INTERNAL MEDICINE

## 2022-05-09 PROCEDURE — 3078F DIAST BP <80 MM HG: CPT | Mod: CPTII,S$GLB,, | Performed by: INTERNAL MEDICINE

## 2022-05-09 PROCEDURE — 1126F AMNT PAIN NOTED NONE PRSNT: CPT | Mod: CPTII,S$GLB,, | Performed by: INTERNAL MEDICINE

## 2022-05-09 PROCEDURE — 99999 PR PBB SHADOW E&M-EST. PATIENT-LVL IV: CPT | Mod: PBBFAC,,, | Performed by: INTERNAL MEDICINE

## 2022-05-09 PROCEDURE — 3008F BODY MASS INDEX DOCD: CPT | Mod: CPTII,S$GLB,, | Performed by: INTERNAL MEDICINE

## 2022-05-09 PROCEDURE — 1126F PR PAIN SEVERITY QUANTIFIED, NO PAIN PRESENT: ICD-10-PCS | Mod: CPTII,S$GLB,, | Performed by: INTERNAL MEDICINE

## 2022-05-09 PROCEDURE — 3074F SYST BP LT 130 MM HG: CPT | Mod: CPTII,S$GLB,, | Performed by: INTERNAL MEDICINE

## 2022-05-09 PROCEDURE — 3008F PR BODY MASS INDEX (BMI) DOCUMENTED: ICD-10-PCS | Mod: CPTII,S$GLB,, | Performed by: INTERNAL MEDICINE

## 2022-05-09 PROCEDURE — 1160F PR REVIEW ALL MEDS BY PRESCRIBER/CLIN PHARMACIST DOCUMENTED: ICD-10-PCS | Mod: CPTII,S$GLB,, | Performed by: INTERNAL MEDICINE

## 2022-05-09 PROCEDURE — 1160F RVW MEDS BY RX/DR IN RCRD: CPT | Mod: CPTII,S$GLB,, | Performed by: INTERNAL MEDICINE

## 2022-05-09 PROCEDURE — 3078F PR MOST RECENT DIASTOLIC BLOOD PRESSURE < 80 MM HG: ICD-10-PCS | Mod: CPTII,S$GLB,, | Performed by: INTERNAL MEDICINE

## 2022-05-09 PROCEDURE — 99213 OFFICE O/P EST LOW 20 MIN: CPT | Mod: S$GLB,,, | Performed by: INTERNAL MEDICINE

## 2022-05-09 NOTE — PATIENT INSTRUCTIONS
"  Contrave is long term weight loss medication. Side effects may include insomnia, nausea, headache, constipation, depression or change in thinking.  These side effects will improve with stopping the medication.        [email or web address]    Continue with  2 pills twice a day.     1000 jeremiah pb meal planner given previously     Add some type of resistance training 2-3 days a week. These can be body weight exercises, light weight or elastic bands. [employer] and [employer] are great sources for free work out plans and videos.     Spring Recipes:    Sadi Shake:     Ingredients  ½ cup low fat cottage cheese  ¼ cup vanilla protein powder  1/8 tsp mint extract  2-3 packets of stevia or artificial sweetener of choice  5-10 ice cubes (more or less depending on how thick you would like it)  4 oz of water  2-3 drops of green food coloring OR a small handful of spinach to make it green    Put all ingredients in  and  until desired consistency.      "Unstuffed" Cabbage Rolls:    Ingredients:  1 1/2 to 2 pounds lean ground beef or turkey  1 large onion, chopped  1 clove garlic, minced  1 small cabbage, chopped  2 cans (14.5 ounces each) diced tomatoes  1 can (8 ounces) tomato sauce  ¼ - ½ cup water depending on desired consistency  1 teaspoon ground black pepper, salt to taste    Spray large skillet with nonstick cookspray. Heat pan on medium heat. Sauté the onions until tender, and then add the ground beef (or turkey) and cook until the meat is browned.    Add the garlic, cook an additional minute before adding the remaining ingredients. Cover, reduce the heat and simmer about 25 minutes (or until the cabbage is  fork tender)        Not so Christiansen's Pie:    Ingredients  2 (or more) pounds of lean ground beef, or turkey  2 heads of cauliflower or 3 bags of frozen cauliflower, chopped  1 bag of frozen mixed veggies          (NO Corn, Peas or Potatoes!)  1-2 onions  1 egg  1 teaspoon each of basil, garlic powder, " pepper, oregano and a little cayenne  4-5 sprays of I cant believe its not butter spray  4 ounces of fat free cream cheese (optional)  Low fat Cheese to top (optional)    Roast cauliflower in oven on 350* F for about 15-20 minutes, until browned and fork tender. (begin boykin meat while cauliflower is cooking). Place cooked cauliflower in . Add 4 ounces of fat free cream cheese, 4-5 sprays of I cant believe its not butter SPRAY, and spices and puree until creamy.     While cauliflower is roasting, brown meat in large skillet and season to taste. Set aside. Sauté diced onion in skillet until somewhat soft. Set aside with meat. Pour mixed veggies in the skillet to heat on low heat.     Mix the meat, onions, mixed veggies, raw egg and any additional seasonings and put in bottom of 9x13 baking dish. Spread mashed cauliflower mixture over it until smooth.    Bake at 350 for approximately 30 minutes. Add cheese and bake 5 additional minutes (optional). Serve warm (or reheat later).    Crock Pot Balsamic Pork Tenderloin:    Ingredients:  1 tsp dried thyme  1 tsp ground pepper  ½ tsp salt  3 tbsp dried minced onion  2 tsp dried basil  ¼ cup low sodium broth  ½ cup balsamic vinegar  2 cloves garlic, minced  2 lbs Pork Tenderloin    Mix first 5 ingredients together. Rub pork tenderloin with dry seasoning mixture.  In a large sauté pan, heat ¼ cup balsamic vinegar and garlic on medium heat. Add pork to sauté hylton and sear, boykin all sides. Add low sodium broth, 1 tbsp at a time to keep tenderloin from sticking or use nonstick cookspray.     Transfer meat to crock pot. Pour remaining balsamic vinegar into sauté pan and continue  to stir for a few minutes to deglaze the pan. Pour juices over the top of the tenderloin in crockpot. Cook on high for 3 hours or until meat thermometer says 170*. Let rest 5-10 minutes and then slice.       Side Dish: Steamed carrots w/ garlic and phan    Ingredients:  2 garlic cloves,  minced  1 lb baby carrots  5-6 sprays of I cant believe its not butter SPRAY  1 tsp minced peeled fresh phan  1 tbsp chopped fresh cilantro  ½ tsp grated lime rind  1 tbsp fresh lime juice   ¼ tsp salt    Prepare garlic; let stand 10 minutes. Steam carrots, covered in pot, about 10 minutes or until tender.     In a nonstick skillet over medium heat, spray pan w/ I cant believe its not butter SPRAY. Add garlic and phan and cook 1 minute, stirring constantly. Remove from  heat; stir in carrots, cilantro and remaining ingredients.         Side Dish: Green Bean Almondine    Ingredients:  1 pound fresh green beans, trimmed  1 tbsp sydney vinegar  1 ½ tsp water  1 tsp Mazin Mustard  ¼ tsp salt  ¼ tsp freshly ground black pepper  1 tbsp sliced almonds, toasted    Cook green beans in boiling water for 4 minutes or until crisp-tender. Drain and plunge beans into ice water. Drain well. Pat beans dry w/ paper towel.     Combine vinegar, water, mustard, salt and pepper in a medium bowl. Add beans to vinegar mixture; toss to coat well. Sprinkle with toasted almonds.      How to Cook the Perfect Hard Boiled Egg:    Steam in water: Fill a large pot with 1 inch of water. Place steamer insert inside, cover, and bring to a boil over high heat. Add eggs to steamer basket, cover, and continue cooking 12 minute. If serving cold, immediately place eggs in a bowl of ice water and allow to cool for at least 15 minutes before peeling under cool running water. Store in the refrigerator for up to 5 days.    OR    Purchase Dash Go Rapid Egg Boiler: available @ [employer], [employer], [employer], [employer] for ~$15-$20      Classic Egg Salad Recipe:    Ingredients:  8 hard cooked eggs, peeled and coarsely chopped  4-6 tbsp of low fat or fat free gottlieb  2 tbsp celery, chopped  2 tsp mazin mustard  Dash of cayenne pepper (for spice)  Black pepper, salt to taste    In a medium bowl, combine gottlieb, celery, mustard and cayenne pepper with  chopped eggs. Season w/ pepper and salt. Serve over Lettuce leaves.     Get Creative! Add chopped turkey giordano and tomato and serve on lettuce leaves for an egg-cellent BLT egg salad or mix lean diced ham, low fat cheddar and tomatoes for  egg salad    Tuna Deviled Eggs:    Ingredients:  12 hard boiled eggs  1 can of tuna packed in water  1 rib celery, diced  ¼ cup low fat gottlieb  1 tsp mustard  Garlic powder, black pepper to taste  Dash of paprika (for finish)    Cut eggs in half lengthwise. Remove yolk and mash in bowl. In separate bowl, mix tuna, celery, low fat gottlieb, mustard and seasonings.  Stir in egg yolks until blended. Stuff eggs and sprinkle dash of paprika      Giordano Jalapeno Deviled Eggs:    Ingredients:  12 hard boiled eggs, peeled  ½ cup low fat gottlieb  1 ½ tsp rice vinegar  ¾ tsp ground mustard  ½ packet splenda  2 jalapenos, seeded and chopped, 6 pieces turkey giordano, cooked crisp and crumbled  Dash of paprika (for finish)    Cut eggs in half lengthwise. Remove yolk and mash in bowl. Add gottlieb, vinegar, spices and Splenda until well combined. Mix in jalapenos and giordano. Stuff eggs and sprinkle w/ dash of paprika      Sugar-Free High Protein Brownie Recipe:    Ingredients:  2 cups of pureed zucchini  4 oz fat free cream cheese  1 large egg  2 scoops chocolate protein powder  1 tbsp pure vanilla extract  1/3 cup unsweetened cocoa powder    ¼ tsp salt  2 tbsp chopped nuts  *8-9 packets of splenda or artificial sweetener of choice    Preheat oven to 350* F.     Line an 8x8 pan w/ parchment paper or spray with nonstick cookspray.     In a medium bowl, blend the fat free cream cheese with egg until creamy. Add chocolate protein powder and cocoa powder until creamy. Add vanilla extract. Stir in pureed zucchini and salt.     The batter will be thick, but not dry. If batter seems dry, add 1-2 tbsp water. Fold in Nuts. Pour batter in prepared pan.     Bake for 30 minutes. Allow to cool completely. Cut into  squares and chill to semi-set.     *best if eaten within 2 days but may last 3-4 days in fridge.         Lemon Whip:    Ingredients:  Small box of sugar-free vanilla instant pudding mix  1 small packet of crystal light lemonade mix  2 cups skim milk or fat free fairlife milk  Sugar-free, fat free cool whip     Make sugar-free pudding using 2 cups skim milk according to package. Stir in 1 small packet of crystal light lemonade mix.  Fold in a dollop of sugar-free, fat free cool whip and stir to combine.     Home-made Sugar-free Pudding Pops:    Ingredients:  1 small pkg of sugar-free instant pudding mix (flavor of choice!)  2 cups fat free milk     Beat ingredients with whisk for 2 minutes. Pour into 6 paper or plastic cups or into a popsicle mold. Insert wooden pop stick in center of each cup.     Freeze for 5 hours or until firm. Peel off paper or pull out of popsicle mold.     Variations: add sugar-free syrups to change up the flavor, such as sugar-free chocolate pudding + sugar free raspberry syrup = chocolate raspberry fudgesicle.

## 2022-05-09 NOTE — PROGRESS NOTES
"Subjective:       Patient ID: Carmen Hawley is a 71 y.o. female.    Chief Complaint: Follow-up    Pt here today for follow up. Has lost 3 lbs from previous visit  net neg 10 lbs. Has been on Contrave since last OV. Increased to 2 pill in the morning and 2 pills in the evening last ov. Has been taking at least 2 Iin the am, and someties forgets the pm. Did try WW< but does not care for the tracking. .  Has been doing a lot of gardening and raking.    She has been on topiramate as well.  She is enrolled in digital HTN.  BP  Good today.      diethylpropion , last filled 3/10/21  checked today.   Follow-up  Associated symptoms include arthralgias. Pertinent negatives include no chest pain, chills or fever.     Review of Systems   Constitutional: Negative for chills and fever.   Respiratory: Negative for apnea and shortness of breath.         + snores   Cardiovascular: Negative for chest pain and leg swelling.   Gastrointestinal: Negative for constipation and diarrhea.        Denies HB   Genitourinary: Negative for dysuria.   Musculoskeletal: Positive for arthralgias. Negative for back pain.        Left foot OA   Neurological: Negative for dizziness and light-headedness.   Psychiatric/Behavioral: Negative for dysphoric mood. The patient is not nervous/anxious.         Doing well on Lexapro       Objective:       /62 (BP Location: Left arm, Patient Position: Sitting, BP Method: Large (Manual))   Pulse 69   Ht 5' 3" (1.6 m)   Wt 72.3 kg (159 lb 6.3 oz)   LMP 12/16/2006 (Approximate)   SpO2 99%   BMI 28.24 kg/m²     Physical Exam  Vitals reviewed.   Constitutional:       General: She is not in acute distress.     Appearance: She is well-developed.      Comments: Obese     HENT:      Head: Normocephalic and atraumatic.   Eyes:      General: No scleral icterus.     Pupils: Pupils are equal, round, and reactive to light.   Cardiovascular:      Rate and Rhythm: Normal rate.   Pulmonary:      Effort: Pulmonary " effort is normal.   Musculoskeletal:         General: Normal range of motion.      Cervical back: Normal range of motion and neck supple.   Skin:     General: Skin is warm and dry.      Findings: No erythema.   Neurological:      Mental Status: She is alert and oriented to person, place, and time.      Cranial Nerves: No cranial nerve deficit.   Psychiatric:         Behavior: Behavior normal.         Judgment: Judgment normal.         Assessment:       1. Obesity, Class I, BMI 30.0-34.9 (see actual BMI)        Plan:            Carmen was seen today for follow-up.    Diagnoses and all orders for this visit:    Obesity, Class I, BMI 30.0-34.9 (see actual BMI)  -     naltrexone-bupropion (CONTRAVE) 8-90 mg TbSR; Take 2 tablets by mouth 2 (two) times daily.           Contrave is long term weight loss medication. Side effects may include insomnia, nausea, headache, constipation, depression or change in thinking.  These side effects will improve with stopping the medication.        [email or web address]    Continue with  2 pills twice a day.     1000 jeremiah pb meal planner given previously     Add some type of resistance training 2-3 days a week. These can be body weight exercises, light weight or elastic bands. [employer] and [employer] are great sources for free work out plans and videos.         Spring recipes given.

## 2022-05-16 ENCOUNTER — HOSPITAL ENCOUNTER (OUTPATIENT)
Dept: RADIOLOGY | Facility: HOSPITAL | Age: 72
Discharge: HOME | End: 2022-05-16
Attending: NURSE PRACTITIONER
Payer: COMMERCIAL

## 2022-05-16 ENCOUNTER — OFFICE VISIT (OUTPATIENT)
Dept: ORTHOPEDICS | Facility: CLINIC | Age: 72
End: 2022-05-16
Payer: COMMERCIAL

## 2022-05-16 VITALS — BODY MASS INDEX: 28.9 KG/M2 | WEIGHT: 163.13 LBS | HEIGHT: 63 IN

## 2022-05-16 DIAGNOSIS — M54.30 SCIATICA, UNSPECIFIED LATERALITY: Primary | ICD-10-CM

## 2022-05-16 DIAGNOSIS — M17.11 PRIMARY OSTEOARTHRITIS OF RIGHT KNEE: Primary | ICD-10-CM

## 2022-05-16 DIAGNOSIS — M17.11 PRIMARY OSTEOARTHRITIS OF RIGHT KNEE: ICD-10-CM

## 2022-05-16 DIAGNOSIS — M54.6 LOWER THORACIC BACK PAIN: Primary | ICD-10-CM

## 2022-05-16 DIAGNOSIS — M54.6 LOWER THORACIC BACK PAIN: ICD-10-CM

## 2022-05-16 DIAGNOSIS — M54.9 DORSALGIA, UNSPECIFIED: ICD-10-CM

## 2022-05-16 PROCEDURE — 1101F PR PT FALLS ASSESS DOC 0-1 FALLS W/OUT INJ PAST YR: ICD-10-PCS | Mod: CPTII,S$GLB,, | Performed by: NURSE PRACTITIONER

## 2022-05-16 PROCEDURE — 3288F FALL RISK ASSESSMENT DOCD: CPT | Mod: CPTII,S$GLB,, | Performed by: NURSE PRACTITIONER

## 2022-05-16 PROCEDURE — 3008F BODY MASS INDEX DOCD: CPT | Mod: CPTII,S$GLB,, | Performed by: NURSE PRACTITIONER

## 2022-05-16 PROCEDURE — 99214 PR OFFICE/OUTPT VISIT, EST, LEVL IV, 30-39 MIN: ICD-10-PCS | Mod: S$GLB,,, | Performed by: NURSE PRACTITIONER

## 2022-05-16 PROCEDURE — 72110 X-RAY EXAM L-2 SPINE 4/>VWS: CPT | Mod: 26,,, | Performed by: STUDENT IN AN ORGANIZED HEALTH CARE EDUCATION/TRAINING PROGRAM

## 2022-05-16 PROCEDURE — 72110 XR LUMBAR SPINE AP AND LAT WITH FLEX/EXT: ICD-10-PCS | Mod: 26,,, | Performed by: STUDENT IN AN ORGANIZED HEALTH CARE EDUCATION/TRAINING PROGRAM

## 2022-05-16 PROCEDURE — 99214 OFFICE O/P EST MOD 30 MIN: CPT | Mod: S$GLB,,, | Performed by: NURSE PRACTITIONER

## 2022-05-16 PROCEDURE — 1101F PT FALLS ASSESS-DOCD LE1/YR: CPT | Mod: CPTII,S$GLB,, | Performed by: NURSE PRACTITIONER

## 2022-05-16 PROCEDURE — 3008F PR BODY MASS INDEX (BMI) DOCUMENTED: ICD-10-PCS | Mod: CPTII,S$GLB,, | Performed by: NURSE PRACTITIONER

## 2022-05-16 PROCEDURE — 73562 X-RAY EXAM OF KNEE 3: CPT | Mod: 26,LT,, | Performed by: RADIOLOGY

## 2022-05-16 PROCEDURE — 1159F MED LIST DOCD IN RCRD: CPT | Mod: CPTII,S$GLB,, | Performed by: NURSE PRACTITIONER

## 2022-05-16 PROCEDURE — 3288F PR FALLS RISK ASSESSMENT DOCUMENTED: ICD-10-PCS | Mod: CPTII,S$GLB,, | Performed by: NURSE PRACTITIONER

## 2022-05-16 PROCEDURE — 99999 PR PBB SHADOW E&M-EST. PATIENT-LVL III: ICD-10-PCS | Mod: PBBFAC,,, | Performed by: NURSE PRACTITIONER

## 2022-05-16 PROCEDURE — 72110 X-RAY EXAM L-2 SPINE 4/>VWS: CPT | Mod: TC

## 2022-05-16 PROCEDURE — 1125F PR PAIN SEVERITY QUANTIFIED, PAIN PRESENT: ICD-10-PCS | Mod: CPTII,S$GLB,, | Performed by: NURSE PRACTITIONER

## 2022-05-16 PROCEDURE — 1125F AMNT PAIN NOTED PAIN PRSNT: CPT | Mod: CPTII,S$GLB,, | Performed by: NURSE PRACTITIONER

## 2022-05-16 PROCEDURE — 1160F RVW MEDS BY RX/DR IN RCRD: CPT | Mod: CPTII,S$GLB,, | Performed by: NURSE PRACTITIONER

## 2022-05-16 PROCEDURE — 1159F PR MEDICATION LIST DOCUMENTED IN MEDICAL RECORD: ICD-10-PCS | Mod: CPTII,S$GLB,, | Performed by: NURSE PRACTITIONER

## 2022-05-16 PROCEDURE — 73562 X-RAY EXAM OF KNEE 3: CPT | Mod: TC,LT

## 2022-05-16 PROCEDURE — 73564 X-RAY EXAM KNEE 4 OR MORE: CPT | Mod: 26,RT,, | Performed by: RADIOLOGY

## 2022-05-16 PROCEDURE — 1160F PR REVIEW ALL MEDS BY PRESCRIBER/CLIN PHARMACIST DOCUMENTED: ICD-10-PCS | Mod: CPTII,S$GLB,, | Performed by: NURSE PRACTITIONER

## 2022-05-16 PROCEDURE — 73564 XR KNEE ORTHO RIGHT WITH FLEXION: ICD-10-PCS | Mod: 26,RT,, | Performed by: RADIOLOGY

## 2022-05-16 PROCEDURE — 73562 XR KNEE ORTHO RIGHT WITH FLEXION: ICD-10-PCS | Mod: 26,LT,, | Performed by: RADIOLOGY

## 2022-05-16 PROCEDURE — 99999 PR PBB SHADOW E&M-EST. PATIENT-LVL III: CPT | Mod: PBBFAC,,, | Performed by: NURSE PRACTITIONER

## 2022-05-16 RX ORDER — METHYLPREDNISOLONE 4 MG/1
TABLET ORAL
Qty: 21 EACH | Refills: 0 | Status: SHIPPED | OUTPATIENT
Start: 2022-05-16 | End: 2022-06-06

## 2022-05-19 ENCOUNTER — IMMUNIZATION (OUTPATIENT)
Dept: INTERNAL MEDICINE | Facility: CLINIC | Age: 72
End: 2022-05-19
Payer: COMMERCIAL

## 2022-05-19 DIAGNOSIS — Z23 NEED FOR VACCINATION: Primary | ICD-10-CM

## 2022-05-19 PROCEDURE — 91305 COVID-19, MRNA, LNP-S, PF, 30 MCG/0.3 ML DOSE VACCINE (PFIZER): CPT | Mod: PBBFAC | Performed by: INTERNAL MEDICINE

## 2022-06-08 ENCOUNTER — HOSPITAL ENCOUNTER (OUTPATIENT)
Dept: RADIOLOGY | Facility: HOSPITAL | Age: 72
Discharge: HOME | End: 2022-06-08
Attending: NURSE PRACTITIONER
Payer: COMMERCIAL

## 2022-06-08 DIAGNOSIS — M54.9 DORSALGIA, UNSPECIFIED: ICD-10-CM

## 2022-06-08 PROCEDURE — 72148 MRI LUMBAR SPINE W/O DYE: CPT | Mod: TC

## 2022-06-08 PROCEDURE — 72148 MRI LUMBAR SPINE WITHOUT CONTRAST: ICD-10-PCS | Mod: 26,,, | Performed by: RADIOLOGY

## 2022-06-08 PROCEDURE — 72148 MRI LUMBAR SPINE W/O DYE: CPT | Mod: 26,,, | Performed by: RADIOLOGY

## 2022-06-15 ENCOUNTER — CLINICAL SUPPORT (OUTPATIENT)
Dept: OTHER | Facility: CLINIC | Age: 72
End: 2022-06-15
Payer: COMMERCIAL

## 2022-06-15 ENCOUNTER — OFFICE VISIT (OUTPATIENT)
Dept: ORTHOPEDICS | Facility: CLINIC | Age: 72
End: 2022-06-15
Payer: COMMERCIAL

## 2022-06-15 VITALS — BODY MASS INDEX: 27.97 KG/M2 | WEIGHT: 157.88 LBS | HEIGHT: 63 IN

## 2022-06-15 DIAGNOSIS — M54.2 NECK PAIN: ICD-10-CM

## 2022-06-15 DIAGNOSIS — M51.36 DDD (DEGENERATIVE DISC DISEASE), LUMBAR: ICD-10-CM

## 2022-06-15 DIAGNOSIS — M54.16 LUMBAR RADICULOPATHY: Primary | ICD-10-CM

## 2022-06-15 DIAGNOSIS — M50.30 DDD (DEGENERATIVE DISC DISEASE), CERVICAL: ICD-10-CM

## 2022-06-15 DIAGNOSIS — Z00.8 ENCOUNTER FOR OTHER GENERAL EXAMINATION: ICD-10-CM

## 2022-06-15 PROCEDURE — 1160F RVW MEDS BY RX/DR IN RCRD: CPT | Mod: CPTII,S$GLB,, | Performed by: PHYSICIAN ASSISTANT

## 2022-06-15 PROCEDURE — 3008F PR BODY MASS INDEX (BMI) DOCUMENTED: ICD-10-PCS | Mod: CPTII,S$GLB,, | Performed by: PHYSICIAN ASSISTANT

## 2022-06-15 PROCEDURE — 99214 OFFICE O/P EST MOD 30 MIN: CPT | Mod: S$GLB,,, | Performed by: PHYSICIAN ASSISTANT

## 2022-06-15 PROCEDURE — 3288F FALL RISK ASSESSMENT DOCD: CPT | Mod: CPTII,S$GLB,, | Performed by: PHYSICIAN ASSISTANT

## 2022-06-15 PROCEDURE — 99999 PR PBB SHADOW E&M-EST. PATIENT-LVL III: CPT | Mod: PBBFAC,,, | Performed by: PHYSICIAN ASSISTANT

## 2022-06-15 PROCEDURE — 1101F PT FALLS ASSESS-DOCD LE1/YR: CPT | Mod: CPTII,S$GLB,, | Performed by: PHYSICIAN ASSISTANT

## 2022-06-15 PROCEDURE — 3008F BODY MASS INDEX DOCD: CPT | Mod: CPTII,S$GLB,, | Performed by: PHYSICIAN ASSISTANT

## 2022-06-15 PROCEDURE — 99214 PR OFFICE/OUTPT VISIT, EST, LEVL IV, 30-39 MIN: ICD-10-PCS | Mod: S$GLB,,, | Performed by: PHYSICIAN ASSISTANT

## 2022-06-15 PROCEDURE — 1160F PR REVIEW ALL MEDS BY PRESCRIBER/CLIN PHARMACIST DOCUMENTED: ICD-10-PCS | Mod: CPTII,S$GLB,, | Performed by: PHYSICIAN ASSISTANT

## 2022-06-15 PROCEDURE — 1125F PR PAIN SEVERITY QUANTIFIED, PAIN PRESENT: ICD-10-PCS | Mod: CPTII,S$GLB,, | Performed by: PHYSICIAN ASSISTANT

## 2022-06-15 PROCEDURE — 1101F PR PT FALLS ASSESS DOC 0-1 FALLS W/OUT INJ PAST YR: ICD-10-PCS | Mod: CPTII,S$GLB,, | Performed by: PHYSICIAN ASSISTANT

## 2022-06-15 PROCEDURE — 99999 PR PBB SHADOW E&M-EST. PATIENT-LVL III: ICD-10-PCS | Mod: PBBFAC,,, | Performed by: PHYSICIAN ASSISTANT

## 2022-06-15 PROCEDURE — 1125F AMNT PAIN NOTED PAIN PRSNT: CPT | Mod: CPTII,S$GLB,, | Performed by: PHYSICIAN ASSISTANT

## 2022-06-15 PROCEDURE — 1159F PR MEDICATION LIST DOCUMENTED IN MEDICAL RECORD: ICD-10-PCS | Mod: CPTII,S$GLB,, | Performed by: PHYSICIAN ASSISTANT

## 2022-06-15 PROCEDURE — 3288F PR FALLS RISK ASSESSMENT DOCUMENTED: ICD-10-PCS | Mod: CPTII,S$GLB,, | Performed by: PHYSICIAN ASSISTANT

## 2022-06-15 PROCEDURE — 1159F MED LIST DOCD IN RCRD: CPT | Mod: CPTII,S$GLB,, | Performed by: PHYSICIAN ASSISTANT

## 2022-06-15 RX ORDER — GABAPENTIN 100 MG/1
100 CAPSULE ORAL 3 TIMES DAILY
Qty: 90 CAPSULE | Refills: 0 | Status: SHIPPED | OUTPATIENT
Start: 2022-06-15 | End: 2022-07-21

## 2022-06-15 NOTE — PROGRESS NOTES
DATE: 6/16/2022  PATIENT: Carmen Hawley    Supervising Physician: Romario Blanchard M.D.    CHIEF COMPLAINT: right lateral leg pain    HISTORY:  Carmen Hawley is a 72 y.o. female with PMH of right knee OA here for initial evaluation of right buttock and lateral leg pain (Back - 4, Leg - 4).  The pain in the leg is what bothers her most.  The pain has been present for a few months.  She has significant right knee OA but is putting off a TKA.  The patient describes the pain as throbbing.  The pain is worse with stairs and getting out of the car and improved by rest. She is very active.  She drives over an hour commute to work and has pain when she gets out of the car as well.  There is no associated numbness and tingling. There is no subjective weakness. Prior treatments have included voltaren and physical therapy, but no ESIs or surgery.  She also reports recent neck pain since having a facial and they massaged her neck.  She denies arm pain. No numbness or tingling. No subjective weakness. No myelopathic symptoms.     The patient denies myelopathic symptoms such as handwriting changes or difficulty with buttons/coins/keys. Denies perineal paresthesias, bowel/bladder dysfunction.    PAST MEDICAL/SURGICAL HISTORY:  Past Medical History:   Diagnosis Date    Anxiety     Arthritis     Hyperlipidemia     Hypertension     Macular degeneration     Mitral valve prolapse      Past Surgical History:   Procedure Laterality Date    COLONOSCOPY N/A 10/8/2020    Procedure: COLONOSCOPY;  Surgeon: Crispin Moon MD;  Location: [address];  Service: Endoscopy;  Laterality: N/A;  Family history of colon polyps  COVID test on 10/5/20 at 2nd floor -     TONSILLECTOMY         Medications:   Current Outpatient Medications on File Prior to Visit   Medication Sig Dispense Refill    aspirin (ECOTRIN) 81 MG EC tablet Take 81 mg by mouth once daily.        biotin 300 mcg Tab Take 1 tablet by mouth once  daily.      cyanocobalamin 500 MCG tablet Take 500 mcg by mouth once daily.      diclofenac (VOLTAREN) 75 MG EC tablet Take 1 tablet (75 mg total) by mouth 2 (two) times daily. 60 tablet 1    diclofenac sodium (VOLTAREN) 1 % Gel Apply 2 grams topically 4 (four) times daily. 100 g 6    EScitalopram oxalate (LEXAPRO) 10 MG tablet Take 1 tablet (10 mg total) by mouth once daily. 90 tablet 3    EScitalopram oxalate (LEXAPRO) 5 MG Tab Take 1 tablet (5 mg total) by mouth once daily. Take with Escitalopram 10mg once daily 90 tablet 3    estradioL (ESTRACE) 0.01 % (0.1 mg/gram) vaginal cream Place 1 gram vaginally twice a week. 42.5 g 3    metoprolol succinate (TOPROL-XL) 100 MG 24 hr tablet Take 1 tablet (100 mg total) by mouth once daily. 90 tablet 3    naltrexone-bupropion (CONTRAVE) 8-90 mg TbSR Take 2 tablets by mouth 2 (two) times daily. 120 tablet 3    simvastatin (ZOCOR) 40 MG tablet TAKE 1 TABLET BY MOUTH EVERY EVENING. 90 tablet 3    tretinoin (RETIN-A) 0.1 % cream APPLY A THIN FILM TO AFFECTED AREA EVERY EVENING AFTER MOISTURIZING. 45 g 3    triamterene-hydrochlorothiazide 37.5-25 mg (DYAZIDE) 37.5-25 mg per capsule Take one capsule by mouth every day 90 capsule 3    vitamin D 1000 units Tab Take 2,000 mg by mouth once daily.        No current facility-administered medications on file prior to visit.       Social History:   Social History     Socioeconomic History    Marital status:    Occupational History     Employer: [employer]   Tobacco Use    Smoking status: Never Smoker    Smokeless tobacco: Never Used   Substance and Sexual Activity    Alcohol use: Yes     Comment: 7 drinks weekly     Drug use: No    Sexual activity: Yes     Partners: Male     Birth control/protection: Post-menopausal     Comment:  since 2000   Social History Narrative    Works in [employer] department at [employer]. Working part-time.        Likes to play golf, walks for  "exercise.        REVIEW OF SYSTEMS:  Constitution: Negative. Negative for chills, fever and night sweats.   Cardiovascular: Negative for chest pain and syncope.   Respiratory: Negative for cough and shortness of breath.   Gastrointestinal: See HPI. Negative for nausea/vomiting. Negative for abdominal pain.  Genitourinary: See HPI. Negative for discoloration or dysuria.  Skin: Negative for dry skin, itching and rash.   Hematologic/Lymphatic: Negative for bleeding problem. Does not bruise/bleed easily.   Musculoskeletal: Negative for falls and muscle weakness.   Neurological: See HPI. No seizures.   Endocrine: Negative for polydipsia, polyphagia and polyuria.   Allergic/Immunologic: Negative for hives and persistent infections.     EXAM:  Ht 5' 3" (1.6 m)   Wt 71.6 kg (157 lb 13.6 oz)   LMP 12/16/2006 (Approximate)   BMI 27.96 kg/m²     General: The patient is a very pleasant 72 y.o. female in no apparent distress, the patient is oriented to person, place and time.  Psych: Normal mood and affect  HEENT: Vision grossly intact, hearing intact to the spoken word.  Lungs: Respirations unlabored.  Gait: Normal station and gait, no difficulty with toe or heel walk.   Skin: Dorsal lumbar skin negative for rashes, lesions, hairy patches and surgical scars. There is minimal lumbar tenderness to palpation.  Range of motion: Lumbar range of motion is acceptable.  Spinal Balance: Global saggital and coronal spinal balance acceptable, not significant for scoliosis and kyphosis.  Musculoskeletal: No pain with the range of motion of the bilateral hips. Mild right trochanteric tenderness to palpation.  Vascular: Bilateral lower extremities warm and well perfused, dorsalis pedis pulses 2+ bilaterally.  Neurological: Normal strength and tone in all major motor groups in the bilateral lower extremities. Normal sensation to light touch in the L2-S1 dermatomes bilaterally.  Deep tendon reflexes symmetric 2+ in the bilateral lower " extremities.  Negative Babinski bilaterally. Straight leg raise negative bilaterally.    IMAGING:      Today I personally reviewed AP, Lat and Flex/Ex  upright L-spine films that demonstrate L3/4 and L5/S1 disc degeneration.    MRI lumbar spine demonstrates facet arthropathy at L3/4 with foraminal narrowing bilaterally.  There is a right sided disc extrusion at L4/5 with moderate stenosis.  There is facet edema at L5/S1 with moderate foraminal narrowing.    Imaging of the cervical spine from 2019 shows significant disc degeneration from C3-T1.     Body mass index is 27.96 kg/m².    Hemoglobin A1C   Date Value Ref Range Status   07/14/2021 5.5 4.0 - 5.6 % Final     Comment:     ADA Screening Guidelines:  5.7-6.4%  Consistent with prediabetes  >or=6.5%  Consistent with diabetes    High levels of fetal hemoglobin interfere with the HbA1C  assay. Heterozygous hemoglobin variants (HbS, HgC, etc)do  not significantly interfere with this assay.   However, presence of multiple variants may affect accuracy.     01/12/2021 5.8 4.5 - 6.2 % Final     Comment:     According to ADA guidelines, hemoglobin A1C <7.0% represents  optimal control in non-pregnant diabetic patients.  Different  metrics may apply to specific populations.   Standards of Medical Care in Diabetes - 2016.  For the purpose of screening for the presence of diabetes:  <5.7%     Consistent with the absence of diabetes  5.7-6.4%  Consistent with increasing risk for diabetes   (prediabetes)  >or=6.5%  Consistent with diabetes  Currently no consensus exists for use of hemoglobin A1C  for diagnosis of diabetes for children.     07/09/2020 6.1 (H) 4.0 - 5.6 % Final     Comment:     ADA Screening Guidelines:  5.7-6.4%  Consistent with prediabetes  >or=6.5%  Consistent with diabetes  High levels of fetal hemoglobin interfere with the HbA1C  assay. Heterozygous hemoglobin variants (HbS, HgC, etc)do  not significantly interfere with this assay.   However, presence of  multiple variants may affect accuracy.             ASSESSMENT/PLAN:    Carmen was seen today for back pain.    Diagnoses and all orders for this visit:    Lumbar radiculopathy  -     gabapentin (NEURONTIN) 100 MG capsule; Take 1 capsule (100 mg total) by mouth 3 (three) times daily.  -     Procedure Order to Pain Management; Future    DDD (degenerative disc disease), lumbar    DDD (degenerative disc disease), cervical    Neck pain        Today we discussed at length all of the different treatment options including anti-inflammatories, acetaminophen, rest, ice, heat, physical therapy including strengthening and stretching exercises, home exercises, ROM, aerobic conditioning, aqua therapy, other modalities including ultrasound, massage, and dry needling, epidural steroid injections and finally surgical intervention.      Plan for TFESI.  Gabapentin sent to the pharmacy as well.  She will follow up with me 2 weeks after injection if symptoms persist. We discussed the symptoms of cervical myelopathy and that if she develops any difficulty with fine motor skills, we will need to get an MRI cervical spine.  Patient understands and agrees.

## 2022-06-16 ENCOUNTER — TELEPHONE (OUTPATIENT)
Dept: ADMINISTRATIVE | Facility: OTHER | Age: 72
End: 2022-06-16
Payer: COMMERCIAL

## 2022-06-16 VITALS
HEIGHT: 63 IN | BODY MASS INDEX: 28.17 KG/M2 | DIASTOLIC BLOOD PRESSURE: 80 MMHG | SYSTOLIC BLOOD PRESSURE: 158 MMHG | WEIGHT: 159 LBS

## 2022-06-16 LAB
GLUCOSE SERPL-MCNC: 100 MG/DL (ref 60–140)
HDLC SERPL-MCNC: 80 MG/DL
POC CHOLESTEROL, LDL (DOCK): 133.42 MG/DL
POC CHOLESTEROL, TOTAL: 225 MG/DL
TRIGL SERPL-MCNC: 67 MG/DL

## 2022-06-20 DIAGNOSIS — E78.5 HYPERLIPIDEMIA: ICD-10-CM

## 2022-06-20 DIAGNOSIS — E78.5 HYPERLIPIDEMIA, UNSPECIFIED HYPERLIPIDEMIA TYPE: ICD-10-CM

## 2022-06-20 RX ORDER — SIMVASTATIN 40 MG/1
TABLET, FILM COATED ORAL
Qty: 90 TABLET | Refills: 3 | Status: CANCELLED | OUTPATIENT
Start: 2022-06-20

## 2022-06-20 NOTE — TELEPHONE ENCOUNTER
Care Due:                  Date            Visit Type   Department     Provider  --------------------------------------------------------------------------------                                NP -                              PRIMARY      [facility]  Last Visit: 02-      CARE ([facility])   MEDICINE       Tessie Leon  Next Visit: None Scheduled  None         None Found                                                            Last  Test          Frequency    Reason                     Performed    Due Date  --------------------------------------------------------------------------------    CMP.........  12 months..  triamterene-hydrochloroth  07- 07-                             shanell...................    [employer] Embedded Care Gaps. Reference number: [id number]. 6/20/2022   2:25:26 PM CDT

## 2022-06-21 DIAGNOSIS — E78.5 HYPERLIPIDEMIA, UNSPECIFIED HYPERLIPIDEMIA TYPE: ICD-10-CM

## 2022-06-21 DIAGNOSIS — E78.5 HYPERLIPIDEMIA: ICD-10-CM

## 2022-06-21 RX ORDER — SIMVASTATIN 40 MG/1
TABLET, FILM COATED ORAL
Qty: 90 TABLET | Refills: 3 | Status: SHIPPED | OUTPATIENT
Start: 2022-06-21 | End: 2022-11-07 | Stop reason: SDUPTHER

## 2022-06-21 NOTE — TELEPHONE ENCOUNTER
No new care gaps identified.  [facility] Embedded Care Gaps. Reference number: [id number]. 6/21/2022   12:53:09 PM CDT

## 2022-06-21 NOTE — TELEPHONE ENCOUNTER
Refill Routing Note   Medication(s) are not appropriate for processing by [facility] for the following reason(s):      - Medication not previously prescribed by PCP    [facility] action(s):  Defer          Medication reconciliation completed: No     Appointments  past 12m or future 3m with PCP    Date Provider   Last Visit   2/17/2022 Tessie Leon MD   Next Visit   Visit date not found Tessie Leon MD   ED visits in past 90 days: 0        Note composed:12:51 PM 06/21/2022

## 2022-06-29 ENCOUNTER — HOSPITAL ENCOUNTER (OUTPATIENT)
Facility: OTHER | Age: 72
Discharge: HOME | End: 2022-06-29
Attending: ANESTHESIOLOGY | Admitting: ANESTHESIOLOGY
Payer: COMMERCIAL

## 2022-06-29 VITALS
TEMPERATURE: 98 F | DIASTOLIC BLOOD PRESSURE: 57 MMHG | SYSTOLIC BLOOD PRESSURE: 118 MMHG | WEIGHT: 160 LBS | BODY MASS INDEX: 28.35 KG/M2 | HEART RATE: 68 BPM | OXYGEN SATURATION: 98 % | HEIGHT: 63 IN | RESPIRATION RATE: 16 BRPM

## 2022-06-29 DIAGNOSIS — M54.17 LUMBOSACRAL RADICULOPATHY: ICD-10-CM

## 2022-06-29 DIAGNOSIS — M51.36 DDD (DEGENERATIVE DISC DISEASE), LUMBAR: Primary | ICD-10-CM

## 2022-06-29 PROCEDURE — 64484 NJX AA&/STRD TFRM EPI L/S EA: CPT | Mod: RT | Performed by: ANESTHESIOLOGY

## 2022-06-29 PROCEDURE — 64484 PRA INJECT ANES/STEROID FORAMEN LUMBAR/SACRAL W IMG GUIDE ,EA ADD LEVEL: ICD-10-PCS | Mod: RT,,, | Performed by: ANESTHESIOLOGY

## 2022-06-29 PROCEDURE — 63600175 PHARM REV CODE 636 W HCPCS: Performed by: ANESTHESIOLOGY

## 2022-06-29 PROCEDURE — 64484 NJX AA&/STRD TFRM EPI L/S EA: CPT | Mod: RT,,, | Performed by: ANESTHESIOLOGY

## 2022-06-29 PROCEDURE — 25500020 PHARM REV CODE 255: Performed by: ANESTHESIOLOGY

## 2022-06-29 PROCEDURE — 64483 NJX AA&/STRD TFRM EPI L/S 1: CPT | Mod: RT,,, | Performed by: ANESTHESIOLOGY

## 2022-06-29 PROCEDURE — 25000003 PHARM REV CODE 250: Performed by: ANESTHESIOLOGY

## 2022-06-29 PROCEDURE — 25000003 PHARM REV CODE 250: Performed by: STUDENT IN AN ORGANIZED HEALTH CARE EDUCATION/TRAINING PROGRAM

## 2022-06-29 PROCEDURE — 64483 PR EPIDURAL INJ, ANES/STEROID, TRANSFORAMINAL, LUMB/SACR, SNGL LEVL: ICD-10-PCS | Mod: RT,,, | Performed by: ANESTHESIOLOGY

## 2022-06-29 PROCEDURE — 64483 NJX AA&/STRD TFRM EPI L/S 1: CPT | Mod: RT | Performed by: ANESTHESIOLOGY

## 2022-06-29 RX ORDER — LIDOCAINE HYDROCHLORIDE 20 MG/ML
INJECTION, SOLUTION INFILTRATION; PERINEURAL
Status: DISCONTINUED | OUTPATIENT
Start: 2022-06-29 | End: 2022-06-29 | Stop reason: HOSPADM

## 2022-06-29 RX ORDER — MIDAZOLAM HYDROCHLORIDE 1 MG/ML
INJECTION INTRAMUSCULAR; INTRAVENOUS
Status: DISCONTINUED | OUTPATIENT
Start: 2022-06-29 | End: 2022-06-29 | Stop reason: HOSPADM

## 2022-06-29 RX ORDER — SODIUM CHLORIDE 9 MG/ML
500 INJECTION, SOLUTION INTRAVENOUS CONTINUOUS
Status: DISCONTINUED | OUTPATIENT
Start: 2022-06-29 | End: 2022-06-29 | Stop reason: HOSPADM

## 2022-06-29 RX ORDER — FENTANYL CITRATE 50 UG/ML
INJECTION, SOLUTION INTRAMUSCULAR; INTRAVENOUS
Status: DISCONTINUED | OUTPATIENT
Start: 2022-06-29 | End: 2022-06-29 | Stop reason: HOSPADM

## 2022-06-29 RX ORDER — DEXAMETHASONE SODIUM PHOSPHATE 10 MG/ML
INJECTION INTRAMUSCULAR; INTRAVENOUS
Status: DISCONTINUED | OUTPATIENT
Start: 2022-06-29 | End: 2022-06-29 | Stop reason: HOSPADM

## 2022-06-29 RX ORDER — LIDOCAINE HYDROCHLORIDE 10 MG/ML
INJECTION, SOLUTION EPIDURAL; INFILTRATION; INTRACAUDAL; PERINEURAL
Status: DISCONTINUED | OUTPATIENT
Start: 2022-06-29 | End: 2022-06-29 | Stop reason: HOSPADM

## 2022-06-29 NOTE — OP NOTE
Lumbar Transforaminal Epidural Steroid Injection under Fluoroscopic Guidance    The procedure, risks, benefits, and options were discussed with the patient. There are no contraindications to the procedure. The patent expressed understanding and agreed to the procedure. Informed written consent was obtained prior to the start of the procedure and can be found in the patient's chart.    PATIENT NAME: Carmen Hawley   MRN: [id number]     DATE OF PROCEDURE: 06/29/2022    PROCEDURE:  Right  L3/4 and L4/5 Lumbar Transforaminal Epidural Steroid Injection under Fluoroscopic Guidance    PRE-OP DIAGNOSIS: Lumbar radiculopathy [M54.16] Lumbar radiculopathy [M54.16]    POST-OP DIAGNOSIS: Same    PHYSICIAN: Tej Cm MD    ASSISTANTS: Lachelle Contreras DO  [facility]     MEDICATIONS INJECTED: Preservative-free Decadron 10mg with 5cc of Lidocaine 1% MPF     LOCAL ANESTHETIC INJECTED: Xylocaine 2%     SEDATION:   Versed 2mg and Fentanyl 100mcg                                                                                                                                                                                     Conscious sedation ordered by M.D. Patient re-evaluation prior to administration of conscious sedation. No changes noted in patient's status from initial evaluation. The patient's vital signs were monitored by RN and patient remained hemodynamically stable throughout the procedure.    Event Time In   Sedation Start 1010   Sedation End 1022       ESTIMATED BLOOD LOSS: None    COMPLICATIONS: None    TECHNIQUE: Time-out was performed to identify the patient and procedure to be performed. With the patient laying in a prone position, the surgical area was prepped and draped in the usual sterile fashion using ChloraPrep and a fenestrated drape.The levels were determined under fluoroscopy guidance. Skin anesthesia was achieved by injecting Lidocaine 2% over the injection sites. The transforaminal  spaces were then approached with a 22 gauge, 5 inch spinal quinke needle that was introduced under fluoroscopic guidance in the AP and Lateral views. Once the needle tip was in the area of the transforaminal space, and there was no blood, CSF or paraesthesias, contrast dye Omnipaque (240mg/mL) was injected to confirm placement and there was no vascular runoff. Fluoroscopic imaging in the AP and lateral views revealed a clear outline of the spinal nerve with proximal spread of agent through the neural foramen into the epidural space. 3 mL of the medication mixture listed above was injected slowly at each site. Displacement of the radio opaque contrast after injection of the medication confirmed that the medication went into the area of the transforaminal spaces. The needles were removed and bleeding was nil. A sterile dressing was applied. No specimens collected. The patient tolerated the procedure well.       The patient was monitored after the procedure in the recovery area. They were given post-procedure and discharge instructions to follow at home. The patient was discharged in a stable condition.    Tej Cm MD

## 2022-06-29 NOTE — DISCHARGE INSTRUCTIONS

## 2022-06-29 NOTE — H&P
HPI  Patient presenting for Procedure(s) (LRB):  INJECTION, STEROID, EPIDURAL, TRANSFORAMINAL APPROACH, RIGHT L3-L4 AND L4-L5 CONTRAST DIRECT REF (Right)     Patient on Anti-coagulation No    No health changes since previous encounter    Past Medical History:   Diagnosis Date    Anxiety     Arthritis     Hyperlipidemia     Hypertension     Macular degeneration     Mitral valve prolapse      Past Surgical History:   Procedure Laterality Date    COLONOSCOPY N/A 10/8/2020    Procedure: COLONOSCOPY;  Surgeon: Crispin Moon MD;  Location: [facility] ([address] [place] [address]);  Service: Endoscopy;  Laterality: N/A;  Family history of colon polyps  COVID test on 10/5/20 at [address] [place] [address]    TONSILLECTOMY       Review of patient's allergies indicates:  No Known Allergies   Current Facility-Administered Medications   Medication    0.9%  NaCl infusion       PMHx, PSHx, Allergies, Medications reviewed in epic    ROS negative except pain complaints in HPI    OBJECTIVE:    LMP 12/16/2006 (Approximate)   Breastfeeding No     PHYSICAL EXAMINATION:    GENERAL: Well appearing, in no acute distress, alert and oriented x3.  PSYCH:  Mood and affect appropriate.  SKIN: Skin color, texture, turgor normal, no rashes or lesions which will impact the procedure.  CV: RRR with palpation of the radial artery.  PULM: No evidence of respiratory difficulty, symmetric chest rise.   NEURO: Cranial nerves grossly intact.    Plan:    Proceed with procedure as planned Procedure(s) (LRB):  INJECTION, STEROID, EPIDURAL, TRANSFORAMINAL APPROACH, RIGHT L3-L4 AND L4-L5 CONTRAST DIRECT REF (Right)    Lachelle Contreras DO  [employer] [facility] Fellow

## 2022-06-29 NOTE — DISCHARGE SUMMARY
Discharge Note  Short Stay      SUMMARY     Admit Date: 6/29/2022    Attending Physician: Tej Cm MD    Discharge Physician: Tej Cm MD    Discharge Date: 6/29/2022 10:25 AM    Procedure(s) (LRB):  INJECTION, STEROID, EPIDURAL, TRANSFORAMINAL APPROACH, RIGHT L3-L4 AND L4-L5 CONTRAST DIRECT REF (Right)    Final Diagnosis: Lumbar radiculopathy [M54.16]    Disposition: Home or self care    Patient Instructions:   Current Discharge Medication List      CONTINUE these medications which have NOT CHANGED    Details   aspirin (ECOTRIN) 81 MG EC tablet Take 81 mg by mouth once daily.        biotin 300 mcg Tab Take 1 tablet by mouth once daily.      cyanocobalamin 500 MCG tablet Take 500 mcg by mouth once daily.      diclofenac (VOLTAREN) 75 MG EC tablet Take 1 tablet (75 mg total) by mouth 2 (two) times daily.  Qty: 60 tablet, Refills: 1      diclofenac sodium (VOLTAREN) 1 % Gel Apply 2 grams topically 4 (four) times daily.  Qty: 100 g, Refills: 6    Associated Diagnoses: Primary osteoarthritis of right knee      !! EScitalopram oxalate (LEXAPRO) 10 MG tablet Take 1 tablet (10 mg total) by mouth once daily.  Qty: 90 tablet, Refills: 3    Associated Diagnoses: Anxiety      !! EScitalopram oxalate (LEXAPRO) 5 MG Tab Take 1 tablet (5 mg total) by mouth once daily. Take with Escitalopram 10mg once daily  Qty: 90 tablet, Refills: 3      estradioL (ESTRACE) 0.01 % (0.1 mg/gram) vaginal cream Place 1 gram vaginally twice a week.  Qty: 42.5 g, Refills: 3    Associated Diagnoses: Postmenopausal atrophic vaginitis      gabapentin (NEURONTIN) 100 MG capsule Take 1 capsule (100 mg total) by mouth 3 (three) times daily.  Qty: 90 capsule, Refills: 0    Associated Diagnoses: Lumbar radiculopathy      metoprolol succinate (TOPROL-XL) 100 MG 24 hr tablet Take 1 tablet (100 mg total) by mouth once daily.  Qty: 90 tablet, Refills: 3    Associated Diagnoses: Primary hypertension      naltrexone-bupropion (CONTRAVE) 8-90 mg TbSR  Take 2 tablets by mouth 2 (two) times daily.  Qty: 120 tablet, Refills: 3    Associated Diagnoses: Obesity, Class I, BMI 30.0-34.9 (see actual BMI)      simvastatin (ZOCOR) 40 MG tablet TAKE 1 TABLET BY MOUTH EVERY EVENING.  Qty: 90 tablet, Refills: 3    Associated Diagnoses: Hyperlipidemia; Hyperlipidemia, unspecified hyperlipidemia type      tretinoin (RETIN-A) 0.1 % cream APPLY A THIN FILM TO AFFECTED AREA EVERY EVENING AFTER MOISTURIZING.  Qty: 45 g, Refills: 3    Associated Diagnoses: Lentigo      triamterene-hydrochlorothiazide 37.5-25 mg (DYAZIDE) 37.5-25 mg per capsule Take one capsule by mouth every day  Qty: 90 capsule, Refills: 3    Comments: .  Associated Diagnoses: Primary hypertension      vitamin D 1000 units Tab Take 2,000 mg by mouth once daily.        !! - Potential duplicate medications found. Please discuss with provider.              Discharge Diagnosis: Lumbar radiculopathy [M54.16]  Condition on Discharge: Stable with no complications to procedure   Diet on Discharge: Same as before.  Activity: as per instruction sheet.  Discharge to: Home with a responsible adult.  Follow up: 2-4 weeks

## 2022-06-30 ENCOUNTER — PATIENT MESSAGE (OUTPATIENT)
Dept: PAIN MEDICINE | Facility: OTHER | Age: 72
End: 2022-06-30
Payer: COMMERCIAL

## 2022-07-20 ENCOUNTER — TELEPHONE (OUTPATIENT)
Dept: PAIN MEDICINE | Facility: CLINIC | Age: 72
End: 2022-07-20
Payer: COMMERCIAL

## 2022-07-20 NOTE — TELEPHONE ENCOUNTER
"This message is for patient in regards to his/her appointment 07/21/22 at 10:30a       [facility] Policy: For the safety of all patients and staff members.     Patient Visitor policy: Due to social distancing and limited seating staff are requesting patient to arrive to their schedule appointments on time.     Upon arriving to facility; patients are required to wear a face mask, if patient do not have a face mask one will be provided. Upon arriving patient we ask patients to contact clinic at this number [phone] to notify staff that they have arrived or they may do so by utilizing the [facility] in [place](if patient have patient portal; clinical staff will send a message through there letting them know it's okay to proceed to their visit). Staff will give the patient the "okay" to come up or wait inside their vehicle until clinic is ready for patient to be seen by Dr. Tej Cm MD. If you have any questions or concerns please contact [phone]    Patient vm was full  "

## 2022-07-21 ENCOUNTER — TELEPHONE (OUTPATIENT)
Dept: ADMINISTRATIVE | Facility: OTHER | Age: 72
End: 2022-07-21
Payer: COMMERCIAL

## 2022-07-21 ENCOUNTER — OFFICE VISIT (OUTPATIENT)
Dept: PAIN MEDICINE | Facility: CLINIC | Age: 72
End: 2022-07-21
Attending: ANESTHESIOLOGY
Payer: COMMERCIAL

## 2022-07-21 VITALS
HEART RATE: 66 BPM | RESPIRATION RATE: 18 BRPM | HEIGHT: 63 IN | WEIGHT: 158 LBS | BODY MASS INDEX: 28 KG/M2 | TEMPERATURE: 98 F | SYSTOLIC BLOOD PRESSURE: 139 MMHG | DIASTOLIC BLOOD PRESSURE: 71 MMHG

## 2022-07-21 DIAGNOSIS — M51.36 DDD (DEGENERATIVE DISC DISEASE), LUMBAR: ICD-10-CM

## 2022-07-21 DIAGNOSIS — M47.897 OTHER SPONDYLOSIS, LUMBOSACRAL REGION: ICD-10-CM

## 2022-07-21 DIAGNOSIS — M17.11 PRIMARY OSTEOARTHRITIS OF RIGHT KNEE: Primary | ICD-10-CM

## 2022-07-21 DIAGNOSIS — M54.16 LUMBAR RADICULOPATHY: ICD-10-CM

## 2022-07-21 PROCEDURE — 3008F BODY MASS INDEX DOCD: CPT | Mod: CPTII,S$GLB,, | Performed by: ANESTHESIOLOGY

## 2022-07-21 PROCEDURE — 3075F SYST BP GE 130 - 139MM HG: CPT | Mod: CPTII,S$GLB,, | Performed by: ANESTHESIOLOGY

## 2022-07-21 PROCEDURE — 1126F PR PAIN SEVERITY QUANTIFIED, NO PAIN PRESENT: ICD-10-PCS | Mod: CPTII,S$GLB,, | Performed by: ANESTHESIOLOGY

## 2022-07-21 PROCEDURE — 1101F PT FALLS ASSESS-DOCD LE1/YR: CPT | Mod: CPTII,S$GLB,, | Performed by: ANESTHESIOLOGY

## 2022-07-21 PROCEDURE — 1160F PR REVIEW ALL MEDS BY PRESCRIBER/CLIN PHARMACIST DOCUMENTED: ICD-10-PCS | Mod: CPTII,S$GLB,, | Performed by: ANESTHESIOLOGY

## 2022-07-21 PROCEDURE — 1160F RVW MEDS BY RX/DR IN RCRD: CPT | Mod: CPTII,S$GLB,, | Performed by: ANESTHESIOLOGY

## 2022-07-21 PROCEDURE — 1101F PR PT FALLS ASSESS DOC 0-1 FALLS W/OUT INJ PAST YR: ICD-10-PCS | Mod: CPTII,S$GLB,, | Performed by: ANESTHESIOLOGY

## 2022-07-21 PROCEDURE — 3075F PR MOST RECENT SYSTOLIC BLOOD PRESS GE 130-139MM HG: ICD-10-PCS | Mod: CPTII,S$GLB,, | Performed by: ANESTHESIOLOGY

## 2022-07-21 PROCEDURE — 3288F PR FALLS RISK ASSESSMENT DOCUMENTED: ICD-10-PCS | Mod: CPTII,S$GLB,, | Performed by: ANESTHESIOLOGY

## 2022-07-21 PROCEDURE — 1159F PR MEDICATION LIST DOCUMENTED IN MEDICAL RECORD: ICD-10-PCS | Mod: CPTII,S$GLB,, | Performed by: ANESTHESIOLOGY

## 2022-07-21 PROCEDURE — 3008F PR BODY MASS INDEX (BMI) DOCUMENTED: ICD-10-PCS | Mod: CPTII,S$GLB,, | Performed by: ANESTHESIOLOGY

## 2022-07-21 PROCEDURE — 1159F MED LIST DOCD IN RCRD: CPT | Mod: CPTII,S$GLB,, | Performed by: ANESTHESIOLOGY

## 2022-07-21 PROCEDURE — 3288F FALL RISK ASSESSMENT DOCD: CPT | Mod: CPTII,S$GLB,, | Performed by: ANESTHESIOLOGY

## 2022-07-21 PROCEDURE — 3078F PR MOST RECENT DIASTOLIC BLOOD PRESSURE < 80 MM HG: ICD-10-PCS | Mod: CPTII,S$GLB,, | Performed by: ANESTHESIOLOGY

## 2022-07-21 PROCEDURE — 99999 PR PBB SHADOW E&M-EST. PATIENT-LVL IV: ICD-10-PCS | Mod: PBBFAC,,, | Performed by: ANESTHESIOLOGY

## 2022-07-21 PROCEDURE — 3078F DIAST BP <80 MM HG: CPT | Mod: CPTII,S$GLB,, | Performed by: ANESTHESIOLOGY

## 2022-07-21 PROCEDURE — 99999 PR PBB SHADOW E&M-EST. PATIENT-LVL IV: CPT | Mod: PBBFAC,,, | Performed by: ANESTHESIOLOGY

## 2022-07-21 PROCEDURE — 99214 PR OFFICE/OUTPT VISIT, EST, LEVL IV, 30-39 MIN: ICD-10-PCS | Mod: S$GLB,,, | Performed by: ANESTHESIOLOGY

## 2022-07-21 PROCEDURE — 99214 OFFICE O/P EST MOD 30 MIN: CPT | Mod: S$GLB,,, | Performed by: ANESTHESIOLOGY

## 2022-07-21 PROCEDURE — 1126F AMNT PAIN NOTED NONE PRSNT: CPT | Mod: CPTII,S$GLB,, | Performed by: ANESTHESIOLOGY

## 2022-07-21 RX ORDER — GABAPENTIN 300 MG/1
300 CAPSULE ORAL 2 TIMES DAILY
Qty: 60 CAPSULE | Refills: 0 | Status: SHIPPED | OUTPATIENT
Start: 2022-07-21 | End: 2022-09-06 | Stop reason: SDUPTHER

## 2022-07-21 NOTE — PROGRESS NOTES
Chronic patient Established Note (Follow up visit)      SUBJECTIVE:    Carmen Hawley presents to the clinic for a follow-up appointment for low back pain. She received right L3/L4 and L4/L5 which she reports great relief. She reports that since her KYLE she has been able to be more active and play golf more regularly. She reports that she taking Gabapentin 100mg TID and diclofenac gel/PO tablet for her right knee pain that has been a chronic problem. Today, she reports that the biggest pain currently. Her pain in the right knee mainly occurs at night and worsens with prolong walking. Pain is relieved with rest and medication. Majority of her knee pain is located on the medial aspect of her knee. She is has been in PT for her knee which she reports has been helping with her pain. She reports that her low back pain is currently a 1/10 and feels great. She does reports that her right knee pain at a 6/10 and is achy in nature. Denies numbness/tingling, weakness, changes in bowel/bladder.     Pain Disability Index Review:  Last 3 PDI Scores 2022   Pain Disability Index (PDI) 0       Pain Medications:  Diclofenac 75 mg tablet   Diclofenac gel   Tylenol     Opioid Contract: not applicable     report:  Not applicable    Pain Procedures:   22 - Right L3/L4 and L4/L5: 90% relief     Physical Therapy/Home Exercise: yes; she has been to therapy for the knee     Imagin22   EXAMINATION:  XR KNEE ORTHO RIGHT WITH FLEXION     CLINICAL HISTORY:  ROOM 21;  Unilateral primary osteoarthritis, right knee     TECHNIQUE:  AP standing as well as PA flexion standing and Merchant views of both knees were performed.  A lateral view of the right knee is also performed.     COMPARISON:  2021     FINDINGS:  No acute fracture or dislocation seen.  Soft tissues are unremarkable.  Small suprapatellar joint effusion.     Tricompartmental osteophyte formation.  Subchondral sclerosis with severe bone-on-bone narrowing  medial tibiofemoral compartment.  Narrowing patellofemoral compartment..     Impression:     No acute osseous abnormality seen.  Advanced osteoarthritis medial tibiofemoral compartment.    EXAMINATION:  XR LUMBAR SPINE AP AND LAT WITH FLEX/EXT     CLINICAL HISTORY:  ROOM 21;  Pain in thoracic spine     TECHNIQUE:  Three views of the lumbar spine plus flexion extension views were performed.     COMPARISON:  Lumbar spine radiograph from 11/18/2021.     FINDINGS:  Alignment: Alignment is maintained.  No dynamic instability.     Vertebrae: Vertebral body heights are maintained.  No suspicious appearing lytic or blastic lesions.     Discs and facets: Multilevel moderate to severe disc space narrowing, most prominent at L3-L4 and L5-S1. Moderate facet arthropathy within the lower lumbar spine.     Miscellaneous: Calcific abdominal aortic atherosclerosis.     Impression:     As above.    5/16/22  EXAMINATION:  MRI LUMBAR SPINE WITHOUT CONTRAST     CLINICAL HISTORY:  Low back pain, symptoms persist with > 6wks conservative treatment; Dorsalgia, unspecified     TECHNIQUE:  Multiplanar, multisequence MR images were acquired from the thoracolumbar junction to the sacrum without the administration of contrast.     COMPARISON:  Lumbar spine radiograph 05/16/2022.  MRI lumbar spine 11/28/2018, 01/23/2007.     FINDINGS:  Alignment: Normal.     Vertebrae: No acute fracture or marrow replacement process.     Discs: Severe multilevel disc height loss with endplate irregularities and sub endplate edema, favored to be degenerative.     Cord: Normal.  Conus terminates at L2.     Degenerative findings:     T12-L1: Circumferential disc bulge, bilateral facet arthropathy and ligamentum flavum hypertrophy.  No spinal canal stenosis or neuroforaminal narrowing.     L1-L2: Mild circumferential disc bulge, bilateral facet arthropathy and ligamentum flavum hypertrophy.  No spinal canal stenosis or neuroforaminal narrowing.     L2-L3:  Circumferential disc bulge, bilateral facet arthropathy and ligamentum flavum hypertrophy resulting in mild bilateral neural foraminal narrowing.  No significant spinal canal stenosis.     L3-L4: Circumferential disc bulge, bilateral facet arthropathy and ligamentum flavum hypertrophy resulting in moderate left and mild right neural foraminal narrowing.  No significant spinal canal stenosis.     L4-L5: There is a moderate-sized right paracentral/lateral recess less disc extrusion extending 1.3 cm inferiorly from the endplate (series 7, image 9).  This may impinge upon the descending right L5 nerve root.  There is superimposed moderate disc bulging and facet hypertrophy, resulting in moderate/severe spinal canal stenosis.  There is severe right and moderate left-sided neural foraminal narrowing.  There is advanced right-sided disc height loss with prominent sub endplate marrow edema and facet joint arthropathy (series 6, images 6-8).     L5-S1: There is advanced disc height loss with sub endplate plate sclerosis.  Moderate disc bulging and mild facet arthropathy noted.  These findings in severe right and moderate left-sided neural foraminal narrowing.  The spinal canal is patent.     Paraspinal muscles & soft tissues: 1.9 cm right renal cyst.     Impression:     Right paracentral disc extrusion at L4-5, possibly impinging upon the descending right L5 nerve root, as above.     Additional lumbar spondylosis, resulting in moderate/ severe spinal canal stenosis at L4-5 and moderate/ severe neural foraminal narrowing at L4-5 and L5-S1, as above.     Prominent degenerative disc disease and right-sided facet joint arthropathy at L4-5, as above.    Allergies: Review of patient's allergies indicates:  No Known Allergies    Current Medications:   Current Outpatient Medications   Medication Sig Dispense Refill    aspirin (ECOTRIN) 81 MG EC tablet Take 81 mg by mouth once daily.        biotin 300 mcg Tab Take 1 tablet by mouth  once daily.      cyanocobalamin 500 MCG tablet Take 500 mcg by mouth once daily.      diclofenac (VOLTAREN) 75 MG EC tablet Take 1 tablet (75 mg total) by mouth 2 (two) times daily. 60 tablet 1    diclofenac sodium (VOLTAREN) 1 % Gel Apply 2 grams topically 4 (four) times daily. 100 g 6    EScitalopram oxalate (LEXAPRO) 10 MG tablet Take 1 tablet (10 mg total) by mouth once daily. 90 tablet 3    EScitalopram oxalate (LEXAPRO) 5 MG Tab Take 1 tablet (5 mg total) by mouth once daily. Take with Escitalopram 10mg once daily 90 tablet 3    estradioL (ESTRACE) 0.01 % (0.1 mg/gram) vaginal cream Place 1 gram vaginally twice a week. 42.5 g 3    gabapentin (NEURONTIN) 100 MG capsule Take 1 capsule (100 mg total) by mouth 3 (three) times daily. 90 capsule 0    metoprolol succinate (TOPROL-XL) 100 MG 24 hr tablet Take 1 tablet (100 mg total) by mouth once daily. 90 tablet 3    naltrexone-bupropion (CONTRAVE) 8-90 mg TbSR Take 2 tablets by mouth 2 (two) times daily. 120 tablet 3    simvastatin (ZOCOR) 40 MG tablet TAKE 1 TABLET BY MOUTH EVERY EVENING. 90 tablet 3    tretinoin (RETIN-A) 0.1 % cream APPLY A THIN FILM TO AFFECTED AREA EVERY EVENING AFTER MOISTURIZING. 45 g 3    triamterene-hydrochlorothiazide 37.5-25 mg (DYAZIDE) 37.5-25 mg per capsule Take one capsule by mouth every day 90 capsule 3    vitamin D 1000 units Tab Take 2,000 mg by mouth once daily.        No current facility-administered medications for this visit.       REVIEW OF SYSTEMS:    GENERAL:  No weight loss, malaise or fevers.  HEENT:  Negative for frequent or significant headaches.  NECK:  Negative for lumps, goiter, pain and significant neck swelling.  RESPIRATORY:  Negative for cough, wheezing or shortness of breath.  CARDIOVASCULAR:  Negative for chest pain, leg swelling or palpitations.  GI:  Negative for abdominal discomfort, blood in stools or black stools or change in bowel habits.  MUSCULOSKELETAL:  See HPI.  SKIN:  Negative for  lesions, rash, and itching.  PSYCH:  Negative for sleep disturbance, mood disorder and recent psychosocial stressors.  HEMATOLOGY/LYMPHOLOGY:  Negative for prolonged bleeding, bruising easily or swollen nodes.  NEURO:   No history of headaches, syncope, paralysis, seizures or tremors.  All other reviewed and negative other than HPI.    Past Medical History:  Past Medical History:   Diagnosis Date    Anxiety     Arthritis     Hyperlipidemia     Hypertension     Macular degeneration     Mitral valve prolapse        Past Surgical History:  Past Surgical History:   Procedure Laterality Date    COLONOSCOPY N/A 10/8/2020    Procedure: COLONOSCOPY;  Surgeon: Crispin Moon MD;  Location: [facility] ENDO (4TH FLR);  Service: Endoscopy;  Laterality: N/A;  Family history of colon polyps  COVID test on 10/5/20 at [facility] floor -     TONSILLECTOMY      TRANSFORAMINAL EPIDURAL INJECTION OF STEROID Right 6/29/2022    Procedure: INJECTION, STEROID, EPIDURAL, TRANSFORAMINAL APPROACH, RIGHT L3-L4 AND L4-L5 CONTRAST DIRECT REF;  Surgeon: Tej Cm MD;  Location: [facility] [place] [facility]IN MGT;  Service: Pain Management;  Laterality: Right;       Family History:  Family History   Problem Relation Age of Onset    Cancer Mother         lung    Stroke Mother     Heart disease Father     Diabetes Father     Diabetes Brother     Aortic aneurysm Brother         surgery 5/2012    Kidney disease Brother         on dialysis    Melanoma Neg Hx     Breast cancer Neg Hx     Colon cancer Neg Hx     Ovarian cancer Neg Hx        Social History:  Social History     Socioeconomic History    Marital status:    Occupational History     Employer: [employer]   Tobacco Use    Smoking status: Never Smoker    Smokeless tobacco: Never Used   Substance and Sexual Activity    Alcohol use: Yes     Comment: 7 drinks weekly     Drug use: No    Sexual activity: Yes     Partners: Male     Birth control/protection:  "Post-menopausal     Comment:  since 2000   Social History Narrative    Works in [employer] department at [employer]. Working part-time.        Likes to play golf, walks for exercise.        OBJECTIVE:    /71   Pulse 66   Temp 97.7 °F (36.5 °C)   Resp 18   Ht 5' 3" (1.6 m)   Wt 71.7 kg (158 lb)   LMP 12/16/2006 (Approximate)   BMI 27.99 kg/m²     PHYSICAL EXAMINATION:    General appearance: Well appearing, in no acute distress, alert and oriented x3.  Psych:  Mood and affect appropriate.  Skin: Skin color, texture, turgor normal, no rashes or lesions, in both upper and lower body.  Head/face:  Atraumatic, normocephalic. No palpable lymph nodes  Neck: No pain to palpation over the cervical paraspinous muscles. Spurling Negative. No pain with neck flexion, extension, or lateral flexion. .  Cor: RRR  Pulm: CTA  GI: Abdomen soft and non-tender.  Back: Straight leg raising in the sitting and supine positions is negative to radicular pain. mild pain to palpation over the spine or costovertebral angles. Normal range of motion without pain reproduction. Negative FACET loading bilaterally. Positive axial loading test bilateral. Positive FABERE,Ganselin and Yeoman's test on the both side.negative FADIR  Extremities: Peripheral joint ROM is full and pain free without obvious instability or laxity in all four extremities. No deformities, edema, or skin discoloration. Good capillary refill.  Right knee with slight effusion/swelling compared to left. positive for rt knee limited ROM with tenderness on palpation and crepitus on movement, negative ant and post drawer sign  Musculoskeletal: Negative SLR/LOLY/FADIR bilaterally. Negative for varus/valgus laxity in knee bilaterally. Negative Lachman/Anterior/Posterior Drawer bilaterally. TTP medial aspect of right knee.  Bilateral upper and lower extremity strength is normal and symmetric.  No atrophy or tone abnormalities are noted.  Neuro: " Bilateral upper and lower extremity coordination and muscle stretch reflexes are physiologic and symmetric.  Plantar response are downgoing. No loss of sensation is noted.  Gait: Normal.    ASSESSMENT: 72 y.o. year old female with chronic right knee pain and low back pain, consistent with      1. Primary osteoarthritis of right knee  Procedure Order to Pain Management   2. Lumbar radiculopathy     3. DDD (degenerative disc disease), lumbar     4. Other spondylosis, lumbosacral region           PLAN:       - I have stressed the importance of physical activity and a home exercise plan to help with pain and improve health.    - Patient can continue with medications for now since they are providing benefits, using them appropriately, and without side effects.    - Discussed in detail that the patient's right knee pain is secondary to OA of the knee. Will schedule for Right knee Synvisc-one injection     - Discussed in detail that patients low back pain was most likely secondary to lumbar radiculopathy. Patient reported 90% relief of pain after right L3/L4 and L4/L5 TFESI and states that she has been more functional since having procedure done.    - Increased Gabapentin to 300mg BID      - RTC 4 weeks after scheduled procedure.    - Counseled patient regarding the importance of activity modification, constant sleeping habits and physical therapy.    The above plan and management options were discussed at length with patient. Patient is in agreement with the above and verbalized understanding.    Bran David, DO   [employer] [facility] PGY-2   I have personally reviewed the history and exam of this patient and agree with the resident/fellow/NPs note as stated above.    Tej Cm MD  7/21/22

## 2022-07-22 ENCOUNTER — TELEPHONE (OUTPATIENT)
Dept: ADMINISTRATIVE | Facility: OTHER | Age: 72
End: 2022-07-22
Payer: COMMERCIAL

## 2022-07-25 ENCOUNTER — TELEPHONE (OUTPATIENT)
Dept: ADMINISTRATIVE | Facility: OTHER | Age: 72
End: 2022-07-25
Payer: COMMERCIAL

## 2022-07-29 ENCOUNTER — TELEPHONE (OUTPATIENT)
Dept: ADMINISTRATIVE | Facility: OTHER | Age: 72
End: 2022-07-29
Payer: COMMERCIAL

## 2022-08-03 ENCOUNTER — PATIENT MESSAGE (OUTPATIENT)
Dept: BARIATRICS | Facility: CLINIC | Age: 72
End: 2022-08-03
Payer: COMMERCIAL

## 2022-08-10 ENCOUNTER — OFFICE VISIT (OUTPATIENT)
Dept: BARIATRICS | Facility: CLINIC | Age: 72
End: 2022-08-10
Payer: COMMERCIAL

## 2022-08-10 VITALS
HEART RATE: 66 BPM | SYSTOLIC BLOOD PRESSURE: 124 MMHG | BODY MASS INDEX: 28.82 KG/M2 | DIASTOLIC BLOOD PRESSURE: 74 MMHG | WEIGHT: 162.69 LBS | OXYGEN SATURATION: 99 % | HEIGHT: 63 IN

## 2022-08-10 DIAGNOSIS — E88.819 INSULIN RESISTANCE: ICD-10-CM

## 2022-08-10 DIAGNOSIS — E66.3 OVERWEIGHT (BMI 25.0-29.9): Primary | ICD-10-CM

## 2022-08-10 PROCEDURE — 99999 PR PBB SHADOW E&M-EST. PATIENT-LVL IV: CPT | Mod: PBBFAC,,, | Performed by: INTERNAL MEDICINE

## 2022-08-10 PROCEDURE — 1126F PR PAIN SEVERITY QUANTIFIED, NO PAIN PRESENT: ICD-10-PCS | Mod: CPTII,S$GLB,, | Performed by: INTERNAL MEDICINE

## 2022-08-10 PROCEDURE — 99213 OFFICE O/P EST LOW 20 MIN: CPT | Mod: S$GLB,,, | Performed by: INTERNAL MEDICINE

## 2022-08-10 PROCEDURE — 3074F SYST BP LT 130 MM HG: CPT | Mod: CPTII,S$GLB,, | Performed by: INTERNAL MEDICINE

## 2022-08-10 PROCEDURE — 1101F PR PT FALLS ASSESS DOC 0-1 FALLS W/OUT INJ PAST YR: ICD-10-PCS | Mod: CPTII,S$GLB,, | Performed by: INTERNAL MEDICINE

## 2022-08-10 PROCEDURE — 1159F PR MEDICATION LIST DOCUMENTED IN MEDICAL RECORD: ICD-10-PCS | Mod: CPTII,S$GLB,, | Performed by: INTERNAL MEDICINE

## 2022-08-10 PROCEDURE — 3074F PR MOST RECENT SYSTOLIC BLOOD PRESSURE < 130 MM HG: ICD-10-PCS | Mod: CPTII,S$GLB,, | Performed by: INTERNAL MEDICINE

## 2022-08-10 PROCEDURE — 3288F PR FALLS RISK ASSESSMENT DOCUMENTED: ICD-10-PCS | Mod: CPTII,S$GLB,, | Performed by: INTERNAL MEDICINE

## 2022-08-10 PROCEDURE — 3008F PR BODY MASS INDEX (BMI) DOCUMENTED: ICD-10-PCS | Mod: CPTII,S$GLB,, | Performed by: INTERNAL MEDICINE

## 2022-08-10 PROCEDURE — 3008F BODY MASS INDEX DOCD: CPT | Mod: CPTII,S$GLB,, | Performed by: INTERNAL MEDICINE

## 2022-08-10 PROCEDURE — 1159F MED LIST DOCD IN RCRD: CPT | Mod: CPTII,S$GLB,, | Performed by: INTERNAL MEDICINE

## 2022-08-10 PROCEDURE — 99213 PR OFFICE/OUTPT VISIT, EST, LEVL III, 20-29 MIN: ICD-10-PCS | Mod: S$GLB,,, | Performed by: INTERNAL MEDICINE

## 2022-08-10 PROCEDURE — 3078F DIAST BP <80 MM HG: CPT | Mod: CPTII,S$GLB,, | Performed by: INTERNAL MEDICINE

## 2022-08-10 PROCEDURE — 1101F PT FALLS ASSESS-DOCD LE1/YR: CPT | Mod: CPTII,S$GLB,, | Performed by: INTERNAL MEDICINE

## 2022-08-10 PROCEDURE — 1126F AMNT PAIN NOTED NONE PRSNT: CPT | Mod: CPTII,S$GLB,, | Performed by: INTERNAL MEDICINE

## 2022-08-10 PROCEDURE — 3078F PR MOST RECENT DIASTOLIC BLOOD PRESSURE < 80 MM HG: ICD-10-PCS | Mod: CPTII,S$GLB,, | Performed by: INTERNAL MEDICINE

## 2022-08-10 PROCEDURE — 99999 PR PBB SHADOW E&M-EST. PATIENT-LVL IV: ICD-10-PCS | Mod: PBBFAC,,, | Performed by: INTERNAL MEDICINE

## 2022-08-10 PROCEDURE — 3288F FALL RISK ASSESSMENT DOCD: CPT | Mod: CPTII,S$GLB,, | Performed by: INTERNAL MEDICINE

## 2022-08-10 RX ORDER — METFORMIN HYDROCHLORIDE 500 MG/1
500 TABLET, EXTENDED RELEASE ORAL
Qty: 90 TABLET | Refills: 1 | Status: SHIPPED | OUTPATIENT
Start: 2022-08-10 | End: 2022-11-17 | Stop reason: SDUPTHER

## 2022-08-10 NOTE — PATIENT INSTRUCTIONS
Start metformin with dinner. Always take with food.  No Alcohol.           Contrave is long term weight loss medication. Side effects may include insomnia, nausea, headache, constipation, depression or change in thinking.  These side effects will improve with stopping the medication.        [email or web address]    Continue with  2 pills twice a day.     1000 jeremiah pb meal planner given previously     Add some type of resistance training 2-3 days a week. These can be body weight exercises, light weight or elastic bands. [employer] and [employer] are great sources for free work out plans and videos.       Tips for preparing for hurricane season:    If you are on weight loss medications, please take your medication with you in case of evacuation. Injectable medications should be transported with an ice pack if unopened or room temperature if you have started to use them.   Hurricane supplies do not necessarily need to be junk food or high in carbs or sugar. Shelf stable and healthy choices to include in your supplies could include:  Canned/packets of tuna or chicken  Apples, oranges, banana, pears  Beef or turkey jerky  Sugar free pudding  Pickle, olives, dill relish (mix with the tuna or chicken!)  Low sodium or no salt added canned vegetables  If you get bread, get lite bread (40 calories a slice)  0 sugar sports drinks  Water  String cheese will be okay for a few days without refrigeration or in an ice chest.   Peanut or other nut butter.   Parmesan crisps  Pork skins  Protein shakes (20-30g protein, less than 5 g sugar)  Protein bars (15 or more g protein, less than 5 g sugar)  Don't forget disposable forks, spoons, plates in your supplies  If evacuated, manage stress by taking walks, reading or meditating.   If eating out make better choices when possible.   Salads with a lean protein and limited dressing, cheese or other toppings  Grilled chicken sandwich or burger without the bun.   Skip the fries!  Order water or  unsweetened tea instead of soda  Grocery stores with a deli, salad/food bar can be a good quick and affordable option to replace fast food

## 2022-08-10 NOTE — PROGRESS NOTES
"Subjective:       Patient ID: Carmen Hawley is a 72 y.o. female.    Chief Complaint: Follow-up    Pt here today for follow up. Has gained 3 lbs from previous visit  net neg 7 lbs. Has been on Contrave since last OV. Increased to 2 pill in the morning and 2 pills in the evening last ov. Has been taking at least 2 Iin the am, and someties forgets the pm. Did try WW< but does not care for the tracking. Has been eating out the past couple of days.  On chart review was 158# a couple of weeks ago. Has been doing a lot of gardening and raking.    She has been on topiramate as well.  She is enrolled in digital HTN.  BP  Good today.   Was on topiramate in the past.    diethylpropion , last filled 3/10/21  checked today.     Lab Results   Component Value Date    HGBA1C 5.5 07/14/2021    HGBA1C 5.8 01/12/2021    HGBA1C 6.1 (H) 07/09/2020     Lab Results   Component Value Date    LDLCALC 108.6 07/14/2021    CREATININE 0.8 07/14/2021        BS 1000 on recent labs  Follow-up  Associated symptoms include arthralgias. Pertinent negatives include no chest pain, chills or fever.     Review of Systems   Constitutional: Negative for chills and fever.   Respiratory: Negative for apnea and shortness of breath.         + snores   Cardiovascular: Negative for chest pain and leg swelling.   Gastrointestinal: Negative for constipation and diarrhea.        Denies HB   Genitourinary: Negative for dysuria.   Musculoskeletal: Positive for arthralgias. Negative for back pain.        Left foot OA   Neurological: Negative for dizziness and light-headedness.   Psychiatric/Behavioral: Negative for dysphoric mood. The patient is not nervous/anxious.         Doing well on Lexapro       Objective:       /74 (BP Location: Right arm, Patient Position: Sitting, BP Method: Large (Manual))   Pulse 66   Ht 5' 3" (1.6 m)   Wt 73.8 kg (162 lb 11.2 oz)   LMP 12/16/2006 (Approximate)   SpO2 99%   BMI 28.82 kg/m²     Physical Exam  Vitals " reviewed.   Constitutional:       General: She is not in acute distress.     Appearance: She is well-developed.      Comments: Obese     HENT:      Head: Normocephalic and atraumatic.   Eyes:      General: No scleral icterus.     Pupils: Pupils are equal, round, and reactive to light.   Cardiovascular:      Rate and Rhythm: Normal rate.   Pulmonary:      Effort: Pulmonary effort is normal.   Musculoskeletal:         General: Normal range of motion.      Cervical back: Normal range of motion and neck supple.   Skin:     General: Skin is warm and dry.      Findings: No erythema.   Neurological:      Mental Status: She is alert and oriented to person, place, and time.      Cranial Nerves: No cranial nerve deficit.   Psychiatric:         Behavior: Behavior normal.         Judgment: Judgment normal.         Assessment:       1. Overweight (BMI 25.0-29.9)    2. Insulin resistance        Plan:            Carmen was seen today for follow-up.    Diagnoses and all orders for this visit:    Overweight (BMI 25.0-29.9)  -     metFORMIN (GLUCOPHAGE-XR) 500 MG ER 24hr tablet; Take 1 tablet (500 mg total) by mouth daily with breakfast.    Insulin resistance    Other orders  -     naltrexone-bupropion (CONTRAVE) 8-90 mg TbSR; Take 2 tablets by mouth 2 (two) times daily.       Start metformin with dinner. Always take with food.  No Alcohol.     Contrave is long term weight loss medication. Side effects may include insomnia, nausea, headache, constipation, depression or change in thinking.  These side effects will improve with stopping the medication.        [email or web address]    Continue with  2 pills twice a day.     1000 jeremiah pb meal planner given previously     Add some type of resistance training 2-3 days a week. These can be body weight exercises, light weight or elastic bands. [employer] and [employer] are great sources for free work out plans and videos.         Hurricane tips given.

## 2022-08-17 ENCOUNTER — HOSPITAL ENCOUNTER (OUTPATIENT)
Facility: OTHER | Age: 72
Discharge: HOME | End: 2022-08-17
Attending: ANESTHESIOLOGY | Admitting: ANESTHESIOLOGY
Payer: COMMERCIAL

## 2022-08-17 VITALS
BODY MASS INDEX: 28 KG/M2 | SYSTOLIC BLOOD PRESSURE: 165 MMHG | WEIGHT: 158 LBS | OXYGEN SATURATION: 100 % | TEMPERATURE: 98 F | HEART RATE: 61 BPM | RESPIRATION RATE: 16 BRPM | DIASTOLIC BLOOD PRESSURE: 76 MMHG | HEIGHT: 63 IN

## 2022-08-17 DIAGNOSIS — M17.11 PRIMARY OSTEOARTHRITIS OF RIGHT KNEE: Primary | ICD-10-CM

## 2022-08-17 DIAGNOSIS — G89.29 CHRONIC PAIN: ICD-10-CM

## 2022-08-17 PROCEDURE — 25000003 PHARM REV CODE 250: Performed by: ANESTHESIOLOGY

## 2022-08-17 PROCEDURE — 20610 DRAIN/INJ JOINT/BURSA W/O US: CPT | Mod: RT | Performed by: ANESTHESIOLOGY

## 2022-08-17 PROCEDURE — 77002 PR FLUOROSCOPIC GUIDANCE NEEDLE PLACEMENT: ICD-10-PCS | Mod: 26,,, | Performed by: ANESTHESIOLOGY

## 2022-08-17 PROCEDURE — 25500020 PHARM REV CODE 255: Performed by: ANESTHESIOLOGY

## 2022-08-17 PROCEDURE — 77002 NEEDLE LOCALIZATION BY XRAY: CPT | Mod: 26,,, | Performed by: ANESTHESIOLOGY

## 2022-08-17 PROCEDURE — 20610 DRAIN/INJ JOINT/BURSA W/O US: CPT | Mod: RT,,, | Performed by: ANESTHESIOLOGY

## 2022-08-17 PROCEDURE — 63600175 PHARM REV CODE 636 W HCPCS: Performed by: ANESTHESIOLOGY

## 2022-08-17 PROCEDURE — 77002 NEEDLE LOCALIZATION BY XRAY: CPT | Mod: 26 | Performed by: ANESTHESIOLOGY

## 2022-08-17 PROCEDURE — 20610 PR DRAIN/INJECT LARGE JOINT/BURSA: ICD-10-PCS | Mod: RT,,, | Performed by: ANESTHESIOLOGY

## 2022-08-17 RX ORDER — LIDOCAINE HYDROCHLORIDE 20 MG/ML
INJECTION, SOLUTION INFILTRATION; PERINEURAL
Status: DISCONTINUED | OUTPATIENT
Start: 2022-08-17 | End: 2022-08-17 | Stop reason: HOSPADM

## 2022-08-17 RX ORDER — SODIUM CHLORIDE 9 MG/ML
500 INJECTION, SOLUTION INTRAVENOUS CONTINUOUS
Status: DISCONTINUED | OUTPATIENT
Start: 2022-08-17 | End: 2022-08-17 | Stop reason: HOSPADM

## 2022-08-17 RX ORDER — BUPIVACAINE HYDROCHLORIDE 2.5 MG/ML
INJECTION, SOLUTION EPIDURAL; INFILTRATION; INTRACAUDAL
Status: DISCONTINUED | OUTPATIENT
Start: 2022-08-17 | End: 2022-08-17 | Stop reason: HOSPADM

## 2022-08-17 RX ORDER — TRIAMCINOLONE ACETONIDE 40 MG/ML
INJECTION, SUSPENSION INTRA-ARTICULAR; INTRAMUSCULAR
Status: DISCONTINUED | OUTPATIENT
Start: 2022-08-17 | End: 2022-08-17 | Stop reason: HOSPADM

## 2022-08-17 NOTE — H&P
HPI  Carmen Hawley presents for Right Knee synvisc injection        Past Medical History:   Diagnosis Date    Anxiety     Arthritis     Hyperlipidemia     Hypertension     Macular degeneration     Mitral valve prolapse      Past Surgical History:   Procedure Laterality Date    COLONOSCOPY N/A 10/8/2020    Procedure: COLONOSCOPY;  Surgeon: Crispin Moon MD;  Location: [facility] (4TH FLR);  Service: Endoscopy;  Laterality: N/A;  Family history of colon polyps  COVID test on 10/5/20 at 2nd floor -     TONSILLECTOMY      TRANSFORAMINAL EPIDURAL INJECTION OF STEROID Right 6/29/2022    Procedure: INJECTION, STEROID, EPIDURAL, TRANSFORAMINAL APPROACH, RIGHT L3-L4 AND L4-L5 CONTRAST DIRECT REF;  Surgeon: Tej Cm MD;  [facility];  Service: Pain Management;  Laterality: Right;     Review of patient's allergies indicates:  No Known Allergies     PMHx, PSHx, Allergies, Medications reviewed in epic      ROS negative except pain complaints in HPI    OBJECTIVE:    LMP 12/16/2006 (Approximate)     PHYSICAL EXAMINATION:    GENERAL: Well appearing, in no acute distress, alert and oriented x3.  PSYCH:  Mood and affect appropriate.  SKIN: Skin color, texture, turgor normal, no rashes or lesions.  CV: RRR with palpation of the radial artery.  PULM: No evidence of respiratory difficulty, symmetric chest rise. Clear to auscultation.  NEURO: Cranial nerves grossly intact.    Plan:    Proceed with procedure as planned    Jenny Fernandez  08/17/2022

## 2022-08-17 NOTE — DISCHARGE SUMMARY
Discharge Note  Short Stay      SUMMARY     Admit Date: 8/17/2022    Attending Physician: Maykel Oconnor      Discharge Physician: Maykel Oconnor      Discharge Date: 8/17/2022 1:48 PM    Procedure(s) (LRB):  INJECTION, RIGHT KNEE SYNVISC CONTRAST (Right)    Final Diagnosis: Primary osteoarthritis of right knee [M17.11]    Disposition: Home or self care    Patient Instructions:   Current Discharge Medication List      CONTINUE these medications which have NOT CHANGED    Details   aspirin (ECOTRIN) 81 MG EC tablet Take 81 mg by mouth once daily.        biotin 300 mcg Tab Take 1 tablet by mouth once daily.      cyanocobalamin 500 MCG tablet Take 500 mcg by mouth once daily.      diclofenac (VOLTAREN) 75 MG EC tablet Take 1 tablet (75 mg total) by mouth 2 (two) times daily.  Qty: 60 tablet, Refills: 1      diclofenac sodium (VOLTAREN) 1 % Gel Apply 2 grams topically 4 (four) times daily.  Qty: 100 g, Refills: 6    Associated Diagnoses: Primary osteoarthritis of right knee      !! EScitalopram oxalate (LEXAPRO) 10 MG tablet Take 1 tablet (10 mg total) by mouth once daily.  Qty: 90 tablet, Refills: 3    Associated Diagnoses: Anxiety      !! EScitalopram oxalate (LEXAPRO) 5 MG Tab Take 1 tablet (5 mg total) by mouth once daily. Take with Escitalopram 10mg once daily  Qty: 90 tablet, Refills: 3      estradioL (ESTRACE) 0.01 % (0.1 mg/gram) vaginal cream Place 1 gram vaginally twice a week.  Qty: 42.5 g, Refills: 3    Associated Diagnoses: Postmenopausal atrophic vaginitis      gabapentin (NEURONTIN) 300 MG capsule Take 1 capsule (300 mg total) by mouth 2 (two) times daily.  Qty: 60 capsule, Refills: 0      metFORMIN (GLUCOPHAGE-XR) 500 MG ER 24hr tablet Take 1 tablet (500 mg total) by mouth daily with breakfast.  Qty: 90 tablet, Refills: 1    Associated Diagnoses: Overweight (BMI 25.0-29.9)      metoprolol succinate (TOPROL-XL) 100 MG 24 hr tablet Take 1 tablet (100 mg total) by mouth once daily.  Qty: 90 tablet,  Refills: 3    Associated Diagnoses: Primary hypertension      naltrexone-bupropion (CONTRAVE) 8-90 mg TbSR Take 2 tablets by mouth 2 (two) times daily.  Qty: 120 tablet, Refills: 3      simvastatin (ZOCOR) 40 MG tablet TAKE 1 TABLET BY MOUTH EVERY EVENING.  Qty: 90 tablet, Refills: 3    Associated Diagnoses: Hyperlipidemia; Hyperlipidemia, unspecified hyperlipidemia type      tretinoin (RETIN-A) 0.1 % cream APPLY A THIN FILM TO AFFECTED AREA EVERY EVENING AFTER MOISTURIZING.  Qty: 45 g, Refills: 3    Associated Diagnoses: Lentigo      triamterene-hydrochlorothiazide 37.5-25 mg (DYAZIDE) 37.5-25 mg per capsule Take one capsule by mouth every day  Qty: 90 capsule, Refills: 3    Comments: .  Associated Diagnoses: Primary hypertension      vitamin D 1000 units Tab Take 2,000 mg by mouth once daily.        !! - Potential duplicate medications found. Please discuss with provider.              Discharge Diagnosis: Primary osteoarthritis of right knee [M17.11]  Condition on Discharge: Stable with no complications to procedure   Diet on Discharge: Same as before.  Activity: as per instruction sheet.  Discharge to: Home with a responsible adult.  Follow up: 2-4 weeks

## 2022-08-17 NOTE — OP NOTE
Knee Steroid Injection under Fluoroscopic Guidance    The procedure, risks, benefits, and options were discussed with the patient. There are no contraindications to the procedure. The patent expressed understanding and agreed to the procedure. Informed written consent was obtained prior to the start of the procedure and can be found in the patient's chart.    PATIENT NAME: Carmen Hawley   MRN: [id number]     DATE OF PROCEDURE: 08/17/2022    PROCEDURE: Right Knee Steroid Injection under Fluoroscopic Guidance    PRE-OP DIAGNOSIS: Primary osteoarthritis of right knee [M17.11]  Chronic knee pain    POST-OP DIAGNOSIS: Same    PHYSICIAN: Tej Cm MD    ASSISTANTS: Maykel Oconnor MD     MEDICATIONS INJECTED:6 cc of Synvisc with 2cc of Bupivacine 0.25%     LOCAL ANESTHETIC INJECTED: Xylocaine 2%     SEDATION: None    ESTIMATED BLOOD LOSS: None    COMPLICATIONS: None    TECHNIQUE: Time-out was performed to identify the patient and procedure to be performed. With the patient laying in a supine position, the surgical area was prepped and draped in the usual sterile fashion using ChloraPrep and a fenestrated drape. The area overlying the knee joint was determined under fluoroscopy guidance. Skin anesthesia was achieved by injecting Lidocaine 2% over the injection sites. A 20 gauge, 3.5 inch spinal quinke needle was then advanced under fluoroscopy in the AP views into the knee joint. Once the needle tip was in the area of the joint, and there was no blood aspiration,  Contrast dye  Omnipaque (240mg/mL) was injected to confirm placement and there was no vascular runoff. 8 mL of the medication mixture listed above was injected slowly. The needles were removed and bleeding was nil. A sterile dressing was applied. No specimens collected. The patient tolerated the procedure well.    The patient was monitored after the procedure in the recovery area. They were given post-procedure and discharge instructions to follow at  home. The patient was discharged in a stable condition.      Maykel Oconnor MD    I reviewed and edited the fellow's note. I conducted my own interview and physical examination. I agree with the findings. I was present and supervising all critical portions of the procedure.    Tej Cm MD

## 2022-08-17 NOTE — DISCHARGE INSTRUCTIONS

## 2022-08-18 PROBLEM — G89.29 CHRONIC RIGHT-SIDED LOW BACK PAIN WITH SCIATICA: Status: RESOLVED | Noted: 2018-11-13 | Resolved: 2022-08-18

## 2022-08-18 PROBLEM — M51.36 DDD (DEGENERATIVE DISC DISEASE), LUMBAR: Status: ACTIVE | Noted: 2022-08-18

## 2022-08-18 PROBLEM — M54.40 CHRONIC RIGHT-SIDED LOW BACK PAIN WITH SCIATICA: Status: RESOLVED | Noted: 2018-11-13 | Resolved: 2022-08-18

## 2022-08-18 PROBLEM — M79.602 ARM PAIN, MUSCULOSKELETAL, LEFT: Status: RESOLVED | Noted: 2018-02-15 | Resolved: 2022-08-18

## 2022-08-18 PROBLEM — M51.369 DDD (DEGENERATIVE DISC DISEASE), LUMBAR: Status: ACTIVE | Noted: 2022-08-18

## 2022-08-18 PROBLEM — M79.10 PAIN IN THE MUSCLES: Status: RESOLVED | Noted: 2018-02-15 | Resolved: 2022-08-18

## 2022-08-18 PROBLEM — M47.897 OTHER SPONDYLOSIS, LUMBOSACRAL REGION: Status: ACTIVE | Noted: 2022-08-18

## 2022-09-06 RX ORDER — GABAPENTIN 300 MG/1
300 CAPSULE ORAL 2 TIMES DAILY
Qty: 60 CAPSULE | Refills: 0 | Status: SHIPPED | OUTPATIENT
Start: 2022-09-06 | End: 2022-10-10 | Stop reason: SDUPTHER

## 2022-09-14 ENCOUNTER — PATIENT MESSAGE (OUTPATIENT)
Dept: PAIN MEDICINE | Facility: CLINIC | Age: 72
End: 2022-09-14
Payer: COMMERCIAL

## 2022-09-28 ENCOUNTER — OFFICE VISIT (OUTPATIENT)
Dept: OPHTHALMOLOGY | Facility: CLINIC | Age: 72
End: 2022-09-28
Payer: COMMERCIAL

## 2022-09-28 DIAGNOSIS — H25.13 NUCLEAR SCLEROSIS, BILATERAL: ICD-10-CM

## 2022-09-28 DIAGNOSIS — H18.453 SALZMANN'S NODULAR DEGENERATION OF CORNEAS OF BOTH EYES: Primary | ICD-10-CM

## 2022-09-28 PROCEDURE — 3288F FALL RISK ASSESSMENT DOCD: CPT | Mod: CPTII,S$GLB,, | Performed by: OPHTHALMOLOGY

## 2022-09-28 PROCEDURE — 3288F PR FALLS RISK ASSESSMENT DOCUMENTED: ICD-10-PCS | Mod: CPTII,S$GLB,, | Performed by: OPHTHALMOLOGY

## 2022-09-28 PROCEDURE — 99999 PR PBB SHADOW E&M-EST. PATIENT-LVL III: CPT | Mod: PBBFAC,,, | Performed by: OPHTHALMOLOGY

## 2022-09-28 PROCEDURE — 1160F PR REVIEW ALL MEDS BY PRESCRIBER/CLIN PHARMACIST DOCUMENTED: ICD-10-PCS | Mod: CPTII,S$GLB,, | Performed by: OPHTHALMOLOGY

## 2022-09-28 PROCEDURE — 1159F PR MEDICATION LIST DOCUMENTED IN MEDICAL RECORD: ICD-10-PCS | Mod: CPTII,S$GLB,, | Performed by: OPHTHALMOLOGY

## 2022-09-28 PROCEDURE — 1160F RVW MEDS BY RX/DR IN RCRD: CPT | Mod: CPTII,S$GLB,, | Performed by: OPHTHALMOLOGY

## 2022-09-28 PROCEDURE — 99999 PR PBB SHADOW E&M-EST. PATIENT-LVL III: ICD-10-PCS | Mod: PBBFAC,,, | Performed by: OPHTHALMOLOGY

## 2022-09-28 PROCEDURE — 92014 PR EYE EXAM, EST PATIENT,COMPREHESV: ICD-10-PCS | Mod: S$GLB,,, | Performed by: OPHTHALMOLOGY

## 2022-09-28 PROCEDURE — 1126F PR PAIN SEVERITY QUANTIFIED, NO PAIN PRESENT: ICD-10-PCS | Mod: CPTII,S$GLB,, | Performed by: OPHTHALMOLOGY

## 2022-09-28 PROCEDURE — 1159F MED LIST DOCD IN RCRD: CPT | Mod: CPTII,S$GLB,, | Performed by: OPHTHALMOLOGY

## 2022-09-28 PROCEDURE — 1126F AMNT PAIN NOTED NONE PRSNT: CPT | Mod: CPTII,S$GLB,, | Performed by: OPHTHALMOLOGY

## 2022-09-28 PROCEDURE — 1101F PR PT FALLS ASSESS DOC 0-1 FALLS W/OUT INJ PAST YR: ICD-10-PCS | Mod: CPTII,S$GLB,, | Performed by: OPHTHALMOLOGY

## 2022-09-28 PROCEDURE — 1101F PT FALLS ASSESS-DOCD LE1/YR: CPT | Mod: CPTII,S$GLB,, | Performed by: OPHTHALMOLOGY

## 2022-09-28 PROCEDURE — 92014 COMPRE OPH EXAM EST PT 1/>: CPT | Mod: S$GLB,,, | Performed by: OPHTHALMOLOGY

## 2022-09-28 NOTE — PROGRESS NOTES
KITA [facility]al patient    Patient is here for Cataract eval     At's PRN   Taking [facility]     1. Dry Macular Degeneration OU   - drusen OU       2. NSC OU        3. PVD OU    Last edited by Lidia Pineda, PCT on 9/28/2022  2:43 PM.            Assessment /Plan     For exam results, see Encounter Report.    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

## 2022-10-06 ENCOUNTER — TELEPHONE (OUTPATIENT)
Dept: PAIN MEDICINE | Facility: CLINIC | Age: 72
End: 2022-10-06
Payer: COMMERCIAL

## 2022-10-06 ENCOUNTER — PATIENT MESSAGE (OUTPATIENT)
Dept: BARIATRICS | Facility: CLINIC | Age: 72
End: 2022-10-06
Payer: COMMERCIAL

## 2022-10-06 NOTE — TELEPHONE ENCOUNTER
----- Message from Venkatesh Aragon sent at 10/6/2022  3:29 PM CDT -----    Name of Who is Calling: CELESTINA HENRY [[id number]]      What is the request in detail: Pt is requesting a call back in regards to an appt that was cancelled.       Can the clinic reply by [facility]: No      [facility]: [phone]

## 2022-10-10 DIAGNOSIS — M51.36 DDD (DEGENERATIVE DISC DISEASE), LUMBAR: Primary | ICD-10-CM

## 2022-10-10 RX ORDER — DICLOFENAC SODIUM 75 MG/1
75 TABLET, DELAYED RELEASE ORAL 2 TIMES DAILY PRN
Qty: 60 TABLET | Refills: 1 | Status: SHIPPED | OUTPATIENT
Start: 2022-10-10 | End: 2023-04-06 | Stop reason: SDUPTHER

## 2022-10-10 RX ORDER — GABAPENTIN 300 MG/1
300 CAPSULE ORAL 2 TIMES DAILY
Qty: 60 CAPSULE | Refills: 0 | Status: SHIPPED | OUTPATIENT
Start: 2022-10-10 | End: 2022-11-10 | Stop reason: SDUPTHER

## 2022-10-11 NOTE — TELEPHONE ENCOUNTER
Refill Routing Note   Medication(s) are not appropriate for processing by [facility] for the following reason(s):      - Outside of protocol    [facility] action(s):  Route          Medication reconciliation completed: No     Appointments  past 12m or future 3m with PCP    Date Provider   Last Visit   2/17/2022 Tessie Leon MD   Next Visit   11/7/2022 Tessie Leon MD   ED visits in past 90 days: 0        Note composed:7:04 PM 10/10/2022

## 2022-10-14 ENCOUNTER — TELEPHONE (OUTPATIENT)
Dept: OPHTHALMOLOGY | Facility: CLINIC | Age: 72
End: 2022-10-14
Payer: COMMERCIAL

## 2022-10-18 ENCOUNTER — TELEPHONE (OUTPATIENT)
Dept: OPHTHALMOLOGY | Facility: CLINIC | Age: 72
End: 2022-10-18
Payer: COMMERCIAL

## 2022-10-19 ENCOUNTER — OFFICE VISIT (OUTPATIENT)
Dept: ORTHOPEDICS | Facility: CLINIC | Age: 72
End: 2022-10-19
Payer: COMMERCIAL

## 2022-10-19 VITALS — HEIGHT: 63 IN | BODY MASS INDEX: 28 KG/M2 | WEIGHT: 158 LBS

## 2022-10-19 DIAGNOSIS — M17.11 PRIMARY OSTEOARTHRITIS OF RIGHT KNEE: Primary | ICD-10-CM

## 2022-10-19 PROCEDURE — 1160F PR REVIEW ALL MEDS BY PRESCRIBER/CLIN PHARMACIST DOCUMENTED: ICD-10-PCS | Mod: CPTII,S$GLB,, | Performed by: NURSE PRACTITIONER

## 2022-10-19 PROCEDURE — 1159F PR MEDICATION LIST DOCUMENTED IN MEDICAL RECORD: ICD-10-PCS | Mod: CPTII,S$GLB,, | Performed by: NURSE PRACTITIONER

## 2022-10-19 PROCEDURE — 1101F PT FALLS ASSESS-DOCD LE1/YR: CPT | Mod: CPTII,S$GLB,, | Performed by: NURSE PRACTITIONER

## 2022-10-19 PROCEDURE — 3288F FALL RISK ASSESSMENT DOCD: CPT | Mod: CPTII,S$GLB,, | Performed by: NURSE PRACTITIONER

## 2022-10-19 PROCEDURE — 1101F PR PT FALLS ASSESS DOC 0-1 FALLS W/OUT INJ PAST YR: ICD-10-PCS | Mod: CPTII,S$GLB,, | Performed by: NURSE PRACTITIONER

## 2022-10-19 PROCEDURE — 1160F RVW MEDS BY RX/DR IN RCRD: CPT | Mod: CPTII,S$GLB,, | Performed by: NURSE PRACTITIONER

## 2022-10-19 PROCEDURE — 99499 NO LOS: ICD-10-PCS | Mod: S$GLB,,, | Performed by: NURSE PRACTITIONER

## 2022-10-19 PROCEDURE — 99999 PR PBB SHADOW E&M-EST. PATIENT-LVL III: CPT | Mod: PBBFAC,,, | Performed by: NURSE PRACTITIONER

## 2022-10-19 PROCEDURE — 1159F MED LIST DOCD IN RCRD: CPT | Mod: CPTII,S$GLB,, | Performed by: NURSE PRACTITIONER

## 2022-10-19 PROCEDURE — 20610 PR DRAIN/INJECT LARGE JOINT/BURSA: ICD-10-PCS | Mod: RT,S$GLB,, | Performed by: NURSE PRACTITIONER

## 2022-10-19 PROCEDURE — 1125F PR PAIN SEVERITY QUANTIFIED, PAIN PRESENT: ICD-10-PCS | Mod: CPTII,S$GLB,, | Performed by: NURSE PRACTITIONER

## 2022-10-19 PROCEDURE — 1125F AMNT PAIN NOTED PAIN PRSNT: CPT | Mod: CPTII,S$GLB,, | Performed by: NURSE PRACTITIONER

## 2022-10-19 PROCEDURE — 20610 DRAIN/INJ JOINT/BURSA W/O US: CPT | Mod: RT,S$GLB,, | Performed by: NURSE PRACTITIONER

## 2022-10-19 PROCEDURE — 3288F PR FALLS RISK ASSESSMENT DOCUMENTED: ICD-10-PCS | Mod: CPTII,S$GLB,, | Performed by: NURSE PRACTITIONER

## 2022-10-19 PROCEDURE — 99999 PR PBB SHADOW E&M-EST. PATIENT-LVL III: ICD-10-PCS | Mod: PBBFAC,,, | Performed by: NURSE PRACTITIONER

## 2022-10-19 PROCEDURE — 99499 UNLISTED E&M SERVICE: CPT | Mod: S$GLB,,, | Performed by: NURSE PRACTITIONER

## 2022-10-19 RX ORDER — TRIAMCINOLONE ACETONIDE 40 MG/ML
40 INJECTION, SUSPENSION INTRA-ARTICULAR; INTRAMUSCULAR
Status: COMPLETED | OUTPATIENT
Start: 2022-10-19 | End: 2022-10-19

## 2022-10-19 RX ORDER — TRAMADOL HYDROCHLORIDE 50 MG/1
50 TABLET ORAL EVERY 6 HOURS PRN
Qty: 28 TABLET | Refills: 1 | Status: SHIPPED | OUTPATIENT
Start: 2022-10-19 | End: 2023-10-16 | Stop reason: SDUPTHER

## 2022-10-19 RX ADMIN — TRIAMCINOLONE ACETONIDE 40 MG: 40 INJECTION, SUSPENSION INTRA-ARTICULAR; INTRAMUSCULAR at 03:10

## 2022-10-19 NOTE — PROGRESS NOTES
Pt with known right knee djd. She has had cortisone injections in the past with some relief and recently had synvisc injection by Dr. Guo which hasn't provided any relief. She returns today for another cortisone injection today.     Knee Injection Procedure Note  Diagnosis: right knee degenerative arthritis  Indications: right knee pain  Procedure Details: Verbal consent was obtained for the procedure. The injection site was identified and the skin was prepared with alcohol. The right knee was injected from an anterolateral approach with 1 ml of Kenalog and 4 ml Lidocaine under sterile technique using a 22 gauge needle. The needle was removed and the area cleansed and dressed.  Complications:  Patient tolerated the procedure well.    she was advised to rest the knee today, using ice and elevation as needed for comfort and swelling.Immediate relief of the knee pain may be short lived and secondary to the lidocaine. she may have an increase in discomfort tonight followed by steady improvement over the next several days. It may take 1-2 weeks following the injection to get the full benefit of the medication.

## 2022-10-26 DIAGNOSIS — M17.11 PRIMARY OSTEOARTHRITIS OF RIGHT KNEE: ICD-10-CM

## 2022-10-26 NOTE — TELEPHONE ENCOUNTER
Refill Routing Note   Medication(s) are not appropriate for processing by [facility] for the following reason(s):      - Outside of protocol    [facility] action(s):  Route          Medication reconciliation completed: No     Appointments  past 12m or future 3m with PCP    Date Provider   Last Visit   2/17/2022 Tessie Leon MD   Next Visit   11/7/2022 Tessie Leon MD   ED visits in past 90 days: 0        Note composed:1:42 PM 10/26/2022

## 2022-10-27 ENCOUNTER — IMMUNIZATION (OUTPATIENT)
Dept: INTERNAL MEDICINE | Facility: CLINIC | Age: 72
End: 2022-10-27
Payer: COMMERCIAL

## 2022-10-27 DIAGNOSIS — Z23 NEED FOR VACCINATION: Primary | ICD-10-CM

## 2022-10-27 PROCEDURE — 91312 COVID-19, MRNA, LNP-S, BIVALENT BOOSTER, PF, 30 MCG/0.3 ML DOSE: ICD-10-PCS | Mod: S$GLB,,, | Performed by: INTERNAL MEDICINE

## 2022-10-27 PROCEDURE — 0124A COVID-19, MRNA, LNP-S, BIVALENT BOOSTER, PF, 30 MCG/0.3 ML DOSE: CPT | Mod: CV19,PBBFAC | Performed by: INTERNAL MEDICINE

## 2022-10-27 PROCEDURE — 91312 COVID-19, MRNA, LNP-S, BIVALENT BOOSTER, PF, 30 MCG/0.3 ML DOSE: CPT | Mod: S$GLB,,, | Performed by: INTERNAL MEDICINE

## 2022-10-27 RX ORDER — DICLOFENAC SODIUM 10 MG/G
2 GEL TOPICAL 4 TIMES DAILY
Qty: 350 G | Refills: 6 | Status: ON HOLD | OUTPATIENT
Start: 2022-10-27 | End: 2023-10-23 | Stop reason: HOSPADM

## 2022-11-07 ENCOUNTER — OFFICE VISIT (OUTPATIENT)
Dept: INTERNAL MEDICINE | Facility: CLINIC | Age: 72
End: 2022-11-07
Payer: COMMERCIAL

## 2022-11-07 VITALS
DIASTOLIC BLOOD PRESSURE: 82 MMHG | SYSTOLIC BLOOD PRESSURE: 130 MMHG | HEART RATE: 67 BPM | WEIGHT: 159.81 LBS | BODY MASS INDEX: 28.31 KG/M2 | OXYGEN SATURATION: 98 %

## 2022-11-07 DIAGNOSIS — E55.9 MILD VITAMIN D DEFICIENCY: ICD-10-CM

## 2022-11-07 DIAGNOSIS — R73.09 ELEVATED GLUCOSE: ICD-10-CM

## 2022-11-07 DIAGNOSIS — E78.5 HYPERLIPIDEMIA, UNSPECIFIED HYPERLIPIDEMIA TYPE: ICD-10-CM

## 2022-11-07 DIAGNOSIS — Z00.00 VISIT FOR ANNUAL HEALTH EXAMINATION: Primary | ICD-10-CM

## 2022-11-07 DIAGNOSIS — M51.36 DDD (DEGENERATIVE DISC DISEASE), LUMBAR: ICD-10-CM

## 2022-11-07 DIAGNOSIS — H35.3130 BILATERAL NONEXUDATIVE AGE-RELATED MACULAR DEGENERATION, UNSPECIFIED STAGE: ICD-10-CM

## 2022-11-07 DIAGNOSIS — F41.9 ANXIETY: ICD-10-CM

## 2022-11-07 DIAGNOSIS — Z83.719 FAMILY HISTORY OF COLONIC POLYPS: ICD-10-CM

## 2022-11-07 DIAGNOSIS — M17.11 PRIMARY OSTEOARTHRITIS OF RIGHT KNEE: ICD-10-CM

## 2022-11-07 DIAGNOSIS — I10 ESSENTIAL HYPERTENSION: ICD-10-CM

## 2022-11-07 PROCEDURE — 1126F AMNT PAIN NOTED NONE PRSNT: CPT | Mod: CPTII,S$GLB,, | Performed by: INTERNAL MEDICINE

## 2022-11-07 PROCEDURE — 1101F PR PT FALLS ASSESS DOC 0-1 FALLS W/OUT INJ PAST YR: ICD-10-PCS | Mod: CPTII,S$GLB,, | Performed by: INTERNAL MEDICINE

## 2022-11-07 PROCEDURE — 1159F PR MEDICATION LIST DOCUMENTED IN MEDICAL RECORD: ICD-10-PCS | Mod: CPTII,S$GLB,, | Performed by: INTERNAL MEDICINE

## 2022-11-07 PROCEDURE — 3079F DIAST BP 80-89 MM HG: CPT | Mod: CPTII,S$GLB,, | Performed by: INTERNAL MEDICINE

## 2022-11-07 PROCEDURE — 99999 PR PBB SHADOW E&M-EST. PATIENT-LVL IV: ICD-10-PCS | Mod: PBBFAC,,, | Performed by: INTERNAL MEDICINE

## 2022-11-07 PROCEDURE — 3008F PR BODY MASS INDEX (BMI) DOCUMENTED: ICD-10-PCS | Mod: CPTII,S$GLB,, | Performed by: INTERNAL MEDICINE

## 2022-11-07 PROCEDURE — 1160F PR REVIEW ALL MEDS BY PRESCRIBER/CLIN PHARMACIST DOCUMENTED: ICD-10-PCS | Mod: CPTII,S$GLB,, | Performed by: INTERNAL MEDICINE

## 2022-11-07 PROCEDURE — 99999 PR PBB SHADOW E&M-EST. PATIENT-LVL IV: CPT | Mod: PBBFAC,,, | Performed by: INTERNAL MEDICINE

## 2022-11-07 PROCEDURE — 1126F PR PAIN SEVERITY QUANTIFIED, NO PAIN PRESENT: ICD-10-PCS | Mod: CPTII,S$GLB,, | Performed by: INTERNAL MEDICINE

## 2022-11-07 PROCEDURE — 1160F RVW MEDS BY RX/DR IN RCRD: CPT | Mod: CPTII,S$GLB,, | Performed by: INTERNAL MEDICINE

## 2022-11-07 PROCEDURE — 3288F PR FALLS RISK ASSESSMENT DOCUMENTED: ICD-10-PCS | Mod: CPTII,S$GLB,, | Performed by: INTERNAL MEDICINE

## 2022-11-07 PROCEDURE — 99397 PER PM REEVAL EST PAT 65+ YR: CPT | Mod: S$GLB,,, | Performed by: INTERNAL MEDICINE

## 2022-11-07 PROCEDURE — 99397 PR PREVENTIVE VISIT,EST,65 & OVER: ICD-10-PCS | Mod: S$GLB,,, | Performed by: INTERNAL MEDICINE

## 2022-11-07 PROCEDURE — 3288F FALL RISK ASSESSMENT DOCD: CPT | Mod: CPTII,S$GLB,, | Performed by: INTERNAL MEDICINE

## 2022-11-07 PROCEDURE — 1101F PT FALLS ASSESS-DOCD LE1/YR: CPT | Mod: CPTII,S$GLB,, | Performed by: INTERNAL MEDICINE

## 2022-11-07 PROCEDURE — 3075F SYST BP GE 130 - 139MM HG: CPT | Mod: CPTII,S$GLB,, | Performed by: INTERNAL MEDICINE

## 2022-11-07 PROCEDURE — 3079F PR MOST RECENT DIASTOLIC BLOOD PRESSURE 80-89 MM HG: ICD-10-PCS | Mod: CPTII,S$GLB,, | Performed by: INTERNAL MEDICINE

## 2022-11-07 PROCEDURE — 1159F MED LIST DOCD IN RCRD: CPT | Mod: CPTII,S$GLB,, | Performed by: INTERNAL MEDICINE

## 2022-11-07 PROCEDURE — 3008F BODY MASS INDEX DOCD: CPT | Mod: CPTII,S$GLB,, | Performed by: INTERNAL MEDICINE

## 2022-11-07 PROCEDURE — 3075F PR MOST RECENT SYSTOLIC BLOOD PRESS GE 130-139MM HG: ICD-10-PCS | Mod: CPTII,S$GLB,, | Performed by: INTERNAL MEDICINE

## 2022-11-07 RX ORDER — ESCITALOPRAM OXALATE 10 MG/1
10 TABLET ORAL DAILY
Qty: 90 TABLET | Refills: 3 | Status: SHIPPED | OUTPATIENT
Start: 2022-11-07 | End: 2024-02-12 | Stop reason: SDUPTHER

## 2022-11-07 RX ORDER — SIMVASTATIN 40 MG/1
TABLET, FILM COATED ORAL
Qty: 90 TABLET | Refills: 3 | Status: SHIPPED | OUTPATIENT
Start: 2022-11-07 | End: 2023-08-23 | Stop reason: SDUPTHER

## 2022-11-07 RX ORDER — TRIAMTERENE AND HYDROCHLOROTHIAZIDE 37.5; 25 MG/1; MG/1
CAPSULE ORAL DAILY
Qty: 90 CAPSULE | Refills: 3 | Status: SHIPPED | OUTPATIENT
Start: 2022-11-07 | End: 2023-08-23 | Stop reason: SDUPTHER

## 2022-11-07 RX ORDER — METOPROLOL SUCCINATE 100 MG/1
100 TABLET, EXTENDED RELEASE ORAL DAILY
Qty: 90 TABLET | Refills: 3 | Status: SHIPPED | OUTPATIENT
Start: 2022-11-07 | End: 2023-08-23 | Stop reason: SDUPTHER

## 2022-11-07 NOTE — PROGRESS NOTES
INTERNAL MEDICINE ESTABLISHED PATIENT VISIT NOTE    Subjective:     Chief Complaint: Follow-up       Patient ID: Carmen Hawley is a 72 y.o. female with lumbar radiculopathy, anxiety, macular degeneration, HTN, HLD, elevated glucose, OA of knee/L foot,, last seen by me 2/2022, here today for follow-up.    8/2022 R knee injection    Applying diclofenac gel to knee as needed. Only takes PO diclofenac when in severe pain, once weekly. Takes Tramadol PRN 1-2 times weekly if unable to sleep due to pain.     Following with Weight Management, on Contrave and Metformin.     Upcoming eye surgery with Dr. Diallo, diagnosed with Salzmann's nodular degeneration of corneas. Upcoming procedure planned in early Dec.     Past Medical History:  Past Medical History:   Diagnosis Date    Anxiety     Arthritis     Hyperlipidemia     Hypertension     Macular degeneration     Mitral valve prolapse        Review of Systems:  Review of Systems   Constitutional:  Negative for chills and fever.   HENT:  Negative for congestion.    Respiratory:  Negative for cough and shortness of breath.    Cardiovascular:  Negative for chest pain.   Gastrointestinal:  Negative for constipation, nausea and vomiting.   Genitourinary:  Negative for hematuria and urgency.   Musculoskeletal:  Positive for joint pain. Negative for falls.   Skin:  Negative for rash.   Neurological:  Negative for dizziness and loss of consciousness.     Health Maintenance:   Immunizations:   Influenza - complete  Tdap - 10/2018  Covid 19 - complete  HPV  Prevnar rec at 66 - 6/2016, Pneumovax 1/2018  Shingrix rec at 51 - complete     Cancer Screening:  PAP: 12/2016 NILM  Mammogram: 4/2022 BIRAD 1  Colonoscopy:  10/2020 diverticulosis, repeat 10/2025  DEXA:  5/2021 osteopenia, BMD increased    Objective:   /82 (BP Location: Left arm, Patient Position: Sitting, BP Method: Medium (Manual))   Pulse 67   Wt 72.5 kg (159 lb 13.3 oz)   LMP 12/16/2006 (Approximate)   SpO2 98%    BMI 28.31 kg/m²        General: AAO x3, no apparent distress  HEENT: PERRL  CV: RRR, no m/r/g  Pulm: Lungs CTAB, no crackles, no wheezes  Abd: s/NT/ND +BS  Extremities: no c/c/e    Labs:       Assessment/Plan     Visit for annual health examination  Labs ordered  -     CBC Auto Differential; Future; Expected date: 11/07/2022  -     Comprehensive Metabolic Panel; Future; Expected date: 11/07/2022  -     Hemoglobin A1C; Future; Expected date: 11/07/2022  -     Lipid Panel; Future; Expected date: 11/07/2022  -     Vitamin D; Future; Expected date: 11/07/2022    Hyperlipidemia, unspecified hyperlipidemia type  Statin, repeat panel  -     simvastatin (ZOCOR) 40 MG tablet; TAKE 1 TABLET BY MOUTH EVERY EVENING.  Dispense: 90 tablet; Refill: 3  -     Lipid Panel; Future; Expected date: 11/07/2022    Elevated glucose  Metformin, repeat lab  -     Hemoglobin A1C; Future; Expected date: 11/07/2022    Essential hypertension  Controlled, continue current regimen  Enrolled in dig HTN program, advised to submit weekly BP readings  -     triamterene-hydrochlorothiazide 37.5-25 mg (DYAZIDE) 37.5-25 mg per capsule; Take one capsule by mouth every day  Dispense: 90 capsule; Refill: 3  -     metoprolol succinate (TOPROL-XL) 100 MG 24 hr tablet; Take 1 tablet (100 mg total) by mouth once daily.  Dispense: 90 tablet; Refill: 3  -     CBC Auto Differential; Future; Expected date: 11/07/2022  -     Comprehensive Metabolic Panel; Future; Expected date: 11/07/2022    DDD (degenerative disc disease), lumbar    Mild vitamin D deficiency  Supplement  -     Vitamin D; Future; Expected date: 11/07/2022    Bilateral nonexudative age-related macular degeneration, unspecified stage  Follows with Ophthalmology    Primary osteoarthritis of right knee  Follows with Orthopedics; continue current regimen  PO Diclofenac PRN, Tramadol for severe pain per specialty    Anxiety  Controlled, continue current regimen  -     EScitalopram oxalate (LEXAPRO) 10 MG  tablet; Take 1 tablet (10 mg total) by mouth once daily.  Dispense: 90 tablet; Refill: 3    Family history of colonic polyps  UTD on screening     HM as above    RTC in 6 mo.     Holly Leon MD  Department of Internal Medicine - [facility]  11/07/2022

## 2022-11-09 ENCOUNTER — LAB VISIT (OUTPATIENT)
Dept: LAB | Facility: HOSPITAL | Age: 72
End: 2022-11-09
Payer: COMMERCIAL

## 2022-11-09 DIAGNOSIS — I10 ESSENTIAL HYPERTENSION: ICD-10-CM

## 2022-11-09 DIAGNOSIS — E55.9 MILD VITAMIN D DEFICIENCY: ICD-10-CM

## 2022-11-09 DIAGNOSIS — H25.13 NUCLEAR SCLEROSIS, BILATERAL: Primary | ICD-10-CM

## 2022-11-09 DIAGNOSIS — R73.09 ELEVATED GLUCOSE: ICD-10-CM

## 2022-11-09 DIAGNOSIS — Z00.00 VISIT FOR ANNUAL HEALTH EXAMINATION: ICD-10-CM

## 2022-11-09 DIAGNOSIS — E78.5 HYPERLIPIDEMIA, UNSPECIFIED HYPERLIPIDEMIA TYPE: ICD-10-CM

## 2022-11-09 LAB
25(OH)D3+25(OH)D2 SERPL-MCNC: 27 NG/ML (ref 30–96)
ALBUMIN SERPL BCP-MCNC: 3.9 G/DL (ref 3.5–5.2)
ALP SERPL-CCNC: 83 U/L (ref 55–135)
ALT SERPL W/O P-5'-P-CCNC: 13 U/L (ref 10–44)
ANION GAP SERPL CALC-SCNC: 9 MMOL/L (ref 8–16)
AST SERPL-CCNC: 17 U/L (ref 10–40)
BASOPHILS # BLD AUTO: 0.04 K/UL (ref 0–0.2)
BASOPHILS NFR BLD: 0.8 % (ref 0–1.9)
BILIRUB SERPL-MCNC: 0.8 MG/DL (ref 0.1–1)
BUN SERPL-MCNC: 10 MG/DL (ref 8–23)
CALCIUM SERPL-MCNC: 9.8 MG/DL (ref 8.7–10.5)
CHLORIDE SERPL-SCNC: 101 MMOL/L (ref 95–110)
CHOLEST SERPL-MCNC: 217 MG/DL (ref 120–199)
CHOLEST/HDLC SERPL: 2.8 {RATIO} (ref 2–5)
CO2 SERPL-SCNC: 26 MMOL/L (ref 23–29)
CREAT SERPL-MCNC: 0.7 MG/DL (ref 0.5–1.4)
DIFFERENTIAL METHOD: ABNORMAL
EOSINOPHIL # BLD AUTO: 0.2 K/UL (ref 0–0.5)
EOSINOPHIL NFR BLD: 3.3 % (ref 0–8)
ERYTHROCYTE [DISTWIDTH] IN BLOOD BY AUTOMATED COUNT: 12.5 % (ref 11.5–14.5)
EST. GFR  (NO RACE VARIABLE): >60 ML/MIN/1.73 M^2
ESTIMATED AVG GLUCOSE: 114 MG/DL (ref 68–131)
GLUCOSE SERPL-MCNC: 90 MG/DL (ref 70–110)
HBA1C MFR BLD: 5.6 % (ref 4–5.6)
HCT VFR BLD AUTO: 36.2 % (ref 37–48.5)
HDLC SERPL-MCNC: 78 MG/DL (ref 40–75)
HDLC SERPL: 35.9 % (ref 20–50)
HGB BLD-MCNC: 12.1 G/DL (ref 12–16)
IMM GRANULOCYTES # BLD AUTO: 0.01 K/UL (ref 0–0.04)
IMM GRANULOCYTES NFR BLD AUTO: 0.2 % (ref 0–0.5)
LDLC SERPL CALC-MCNC: 128 MG/DL (ref 63–159)
LYMPHOCYTES # BLD AUTO: 1.4 K/UL (ref 1–4.8)
LYMPHOCYTES NFR BLD: 26.2 % (ref 18–48)
MCH RBC QN AUTO: 30.2 PG (ref 27–31)
MCHC RBC AUTO-ENTMCNC: 33.4 G/DL (ref 32–36)
MCV RBC AUTO: 90 FL (ref 82–98)
MONOCYTES # BLD AUTO: 0.4 K/UL (ref 0.3–1)
MONOCYTES NFR BLD: 8.4 % (ref 4–15)
NEUTROPHILS # BLD AUTO: 3.2 K/UL (ref 1.8–7.7)
NEUTROPHILS NFR BLD: 61.1 % (ref 38–73)
NONHDLC SERPL-MCNC: 139 MG/DL
NRBC BLD-RTO: 0 /100 WBC
PLATELET # BLD AUTO: 259 K/UL (ref 150–450)
PMV BLD AUTO: 8.9 FL (ref 9.2–12.9)
POTASSIUM SERPL-SCNC: 4.1 MMOL/L (ref 3.5–5.1)
PROT SERPL-MCNC: 6.9 G/DL (ref 6–8.4)
RBC # BLD AUTO: 4.01 M/UL (ref 4–5.4)
SODIUM SERPL-SCNC: 136 MMOL/L (ref 136–145)
TRIGL SERPL-MCNC: 55 MG/DL (ref 30–150)
WBC # BLD AUTO: 5.22 K/UL (ref 3.9–12.7)

## 2022-11-09 PROCEDURE — 80053 COMPREHEN METABOLIC PANEL: CPT | Performed by: INTERNAL MEDICINE

## 2022-11-09 PROCEDURE — 82306 VITAMIN D 25 HYDROXY: CPT | Performed by: INTERNAL MEDICINE

## 2022-11-09 PROCEDURE — 83036 HEMOGLOBIN GLYCOSYLATED A1C: CPT | Performed by: INTERNAL MEDICINE

## 2022-11-09 PROCEDURE — 36415 COLL VENOUS BLD VENIPUNCTURE: CPT | Performed by: INTERNAL MEDICINE

## 2022-11-09 PROCEDURE — 85025 COMPLETE CBC W/AUTO DIFF WBC: CPT | Performed by: INTERNAL MEDICINE

## 2022-11-09 PROCEDURE — 80061 LIPID PANEL: CPT | Performed by: INTERNAL MEDICINE

## 2022-11-09 RX ORDER — GABAPENTIN 300 MG/1
300 CAPSULE ORAL 2 TIMES DAILY
Qty: 60 CAPSULE | Refills: 0 | OUTPATIENT
Start: 2022-11-09 | End: 2022-12-09

## 2022-11-11 RX ORDER — PREDNISOLONE ACETATE-GATIFLOXACIN-BROMFENAC .75; 5; 1 MG/ML; MG/ML; MG/ML
1 SUSPENSION/ DROPS OPHTHALMIC 3 TIMES DAILY
Qty: 5 ML | Refills: 3 | Status: ON HOLD | OUTPATIENT
Start: 2022-11-11 | End: 2023-05-08

## 2022-11-16 ENCOUNTER — OFFICE VISIT (OUTPATIENT)
Dept: OPHTHALMOLOGY | Facility: CLINIC | Age: 72
End: 2022-11-16
Attending: OPHTHALMOLOGY
Payer: COMMERCIAL

## 2022-11-16 DIAGNOSIS — H25.13 NUCLEAR SCLEROSIS, BILATERAL: ICD-10-CM

## 2022-11-16 DIAGNOSIS — H18.453 SALZMANN'S NODULAR DEGENERATION OF CORNEAS OF BOTH EYES: Primary | ICD-10-CM

## 2022-11-16 PROCEDURE — 3044F PR MOST RECENT HEMOGLOBIN A1C LEVEL <7.0%: ICD-10-PCS | Mod: CPTII,S$GLB,, | Performed by: OPHTHALMOLOGY

## 2022-11-16 PROCEDURE — 1160F PR REVIEW ALL MEDS BY PRESCRIBER/CLIN PHARMACIST DOCUMENTED: ICD-10-PCS | Mod: CPTII,S$GLB,, | Performed by: OPHTHALMOLOGY

## 2022-11-16 PROCEDURE — 1160F RVW MEDS BY RX/DR IN RCRD: CPT | Mod: CPTII,S$GLB,, | Performed by: OPHTHALMOLOGY

## 2022-11-16 PROCEDURE — 3044F HG A1C LEVEL LT 7.0%: CPT | Mod: CPTII,S$GLB,, | Performed by: OPHTHALMOLOGY

## 2022-11-16 PROCEDURE — 92012 INTRM OPH EXAM EST PATIENT: CPT | Mod: S$GLB,,, | Performed by: OPHTHALMOLOGY

## 2022-11-16 PROCEDURE — 1159F PR MEDICATION LIST DOCUMENTED IN MEDICAL RECORD: ICD-10-PCS | Mod: CPTII,S$GLB,, | Performed by: OPHTHALMOLOGY

## 2022-11-16 PROCEDURE — 99999 PR PBB SHADOW E&M-EST. PATIENT-LVL II: CPT | Mod: PBBFAC,,, | Performed by: OPHTHALMOLOGY

## 2022-11-16 PROCEDURE — 1159F MED LIST DOCD IN RCRD: CPT | Mod: CPTII,S$GLB,, | Performed by: OPHTHALMOLOGY

## 2022-11-16 PROCEDURE — 92012 PR EYE EXAM, EST PATIENT,INTERMED: ICD-10-PCS | Mod: S$GLB,,, | Performed by: OPHTHALMOLOGY

## 2022-11-16 PROCEDURE — 99999 PR PBB SHADOW E&M-EST. PATIENT-LVL II: ICD-10-PCS | Mod: PBBFAC,,, | Performed by: OPHTHALMOLOGY

## 2022-11-16 RX ORDER — NEOMYCIN SULFATE, POLYMYXIN B SULFATE AND DEXAMETHASONE 3.5; 10000; 1 MG/ML; [USP'U]/ML; MG/ML
1 SUSPENSION/ DROPS OPHTHALMIC 4 TIMES DAILY
Qty: 10 ML | Refills: 3 | Status: SHIPPED | OUTPATIENT
Start: 2022-11-16 | End: 2022-12-13

## 2022-11-16 NOTE — PROGRESS NOTES
Assessment /Plan     For exam results, see Encounter Report.    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 for 10 days  Dispense: 10 mL; Refill: 3        Rec:   SK OU for Salzmann's ([place] and 0.12 forceps)  Prior to [place], calcs...    Pentacam done today with 3.5//4.5 D astigmatism from nodules.    Plan SK OS Dec 1st.

## 2022-11-17 ENCOUNTER — OFFICE VISIT (OUTPATIENT)
Dept: BARIATRICS | Facility: CLINIC | Age: 72
End: 2022-11-17
Payer: COMMERCIAL

## 2022-11-17 VITALS
OXYGEN SATURATION: 98 % | HEART RATE: 70 BPM | HEIGHT: 63 IN | DIASTOLIC BLOOD PRESSURE: 74 MMHG | WEIGHT: 156.75 LBS | SYSTOLIC BLOOD PRESSURE: 122 MMHG | BODY MASS INDEX: 27.77 KG/M2

## 2022-11-17 DIAGNOSIS — E66.3 OVERWEIGHT (BMI 25.0-29.9): ICD-10-CM

## 2022-11-17 DIAGNOSIS — R73.01 IFG (IMPAIRED FASTING GLUCOSE): ICD-10-CM

## 2022-11-17 PROCEDURE — 1126F AMNT PAIN NOTED NONE PRSNT: CPT | Mod: CPTII,S$GLB,, | Performed by: INTERNAL MEDICINE

## 2022-11-17 PROCEDURE — 3078F DIAST BP <80 MM HG: CPT | Mod: CPTII,S$GLB,, | Performed by: INTERNAL MEDICINE

## 2022-11-17 PROCEDURE — 3288F PR FALLS RISK ASSESSMENT DOCUMENTED: ICD-10-PCS | Mod: CPTII,S$GLB,, | Performed by: INTERNAL MEDICINE

## 2022-11-17 PROCEDURE — 99213 PR OFFICE/OUTPT VISIT, EST, LEVL III, 20-29 MIN: ICD-10-PCS | Mod: S$GLB,,, | Performed by: INTERNAL MEDICINE

## 2022-11-17 PROCEDURE — 99213 OFFICE O/P EST LOW 20 MIN: CPT | Mod: S$GLB,,, | Performed by: INTERNAL MEDICINE

## 2022-11-17 PROCEDURE — 1159F MED LIST DOCD IN RCRD: CPT | Mod: CPTII,S$GLB,, | Performed by: INTERNAL MEDICINE

## 2022-11-17 PROCEDURE — 1160F PR REVIEW ALL MEDS BY PRESCRIBER/CLIN PHARMACIST DOCUMENTED: ICD-10-PCS | Mod: CPTII,S$GLB,, | Performed by: INTERNAL MEDICINE

## 2022-11-17 PROCEDURE — 1126F PR PAIN SEVERITY QUANTIFIED, NO PAIN PRESENT: ICD-10-PCS | Mod: CPTII,S$GLB,, | Performed by: INTERNAL MEDICINE

## 2022-11-17 PROCEDURE — 1159F PR MEDICATION LIST DOCUMENTED IN MEDICAL RECORD: ICD-10-PCS | Mod: CPTII,S$GLB,, | Performed by: INTERNAL MEDICINE

## 2022-11-17 PROCEDURE — 3074F PR MOST RECENT SYSTOLIC BLOOD PRESSURE < 130 MM HG: ICD-10-PCS | Mod: CPTII,S$GLB,, | Performed by: INTERNAL MEDICINE

## 2022-11-17 PROCEDURE — 3074F SYST BP LT 130 MM HG: CPT | Mod: CPTII,S$GLB,, | Performed by: INTERNAL MEDICINE

## 2022-11-17 PROCEDURE — 1101F PR PT FALLS ASSESS DOC 0-1 FALLS W/OUT INJ PAST YR: ICD-10-PCS | Mod: CPTII,S$GLB,, | Performed by: INTERNAL MEDICINE

## 2022-11-17 PROCEDURE — 1101F PT FALLS ASSESS-DOCD LE1/YR: CPT | Mod: CPTII,S$GLB,, | Performed by: INTERNAL MEDICINE

## 2022-11-17 PROCEDURE — 3044F PR MOST RECENT HEMOGLOBIN A1C LEVEL <7.0%: ICD-10-PCS | Mod: CPTII,S$GLB,, | Performed by: INTERNAL MEDICINE

## 2022-11-17 PROCEDURE — 1160F RVW MEDS BY RX/DR IN RCRD: CPT | Mod: CPTII,S$GLB,, | Performed by: INTERNAL MEDICINE

## 2022-11-17 PROCEDURE — 3008F BODY MASS INDEX DOCD: CPT | Mod: CPTII,S$GLB,, | Performed by: INTERNAL MEDICINE

## 2022-11-17 PROCEDURE — 99999 PR PBB SHADOW E&M-EST. PATIENT-LVL V: CPT | Mod: PBBFAC,,, | Performed by: INTERNAL MEDICINE

## 2022-11-17 PROCEDURE — 3044F HG A1C LEVEL LT 7.0%: CPT | Mod: CPTII,S$GLB,, | Performed by: INTERNAL MEDICINE

## 2022-11-17 PROCEDURE — 99999 PR PBB SHADOW E&M-EST. PATIENT-LVL V: ICD-10-PCS | Mod: PBBFAC,,, | Performed by: INTERNAL MEDICINE

## 2022-11-17 PROCEDURE — 3008F PR BODY MASS INDEX (BMI) DOCUMENTED: ICD-10-PCS | Mod: CPTII,S$GLB,, | Performed by: INTERNAL MEDICINE

## 2022-11-17 PROCEDURE — 3078F PR MOST RECENT DIASTOLIC BLOOD PRESSURE < 80 MM HG: ICD-10-PCS | Mod: CPTII,S$GLB,, | Performed by: INTERNAL MEDICINE

## 2022-11-17 PROCEDURE — 3288F FALL RISK ASSESSMENT DOCD: CPT | Mod: CPTII,S$GLB,, | Performed by: INTERNAL MEDICINE

## 2022-11-17 RX ORDER — METFORMIN HYDROCHLORIDE 500 MG/1
500 TABLET, EXTENDED RELEASE ORAL
Qty: 90 TABLET | Refills: 1 | Status: SHIPPED | OUTPATIENT
Start: 2022-11-17 | End: 2023-08-17 | Stop reason: SDUPTHER

## 2022-11-17 NOTE — PATIENT INSTRUCTIONS
Continue metformin with dinner. Always take with food.  No Alcohol.     Contrave is long term weight loss medication. Side effects may include insomnia, nausea, headache, constipation, depression or change in thinking.  These side effects will improve with stopping the medication.        [email or web address]    Continue with  Contrave 2 pills twice a day.     1000 jeremiah pb meal planner given previously     Add some type of resistance training 2-3 days a week. These can be body weight exercises, light weight or elastic bands. [employer] and [employer] are great sources for free work out plans and videos.       Helpful tips to survive the holidays:  Dont save yourself for the meal: Make sure you continue to eat high protein small meals every 3-4 hours to ensure to do not become over-hungry. Avoid temptation by not showing up to a holiday party or gathering hungry.   Plan ahead. Bring a dish to a party if you know there may not be an appropriate option.   Choose sugar-free drinks: Stick to water or other sugar-free beverages and make sure you are getting 6-8 cups of fluid each day (but not with meals!). Avoid alcohol, carbonated beverages, and high-fat/high-sugar beverages like hot chocolate and eggnog. Try sugar-free hot cocoa made with skim milk or water, or sugar-free spiced tea to add some holiday flair to your beverage (see sugar-free mulled cider recipe below)  Take your time: Eat mindfully. Dont graze on food throughout the day. Sit down to enjoy your small meals. Chew slowly and thoroughly. Cut your food into small pieces before eating.  Listen to your body: Stop eating as soon as you feel full. Do not feel pressured to try certain (or all) foods or to eat all of the food on your plate. Listen to your hunger cues.   REMEMBER: Make your holidays about spending time with family and friends instead of focusing gatherings around food.  Keep up your exercise routine: Make sure you continue to get regular exercise  throughout the holiday season. Encourage friends and family to be active by taking a walk together after a meal, to look at holiday lights, or to window-shop.    Good Holiday Meal Options:  Roasted Turkey, NO skin. Use low sodium broth instead of gravy.   Stuffed Bell Peppers made WITHOUT bread crumbs or Rice. Try using parmesan cheese instead  Gumbo, NO rice. Try picking out mostly the meat/seafood and vegetables with little broth.   Green Bean Casserole made with 98% fat free cream of mushroom soup and crushed almonds/pecans instead of fried onions  Side salad w/ low fat dressing. Try a different kind of salad maybe use Kale or spinach.   Roasted non-starchy vegetables like brussel sprouts, broccoli, green beans, zucchini, butternut squash, cauliflower  Cauliflower Mash (steam or roast cauliflower, puree w/ low fat cheese, dash of fat free milk and 2-3 sprays of I cant believe its not butter spray. Add garlic powder and black pepper to season). Use Low sodium broth instead of gravy.   Try Loaded Cauliflower Mash (Make cauliflower like above cauliflower mash. Top with diced turkey giordano, ¼ cup low fat cheddar cheese and bake @ 350* F for 5-10 minutes, until cheese is melted. Top with minced chives, black pepper and garlic to taste).   Homemade cranberry sauce using Splenda or another alternative sweetener. Boil fresh cranberries and add splenda to taste. Boil until cranberries break open and then simmer until it reaches the consistency you want (less time for more watery sauce and simmer for longer to create a thicker sauce).   Deviled eggs: make using low fat gottlieb, mustard, DILL relish (not sweet relish).   Vegetable tray w/ Greek yogurt Ranch Dip. Mix 1 packet of [place] ranch dip mix w/ 16 oz low fat plain greek yogurt.     Good Holiday Dessert Options:  High protein Pumpkin Cheesecake (see recipe below)  Pumpkin Whip (see recipe below)  Quest Apple Pie or Cinnamon Roll flavored protein bar (warm in  microwave for 10-15 seconds)  Eggnog Protein shake (see recipe below)  Fresh fruit w/ low fat cheese  Sugar-free Jello Parfaits. Layer Red and Green sugar-free jello in cups and top w/ 2 tbsp Sugar-free cool-whip    Pumpkin Cheesecake    8 ounces fat free cream cheese, softened   2 scoops of vanilla protein powder (<4 g sugar per serving)   ¼ tsp Fine salt   2 eggs, at room temperature   1/3 cup fat free sour cream  1/3 cup fat free half and half  1 15 -ounce can pure pumpkin puree   1 tablespoon pumpkin pie spice, plus more for dusting   Unsalted nuts, crushed  *Add splenda to taste    Directions     1. Preheat the oven to 300 degrees F. Line 18 muffin cups with paper liners. Sprinkle 1 tsp crushed unsalted nuts at the bottom of each of muffin cup liner.     2. In a large bowl, beat the cream cheese, vanilla protein powder and 1/4 teaspoon fine salt on medium-high speed until smooth and creamy, 2 to 3 minutes. Scrape down the sides, reduce speed to low and beat in the eggs, 1 at a time, until combined. Beat in 1/3 cup fat free sour cream and fat free half and half. Stir in the pumpkin puree and pumpkin pie spice until smooth. Divide evenly among cookie-lined paper cups, filling almost all the way to the top.     3. Bake until the filling is just set, 40 to 45 minutes. A sharp knife inserted into the center will come out moist, but clean. Cool completely in tins on a wire rack. Refrigerate until cold, 4 hours, or overnight. Top with a dusting of pumpkin pie spice.    Recipe altered from the following recipe: http://www.foodthesweetlink.com/recipes/food-network-jorge/mini-pumpkin-cheesecakes-recipe.print.html?oc=linkback    Pumpkin Whip    Box of sugar-free vanilla pudding  Can of pumpkin puree  Pumpkin Pie spice (sprinkle to taste)  ½ cup of sugar-free Cool Whip    Directions:  Make sugar-free pudding according to package directions using fat free or 1% milk. Stir in pumpkin and cool whip. Add pumpkin pie spice to  taste.     Egg Nog Protein shake    8 oz skim or 1% milk  1 scoop vanilla protein powder  1 tbsp sugar-free vanilla pudding mix  ½ tsp butter flavor extract  ½ tsp rum extract  ½ tsp cinnamon     Shake together or blend with ice and serve.     Sugar-Free Mulled Cider    3 oz diet cran-apple juice  6 oz water  1 packet sugar-free apple cider mix  ½ tsp apple pie spice  ½ tsp butter flavor extract  1 tbsp Sugar-free Syrup    Mix together. Warm if needed and serve w/ orange wedge and cinnamon stick.

## 2022-11-17 NOTE — PROGRESS NOTES
"Subjective:       Patient ID: Carmen Hawley is a 72 y.o. female.    Chief Complaint: Follow-up    Pt here today for follow up. Has lost 6 lbs from previous visit  net neg 13 lbs. Has been on Contrave  Increased to 2 pill in the morning and 2 pills in the evening last ov. Has been taking at least 2 Iin the am, and someties forgets the pm. Did try WW< but does not care for the tracking. Has been eating out the past couple of days.     FBS was 100 so started metformin for impaired fastin glucose.    She has been on topiramate as well.  She is enrolled in digital HTN.  BP  Good today.   Was on topiramate in the past.    diethylpropion , last filled 3/10/21  checked today.     Lab Results   Component Value Date    HGBA1C 5.6 11/09/2022    HGBA1C 5.5 07/14/2021    HGBA1C 5.8 01/12/2021     Lab Results   Component Value Date    LDLCALC 128.0 11/09/2022    CREATININE 0.7 11/09/2022             Follow-up  Associated symptoms include arthralgias. Pertinent negatives include no chest pain, chills or fever.     Review of Systems   Constitutional: Negative for chills and fever.   Respiratory: Negative for apnea and shortness of breath.         + snores   Cardiovascular: Negative for chest pain and leg swelling.   Gastrointestinal: Negative for constipation and diarrhea.        Denies HB   Genitourinary: Negative for dysuria.   Musculoskeletal: Positive for arthralgias. Negative for back pain.        Left foot OA   Neurological: Negative for dizziness and light-headedness.   Psychiatric/Behavioral: Negative for dysphoric mood. The patient is not nervous/anxious.         Doing well on Lexapro       Objective:       /74 (BP Location: Left arm, Patient Position: Sitting, BP Method: Large (Manual))   Pulse 70   Ht 5' 3" (1.6 m)   Wt 71.1 kg (156 lb 12 oz)   LMP 12/16/2006 (Approximate)   SpO2 98%   BMI 27.77 kg/m²     Physical Exam  Vitals reviewed.   Constitutional:       General: She is not in acute distress.     " Appearance: She is well-developed.      Comments: Obese     HENT:      Head: Normocephalic and atraumatic.   Eyes:      General: No scleral icterus.     Pupils: Pupils are equal, round, and reactive to light.   Cardiovascular:      Rate and Rhythm: Normal rate.   Pulmonary:      Effort: Pulmonary effort is normal.   Musculoskeletal:         General: Normal range of motion.      Cervical back: Normal range of motion and neck supple.   Skin:     General: Skin is warm and dry.      Findings: No erythema.   Neurological:      Mental Status: She is alert and oriented to person, place, and time.      Cranial Nerves: No cranial nerve deficit.   Psychiatric:         Behavior: Behavior normal.         Judgment: Judgment normal.         Assessment:       1. Overweight (BMI 25.0-29.9)    2. IFG (impaired fasting glucose)          Plan:            Carmen was seen today for follow-up.    Diagnoses and all orders for this visit:    Overweight (BMI 25.0-29.9)  -     metFORMIN (GLUCOPHAGE-XR) 500 MG ER 24hr tablet; Take 1 tablet (500 mg total) by mouth daily with breakfast.  -     naltrexone-bupropion (CONTRAVE) 8-90 mg TbSR; Take 2 tablets by mouth 2 (two) times daily.    IFG (impaired fasting glucose)       Continue metformin with dinner. Always take with food.  No Alcohol.     Contrave is long term weight loss medication. Side effects may include insomnia, nausea, headache, constipation, depression or change in thinking.  These side effects will improve with stopping the medication.        [email or web address]    Continue with  Contrave 2 pills twice a day.     1000 jeremiah pb meal planner given previously     Add some type of resistance training 2-3 days a week. These can be body weight exercises, light weight or elastic bands. [employer] and [employer] are great sources for free work out plans and videos.         Holiday tips given.

## 2022-11-30 ENCOUNTER — OFFICE VISIT (OUTPATIENT)
Dept: OPHTHALMOLOGY | Facility: CLINIC | Age: 72
End: 2022-11-30
Payer: COMMERCIAL

## 2022-11-30 DIAGNOSIS — H18.453 SALZMANN'S NODULAR DEGENERATION OF CORNEAS OF BOTH EYES: Primary | ICD-10-CM

## 2022-11-30 PROCEDURE — 3288F FALL RISK ASSESSMENT DOCD: CPT | Mod: CPTII,S$GLB,, | Performed by: OPHTHALMOLOGY

## 2022-11-30 PROCEDURE — 1160F RVW MEDS BY RX/DR IN RCRD: CPT | Mod: CPTII,S$GLB,, | Performed by: OPHTHALMOLOGY

## 2022-11-30 PROCEDURE — 3288F PR FALLS RISK ASSESSMENT DOCUMENTED: ICD-10-PCS | Mod: CPTII,S$GLB,, | Performed by: OPHTHALMOLOGY

## 2022-11-30 PROCEDURE — 1126F AMNT PAIN NOTED NONE PRSNT: CPT | Mod: CPTII,S$GLB,, | Performed by: OPHTHALMOLOGY

## 2022-11-30 PROCEDURE — 1126F PR PAIN SEVERITY QUANTIFIED, NO PAIN PRESENT: ICD-10-PCS | Mod: CPTII,S$GLB,, | Performed by: OPHTHALMOLOGY

## 2022-11-30 PROCEDURE — 1159F PR MEDICATION LIST DOCUMENTED IN MEDICAL RECORD: ICD-10-PCS | Mod: CPTII,S$GLB,, | Performed by: OPHTHALMOLOGY

## 2022-11-30 PROCEDURE — 99999 PR PBB SHADOW E&M-EST. PATIENT-LVL III: CPT | Mod: PBBFAC,,, | Performed by: OPHTHALMOLOGY

## 2022-11-30 PROCEDURE — 1101F PT FALLS ASSESS-DOCD LE1/YR: CPT | Mod: CPTII,S$GLB,, | Performed by: OPHTHALMOLOGY

## 2022-11-30 PROCEDURE — 3044F HG A1C LEVEL LT 7.0%: CPT | Mod: CPTII,S$GLB,, | Performed by: OPHTHALMOLOGY

## 2022-11-30 PROCEDURE — 1159F MED LIST DOCD IN RCRD: CPT | Mod: CPTII,S$GLB,, | Performed by: OPHTHALMOLOGY

## 2022-11-30 PROCEDURE — 92012 PR EYE EXAM, EST PATIENT,INTERMED: ICD-10-PCS | Mod: 25,S$GLB,, | Performed by: OPHTHALMOLOGY

## 2022-11-30 PROCEDURE — 99999 PR PBB SHADOW E&M-EST. PATIENT-LVL III: ICD-10-PCS | Mod: PBBFAC,,, | Performed by: OPHTHALMOLOGY

## 2022-11-30 PROCEDURE — 65400 PR EXCIS CORNEA LESN: ICD-10-PCS | Mod: LT,S$GLB,, | Performed by: OPHTHALMOLOGY

## 2022-11-30 PROCEDURE — 92012 INTRM OPH EXAM EST PATIENT: CPT | Mod: 25,S$GLB,, | Performed by: OPHTHALMOLOGY

## 2022-11-30 PROCEDURE — 1160F PR REVIEW ALL MEDS BY PRESCRIBER/CLIN PHARMACIST DOCUMENTED: ICD-10-PCS | Mod: CPTII,S$GLB,, | Performed by: OPHTHALMOLOGY

## 2022-11-30 PROCEDURE — 3044F PR MOST RECENT HEMOGLOBIN A1C LEVEL <7.0%: ICD-10-PCS | Mod: CPTII,S$GLB,, | Performed by: OPHTHALMOLOGY

## 2022-11-30 PROCEDURE — 1101F PR PT FALLS ASSESS DOC 0-1 FALLS W/OUT INJ PAST YR: ICD-10-PCS | Mod: CPTII,S$GLB,, | Performed by: OPHTHALMOLOGY

## 2022-11-30 PROCEDURE — 65400 REMOVAL OF EYE LESION: CPT | Mod: LT,S$GLB,, | Performed by: OPHTHALMOLOGY

## 2022-11-30 NOTE — PROGRESS NOTES
HPI    71 y/o female is here for SK OS.  Last edited by Rachelle Dale on 11/30/2022 11:57 AM.            Assessment /Plan     For exam results, see Encounter Report.    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    PREOPERATIVE DIAGNOSIS: Salzmanns Nodular Degeneration    POSTOPERATIVE DIAGNOSIS: Salzmanns Nodular Degeneration    PROCEDURES:    Superficial Keratectomy left eye    ANESTHESIA: Topical Tetracaine 0.5%    COMPLICATIONS:  None    ESTIMATED BLOOD LOSS: None    SPECIMENS: None    INDICATIONS:  The patient has a history of the diagnosis above, causing visually significant visual loss. After a thorough discussion of risks, benefits, and alternatives, it was agreed to proceed with surgical removal to attempt visual rehabilitation and improve activities of daily living which have suffered due to the impaired visual status.    PROCEDURE IN DETAIL  The patient was placed in a supine position on the procedure table while the eye was prepped and draped in standard sterile fashion with 5% Betadine. A lid speculum was placed and the procedure begun by debridement of the corneal epithelium with a [place] blade, which was also used to remove as much of the diseased cornea as could safely and easily be accomplished.   A nancy erasto was used to remove additional diseased cornea and smooth the stromal surface.    The patient will continue with antibiotic and anti-inflammatory treatment, and be followed up in the eye clinic in 1 week.

## 2022-12-07 ENCOUNTER — OFFICE VISIT (OUTPATIENT)
Dept: OPHTHALMOLOGY | Facility: CLINIC | Age: 72
End: 2022-12-07
Payer: COMMERCIAL

## 2022-12-07 DIAGNOSIS — H18.453 SALZMANN'S NODULAR DEGENERATION OF CORNEAS OF BOTH EYES: Primary | ICD-10-CM

## 2022-12-07 PROCEDURE — 99999 PR PBB SHADOW E&M-EST. PATIENT-LVL III: ICD-10-PCS | Mod: PBBFAC,,, | Performed by: OPHTHALMOLOGY

## 2022-12-07 PROCEDURE — 1159F MED LIST DOCD IN RCRD: CPT | Mod: CPTII,S$GLB,, | Performed by: OPHTHALMOLOGY

## 2022-12-07 PROCEDURE — 99024 PR POST-OP FOLLOW-UP VISIT: ICD-10-PCS | Mod: S$GLB,,, | Performed by: OPHTHALMOLOGY

## 2022-12-07 PROCEDURE — 3044F HG A1C LEVEL LT 7.0%: CPT | Mod: CPTII,S$GLB,, | Performed by: OPHTHALMOLOGY

## 2022-12-07 PROCEDURE — 1160F RVW MEDS BY RX/DR IN RCRD: CPT | Mod: CPTII,S$GLB,, | Performed by: OPHTHALMOLOGY

## 2022-12-07 PROCEDURE — 99999 PR PBB SHADOW E&M-EST. PATIENT-LVL III: CPT | Mod: PBBFAC,,, | Performed by: OPHTHALMOLOGY

## 2022-12-07 PROCEDURE — 3044F PR MOST RECENT HEMOGLOBIN A1C LEVEL <7.0%: ICD-10-PCS | Mod: CPTII,S$GLB,, | Performed by: OPHTHALMOLOGY

## 2022-12-07 PROCEDURE — 1159F PR MEDICATION LIST DOCUMENTED IN MEDICAL RECORD: ICD-10-PCS | Mod: CPTII,S$GLB,, | Performed by: OPHTHALMOLOGY

## 2022-12-07 PROCEDURE — 1160F PR REVIEW ALL MEDS BY PRESCRIBER/CLIN PHARMACIST DOCUMENTED: ICD-10-PCS | Mod: CPTII,S$GLB,, | Performed by: OPHTHALMOLOGY

## 2022-12-07 PROCEDURE — 99024 POSTOP FOLLOW-UP VISIT: CPT | Mod: S$GLB,,, | Performed by: OPHTHALMOLOGY

## 2022-12-07 NOTE — PROGRESS NOTES
HPI    DLS: 11/30/22    S/P SK OS     C/O: Pt states she had no pain just a little discomfort the first day.     Meds: Maxitrol QID OS   Last edited by Tammi Roman MA on 12/7/2022 11:51 AM.            Assessment /Plan     For exam results, see Encounter Report.    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  Ready for OD SK...

## 2022-12-14 DIAGNOSIS — M54.16 LUMBAR RADICULOPATHY: Primary | ICD-10-CM

## 2022-12-14 RX ORDER — GABAPENTIN 300 MG/1
300 CAPSULE ORAL 2 TIMES DAILY
Qty: 60 CAPSULE | Refills: 0 | Status: SHIPPED | OUTPATIENT
Start: 2022-12-14 | End: 2023-01-17 | Stop reason: SDUPTHER

## 2022-12-19 DIAGNOSIS — M54.31 SCIATICA OF RIGHT SIDE: Primary | ICD-10-CM

## 2022-12-19 RX ORDER — METHYLPREDNISOLONE 4 MG/1
TABLET ORAL
Qty: 21 EACH | Refills: 0 | Status: SHIPPED | OUTPATIENT
Start: 2022-12-19 | End: 2023-01-09

## 2022-12-28 DIAGNOSIS — F41.9 ANXIETY: Primary | ICD-10-CM

## 2022-12-28 RX ORDER — ESCITALOPRAM OXALATE 5 MG/1
5 TABLET ORAL DAILY
Qty: 30 TABLET | Refills: 11 | Status: SHIPPED | OUTPATIENT
Start: 2022-12-28

## 2022-12-28 RX ORDER — ESCITALOPRAM OXALATE 5 MG/1
5 TABLET ORAL DAILY
Qty: 90 TABLET | Refills: 3 | OUTPATIENT
Start: 2022-12-28 | End: 2023-12-28

## 2022-12-28 NOTE — TELEPHONE ENCOUNTER
Refill Routing Note   Medication(s) are not appropriate for processing by [facility] for the following reason(s):      - Medication not previously prescribed by PCP    [facility] action(s):  Route          Medication reconciliation completed: No     Appointments  past 12m or future 3m with PCP    Date Provider   Last Visit   11/7/2022 Tessie Leon MD   Next Visit   Visit date not found Tessie Leon MD   ED visits in past 90 days: 0        Note composed:2:20 PM 12/28/2022

## 2022-12-28 NOTE — TELEPHONE ENCOUNTER
No new care gaps identified.  [facility] Embedded Care Gaps. Reference number: [id number]. 12/28/2022   7:49:44 AM CST

## 2022-12-28 NOTE — TELEPHONE ENCOUNTER
Pt takes 10mg per day    Pt rarely takes the extra 5mg per day ; pt states she likes to have them on hand in case of a stressful day   Pt reports she sometimes uses an extra 5mg per day once a month     ++++    Messaged pt in portal  Gave pt flyer for other doctors to establish care with

## 2022-12-29 NOTE — TELEPHONE ENCOUNTER
Called pt to inform that Rx had been sent to pharmacy     Pt did not answer    Left message stating intent

## 2023-01-05 ENCOUNTER — TELEPHONE (OUTPATIENT)
Dept: PAIN MEDICINE | Facility: CLINIC | Age: 73
End: 2023-01-05
Payer: COMMERCIAL

## 2023-01-05 NOTE — TELEPHONE ENCOUNTER
----- Message from Marcia Escobedo sent at 1/5/2023  3:56 PM CST -----  Name of Who is Calling:CELESTINA HENRY [[id number]]              What is the request in detail:Requesting a call back to schedule an injection.               Can the clinic reply by [facility]:              What Number to Call Back if not in [facility]:[phone]

## 2023-01-17 DIAGNOSIS — M54.16 LUMBAR RADICULOPATHY: ICD-10-CM

## 2023-01-17 RX ORDER — GABAPENTIN 300 MG/1
300 CAPSULE ORAL 2 TIMES DAILY
Qty: 60 CAPSULE | Refills: 0 | Status: SHIPPED | OUTPATIENT
Start: 2023-01-17 | End: 2023-02-15 | Stop reason: SDUPTHER

## 2023-01-25 ENCOUNTER — PROCEDURE VISIT (OUTPATIENT)
Dept: OPHTHALMOLOGY | Facility: CLINIC | Age: 73
End: 2023-01-25
Attending: OPHTHALMOLOGY
Payer: COMMERCIAL

## 2023-01-25 DIAGNOSIS — H18.453 SALZMANN'S NODULAR DEGENERATION OF CORNEAS OF BOTH EYES: Primary | ICD-10-CM

## 2023-01-25 DIAGNOSIS — H25.13 NUCLEAR SCLEROSIS, BILATERAL: ICD-10-CM

## 2023-01-25 PROCEDURE — 65400 PR EXCIS CORNEA LESN: ICD-10-PCS | Mod: 79,RT,S$GLB, | Performed by: OPHTHALMOLOGY

## 2023-01-25 PROCEDURE — 99024 POSTOP FOLLOW-UP VISIT: CPT | Mod: S$GLB,,, | Performed by: OPHTHALMOLOGY

## 2023-01-25 PROCEDURE — 99024 PR POST-OP FOLLOW-UP VISIT: ICD-10-PCS | Mod: S$GLB,,, | Performed by: OPHTHALMOLOGY

## 2023-01-25 PROCEDURE — 65400 REMOVAL OF EYE LESION: CPT | Mod: 79,RT,S$GLB, | Performed by: OPHTHALMOLOGY

## 2023-01-25 RX ORDER — NEOMYCIN SULFATE, POLYMYXIN B SULFATE AND DEXAMETHASONE 3.5; 10000; 1 MG/ML; [USP'U]/ML; MG/ML
1 SUSPENSION/ DROPS OPHTHALMIC 4 TIMES DAILY
Qty: 10 ML | Refills: 2 | Status: SHIPPED | OUTPATIENT
Start: 2023-01-25 | End: 2023-03-01

## 2023-01-25 NOTE — PROGRESS NOTES
HPI    Patient here today for SK OD. VA stable ou, no pain and no f/f.    Last edited by Lisbeth Pimentel on 1/25/2023  3:32 PM.            Assessment /Plan     For exam results, see Encounter Report.    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    PREOPERATIVE DIAGNOSIS: Salzmanns Nodular Degeneration OD    POSTOPERATIVE DIAGNOSIS: Salzmanns Nodular Degeneration OD    PROCEDURES:    Superficial Keratectomy OD    ANESTHESIA: Topical Tetracaine 0.5%    COMPLICATIONS:  None    ESTIMATED BLOOD LOSS: None    SPECIMENS: None    INDICATIONS:  The patient has a history of the diagnosis above, causing visually significant visual loss. After a thorough discussion of risks, benefits, and alternatives, it was agreed to proceed with surgical removal to attempt visual rehabilitation and improve activities of daily living which have suffered due to the impaired visual status.    PROCEDURE IN DETAIL  The patient was placed in a supine position on the procedure table while the eye was prepped and draped in standard sterile fashion with 5% Betadine. A lid speculum was placed and the procedure begun by debridement of the corneal epithelium with a [place] blade, which was also used to remove as much of the diseased cornea as could safely and easily be accomplished.     Antibiotic drops were placed on the eye, and a bandage contact lens applied.  The patient will continue with antibiotic and anti-inflammatory treatment, and be followed up in the eye clinic in 1 week.

## 2023-02-08 ENCOUNTER — OFFICE VISIT (OUTPATIENT)
Dept: OPHTHALMOLOGY | Facility: CLINIC | Age: 73
End: 2023-02-08
Payer: COMMERCIAL

## 2023-02-08 DIAGNOSIS — H18.453 SALZMANN'S NODULAR DEGENERATION OF CORNEAS OF BOTH EYES: Primary | ICD-10-CM

## 2023-02-08 PROCEDURE — 1101F PR PT FALLS ASSESS DOC 0-1 FALLS W/OUT INJ PAST YR: ICD-10-PCS | Mod: CPTII,S$GLB,, | Performed by: OPHTHALMOLOGY

## 2023-02-08 PROCEDURE — 1126F AMNT PAIN NOTED NONE PRSNT: CPT | Mod: CPTII,S$GLB,, | Performed by: OPHTHALMOLOGY

## 2023-02-08 PROCEDURE — 1101F PT FALLS ASSESS-DOCD LE1/YR: CPT | Mod: CPTII,S$GLB,, | Performed by: OPHTHALMOLOGY

## 2023-02-08 PROCEDURE — 99999 PR PBB SHADOW E&M-EST. PATIENT-LVL III: CPT | Mod: PBBFAC,,, | Performed by: OPHTHALMOLOGY

## 2023-02-08 PROCEDURE — 1159F MED LIST DOCD IN RCRD: CPT | Mod: CPTII,S$GLB,, | Performed by: OPHTHALMOLOGY

## 2023-02-08 PROCEDURE — 3288F FALL RISK ASSESSMENT DOCD: CPT | Mod: CPTII,S$GLB,, | Performed by: OPHTHALMOLOGY

## 2023-02-08 PROCEDURE — 99024 POSTOP FOLLOW-UP VISIT: CPT | Mod: S$GLB,,, | Performed by: OPHTHALMOLOGY

## 2023-02-08 PROCEDURE — 1160F RVW MEDS BY RX/DR IN RCRD: CPT | Mod: CPTII,S$GLB,, | Performed by: OPHTHALMOLOGY

## 2023-02-08 PROCEDURE — 1159F PR MEDICATION LIST DOCUMENTED IN MEDICAL RECORD: ICD-10-PCS | Mod: CPTII,S$GLB,, | Performed by: OPHTHALMOLOGY

## 2023-02-08 PROCEDURE — 3288F PR FALLS RISK ASSESSMENT DOCUMENTED: ICD-10-PCS | Mod: CPTII,S$GLB,, | Performed by: OPHTHALMOLOGY

## 2023-02-08 PROCEDURE — 1160F PR REVIEW ALL MEDS BY PRESCRIBER/CLIN PHARMACIST DOCUMENTED: ICD-10-PCS | Mod: CPTII,S$GLB,, | Performed by: OPHTHALMOLOGY

## 2023-02-08 PROCEDURE — 99024 PR POST-OP FOLLOW-UP VISIT: ICD-10-PCS | Mod: S$GLB,,, | Performed by: OPHTHALMOLOGY

## 2023-02-08 PROCEDURE — 1126F PR PAIN SEVERITY QUANTIFIED, NO PAIN PRESENT: ICD-10-PCS | Mod: CPTII,S$GLB,, | Performed by: OPHTHALMOLOGY

## 2023-02-08 PROCEDURE — 99999 PR PBB SHADOW E&M-EST. PATIENT-LVL III: ICD-10-PCS | Mod: PBBFAC,,, | Performed by: OPHTHALMOLOGY

## 2023-02-08 NOTE — PROGRESS NOTES
HPI    73 y/o female is here for follow up after SK OD.   No pain and vision seems to be better. She does have bandage c/l's OD and   has her on own contact OS.  Refresh PRN OU  Maxitrol QID OS       Last edited by Rachelle Dale on 2/8/2023  1:22 PM.            Assessment /Plan     For exam results, see Encounter Report.    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    Looks good, so repeat IOL calcs/[place] soon for [facility]

## 2023-02-15 DIAGNOSIS — M54.16 LUMBAR RADICULOPATHY: ICD-10-CM

## 2023-02-15 RX ORDER — GABAPENTIN 300 MG/1
300 CAPSULE ORAL 2 TIMES DAILY
Qty: 60 CAPSULE | Refills: 0 | Status: SHIPPED | OUTPATIENT
Start: 2023-02-15 | End: 2023-03-10 | Stop reason: SDUPTHER

## 2023-02-16 ENCOUNTER — TELEPHONE (OUTPATIENT)
Dept: PAIN MEDICINE | Facility: CLINIC | Age: 73
End: 2023-02-16
Payer: COMMERCIAL

## 2023-02-16 NOTE — TELEPHONE ENCOUNTER
Staff spoke with pt in regards to setting up appt on 2/27/23 @ 1 pm in the office with Elvin balderrama Np.

## 2023-02-16 NOTE — TELEPHONE ENCOUNTER
----- Message from Blanca Garcia sent at 2/16/2023 11:33 AM CST -----  .Type: Patient Call Back    Who called:CELESTINA HENRY [[id number]]    What is the request in detail: PT stated that she would like to be scheduled for an injection in her knee.Please contact to further discuss and advise.     Can the clinic reply by [facility]? NO    Would the patient rather a call back or a response via [facility]? CALL BACK    Best call back number: [phone]    Additional Information: N/A

## 2023-02-22 ENCOUNTER — PATIENT MESSAGE (OUTPATIENT)
Dept: BARIATRICS | Facility: CLINIC | Age: 73
End: 2023-02-22
Payer: COMMERCIAL

## 2023-02-24 ENCOUNTER — PATIENT MESSAGE (OUTPATIENT)
Dept: PAIN MEDICINE | Facility: CLINIC | Age: 73
End: 2023-02-24
Payer: COMMERCIAL

## 2023-02-27 ENCOUNTER — OFFICE VISIT (OUTPATIENT)
Dept: PAIN MEDICINE | Facility: CLINIC | Age: 73
End: 2023-02-27
Payer: COMMERCIAL

## 2023-02-27 ENCOUNTER — PATIENT MESSAGE (OUTPATIENT)
Dept: PAIN MEDICINE | Facility: OTHER | Age: 73
End: 2023-02-27
Payer: COMMERCIAL

## 2023-02-27 VITALS
SYSTOLIC BLOOD PRESSURE: 155 MMHG | WEIGHT: 162.25 LBS | BODY MASS INDEX: 28.75 KG/M2 | HEART RATE: 67 BPM | HEIGHT: 63 IN | DIASTOLIC BLOOD PRESSURE: 74 MMHG

## 2023-02-27 DIAGNOSIS — M25.561 CHRONIC PAIN OF RIGHT KNEE: ICD-10-CM

## 2023-02-27 DIAGNOSIS — M54.16 LUMBAR RADICULOPATHY: Primary | ICD-10-CM

## 2023-02-27 DIAGNOSIS — G89.29 CHRONIC PAIN OF RIGHT KNEE: ICD-10-CM

## 2023-02-27 DIAGNOSIS — M17.11 PRIMARY OSTEOARTHRITIS OF RIGHT KNEE: ICD-10-CM

## 2023-02-27 PROCEDURE — 1101F PT FALLS ASSESS-DOCD LE1/YR: CPT | Mod: CPTII,S$GLB,, | Performed by: NURSE PRACTITIONER

## 2023-02-27 PROCEDURE — 1125F PR PAIN SEVERITY QUANTIFIED, PAIN PRESENT: ICD-10-PCS | Mod: CPTII,S$GLB,, | Performed by: NURSE PRACTITIONER

## 2023-02-27 PROCEDURE — 3077F SYST BP >= 140 MM HG: CPT | Mod: CPTII,S$GLB,, | Performed by: NURSE PRACTITIONER

## 2023-02-27 PROCEDURE — 3008F BODY MASS INDEX DOCD: CPT | Mod: CPTII,S$GLB,, | Performed by: NURSE PRACTITIONER

## 2023-02-27 PROCEDURE — 99999 PR PBB SHADOW E&M-EST. PATIENT-LVL IV: CPT | Mod: PBBFAC,,, | Performed by: NURSE PRACTITIONER

## 2023-02-27 PROCEDURE — 1159F MED LIST DOCD IN RCRD: CPT | Mod: CPTII,S$GLB,, | Performed by: NURSE PRACTITIONER

## 2023-02-27 PROCEDURE — 1101F PR PT FALLS ASSESS DOC 0-1 FALLS W/OUT INJ PAST YR: ICD-10-PCS | Mod: CPTII,S$GLB,, | Performed by: NURSE PRACTITIONER

## 2023-02-27 PROCEDURE — 1125F AMNT PAIN NOTED PAIN PRSNT: CPT | Mod: CPTII,S$GLB,, | Performed by: NURSE PRACTITIONER

## 2023-02-27 PROCEDURE — 99999 PR PBB SHADOW E&M-EST. PATIENT-LVL IV: ICD-10-PCS | Mod: PBBFAC,,, | Performed by: NURSE PRACTITIONER

## 2023-02-27 PROCEDURE — 1160F RVW MEDS BY RX/DR IN RCRD: CPT | Mod: CPTII,S$GLB,, | Performed by: NURSE PRACTITIONER

## 2023-02-27 PROCEDURE — 99214 PR OFFICE/OUTPT VISIT, EST, LEVL IV, 30-39 MIN: ICD-10-PCS | Mod: S$GLB,,, | Performed by: NURSE PRACTITIONER

## 2023-02-27 PROCEDURE — 3077F PR MOST RECENT SYSTOLIC BLOOD PRESSURE >= 140 MM HG: ICD-10-PCS | Mod: CPTII,S$GLB,, | Performed by: NURSE PRACTITIONER

## 2023-02-27 PROCEDURE — 3008F PR BODY MASS INDEX (BMI) DOCUMENTED: ICD-10-PCS | Mod: CPTII,S$GLB,, | Performed by: NURSE PRACTITIONER

## 2023-02-27 PROCEDURE — 3288F FALL RISK ASSESSMENT DOCD: CPT | Mod: CPTII,S$GLB,, | Performed by: NURSE PRACTITIONER

## 2023-02-27 PROCEDURE — 3078F PR MOST RECENT DIASTOLIC BLOOD PRESSURE < 80 MM HG: ICD-10-PCS | Mod: CPTII,S$GLB,, | Performed by: NURSE PRACTITIONER

## 2023-02-27 PROCEDURE — 99214 OFFICE O/P EST MOD 30 MIN: CPT | Mod: S$GLB,,, | Performed by: NURSE PRACTITIONER

## 2023-02-27 PROCEDURE — 3078F DIAST BP <80 MM HG: CPT | Mod: CPTII,S$GLB,, | Performed by: NURSE PRACTITIONER

## 2023-02-27 PROCEDURE — 1159F PR MEDICATION LIST DOCUMENTED IN MEDICAL RECORD: ICD-10-PCS | Mod: CPTII,S$GLB,, | Performed by: NURSE PRACTITIONER

## 2023-02-27 PROCEDURE — 1160F PR REVIEW ALL MEDS BY PRESCRIBER/CLIN PHARMACIST DOCUMENTED: ICD-10-PCS | Mod: CPTII,S$GLB,, | Performed by: NURSE PRACTITIONER

## 2023-02-27 PROCEDURE — 3288F PR FALLS RISK ASSESSMENT DOCUMENTED: ICD-10-PCS | Mod: CPTII,S$GLB,, | Performed by: NURSE PRACTITIONER

## 2023-02-27 NOTE — PROGRESS NOTES
Chronic patient Established Note (Follow up visit)      SUBJECTIVE:      Interval History 2/27/2023:  Mrs Hawley presents for follow up of lwoer back pain and R leg radicular pain prior relieved by R L3/4&L4/5 TFESI with near 100% resolution and prior injection was approx 8 months prior. She had improved functioning from procedure. She additionally is not ready to have R knee TKA consideration and visco supplementation did not provide much benefit but prior CSIs do provide some relief. HEP continues and PT tried in past with some mild benefit. Pain is limiting functioning at this time and >6/10.     Prior HPI:  Carmen Hawley presents to the clinic for a follow-up appointment for low back pain. She received right L3/L4 and L4/L5 which she reports great relief. She reports that since her KYLE she has been able to be more active and play golf more regularly. She reports that she taking Gabapentin 100mg TID and diclofenac gel/PO tablet for her right knee pain that has been a chronic problem. Today, she reports that the biggest pain currently. Her pain in the right knee mainly occurs at night and worsens with prolong walking. Pain is relieved with rest and medication. Majority of her knee pain is located on the medial aspect of her knee. She is has been in PT for her knee which she reports has been helping with her pain. She reports that her low back pain is currently a 1/10 and feels great. She does reports that her right knee pain at a 6/10 and is achy in nature. Denies numbness/tingling, weakness, changes in bowel/bladder.     Pain Disability Index Review:  Last 3 PDI Scores 2/27/2023 7/21/2022   Pain Disability Index (PDI) 36 0       Pain Medications:  Diclofenac 75 mg tablet   Diclofenac gel   Tylenol     Opioid Contract: not applicable     report:  Not applicable    Pain Procedures:   6/29/22 - Right L3/L4 and L4/L5: 90% relief     Physical Therapy/Home Exercise: yes; she has been to therapy for the knee      Imagin22   EXAMINATION:  XR KNEE ORTHO RIGHT WITH FLEXION     CLINICAL HISTORY:  ROOM 21;  Unilateral primary osteoarthritis, right knee     TECHNIQUE:  AP standing as well as PA flexion standing and Merchant views of both knees were performed.  A lateral view of the right knee is also performed.     COMPARISON:  2021     FINDINGS:  No acute fracture or dislocation seen.  Soft tissues are unremarkable.  Small suprapatellar joint effusion.     Tricompartmental osteophyte formation.  Subchondral sclerosis with severe bone-on-bone narrowing medial tibiofemoral compartment.  Narrowing patellofemoral compartment..     Impression:     No acute osseous abnormality seen.  Advanced osteoarthritis medial tibiofemoral compartment.    EXAMINATION:  XR LUMBAR SPINE AP AND LAT WITH FLEX/EXT     CLINICAL HISTORY:  ROOM 21;  Pain in thoracic spine     TECHNIQUE:  Three views of the lumbar spine plus flexion extension views were performed.     COMPARISON:  Lumbar spine radiograph from 2021.     FINDINGS:  Alignment: Alignment is maintained.  No dynamic instability.     Vertebrae: Vertebral body heights are maintained.  No suspicious appearing lytic or blastic lesions.     Discs and facets: Multilevel moderate to severe disc space narrowing, most prominent at L3-L4 and L5-S1. Moderate facet arthropathy within the lower lumbar spine.     Miscellaneous: Calcific abdominal aortic atherosclerosis.     Impression:     As above.    22  EXAMINATION:  MRI LUMBAR SPINE WITHOUT CONTRAST     CLINICAL HISTORY:  Low back pain, symptoms persist with > 6wks conservative treatment; Dorsalgia, unspecified     TECHNIQUE:  Multiplanar, multisequence MR images were acquired from the thoracolumbar junction to the sacrum without the administration of contrast.     COMPARISON:  Lumbar spine radiograph 2022.  MRI lumbar spine 2018, 2007.     FINDINGS:  Alignment: Normal.     Vertebrae: No acute fracture or  marrow replacement process.     Discs: Severe multilevel disc height loss with endplate irregularities and sub endplate edema, favored to be degenerative.     Cord: Normal.  Conus terminates at L2.     Degenerative findings:     T12-L1: Circumferential disc bulge, bilateral facet arthropathy and ligamentum flavum hypertrophy.  No spinal canal stenosis or neuroforaminal narrowing.     L1-L2: Mild circumferential disc bulge, bilateral facet arthropathy and ligamentum flavum hypertrophy.  No spinal canal stenosis or neuroforaminal narrowing.     L2-L3: Circumferential disc bulge, bilateral facet arthropathy and ligamentum flavum hypertrophy resulting in mild bilateral neural foraminal narrowing.  No significant spinal canal stenosis.     L3-L4: Circumferential disc bulge, bilateral facet arthropathy and ligamentum flavum hypertrophy resulting in moderate left and mild right neural foraminal narrowing.  No significant spinal canal stenosis.     L4-L5: There is a moderate-sized right paracentral/lateral recess less disc extrusion extending 1.3 cm inferiorly from the endplate (series 7, image 9).  This may impinge upon the descending right L5 nerve root.  There is superimposed moderate disc bulging and facet hypertrophy, resulting in moderate/severe spinal canal stenosis.  There is severe right and moderate left-sided neural foraminal narrowing.  There is advanced right-sided disc height loss with prominent sub endplate marrow edema and facet joint arthropathy (series 6, images 6-8).     L5-S1: There is advanced disc height loss with sub endplate plate sclerosis.  Moderate disc bulging and mild facet arthropathy noted.  These findings in severe right and moderate left-sided neural foraminal narrowing.  The spinal canal is patent.     Paraspinal muscles & soft tissues: 1.9 cm right renal cyst.     Impression:     Right paracentral disc extrusion at L4-5, possibly impinging upon the descending right L5 nerve root, as  above.     Additional lumbar spondylosis, resulting in moderate/ severe spinal canal stenosis at L4-5 and moderate/ severe neural foraminal narrowing at L4-5 and L5-S1, as above.     Prominent degenerative disc disease and right-sided facet joint arthropathy at L4-5, as above.    Allergies: Review of patient's allergies indicates:  No Known Allergies    Current Medications:   Current Outpatient Medications   Medication Sig Dispense Refill    aspirin (ECOTRIN) 81 MG EC tablet Take 81 mg by mouth once daily.        biotin 300 mcg Tab Take 1 tablet by mouth once daily.      cyanocobalamin 500 MCG tablet Take 500 mcg by mouth once daily.      diclofenac (VOLTAREN) 75 MG EC tablet Take 1 tablet (75 mg total) by mouth 2 (two) times daily as needed (pain). With food and water. 60 tablet 1    diclofenac sodium (VOLTAREN) 1 % Gel Apply 2 grams topically 4 (four) times daily. 350 g 6    EScitalopram oxalate (LEXAPRO) 10 MG tablet Take 1 tablet (10 mg total) by mouth once daily. 90 tablet 3    EScitalopram oxalate (LEXAPRO) 5 MG Tab Take 1 tablet (5 mg total) by mouth once daily. Combine with 10mg for total 15mg. 30 tablet 11    estradioL (ESTRACE) 0.01 % (0.1 mg/gram) vaginal cream Place 1 gram vaginally twice a week. 42.5 g 3    gabapentin (NEURONTIN) 300 MG capsule Take 1 capsule (300 mg total) by mouth 2 (two) times daily. 60 capsule 0    metoprolol succinate (TOPROL-XL) 100 MG 24 hr tablet Take 1 tablet (100 mg total) by mouth once daily. 90 tablet 3    naltrexone-bupropion (CONTRAVE) 8-90 mg TbSR Take 2 tablets by mouth 2 (two) times daily. 120 tablet 3    neomycin-polymyxin-dexamethasone (MAXITROL) 3.5mg/mL-10,000 unit/mL-0.1 % DrpS Place 1 drop into the right eye 4 (four) times daily. for 10 days 10 mL 2    prednisolon/gatiflox/bromfenac (PREDNISOL ACE-GATIFLOX-BROMFEN) 1-0.5-0.075 % DrpS Apply 1 drop to eye 3 (three) times daily. 5 mL 3    simvastatin (ZOCOR) 40 MG tablet TAKE 1 TABLET BY MOUTH EVERY EVENING. 90  tablet 3    traMADoL (ULTRAM) 50 mg tablet Take 1 tablet (50 mg total) by mouth every 6 (six) hours as needed for Pain. 28 tablet 1    tretinoin (RETIN-A) 0.1 % cream APPLY A THIN FILM TO AFFECTED AREA EVERY EVENING AFTER MOISTURIZING. 45 g 3    triamterene-hydrochlorothiazide 37.5-25 mg (DYAZIDE) 37.5-25 mg per capsule Take one capsule by mouth every day 90 capsule 3    vitamin D 1000 units Tab Take 2,000 mg by mouth once daily.       metFORMIN (GLUCOPHAGE-XR) 500 MG ER 24hr tablet Take 1 tablet (500 mg total) by mouth daily with breakfast. 90 tablet 1     No current facility-administered medications for this visit.       REVIEW OF SYSTEMS:    GENERAL:  No weight loss, malaise or fevers.  HEENT:  Negative for frequent or significant headaches.  NECK:  Negative for lumps, goiter, pain and significant neck swelling.  RESPIRATORY:  Negative for cough, wheezing or shortness of breath.  CARDIOVASCULAR:  Negative for chest pain, leg swelling or palpitations.  GI:  Negative for abdominal discomfort, blood in stools or black stools or change in bowel habits.  MUSCULOSKELETAL:  See HPI.  SKIN:  Negative for lesions, rash, and itching.  PSYCH:  Negative for sleep disturbance, mood disorder and recent psychosocial stressors.  HEMATOLOGY/LYMPHOLOGY:  Negative for prolonged bleeding, bruising easily or swollen nodes.  NEURO:   No history of headaches, syncope, paralysis, seizures or tremors.  All other reviewed and negative other than HPI.    Past Medical History:  Past Medical History:   Diagnosis Date    Anxiety     Arthritis     Hyperlipidemia     Hypertension     Macular degeneration     Mitral valve prolapse        Past Surgical History:  Past Surgical History:   Procedure Laterality Date    COLONOSCOPY N/A 10/8/2020    Procedure: COLONOSCOPY;  Surgeon: Crispin Moon MD;  Location: [facility] ([address] [place] [address]);  Service: Endoscopy;  Laterality: N/A;  Family history of colon polyps  COVID test on 10/5/20 at [facility] -      "TONSILLECTOMY      TRANSFORAMINAL EPIDURAL INJECTION OF STEROID Right 6/29/2022    Procedure: INJECTION, STEROID, EPIDURAL, TRANSFORAMINAL APPROACH, RIGHT L3-L4 AND L4-L5 CONTRAST DIRECT REF;  Surgeon: Tej Cm MD;  [facility];  Service: Pain Management;  Laterality: Right;       Family History:  Family History   Problem Relation Age of Onset    Cancer Mother         lung    Stroke Mother     Heart disease Father     Diabetes Father     Diabetes Brother     Aortic aneurysm Brother         surgery 5/2012    Kidney disease Brother         on dialysis    Melanoma Neg Hx     Breast cancer Neg Hx     Colon cancer Neg Hx     Ovarian cancer Neg Hx        Social History:  Social History     Socioeconomic History    Marital status:    Occupational History     Employer: [employer]   Tobacco Use    Smoking status: Never    Smokeless tobacco: Never   Substance and Sexual Activity    Alcohol use: Yes     Comment: 7 drinks weekly     Drug use: No    Sexual activity: Yes     Partners: Male     Birth control/protection: Post-menopausal     Comment:  since 2000   Social History Narrative    Works in [employer] department at [employer]. Working part-time.        Likes to play golf, walks for exercise.        OBJECTIVE:    BP (!) 155/74 (BP Location: Left arm, Patient Position: Sitting, BP Method: Medium (Automatic))   Pulse 67   Ht 5' 3" (1.6 m)   Wt 73.6 kg (162 lb 4.1 oz)   LMP 12/16/2006 (Approximate)   BMI 28.74 kg/m²     PHYSICAL EXAMINATION:    General appearance: Well appearing, in no acute distress, alert and oriented x3.  Psych:  Mood and affect appropriate.  Skin: Skin color, texture, turgor normal, no rashes or lesions, in both upper and lower body.  Head/face:  Atraumatic, normocephalic. No palpable lymph nodes  Neck: No pain to palpation over the cervical paraspinous muscles. Spurling Negative. No pain with neck flexion, extension, or lateral " flexion. .  Cor: RRR  Pulm: CTA  GI: Abdomen soft and non-tender.  Back: Straight leg raising in the sitting and supine positions is negative to radicular pain. mild pain to palpation over the spine or costovertebral angles. Normal range of motion without pain reproduction. Negative FACET loading bilaterally. Positive axial loading test bilateral. Positive FABERE,Ganselin and Yeoman's test on the both side.negative FADIR  Extremities: Peripheral joint ROM is full and pain free without obvious instability or laxity in all four extremities. No deformities, edema, or skin discoloration. Good capillary refill.  Right knee with slight effusion/swelling compared to left. positive for rt knee limited ROM with tenderness on palpation and crepitus on movement, negative ant and post drawer sign  Musculoskeletal: Negative SLR/LOLY/FADIR bilaterally. Negative for varus/valgus laxity in knee bilaterally. Negative Lachman/Anterior/Posterior Drawer bilaterally. TTP medial aspect of right knee.  Bilateral upper and lower extremity strength is normal and symmetric.  No atrophy or tone abnormalities are noted.  Neuro: Bilateral upper and lower extremity coordination and muscle stretch reflexes are physiologic and symmetric.  Plantar response are downgoing. No loss of sensation is noted.  Gait: Normal.    ASSESSMENT: 72 y.o. year old female with chronic right knee pain and low back pain, consistent with      1. Lumbar radiculopathy  Procedure Order to Pain Management      2. Primary osteoarthritis of right knee  Procedure Order to Pain Management      3. Chronic pain of right knee  Procedure Order to Pain Management              PLAN:     - Prior records reviewed    - Imaging reviewed    - pain returning where prior R L3/4&L4/5 provided improved functioning for >8 months and pain again limiting functioning    - Will s/f repeat R L3/4&L4/5 TFESI.     - Additionally s/f R knee genicular block TO BE THERAPEUTIC WITH STEROID     - I have  stressed the importance of physical activity and a home exercise plan to help with pain and improve health.    - Patient can continue with medications for now since they are providing benefits, using them appropriately, and without side effects.    - Discussed in detail that the patient's right knee pain is secondary to OA of the knee. Will schedule for Right knee Synvisc-one injection     - Discussed in detail that patients low back pain was most likely secondary to lumbar radiculopathy. Patient reported 90% relief of pain after right L3/L4 and L4/L5 TFESI and states that she has been more functional since having procedure done.    - Continue Neurontin      - RTC after procedures.     - Counseled patient regarding the importance of activity modification, constant sleeping habits and physical therapy.    The above plan and management options were discussed at length with patient. Patient is in agreement with the above and verbalized understanding.      Elvin Curtis NP  2/27/2023     I spent a total of 30 minutes on the day of the visit.  This includes face to face time and non-face to face time preparing to see the patient by reviewing previous labs/imaging, obtaining and/or reviewing separately obtained history, documenting clinical information in the electronic or other health record, independently interpreting results and communicating results to the patient/family/caregiver.

## 2023-02-27 NOTE — H&P (VIEW-ONLY)
Chronic patient Established Note (Follow up visit)      SUBJECTIVE:      Interval History 2/27/2023:  Mrs Hawley presents for follow up of lwoer back pain and R leg radicular pain prior relieved by R L3/4&L4/5 TFESI with near 100% resolution and prior injection was approx 8 months prior. She had improved functioning from procedure. She additionally is not ready to have R knee TKA consideration and visco supplementation did not provide much benefit but prior CSIs do provide some relief. HEP continues and PT tried in past with some mild benefit. Pain is limiting functioning at this time and >6/10.     Prior HPI:  Carmen Hawley presents to the clinic for a follow-up appointment for low back pain. She received right L3/L4 and L4/L5 which she reports great relief. She reports that since her KYLE she has been able to be more active and play golf more regularly. She reports that she taking Gabapentin 100mg TID and diclofenac gel/PO tablet for her right knee pain that has been a chronic problem. Today, she reports that the biggest pain currently. Her pain in the right knee mainly occurs at night and worsens with prolong walking. Pain is relieved with rest and medication. Majority of her knee pain is located on the medial aspect of her knee. She is has been in PT for her knee which she reports has been helping with her pain. She reports that her low back pain is currently a 1/10 and feels great. She does reports that her right knee pain at a 6/10 and is achy in nature. Denies numbness/tingling, weakness, changes in bowel/bladder.     Pain Disability Index Review:  Last 3 PDI Scores 2/27/2023 7/21/2022   Pain Disability Index (PDI) 36 0       Pain Medications:  Diclofenac 75 mg tablet   Diclofenac gel   Tylenol     Opioid Contract: not applicable     report:  Not applicable    Pain Procedures:   6/29/22 - Right L3/L4 and L4/L5: 90% relief     Physical Therapy/Home Exercise: yes; she has been to therapy for the knee      Imagin22   EXAMINATION:  XR KNEE ORTHO RIGHT WITH FLEXION     CLINICAL HISTORY:  ROOM 21;  Unilateral primary osteoarthritis, right knee     TECHNIQUE:  AP standing as well as PA flexion standing and Merchant views of both knees were performed.  A lateral view of the right knee is also performed.     COMPARISON:  2021     FINDINGS:  No acute fracture or dislocation seen.  Soft tissues are unremarkable.  Small suprapatellar joint effusion.     Tricompartmental osteophyte formation.  Subchondral sclerosis with severe bone-on-bone narrowing medial tibiofemoral compartment.  Narrowing patellofemoral compartment..     Impression:     No acute osseous abnormality seen.  Advanced osteoarthritis medial tibiofemoral compartment.    EXAMINATION:  XR LUMBAR SPINE AP AND LAT WITH FLEX/EXT     CLINICAL HISTORY:  ROOM 21;  Pain in thoracic spine     TECHNIQUE:  Three views of the lumbar spine plus flexion extension views were performed.     COMPARISON:  Lumbar spine radiograph from 2021.     FINDINGS:  Alignment: Alignment is maintained.  No dynamic instability.     Vertebrae: Vertebral body heights are maintained.  No suspicious appearing lytic or blastic lesions.     Discs and facets: Multilevel moderate to severe disc space narrowing, most prominent at L3-L4 and L5-S1. Moderate facet arthropathy within the lower lumbar spine.     Miscellaneous: Calcific abdominal aortic atherosclerosis.     Impression:     As above.    22  EXAMINATION:  MRI LUMBAR SPINE WITHOUT CONTRAST     CLINICAL HISTORY:  Low back pain, symptoms persist with > 6wks conservative treatment; Dorsalgia, unspecified     TECHNIQUE:  Multiplanar, multisequence MR images were acquired from the thoracolumbar junction to the sacrum without the administration of contrast.     COMPARISON:  Lumbar spine radiograph 2022.  MRI lumbar spine 2018, 2007.     FINDINGS:  Alignment: Normal.     Vertebrae: No acute fracture or  marrow replacement process.     Discs: Severe multilevel disc height loss with endplate irregularities and sub endplate edema, favored to be degenerative.     Cord: Normal.  Conus terminates at L2.     Degenerative findings:     T12-L1: Circumferential disc bulge, bilateral facet arthropathy and ligamentum flavum hypertrophy.  No spinal canal stenosis or neuroforaminal narrowing.     L1-L2: Mild circumferential disc bulge, bilateral facet arthropathy and ligamentum flavum hypertrophy.  No spinal canal stenosis or neuroforaminal narrowing.     L2-L3: Circumferential disc bulge, bilateral facet arthropathy and ligamentum flavum hypertrophy resulting in mild bilateral neural foraminal narrowing.  No significant spinal canal stenosis.     L3-L4: Circumferential disc bulge, bilateral facet arthropathy and ligamentum flavum hypertrophy resulting in moderate left and mild right neural foraminal narrowing.  No significant spinal canal stenosis.     L4-L5: There is a moderate-sized right paracentral/lateral recess less disc extrusion extending 1.3 cm inferiorly from the endplate (series 7, image 9).  This may impinge upon the descending right L5 nerve root.  There is superimposed moderate disc bulging and facet hypertrophy, resulting in moderate/severe spinal canal stenosis.  There is severe right and moderate left-sided neural foraminal narrowing.  There is advanced right-sided disc height loss with prominent sub endplate marrow edema and facet joint arthropathy (series 6, images 6-8).     L5-S1: There is advanced disc height loss with sub endplate plate sclerosis.  Moderate disc bulging and mild facet arthropathy noted.  These findings in severe right and moderate left-sided neural foraminal narrowing.  The spinal canal is patent.     Paraspinal muscles & soft tissues: 1.9 cm right renal cyst.     Impression:     Right paracentral disc extrusion at L4-5, possibly impinging upon the descending right L5 nerve root, as  above.     Additional lumbar spondylosis, resulting in moderate/ severe spinal canal stenosis at L4-5 and moderate/ severe neural foraminal narrowing at L4-5 and L5-S1, as above.     Prominent degenerative disc disease and right-sided facet joint arthropathy at L4-5, as above.    Allergies: Review of patient's allergies indicates:  No Known Allergies    Current Medications:   Current Outpatient Medications   Medication Sig Dispense Refill    aspirin (ECOTRIN) 81 MG EC tablet Take 81 mg by mouth once daily.        biotin 300 mcg Tab Take 1 tablet by mouth once daily.      cyanocobalamin 500 MCG tablet Take 500 mcg by mouth once daily.      diclofenac (VOLTAREN) 75 MG EC tablet Take 1 tablet (75 mg total) by mouth 2 (two) times daily as needed (pain). With food and water. 60 tablet 1    diclofenac sodium (VOLTAREN) 1 % Gel Apply 2 grams topically 4 (four) times daily. 350 g 6    EScitalopram oxalate (LEXAPRO) 10 MG tablet Take 1 tablet (10 mg total) by mouth once daily. 90 tablet 3    EScitalopram oxalate (LEXAPRO) 5 MG Tab Take 1 tablet (5 mg total) by mouth once daily. Combine with 10mg for total 15mg. 30 tablet 11    estradioL (ESTRACE) 0.01 % (0.1 mg/gram) vaginal cream Place 1 gram vaginally twice a week. 42.5 g 3    gabapentin (NEURONTIN) 300 MG capsule Take 1 capsule (300 mg total) by mouth 2 (two) times daily. 60 capsule 0    metoprolol succinate (TOPROL-XL) 100 MG 24 hr tablet Take 1 tablet (100 mg total) by mouth once daily. 90 tablet 3    naltrexone-bupropion (CONTRAVE) 8-90 mg TbSR Take 2 tablets by mouth 2 (two) times daily. 120 tablet 3    neomycin-polymyxin-dexamethasone (MAXITROL) 3.5mg/mL-10,000 unit/mL-0.1 % DrpS Place 1 drop into the right eye 4 (four) times daily. for 10 days 10 mL 2    prednisolon/gatiflox/bromfenac (PREDNISOL ACE-GATIFLOX-BROMFEN) 1-0.5-0.075 % DrpS Apply 1 drop to eye 3 (three) times daily. 5 mL 3    simvastatin (ZOCOR) 40 MG tablet TAKE 1 TABLET BY MOUTH EVERY EVENING. 90  tablet 3    traMADoL (ULTRAM) 50 mg tablet Take 1 tablet (50 mg total) by mouth every 6 (six) hours as needed for Pain. 28 tablet 1    tretinoin (RETIN-A) 0.1 % cream APPLY A THIN FILM TO AFFECTED AREA EVERY EVENING AFTER MOISTURIZING. 45 g 3    triamterene-hydrochlorothiazide 37.5-25 mg (DYAZIDE) 37.5-25 mg per capsule Take one capsule by mouth every day 90 capsule 3    vitamin D 1000 units Tab Take 2,000 mg by mouth once daily.       metFORMIN (GLUCOPHAGE-XR) 500 MG ER 24hr tablet Take 1 tablet (500 mg total) by mouth daily with breakfast. 90 tablet 1     No current facility-administered medications for this visit.       REVIEW OF SYSTEMS:    GENERAL:  No weight loss, malaise or fevers.  HEENT:  Negative for frequent or significant headaches.  NECK:  Negative for lumps, goiter, pain and significant neck swelling.  RESPIRATORY:  Negative for cough, wheezing or shortness of breath.  CARDIOVASCULAR:  Negative for chest pain, leg swelling or palpitations.  GI:  Negative for abdominal discomfort, blood in stools or black stools or change in bowel habits.  MUSCULOSKELETAL:  See HPI.  SKIN:  Negative for lesions, rash, and itching.  PSYCH:  Negative for sleep disturbance, mood disorder and recent psychosocial stressors.  HEMATOLOGY/LYMPHOLOGY:  Negative for prolonged bleeding, bruising easily or swollen nodes.  NEURO:   No history of headaches, syncope, paralysis, seizures or tremors.  All other reviewed and negative other than HPI.    Past Medical History:  Past Medical History:   Diagnosis Date    Anxiety     Arthritis     Hyperlipidemia     Hypertension     Macular degeneration     Mitral valve prolapse        Past Surgical History:  Past Surgical History:   Procedure Laterality Date    COLONOSCOPY N/A 10/8/2020    Procedure: COLONOSCOPY;  Surgeon: Crispin Moon MD;  Location: [facility] ([address] [place] [address]);  Service: Endoscopy;  Laterality: N/A;  Family history of colon polyps  COVID test on 10/5/20 at [facility] -      "TONSILLECTOMY      TRANSFORAMINAL EPIDURAL INJECTION OF STEROID Right 6/29/2022    Procedure: INJECTION, STEROID, EPIDURAL, TRANSFORAMINAL APPROACH, RIGHT L3-L4 AND L4-L5 CONTRAST DIRECT REF;  Surgeon: Tej Cm MD;  [facility];  Service: Pain Management;  Laterality: Right;       Family History:  Family History   Problem Relation Age of Onset    Cancer Mother         lung    Stroke Mother     Heart disease Father     Diabetes Father     Diabetes Brother     Aortic aneurysm Brother         surgery 5/2012    Kidney disease Brother         on dialysis    Melanoma Neg Hx     Breast cancer Neg Hx     Colon cancer Neg Hx     Ovarian cancer Neg Hx        Social History:  Social History     Socioeconomic History    Marital status:    Occupational History     Employer: [employer]   Tobacco Use    Smoking status: Never    Smokeless tobacco: Never   Substance and Sexual Activity    Alcohol use: Yes     Comment: 7 drinks weekly     Drug use: No    Sexual activity: Yes     Partners: Male     Birth control/protection: Post-menopausal     Comment:  since 2000   Social History Narrative    Works in [employer] department at [employer]. Working part-time.        Likes to play golf, walks for exercise.        OBJECTIVE:    BP (!) 155/74 (BP Location: Left arm, Patient Position: Sitting, BP Method: Medium (Automatic))   Pulse 67   Ht 5' 3" (1.6 m)   Wt 73.6 kg (162 lb 4.1 oz)   LMP 12/16/2006 (Approximate)   BMI 28.74 kg/m²     PHYSICAL EXAMINATION:    General appearance: Well appearing, in no acute distress, alert and oriented x3.  Psych:  Mood and affect appropriate.  Skin: Skin color, texture, turgor normal, no rashes or lesions, in both upper and lower body.  Head/face:  Atraumatic, normocephalic. No palpable lymph nodes  Neck: No pain to palpation over the cervical paraspinous muscles. Spurling Negative. No pain with neck flexion, extension, or lateral " flexion. .  Cor: RRR  Pulm: CTA  GI: Abdomen soft and non-tender.  Back: Straight leg raising in the sitting and supine positions is negative to radicular pain. mild pain to palpation over the spine or costovertebral angles. Normal range of motion without pain reproduction. Negative FACET loading bilaterally. Positive axial loading test bilateral. Positive FABERE,Ganselin and Yeoman's test on the both side.negative FADIR  Extremities: Peripheral joint ROM is full and pain free without obvious instability or laxity in all four extremities. No deformities, edema, or skin discoloration. Good capillary refill.  Right knee with slight effusion/swelling compared to left. positive for rt knee limited ROM with tenderness on palpation and crepitus on movement, negative ant and post drawer sign  Musculoskeletal: Negative SLR/LOLY/FADIR bilaterally. Negative for varus/valgus laxity in knee bilaterally. Negative Lachman/Anterior/Posterior Drawer bilaterally. TTP medial aspect of right knee.  Bilateral upper and lower extremity strength is normal and symmetric.  No atrophy or tone abnormalities are noted.  Neuro: Bilateral upper and lower extremity coordination and muscle stretch reflexes are physiologic and symmetric.  Plantar response are downgoing. No loss of sensation is noted.  Gait: Normal.    ASSESSMENT: 72 y.o. year old female with chronic right knee pain and low back pain, consistent with      1. Lumbar radiculopathy  Procedure Order to Pain Management      2. Primary osteoarthritis of right knee  Procedure Order to Pain Management      3. Chronic pain of right knee  Procedure Order to Pain Management              PLAN:     - Prior records reviewed    - Imaging reviewed    - pain returning where prior R L3/4&L4/5 provided improved functioning for >8 months and pain again limiting functioning    - Will s/f repeat R L3/4&L4/5 TFESI.     - Additionally s/f R knee genicular block TO BE THERAPEUTIC WITH STEROID     - I have  stressed the importance of physical activity and a home exercise plan to help with pain and improve health.    - Patient can continue with medications for now since they are providing benefits, using them appropriately, and without side effects.    - Discussed in detail that the patient's right knee pain is secondary to OA of the knee. Will schedule for Right knee Synvisc-one injection     - Discussed in detail that patients low back pain was most likely secondary to lumbar radiculopathy. Patient reported 90% relief of pain after right L3/L4 and L4/L5 TFESI and states that she has been more functional since having procedure done.    - Continue Neurontin      - RTC after procedures.     - Counseled patient regarding the importance of activity modification, constant sleeping habits and physical therapy.    The above plan and management options were discussed at length with patient. Patient is in agreement with the above and verbalized understanding.      Elvin Curtis NP  2/27/2023     I spent a total of 30 minutes on the day of the visit.  This includes face to face time and non-face to face time preparing to see the patient by reviewing previous labs/imaging, obtaining and/or reviewing separately obtained history, documenting clinical information in the electronic or other health record, independently interpreting results and communicating results to the patient/family/caregiver.

## 2023-03-02 ENCOUNTER — CLINICAL SUPPORT (OUTPATIENT)
Dept: OTHER | Facility: CLINIC | Age: 73
End: 2023-03-02
Payer: COMMERCIAL

## 2023-03-02 ENCOUNTER — TELEPHONE (OUTPATIENT)
Dept: PAIN MEDICINE | Facility: CLINIC | Age: 73
End: 2023-03-02
Payer: COMMERCIAL

## 2023-03-02 ENCOUNTER — TELEPHONE (OUTPATIENT)
Dept: PAIN MEDICINE | Facility: OTHER | Age: 73
End: 2023-03-02
Payer: COMMERCIAL

## 2023-03-02 DIAGNOSIS — Z00.8 ENCOUNTER FOR OTHER GENERAL EXAMINATION: ICD-10-CM

## 2023-03-02 NOTE — TELEPHONE ENCOUNTER
----- Message from Millicent Gee sent at 3/2/2023 11:19 AM CST -----  Type:  Patient Returning Call    Who Called:     Who Left Message for Patient:     Does the patient know what this is regarding?: missed call     Best Call Back Number:  [phone]    Additional Information:

## 2023-03-03 ENCOUNTER — TELEPHONE (OUTPATIENT)
Dept: PAIN MEDICINE | Facility: OTHER | Age: 73
End: 2023-03-03
Payer: COMMERCIAL

## 2023-03-03 VITALS
WEIGHT: 156 LBS | BODY MASS INDEX: 27.64 KG/M2 | SYSTOLIC BLOOD PRESSURE: 144 MMHG | HEIGHT: 63 IN | DIASTOLIC BLOOD PRESSURE: 82 MMHG

## 2023-03-03 LAB
GLUCOSE SERPL-MCNC: 95 MG/DL (ref 60–140)
HDLC SERPL-MCNC: 85 MG/DL
POC CHOLESTEROL, LDL (DOCK): 123 MG/DL
POC CHOLESTEROL, TOTAL: 221 MG/DL
TRIGL SERPL-MCNC: 73 MG/DL

## 2023-03-06 ENCOUNTER — TELEPHONE (OUTPATIENT)
Dept: PAIN MEDICINE | Facility: OTHER | Age: 73
End: 2023-03-06
Payer: COMMERCIAL

## 2023-03-06 ENCOUNTER — TELEPHONE (OUTPATIENT)
Dept: PAIN MEDICINE | Facility: CLINIC | Age: 73
End: 2023-03-06
Payer: COMMERCIAL

## 2023-03-06 NOTE — TELEPHONE ENCOUNTER
----- Message from Raegan Tidwell sent at 3/6/2023 10:15 AM CST -----  Regarding: patient call back  Type: Patient Call Back    Who called: Self     What is the request in detail: Needs to verify procedure     Can the clinic reply by [facility]?    Would the patient rather a call back or a response via [facility]? No     Best call back number:  Direct work line [phone]    Additional Information:

## 2023-03-10 DIAGNOSIS — M54.16 LUMBAR RADICULOPATHY: ICD-10-CM

## 2023-03-11 RX ORDER — GABAPENTIN 300 MG/1
300 CAPSULE ORAL 2 TIMES DAILY
Qty: 60 CAPSULE | Refills: 0 | Status: SHIPPED | OUTPATIENT
Start: 2023-03-11 | End: 2023-04-06 | Stop reason: SDUPTHER

## 2023-03-13 ENCOUNTER — TELEPHONE (OUTPATIENT)
Dept: PAIN MEDICINE | Facility: CLINIC | Age: 73
End: 2023-03-13

## 2023-03-13 NOTE — TELEPHONE ENCOUNTER
----- Message from Raegna Tidwell sent at 3/13/2023  8:38 AM CDT -----  Regarding: Return Call  .Type:  Patient Returning Call    Who Called: Self     Who Left Message for Patient: Alix     Does the patient know what this is regarding?: confirm time     Would the patient rather a call back or a response via [facility]? Call     Best Call Back Number:[phone]

## 2023-03-14 ENCOUNTER — PATIENT MESSAGE (OUTPATIENT)
Dept: PAIN MEDICINE | Facility: OTHER | Age: 73
End: 2023-03-14
Payer: COMMERCIAL

## 2023-03-15 ENCOUNTER — HOSPITAL ENCOUNTER (OUTPATIENT)
Facility: OTHER | Age: 73
Discharge: HOME | End: 2023-03-15
Attending: ANESTHESIOLOGY | Admitting: ANESTHESIOLOGY
Payer: COMMERCIAL

## 2023-03-15 VITALS
BODY MASS INDEX: 27.64 KG/M2 | OXYGEN SATURATION: 98 % | DIASTOLIC BLOOD PRESSURE: 61 MMHG | RESPIRATION RATE: 16 BRPM | HEIGHT: 63 IN | TEMPERATURE: 98 F | SYSTOLIC BLOOD PRESSURE: 140 MMHG | HEART RATE: 64 BPM | WEIGHT: 156 LBS

## 2023-03-15 DIAGNOSIS — M47.897 OTHER SPONDYLOSIS, LUMBOSACRAL REGION: Primary | ICD-10-CM

## 2023-03-15 DIAGNOSIS — G89.29 CHRONIC PAIN: ICD-10-CM

## 2023-03-15 PROCEDURE — 63600175 PHARM REV CODE 636 W HCPCS: Performed by: ANESTHESIOLOGY

## 2023-03-15 PROCEDURE — 64483 NJX AA&/STRD TFRM EPI L/S 1: CPT | Mod: RT,,, | Performed by: ANESTHESIOLOGY

## 2023-03-15 PROCEDURE — 64484 PRA INJECT ANES/STEROID FORAMEN LUMBAR/SACRAL W IMG GUIDE ,EA ADD LEVEL: ICD-10-PCS | Mod: RT,,, | Performed by: ANESTHESIOLOGY

## 2023-03-15 PROCEDURE — 25500020 PHARM REV CODE 255: Performed by: ANESTHESIOLOGY

## 2023-03-15 PROCEDURE — 64483 PR EPIDURAL INJ, ANES/STEROID, TRANSFORAMINAL, LUMB/SACR, SNGL LEVL: ICD-10-PCS | Mod: RT,,, | Performed by: ANESTHESIOLOGY

## 2023-03-15 PROCEDURE — 64484 NJX AA&/STRD TFRM EPI L/S EA: CPT | Mod: RT | Performed by: ANESTHESIOLOGY

## 2023-03-15 PROCEDURE — 25000003 PHARM REV CODE 250: Performed by: ANESTHESIOLOGY

## 2023-03-15 PROCEDURE — 64484 NJX AA&/STRD TFRM EPI L/S EA: CPT | Mod: RT,,, | Performed by: ANESTHESIOLOGY

## 2023-03-15 PROCEDURE — 64483 NJX AA&/STRD TFRM EPI L/S 1: CPT | Mod: RT | Performed by: ANESTHESIOLOGY

## 2023-03-15 RX ORDER — SODIUM CHLORIDE 9 MG/ML
500 INJECTION, SOLUTION INTRAVENOUS CONTINUOUS
Status: DISCONTINUED | OUTPATIENT
Start: 2023-03-15 | End: 2023-03-15 | Stop reason: HOSPADM

## 2023-03-15 RX ORDER — LIDOCAINE HYDROCHLORIDE 20 MG/ML
INJECTION, SOLUTION INFILTRATION; PERINEURAL
Status: DISCONTINUED | OUTPATIENT
Start: 2023-03-15 | End: 2023-03-15 | Stop reason: HOSPADM

## 2023-03-15 RX ORDER — DEXAMETHASONE SODIUM PHOSPHATE 10 MG/ML
INJECTION INTRAMUSCULAR; INTRAVENOUS
Status: DISCONTINUED | OUTPATIENT
Start: 2023-03-15 | End: 2023-03-15 | Stop reason: HOSPADM

## 2023-03-15 RX ORDER — LIDOCAINE HYDROCHLORIDE 10 MG/ML
INJECTION, SOLUTION EPIDURAL; INFILTRATION; INTRACAUDAL; PERINEURAL
Status: DISCONTINUED | OUTPATIENT
Start: 2023-03-15 | End: 2023-03-15 | Stop reason: HOSPADM

## 2023-03-15 NOTE — OP NOTE
Lumbar Transforaminal Epidural Steroid Injection under Fluoroscopic Guidance    The procedure, risks, benefits, and options were discussed with the patient. There are no contraindications to the procedure. The patent expressed understanding and agreed to the procedure. Informed written consent was obtained prior to the start of the procedure and can be found in the patient's chart.    PATIENT NAME: Carmen Hawley   MRN: [id number]     DATE OF PROCEDURE: 03/15/2023    PROCEDURE:  Right  L3/4 and L4/5 Lumbar Transforaminal Epidural Steroid Injection under Fluoroscopic Guidance    PRE-OP DIAGNOSIS: Lumbar radiculopathy [M54.16] Lumbar radiculopathy [M54.16]    POST-OP DIAGNOSIS: Same    PHYSICIAN: Tej Cm MD    ASSISTANTS: None     MEDICATIONS INJECTED: Preservative-free Decadron 10mg with 5cc of Lidocaine 1% MPF     LOCAL ANESTHETIC INJECTED: Xylocaine 2%     SEDATION: None    ESTIMATED BLOOD LOSS: None    COMPLICATIONS: None    TECHNIQUE: Time-out was performed to identify the patient and procedure to be performed. With the patient laying in a prone position, the surgical area was prepped and draped in the usual sterile fashion using ChloraPrep and a fenestrated drape.The levels were determined under fluoroscopy guidance. Skin anesthesia was achieved by injecting Lidocaine 2% over the injection sites. The transforaminal spaces were then approached with a 22 gauge, 3.5 inch spinal quinke needle that was introduced under fluoroscopic guidance in the AP and Lateral views. Once the needle tip was in the area of the transforaminal space, and there was no blood, CSF or paraesthesias, contrast dye Omnipaque (240mg/mL) was injected to confirm placement and there was no vascular runoff. Fluoroscopic imaging in the AP and lateral views revealed a clear outline of the spinal nerve with proximal spread of agent through the neural foramen into the epidural space. 3 mL of the medication mixture listed above was injected  slowly at each site. Displacement of the radio opaque contrast after injection of the medication confirmed that the medication went into the area of the transforaminal spaces. The needles were removed and bleeding was nil. A sterile dressing was applied. No specimens collected. The patient tolerated the procedure well.       The patient was monitored after the procedure in the recovery area. They were given post-procedure and discharge instructions to follow at home. The patient was discharged in a stable condition.    Jenny Fernandez MD [facility]    I reviewed and edited the fellow's note. I conducted my own interview and physical examination. I agree with the findings. I was present and supervising all critical portions of the procedure.    Tej Cm MD

## 2023-03-15 NOTE — INTERVAL H&P NOTE
The patient was examined and no significant changes were noted from the updated H&P or last clinic note.    The risks and benefits of this procedure, including alternative therapies, were discussed with the patient.  The discussion of risks included infection, bleeding, need for additional procedures or surgery, nerve damage, paralysis, adverse medication reaction(s), stroke, and if appropriate for the procedure, death.  Questions regarding the procedure, risks, expected outcome, and possible side effects were solicited and answered to Carmen's satisfaction.  Carmen Hawley wishes to proceed with the injection or procedure as confirmed by written consent.

## 2023-03-15 NOTE — DISCHARGE SUMMARY
Discharge Note  Short Stay      SUMMARY     Admit Date: 3/15/2023    Attending Physician: Tej Cm    Discharge Physician: Jenny Fernandez      Discharge Date: 3/15/2023 4:09 PM    Procedure(s) (LRB):  INJECTION, STEROID, EPIDURAL, TRANSFORAMINAL APPROACH RIGHT L3/4 AND L4/5 (Right)    Final Diagnosis: Lumbar radiculopathy [M54.16]    Disposition: Home or self care    Patient Instructions:   Current Discharge Medication List        CONTINUE these medications which have NOT CHANGED    Details   aspirin (ECOTRIN) 81 MG EC tablet Take 81 mg by mouth once daily.        biotin 300 mcg Tab Take 1 tablet by mouth once daily.      cyanocobalamin 500 MCG tablet Take 500 mcg by mouth once daily.      diclofenac (VOLTAREN) 75 MG EC tablet Take 1 tablet (75 mg total) by mouth 2 (two) times daily as needed (pain). With food and water.  Qty: 60 tablet, Refills: 1    Associated Diagnoses: DDD (degenerative disc disease), lumbar      diclofenac sodium (VOLTAREN) 1 % Gel Apply 2 grams topically 4 (four) times daily.  Qty: 350 g, Refills: 6    Associated Diagnoses: Primary osteoarthritis of right knee      !! EScitalopram oxalate (LEXAPRO) 10 MG tablet Take 1 tablet (10 mg total) by mouth once daily.  Qty: 90 tablet, Refills: 3    Associated Diagnoses: Anxiety      !! EScitalopram oxalate (LEXAPRO) 5 MG Tab Take 1 tablet (5 mg total) by mouth once daily. Combine with 10mg for total 15mg.  Qty: 30 tablet, Refills: 11    Associated Diagnoses: Anxiety      estradioL (ESTRACE) 0.01 % (0.1 mg/gram) vaginal cream Place 1 gram vaginally twice a week.  Qty: 42.5 g, Refills: 3    Associated Diagnoses: Postmenopausal atrophic vaginitis      gabapentin (NEURONTIN) 300 MG capsule Take 1 capsule (300 mg total) by mouth 2 (two) times daily.  Qty: 60 capsule, Refills: 0    Associated Diagnoses: Lumbar radiculopathy      metFORMIN (GLUCOPHAGE-XR) 500 MG ER 24hr tablet Take 1 tablet (500 mg total) by mouth daily with breakfast.  Qty: 90 tablet,  Refills: 1    Associated Diagnoses: Overweight (BMI 25.0-29.9)      metoprolol succinate (TOPROL-XL) 100 MG 24 hr tablet Take 1 tablet (100 mg total) by mouth once daily.  Qty: 90 tablet, Refills: 3    Comments: .  Associated Diagnoses: Essential hypertension      naltrexone-bupropion (CONTRAVE) 8-90 mg TbSR Take 2 tablets by mouth 2 (two) times daily.  Qty: 120 tablet, Refills: 3    Associated Diagnoses: Overweight (BMI 25.0-29.9)      prednisolon/gatiflox/bromfenac (PREDNISOL ACE-GATIFLOX-BROMFEN) 1-0.5-0.075 % DrpS Apply 1 drop to eye 3 (three) times daily.  Qty: 5 mL, Refills: 3    Associated Diagnoses: Nuclear sclerosis, bilateral      simvastatin (ZOCOR) 40 MG tablet TAKE 1 TABLET BY MOUTH EVERY EVENING.  Qty: 90 tablet, Refills: 3    Associated Diagnoses: Hyperlipidemia, unspecified hyperlipidemia type      traMADoL (ULTRAM) 50 mg tablet Take 1 tablet (50 mg total) by mouth every 6 (six) hours as needed for Pain.  Qty: 28 tablet, Refills: 1    Comments: Quantity prescribed more than 7 day supply? No  Associated Diagnoses: Primary osteoarthritis of right knee      tretinoin (RETIN-A) 0.1 % cream APPLY A THIN FILM TO AFFECTED AREA EVERY EVENING AFTER MOISTURIZING.  Qty: 45 g, Refills: 3    Associated Diagnoses: Lentigo      triamterene-hydrochlorothiazide 37.5-25 mg (DYAZIDE) 37.5-25 mg per capsule Take one capsule by mouth every day  Qty: 90 capsule, Refills: 3    Comments: .  Associated Diagnoses: Essential hypertension      vitamin D 1000 units Tab Take 2,000 mg by mouth once daily.        !! - Potential duplicate medications found. Please discuss with provider.              Discharge Diagnosis: Lumbar radiculopathy [M54.16]  Condition on Discharge: Stable with no complications to procedure   Diet on Discharge: Same as before.  Activity: as per instruction sheet.  Discharge to: Home with a responsible adult.  Follow up: 2-4 weeks       Please call my office or pager at [phone] if experienced any  weakness or loss of sensation, fever > 101.5, pain uncontrolled with oral medications, persistent nausea/vomiting/or diarrhea, redness or drainage from the incisions, or any other worrisome concerns. If physician on call was not reached or could not communicate with our office for any reason please go to the nearest emergency department        Tej Cm

## 2023-03-15 NOTE — DISCHARGE INSTRUCTIONS

## 2023-03-22 ENCOUNTER — OFFICE VISIT (OUTPATIENT)
Dept: OPHTHALMOLOGY | Facility: CLINIC | Age: 73
End: 2023-03-22
Attending: OPHTHALMOLOGY
Payer: COMMERCIAL

## 2023-03-22 DIAGNOSIS — H25.13 NUCLEAR SCLEROTIC CATARACT, BILATERAL: Primary | ICD-10-CM

## 2023-03-22 DIAGNOSIS — H25.13 NUCLEAR SCLEROSIS, BILATERAL: ICD-10-CM

## 2023-03-22 PROCEDURE — 1101F PR PT FALLS ASSESS DOC 0-1 FALLS W/OUT INJ PAST YR: ICD-10-PCS | Mod: CPTII,S$GLB,, | Performed by: OPHTHALMOLOGY

## 2023-03-22 PROCEDURE — 99214 PR OFFICE/OUTPT VISIT, EST, LEVL IV, 30-39 MIN: ICD-10-PCS | Mod: 24,S$GLB,, | Performed by: OPHTHALMOLOGY

## 2023-03-22 PROCEDURE — 1126F PR PAIN SEVERITY QUANTIFIED, NO PAIN PRESENT: ICD-10-PCS | Mod: CPTII,S$GLB,, | Performed by: OPHTHALMOLOGY

## 2023-03-22 PROCEDURE — 1159F MED LIST DOCD IN RCRD: CPT | Mod: CPTII,S$GLB,, | Performed by: OPHTHALMOLOGY

## 2023-03-22 PROCEDURE — 99999 PR PBB SHADOW E&M-EST. PATIENT-LVL III: CPT | Mod: PBBFAC,,, | Performed by: OPHTHALMOLOGY

## 2023-03-22 PROCEDURE — 1101F PT FALLS ASSESS-DOCD LE1/YR: CPT | Mod: CPTII,S$GLB,, | Performed by: OPHTHALMOLOGY

## 2023-03-22 PROCEDURE — 99999 PR PBB SHADOW E&M-EST. PATIENT-LVL III: ICD-10-PCS | Mod: PBBFAC,,, | Performed by: OPHTHALMOLOGY

## 2023-03-22 PROCEDURE — 1159F PR MEDICATION LIST DOCUMENTED IN MEDICAL RECORD: ICD-10-PCS | Mod: CPTII,S$GLB,, | Performed by: OPHTHALMOLOGY

## 2023-03-22 PROCEDURE — 99214 OFFICE O/P EST MOD 30 MIN: CPT | Mod: 24,S$GLB,, | Performed by: OPHTHALMOLOGY

## 2023-03-22 PROCEDURE — 3288F PR FALLS RISK ASSESSMENT DOCUMENTED: ICD-10-PCS | Mod: CPTII,S$GLB,, | Performed by: OPHTHALMOLOGY

## 2023-03-22 PROCEDURE — 3288F FALL RISK ASSESSMENT DOCD: CPT | Mod: CPTII,S$GLB,, | Performed by: OPHTHALMOLOGY

## 2023-03-22 PROCEDURE — 1126F AMNT PAIN NOTED NONE PRSNT: CPT | Mod: CPTII,S$GLB,, | Performed by: OPHTHALMOLOGY

## 2023-03-22 RX ORDER — PHENYLEPHRINE HYDROCHLORIDE 25 MG/ML
1 SOLUTION/ DROPS OPHTHALMIC
Status: CANCELLED | OUTPATIENT
Start: 2023-03-22

## 2023-03-22 RX ORDER — PREDNISOLONE ACETATE-GATIFLOXACIN-BROMFENAC .75; 5; 1 MG/ML; MG/ML; MG/ML
1 SUSPENSION/ DROPS OPHTHALMIC 3 TIMES DAILY
Qty: 5 ML | Refills: 3 | Status: SHIPPED | OUTPATIENT
Start: 2023-03-22 | End: 2023-09-25 | Stop reason: CLARIF

## 2023-03-22 RX ORDER — PHENYLEPHRINE HYDROCHLORIDE 100 MG/ML
1 SOLUTION/ DROPS OPHTHALMIC
Status: CANCELLED | OUTPATIENT
Start: 2023-03-22

## 2023-03-22 RX ORDER — MOXIFLOXACIN 5 MG/ML
1 SOLUTION/ DROPS OPHTHALMIC
Status: CANCELLED | OUTPATIENT
Start: 2023-03-22

## 2023-03-22 RX ORDER — TROPICAMIDE 10 MG/ML
1 SOLUTION/ DROPS OPHTHALMIC
Status: CANCELLED | OUTPATIENT
Start: 2023-03-22

## 2023-03-22 RX ORDER — TETRACAINE HYDROCHLORIDE 5 MG/ML
1 SOLUTION OPHTHALMIC
Status: CANCELLED | OUTPATIENT
Start: 2023-03-22

## 2023-03-22 NOTE — PROGRESS NOTES
HPI     Cataract            Comments: Cataract eval          Comments    DLS: 2/8/23    Patient presents today for a Cataract Evaluation. Patient notes vision as   blurry. Patient notes difficulty driving at night and has issues with   glare. Patient notes no eye pain.           Last edited by Deangelo Turner on 3/22/2023  3:43 PM.            Assessment /Plan     For exam results, see Encounter Report.    Nuclear sclerotic cataract, bilateral    Nuclear sclerosis, bilateral      Visually Significant Cataract: Patient reports decreased vision consistent with the clinical amount of lenticular opacity, which reaches the level of visual significance and affects activities of daily living.     Specifically, this patient describes difficulty with:  - driving safely at night  - reading road signs  - reading small print  - deciphering medicine bottles  - reading the newspaper  - using the phone  - reading texts     Risks, benefits, and alternatives to cataract surgery were discussed and the consent reviewed. IOL options were discussed, including ATIOLs and the associated side effects and additional patient cost associated with them.   IOL Selections:   Right eye  IOL: [id number] 16.5       Left eye  IOL: [id number]  16.0     Pt wishes to have LEFT eye done first.  The patient expresses a desire to reduce spectacle dependence. I reviewed various IOL and LASER refractive surgical options and we will attempt to minimize spectacle dependence by managing astigmatism and optimizing IOL selection. Femtosecond LASER assisted cataract surgery (FLACS) technology was explained to the patient with educational videos and discussion.  The patient voices understanding and wishes to implement this technology during the cataract procedure.  I explained the increased precision of the LASER versus manual techniques, especially as it relates to astigmatism reduction with arcuate incisions.  I emphasized that although our goal is to reduce  the need for refractive correction after surgery, there may still be a need for spectacle correction to achieve optimal visual acuity, and that a reasonable range of functional vision should be the expectation.  No guarantees are made about post operative refraction or visual acuity, as the eye may heal in unpredictable ways, and the standard risks, benefits, and alternatives to cataract surgery were explained.  The patient understands that the refractive portions of this cataract procedure are not covered by insurance, and that there is an out of pocket expense of $1500 per eye. I also explained that even though our pre-operative plan is to utilize advanced refractive technologies during surgery, that I may decide to eliminate part or all of this plan if surgical challenges or complications arise, or I feel that it is not in the patient's best interest. Consent forms and an ABN form were given to the patient to review.

## 2023-04-05 ENCOUNTER — TELEPHONE (OUTPATIENT)
Dept: PAIN MEDICINE | Facility: CLINIC | Age: 73
End: 2023-04-05
Payer: COMMERCIAL

## 2023-04-05 NOTE — TELEPHONE ENCOUNTER
This message is for patient in regards to his/her appointment 4/6/23 at 2:15 pm With Dr. Cm at [facility] on the 9th floor [address].If you have any questions or concerns please contact [phone]

## 2023-04-06 ENCOUNTER — OFFICE VISIT (OUTPATIENT)
Dept: PAIN MEDICINE | Facility: CLINIC | Age: 73
End: 2023-04-06
Attending: ANESTHESIOLOGY
Payer: COMMERCIAL

## 2023-04-06 VITALS
SYSTOLIC BLOOD PRESSURE: 137 MMHG | BODY MASS INDEX: 28.67 KG/M2 | HEIGHT: 63 IN | HEART RATE: 69 BPM | DIASTOLIC BLOOD PRESSURE: 81 MMHG | WEIGHT: 161.81 LBS

## 2023-04-06 DIAGNOSIS — M54.16 LUMBAR RADICULOPATHY: ICD-10-CM

## 2023-04-06 DIAGNOSIS — M17.11 PRIMARY OSTEOARTHRITIS OF RIGHT KNEE: Primary | ICD-10-CM

## 2023-04-06 DIAGNOSIS — M51.36 DDD (DEGENERATIVE DISC DISEASE), LUMBAR: ICD-10-CM

## 2023-04-06 PROCEDURE — 1125F AMNT PAIN NOTED PAIN PRSNT: CPT | Mod: CPTII,S$GLB,, | Performed by: ANESTHESIOLOGY

## 2023-04-06 PROCEDURE — 1101F PT FALLS ASSESS-DOCD LE1/YR: CPT | Mod: CPTII,S$GLB,, | Performed by: ANESTHESIOLOGY

## 2023-04-06 PROCEDURE — 99214 PR OFFICE/OUTPT VISIT, EST, LEVL IV, 30-39 MIN: ICD-10-PCS | Mod: S$GLB,,, | Performed by: ANESTHESIOLOGY

## 2023-04-06 PROCEDURE — 99999 PR PBB SHADOW E&M-EST. PATIENT-LVL IV: CPT | Mod: PBBFAC,,, | Performed by: ANESTHESIOLOGY

## 2023-04-06 PROCEDURE — 99999 PR PBB SHADOW E&M-EST. PATIENT-LVL IV: ICD-10-PCS | Mod: PBBFAC,,, | Performed by: ANESTHESIOLOGY

## 2023-04-06 PROCEDURE — 1125F PR PAIN SEVERITY QUANTIFIED, PAIN PRESENT: ICD-10-PCS | Mod: CPTII,S$GLB,, | Performed by: ANESTHESIOLOGY

## 2023-04-06 PROCEDURE — 3079F DIAST BP 80-89 MM HG: CPT | Mod: CPTII,S$GLB,, | Performed by: ANESTHESIOLOGY

## 2023-04-06 PROCEDURE — 3008F PR BODY MASS INDEX (BMI) DOCUMENTED: ICD-10-PCS | Mod: CPTII,S$GLB,, | Performed by: ANESTHESIOLOGY

## 2023-04-06 PROCEDURE — 99214 OFFICE O/P EST MOD 30 MIN: CPT | Mod: S$GLB,,, | Performed by: ANESTHESIOLOGY

## 2023-04-06 PROCEDURE — 1101F PR PT FALLS ASSESS DOC 0-1 FALLS W/OUT INJ PAST YR: ICD-10-PCS | Mod: CPTII,S$GLB,, | Performed by: ANESTHESIOLOGY

## 2023-04-06 PROCEDURE — 3075F PR MOST RECENT SYSTOLIC BLOOD PRESS GE 130-139MM HG: ICD-10-PCS | Mod: CPTII,S$GLB,, | Performed by: ANESTHESIOLOGY

## 2023-04-06 PROCEDURE — 3288F PR FALLS RISK ASSESSMENT DOCUMENTED: ICD-10-PCS | Mod: CPTII,S$GLB,, | Performed by: ANESTHESIOLOGY

## 2023-04-06 PROCEDURE — 3288F FALL RISK ASSESSMENT DOCD: CPT | Mod: CPTII,S$GLB,, | Performed by: ANESTHESIOLOGY

## 2023-04-06 PROCEDURE — 3075F SYST BP GE 130 - 139MM HG: CPT | Mod: CPTII,S$GLB,, | Performed by: ANESTHESIOLOGY

## 2023-04-06 PROCEDURE — 3008F BODY MASS INDEX DOCD: CPT | Mod: CPTII,S$GLB,, | Performed by: ANESTHESIOLOGY

## 2023-04-06 PROCEDURE — 3079F PR MOST RECENT DIASTOLIC BLOOD PRESSURE 80-89 MM HG: ICD-10-PCS | Mod: CPTII,S$GLB,, | Performed by: ANESTHESIOLOGY

## 2023-04-06 RX ORDER — GABAPENTIN 300 MG/1
300 CAPSULE ORAL 3 TIMES DAILY
Qty: 90 CAPSULE | Refills: 2 | Status: SHIPPED | OUTPATIENT
Start: 2023-04-06 | End: 2023-07-29 | Stop reason: SDUPTHER

## 2023-04-06 RX ORDER — DICLOFENAC SODIUM 75 MG/1
75 TABLET, DELAYED RELEASE ORAL 2 TIMES DAILY PRN
Qty: 60 TABLET | Refills: 1 | Status: ON HOLD | OUTPATIENT
Start: 2023-04-06 | End: 2023-10-23 | Stop reason: HOSPADM

## 2023-04-06 NOTE — PROGRESS NOTES
Chronic patient Established Note (Follow up visit)      SUBJECTIVE:  Interval History 4/6/2023:  Carmen Hawley presents to the clinic for a follow-up appointment for chronic radicular low back pain. She recently underwent a right L3 and L4 TFESI on 3/15/23. Reports 50% relief since then. Right Knee is still hurting and has discussed knee replacement with Dr. Bennett and [facility].    Interval History 2/27/2023:  Mrs Hawley presents for follow up of lwoer back pain and R leg radicular pain prior relieved by R L3/4&L4/5 TFESI with near 100% resolution and prior injection was approx 8 months prior. She had improved functioning from procedure. She additionally is not ready to have R knee TKA consideration and visco supplementation did not provide much benefit but prior CSIs do provide some relief. HEP continues and PT tried in past with some mild benefit. Pain is limiting functioning at this time and >6/10.      Prior HPI:  Carmen Hawley presents to the clinic for a follow-up appointment for low back pain. She received right L3/L4 and L4/L5 which she reports great relief. She reports that since her KYLE she has been able to be more active and play golf more regularly. She reports that she taking Gabapentin 100mg TID and diclofenac gel/PO tablet for her right knee pain that has been a chronic problem. Today, she reports that the biggest pain currently. Her pain in the right knee mainly occurs at night and worsens with prolong walking. Pain is relieved with rest and medication. Majority of her knee pain is located on the medial aspect of her knee. She is has been in PT for her knee which she reports has been helping with her pain. She reports that her low back pain is currently a 1/10 and feels great. She does reports that her right knee pain at a 6/10 and is achy in nature. Denies numbness/tingling, weakness, changes in bowel/bladder.          Pain Disability Index Review:  Last 3 PDI Scores 2/27/2023  7/21/2022   Pain Disability Index (PDI) 36 0         Pain Medications:  Diclofenac 75 mg tablet   Diclofenac gel   Tylenol      Opioid Contract: not applicable      report:  Not applicable     Pain Procedures:   6/29/22 - Right L3/L4 and L4/L5: 90% relief      Physical Therapy/Home Exercise: yes; she has been to therapy for the knee     Pain Disability Index Review:  Last 3 PDI Scores 2/27/2023 7/21/2022   Pain Disability Index (PDI) 36 0     Imaging:   EXAMINATION:  MRI LUMBAR SPINE WITHOUT CONTRAST     CLINICAL HISTORY:  Low back pain, symptoms persist with > 6wks conservative treatment; Dorsalgia, unspecified     TECHNIQUE:  Multiplanar, multisequence MR images were acquired from the thoracolumbar junction to the sacrum without the administration of contrast.     COMPARISON:  Lumbar spine radiograph 05/16/2022.  MRI lumbar spine 11/28/2018, 01/23/2007.     FINDINGS:  Alignment: Normal.     Vertebrae: No acute fracture or marrow replacement process.     Discs: Severe multilevel disc height loss with endplate irregularities and sub endplate edema, favored to be degenerative.     Cord: Normal.  Conus terminates at L2.     Degenerative findings:     T12-L1: Circumferential disc bulge, bilateral facet arthropathy and ligamentum flavum hypertrophy.  No spinal canal stenosis or neuroforaminal narrowing.     L1-L2: Mild circumferential disc bulge, bilateral facet arthropathy and ligamentum flavum hypertrophy.  No spinal canal stenosis or neuroforaminal narrowing.     L2-L3: Circumferential disc bulge, bilateral facet arthropathy and ligamentum flavum hypertrophy resulting in mild bilateral neural foraminal narrowing.  No significant spinal canal stenosis.     L3-L4: Circumferential disc bulge, bilateral facet arthropathy and ligamentum flavum hypertrophy resulting in moderate left and mild right neural foraminal narrowing.  No significant spinal canal stenosis.     L4-L5: There is a moderate-sized right  paracentral/lateral recess less disc extrusion extending 1.3 cm inferiorly from the endplate (series 7, image 9).  This may impinge upon the descending right L5 nerve root.  There is superimposed moderate disc bulging and facet hypertrophy, resulting in moderate/severe spinal canal stenosis.  There is severe right and moderate left-sided neural foraminal narrowing.  There is advanced right-sided disc height loss with prominent sub endplate marrow edema and facet joint arthropathy (series 6, images 6-8).     L5-S1: There is advanced disc height loss with sub endplate plate sclerosis.  Moderate disc bulging and mild facet arthropathy noted.  These findings in severe right and moderate left-sided neural foraminal narrowing.  The spinal canal is patent.     Paraspinal muscles & soft tissues: 1.9 cm right renal cyst.     Impression:     Right paracentral disc extrusion at L4-5, possibly impinging upon the descending right L5 nerve root, as above.     Additional lumbar spondylosis, resulting in moderate/ severe spinal canal stenosis at L4-5 and moderate/ severe neural foraminal narrowing at L4-5 and L5-S1, as above.     Prominent degenerative disc disease and right-sided facet joint arthropathy at L4-5, as above.    Allergies: Review of patient's allergies indicates:  No Known Allergies    Current Medications:   Current Outpatient Medications   Medication Sig Dispense Refill    aspirin (ECOTRIN) 81 MG EC tablet Take 81 mg by mouth once daily.        biotin 300 mcg Tab Take 1 tablet by mouth once daily.      cyanocobalamin 500 MCG tablet Take 500 mcg by mouth once daily.      diclofenac (VOLTAREN) 75 MG EC tablet Take 1 tablet (75 mg total) by mouth 2 (two) times daily as needed (pain). With food and water. 60 tablet 1    diclofenac sodium (VOLTAREN) 1 % Gel Apply 2 grams topically 4 (four) times daily. 350 g 6    EScitalopram oxalate (LEXAPRO) 10 MG tablet Take 1 tablet (10 mg total) by mouth once daily. 90 tablet 3     EScitalopram oxalate (LEXAPRO) 5 MG Tab Take 1 tablet (5 mg total) by mouth once daily. Combine with 10mg for total 15mg. 30 tablet 11    estradioL (ESTRACE) 0.01 % (0.1 mg/gram) vaginal cream Place 1 gram vaginally twice a week. 42.5 g 3    gabapentin (NEURONTIN) 300 MG capsule Take 1 capsule (300 mg total) by mouth 2 (two) times daily. 60 capsule 0    metFORMIN (GLUCOPHAGE-XR) 500 MG ER 24hr tablet Take 1 tablet (500 mg total) by mouth daily with breakfast. 90 tablet 1    metoprolol succinate (TOPROL-XL) 100 MG 24 hr tablet Take 1 tablet (100 mg total) by mouth once daily. 90 tablet 3    naltrexone-bupropion (CONTRAVE) 8-90 mg TbSR Take 2 tablets by mouth 2 (two) times daily. 120 tablet 3    prednisolon/gatiflox/bromfenac (PREDNISOL ACE-GATIFLOX-BROMFEN) 1-0.5-0.075 % DrpS Apply 1 drop to eye 3 (three) times daily. 5 mL 3    prednisolon/gatiflox/bromfenac (PREDNISOL ACE-GATIFLOX-BROMFEN) 1-0.5-0.075 % DrpS Apply 1 drop to eye 3 (three) times daily. in operative eye for 1 month after surgery 5 mL 3    simvastatin (ZOCOR) 40 MG tablet TAKE 1 TABLET BY MOUTH EVERY EVENING. 90 tablet 3    traMADoL (ULTRAM) 50 mg tablet Take 1 tablet (50 mg total) by mouth every 6 (six) hours as needed for Pain. 28 tablet 1    tretinoin (RETIN-A) 0.1 % cream APPLY A THIN FILM TO AFFECTED AREA EVERY EVENING AFTER MOISTURIZING. 45 g 3    triamterene-hydrochlorothiazide 37.5-25 mg (DYAZIDE) 37.5-25 mg per capsule Take one capsule by mouth every day 90 capsule 3    vitamin D 1000 units Tab Take 2,000 mg by mouth once daily.        No current facility-administered medications for this visit.       REVIEW OF SYSTEMS:    GENERAL:  No weight loss, malaise or fevers.  HEENT:  Negative for frequent or significant headaches.  NECK:  Negative for lumps, goiter, pain and significant neck swelling.  RESPIRATORY:  Negative for cough, wheezing or shortness of breath.  CARDIOVASCULAR:  Negative for chest pain, leg swelling or palpitations.  GI:   Negative for abdominal discomfort, blood in stools or black stools or change in bowel habits.  MUSCULOSKELETAL:  See HPI.  SKIN:  Negative for lesions, rash, and itching.  PSYCH:  + for sleep disturbance, mood disorder and recent psychosocial stressors.  HEMATOLOGY/LYMPHOLOGY:  Negative for prolonged bleeding, bruising easily or swollen nodes.  NEURO:   No history of headaches, syncope, paralysis, seizures or tremors.  All other reviewed and negative other than HPI.    Past Medical History:  Past Medical History:   Diagnosis Date    Anxiety     Arthritis     Cataract     Hyperlipidemia     Hypertension     Macular degeneration     Mitral valve prolapse     Salzmann's nodular dystrophy of both eyes        Past Surgical History:  Past Surgical History:   Procedure Laterality Date    COLONOSCOPY N/A 10/8/2020    Procedure: COLONOSCOPY;  Surgeon: Crispin Moon MD;  Location: [address]);  Service: Endoscopy;  Laterality: N/A;  Family history of colon polyps  COVID test on 10/5/20 at [facility] floor -     TONSILLECTOMY      TRANSFORAMINAL EPIDURAL INJECTION OF STEROID Right 6/29/2022    Procedure: INJECTION, STEROID, EPIDURAL, TRANSFORAMINAL APPROACH, RIGHT L3-L4 AND L4-L5 CONTRAST DIRECT REF;  Surgeon: Tej Cm MD;  Location: [facility];  Service: Pain Management;  Laterality: Right;    TRANSFORAMINAL EPIDURAL INJECTION OF STEROID Right 3/15/2023    Procedure: INJECTION, STEROID, EPIDURAL, TRANSFORAMINAL APPROACH RIGHT L3/4 AND L4/5;  Surgeon: Tej Cm MD;  Location: [facility];  Service: Pain Management;  Laterality: Right;       Family History:  Family History   Problem Relation Age of Onset    Cancer Mother         lung    Stroke Mother     Heart disease Father     Diabetes Father     Diabetes Brother     Aortic aneurysm Brother         surgery 5/2012    Kidney disease Brother         on dialysis    Melanoma Neg Hx     Breast cancer Neg Hx     Colon cancer Neg Hx     Ovarian  "cancer Neg Hx        Social History:  Social History     Socioeconomic History    Marital status:    Occupational History     Employer: [employer]   Tobacco Use    Smoking status: Never    Smokeless tobacco: Never   Substance and Sexual Activity    Alcohol use: Yes     Comment: 7 drinks weekly     Drug use: No    Sexual activity: Yes     Partners: Male     Birth control/protection: Post-menopausal     Comment:  since 2000   Social History Narrative    Works in [employer] G[employer] department at [employer]. Working part-time.        Likes to play golf, walks for exercise.        OBJECTIVE:    /81 (BP Location: Right arm, Patient Position: Sitting, BP Method: Medium (Automatic))   Pulse 69   Ht 5' 3" (1.6 m)   Wt 73.4 kg (161 lb 13.1 oz)   LMP 12/16/2006 (Approximate)   BMI 28.66 kg/m²     PHYSICAL EXAMINATION:    General appearance: Well appearing, in no acute distress, alert and oriented x3.  Psych:  Mood and affect appropriate.  Skin: Skin color, texture, turgor normal, no rashes or lesions, in both upper and lower body.  Head/face:  Atraumatic, normocephalic. No palpable lymph nodes  Neck: No pain to palpation over the cervical paraspinous muscles. Spurling Negative. No pain with neck flexion, extension, or lateral flexion. .  Cor: RRR  Pulm: CTA  GI: Abdomen soft and non-tender.  Back: Straight leg raising in the sitting and supine positions is negative to radicular pain. No pain to palpation over the spine or costovertebral angles. Normal range of motion without pain reproduction.  Extremities: Peripheral joint ROM is full and pain free without obvious instability or laxity in bilateral upper extremities . Edematous rt knee and decreased range of motion. positive for rt knee limited ROM with tenderness on palpation and crepitus on movement, negative ant and post drawer sign No deformities, edema, or skin discoloration. Good capillary refill.  Musculoskeletal: " Shoulder, hip, sacroiliac and knee provocative maneuvers are negative. Bilateral upper and lower extremity strength is normal and symmetric.  No atrophy or tone abnormalities are noted.  Neuro: Bilateral upper and lower extremity coordination and muscle stretch reflexes are physiologic and symmetric.  Plantar response are downgoing. No loss of sensation is noted.  Gait: Normal.    ASSESSMENT: 72 y.o. year old female with right knee pain, consistent with .     1. Primary osteoarthritis of right knee  Procedure Request Order for Pain Management      2. Lumbar radiculopathy  gabapentin (NEURONTIN) 300 MG capsule      3. DDD (degenerative disc disease), lumbar  diclofenac (VOLTAREN) 75 MG EC tablet            PLAN:     - I have stressed the importance of physical activity and a home exercise plan to help with pain and improve health.  - Start Neurontin 300mg gradually to three times a day to help with radicular pain.  - Patient can continue with medications for now since they are providing benefits, using them appropriately, and without side effects.  - Diclofenac gel  - Counseled patient regarding the importance of activity modification  - Planning for right knee synvisc procedure  -RTC 4-6 WEEKS    The above plan and management options were discussed at length with patient. Patient is in agreement with the above and verbalized understanding.  Phuong Malcolm MD  [facility]    I have personally reviewed the history and exam of this patient and agree with the resident/fellow/NPs note as stated above.    Tej Cm MD    04/06/2023

## 2023-04-10 ENCOUNTER — OFFICE VISIT (OUTPATIENT)
Dept: URGENT CARE | Facility: CLINIC | Age: 73
End: 2023-04-10
Payer: COMMERCIAL

## 2023-04-10 ENCOUNTER — TELEPHONE (OUTPATIENT)
Dept: PAIN MEDICINE | Facility: OTHER | Age: 73
End: 2023-04-10
Payer: COMMERCIAL

## 2023-04-10 ENCOUNTER — PATIENT MESSAGE (OUTPATIENT)
Dept: BARIATRICS | Facility: CLINIC | Age: 73
End: 2023-04-10
Payer: COMMERCIAL

## 2023-04-10 VITALS
OXYGEN SATURATION: 95 % | TEMPERATURE: 98 F | BODY MASS INDEX: 28.53 KG/M2 | HEART RATE: 69 BPM | DIASTOLIC BLOOD PRESSURE: 81 MMHG | WEIGHT: 161 LBS | RESPIRATION RATE: 18 BRPM | HEIGHT: 63 IN | SYSTOLIC BLOOD PRESSURE: 137 MMHG

## 2023-04-10 DIAGNOSIS — S51.011A LACERATION OF SKIN OF RIGHT ELBOW, INITIAL ENCOUNTER: Primary | ICD-10-CM

## 2023-04-10 PROCEDURE — 99203 PR OFFICE/OUTPT VISIT, NEW, LEVL III, 30-44 MIN: ICD-10-PCS | Mod: S$GLB,,, | Performed by: FAMILY MEDICINE

## 2023-04-10 PROCEDURE — 99203 OFFICE O/P NEW LOW 30 MIN: CPT | Mod: S$GLB,,, | Performed by: FAMILY MEDICINE

## 2023-04-10 RX ORDER — SULFAMETHOXAZOLE AND TRIMETHOPRIM 800; 160 MG/1; MG/1
1 TABLET ORAL 2 TIMES DAILY
Qty: 10 TABLET | Refills: 0 | Status: SHIPPED | OUTPATIENT
Start: 2023-04-10 | End: 2023-04-15

## 2023-04-10 NOTE — PROGRESS NOTES
"Subjective:      Patient ID: Carmen Hawley is a 72 y.o. female.    Vitals:  height is 5' 3" (1.6 m) and weight is 73 kg (161 lb). Her oral temperature is 98.1 °F (36.7 °C). Her blood pressure is 137/81 and her pulse is 69. Her respiration is 18 and oxygen saturation is 95%.     Chief Complaint: Laceration    Treatments tried: neosporin and liquid band-aid    Laceration   The incident occurred 2 days ago (4/8/23). The laceration is located on the Right arm (R elbow). The laceration is 2 cm in size. Injury mechanism: wood. The pain is at a severity of 0/10. The patient is experiencing no pain. She reports no foreign bodies present. Her tetanus status is UTD.     Skin:  Positive for laceration. Negative for erythema.    Objective:     Physical Exam   Constitutional: She is oriented to person, place, and time. She appears well-developed.   HENT:   Head: Normocephalic and atraumatic. Head is without abrasion, without contusion and without laceration.   Ears:   Right Ear: External ear normal.   Left Ear: External ear normal.   Nose: Nose normal.   Mouth/Throat: Oropharynx is clear and moist and mucous membranes are normal.   Eyes: Conjunctivae, EOM and lids are normal. Pupils are equal, round, and reactive to light.   Neck: Trachea normal and phonation normal. Neck supple.   Cardiovascular: Normal rate, regular rhythm and normal heart sounds.   Pulmonary/Chest: Effort normal and breath sounds normal. No stridor. No respiratory distress.   Musculoskeletal: Normal range of motion.         General: Swelling present. No deformity. Normal range of motion.   Neurological: She is alert and oriented to person, place, and time.   Skin: Skin is warm, dry, intact and no rash. Capillary refill takes less than 2 seconds. No abrasion, No burn, No bruising, No erythema and No ecchymosis   Psychiatric: Her speech is normal and behavior is normal. Judgment and thought content normal.   Nursing note and vitals reviewed.    Assessment: "     1. Laceration of skin of right elbow, initial encounter        Plan:       Laceration of skin of right elbow, initial encounter  -     sulfamethoxazole-trimethoprim 800-160mg (BACTRIM DS) 800-160 mg Tab; Take 1 tablet by mouth 2 (two) times daily. for 5 days  Dispense: 10 tablet; Refill: 0    Pt had nonstick compressive bandage placed on as wound has already started to reepithelialize.     Tetanus UTD

## 2023-04-11 ENCOUNTER — PATIENT MESSAGE (OUTPATIENT)
Dept: PAIN MEDICINE | Facility: OTHER | Age: 73
End: 2023-04-11
Payer: COMMERCIAL

## 2023-04-11 ENCOUNTER — TELEPHONE (OUTPATIENT)
Dept: PAIN MEDICINE | Facility: CLINIC | Age: 73
End: 2023-04-11
Payer: COMMERCIAL

## 2023-04-11 ENCOUNTER — TELEPHONE (OUTPATIENT)
Dept: PAIN MEDICINE | Facility: OTHER | Age: 73
End: 2023-04-11
Payer: COMMERCIAL

## 2023-04-11 NOTE — TELEPHONE ENCOUNTER
Staff tried to reach pt to inform her that she has 2 refills on the gabapentin at the pharmacy but mailbox was full.

## 2023-04-11 NOTE — TELEPHONE ENCOUNTER
----- Message from Fatemeh Tran sent at 4/11/2023 10:50 AM CDT -----  .Type: RX Refill Request    Who Called: Self     Have you contacted your pharmacy: No     Refill or New Rx: Refill     RX Name and Strength:gabapentin (NEURONTIN) 300 MG capsule    Preferred Pharmacy with phone number:.  [facility]  [address] [address]  Phone: [phone] Fax: [phone]    Local or Mail Order: Local     Ordering Provider:Soila    Would the patient rather a call back or a response via [facility]? Call Back     Best Call Back Number:.[phone] (home) [phone] (work)      Additional Information:

## 2023-04-11 NOTE — TELEPHONE ENCOUNTER
----- Message from Fatemeh Tran sent at 4/11/2023 10:48 AM CDT -----  .Type:  Patient Returning Call    Who Called: Self     Who Left Message for Patient: Zuly     Does the patient know what this is regarding?: Yes    Would the patient rather a call back or a response via [facility]? Call back     Best Call Back Number:.[phone] (home) [phone] (work)      Additional Information:

## 2023-04-14 ENCOUNTER — TELEPHONE (OUTPATIENT)
Dept: OPHTHALMOLOGY | Facility: CLINIC | Age: 73
End: 2023-04-14
Payer: COMMERCIAL

## 2023-04-14 DIAGNOSIS — H25.12 NUCLEAR SCLEROTIC CATARACT OF LEFT EYE: Primary | ICD-10-CM

## 2023-04-19 ENCOUNTER — OFFICE VISIT (OUTPATIENT)
Dept: ORTHOPEDICS | Facility: CLINIC | Age: 73
End: 2023-04-19
Payer: COMMERCIAL

## 2023-04-19 VITALS — WEIGHT: 163.13 LBS | HEIGHT: 63 IN | BODY MASS INDEX: 28.9 KG/M2

## 2023-04-19 DIAGNOSIS — M17.11 PRIMARY OSTEOARTHRITIS OF RIGHT KNEE: Primary | ICD-10-CM

## 2023-04-19 PROCEDURE — 3008F BODY MASS INDEX DOCD: CPT | Mod: CPTII,S$GLB,, | Performed by: NURSE PRACTITIONER

## 2023-04-19 PROCEDURE — 1160F RVW MEDS BY RX/DR IN RCRD: CPT | Mod: CPTII,S$GLB,, | Performed by: NURSE PRACTITIONER

## 2023-04-19 PROCEDURE — 99999 PR PBB SHADOW E&M-EST. PATIENT-LVL IV: ICD-10-PCS | Mod: PBBFAC,,, | Performed by: NURSE PRACTITIONER

## 2023-04-19 PROCEDURE — 99999 PR PBB SHADOW E&M-EST. PATIENT-LVL IV: CPT | Mod: PBBFAC,,, | Performed by: NURSE PRACTITIONER

## 2023-04-19 PROCEDURE — 20610 PR DRAIN/INJECT LARGE JOINT/BURSA: ICD-10-PCS | Mod: RT,S$GLB,, | Performed by: NURSE PRACTITIONER

## 2023-04-19 PROCEDURE — 1159F PR MEDICATION LIST DOCUMENTED IN MEDICAL RECORD: ICD-10-PCS | Mod: CPTII,S$GLB,, | Performed by: NURSE PRACTITIONER

## 2023-04-19 PROCEDURE — 3288F PR FALLS RISK ASSESSMENT DOCUMENTED: ICD-10-PCS | Mod: CPTII,S$GLB,, | Performed by: NURSE PRACTITIONER

## 2023-04-19 PROCEDURE — 1125F AMNT PAIN NOTED PAIN PRSNT: CPT | Mod: CPTII,S$GLB,, | Performed by: NURSE PRACTITIONER

## 2023-04-19 PROCEDURE — 1101F PR PT FALLS ASSESS DOC 0-1 FALLS W/OUT INJ PAST YR: ICD-10-PCS | Mod: CPTII,S$GLB,, | Performed by: NURSE PRACTITIONER

## 2023-04-19 PROCEDURE — 99499 UNLISTED E&M SERVICE: CPT | Mod: S$GLB,,, | Performed by: NURSE PRACTITIONER

## 2023-04-19 PROCEDURE — 1125F PR PAIN SEVERITY QUANTIFIED, PAIN PRESENT: ICD-10-PCS | Mod: CPTII,S$GLB,, | Performed by: NURSE PRACTITIONER

## 2023-04-19 PROCEDURE — 1160F PR REVIEW ALL MEDS BY PRESCRIBER/CLIN PHARMACIST DOCUMENTED: ICD-10-PCS | Mod: CPTII,S$GLB,, | Performed by: NURSE PRACTITIONER

## 2023-04-19 PROCEDURE — 1159F MED LIST DOCD IN RCRD: CPT | Mod: CPTII,S$GLB,, | Performed by: NURSE PRACTITIONER

## 2023-04-19 PROCEDURE — 3008F PR BODY MASS INDEX (BMI) DOCUMENTED: ICD-10-PCS | Mod: CPTII,S$GLB,, | Performed by: NURSE PRACTITIONER

## 2023-04-19 PROCEDURE — 20610 DRAIN/INJ JOINT/BURSA W/O US: CPT | Mod: RT,S$GLB,, | Performed by: NURSE PRACTITIONER

## 2023-04-19 PROCEDURE — 3288F FALL RISK ASSESSMENT DOCD: CPT | Mod: CPTII,S$GLB,, | Performed by: NURSE PRACTITIONER

## 2023-04-19 PROCEDURE — 99499 NO LOS: ICD-10-PCS | Mod: S$GLB,,, | Performed by: NURSE PRACTITIONER

## 2023-04-19 PROCEDURE — 1101F PT FALLS ASSESS-DOCD LE1/YR: CPT | Mod: CPTII,S$GLB,, | Performed by: NURSE PRACTITIONER

## 2023-04-19 RX ADMIN — TRIAMCINOLONE ACETONIDE 40 MG: 40 INJECTION, SUSPENSION INTRA-ARTICULAR; INTRAMUSCULAR at 11:04

## 2023-04-20 RX ORDER — TRIAMCINOLONE ACETONIDE 40 MG/ML
40 INJECTION, SUSPENSION INTRA-ARTICULAR; INTRAMUSCULAR
Status: COMPLETED | OUTPATIENT
Start: 2023-04-19 | End: 2023-04-19

## 2023-04-21 ENCOUNTER — PATIENT MESSAGE (OUTPATIENT)
Dept: ORTHOPEDICS | Facility: CLINIC | Age: 73
End: 2023-04-21
Payer: COMMERCIAL

## 2023-04-21 ENCOUNTER — TELEPHONE (OUTPATIENT)
Dept: ORTHOPEDICS | Facility: CLINIC | Age: 73
End: 2023-04-21
Payer: COMMERCIAL

## 2023-04-21 NOTE — TELEPHONE ENCOUNTER
I called the patient today regarding her surgery. The patient's voice mail box is full and cannot accept any more voice messages. I will send her a message via the portal.

## 2023-04-21 NOTE — PROGRESS NOTES
Pt with known right knee djd. She has been seen in the past by myself and Dr. Bennett. She has had cortisone injections with good relief and returns to repeat that today. She would also like to go ahead and schedule her knee replacement in October. Message sent to Benigno for scheduling and cortisone injection administered.    Knee Injection Procedure Note  Diagnosis: right knee degenerative arthritis  Indications: right knee pain  Procedure Details: Verbal consent was obtained for the procedure. The injection site was identified and the skin was prepared with alcohol. The right knee was injected from an anterolateral approach with 1 ml of Kenalog and 4 ml Lidocaine under sterile technique using a 22 gauge needle. The needle was removed and the area cleansed and dressed.  Complications:  Patient tolerated the procedure well.    she was advised to rest the knee today, using ice and elevation as needed for comfort and swelling.Immediate relief of the knee pain may be short lived and secondary to the lidocaine. she may have an increase in discomfort tonight followed by steady improvement over the next several days. It may take 1-2 weeks following the injection to get the full benefit of the medication.

## 2023-04-21 NOTE — TELEPHONE ENCOUNTER
----- Message from Santa Mendez NP sent at 4/19/2023  4:52 PM CDT -----  This lady is ready to schedule knee replacement in October. She prefers a Monday please. Thanks!

## 2023-05-01 ENCOUNTER — PATIENT MESSAGE (OUTPATIENT)
Dept: ORTHOPEDICS | Facility: CLINIC | Age: 73
End: 2023-05-01
Payer: COMMERCIAL

## 2023-05-03 ENCOUNTER — TELEPHONE (OUTPATIENT)
Dept: OPHTHALMOLOGY | Facility: CLINIC | Age: 73
End: 2023-05-03
Payer: COMMERCIAL

## 2023-05-03 NOTE — TELEPHONE ENCOUNTER
----- Message from Deborah Gill sent at 5/3/2023  3:45 PM CDT -----  Regarding: advice  Contact: self @ [phone] or [id number]  Pt says she is scheduled for surgery on Monday.  She says she needs to know today what her co pay will be and her arrival time.  She needs to pay the co pay tomorrow and will not be in the office on Friday.  Pls call asap.

## 2023-05-04 ENCOUNTER — TELEPHONE (OUTPATIENT)
Dept: OPHTHALMOLOGY | Facility: CLINIC | Age: 73
End: 2023-05-04
Payer: COMMERCIAL

## 2023-05-04 ENCOUNTER — PATIENT MESSAGE (OUTPATIENT)
Dept: BARIATRICS | Facility: CLINIC | Age: 73
End: 2023-05-04
Payer: COMMERCIAL

## 2023-05-04 NOTE — PRE-PROCEDURE INSTRUCTIONS
- NPO after midinight, except AM meds with small sip of water  - No Diabetic meds OR Fluid pills  - All B/P meds are ok to take, except those that contain a fluid pill  - Hold all vits and herbal meds AM of surgery  - Use inhalers as needed and bring AM of surgery  - Wash face for 3 mins PM prior and AM of surgery  - No powder, lotions, creams, (makeup),  or jewelry    - Wear comfortable clothing (button up shirt)    -- 2nd floor surgery ctr at [facility] @ [address], [place] [address]    Pt voiced understanding

## 2023-05-04 NOTE — TELEPHONE ENCOUNTER
Patient given arrival time of 8:30 am on Monday May 8 . Nothing to eat or drink after 9 pm.  Water, Gatorade after 9 pm until leaves home.  Start drops into the operative eye today. [address] [facility]

## 2023-05-08 ENCOUNTER — ANESTHESIA (OUTPATIENT)
Dept: SURGERY | Facility: HOSPITAL | Age: 73
End: 2023-05-08
Payer: COMMERCIAL

## 2023-05-08 ENCOUNTER — HOSPITAL ENCOUNTER (OUTPATIENT)
Facility: HOSPITAL | Age: 73
Discharge: HOME | End: 2023-05-08
Attending: OPHTHALMOLOGY | Admitting: OPHTHALMOLOGY
Payer: COMMERCIAL

## 2023-05-08 ENCOUNTER — ANESTHESIA EVENT (OUTPATIENT)
Dept: SURGERY | Facility: HOSPITAL | Age: 73
End: 2023-05-08
Payer: COMMERCIAL

## 2023-05-08 VITALS
TEMPERATURE: 98 F | RESPIRATION RATE: 16 BRPM | DIASTOLIC BLOOD PRESSURE: 69 MMHG | OXYGEN SATURATION: 100 % | HEIGHT: 63 IN | WEIGHT: 158 LBS | BODY MASS INDEX: 28 KG/M2 | HEART RATE: 65 BPM | SYSTOLIC BLOOD PRESSURE: 155 MMHG

## 2023-05-08 DIAGNOSIS — H25.13 NUCLEAR SCLEROSIS OF BOTH EYES: Primary | ICD-10-CM

## 2023-05-08 PROCEDURE — 37000008 HC ANESTHESIA 1ST 15 MINUTES: Performed by: OPHTHALMOLOGY

## 2023-05-08 PROCEDURE — 25000003 PHARM REV CODE 250: Performed by: OPHTHALMOLOGY

## 2023-05-08 PROCEDURE — 66984 XCAPSL CTRC RMVL W/O ECP: CPT | Mod: LT,,, | Performed by: OPHTHALMOLOGY

## 2023-05-08 PROCEDURE — 66984 PR REMOVAL, CATARACT, W/INSRT INTRAOC LENS, W/O ENDO CYCLO: ICD-10-PCS | Mod: LT,,, | Performed by: OPHTHALMOLOGY

## 2023-05-08 PROCEDURE — 66999 UNLISTED PX ANT SEGMENT EYE: CPT | Mod: CSM,LT,, | Performed by: OPHTHALMOLOGY

## 2023-05-08 PROCEDURE — D9220A PRA ANESTHESIA: Mod: ,,, | Performed by: NURSE ANESTHETIST, CERTIFIED REGISTERED

## 2023-05-08 PROCEDURE — 36000706: Performed by: OPHTHALMOLOGY

## 2023-05-08 PROCEDURE — 94761 N-INVAS EAR/PLS OXIMETRY MLT: CPT

## 2023-05-08 PROCEDURE — 36000707: Performed by: OPHTHALMOLOGY

## 2023-05-08 PROCEDURE — V2787 ASTIGMATISM-CORRECT FUNCTION: HCPCS | Performed by: OPHTHALMOLOGY

## 2023-05-08 PROCEDURE — 99900035 HC TECH TIME PER 15 MIN (STAT)

## 2023-05-08 PROCEDURE — 66999 PR FEMTO TORIC: ICD-10-PCS | Mod: CSM,LT,, | Performed by: OPHTHALMOLOGY

## 2023-05-08 PROCEDURE — 37000009 HC ANESTHESIA EA ADD 15 MINS: Performed by: OPHTHALMOLOGY

## 2023-05-08 PROCEDURE — 71000015 HC POSTOP RECOV 1ST HR: Performed by: OPHTHALMOLOGY

## 2023-05-08 PROCEDURE — D9220A PRA ANESTHESIA: ICD-10-PCS | Mod: ,,, | Performed by: NURSE ANESTHETIST, CERTIFIED REGISTERED

## 2023-05-08 DEVICE — IMPLANTABLE DEVICE: Type: IMPLANTABLE DEVICE | Site: EYE | Status: FUNCTIONAL

## 2023-05-08 RX ORDER — ACETAMINOPHEN 325 MG/1
650 TABLET ORAL EVERY 4 HOURS PRN
Status: DISCONTINUED | OUTPATIENT
Start: 2023-05-08 | End: 2023-05-08 | Stop reason: HOSPADM

## 2023-05-08 RX ORDER — MOXIFLOXACIN 5 MG/ML
1 SOLUTION/ DROPS OPHTHALMIC
Status: COMPLETED | OUTPATIENT
Start: 2023-05-08 | End: 2023-05-08

## 2023-05-08 RX ORDER — PROPARACAINE HYDROCHLORIDE 5 MG/ML
1 SOLUTION/ DROPS OPHTHALMIC
Status: DISCONTINUED | OUTPATIENT
Start: 2023-05-08 | End: 2023-05-08 | Stop reason: HOSPADM

## 2023-05-08 RX ORDER — CYCLOP/TROP/PROPA/PHEN/KET/WAT 1-1-0.1%
1 DROPS (EA) OPHTHALMIC (EYE)
Status: COMPLETED | OUTPATIENT
Start: 2023-05-08 | End: 2023-05-08

## 2023-05-08 RX ORDER — PHENYLEPHRINE HYDROCHLORIDE 100 MG/ML
1 SOLUTION/ DROPS OPHTHALMIC
Status: DISCONTINUED | OUTPATIENT
Start: 2023-05-08 | End: 2023-05-08 | Stop reason: HOSPADM

## 2023-05-08 RX ORDER — MOXIFLOXACIN 5 MG/ML
SOLUTION/ DROPS OPHTHALMIC
Status: DISCONTINUED | OUTPATIENT
Start: 2023-05-08 | End: 2023-05-08 | Stop reason: HOSPADM

## 2023-05-08 RX ORDER — TETRACAINE HYDROCHLORIDE 5 MG/ML
SOLUTION OPHTHALMIC
Status: DISCONTINUED | OUTPATIENT
Start: 2023-05-08 | End: 2023-05-08 | Stop reason: HOSPADM

## 2023-05-08 RX ORDER — LIDOCAINE HYDROCHLORIDE 40 MG/ML
INJECTION, SOLUTION RETROBULBAR
Status: DISCONTINUED | OUTPATIENT
Start: 2023-05-08 | End: 2023-05-08 | Stop reason: HOSPADM

## 2023-05-08 RX ORDER — MIDAZOLAM HYDROCHLORIDE 1 MG/ML
INJECTION INTRAMUSCULAR; INTRAVENOUS
Status: DISCONTINUED | OUTPATIENT
Start: 2023-05-08 | End: 2023-05-08

## 2023-05-08 RX ADMIN — MOXIFLOXACIN 1 DROP: 5 SOLUTION/ DROPS OPHTHALMIC at 11:05

## 2023-05-08 RX ADMIN — MIDAZOLAM HYDROCHLORIDE 2 MG: 1 INJECTION INTRAMUSCULAR; INTRAVENOUS at 10:05

## 2023-05-08 RX ADMIN — MOXIFLOXACIN OPHTHALMIC 1 DROP: 5 SOLUTION/ DROPS OPHTHALMIC at 09:05

## 2023-05-08 RX ADMIN — Medication 1 DROP: at 09:05

## 2023-05-08 NOTE — DISCHARGE SUMMARY
Outcome: Successful outpatient ophthalmic surgical procedure  Preprinted Instructions given to patient.  Regular diet.  Activity: No restrictions  Meds: see Med Rec  Condition: stable  Follow up: 1 day with Dr Diallo  Disposition: Home  Diagnosis: s/p eye surgery  Date of discharge: 05/08/2023

## 2023-05-08 NOTE — TRANSFER OF CARE
"Anesthesia Transfer of Care Note    Patient: Carmen Hawley    Procedure(s) Performed: Procedure(s) (LRB):  EXTRACTION, CATARACT, WITH IOL INSERTION (Left)    Patient location: PACU    Anesthesia Type: MAC    Transport from OR: Transported from OR on room air with adequate spontaneous ventilation    Post pain: adequate analgesia    Post assessment: no apparent anesthetic complications and tolerated procedure well    Post vital signs: stable    Level of consciousness: awake, alert and oriented    Nausea/Vomiting: no nausea/vomiting    Complications: none    Transfer of care protocol was followed      Last vitals:   Visit Vitals  BP (!) 166/79   Pulse 67   Temp 37.4 °C (99.3 °F) (Oral)   Resp 18   Ht 5' 3" (1.6 m)   Wt 71.7 kg (158 lb)   LMP 12/16/2006 (Approximate)   SpO2 99%   Breastfeeding No   BMI 27.99 kg/m²     "

## 2023-05-08 NOTE — H&P
CC: Painless, progressive loss of vision  Present Illness: Nuclear sclerotic cataract  Allergies/Current Meds: see meds  Mental Status: A&O x3  Pertinent Medical History: n/a     Physical Exam  General: NAD  HEENT: Eye white/quiet  Lungs: Adequate respirations  Heart: + pulses  Abdomen: soft  Rectal/GI/: deferred     Impression: Cataract  Plan: Phacoemulsification with lens implant     none

## 2023-05-08 NOTE — ANESTHESIA POSTPROCEDURE EVALUATION
Anesthesia Post Evaluation    Patient: Carmen Hawley    Procedure(s) Performed: Procedure(s) (LRB):  EXTRACTION, CATARACT, WITH IOL INSERTION (Left)    Final Anesthesia Type: MAC      Patient location during evaluation: PACU  Patient participation: Yes- Able to Participate  Level of consciousness: awake and alert  Post-procedure vital signs: reviewed and stable  Pain management: adequate  Airway patency: patent    PONV status at discharge: No PONV  Anesthetic complications: no      Cardiovascular status: blood pressure returned to baseline  Respiratory status: unassisted, spontaneous ventilation and room air  Hydration status: euvolemic  Follow-up not needed.          Vitals Value Taken Time   /69 05/08/23 1127   Temp 36.6 °C (97.9 °F) 05/08/23 1100   Pulse 65 05/08/23 1127   Resp 16 05/08/23 1127   SpO2 100 % 05/08/23 1127         No case tracking events are documented in the log.      Pain/Juani Score: Juani Score: 9 (5/8/2023 11:16 AM)

## 2023-05-08 NOTE — ANESTHESIA PREPROCEDURE EVALUATION
05/08/2023  Carmen Hawley is a 72 y.o., female.      Pre-op Assessment    I have reviewed the Patient Summary Reports.    I have reviewed the NPO Status.   I have reviewed the Medications.     Review of Systems  Anesthesia Hx:  No problems with previous Anesthesia  Denies Family Hx of Anesthesia complications.   Denies Personal Hx of Anesthesia complications.   Social:  Alcohol Use    Hematology/Oncology:  Hematology Normal   Oncology Normal     EENT/Dental:  EENT/Dental Normal Eyes:    Cardiovascular:   Hypertension Denies MI.    Denies Angina. hyperlipidemia    Pulmonary:  Pulmonary Normal    Renal/:  Renal/ Normal     Hepatic/GI:  Hepatic/GI Normal    Musculoskeletal:   Arthritis   Spine Disorders: Chronic Pain and Degenerative disease    Neurological:   Neuromuscular Disease,    Endocrine:  Endocrine Normal    Psych:   anxiety          Physical Exam  General: Well nourished, Cooperative, Alert and Oriented    Airway:  Mallampati: II   Mouth Opening: Normal  TM Distance: Normal  Tongue: Normal  Neck ROM: Normal ROM        Anesthesia Plan  Type of Anesthesia, risks & benefits discussed:    Anesthesia Type: MAC  Intra-op Monitoring Plan: Standard ASA Monitors  Post Op Pain Control Plan: multimodal analgesia and IV/PO Opioids PRN  Induction:  IV  Informed Consent: Informed consent signed with the Patient and all parties understand the risks and agree with anesthesia plan.  All questions answered.   ASA Score: 2  Day of Surgery Review of History & Physical: H&P Update referred to the surgeon/provider.I have interviewed and examined the patient. I have reviewed the patient's H&P dated: There are no significant changes. H&P completed by Anesthesiologist.    Ready For Surgery From Anesthesia Perspective.     .

## 2023-05-08 NOTE — DISCHARGE INSTRUCTIONS
CATARACT SURGERY    POST-OPERATIVE INSTRUCTIONS    · Apply drops THREE times a day into operative eye for 30 days.    · DO NOT rub your eye    · Wear protective sunglasses during the day    · Resume moderate activity    · Bathe/shower/wash face normally    · DO NOT apply makeup around the operative eye for 1 week.         You should expect    - Blurry vision and halos for 24-48 hours    - Dilated pupil for 24-48 hours    - Scratchy feeling in the eye for 1-2 days    - Curved shadow in your peripheral vision for 2-3 weeks    - Occasional flickering of lights for up to 1 week    -If you experience severe pain or nausea, please call Dr Diallo or the on-call doctor at [phone]    - Plan to see Dr Diallo tomorrow .      [facility]    [address] [address]    ** Most patients can drive the next day, but if you do not feel comfortable driving, please arrange for transportation.

## 2023-05-08 NOTE — OP NOTE
SURGEON:  Flaca Diallo M.D.    PREOPERATIVE DIAGNOSIS:    Nuclear Sclerotic Cataract Left Eye    POSTOPERATIVE DIAGNOSIS:    Nuclear Sclerotic Cataract Left  Eye    PROCEDURES:    Phacoemulsification with  intraocular lens, Left eye (43942)  With ORA LASER assist    DATE OF SURGERY: 05/08/2023    IMPLANT: [id number]  16.0 AT 17    ANESTHESIA:  MAC with topical Lidocaine    COMPLICATIONS:  None    ESTIMATED BLOOD LOSS: None    SPECIMENS: None    INDICATIONS:    The patient has a history of painless progressive visual loss and difficulty with activities of daily living, which specifically include difficult driving at night due to glare and difficulty reading small print, secondary to cataract formation.  After a thorough discussion of the risks, benefits, and alternatives to cataract surgery, including, but not limited to, the rare risks of infection, retinal detachment, hemorrhage, need for additional surgery, loss of vision, and even loss of the eye, the patient voices understanding and desires to proceed.    DESCRIPTION OF PROCEDURE:      The patients IOL calculations were reviewed, and the lens selection confirmed.   After verification and marking of the proper eye in the preop holding area, the patient was brought to the operating room in supine position where the eye was prepped and draped in standard sterile fashion with 5% Betadine and a lid speculum placed in the eye.   Topical 4% Lidocaine was used in addition to the preoperative anesthesia and the procedure was begun by the creation of a paracentesis incision through which viscoelastic was used to fill the anterior chamber.  Next, a keratome blade was used to create a triplanar temporal clear corneal incision and a cystotome and Utrata forceps used to fashion a continuous curvilinear capsulorrhexis.  Hydrodissection was carried out using the Singh hydrodissection cannula and the nucleus was found to be mobile.  Phacoemulsification of the nucleus was carried out  using a quick chop technique, and all remaining epinuclear and cortical material was removed.  The eye was then reformed with Viscoelastic and ORA was used to verify the proper IOL power. The intraocular lens was then implanted into the capsular bag.  All remaining viscoelastics were removed from the eye and at the end of the case the pupil was round, the lens was well-centered within the capsular bag and all wounds were found to be water tight.  Drops of Vigamox and Pred Forte were instilled and a shield was placed over the eye. The patient will follow up with Dr. Diallo in the morning.   Yes

## 2023-05-09 ENCOUNTER — OFFICE VISIT (OUTPATIENT)
Dept: OPHTHALMOLOGY | Facility: CLINIC | Age: 73
End: 2023-05-09
Payer: COMMERCIAL

## 2023-05-09 DIAGNOSIS — Z98.890 POST-OPERATIVE STATE: Primary | ICD-10-CM

## 2023-05-09 DIAGNOSIS — H25.13 NUCLEAR SCLEROTIC CATARACT, BILATERAL: ICD-10-CM

## 2023-05-09 PROCEDURE — 1101F PT FALLS ASSESS-DOCD LE1/YR: CPT | Mod: CPTII,S$GLB,, | Performed by: OPHTHALMOLOGY

## 2023-05-09 PROCEDURE — 1160F PR REVIEW ALL MEDS BY PRESCRIBER/CLIN PHARMACIST DOCUMENTED: ICD-10-PCS | Mod: CPTII,S$GLB,, | Performed by: OPHTHALMOLOGY

## 2023-05-09 PROCEDURE — 99999 PR PBB SHADOW E&M-EST. PATIENT-LVL III: CPT | Mod: PBBFAC,,, | Performed by: OPHTHALMOLOGY

## 2023-05-09 PROCEDURE — 1126F AMNT PAIN NOTED NONE PRSNT: CPT | Mod: CPTII,S$GLB,, | Performed by: OPHTHALMOLOGY

## 2023-05-09 PROCEDURE — 1101F PR PT FALLS ASSESS DOC 0-1 FALLS W/OUT INJ PAST YR: ICD-10-PCS | Mod: CPTII,S$GLB,, | Performed by: OPHTHALMOLOGY

## 2023-05-09 PROCEDURE — 99024 POSTOP FOLLOW-UP VISIT: CPT | Mod: S$GLB,,, | Performed by: OPHTHALMOLOGY

## 2023-05-09 PROCEDURE — 1159F PR MEDICATION LIST DOCUMENTED IN MEDICAL RECORD: ICD-10-PCS | Mod: CPTII,S$GLB,, | Performed by: OPHTHALMOLOGY

## 2023-05-09 PROCEDURE — 99024 PR POST-OP FOLLOW-UP VISIT: ICD-10-PCS | Mod: S$GLB,,, | Performed by: OPHTHALMOLOGY

## 2023-05-09 PROCEDURE — 3288F PR FALLS RISK ASSESSMENT DOCUMENTED: ICD-10-PCS | Mod: CPTII,S$GLB,, | Performed by: OPHTHALMOLOGY

## 2023-05-09 PROCEDURE — 1126F PR PAIN SEVERITY QUANTIFIED, NO PAIN PRESENT: ICD-10-PCS | Mod: CPTII,S$GLB,, | Performed by: OPHTHALMOLOGY

## 2023-05-09 PROCEDURE — 1160F RVW MEDS BY RX/DR IN RCRD: CPT | Mod: CPTII,S$GLB,, | Performed by: OPHTHALMOLOGY

## 2023-05-09 PROCEDURE — 3288F FALL RISK ASSESSMENT DOCD: CPT | Mod: CPTII,S$GLB,, | Performed by: OPHTHALMOLOGY

## 2023-05-09 PROCEDURE — 1159F MED LIST DOCD IN RCRD: CPT | Mod: CPTII,S$GLB,, | Performed by: OPHTHALMOLOGY

## 2023-05-09 PROCEDURE — 99999 PR PBB SHADOW E&M-EST. PATIENT-LVL III: ICD-10-PCS | Mod: PBBFAC,,, | Performed by: OPHTHALMOLOGY

## 2023-05-09 NOTE — PROGRESS NOTES
HPI    1 day po phaco/IOL OS  Vision is good and no pain    PGB TID OS    DATE OF SURGERY: 05/08/2023     IMPLANT: [id number]  16.0 AT 17     Last edited by Rachelle Dale on 5/9/2023  7:52 AM.            Assessment /Plan     For exam results, see Encounter Report.    Post-operative state    Nuclear sclerotic cataract, bilateral      Slit lamp exam:  L/L: nl  K: clear, wound sealed  AC: 1+ cell  Lens: IOL centered and stable    POD1 s/p Phaco/IOL  Appropriate precautions and post op medications reviewed.  Patient instructed to call or come in if symptoms of redness, decreased vision, or pain are experienced.

## 2023-05-15 ENCOUNTER — PATIENT MESSAGE (OUTPATIENT)
Dept: ORTHOPEDICS | Facility: CLINIC | Age: 73
End: 2023-05-15
Payer: COMMERCIAL

## 2023-05-16 ENCOUNTER — TELEPHONE (OUTPATIENT)
Dept: ORTHOPEDICS | Facility: CLINIC | Age: 73
End: 2023-05-16
Payer: COMMERCIAL

## 2023-05-16 NOTE — TELEPHONE ENCOUNTER
I called the patient today regarding her voice message. I left a message for the patient to call me back. I left my name and phone number.      ----- Message from Kendall Duffy sent at 5/15/2023  4:24 PM CDT -----  Regarding: FW: missed call  Contact: [phone]    ----- Message -----  From: Amber Pope  Sent: 5/15/2023   4:04 PM CDT  To: Donald [facility]NAS Staff  Subject: missed call                                      Carmen Hawley calling regarding Patient Advice (message) for #returning a missed call from Benigno. She states she can be called anytime after 10am on tomorrow 5.16.  She did confirm surgery is on right knee. She states that she will be at work on wed/thurs ext [phone]. Please call

## 2023-05-17 ENCOUNTER — TELEPHONE (OUTPATIENT)
Dept: OPHTHALMOLOGY | Facility: CLINIC | Age: 73
End: 2023-05-17
Payer: COMMERCIAL

## 2023-05-17 ENCOUNTER — TELEPHONE (OUTPATIENT)
Dept: ORTHOPEDICS | Facility: CLINIC | Age: 73
End: 2023-05-17
Payer: COMMERCIAL

## 2023-05-17 ENCOUNTER — OFFICE VISIT (OUTPATIENT)
Dept: OPHTHALMOLOGY | Facility: CLINIC | Age: 73
End: 2023-05-17
Payer: COMMERCIAL

## 2023-05-17 ENCOUNTER — HOSPITAL ENCOUNTER (OUTPATIENT)
Facility: OTHER | Age: 73
Discharge: HOME | End: 2023-05-17
Attending: ANESTHESIOLOGY | Admitting: ANESTHESIOLOGY
Payer: COMMERCIAL

## 2023-05-17 VITALS
HEART RATE: 69 BPM | HEIGHT: 63 IN | OXYGEN SATURATION: 69 % | RESPIRATION RATE: 16 BRPM | TEMPERATURE: 98 F | DIASTOLIC BLOOD PRESSURE: 59 MMHG | WEIGHT: 158 LBS | SYSTOLIC BLOOD PRESSURE: 124 MMHG | BODY MASS INDEX: 28 KG/M2

## 2023-05-17 DIAGNOSIS — M17.9 OSTEOARTHRITIS OF KNEE, UNSPECIFIED LATERALITY, UNSPECIFIED OSTEOARTHRITIS TYPE: Primary | ICD-10-CM

## 2023-05-17 DIAGNOSIS — Z98.890 POST-OPERATIVE STATE: Primary | ICD-10-CM

## 2023-05-17 DIAGNOSIS — H25.11 NUCLEAR SCLEROTIC CATARACT OF RIGHT EYE: Primary | ICD-10-CM

## 2023-05-17 DIAGNOSIS — G89.29 CHRONIC PAIN: ICD-10-CM

## 2023-05-17 DIAGNOSIS — H25.13 NUCLEAR SCLEROTIC CATARACT, BILATERAL: ICD-10-CM

## 2023-05-17 DIAGNOSIS — M25.569 CHRONIC KNEE PAIN, UNSPECIFIED LATERALITY: ICD-10-CM

## 2023-05-17 DIAGNOSIS — G89.29 CHRONIC KNEE PAIN, UNSPECIFIED LATERALITY: ICD-10-CM

## 2023-05-17 PROCEDURE — 1126F PR PAIN SEVERITY QUANTIFIED, NO PAIN PRESENT: ICD-10-PCS | Mod: CPTII,S$GLB,, | Performed by: OPHTHALMOLOGY

## 2023-05-17 PROCEDURE — 20610 DRAIN/INJ JOINT/BURSA W/O US: CPT | Mod: RT | Performed by: ANESTHESIOLOGY

## 2023-05-17 PROCEDURE — 1160F RVW MEDS BY RX/DR IN RCRD: CPT | Mod: CPTII,S$GLB,, | Performed by: OPHTHALMOLOGY

## 2023-05-17 PROCEDURE — 77002 PR FLUOROSCOPIC GUIDANCE NEEDLE PLACEMENT: ICD-10-PCS | Mod: 26,RT,, | Performed by: ANESTHESIOLOGY

## 2023-05-17 PROCEDURE — 20610 DRAIN/INJ JOINT/BURSA W/O US: CPT | Mod: RT,,, | Performed by: ANESTHESIOLOGY

## 2023-05-17 PROCEDURE — 99999 PR PBB SHADOW E&M-EST. PATIENT-LVL III: CPT | Mod: PBBFAC,,, | Performed by: OPHTHALMOLOGY

## 2023-05-17 PROCEDURE — 99999 PR PBB SHADOW E&M-EST. PATIENT-LVL III: ICD-10-PCS | Mod: PBBFAC,,, | Performed by: OPHTHALMOLOGY

## 2023-05-17 PROCEDURE — 77002 NEEDLE LOCALIZATION BY XRAY: CPT | Mod: 26,RT,, | Performed by: ANESTHESIOLOGY

## 2023-05-17 PROCEDURE — 25000003 PHARM REV CODE 250: Performed by: ANESTHESIOLOGY

## 2023-05-17 PROCEDURE — 20610 PR DRAIN/INJECT LARGE JOINT/BURSA: ICD-10-PCS | Mod: RT,,, | Performed by: ANESTHESIOLOGY

## 2023-05-17 PROCEDURE — 3288F FALL RISK ASSESSMENT DOCD: CPT | Mod: CPTII,S$GLB,, | Performed by: OPHTHALMOLOGY

## 2023-05-17 PROCEDURE — 1101F PT FALLS ASSESS-DOCD LE1/YR: CPT | Mod: CPTII,S$GLB,, | Performed by: OPHTHALMOLOGY

## 2023-05-17 PROCEDURE — 99024 POSTOP FOLLOW-UP VISIT: CPT | Mod: S$GLB,,, | Performed by: OPHTHALMOLOGY

## 2023-05-17 PROCEDURE — 63600175 PHARM REV CODE 636 W HCPCS: Mod: JZ,JG | Performed by: ANESTHESIOLOGY

## 2023-05-17 PROCEDURE — 1159F MED LIST DOCD IN RCRD: CPT | Mod: CPTII,S$GLB,, | Performed by: OPHTHALMOLOGY

## 2023-05-17 PROCEDURE — 1160F PR REVIEW ALL MEDS BY PRESCRIBER/CLIN PHARMACIST DOCUMENTED: ICD-10-PCS | Mod: CPTII,S$GLB,, | Performed by: OPHTHALMOLOGY

## 2023-05-17 PROCEDURE — 25500020 PHARM REV CODE 255: Performed by: ANESTHESIOLOGY

## 2023-05-17 PROCEDURE — 1126F AMNT PAIN NOTED NONE PRSNT: CPT | Mod: CPTII,S$GLB,, | Performed by: OPHTHALMOLOGY

## 2023-05-17 PROCEDURE — 1159F PR MEDICATION LIST DOCUMENTED IN MEDICAL RECORD: ICD-10-PCS | Mod: CPTII,S$GLB,, | Performed by: OPHTHALMOLOGY

## 2023-05-17 PROCEDURE — 3288F PR FALLS RISK ASSESSMENT DOCUMENTED: ICD-10-PCS | Mod: CPTII,S$GLB,, | Performed by: OPHTHALMOLOGY

## 2023-05-17 PROCEDURE — 1101F PR PT FALLS ASSESS DOC 0-1 FALLS W/OUT INJ PAST YR: ICD-10-PCS | Mod: CPTII,S$GLB,, | Performed by: OPHTHALMOLOGY

## 2023-05-17 PROCEDURE — 99024 PR POST-OP FOLLOW-UP VISIT: ICD-10-PCS | Mod: S$GLB,,, | Performed by: OPHTHALMOLOGY

## 2023-05-17 RX ORDER — LIDOCAINE HYDROCHLORIDE 20 MG/ML
INJECTION, SOLUTION INFILTRATION; PERINEURAL
Status: DISCONTINUED | OUTPATIENT
Start: 2023-05-17 | End: 2023-05-17 | Stop reason: HOSPADM

## 2023-05-17 RX ORDER — CYCLOP/TROP/PROPA/PHEN/KET/WAT 1-1-0.1%
1 DROPS (EA) OPHTHALMIC (EYE)
Status: CANCELLED | OUTPATIENT
Start: 2023-05-17

## 2023-05-17 RX ORDER — MOXIFLOXACIN 5 MG/ML
1 SOLUTION/ DROPS OPHTHALMIC
Status: CANCELLED | OUTPATIENT
Start: 2023-05-17

## 2023-05-17 RX ORDER — SODIUM CHLORIDE 9 MG/ML
500 INJECTION, SOLUTION INTRAVENOUS CONTINUOUS
Status: DISCONTINUED | OUTPATIENT
Start: 2023-05-17 | End: 2023-05-17 | Stop reason: HOSPADM

## 2023-05-17 RX ORDER — PHENYLEPHRINE HYDROCHLORIDE 100 MG/ML
1 SOLUTION/ DROPS OPHTHALMIC
Status: CANCELLED | OUTPATIENT
Start: 2023-05-17

## 2023-05-17 NOTE — PROGRESS NOTES
HPI    1 week po phaco/IOL OS  Vision is good and no pain    PGB TID OS    DATE OF SURGERY: 05/08/2023     IMPLANT: [id number]  16.0 AT 17     Last edited by Deangelo Turner on 5/17/2023 11:15 AM.            Assessment /Plan     For exam results, see Encounter Report.    Post-operative state    Nuclear sclerotic cataract, bilateral      Slit lamp exam:  L/L: nl  K: clear, wound sealed  AC: 1+ cell  Lens: IOL centered and stable    POD1 s/p Phaco/IOL  Appropriate precautions and post op medications reviewed.  Patient instructed to call or come in if symptoms of redness, decreased vision, or pain are experienced.     Right eye  IOL: [id number] 16.5       Left eye  IOL: [id number]  16.0 , implanted with good result    The patient expresses a desire to reduce spectacle dependence. I reviewed various IOL and LASER refractive surgical options and we will attempt to minimize spectacle dependence by managing astigmatism and optimizing IOL selection. Femtosecond LASER assisted cataract surgery (FLACS) technology was explained to the patient with educational videos and discussion.  The patient voices understanding and wishes to implement this technology during the cataract procedure.  I explained the increased precision of the LASER versus manual techniques, especially as it relates to astigmatism reduction with arcuate incisions.  I emphasized that although our goal is to reduce the need for refractive correction after surgery, there may still be a need for spectacle correction to achieve optimal visual acuity, and that a reasonable range of functional vision should be the expectation.  No guarantees are made about post operative refraction or visual acuity, as the eye may heal in unpredictable ways, and the standard risks, benefits, and alternatives to cataract surgery were explained.  The patient understands that the refractive portions of this cataract procedure are not covered by insurance, and that there is an out of  pocket expense of $1500 per eye. I also explained that even though our pre-operative plan is to utilize advanced refractive technologies during surgery, that I may decide to eliminate part or all of this plan if surgical challenges or complications arise, or I feel that it is not in the patient's best interest. Consent forms and an ABN form were given to the patient to review.    [facility] only

## 2023-05-17 NOTE — H&P
HPI  Patient presenting for Procedure(s) (LRB):  INJECTION RIGHT KNEE SYNVISC (Right)     Patient on Anti-coagulation No    No health changes since previous encounter    Past Medical History:   Diagnosis Date    Anxiety     Arthritis     Cataract     Hyperlipidemia     Hypertension     Macular degeneration     Mitral valve prolapse     Salzmann's nodular dystrophy of both eyes      Past Surgical History:   Procedure Laterality Date    CATARACT EXTRACTION W/  INTRAOCULAR LENS IMPLANT Left 5/8/2023    Procedure: EXTRACTION, CATARACT, WITH IOL INSERTION;  Surgeon: Flaca Diallo MD;  Location: [facility] OR;  Service: Ophthalmology;  Laterality: Left;    COLONOSCOPY N/A 10/8/2020    Procedure: COLONOSCOPY;  Surgeon: Crispin Moon MD;  Location: [facility] ENDO (4TH FLR);  Service: Endoscopy;  Laterality: N/A;  Family history of colon polyps  COVID test on 10/5/20 at [address] [place] [address] -     TONSILLECTOMY      TRANSFORAMINAL EPIDURAL INJECTION OF STEROID Right 6/29/2022    Procedure: INJECTION, STEROID, EPIDURAL, TRANSFORAMINAL APPROACH, RIGHT L3-L4 AND L4-L5 CONTRAST DIRECT REF;  Surgeon: Tej Cm MD;  Location: [facility] PAIN MGT;  Service: Pain Management;  Laterality: Right;    TRANSFORAMINAL EPIDURAL INJECTION OF STEROID Right 3/15/2023    Procedure: INJECTION, STEROID, EPIDURAL, TRANSFORAMINAL APPROACH RIGHT L3/4 AND L4/5;  Surgeon: Tej Cm MD;  Location: [facility] PAIN MGT;  Service: Pain Management;  Laterality: Right;     Review of patient's allergies indicates:  No Known Allergies   Current Facility-Administered Medications   Medication    0.9%  NaCl infusion       PMHx, PSHx, Allergies, Medications reviewed in epic    ROS negative except pain complaints in HPI    OBJECTIVE:    LMP 12/16/2006 (Approximate)     PHYSICAL EXAMINATION:    GENERAL: Well appearing, in no acute distress, alert and oriented x3.  PSYCH:  Mood and affect appropriate.  SKIN: Skin color, texture, turgor normal, no rashes or lesions which  will impact the procedure.  CV: RRR with palpation of the radial artery.  PULM: No evidence of respiratory difficulty, symmetric chest rise. Clear to auscultation.  NEURO: Cranial nerves grossly intact.    Plan:    Proceed with procedure as planned Procedure(s) (LRB):  INJECTION RIGHT KNEE SYNVISC (Right)    Rad Jernigan  05/17/2023

## 2023-05-17 NOTE — TELEPHONE ENCOUNTER
I called the patient today regarding her surgery. The patient wants to have surgery on 10/23 and will see Dr. Bennett on 8/10. The patient verbalized understanding and has no further questions.

## 2023-05-17 NOTE — TELEPHONE ENCOUNTER
----- Message from Lavelle Fernandez sent at 5/17/2023  9:39 AM CDT -----  Consult/Advisory    Name Of Caller:Carmen      Contact Preference:[phone] and the ext [phone]    Nature of call: Pt called regarding her appt for today she said she has to be at [facility] for 1:30 and was asking if she can be seen earlier then her appt time

## 2023-05-17 NOTE — DISCHARGE INSTRUCTIONS

## 2023-05-17 NOTE — H&P (VIEW-ONLY)
HPI    1 week po phaco/IOL OS  Vision is good and no pain    PGB TID OS    DATE OF SURGERY: 05/08/2023     IMPLANT: [id number]  16.0 AT 17     Last edited by Deangelo Turner on 5/17/2023 11:15 AM.            Assessment /Plan     For exam results, see Encounter Report.    Post-operative state    Nuclear sclerotic cataract, bilateral      Slit lamp exam:  L/L: nl  K: clear, wound sealed  AC: 1+ cell  Lens: IOL centered and stable    POD1 s/p Phaco/IOL  Appropriate precautions and post op medications reviewed.  Patient instructed to call or come in if symptoms of redness, decreased vision, or pain are experienced.     Right eye  IOL: [id number] 16.5       Left eye  IOL: [id number]  16.0 , implanted with good result    The patient expresses a desire to reduce spectacle dependence. I reviewed various IOL and LASER refractive surgical options and we will attempt to minimize spectacle dependence by managing astigmatism and optimizing IOL selection. Femtosecond LASER assisted cataract surgery (FLACS) technology was explained to the patient with educational videos and discussion.  The patient voices understanding and wishes to implement this technology during the cataract procedure.  I explained the increased precision of the LASER versus manual techniques, especially as it relates to astigmatism reduction with arcuate incisions.  I emphasized that although our goal is to reduce the need for refractive correction after surgery, there may still be a need for spectacle correction to achieve optimal visual acuity, and that a reasonable range of functional vision should be the expectation.  No guarantees are made about post operative refraction or visual acuity, as the eye may heal in unpredictable ways, and the standard risks, benefits, and alternatives to cataract surgery were explained.  The patient understands that the refractive portions of this cataract procedure are not covered by insurance, and that there is an out of  pocket expense of $1500 per eye. I also explained that even though our pre-operative plan is to utilize advanced refractive technologies during surgery, that I may decide to eliminate part or all of this plan if surgical challenges or complications arise, or I feel that it is not in the patient's best interest. Consent forms and an ABN form were given to the patient to review.    [facility] only

## 2023-05-17 NOTE — OP NOTE
Knee Synvisc One Injection under Fluoroscopic Guidance    The procedure, risks, benefits, and options were discussed with the patient. There are no contraindications to the procedure. The patent expressed understanding and agreed to the procedure. Informed written consent was obtained prior to the start of the procedure and can be found in the patient's chart.    PATIENT NAME: Carmen Hawley   MRN: [id number]     DATE OF PROCEDURE: 05/17/2023    PROCEDURE: Right Knee Synvisc One Injection under Fluoroscopic Guidance    PRE-OP DIAGNOSIS: Primary osteoarthritis of right knee [M17.11]    POST-OP DIAGNOSIS: Same    PHYSICIAN: Tej Cm MD    ASSISTANTS: Rad Jernigan MD Resident, Isra Marc MD resident     MEDICATIONS INJECTED: Bupivacaine 0.25% and Synvisc One     LOCAL ANESTHETIC INJECTED: Xylocaine 2%     SEDATION: None    ESTIMATED BLOOD LOSS: None    COMPLICATIONS: None    TECHNIQUE: Time-out was performed to identify the patient and procedure to be performed. With the patient laying in a supine position, the surgical area was prepped and draped in the usual sterile fashion using ChloraPrep and a fenestrated drape. The area overlying the knee joint was determined under fluoroscopy guidance. Skin anesthesia was achieved by injecting Lidocaine 2% over the injection sites. A 20 gauge, 3.5 inch spinal quinke needle was then advanced under fluoroscopy in the AP views into the knee joint. Once the needle tip was in the area of the joint, and there was no blood aspiration, Contrast dye  Omnipaque (300mg/mL) was injected to confirm placement and there was no vascular runoff.  Injection of Synvisc One 6 mls into joint space.  The needles were removed and bleeding was nil. A sterile dressing was applied. No specimens collected. The patient tolerated the procedure well.    The patient was monitored after the procedure in the recovery area. They were given post-procedure and discharge instructions to follow at  home. The patient was discharged in a stable condition.    Rad Jernigan MD  [facility], PGY-4    I reviewed and edited the fellow's note. I conducted my own interview and physical examination. I agree with the findings. I was present and supervising all critical portions of the procedure.    Tej Cm MD

## 2023-05-18 ENCOUNTER — PATIENT MESSAGE (OUTPATIENT)
Dept: PREADMISSION TESTING | Facility: HOSPITAL | Age: 73
End: 2023-05-18
Payer: COMMERCIAL

## 2023-05-18 ENCOUNTER — TELEPHONE (OUTPATIENT)
Dept: OPHTHALMOLOGY | Facility: CLINIC | Age: 73
End: 2023-05-18
Payer: COMMERCIAL

## 2023-05-18 NOTE — TELEPHONE ENCOUNTER
Patient given arrival time of 11:00 am on Monday May 22 . Nothing to eat or drink after 9 pm.  Water, Gatorade after 9 pm until leaves home.  Start drops into the operative eye today. [address] [facility]

## 2023-05-19 ENCOUNTER — ANESTHESIA EVENT (OUTPATIENT)
Dept: SURGERY | Facility: HOSPITAL | Age: 73
End: 2023-05-19
Payer: COMMERCIAL

## 2023-05-19 NOTE — ANESTHESIA PREPROCEDURE EVALUATION
05/19/2023  Carmen Hawley is a 72 y.o., female.      Pre-op Assessment    I have reviewed the Patient Summary Reports.    I have reviewed the NPO Status.   I have reviewed the Medications.     Review of Systems  Anesthesia Hx:  No problems with previous Anesthesia  Denies Family Hx of Anesthesia complications.   Denies Personal Hx of Anesthesia complications.   Social:  Alcohol Use    Hematology/Oncology:  Hematology Normal   Oncology Normal     EENT/Dental:  EENT/Dental Normal Eyes:    Cardiovascular:   Hypertension Valvular problems/Murmurs, MVP Denies MI.    Denies Angina. hyperlipidemia NYHA Classification II    Pulmonary:  Pulmonary Normal    Renal/:  Renal/ Normal     Hepatic/GI:  Hepatic/GI Normal    Musculoskeletal:   Arthritis   Spine Disorders: Chronic Pain and Degenerative disease    Neurological:   Neuromuscular Disease,    Endocrine:  Endocrine Normal    Psych:   anxiety          Physical Exam  General: Well nourished, Cooperative, Alert and Oriented    Airway:  Mallampati: II   Mouth Opening: Normal  TM Distance: Normal  Tongue: Normal  Neck ROM: Normal ROM        Anesthesia Plan  Type of Anesthesia, risks & benefits discussed:    Anesthesia Type: MAC  Intra-op Monitoring Plan: Standard ASA Monitors  Post Op Pain Control Plan: multimodal analgesia and IV/PO Opioids PRN  Induction:  IV  Informed Consent: Informed consent signed with the Patient and all parties understand the risks and agree with anesthesia plan.  All questions answered.   ASA Score: 2  Day of Surgery Review of History & Physical: H&P Update referred to the surgeon/provider.I have interviewed and examined the patient. I have reviewed the patient's H&P dated: There are no significant changes. H&P completed by Anesthesiologist.    Ready For Surgery From Anesthesia Perspective.     .

## 2023-05-22 ENCOUNTER — ANESTHESIA (OUTPATIENT)
Dept: SURGERY | Facility: HOSPITAL | Age: 73
End: 2023-05-22
Payer: COMMERCIAL

## 2023-05-22 ENCOUNTER — HOSPITAL ENCOUNTER (OUTPATIENT)
Facility: HOSPITAL | Age: 73
Discharge: HOME | End: 2023-05-22
Attending: OPHTHALMOLOGY | Admitting: OPHTHALMOLOGY
Payer: COMMERCIAL

## 2023-05-22 VITALS
BODY MASS INDEX: 28 KG/M2 | HEART RATE: 61 BPM | HEIGHT: 63 IN | TEMPERATURE: 98 F | RESPIRATION RATE: 20 BRPM | DIASTOLIC BLOOD PRESSURE: 65 MMHG | SYSTOLIC BLOOD PRESSURE: 148 MMHG | OXYGEN SATURATION: 100 % | WEIGHT: 158 LBS

## 2023-05-22 DIAGNOSIS — H25.13 NUCLEAR SCLEROTIC CATARACT, BILATERAL: ICD-10-CM

## 2023-05-22 DIAGNOSIS — Z98.890 POST-OPERATIVE STATE: ICD-10-CM

## 2023-05-22 DIAGNOSIS — H25.11 NUCLEAR SCLEROTIC CATARACT OF RIGHT EYE: Primary | ICD-10-CM

## 2023-05-22 LAB — GLUCOSE SERPL-MCNC: 98 MG/DL (ref 70–110)

## 2023-05-22 PROCEDURE — 66999 UNLISTED PX ANT SEGMENT EYE: CPT | Mod: CSM,RT,, | Performed by: OPHTHALMOLOGY

## 2023-05-22 PROCEDURE — D9220A PRA ANESTHESIA: ICD-10-PCS | Mod: ,,, | Performed by: NURSE ANESTHETIST, CERTIFIED REGISTERED

## 2023-05-22 PROCEDURE — 66999 PR FEMTO TORIC: ICD-10-PCS | Mod: CSM,RT,, | Performed by: OPHTHALMOLOGY

## 2023-05-22 PROCEDURE — 37000009 HC ANESTHESIA EA ADD 15 MINS: Performed by: OPHTHALMOLOGY

## 2023-05-22 PROCEDURE — 36000707: Performed by: OPHTHALMOLOGY

## 2023-05-22 PROCEDURE — 66984 XCAPSL CTRC RMVL W/O ECP: CPT | Mod: 79,RT,, | Performed by: OPHTHALMOLOGY

## 2023-05-22 PROCEDURE — 63600175 PHARM REV CODE 636 W HCPCS: Performed by: NURSE ANESTHETIST, CERTIFIED REGISTERED

## 2023-05-22 PROCEDURE — V2787 ASTIGMATISM-CORRECT FUNCTION: HCPCS | Performed by: OPHTHALMOLOGY

## 2023-05-22 PROCEDURE — D9220A PRA ANESTHESIA: Mod: ,,, | Performed by: NURSE ANESTHETIST, CERTIFIED REGISTERED

## 2023-05-22 PROCEDURE — 37000008 HC ANESTHESIA 1ST 15 MINUTES: Performed by: OPHTHALMOLOGY

## 2023-05-22 PROCEDURE — 71000015 HC POSTOP RECOV 1ST HR: Performed by: OPHTHALMOLOGY

## 2023-05-22 PROCEDURE — 66984 PR REMOVAL, CATARACT, W/INSRT INTRAOC LENS, W/O ENDO CYCLO: ICD-10-PCS | Mod: 79,RT,, | Performed by: OPHTHALMOLOGY

## 2023-05-22 PROCEDURE — 94761 N-INVAS EAR/PLS OXIMETRY MLT: CPT

## 2023-05-22 PROCEDURE — 82962 GLUCOSE BLOOD TEST: CPT | Performed by: OPHTHALMOLOGY

## 2023-05-22 PROCEDURE — 25000003 PHARM REV CODE 250: Performed by: OPHTHALMOLOGY

## 2023-05-22 PROCEDURE — 36000706: Performed by: OPHTHALMOLOGY

## 2023-05-22 PROCEDURE — 99900035 HC TECH TIME PER 15 MIN (STAT)

## 2023-05-22 DEVICE — IMPLANTABLE DEVICE: Type: IMPLANTABLE DEVICE | Site: EYE | Status: FUNCTIONAL

## 2023-05-22 RX ORDER — PROPARACAINE HYDROCHLORIDE 5 MG/ML
1 SOLUTION/ DROPS OPHTHALMIC
Status: DISCONTINUED | OUTPATIENT
Start: 2023-05-22 | End: 2023-05-22 | Stop reason: HOSPADM

## 2023-05-22 RX ORDER — LIDOCAINE HYDROCHLORIDE 40 MG/ML
INJECTION, SOLUTION RETROBULBAR
Status: DISCONTINUED | OUTPATIENT
Start: 2023-05-22 | End: 2023-05-22 | Stop reason: HOSPADM

## 2023-05-22 RX ORDER — ACETAMINOPHEN 325 MG/1
650 TABLET ORAL EVERY 4 HOURS PRN
Status: DISCONTINUED | OUTPATIENT
Start: 2023-05-22 | End: 2023-05-22 | Stop reason: HOSPADM

## 2023-05-22 RX ORDER — TETRACAINE HYDROCHLORIDE 5 MG/ML
SOLUTION OPHTHALMIC
Status: DISCONTINUED | OUTPATIENT
Start: 2023-05-22 | End: 2023-05-22 | Stop reason: HOSPADM

## 2023-05-22 RX ORDER — CYCLOP/TROP/PROPA/PHEN/KET/WAT 1-1-0.1%
1 DROPS (EA) OPHTHALMIC (EYE)
Status: COMPLETED | OUTPATIENT
Start: 2023-05-22 | End: 2023-05-22

## 2023-05-22 RX ORDER — MIDAZOLAM HYDROCHLORIDE 1 MG/ML
INJECTION, SOLUTION INTRAMUSCULAR; INTRAVENOUS
Status: DISCONTINUED | OUTPATIENT
Start: 2023-05-22 | End: 2023-05-22

## 2023-05-22 RX ORDER — PHENYLEPHRINE HYDROCHLORIDE 100 MG/ML
1 SOLUTION/ DROPS OPHTHALMIC
Status: DISCONTINUED | OUTPATIENT
Start: 2023-05-22 | End: 2023-05-22 | Stop reason: HOSPADM

## 2023-05-22 RX ORDER — MOXIFLOXACIN 5 MG/ML
1 SOLUTION/ DROPS OPHTHALMIC
Status: COMPLETED | OUTPATIENT
Start: 2023-05-22 | End: 2023-05-22

## 2023-05-22 RX ORDER — MOXIFLOXACIN 5 MG/ML
SOLUTION/ DROPS OPHTHALMIC
Status: DISCONTINUED | OUTPATIENT
Start: 2023-05-22 | End: 2023-05-22 | Stop reason: HOSPADM

## 2023-05-22 RX ORDER — MOXIFLOXACIN 5 MG/ML
1 SOLUTION/ DROPS OPHTHALMIC
Status: DISCONTINUED | OUTPATIENT
Start: 2023-05-22 | End: 2023-05-22 | Stop reason: HOSPADM

## 2023-05-22 RX ADMIN — MIDAZOLAM 1 MG: 1 INJECTION INTRAMUSCULAR; INTRAVENOUS at 01:05

## 2023-05-22 RX ADMIN — MOXIFLOXACIN OPHTHALMIC 1 DROP: 5 SOLUTION/ DROPS OPHTHALMIC at 12:05

## 2023-05-22 RX ADMIN — MIDAZOLAM 1 MG: 1 INJECTION INTRAMUSCULAR; INTRAVENOUS at 12:05

## 2023-05-22 RX ADMIN — Medication 1 DROP: at 12:05

## 2023-05-22 RX ADMIN — MOXIFLOXACIN 1 DROP: 5 SOLUTION/ DROPS OPHTHALMIC at 01:05

## 2023-05-22 NOTE — TRANSFER OF CARE
"Anesthesia Transfer of Care Note    Patient: Carmen Hawley    Procedure(s) Performed: Procedure(s) (LRB):  EXTRACTION, CATARACT, WITH IOL INSERTION (Right)    Patient location: PACU    Anesthesia Type: MAC    Transport from OR: Transported from OR on room air with adequate spontaneous ventilation    Post pain: adequate analgesia    Post assessment: no apparent anesthetic complications    Post vital signs: stable    Level of consciousness: awake    Nausea/Vomiting: no nausea/vomiting    Complications: none          Last vitals:   Visit Vitals  BP (!) 148/67 (BP Location: Right arm, Patient Position: Lying)   Pulse (!) 57   Temp 36.6 °C (97.9 °F) (Oral)   Resp 18   Ht 5' 3" (1.6 m)   Wt 71.7 kg (158 lb)   LMP 12/16/2006 (Approximate)   SpO2 97%   Breastfeeding No   BMI 27.99 kg/m²     "

## 2023-05-22 NOTE — ANESTHESIA POSTPROCEDURE EVALUATION
Anesthesia Post Evaluation    Patient: Carmen Hawley    Procedure(s) Performed: Procedure(s) (LRB):  EXTRACTION, CATARACT, WITH IOL INSERTION (Right)    Final Anesthesia Type: MAC      Patient location during evaluation: PACU  Patient participation: Yes- Able to Participate  Level of consciousness: awake and alert  Post-procedure vital signs: reviewed and stable  Pain management: adequate  Airway patency: patent    PONV status at discharge: No PONV  Anesthetic complications: no      Cardiovascular status: blood pressure returned to baseline  Respiratory status: unassisted, spontaneous ventilation and room air  Hydration status: euvolemic  Follow-up not needed.          Vitals Value Taken Time   /65 05/22/23 1341   Temp 36.6 °C (97.9 °F) 05/22/23 1322   Pulse 61 05/22/23 1340   Resp 20 05/22/23 1340   SpO2 100 % 05/22/23 1340         No case tracking events are documented in the log.      Pain/Juani Score: Juani Score: 10 (5/22/2023  1:30 PM)

## 2023-05-22 NOTE — OP NOTE
SURGEON:  Flaca Diallo M.D.    PREOPERATIVE DIAGNOSIS:    Nuclear Sclerotic Cataract Right Eye    POSTOPERATIVE DIAGNOSIS:    Nuclear Sclerotic Cataract Right Eye    PROCEDURES:    Phacoemulsification with  intraocular lens, Right eye (56195)  With ORA  LASER assist    DATE OF SURGERY: 05/22/2023    IMPLANT: [id number]  16.5     ANESTHESIA:  MAC with topical Lidocaine    COMPLICATIONS:  None    ESTIMATED BLOOD LOSS: None    SPECIMENS: None    INDICATIONS:    The patient has a history of painless progressive visual loss and difficulty with activities of daily living, which specifically include difficult driving at night due to glare and difficulty reading small print, secondary to cataract formation.  After a thorough discussion of the risks, benefits, and alternatives to cataract surgery, including, but not limited to, the rare risks of infection, retinal detachment, hemorrhage, need for additional surgery, loss of vision, and even loss of the eye, the patient voices understanding and desires to proceed.    DESCRIPTION OF PROCEDURE:      The patients IOL calculations were reviewed, and the lens selection confirmed.   After verification and marking of the proper eye in the preop holding area, the patient was brought to the operating room in supine position where the eye was prepped and draped in standard sterile fashion with 5% Betadine and a lid speculum placed in the eye.   Topical 4% Lidocaine was used in addition to the preoperative anesthesia and the procedure was begun by the creation of a paracentesis incision through which viscoelastic was used to fill the anterior chamber.  Next, a keratome blade was used to create a triplanar temporal clear corneal incision and a cystotome and Utrata forceps used to fashion a continuous curvilinear capsulorrhexis.  Hydrodissection was carried out using the Singh hydrodissection cannula and the nucleus was found to be mobile.  Phacoemulsification of the nucleus was carried  out using a quick chop technique, and all remaining epinuclear and cortical material was removed.  The eye was then reformed with Viscoelastic and ORA was used to verify the proper IOL power. The intraocular lens was then implanted into the capsular bag.  All remaining viscoelastics were removed from the eye and at the end of the case the pupil was round, the lens was well-centered within the capsular bag and all wounds were found to be water tight.  Drops of Vigamox and Pred Forte were instilled and a shield was placed over the eye. The patient will follow up with Dr. Diallo in the morning.

## 2023-05-22 NOTE — DISCHARGE SUMMARY
Outcome: Successful outpatient ophthalmic surgical procedure  Preprinted Instructions given to patient.  Regular diet.  Activity: No restrictions  Meds: see Med Rec  Condition: stable  Follow up: 1 day with Dr Diallo  Disposition: Home  Diagnosis: s/p eye surgery  Date of discharge: 05/22/2023

## 2023-05-22 NOTE — DISCHARGE INSTRUCTIONS
CATARACT SURGERY    POST-OPERATIVE INSTRUCTIONS    · Apply drops THREE times a day into operative eye for 30 days.    · DO NOT rub your eye    · Wear protective sunglasses during the day    · Resume moderate activity    · Bathe/shower/wash face normally    · DO NOT apply makeup around the operative eye for 1 week.         You should expect    - Blurry vision and halos for 24-48 hours    - Dilated pupil for 24-48 hours    - Scratchy feeling in the eye for 1-2 days    - Curved shadow in your peripheral vision for 2-3 weeks    - Occasional flickering of lights for up to 1 week    -If you experience severe pain or nausea, please call Dr Diallo or the on-call doctor at [phone]    - Plan to see Dr Diallo tomorrow .      [facility]    [address] [address]    ** Most patients can drive the next day, but if you do not feel comfortable driving, please arrange for transportation.

## 2023-05-23 ENCOUNTER — OFFICE VISIT (OUTPATIENT)
Dept: OPHTHALMOLOGY | Facility: CLINIC | Age: 73
End: 2023-05-23
Payer: COMMERCIAL

## 2023-05-23 DIAGNOSIS — H25.11 NUCLEAR SCLEROSIS OF RIGHT EYE: ICD-10-CM

## 2023-05-23 DIAGNOSIS — Z98.890 POST-OPERATIVE STATE: Primary | ICD-10-CM

## 2023-05-23 PROCEDURE — 99024 POSTOP FOLLOW-UP VISIT: CPT | Mod: S$GLB,,, | Performed by: OPHTHALMOLOGY

## 2023-05-23 PROCEDURE — 1160F PR REVIEW ALL MEDS BY PRESCRIBER/CLIN PHARMACIST DOCUMENTED: ICD-10-PCS | Mod: CPTII,S$GLB,, | Performed by: OPHTHALMOLOGY

## 2023-05-23 PROCEDURE — 1126F AMNT PAIN NOTED NONE PRSNT: CPT | Mod: CPTII,S$GLB,, | Performed by: OPHTHALMOLOGY

## 2023-05-23 PROCEDURE — 99999 PR PBB SHADOW E&M-EST. PATIENT-LVL III: ICD-10-PCS | Mod: PBBFAC,,, | Performed by: OPHTHALMOLOGY

## 2023-05-23 PROCEDURE — 1160F RVW MEDS BY RX/DR IN RCRD: CPT | Mod: CPTII,S$GLB,, | Performed by: OPHTHALMOLOGY

## 2023-05-23 PROCEDURE — 1126F PR PAIN SEVERITY QUANTIFIED, NO PAIN PRESENT: ICD-10-PCS | Mod: CPTII,S$GLB,, | Performed by: OPHTHALMOLOGY

## 2023-05-23 PROCEDURE — 99024 PR POST-OP FOLLOW-UP VISIT: ICD-10-PCS | Mod: S$GLB,,, | Performed by: OPHTHALMOLOGY

## 2023-05-23 PROCEDURE — 1159F MED LIST DOCD IN RCRD: CPT | Mod: CPTII,S$GLB,, | Performed by: OPHTHALMOLOGY

## 2023-05-23 PROCEDURE — 3288F PR FALLS RISK ASSESSMENT DOCUMENTED: ICD-10-PCS | Mod: CPTII,S$GLB,, | Performed by: OPHTHALMOLOGY

## 2023-05-23 PROCEDURE — 1101F PR PT FALLS ASSESS DOC 0-1 FALLS W/OUT INJ PAST YR: ICD-10-PCS | Mod: CPTII,S$GLB,, | Performed by: OPHTHALMOLOGY

## 2023-05-23 PROCEDURE — 3288F FALL RISK ASSESSMENT DOCD: CPT | Mod: CPTII,S$GLB,, | Performed by: OPHTHALMOLOGY

## 2023-05-23 PROCEDURE — 1159F PR MEDICATION LIST DOCUMENTED IN MEDICAL RECORD: ICD-10-PCS | Mod: CPTII,S$GLB,, | Performed by: OPHTHALMOLOGY

## 2023-05-23 PROCEDURE — 1101F PT FALLS ASSESS-DOCD LE1/YR: CPT | Mod: CPTII,S$GLB,, | Performed by: OPHTHALMOLOGY

## 2023-05-23 PROCEDURE — 99999 PR PBB SHADOW E&M-EST. PATIENT-LVL III: CPT | Mod: PBBFAC,,, | Performed by: OPHTHALMOLOGY

## 2023-05-23 NOTE — PROGRESS NOTES
HPI    Patient here for 1 day phaco OD  Vision is good and no pain    PGB TID OU    05/22/2023 IMPLANT: [id number]  16.5  OD  05/08/2023 IMPLANT: [id number]  16.0 AT 17 OS  Last edited by Rachelle Dale on 5/23/2023  8:12 AM.            Assessment /Plan     For exam results, see Encounter Report.    Post-operative state    Nuclear sclerosis of right eye      Slit lamp exam:  L/L: nl  K: clear, wound sealed  AC: 1+ cell  Lens: IOL centered and stable    POD1 s/p Phaco/IOL  Appropriate precautions and post op medications reviewed.  Patient instructed to call or come in if symptoms of redness, decreased vision, or pain are experienced.

## 2023-06-01 DIAGNOSIS — M17.11 PRIMARY OSTEOARTHRITIS OF RIGHT KNEE: Primary | ICD-10-CM

## 2023-06-15 ENCOUNTER — OFFICE VISIT (OUTPATIENT)
Dept: ORTHOPEDICS | Facility: CLINIC | Age: 73
End: 2023-06-15
Payer: COMMERCIAL

## 2023-06-15 VITALS — HEIGHT: 63 IN | BODY MASS INDEX: 28 KG/M2 | WEIGHT: 158.06 LBS

## 2023-06-15 DIAGNOSIS — M17.11 PRIMARY OSTEOARTHRITIS OF RIGHT KNEE: Primary | ICD-10-CM

## 2023-06-15 PROCEDURE — 1160F RVW MEDS BY RX/DR IN RCRD: CPT | Mod: CPTII,S$GLB,, | Performed by: NURSE PRACTITIONER

## 2023-06-15 PROCEDURE — 3008F PR BODY MASS INDEX (BMI) DOCUMENTED: ICD-10-PCS | Mod: CPTII,S$GLB,, | Performed by: NURSE PRACTITIONER

## 2023-06-15 PROCEDURE — 99213 PR OFFICE/OUTPT VISIT, EST, LEVL III, 20-29 MIN: ICD-10-PCS | Mod: 25,S$GLB,, | Performed by: NURSE PRACTITIONER

## 2023-06-15 PROCEDURE — 3288F FALL RISK ASSESSMENT DOCD: CPT | Mod: CPTII,S$GLB,, | Performed by: NURSE PRACTITIONER

## 2023-06-15 PROCEDURE — 20610 DRAIN/INJ JOINT/BURSA W/O US: CPT | Mod: RT,S$GLB,, | Performed by: NURSE PRACTITIONER

## 2023-06-15 PROCEDURE — 1125F PR PAIN SEVERITY QUANTIFIED, PAIN PRESENT: ICD-10-PCS | Mod: CPTII,S$GLB,, | Performed by: NURSE PRACTITIONER

## 2023-06-15 PROCEDURE — 99213 OFFICE O/P EST LOW 20 MIN: CPT | Mod: 25,S$GLB,, | Performed by: NURSE PRACTITIONER

## 2023-06-15 PROCEDURE — 1125F AMNT PAIN NOTED PAIN PRSNT: CPT | Mod: CPTII,S$GLB,, | Performed by: NURSE PRACTITIONER

## 2023-06-15 PROCEDURE — 99999 PR PBB SHADOW E&M-EST. PATIENT-LVL III: ICD-10-PCS | Mod: PBBFAC,,, | Performed by: NURSE PRACTITIONER

## 2023-06-15 PROCEDURE — 99999 PR PBB SHADOW E&M-EST. PATIENT-LVL III: CPT | Mod: PBBFAC,,, | Performed by: NURSE PRACTITIONER

## 2023-06-15 PROCEDURE — 1160F PR REVIEW ALL MEDS BY PRESCRIBER/CLIN PHARMACIST DOCUMENTED: ICD-10-PCS | Mod: CPTII,S$GLB,, | Performed by: NURSE PRACTITIONER

## 2023-06-15 PROCEDURE — 20610 PR DRAIN/INJECT LARGE JOINT/BURSA: ICD-10-PCS | Mod: RT,S$GLB,, | Performed by: NURSE PRACTITIONER

## 2023-06-15 PROCEDURE — 1159F MED LIST DOCD IN RCRD: CPT | Mod: CPTII,S$GLB,, | Performed by: NURSE PRACTITIONER

## 2023-06-15 PROCEDURE — 1101F PR PT FALLS ASSESS DOC 0-1 FALLS W/OUT INJ PAST YR: ICD-10-PCS | Mod: CPTII,S$GLB,, | Performed by: NURSE PRACTITIONER

## 2023-06-15 PROCEDURE — 3008F BODY MASS INDEX DOCD: CPT | Mod: CPTII,S$GLB,, | Performed by: NURSE PRACTITIONER

## 2023-06-15 PROCEDURE — 3288F PR FALLS RISK ASSESSMENT DOCUMENTED: ICD-10-PCS | Mod: CPTII,S$GLB,, | Performed by: NURSE PRACTITIONER

## 2023-06-15 PROCEDURE — 1101F PT FALLS ASSESS-DOCD LE1/YR: CPT | Mod: CPTII,S$GLB,, | Performed by: NURSE PRACTITIONER

## 2023-06-15 PROCEDURE — 1159F PR MEDICATION LIST DOCUMENTED IN MEDICAL RECORD: ICD-10-PCS | Mod: CPTII,S$GLB,, | Performed by: NURSE PRACTITIONER

## 2023-06-15 RX ORDER — TRIAMCINOLONE ACETONIDE 40 MG/ML
40 INJECTION, SUSPENSION INTRA-ARTICULAR; INTRAMUSCULAR
Status: COMPLETED | OUTPATIENT
Start: 2023-06-15 | End: 2023-06-15

## 2023-06-15 RX ADMIN — TRIAMCINOLONE ACETONIDE 40 MG: 40 INJECTION, SUSPENSION INTRA-ARTICULAR; INTRAMUSCULAR at 11:06

## 2023-06-16 NOTE — PROGRESS NOTES
CC: Pain of the Right Knee      HPI: Pt with c/o right knee pain for the past week or so. She is planning to have the right knee replaced in October- unfortunately she isn't able to do it sooner due to her coworker being out. She called earlier today to have a cortisone injection for pain relief. She has had good relief with cortisone injections in the past. She is ambulating without assistive device. There is not a limp.    ROS  General: denies fever and chills  Resp: no c/o sob  CVS: no c/o cp  MSK: c/o right knee pain    PE  General: AAOx3, pleasant and cooperative  Resp: respirations even and unlabored  MSK: right knee exam  5 degrees extension  120 degrees flexion  No warmth or erythema   - effusion  + crepitus  + tenderness over the lateral joint line  5/5 quad strength    Assessment:  Right knee djd    Plan:  Cortisone injection right knee today  RICE  Voltaren  F/u as needed    Knee Injection Procedure Note  Diagnosis: right knee degenerative arthritis  Indications: right knee pain  Procedure Details: Verbal consent was obtained for the procedure. The injection site was identified and the skin was prepared with alcohol. The right knee was injected from an anterolateral approach with 1 ml of Kenalog and 4 ml Lidocaine under sterile technique using a 22 gauge needle. The needle was removed and the area cleansed and dressed.  Complications:  Patient tolerated the procedure well.    she was advised to rest the knee today, using ice and elevation as needed for comfort and swelling.Immediate relief of the knee pain may be short lived and secondary to the lidocaine. she may have an increase in discomfort tonight followed by steady improvement over the next several days. It may take 1-2 weeks following the injection to get the full benefit of the medication.

## 2023-06-27 NOTE — TELEPHONE ENCOUNTER
No new care gaps identified.  Powered by [employer]. Reference number: [id number].   1/31/2022 9:32:36 AM CST   No

## 2023-07-13 ENCOUNTER — PATIENT MESSAGE (OUTPATIENT)
Dept: OPHTHALMOLOGY | Facility: CLINIC | Age: 73
End: 2023-07-13
Payer: COMMERCIAL

## 2023-07-24 ENCOUNTER — OFFICE VISIT (OUTPATIENT)
Dept: OPTOMETRY | Facility: CLINIC | Age: 73
End: 2023-07-24
Payer: COMMERCIAL

## 2023-07-24 DIAGNOSIS — Z98.890 POSTOPERATIVE EYE STATE: Primary | ICD-10-CM

## 2023-07-24 DIAGNOSIS — Z96.1 PSEUDOPHAKIA OF BOTH EYES: ICD-10-CM

## 2023-07-24 DIAGNOSIS — Z98.41 S/P BILATERAL CATARACT EXTRACTION: ICD-10-CM

## 2023-07-24 DIAGNOSIS — Z98.42 S/P BILATERAL CATARACT EXTRACTION: ICD-10-CM

## 2023-07-24 DIAGNOSIS — H52.7 REFRACTIVE ERRORS: ICD-10-CM

## 2023-07-24 PROCEDURE — 1126F PR PAIN SEVERITY QUANTIFIED, NO PAIN PRESENT: ICD-10-PCS | Mod: CPTII,S$GLB,, | Performed by: OPTOMETRIST

## 2023-07-24 PROCEDURE — 99999 PR PBB SHADOW E&M-EST. PATIENT-LVL III: ICD-10-PCS | Mod: PBBFAC,,, | Performed by: OPTOMETRIST

## 2023-07-24 PROCEDURE — 92015 DETERMINE REFRACTIVE STATE: CPT | Mod: S$GLB,,, | Performed by: OPTOMETRIST

## 2023-07-24 PROCEDURE — 1126F AMNT PAIN NOTED NONE PRSNT: CPT | Mod: CPTII,S$GLB,, | Performed by: OPTOMETRIST

## 2023-07-24 PROCEDURE — 99024 POSTOP FOLLOW-UP VISIT: CPT | Mod: S$GLB,,, | Performed by: OPTOMETRIST

## 2023-07-24 PROCEDURE — 99999 PR PBB SHADOW E&M-EST. PATIENT-LVL III: CPT | Mod: PBBFAC,,, | Performed by: OPTOMETRIST

## 2023-07-24 PROCEDURE — 3288F PR FALLS RISK ASSESSMENT DOCUMENTED: ICD-10-PCS | Mod: CPTII,S$GLB,, | Performed by: OPTOMETRIST

## 2023-07-24 PROCEDURE — 92015 PR REFRACTION: ICD-10-PCS | Mod: S$GLB,,, | Performed by: OPTOMETRIST

## 2023-07-24 PROCEDURE — 99024 PR POST-OP FOLLOW-UP VISIT: ICD-10-PCS | Mod: S$GLB,,, | Performed by: OPTOMETRIST

## 2023-07-24 PROCEDURE — 3288F FALL RISK ASSESSMENT DOCD: CPT | Mod: CPTII,S$GLB,, | Performed by: OPTOMETRIST

## 2023-07-24 PROCEDURE — 1159F PR MEDICATION LIST DOCUMENTED IN MEDICAL RECORD: ICD-10-PCS | Mod: CPTII,S$GLB,, | Performed by: OPTOMETRIST

## 2023-07-24 PROCEDURE — 1101F PT FALLS ASSESS-DOCD LE1/YR: CPT | Mod: CPTII,S$GLB,, | Performed by: OPTOMETRIST

## 2023-07-24 PROCEDURE — 1101F PR PT FALLS ASSESS DOC 0-1 FALLS W/OUT INJ PAST YR: ICD-10-PCS | Mod: CPTII,S$GLB,, | Performed by: OPTOMETRIST

## 2023-07-24 PROCEDURE — 1159F MED LIST DOCD IN RCRD: CPT | Mod: CPTII,S$GLB,, | Performed by: OPTOMETRIST

## 2023-07-25 NOTE — PROGRESS NOTES
"HPI     Post-op Evaluation            Comments: In for post-op evaluation   S/P bilateral cataract surgery by Dr. Diallo.    Has finished use of post-operative eyedrops in both eyes.  Feels that near VA is better with OTC glasses.  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 Diallo)          Comments    Patient in for progress check.    Being followed for (diagnosis):   Cataract OD Sx 05/22/2023 Dr. Diallo    Date last seen:  11/29/2012    Doctor last seen:  Dr. Trejo    Prescribed eye medications(s) using:  Out of her drops.    OTC eye medication(s) using:  Refresh and Blink    Signs/symptoms of condition resolved/better/stable/worse?:  Pt states that   her vision is better, but need up to date Reading glasses.         Reading distance = 16.75"  Reading distance = 65/61            Last edited by Pritesh Lal, OD on 7/24/2023  2:58 PM.            Assessment /Plan     For exam results, see Encounter Report.    1. Postoperative eye state        2. S/P bilateral cataract extraction        3. Pseudophakia of both eyes        4. Refractive errors                     History of bilateral Salzmann's nodular corneal dystrophy.  S/P bilateral superficial corneal dystrophy (Dr. Diallo), with residual corneal scarring in each eye.    S/P bilateral cataract extraction, with bilateral posterior chamber intraocular lens.  Residual mild distance refractive error in each eye, with best-corrected VA of 20/20 -1 in the right eye and 20/20-1 in the left eye.    Dilated fundus exam not done in view of excellent best-corrected VA in each eye.  Ms. Hawley states she is now due for follow-up visit with Dr. Caal in retinal clinic, so defer DFE to Dr. Caal.  Assistant to call to schedule appointment with Dr. Caal, as requested by Ms. Hawley.     Return in one year for reheck of refraction - or prior, if any bothersome decrease in visual acuity noted in the interim           "

## 2023-07-25 NOTE — PATIENT INSTRUCTIONS
History of bilateral Salzmann's nodular corneal dystrophy.  S/P bilateral superficial corneal dystrophy (Dr. Diallo), with residual corneal scarring in each eye.    S/P bilateral cataract extraction, with bilateral posterior chamber intraocular lens.  Residual mild distance refractive error in each eye, with best-corrected VA of 20/20 -1 in the right eye and 20/20-1 in the left eye.    Dilated fundus exam not done in view of excellent best-corrected VA in each eye.  Ms. Hawley states she is now due for follow-up visit with Dr. Caal in retinal clinic, so defer DFE to Dr. Caal.  Assistant to call to schedule appointment with Dr. Caal, as requested by MsDionicio SIERRANicolasYosvany.     Return in one year for reheck of refraction - or prior, if any bothersome decrease in visual acuity noted in the interim

## 2023-07-29 DIAGNOSIS — M54.16 LUMBAR RADICULOPATHY: ICD-10-CM

## 2023-08-01 DIAGNOSIS — M17.11 PRIMARY OSTEOARTHRITIS OF RIGHT KNEE: Primary | ICD-10-CM

## 2023-08-01 RX ORDER — GABAPENTIN 300 MG/1
300 CAPSULE ORAL 3 TIMES DAILY
Qty: 90 CAPSULE | Refills: 2 | Status: SHIPPED | OUTPATIENT
Start: 2023-08-01 | End: 2023-11-14 | Stop reason: SDUPTHER

## 2023-08-07 ENCOUNTER — OFFICE VISIT (OUTPATIENT)
Dept: DERMATOLOGY | Facility: CLINIC | Age: 73
End: 2023-08-07
Payer: COMMERCIAL

## 2023-08-07 DIAGNOSIS — L81.4 LENTIGO: Primary | ICD-10-CM

## 2023-08-07 DIAGNOSIS — L82.1 SK (SEBORRHEIC KERATOSIS): ICD-10-CM

## 2023-08-07 DIAGNOSIS — D22.9 NEVUS: ICD-10-CM

## 2023-08-07 DIAGNOSIS — D18.01 CHERRY ANGIOMA: ICD-10-CM

## 2023-08-07 PROCEDURE — 1159F MED LIST DOCD IN RCRD: CPT | Mod: CPTII,S$GLB,, | Performed by: DERMATOLOGY

## 2023-08-07 PROCEDURE — 1160F RVW MEDS BY RX/DR IN RCRD: CPT | Mod: CPTII,S$GLB,, | Performed by: DERMATOLOGY

## 2023-08-07 PROCEDURE — 99213 PR OFFICE/OUTPT VISIT, EST, LEVL III, 20-29 MIN: ICD-10-PCS | Mod: S$GLB,,, | Performed by: DERMATOLOGY

## 2023-08-07 PROCEDURE — 3288F PR FALLS RISK ASSESSMENT DOCUMENTED: ICD-10-PCS | Mod: CPTII,S$GLB,, | Performed by: DERMATOLOGY

## 2023-08-07 PROCEDURE — 1101F PT FALLS ASSESS-DOCD LE1/YR: CPT | Mod: CPTII,S$GLB,, | Performed by: DERMATOLOGY

## 2023-08-07 PROCEDURE — 99999 PR PBB SHADOW E&M-EST. PATIENT-LVL III: ICD-10-PCS | Mod: PBBFAC,,, | Performed by: DERMATOLOGY

## 2023-08-07 PROCEDURE — 1126F PR PAIN SEVERITY QUANTIFIED, NO PAIN PRESENT: ICD-10-PCS | Mod: CPTII,S$GLB,, | Performed by: DERMATOLOGY

## 2023-08-07 PROCEDURE — 1160F PR REVIEW ALL MEDS BY PRESCRIBER/CLIN PHARMACIST DOCUMENTED: ICD-10-PCS | Mod: CPTII,S$GLB,, | Performed by: DERMATOLOGY

## 2023-08-07 PROCEDURE — 1126F AMNT PAIN NOTED NONE PRSNT: CPT | Mod: CPTII,S$GLB,, | Performed by: DERMATOLOGY

## 2023-08-07 PROCEDURE — 3288F FALL RISK ASSESSMENT DOCD: CPT | Mod: CPTII,S$GLB,, | Performed by: DERMATOLOGY

## 2023-08-07 PROCEDURE — 99213 OFFICE O/P EST LOW 20 MIN: CPT | Mod: S$GLB,,, | Performed by: DERMATOLOGY

## 2023-08-07 PROCEDURE — 1159F PR MEDICATION LIST DOCUMENTED IN MEDICAL RECORD: ICD-10-PCS | Mod: CPTII,S$GLB,, | Performed by: DERMATOLOGY

## 2023-08-07 PROCEDURE — 1101F PR PT FALLS ASSESS DOC 0-1 FALLS W/OUT INJ PAST YR: ICD-10-PCS | Mod: CPTII,S$GLB,, | Performed by: DERMATOLOGY

## 2023-08-07 PROCEDURE — 99999 PR PBB SHADOW E&M-EST. PATIENT-LVL III: CPT | Mod: PBBFAC,,, | Performed by: DERMATOLOGY

## 2023-08-07 NOTE — PROGRESS NOTES
"  Subjective:      Patient ID:  Carmen Hawley is a 73 y.o. female who presents for   Chief Complaint   Patient presents with    Skin Check     tbse     History of Present Illness: The patient presents for follow up of skin check. Pt denies any new or changing lesions today    Patient is here today for a "mole" check.   Pt has a history of  normal sun exposure in the past.   Pt recalls several blistering sunburns in the past- no  Pt has history of tanning bed use- no  Pt has  had moles removed in the past- no  Pt has history of melanoma in first degree relatives-  No      Review of Systems   Skin:  Positive for daily sunscreen use and activity-related sunscreen use. Negative for tendency to form keloidal scars.   Hematologic/Lymphatic: Bruises/bleeds easily.       Objective:   Physical Exam   Constitutional: She appears well-developed and well-nourished. No distress.   Neurological: She is alert and oriented to person, place, and time. She is not disoriented.   Psychiatric: She has a normal mood and affect.   Skin:   Areas Examined (abnormalities noted in diagram):   Scalp / Hair Palpated and Inspected  Head / Face Inspection Performed  Neck Inspection Performed  Chest / Axilla Inspection Performed  Abdomen Inspection Performed  Genitals / Buttocks / Groin Inspection Performed  Back Inspection Performed  RUE Inspected  LUE Inspection Performed  RLE Inspected  LLE Inspection Performed  Nails and Digits Inspection Performed                         Diagram Legend     Erythematous scaling macule/papule c/w actinic keratosis       Vascular papule c/w angioma      Pigmented verrucoid papule/plaque c/w seborrheic keratosis      Yellow umbilicated papule c/w sebaceous hyperplasia      Irregularly shaped tan macule c/w lentigo     1-2 mm smooth white papules consistent with Milia      Movable subcutaneous cyst with punctum c/w epidermal inclusion cyst      Subcutaneous movable cyst c/w pilar cyst      Firm pink to brown " papule c/w dermatofibroma      Pedunculated fleshy papule(s) c/w skin tag(s)      Evenly pigmented macule c/w junctional nevus     Mildly variegated pigmented, slightly irregular-bordered macule c/w mildly atypical nevus      Flesh colored to evenly pigmented papule c/w intradermal nevus       Pink pearly papule/plaque c/w basal cell carcinoma      Erythematous hyperkeratotic cursted plaque c/w SCC      Surgical scar with no sign of skin cancer recurrence      Open and closed comedones      Inflammatory papules and pustules      Verrucoid papule consistent consistent with wart     Erythematous eczematous patches and plaques     Dystrophic onycholytic nail with subungual debris c/w onychomycosis     Umbilicated papule    Erythematous-base heme-crusted tan verrucoid plaque consistent with inflamed seborrheic keratosis     Erythematous Silvery Scaling Plaque c/w Psoriasis     See annotation      Assessment / Plan:        Lentigo  This is a benign hyperpigmented sun induced lesion. Recommend daily sun protection/avoidance and use of at least SPF 30, broad spectrum sunscreen (OTC drug) will reduce the number of new lesions. Treatment of these benign lesions are considered cosmetic.  The nature of sun-induced photo-aging and skin cancers is discussed.  Sun avoidance, protective clothing, and the use of 30-SPF sunscreens is advised. Observe closely for skin damage/changes, and call if such occurs.    Nevus  Discussed ABCDE's of nevi.  Monitor for new mole or moles that are becoming bigger, darker, irritated, or developing irregular borders. Brochure provided. Instructed patient to observe lesion(s) for changes and follow up in clinic if changes are noted. Patient to monitor skin at home for new or changing lesions.     Cherry angioma  These are benign vascular lesions that are inherited.  Treatment is not necessary.    SK (seborrheic keratosis)  These are benign inherited growths without a malignant potential. Reassurance  given to patient. No treatment is necessary.     Total body skin examination performed today including at least 12 points as noted in physical examination. No lesions suspicious for malignancy noted.    Recommend daily sun protection/avoidance, use of at least SPF 30, broad spectrum sunscreen (OTC drug), skin self examinations, and routine physician surveillance to optimize early detection             Follow up in about 1 year (around 8/7/2024) for TBSE.

## 2023-08-10 ENCOUNTER — HOSPITAL ENCOUNTER (OUTPATIENT)
Dept: RADIOLOGY | Facility: HOSPITAL | Age: 73
Discharge: HOME | End: 2023-08-10
Attending: ORTHOPAEDIC SURGERY
Payer: COMMERCIAL

## 2023-08-10 ENCOUNTER — OFFICE VISIT (OUTPATIENT)
Dept: ORTHOPEDICS | Facility: CLINIC | Age: 73
End: 2023-08-10
Payer: COMMERCIAL

## 2023-08-10 ENCOUNTER — TELEPHONE (OUTPATIENT)
Dept: OPHTHALMOLOGY | Facility: CLINIC | Age: 73
End: 2023-08-10
Payer: COMMERCIAL

## 2023-08-10 VITALS — WEIGHT: 159.69 LBS | HEIGHT: 63 IN | BODY MASS INDEX: 28.29 KG/M2

## 2023-08-10 DIAGNOSIS — M17.11 PRIMARY OSTEOARTHRITIS OF RIGHT KNEE: ICD-10-CM

## 2023-08-10 DIAGNOSIS — M17.11 PRIMARY OSTEOARTHRITIS OF RIGHT KNEE: Primary | ICD-10-CM

## 2023-08-10 PROCEDURE — 73564 X-RAY EXAM KNEE 4 OR MORE: CPT | Mod: 26,RT,, | Performed by: RADIOLOGY

## 2023-08-10 PROCEDURE — 1125F PR PAIN SEVERITY QUANTIFIED, PAIN PRESENT: ICD-10-PCS | Mod: CPTII,S$GLB,, | Performed by: ORTHOPAEDIC SURGERY

## 2023-08-10 PROCEDURE — 3288F PR FALLS RISK ASSESSMENT DOCUMENTED: ICD-10-PCS | Mod: CPTII,S$GLB,, | Performed by: ORTHOPAEDIC SURGERY

## 2023-08-10 PROCEDURE — 73564 XR KNEE ORTHO RIGHT WITH FLEXION: ICD-10-PCS | Mod: 26,RT,, | Performed by: RADIOLOGY

## 2023-08-10 PROCEDURE — 99214 PR OFFICE/OUTPT VISIT, EST, LEVL IV, 30-39 MIN: ICD-10-PCS | Mod: S$GLB,,, | Performed by: ORTHOPAEDIC SURGERY

## 2023-08-10 PROCEDURE — 1101F PR PT FALLS ASSESS DOC 0-1 FALLS W/OUT INJ PAST YR: ICD-10-PCS | Mod: CPTII,S$GLB,, | Performed by: ORTHOPAEDIC SURGERY

## 2023-08-10 PROCEDURE — 3008F BODY MASS INDEX DOCD: CPT | Mod: CPTII,S$GLB,, | Performed by: ORTHOPAEDIC SURGERY

## 2023-08-10 PROCEDURE — 73562 XR KNEE ORTHO RIGHT WITH FLEXION: ICD-10-PCS | Mod: 26,LT,, | Performed by: RADIOLOGY

## 2023-08-10 PROCEDURE — 1101F PT FALLS ASSESS-DOCD LE1/YR: CPT | Mod: CPTII,S$GLB,, | Performed by: ORTHOPAEDIC SURGERY

## 2023-08-10 PROCEDURE — 3288F FALL RISK ASSESSMENT DOCD: CPT | Mod: CPTII,S$GLB,, | Performed by: ORTHOPAEDIC SURGERY

## 2023-08-10 PROCEDURE — 99999 PR PBB SHADOW E&M-EST. PATIENT-LVL III: CPT | Mod: PBBFAC,,, | Performed by: ORTHOPAEDIC SURGERY

## 2023-08-10 PROCEDURE — 1159F MED LIST DOCD IN RCRD: CPT | Mod: CPTII,S$GLB,, | Performed by: ORTHOPAEDIC SURGERY

## 2023-08-10 PROCEDURE — 99999 PR PBB SHADOW E&M-EST. PATIENT-LVL III: ICD-10-PCS | Mod: PBBFAC,,, | Performed by: ORTHOPAEDIC SURGERY

## 2023-08-10 PROCEDURE — 99214 OFFICE O/P EST MOD 30 MIN: CPT | Mod: S$GLB,,, | Performed by: ORTHOPAEDIC SURGERY

## 2023-08-10 PROCEDURE — 73564 X-RAY EXAM KNEE 4 OR MORE: CPT | Mod: TC,RT

## 2023-08-10 PROCEDURE — 73562 X-RAY EXAM OF KNEE 3: CPT | Mod: 26,LT,, | Performed by: RADIOLOGY

## 2023-08-10 PROCEDURE — 3008F PR BODY MASS INDEX (BMI) DOCUMENTED: ICD-10-PCS | Mod: CPTII,S$GLB,, | Performed by: ORTHOPAEDIC SURGERY

## 2023-08-10 PROCEDURE — 1125F AMNT PAIN NOTED PAIN PRSNT: CPT | Mod: CPTII,S$GLB,, | Performed by: ORTHOPAEDIC SURGERY

## 2023-08-10 PROCEDURE — 1159F PR MEDICATION LIST DOCUMENTED IN MEDICAL RECORD: ICD-10-PCS | Mod: CPTII,S$GLB,, | Performed by: ORTHOPAEDIC SURGERY

## 2023-08-10 NOTE — PROGRESS NOTES
Subjective:     HPI:   Carmen Hawley is a 73 y.o. female who presents for repeat eval R knee arthritis, las seen 21  Non-op treatment with SB since then    Ready to proceed with TKA, plan for 10/23/23    R knee symptoms getting progressively worse and limping more    To review:   progressive right knee pain several years.  She says she has had longstanding intermittent non limiting symptoms but she slowed down and started working from home during COVID and her symptoms have increased.  She complains of moderate to severe activity-related global predominantly medial knee pain     Medications: Diclofenac oral told to minimize use by PCP takes rarely   Diclofenac gel daily  Now kervin TID  Tylenol arthritis     Injections: 3/25/21 Right Knee CSI with Santa Mendez, several weeks of relief   Has had multiple rounds of CSI and HA since then, last 6/15/23    Also some spine injections     Physical Therapy: did PT in , KT taping, no further PT indicated for bone on bone knee arthritis     Bracing: Yes, sleeve, comp sleeve, guardian brace, provides support and helps with the inflammation  Guardian assist brace ordererd     Assistive Devices: None      Walkin-5 blocks with pain     Limitations: General walking, difficulty standing for long periods of time, going up/down steps         Occupation: The patient works within the [employer]     Social support: The patient stated that she lives at home with her . The patient stated that her  would be able to help take care of her if she were to have surgery.     Objective:   Body mass index is 28.29 kg/m².  Exam:  Physical examination included assessment of the patient's general appearance with particular attention to development, nutrition, body habitus, attention to grooming, and any evidence of distress.  Constitutional: The patient is a well-developed, well-nourished patient in no acute distress.   Cardiovascular: Vascular  examination included warmth and capillary refill as well inspection for edema and assessment of pedal pulses. Pulses are palpable and regular.  Musculoskeletal: Gait was assessed as to whether it was steady, non-antalgic, and/or required the use of an assist device. The patient was also asked to walk independently and get onto the examination table.  Skin: The skin was examined for any obvious rashes or lesions in the extremity.  Neurologic: Sensation is intact to light touch in the extremity. The patient has good coordination without hyperreflexia and is alert and oriented to person, place and time and has normal mood and affect.      All of the above were examined and found to be within normal limits except for the following pertinent clinical findings:     No limp nonantalgic gait does have a slight varus thrust.   degree knee range of motion 3° varus alignment which does not correct she is tender palpation medial and patellofemoral joint lines she has mild-to-moderate effusion knee stable anterior-posterior varus and valgus stresses with flexion contracture but no extensor lag      Imaging:  KNEE R ARTHRITIS    Indication:  Right knee pain  Exam Ordered: Radiographs of the right knee include a standing anteroposterior view, a standing posterioanterior view, a lateral view in full flexion, and a sunrise view  Details of Examination: Exam shows evidence of joint space narrowing, osteophyte formation, and subchondral sclerosis, all consistent with degenerative arthritis of the knee.  No other significant findings are noted.  Impression:  Degenerative Arthritis, Right Knee     KL Grade 4 varus right knee arthritis severe bone on bone       Assessment:       ICD-10-CM ICD-9-CM   1. Primary osteoarthritis of right knee  M17.11 715.16      Mitral valve prolapse     [employer]     Plan:       This patient has significant symptoms in their knee that are affecting their quality of life and daily  activities.  They have tried non-operative treatment including analgesics, an exercise program, and activity modification, but the symptoms have persisted. I believe they make a good candidate for knee arthroplasty.     The risks and benefits of knee arthroplasty have been discussed with the patient which include, but are not limited to infections (including severe sequelae), potential component failure, fracture, DVT, pulmonary embolus, nerve palsy, poor range of motion, the possibility of bone grafting, persistent pain, wound healing complications, transfusions, medical complications and death.     We discussed surgical options including implant type and why I believe the implant selected is a good match for the patient's needs. The patient expressed understanding and agrees to proceed with the planned surgery.     Pre-operative planning will include the following:  A pre-surgical evaluation by will be arranged.  Pre-op orders will be placed.  We will make arrangements with the operating room for proper time and staffing.  We will make Social Service arrangements for the patient.    Implants:   Company: [employer]  System: Triathlon  CR/CS  universal tray  X3 poly       CT scan: [facility] protocol for operative planning   Additional discussion about pin sites: risk of wound healing issues and fracture      DVT prophylaxis: ASA 81mg BID x1 month    Dispo: outpatient PT    Admission status: Outpatient/23hr OBS    Location: [place]    R TKA 10/23/23  1 night  Rx cefadroxil          No orders of the defined types were placed in this encounter.            Past Medical History:   Diagnosis Date    Anxiety     Arthritis     Cataract     Hyperlipidemia     Hypertension     Macular degeneration     Mitral valve prolapse     Salzmann's nodular dystrophy of both eyes        Past Surgical History:   Procedure Laterality Date    CATARACT EXTRACTION W/  INTRAOCULAR LENS IMPLANT Left 5/8/2023    Procedure: EXTRACTION, CATARACT, WITH IOL  INSERTION;  Surgeon: Flaca Diallo MD;  Location: [facility] OR;  Service: Ophthalmology;  Laterality: Left;    CATARACT EXTRACTION W/  INTRAOCULAR LENS IMPLANT Right 5/22/2023    Procedure: EXTRACTION, CATARACT, WITH IOL INSERTION;  Surgeon: Flaca Diallo MD;  Location: [facility] OR;  Service: Ophthalmology;  Laterality: Right;    COLONOSCOPY N/A 10/8/2020    Procedure: COLONOSCOPY;  Surgeon: Crispin Moon MD;  Location: [facility] ENDO (4TH FLR);  Service: Endoscopy;  Laterality: N/A;  Family history of colon polyps  COVID test on 10/5/20 at 2nd floor - sm    INJECTION Right 5/17/2023    Procedure: INJECTION RIGHT KNEE SYNVISC;  Surgeon: Tej Cm MD;  Location: [facility] PAIN MGT;  Service: Pain Management;  Laterality: Right;    TONSILLECTOMY      TRANSFORAMINAL EPIDURAL INJECTION OF STEROID Right 6/29/2022    Procedure: INJECTION, STEROID, EPIDURAL, TRANSFORAMINAL APPROACH, RIGHT L3-L4 AND L4-L5 CONTRAST DIRECT REF;  Surgeon: Tej Cm MD;  Location: [facility] PAIN MGT;  Service: Pain Management;  Laterality: Right;    TRANSFORAMINAL EPIDURAL INJECTION OF STEROID Right 3/15/2023    Procedure: INJECTION, STEROID, EPIDURAL, TRANSFORAMINAL APPROACH RIGHT L3/4 AND L4/5;  Surgeon: Tej Cm MD;  Location: [facility]IN [facility]GT;  Service: Pain Management;  Laterality: Right;       Family History   Problem Relation Age of Onset    Cancer Mother         lung    Stroke Mother     Heart disease Father     Diabetes Father     Diabetes Brother     Aortic aneurysm Brother         surgery 5/2012    Kidney disease Brother         on dialysis    Melanoma Neg Hx     Breast cancer Neg Hx     Colon cancer Neg Hx     Ovarian cancer Neg Hx        Social History     Socioeconomic History    Marital status:    Occupational History     Employer: [employer]   Tobacco Use    Smoking status: Never    Smokeless tobacco: Never   Substance and Sexual Activity    Alcohol use: Yes     Comment: 7 drinks weekly     Drug  use: No    Sexual activity: Yes     Partners: Male     Birth control/protection: Post-menopausal     Comment:  since 2000   Social History Narrative    Works in [employer] at [employer]. Working part-time.        Likes to play golf, walks for exercise.      Social Determinants of Health     Financial Resource Strain: Low Risk  (10/9/2019)    Overall Financial Resource Strain (CARDIA)     Difficulty of Paying Living Expenses: Not very hard   Food Insecurity: No Food Insecurity (10/9/2019)    Hunger Vital Sign     Worried About Running Out of Food in the Last Year: Never true     Ran Out of Food in the Last Year: Never true   Transportation Needs: No Transportation Needs (10/9/2019)    PRAPARE - Transportation     Lack of Transportation (Medical): No     Lack of Transportation (Non-Medical): No   Physical Activity: Insufficiently Active (10/9/2019)    Exercise Vital Sign     Days of Exercise per Week: 3 days     Minutes of Exercise per Session: 30 min   Stress: Stress Concern Present (10/9/2019)    Citizen of [place] of Occupational Health - Occupational Stress Questionnaire     Feeling of Stress : To some extent    Social Connection and Isolation Panel [NHANES]

## 2023-08-10 NOTE — TELEPHONE ENCOUNTER
----- Message from JULIAN Mendez sent at 8/9/2023  5:07 PM CDT -----    ----- Message -----  From: Antione Kincaid MA  Sent: 8/9/2023   4:25 PM CDT  To: [facility] Staff    [facility],     for patient to follow up with retina. Patient call back number [phone] or ext [phone].     Thank you,  Antione

## 2023-08-17 ENCOUNTER — OFFICE VISIT (OUTPATIENT)
Dept: BARIATRICS | Facility: CLINIC | Age: 73
End: 2023-08-17
Payer: COMMERCIAL

## 2023-08-17 VITALS
OXYGEN SATURATION: 100 % | HEIGHT: 63 IN | SYSTOLIC BLOOD PRESSURE: 118 MMHG | DIASTOLIC BLOOD PRESSURE: 70 MMHG | WEIGHT: 160.94 LBS | BODY MASS INDEX: 28.52 KG/M2 | HEART RATE: 57 BPM

## 2023-08-17 DIAGNOSIS — E66.3 OVERWEIGHT (BMI 25.0-29.9): ICD-10-CM

## 2023-08-17 DIAGNOSIS — R73.01 IFG (IMPAIRED FASTING GLUCOSE): Primary | ICD-10-CM

## 2023-08-17 PROCEDURE — 1160F PR REVIEW ALL MEDS BY PRESCRIBER/CLIN PHARMACIST DOCUMENTED: ICD-10-PCS | Mod: CPTII,S$GLB,, | Performed by: INTERNAL MEDICINE

## 2023-08-17 PROCEDURE — 3008F BODY MASS INDEX DOCD: CPT | Mod: CPTII,S$GLB,, | Performed by: INTERNAL MEDICINE

## 2023-08-17 PROCEDURE — 99999 PR PBB SHADOW E&M-EST. PATIENT-LVL V: ICD-10-PCS | Mod: PBBFAC,,, | Performed by: INTERNAL MEDICINE

## 2023-08-17 PROCEDURE — 1101F PR PT FALLS ASSESS DOC 0-1 FALLS W/OUT INJ PAST YR: ICD-10-PCS | Mod: CPTII,S$GLB,, | Performed by: INTERNAL MEDICINE

## 2023-08-17 PROCEDURE — 1101F PT FALLS ASSESS-DOCD LE1/YR: CPT | Mod: CPTII,S$GLB,, | Performed by: INTERNAL MEDICINE

## 2023-08-17 PROCEDURE — 3288F PR FALLS RISK ASSESSMENT DOCUMENTED: ICD-10-PCS | Mod: CPTII,S$GLB,, | Performed by: INTERNAL MEDICINE

## 2023-08-17 PROCEDURE — 3078F DIAST BP <80 MM HG: CPT | Mod: CPTII,S$GLB,, | Performed by: INTERNAL MEDICINE

## 2023-08-17 PROCEDURE — 3074F SYST BP LT 130 MM HG: CPT | Mod: CPTII,S$GLB,, | Performed by: INTERNAL MEDICINE

## 2023-08-17 PROCEDURE — 1126F PR PAIN SEVERITY QUANTIFIED, NO PAIN PRESENT: ICD-10-PCS | Mod: CPTII,S$GLB,, | Performed by: INTERNAL MEDICINE

## 2023-08-17 PROCEDURE — 1159F MED LIST DOCD IN RCRD: CPT | Mod: CPTII,S$GLB,, | Performed by: INTERNAL MEDICINE

## 2023-08-17 PROCEDURE — 3008F PR BODY MASS INDEX (BMI) DOCUMENTED: ICD-10-PCS | Mod: CPTII,S$GLB,, | Performed by: INTERNAL MEDICINE

## 2023-08-17 PROCEDURE — 1159F PR MEDICATION LIST DOCUMENTED IN MEDICAL RECORD: ICD-10-PCS | Mod: CPTII,S$GLB,, | Performed by: INTERNAL MEDICINE

## 2023-08-17 PROCEDURE — 1126F AMNT PAIN NOTED NONE PRSNT: CPT | Mod: CPTII,S$GLB,, | Performed by: INTERNAL MEDICINE

## 2023-08-17 PROCEDURE — 99999 PR PBB SHADOW E&M-EST. PATIENT-LVL V: CPT | Mod: PBBFAC,,, | Performed by: INTERNAL MEDICINE

## 2023-08-17 PROCEDURE — 1160F RVW MEDS BY RX/DR IN RCRD: CPT | Mod: CPTII,S$GLB,, | Performed by: INTERNAL MEDICINE

## 2023-08-17 PROCEDURE — 3078F PR MOST RECENT DIASTOLIC BLOOD PRESSURE < 80 MM HG: ICD-10-PCS | Mod: CPTII,S$GLB,, | Performed by: INTERNAL MEDICINE

## 2023-08-17 PROCEDURE — 3074F PR MOST RECENT SYSTOLIC BLOOD PRESSURE < 130 MM HG: ICD-10-PCS | Mod: CPTII,S$GLB,, | Performed by: INTERNAL MEDICINE

## 2023-08-17 PROCEDURE — 99214 PR OFFICE/OUTPT VISIT, EST, LEVL IV, 30-39 MIN: ICD-10-PCS | Mod: S$GLB,,, | Performed by: INTERNAL MEDICINE

## 2023-08-17 PROCEDURE — 3288F FALL RISK ASSESSMENT DOCD: CPT | Mod: CPTII,S$GLB,, | Performed by: INTERNAL MEDICINE

## 2023-08-17 PROCEDURE — 99214 OFFICE O/P EST MOD 30 MIN: CPT | Mod: S$GLB,,, | Performed by: INTERNAL MEDICINE

## 2023-08-17 RX ORDER — METFORMIN HYDROCHLORIDE 500 MG/1
500 TABLET, EXTENDED RELEASE ORAL
Qty: 90 TABLET | Refills: 1 | Status: SHIPPED | OUTPATIENT
Start: 2023-08-17 | End: 2023-11-22

## 2023-08-17 NOTE — PATIENT INSTRUCTIONS
Continue metformin with dinner. Always take with food.  No Alcohol.     Contrave is long term weight loss medication. Side effects may include insomnia, nausea, headache, constipation, depression or change in thinking.  These side effects will improve with stopping the medication.        [email or web address]    Continue with  Contrave 2 pills twice a day.     Pre- and post-operative (not bariatric surgery) instructions for weight loss medications.     Contrave-  Presuming if on typical dose of 2 pills BID, 1 month before surgery decrease by one pill every 3 days until off 14 days before surgery.     1000 jeremiah pb meal planner given previously     Add some type of resistance training 2-3 days a week. These can be body weight exercises, light weight or elastic bands. [employer] and [employer] are great sources for free work out plans and videos.     Tips for preparing for hurricane season:    If you are on weight loss medications, please take your medication with you in case of evacuation. Injectable medications should be transported with an ice pack if unopened or room temperature if you have started to use them.   Hurricane supplies do not necessarily need to be junk food or high in carbs or sugar. Shelf stable and healthy choices to include in your supplies could include:  Canned/packets of tuna or chicken  Apples, oranges, banana, pears  Beef or turkey jerky  Sugar free pudding  Pickle, olives, dill relish (mix with the tuna or chicken!)  Low sodium or no salt added canned vegetables  If you get bread, get lite bread (40 calories a slice)  0 sugar sports drinks  Water  String cheese will be okay for a few days without refrigeration or in an ice chest.   Peanut or other nut butter.   Parmesan crisps  Pork skins  Protein shakes (20-30g protein, less than 5 g sugar)  Protein bars (15 or more g protein, less than 5 g sugar)  Don't forget disposable forks, spoons, plates in your supplies  If evacuated, manage stress by  taking walks, reading or meditating.   If eating out make better choices when possible.   Salads with a lean protein and limited dressing, cheese or other toppings  Grilled chicken sandwich or burger without the bun.   Skip the fries!  Order water or unsweetened tea instead of soda  Grocery stores with a deli, salad/food bar can be a good quick and affordable option to replace fast food        SEATED RESISTANCE BAND EXERCISES     If you do not have a resistance band, or do not feel comfortable using a resistance band, these exercises can also be done holding a light hand weight or water bottle.  If you are just starting to exercise, you may want to go through the motions without any weights or resistance till you become comfortable with the movements.     Do each of the movements shown 10 times (10 repetitions). You can repeat the exercises a second or  third time as well for greater benefit. The amount of tension on the resistance bands should be adjusted so  you can complete one set of 10 repetitions with effort. Increase the tension every few weeks. Do these  exercises 2 or 3 times a week.     * If an exercise hurts your back or joints, stop doing that particular exercise, but keep doing all the others *        CHEST EXERCISE          Start Position:   Sit tall, with feet shoulder width apart and feet in front of knees.   Belly pulled in.   Place the band around your upper back, grasp band in each hand, knuckles (rings) facing front. Arms  should be bent so that knuckles are in front of elbows   Slide shoulder blades down your back and slightly together (as if making a V).   Relax your neck.     To Perform This Exercise:   Press hands forward to lengthen arms using chest muscles (not arms!).   Dont arch your back!)   Return to start position and repeat 10 times.   Breathe!           BACK EXERCISE          Start Position:   Sit tall, with feet shoulder width apart and feet in front of knees.   Belly pulled  in.   Grasp band in each hand and raise overhead. Arms should be slightly wider than shoulder width and no  slack in the band.   Slide shoulder blades down your back and slightly together (as if making a V).   Relax your neck.     To Perform This Exercise:   Open arms pulling down towards chest using upper back muscles (not arms!).   Squeeze through shoulder blades at the bottom of the movement (dont arch your back!).   Return to start position and repeat 10 times.   Breathe!           SHOULDER EXERCISE          Start Position:   Sit tall, with feet shoulder width apart and feet in front of knees.   Belly pulled in.   Sit on band so that you can grasp band in one hand with tension on the band, but not so much tension that  you cannot straighten the arm. It may take a few tries to find the right amount of tension.   Slide shoulder blades down your back and slightly together (as if making a V).   Relax your neck.     To Perform This Exercise:   Press fist to the ceiling, slightly in front of the body.   SLOWLY return to start position and repeat 10 times.   Switch sides and repeat on the other side.   Breathe!           TRICEPS EXERCISE          Start Position:   Sit tall, with feet shoulder width apart and feet in front of knees.   Belly pulled in.   Sit on one end of the band. Grasp other end of band in one hand.   Point elbow directly toward the ceiling (if this is difficult, you may support the upper arm with the opposite  hand)   Be sure there is no slack in the band in the starting position.   Slide shoulder blades down your back and slightly together (as if making a V).   Relax your neck.     To Perform This Exercise:   Extend your arm up to the ceiling, as shown.   Squeeze through shoulder blades at the bottom of the movement (dont arch your back!).   SLOWLY return to start position and repeat 10 times.   Repeat on the other side.   Breathe!           BICEP EXERCISE          Start Position:   Sit tall,  with feet shoulder width apart and feet in front of knees.   Belly pulled in.   Place center of exercise band under one foot and step the end of the band under the other foot.   Grasp each end of the band with one hand. Make sure there is no slack between your foot and the hand  that holds the band.   Hold your elbows to your sides.   Pull abdominals in, lift chest, press shoulders down and back.     To Perform This Exercise:   As you curl up, keep your wrist from changing position in relation to your forearm and your arm stable  from the shoulder to the elbow.   Bend and straighten your elbow in a slow and controlled movement. Repeat this motion 10 times.   Repeat on the other side.   Breathe!

## 2023-08-17 NOTE — PROGRESS NOTES
"Subjective:       Patient ID: Carmen Hawley is a 73 y.o. female.    Chief Complaint: Follow-up      Pt here today for follow up. Has gained 4 lbs from previous visit  net neg 9 lbs. Has been on Contrave  Increased to 2 pill in the morning and 2 pills in the evening last ov. Has been taking at least 2 in the am, and someties forgets the pm. Has stopped WW.   FBS was 100 so started metformin for impaired fastin glucose.   Has not been able to be as active 2/2 knee pain. Will be getting TKA in October.   She has been on topiramate as well.  She is enrolled in digital HTN.  BP  Good today.   Was on topiramate in the past.    diethylpropion, last filled 3/10/21  checked today.     Lab Results   Component Value Date    HGBA1C 5.6 11/09/2022    HGBA1C 5.5 07/14/2021    HGBA1C 5.8 01/12/2021     Lab Results   Component Value Date    LDLCALC 128.0 11/09/2022    CREATININE 0.7 11/09/2022             Follow-up  Associated symptoms include arthralgias. Pertinent negatives include no chest pain, chills or fever.     Review of Systems   Constitutional: Negative for chills and fever.   Respiratory: Negative for apnea and shortness of breath.         + snores   Cardiovascular: Negative for chest pain and leg swelling.   Gastrointestinal: Negative for constipation and diarrhea.        Denies HB   Genitourinary: Negative for dysuria.   Musculoskeletal: Positive for arthralgias. Negative for back pain.        Left foot OA   Neurological: Negative for dizziness and light-headedness.   Psychiatric/Behavioral: Negative for dysphoric mood. The patient is not nervous/anxious.         Doing well on Lexapro       Objective:       /70 (BP Location: Left arm, Patient Position: Sitting, BP Method: Large (Manual))   Pulse (!) 57   Ht 5' 3" (1.6 m)   Wt 73 kg (160 lb 15 oz)   LMP 12/16/2006 (Approximate)   SpO2 100%   BMI 28.51 kg/m²     Physical Exam  Vitals reviewed.   Constitutional:       General: She is not in acute " distress.     Appearance: She is well-developed.      Comments: Obese     HENT:      Head: Normocephalic and atraumatic.   Eyes:      General: No scleral icterus.     Pupils: Pupils are equal, round, and reactive to light.   Cardiovascular:      Rate and Rhythm: Normal rate.   Pulmonary:      Effort: Pulmonary effort is normal.   Musculoskeletal:         General: Normal range of motion.      Cervical back: Normal range of motion and neck supple.   Skin:     General: Skin is warm and dry.      Findings: No erythema.   Neurological:      Mental Status: She is alert and oriented to person, place, and time.      Cranial Nerves: No cranial nerve deficit.   Psychiatric:         Behavior: Behavior normal.         Judgment: Judgment normal.         Assessment:       1. IFG (impaired fasting glucose)    2. Overweight (BMI 25.0-29.9)            Plan:            Carmen was seen today for follow-up.    Diagnoses and all orders for this visit:    IFG (impaired fasting glucose)  -     metFORMIN (GLUCOPHAGE-XR) 500 MG ER 24hr tablet; Take 1 tablet (500 mg total) by mouth daily with breakfast.    Overweight (BMI 25.0-29.9)  -     naltrexone-bupropion (CONTRAVE) 8-90 mg TbSR; Take 2 tablets by mouth 2 (two) times daily.           Continue metformin with dinner. Always take with food.  No Alcohol.     Contrave is long term weight loss medication. Side effects may include insomnia, nausea, headache, constipation, depression or change in thinking.  These side effects will improve with stopping the medication.        [email or web address]    Continue with  Contrave 2 pills twice a day.     Pre- and post-operative (not bariatric surgery) instructions for weight loss medications.     Contrave-  Presuming if on typical dose of 2 pills BID, 1 month before surgery decrease by one pill every 3 days until off 14 days before surgery.     1000 jeremiah pb meal planner given previously     Add some type of resistance training 2-3 days a week. These  can be body weight exercises, light weight or elastic bands. [employer] and [employer] are great sources for free work out plans and videos.     Seated exercises and hurricane tips given.

## 2023-08-23 ENCOUNTER — OFFICE VISIT (OUTPATIENT)
Dept: INTERNAL MEDICINE | Facility: CLINIC | Age: 73
End: 2023-08-23
Payer: COMMERCIAL

## 2023-08-23 DIAGNOSIS — Z12.31 SCREENING MAMMOGRAM, ENCOUNTER FOR: ICD-10-CM

## 2023-08-23 DIAGNOSIS — E78.5 HYPERLIPIDEMIA, UNSPECIFIED HYPERLIPIDEMIA TYPE: ICD-10-CM

## 2023-08-23 DIAGNOSIS — Z78.0 ASYMPTOMATIC MENOPAUSAL STATE: ICD-10-CM

## 2023-08-23 DIAGNOSIS — I10 ESSENTIAL HYPERTENSION: ICD-10-CM

## 2023-08-23 DIAGNOSIS — R73.03 PREDIABETES: Primary | ICD-10-CM

## 2023-08-23 PROCEDURE — 1159F MED LIST DOCD IN RCRD: CPT | Mod: CPTII,S$GLB,, | Performed by: INTERNAL MEDICINE

## 2023-08-23 PROCEDURE — 99999 PR PBB SHADOW E&M-EST. PATIENT-LVL V: ICD-10-PCS | Mod: PBBFAC,,, | Performed by: INTERNAL MEDICINE

## 2023-08-23 PROCEDURE — 3078F PR MOST RECENT DIASTOLIC BLOOD PRESSURE < 80 MM HG: ICD-10-PCS | Mod: CPTII,S$GLB,, | Performed by: INTERNAL MEDICINE

## 2023-08-23 PROCEDURE — 99214 PR OFFICE/OUTPT VISIT, EST, LEVL IV, 30-39 MIN: ICD-10-PCS | Mod: S$GLB,,, | Performed by: INTERNAL MEDICINE

## 2023-08-23 PROCEDURE — 1126F AMNT PAIN NOTED NONE PRSNT: CPT | Mod: CPTII,S$GLB,, | Performed by: INTERNAL MEDICINE

## 2023-08-23 PROCEDURE — 99999 PR PBB SHADOW E&M-EST. PATIENT-LVL V: CPT | Mod: PBBFAC,,, | Performed by: INTERNAL MEDICINE

## 2023-08-23 PROCEDURE — 1101F PR PT FALLS ASSESS DOC 0-1 FALLS W/OUT INJ PAST YR: ICD-10-PCS | Mod: CPTII,S$GLB,, | Performed by: INTERNAL MEDICINE

## 2023-08-23 PROCEDURE — 1126F PR PAIN SEVERITY QUANTIFIED, NO PAIN PRESENT: ICD-10-PCS | Mod: CPTII,S$GLB,, | Performed by: INTERNAL MEDICINE

## 2023-08-23 PROCEDURE — 3288F PR FALLS RISK ASSESSMENT DOCUMENTED: ICD-10-PCS | Mod: CPTII,S$GLB,, | Performed by: INTERNAL MEDICINE

## 2023-08-23 PROCEDURE — 3008F PR BODY MASS INDEX (BMI) DOCUMENTED: ICD-10-PCS | Mod: CPTII,S$GLB,, | Performed by: INTERNAL MEDICINE

## 2023-08-23 PROCEDURE — 3074F SYST BP LT 130 MM HG: CPT | Mod: CPTII,S$GLB,, | Performed by: INTERNAL MEDICINE

## 2023-08-23 PROCEDURE — 1101F PT FALLS ASSESS-DOCD LE1/YR: CPT | Mod: CPTII,S$GLB,, | Performed by: INTERNAL MEDICINE

## 2023-08-23 PROCEDURE — 3074F PR MOST RECENT SYSTOLIC BLOOD PRESSURE < 130 MM HG: ICD-10-PCS | Mod: CPTII,S$GLB,, | Performed by: INTERNAL MEDICINE

## 2023-08-23 PROCEDURE — 1159F PR MEDICATION LIST DOCUMENTED IN MEDICAL RECORD: ICD-10-PCS | Mod: CPTII,S$GLB,, | Performed by: INTERNAL MEDICINE

## 2023-08-23 PROCEDURE — 3008F BODY MASS INDEX DOCD: CPT | Mod: CPTII,S$GLB,, | Performed by: INTERNAL MEDICINE

## 2023-08-23 PROCEDURE — 99214 OFFICE O/P EST MOD 30 MIN: CPT | Mod: S$GLB,,, | Performed by: INTERNAL MEDICINE

## 2023-08-23 PROCEDURE — 3078F DIAST BP <80 MM HG: CPT | Mod: CPTII,S$GLB,, | Performed by: INTERNAL MEDICINE

## 2023-08-23 PROCEDURE — 3288F FALL RISK ASSESSMENT DOCD: CPT | Mod: CPTII,S$GLB,, | Performed by: INTERNAL MEDICINE

## 2023-08-23 RX ORDER — SIMVASTATIN 40 MG/1
TABLET, FILM COATED ORAL
Qty: 90 TABLET | Refills: 1 | Status: SHIPPED | OUTPATIENT
Start: 2023-08-23 | End: 2024-03-06 | Stop reason: SDUPTHER

## 2023-08-23 RX ORDER — METOPROLOL SUCCINATE 100 MG/1
100 TABLET, EXTENDED RELEASE ORAL DAILY
Qty: 90 TABLET | Refills: 1 | Status: SHIPPED | OUTPATIENT
Start: 2023-08-23 | End: 2024-03-06 | Stop reason: SDUPTHER

## 2023-08-23 RX ORDER — TRIAMTERENE AND HYDROCHLOROTHIAZIDE 37.5; 25 MG/1; MG/1
CAPSULE ORAL DAILY
Qty: 90 CAPSULE | Refills: 1 | Status: SHIPPED | OUTPATIENT
Start: 2023-08-23 | End: 2024-03-06 | Stop reason: SDUPTHER

## 2023-08-27 VITALS
HEIGHT: 63 IN | DIASTOLIC BLOOD PRESSURE: 60 MMHG | BODY MASS INDEX: 29.02 KG/M2 | HEART RATE: 80 BPM | SYSTOLIC BLOOD PRESSURE: 128 MMHG | TEMPERATURE: 99 F | WEIGHT: 163.81 LBS | OXYGEN SATURATION: 98 %

## 2023-08-27 NOTE — PROGRESS NOTES
Subjective:       Patient ID: Carmen Hawley is a 73 y.o. female.    Chief Complaint: Hypertension    HPI  She returns for management of hypertension.  She has had hypertension for over a year.  Current treatment has included medications outlined in medication list.  She denies chest pain or shortness of breath.  No palpitations.  Denies left arm or neck pain.  She has pre diabetes.  Currently watching her diet     Past medical history: Hypertension, hyperlipidemia, pre diabetes, family history of colon polyp.  She had a colonoscopy October 2020     Medications:  Toprol, Dyazide, Zocor     No known drug allergies       Review of Systems   Constitutional:  Negative for chills, fatigue, fever and unexpected weight change.   Respiratory:  Negative for chest tightness and shortness of breath.    Cardiovascular:  Negative for chest pain and palpitations.   Gastrointestinal:  Negative for abdominal pain and blood in stool.   Neurological:  Negative for dizziness, syncope, numbness and headaches.       Objective:      Physical Exam  HENT:      Right Ear: External ear normal.      Left Ear: External ear normal.      Nose: Nose normal.      Mouth/Throat:      Mouth: Mucous membranes are moist.      Pharynx: Oropharynx is clear.   Eyes:      Pupils: Pupils are equal, round, and reactive to light.   Cardiovascular:      Rate and Rhythm: Normal rate and regular rhythm.      Heart sounds: No murmur heard.  Pulmonary:      Breath sounds: Normal breath sounds.   Abdominal:      General: There is no distension.      Palpations: There is no hepatomegaly or splenomegaly.      Tenderness: There is no abdominal tenderness.   Musculoskeletal:      Cervical back: Normal range of motion.   Lymphadenopathy:      Cervical: No cervical adenopathy.      Upper Body:      Right upper body: No axillary adenopathy.      Left upper body: No axillary adenopathy.   Neurological:      Cranial Nerves: No cranial nerve deficit.      Sensory: No  sensory deficit.      Motor: Motor function is intact.      Deep Tendon Reflexes: Reflexes are normal and symmetric.       Breast exam:  No masses palpated, no nipple discharge expressed  Assessment/Plan       Assessment and plan:  1.  Hypertension:  Controlled.  Continue Toprol.  Check CMP, lipid panel, CBC  2.  Pre diabetes:  Check A1c  3.  Schedule bone density and mammogram

## 2023-08-30 ENCOUNTER — LAB VISIT (OUTPATIENT)
Dept: LAB | Facility: HOSPITAL | Age: 73
End: 2023-08-30
Attending: INTERNAL MEDICINE
Payer: COMMERCIAL

## 2023-08-30 DIAGNOSIS — E78.5 HYPERLIPIDEMIA, UNSPECIFIED HYPERLIPIDEMIA TYPE: ICD-10-CM

## 2023-08-30 DIAGNOSIS — R73.03 PREDIABETES: ICD-10-CM

## 2023-08-30 DIAGNOSIS — I10 ESSENTIAL HYPERTENSION: ICD-10-CM

## 2023-08-30 LAB
ALBUMIN SERPL BCP-MCNC: 4.3 G/DL (ref 3.5–5.2)
ALP SERPL-CCNC: 88 U/L (ref 55–135)
ALT SERPL W/O P-5'-P-CCNC: 18 U/L (ref 10–44)
ANION GAP SERPL CALC-SCNC: 10 MMOL/L (ref 8–16)
AST SERPL-CCNC: 23 U/L (ref 10–40)
BASOPHILS # BLD AUTO: 0.05 K/UL (ref 0–0.2)
BASOPHILS NFR BLD: 0.8 % (ref 0–1.9)
BILIRUB SERPL-MCNC: 0.7 MG/DL (ref 0.1–1)
BUN SERPL-MCNC: 10 MG/DL (ref 8–23)
CALCIUM SERPL-MCNC: 9.9 MG/DL (ref 8.7–10.5)
CHLORIDE SERPL-SCNC: 93 MMOL/L (ref 95–110)
CHOLEST SERPL-MCNC: 205 MG/DL (ref 120–199)
CHOLEST/HDLC SERPL: 2.7 {RATIO} (ref 2–5)
CO2 SERPL-SCNC: 26 MMOL/L (ref 23–29)
CREAT SERPL-MCNC: 0.8 MG/DL (ref 0.5–1.4)
DIFFERENTIAL METHOD: ABNORMAL
EOSINOPHIL # BLD AUTO: 0.1 K/UL (ref 0–0.5)
EOSINOPHIL NFR BLD: 2.1 % (ref 0–8)
ERYTHROCYTE [DISTWIDTH] IN BLOOD BY AUTOMATED COUNT: 11.7 % (ref 11.5–14.5)
EST. GFR  (NO RACE VARIABLE): >60 ML/MIN/1.73 M^2
ESTIMATED AVG GLUCOSE: 108 MG/DL (ref 68–131)
GLUCOSE SERPL-MCNC: 99 MG/DL (ref 70–110)
HBA1C MFR BLD: 5.4 % (ref 4–5.6)
HCT VFR BLD AUTO: 37.2 % (ref 37–48.5)
HDLC SERPL-MCNC: 75 MG/DL (ref 40–75)
HDLC SERPL: 36.6 % (ref 20–50)
HGB BLD-MCNC: 12.9 G/DL (ref 12–16)
IMM GRANULOCYTES # BLD AUTO: 0.01 K/UL (ref 0–0.04)
IMM GRANULOCYTES NFR BLD AUTO: 0.2 % (ref 0–0.5)
LDLC SERPL CALC-MCNC: 116.8 MG/DL (ref 63–159)
LYMPHOCYTES # BLD AUTO: 1.2 K/UL (ref 1–4.8)
LYMPHOCYTES NFR BLD: 19.3 % (ref 18–48)
MCH RBC QN AUTO: 31.4 PG (ref 27–31)
MCHC RBC AUTO-ENTMCNC: 34.7 G/DL (ref 32–36)
MCV RBC AUTO: 91 FL (ref 82–98)
MONOCYTES # BLD AUTO: 0.5 K/UL (ref 0.3–1)
MONOCYTES NFR BLD: 7.8 % (ref 4–15)
NEUTROPHILS # BLD AUTO: 4.3 K/UL (ref 1.8–7.7)
NEUTROPHILS NFR BLD: 69.8 % (ref 38–73)
NONHDLC SERPL-MCNC: 130 MG/DL
NRBC BLD-RTO: 0 /100 WBC
PLATELET # BLD AUTO: 272 K/UL (ref 150–450)
PMV BLD AUTO: 8.8 FL (ref 9.2–12.9)
POTASSIUM SERPL-SCNC: 4.7 MMOL/L (ref 3.5–5.1)
PROT SERPL-MCNC: 7.4 G/DL (ref 6–8.4)
RBC # BLD AUTO: 4.11 M/UL (ref 4–5.4)
SODIUM SERPL-SCNC: 129 MMOL/L (ref 136–145)
TRIGL SERPL-MCNC: 66 MG/DL (ref 30–150)
WBC # BLD AUTO: 6.12 K/UL (ref 3.9–12.7)

## 2023-08-30 PROCEDURE — 80061 LIPID PANEL: CPT | Performed by: INTERNAL MEDICINE

## 2023-08-30 PROCEDURE — 36415 COLL VENOUS BLD VENIPUNCTURE: CPT | Performed by: INTERNAL MEDICINE

## 2023-08-30 PROCEDURE — 80053 COMPREHEN METABOLIC PANEL: CPT | Performed by: INTERNAL MEDICINE

## 2023-08-30 PROCEDURE — 85025 COMPLETE CBC W/AUTO DIFF WBC: CPT | Performed by: INTERNAL MEDICINE

## 2023-08-30 PROCEDURE — 83036 HEMOGLOBIN GLYCOSYLATED A1C: CPT | Performed by: INTERNAL MEDICINE

## 2023-09-02 ENCOUNTER — TELEPHONE (OUTPATIENT)
Dept: INTERNAL MEDICINE | Facility: CLINIC | Age: 73
End: 2023-09-02
Payer: COMMERCIAL

## 2023-09-02 DIAGNOSIS — I10 HYPERTENSION, UNSPECIFIED TYPE: Primary | ICD-10-CM

## 2023-09-02 NOTE — TELEPHONE ENCOUNTER
Please contact patient and inform her that her sodium level is low.  I would like to recheck it in 2 weeks.  Order in.  Please schedule

## 2023-09-05 ENCOUNTER — TELEPHONE (OUTPATIENT)
Dept: INTERNAL MEDICINE | Facility: CLINIC | Age: 73
End: 2023-09-05
Payer: COMMERCIAL

## 2023-09-06 ENCOUNTER — PATIENT MESSAGE (OUTPATIENT)
Dept: INTERNAL MEDICINE | Facility: CLINIC | Age: 73
End: 2023-09-06
Payer: COMMERCIAL

## 2023-09-12 ENCOUNTER — CLINICAL SUPPORT (OUTPATIENT)
Dept: REHABILITATION | Facility: HOSPITAL | Age: 73
End: 2023-09-12
Payer: COMMERCIAL

## 2023-09-12 DIAGNOSIS — M17.11 PRIMARY OSTEOARTHRITIS OF RIGHT KNEE: ICD-10-CM

## 2023-09-12 PROCEDURE — 97110 THERAPEUTIC EXERCISES: CPT | Mod: PN

## 2023-09-12 PROCEDURE — 97161 PT EVAL LOW COMPLEX 20 MIN: CPT | Mod: PN

## 2023-09-12 NOTE — PLAN OF CARE
[facility]   Physical Therapy Initial Evaluation      Name: Carmen Hawley  Clinic Number: [id number]    Therapy Diagnosis:   Encounter Diagnosis   Name Primary?    Primary osteoarthritis of right knee         Physician: Jean-Pierre Bennett III, *    Physician Orders: PT Eval and Treat PreHab TKA   Medical Diagnosis from Referral: Primary OA of right knee   Evaluation Date: 9/12/2023  Authorization Period Expiration: 12/31/2023  Plan of Care Expiration: 9/12/2023  Progress Note Due: N/A   Date of Surgery: planned for 10/23/2023  Visit # / Visits authorized: 1/ 1   FOTO: N/A     Precautions: Standard     Time In: 2:30 pm   Time Out: 3:30 pm   Total Billable Time: 60 minutes    Subjective     Date of onset: Chronic     History of current condition - Carmen reports: long history of right knee pain with progression to OA, Patient now having continued pain, impaired gait and ROM in her Right knee, conservative treatments no longer helpful for her.  Decision made to have TKA - scheduled for October 23, 2023.  Carmen reports moderate pain in knee, still able to ambulate without use of AD, but wears knee compression sleeve to assist with pain.  Using ice at home and work.     Falls: No reported falls     Imaging: :     Prior Therapy: Carmen has been here in the past for PT to address her knee and lower back problems   Social History: lives at home with ; single story home,  0 steps to enter; tub/shower combo with seat;     has RW, Cane, BSC already at home   Occupation: working Parttime - 3 days per week at [employer]  - [employer]   Prior Level of Function: Independent  Current Level of Function: Remains independent;     Pain:  Current 7/10, worst 9/10, best 4/10   Location: right  Knee   Description: Aching and Throbbing  Aggravating Factors: Sitting, Standing, Night Time, and Getting out of bed/chair  Easing Factors: ice and elevation, gabapentin     Patients goals: To  learn what I need to do in preparation for my knee surgery.      Medical History:   Past Medical History:   Diagnosis Date    Anxiety     Arthritis     Cataract     Hyperlipidemia     Hypertension     Macular degeneration     Mitral valve prolapse     Salzmann's nodular dystrophy of both eyes        Surgical History:   Carmen Hawley  has a past surgical history that includes Tonsillectomy; Colonoscopy (N/A, 10/8/2020); Transforaminal epidural injection of steroid (Right, 6/29/2022); Transforaminal epidural injection of steroid (Right, 3/15/2023); Cataract extraction w/  intraocular lens implant (Left, 5/8/2023); injection (Right, 5/17/2023); and Cataract extraction w/  intraocular lens implant (Right, 5/22/2023).    Medications:   Carmen has a current medication list which includes the following prescription(s): aspirin, biotin, cyanocobalamin, diclofenac, diclofenac sodium, escitalopram oxalate, escitalopram oxalate, estradiol, gabapentin, metformin, metoprolol succinate, naltrexone-bupropion, prednisol ace-gatiflox-bromfen, simvastatin, tramadol, tretinoin, triamterene-hydrochlorothiazide 37.5-25 mg, and vitamin d.    Allergies:   Review of patient's allergies indicates:  No Known Allergies     Objective        Observation: Carmen ambulated into clinic,  with her. She is alert/oriented x 4; Appears in no acute distress; reports that her knee is just hurting all of the time. Would have had surgery earlier, but her co-worker has been out on maternity leave.     Posture: forward head, anterior pelvic tilt, valgus deformity Right > Left knee     Edema: Obvious OA changes in knee (R) with associated edema.       Range of Motion:   Knee Left active Left Passive Right Active R passive    0 - 130 WNL 5 - 117 4 - 117        Lower Extremity Strength   Left Right   Knee extension: 4+/5 4-/5   Knee flexion: 4/5 4/5   Hip flexion: 5/5 4+/5   Hip extension:  4-/5 4-/5   Hip abduction: 4+/5 4/5   Hip adduction: 4/5  "4/5   Hip IR 4+/5 4+/5   Hip ER 5/5 5/5   Ankle dorsiflexion: 5/5 5/5   Ankle plantarflexion: 5/5 5/5       Sensation: Intact to light touch BLE's     Flexibility:    90/90 SLR = R moderate restriction, L moderate restriction   Ely's test: R moderate restriction, L no restriction   Chris's test: R minimal restriction, L no restriction   Nicho test: R moderate restriction, L minimal restriction    PT Evaluation Completed: Yes  Discussed Plan of Care with patient: Yes     Treatment     Total Treatment time (time-based codes) separate from Evaluation: 20 minutes     Carmen received the treatments listed below:      therapeutic exercises to develop strength and ROM for 20 minutes including:  Instructed patient in the following exercises for HEP prior to surgery:  Quad sets - towel roll under ankle - 5" hold x 2 mins several times per day  Gluteal sets 5" h x 15 - several times per day   Seated H/S stretch - min 30 sec H x 3 each leg   S/L hip abduction x 10 - 2 sets  Bridges 10 x 2  SLR 10 x 2  SAQ 10 x 2  Squats / sit to stand 10 x 2   Achilles stretch on wedge - 30 sec hold x 3    Also instructed in Heel slides - after surgery in supine and sitting;   LAQ's and given HEP with several others on sheet that were reviewed.     Instructed in how to use RW - patient familiar with this.    Patient Education and Home Exercises     Education provided:   - Use of RW, sit to stand following surgery  -HEP for pre-surgery and post surgery   -importance of Rehab after surgery - will have pain, but needs to work through this to focus on ROM and strength/function     Written Home Exercises Provided: yes. Exercises were reviewed and Carmen was able to demonstrate them prior to the end of the session.  Carmen demonstrated good  understanding of the education provided. See EMR under Patient Instructions for exercises provided during therapy sessions.    Assessment     Carmen is a 73 y.o. female referred to outpatient Physical " Therapy with a medical diagnosis of Primary OA of Right knee . Patient presents with Chronic knee pain, limited AROM, limited strength and impaired gait,functional mobility as result of severe OA in her knee.  Patient was seen today for Pre-Hab instructions; will have Right TKA on 10/23/2023 with PT evaluation here on 10/25.  Patient demonstrated good understanding of all information and HEP. No further Skilled therapy services required at this time.     Patient prognosis is Good.   Patient will benefit from skilled outpatient Physical Therapy to address the deficits stated above and in the chart below, provide patient /family education, and to maximize patientt's level of independence.     Plan of care discussed with patient: Yes  Patient's spiritual, cultural and educational needs considered and patient is agreeable to the plan of care and goals as stated below:     Anticipated Barriers for therapy: none     Medical Necessity is demonstrated by the following  History  Co-morbidities and personal factors that may impact the plan of care [x] LOW: no personal factors / co-morbidities  [] MODERATE: 1-2 personal factors / co-morbidities  [] HIGH: 3+ personal factors / co-morbidities    Moderate / High Support Documentation:   Co-morbidities affecting plan of care: n/a     Personal Factors:   no deficits     Examination  Body Structures and Functions, activity limitations and participation restrictions that may impact the plan of care [x] LOW: addressing 1-2 elements  [] MODERATE: 3+ elements  [] HIGH: 4+ elements (please support below)    Moderate / High Support Documentation: n/a     Clinical Presentation [x] LOW: stable  [] MODERATE: Evolving  [] HIGH: Unstable     Decision Making/ Complexity Score: low       Goals:  Short Term Goals: 1 visit:   Patient able to demonstrate independence with HEP for pre-surgery activity - MET   Patient able to verbalize/demonstrate Independent with use of RW - MET  Plan     Plan of  care Certification: 9/12/2023 to 9/12/2023    No additional Skilled Physical Therapy Required at this time.  Will see patient 2 days post-op Right TKA.     Susanna Rondon, PT        Physician's Signature: _________________________________________ Date: ________________

## 2023-09-14 ENCOUNTER — HOSPITAL ENCOUNTER (OUTPATIENT)
Dept: RADIOLOGY | Facility: HOSPITAL | Age: 73
Discharge: HOME | End: 2023-09-14
Attending: INTERNAL MEDICINE
Payer: COMMERCIAL

## 2023-09-14 ENCOUNTER — HOSPITAL ENCOUNTER (OUTPATIENT)
Dept: RADIOLOGY | Facility: CLINIC | Age: 73
Discharge: HOME | End: 2023-09-14
Attending: INTERNAL MEDICINE
Payer: COMMERCIAL

## 2023-09-14 DIAGNOSIS — Z78.0 ASYMPTOMATIC MENOPAUSAL STATE: ICD-10-CM

## 2023-09-14 DIAGNOSIS — Z12.31 SCREENING MAMMOGRAM, ENCOUNTER FOR: ICD-10-CM

## 2023-09-14 PROCEDURE — 77080 DXA BONE DENSITY AXIAL: CPT | Mod: TC

## 2023-09-14 PROCEDURE — 77067 SCR MAMMO BI INCL CAD: CPT | Mod: TC

## 2023-09-14 PROCEDURE — 77063 MAMMO DIGITAL SCREENING BILAT WITH TOMO: ICD-10-PCS | Mod: 26,,, | Performed by: RADIOLOGY

## 2023-09-14 PROCEDURE — 77080 DXA BONE DENSITY AXIAL SKELETON 1 OR MORE SITES: ICD-10-PCS | Mod: 26,,, | Performed by: INTERNAL MEDICINE

## 2023-09-14 PROCEDURE — 77063 BREAST TOMOSYNTHESIS BI: CPT | Mod: 26,,, | Performed by: RADIOLOGY

## 2023-09-14 PROCEDURE — 77067 SCR MAMMO BI INCL CAD: CPT | Mod: 26,,, | Performed by: RADIOLOGY

## 2023-09-14 PROCEDURE — 77067 MAMMO DIGITAL SCREENING BILAT WITH TOMO: ICD-10-PCS | Mod: 26,,, | Performed by: RADIOLOGY

## 2023-09-14 PROCEDURE — 77080 DXA BONE DENSITY AXIAL: CPT | Mod: 26,,, | Performed by: INTERNAL MEDICINE

## 2023-09-20 ENCOUNTER — LAB VISIT (OUTPATIENT)
Dept: LAB | Facility: HOSPITAL | Age: 73
End: 2023-09-20
Attending: INTERNAL MEDICINE
Payer: COMMERCIAL

## 2023-09-20 DIAGNOSIS — I10 HYPERTENSION, UNSPECIFIED TYPE: ICD-10-CM

## 2023-09-20 LAB
ANION GAP SERPL CALC-SCNC: 5 MMOL/L (ref 8–16)
BUN SERPL-MCNC: 13 MG/DL (ref 8–23)
CALCIUM SERPL-MCNC: 9.8 MG/DL (ref 8.7–10.5)
CHLORIDE SERPL-SCNC: 99 MMOL/L (ref 95–110)
CO2 SERPL-SCNC: 28 MMOL/L (ref 23–29)
CREAT SERPL-MCNC: 0.8 MG/DL (ref 0.5–1.4)
EST. GFR  (NO RACE VARIABLE): >60 ML/MIN/1.73 M^2
GLUCOSE SERPL-MCNC: 106 MG/DL (ref 70–110)
POTASSIUM SERPL-SCNC: 4.5 MMOL/L (ref 3.5–5.1)
SODIUM SERPL-SCNC: 132 MMOL/L (ref 136–145)

## 2023-09-20 PROCEDURE — 80048 BASIC METABOLIC PNL TOTAL CA: CPT | Performed by: INTERNAL MEDICINE

## 2023-09-20 PROCEDURE — 36415 COLL VENOUS BLD VENIPUNCTURE: CPT | Performed by: INTERNAL MEDICINE

## 2023-09-23 ENCOUNTER — TELEPHONE (OUTPATIENT)
Dept: INTERNAL MEDICINE | Facility: CLINIC | Age: 73
End: 2023-09-23
Payer: COMMERCIAL

## 2023-09-23 DIAGNOSIS — M81.0 OSTEOPOROSIS, UNSPECIFIED OSTEOPOROSIS TYPE, UNSPECIFIED PATHOLOGICAL FRACTURE PRESENCE: Primary | ICD-10-CM

## 2023-09-23 NOTE — TELEPHONE ENCOUNTER
Please contact patient and inform her that her bone density reveals she has osteoporosis.  Her fracture risk is very high.  I would like for her to see endocrinology.  Order in.  Please schedule

## 2023-09-25 ENCOUNTER — TELEPHONE (OUTPATIENT)
Dept: PREADMISSION TESTING | Facility: HOSPITAL | Age: 73
End: 2023-09-25
Payer: COMMERCIAL

## 2023-09-25 ENCOUNTER — PATIENT MESSAGE (OUTPATIENT)
Dept: INTERNAL MEDICINE | Facility: CLINIC | Age: 73
End: 2023-09-25
Payer: COMMERCIAL

## 2023-09-25 DIAGNOSIS — M79.609 PAIN IN EXTREMITY, UNSPECIFIED EXTREMITY: ICD-10-CM

## 2023-09-25 DIAGNOSIS — Z01.818 PRE-OP TESTING: Primary | ICD-10-CM

## 2023-09-25 NOTE — ANESTHESIA PAT ROS NOTE
09/25/2023  Carmen Hawley is a 73 y.o., female.      Pre-op Assessment          Review of Systems           Anesthesia Assessment: Preoperative EQUATION    Planned Procedure: Procedure(s) (LRB):  ARTHROPLASTY, KNEE, TOTAL, USING COMPUTER-ASSISTED NAVIGATION: JOSHUA: RIGHT: JILL - TRIATHALON (Right)  Requested Anesthesia Type:Regional  Surgeon: Jean-Pierre Bennett III, MD  Service: Orthopedics  Known or anticipated Date of Surgery:10/23/2023    Surgeon notes: reviewed    Electronic QUestionnaire Assessment completed via nurse interview with patient.        Triage considerations:     The patient has no apparent active cardiac condition (No unstable coronary Syndrome such as severe unstable angina or recent [<1 month] myocardial infarction, decompensated CHF, severe valvular   disease or significant arrhythmia)    Previous anesthesia records:MAC      Patient reports no personal or family history of anesthesia complications.  5/22/23  EXTRACTION, CATARACT, WITH IOL INSERTION (Right: Eye)    Airway:  Mallampati: II   Mouth Opening: Normal  TM Distance: Normal  Tongue: Normal  Neck ROM: Normal ROM        Last PCP note: within 1 month , within [facility] Dr. Richards  Subspecialty notes: Ortho  Bariatrics         ()  Dermatology    (Dr. Chacko)  Optometry        (Dr. Helms)  Ophthalmology  (Dr Diallo)  Pain Medicine     (Dr. 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   9/20/23     BMP  8/30/23     CMP  A1C  CBC  Lipid panel  11/9/22     Vit D  6/8/22       MRI Lumbar spine  11/18/21   X Ray Lumbar spine  1/15/14     VAS US Carotid Artery El            Instructions given. (See in Nurse's note)    Optimization:  Anesthesia Preop Clinic Assessment  Indicated Will  schedule POC.     Medical Opinion Indicated       Sub-specialist consult indicated:   [facility] NP.        Plan:    Testing:  EKG and PT/INR   Pre-anesthesia  visit       Visit focus: possible regional anesthesia and/or nerve block      Consultation: . [facility] NP for medical and anesthesia optimization.      Patient  has previously scheduled Medical Appointment:     9/28    Navigation: Tests Scheduled.              Consults scheduled.             Results will be tracked by Preop Clinic.     10/5/23  Patient is optimized           I spent a total of 36 minutes on the day of the visit.This includes face to face time and non-face to face time preparing to see the patient (eg, review of tests), obtaining and/or reviewing separately obtained history, documenting clinical information in the electronic or other health record, independently interpreting results and communicating results to the patient/family/caregiver, or care coordinator.      Antony Cottrell NP  Perioperative Medicine  [facility]

## 2023-09-25 NOTE — TELEPHONE ENCOUNTER
----- Message from Ashley Farrell RN sent at 9/25/2023  1:05 PM CDT -----  (9/28) pt will see Antony.  Pls schedule EKG and labs (PT/INR).  Thank you,   Loreta

## 2023-09-25 NOTE — ASSESSMENT & PLAN NOTE
Current /67  today.    Taking: Metoprolol Dyazide  In digital HTN  Patient reports home BP readings of:118-130/60-70    Lifestyle changes to reduce systolic BP:   exercise 30 minutes per day,  5 days per week or 150 minutes weekly; sodium reduction and avoidance of high salt foods such as processed meats, frozen meals and  fast foods.   Keeping a healthy weight/BMI can help with better BP control    BP acceptable for surgery. I recommend monitoring BP during perioperative period as uncontrolled pain can elevate blood pressure.

## 2023-09-25 NOTE — PRE-PROCEDURE INSTRUCTIONS
Chart review; triage plan initiated.    Spoke to patient regarding upcoming scheduled surgery.  Name, , and allergies verified.  Medical, surgical, and anesthesia history reviewed.  Instructed to hold vitamins, supplements and NSAIDs for one week prior to surgery (last day can take is October 15). Informed that the remaining medication instructions will be provided at the POC visit. Pt verbalized understanding.

## 2023-09-28 ENCOUNTER — OFFICE VISIT (OUTPATIENT)
Dept: INTERNAL MEDICINE | Facility: CLINIC | Age: 73
End: 2023-09-28
Payer: COMMERCIAL

## 2023-09-28 ENCOUNTER — HOSPITAL ENCOUNTER (OUTPATIENT)
Dept: RADIOLOGY | Facility: HOSPITAL | Age: 73
Discharge: HOME | End: 2023-09-28
Attending: NURSE PRACTITIONER
Payer: COMMERCIAL

## 2023-09-28 ENCOUNTER — OFFICE VISIT (OUTPATIENT)
Dept: ORTHOPEDICS | Facility: CLINIC | Age: 73
End: 2023-09-28
Payer: COMMERCIAL

## 2023-09-28 ENCOUNTER — HOSPITAL ENCOUNTER (OUTPATIENT)
Dept: RADIOLOGY | Facility: HOSPITAL | Age: 73
Discharge: HOME | End: 2023-09-28
Attending: ORTHOPAEDIC SURGERY
Payer: COMMERCIAL

## 2023-09-28 VITALS
HEART RATE: 85 BPM | DIASTOLIC BLOOD PRESSURE: 67 MMHG | SYSTOLIC BLOOD PRESSURE: 147 MMHG | TEMPERATURE: 98 F | OXYGEN SATURATION: 97 % | WEIGHT: 162.13 LBS | HEIGHT: 63 IN | BODY MASS INDEX: 28.73 KG/M2

## 2023-09-28 VITALS — WEIGHT: 162.06 LBS | BODY MASS INDEX: 28.71 KG/M2 | HEIGHT: 63 IN

## 2023-09-28 DIAGNOSIS — E78.5 HYPERLIPIDEMIA, UNSPECIFIED HYPERLIPIDEMIA TYPE: ICD-10-CM

## 2023-09-28 DIAGNOSIS — M17.11 PRIMARY OSTEOARTHRITIS OF RIGHT KNEE: ICD-10-CM

## 2023-09-28 DIAGNOSIS — M47.897 OTHER SPONDYLOSIS, LUMBOSACRAL REGION: ICD-10-CM

## 2023-09-28 DIAGNOSIS — E87.1 HYPONATREMIA: Primary | ICD-10-CM

## 2023-09-28 DIAGNOSIS — M17.11 PRIMARY OSTEOARTHRITIS OF RIGHT KNEE: Primary | ICD-10-CM

## 2023-09-28 DIAGNOSIS — Z01.818 PRE-OP EVALUATION: ICD-10-CM

## 2023-09-28 DIAGNOSIS — I10 PRIMARY HYPERTENSION: ICD-10-CM

## 2023-09-28 DIAGNOSIS — D64.9 ANEMIA, UNSPECIFIED TYPE: ICD-10-CM

## 2023-09-28 DIAGNOSIS — N28.9 RENAL INSUFFICIENCY: ICD-10-CM

## 2023-09-28 DIAGNOSIS — F41.9 ANXIETY: ICD-10-CM

## 2023-09-28 DIAGNOSIS — M79.609 PAIN IN EXTREMITY, UNSPECIFIED EXTREMITY: ICD-10-CM

## 2023-09-28 PROCEDURE — 3077F SYST BP >= 140 MM HG: CPT | Mod: CPTII,S$GLB,, | Performed by: NURSE PRACTITIONER

## 2023-09-28 PROCEDURE — 3288F PR FALLS RISK ASSESSMENT DOCUMENTED: ICD-10-PCS | Mod: CPTII,S$GLB,, | Performed by: NURSE PRACTITIONER

## 2023-09-28 PROCEDURE — 3044F PR MOST RECENT HEMOGLOBIN A1C LEVEL <7.0%: ICD-10-PCS | Mod: CPTII,S$GLB,, | Performed by: NURSE PRACTITIONER

## 2023-09-28 PROCEDURE — 3008F PR BODY MASS INDEX (BMI) DOCUMENTED: ICD-10-PCS | Mod: CPTII,S$GLB,, | Performed by: NURSE PRACTITIONER

## 2023-09-28 PROCEDURE — 3077F PR MOST RECENT SYSTOLIC BLOOD PRESSURE >= 140 MM HG: ICD-10-PCS | Mod: CPTII,S$GLB,, | Performed by: NURSE PRACTITIONER

## 2023-09-28 PROCEDURE — 3008F BODY MASS INDEX DOCD: CPT | Mod: CPTII,S$GLB,, | Performed by: NURSE PRACTITIONER

## 2023-09-28 PROCEDURE — 1159F PR MEDICATION LIST DOCUMENTED IN MEDICAL RECORD: ICD-10-PCS | Mod: CPTII,S$GLB,, | Performed by: NURSE PRACTITIONER

## 2023-09-28 PROCEDURE — 73700 CT LOWER EXTREMITY W/O DYE: CPT | Mod: TC,RT

## 2023-09-28 PROCEDURE — 73700 CT LOWER EXTREMITY W/O DYE: CPT | Mod: 26,RT,, | Performed by: RADIOLOGY

## 2023-09-28 PROCEDURE — 3078F DIAST BP <80 MM HG: CPT | Mod: CPTII,S$GLB,, | Performed by: NURSE PRACTITIONER

## 2023-09-28 PROCEDURE — 1125F AMNT PAIN NOTED PAIN PRSNT: CPT | Mod: CPTII,S$GLB,, | Performed by: NURSE PRACTITIONER

## 2023-09-28 PROCEDURE — 99214 PR OFFICE/OUTPT VISIT, EST, LEVL IV, 30-39 MIN: ICD-10-PCS | Mod: S$GLB,,, | Performed by: NURSE PRACTITIONER

## 2023-09-28 PROCEDURE — 99999 PR PBB SHADOW E&M-EST. PATIENT-LVL III: CPT | Mod: PBBFAC,,, | Performed by: NURSE PRACTITIONER

## 2023-09-28 PROCEDURE — 1125F PR PAIN SEVERITY QUANTIFIED, PAIN PRESENT: ICD-10-PCS | Mod: CPTII,S$GLB,, | Performed by: NURSE PRACTITIONER

## 2023-09-28 PROCEDURE — 99214 OFFICE O/P EST MOD 30 MIN: CPT | Mod: S$GLB,,, | Performed by: NURSE PRACTITIONER

## 2023-09-28 PROCEDURE — 3078F PR MOST RECENT DIASTOLIC BLOOD PRESSURE < 80 MM HG: ICD-10-PCS | Mod: CPTII,S$GLB,, | Performed by: NURSE PRACTITIONER

## 2023-09-28 PROCEDURE — 3044F HG A1C LEVEL LT 7.0%: CPT | Mod: CPTII,S$GLB,, | Performed by: NURSE PRACTITIONER

## 2023-09-28 PROCEDURE — 1159F MED LIST DOCD IN RCRD: CPT | Mod: CPTII,S$GLB,, | Performed by: NURSE PRACTITIONER

## 2023-09-28 PROCEDURE — 99999 PR PBB SHADOW E&M-EST. PATIENT-LVL V: CPT | Mod: PBBFAC,,, | Performed by: NURSE PRACTITIONER

## 2023-09-28 PROCEDURE — 3288F FALL RISK ASSESSMENT DOCD: CPT | Mod: CPTII,S$GLB,, | Performed by: NURSE PRACTITIONER

## 2023-09-28 PROCEDURE — 99999 PR PBB SHADOW E&M-EST. PATIENT-LVL III: ICD-10-PCS | Mod: PBBFAC,,, | Performed by: NURSE PRACTITIONER

## 2023-09-28 PROCEDURE — 1160F RVW MEDS BY RX/DR IN RCRD: CPT | Mod: CPTII,S$GLB,, | Performed by: NURSE PRACTITIONER

## 2023-09-28 PROCEDURE — 1101F PR PT FALLS ASSESS DOC 0-1 FALLS W/OUT INJ PAST YR: ICD-10-PCS | Mod: CPTII,S$GLB,, | Performed by: NURSE PRACTITIONER

## 2023-09-28 PROCEDURE — 99999 PR PBB SHADOW E&M-EST. PATIENT-LVL V: ICD-10-PCS | Mod: PBBFAC,,, | Performed by: NURSE PRACTITIONER

## 2023-09-28 PROCEDURE — 1101F PT FALLS ASSESS-DOCD LE1/YR: CPT | Mod: CPTII,S$GLB,, | Performed by: NURSE PRACTITIONER

## 2023-09-28 PROCEDURE — 1160F PR REVIEW ALL MEDS BY PRESCRIBER/CLIN PHARMACIST DOCUMENTED: ICD-10-PCS | Mod: CPTII,S$GLB,, | Performed by: NURSE PRACTITIONER

## 2023-09-28 PROCEDURE — 73700 CT KNEE WITHOUT CONTRAST RIGHT: ICD-10-PCS | Mod: 26,RT,, | Performed by: RADIOLOGY

## 2023-09-28 RX ORDER — ACETAMINOPHEN 500 MG
500 TABLET ORAL EVERY 6 HOURS PRN
Status: ON HOLD | COMMUNITY
End: 2023-10-23 | Stop reason: HOSPADM

## 2023-09-28 NOTE — PROGRESS NOTES
[facility]  Progress Note    Patient Name: Carmen Hawley  MRN: [id number]  Date of Evaluation- 10/05/2023  PCP- Susanna Sprague MD    Future cases for Carmen Hawley [[id number]]       Case ID Status Date Time Maurizio Procedure Provider Location    [id number] [facility] 10/23/2023  7:00  ARTHROPLASTY, KNEE, TOTAL, USING COMPUTER-ASSISTED NAVIGATION: JOSHUA: RIGHT: Jean-Pierre Garrett III, MD [[id number]] [facility]            HPI:  This is a 73 y.o. female  who presents today for a preoperative evaluation in preparation for right knee replacement  Surgery is indicated for right knee osteoarthritis.   Patient is new to me.    The history has been obtained by speaking with the patient and reviewing the electronic medical record and/or outside health information. Significant health conditions for the perioperative period are discussed below in assessment and plan.     Patient reports current health status to be Good.  Denies any new symptoms before surgery.       Subjective/ Objective:     Chief Complaint: Preoperative evaulation, perioperative medical management, and complication reduction plan.     Functional Capacity:  Able to climb a flight of stairs without CP SOB or Syncope.  Able to meet 4 METs       Anesthesia issues: None    Difficulty mouth opening: no    Steroid use in the last 12 months: knee injection and epidural    Dental Issues:no    Family anesthesia difficulty: None     Family Hx of Thrombosis none    Past Medical History:   Diagnosis Date    Anxiety     Arthritis     Cataract     Hyperlipidemia     Hypertension     Macular degeneration     Mitral valve prolapse     Salzmann's nodular dystrophy of both eyes          Past Surgical History:   Procedure Laterality Date    CATARACT EXTRACTION W/  INTRAOCULAR LENS IMPLANT Left 5/8/2023    Procedure: EXTRACTION, CATARACT, WITH IOL INSERTION;  Surgeon: Flaca Diallo MD;  Location: [facility];  Service: Ophthalmology;  Laterality: Left;     CATARACT EXTRACTION W/  INTRAOCULAR LENS IMPLANT Right 5/22/2023    Procedure: EXTRACTION, CATARACT, WITH IOL INSERTION;  Surgeon: Flaca Diallo MD;  Location: [facility] OR;  Service: Ophthalmology;  Laterality: Right;    COLONOSCOPY N/A 10/8/2020    Procedure: COLONOSCOPY;  Surgeon: Crispin Moon MD;  Location: [facility] ENDO (4TH FLR);  Service: Endoscopy;  Laterality: N/A;  Family history of colon polyps  COVID test on 10/5/20 at 2nd floor - sm    INJECTION Right 5/17/2023    Procedure: INJECTION RIGHT KNEE SYNVISC;  Surgeon: Tej Cm MD;  Location: [facility] PAIN MGT;  Service: Pain Management;  Laterality: Right;    TONSILLECTOMY      TRANSFORAMINAL EPIDURAL INJECTION OF STEROID Right 6/29/2022    Procedure: INJECTION, STEROID, EPIDURAL, TRANSFORAMINAL APPROACH, RIGHT L3-L4 AND L4-L5 CONTRAST DIRECT REF;  Surgeon: Tej Cm MD;  Location: [facility] PAIN MGT;  Service: Pain Management;  Laterality: Right;    TRANSFORAMINAL EPIDURAL INJECTION OF STEROID Right 3/15/2023    Procedure: INJECTION, STEROID, EPIDURAL, TRANSFORAMINAL APPROACH RIGHT L3/4 AND L4/5;  Surgeon: Tej Cm MD;  Location: [facility] PAIN MGT;  Service: Pain Management;  Laterality: Right;       Review of Systems   Constitutional:  Negative for chills, fatigue and fever.   HENT:  Negative for trouble swallowing and voice change.    Eyes:  Negative for photophobia and visual disturbance.        No acute visual changes   Respiratory:  Negative for apnea, cough, chest tightness, shortness of breath and wheezing.         STOP bang  Score 2  Low risk CLARISSE     Cardiovascular:  Negative for chest pain, palpitations and leg swelling.   Gastrointestinal:  Negative for abdominal distention, abdominal pain, blood in stool, constipation, diarrhea, nausea and vomiting.        NO FLD, hepatitis, cirrhosis  No BRB or black tarry stool     Genitourinary:  Negative for difficulty urinating, dysuria, flank pain, frequency, hematuria and urgency.         Nocturia 2   Musculoskeletal:  Positive for arthralgias, gait problem and joint swelling. Negative for back pain, myalgias, neck pain and neck stiffness.   Neurological:  Negative for dizziness, tremors, seizures, syncope, weakness, light-headedness, numbness and headaches.   Psychiatric/Behavioral:  Negative for suicidal ideas.         VITALS  Visit Vitals  [facility] 12/16/2006 (Approximate)        Physical Exam  Constitutional:       General: She is not in acute distress.     Appearance: Normal appearance. She is well-developed. She is not diaphoretic.   HENT:      Head: Normocephalic.      Right Ear: Hearing normal.      Left Ear: Hearing normal.      Nose: Nose normal.      Mouth/Throat:      Lips: Pink.      Mouth: Mucous membranes are moist.      Pharynx: No oropharyngeal exudate.   Eyes:      General: Lids are normal.         Right eye: No discharge.         Left eye: No discharge.      Conjunctiva/sclera: Conjunctivae normal.      Pupils: Pupils are equal, round, and reactive to light.   Neck:      Thyroid: No thyromegaly.      Vascular: No carotid bruit or JVD.      Trachea: Trachea and phonation normal. No tracheal deviation.   Cardiovascular:      Rate and Rhythm: Normal rate and regular rhythm.      Pulses: Normal pulses.           Carotid pulses are 2+ on the right side and 2+ on the left side.       Radial pulses are 2+ on the right side and 2+ on the left side.        Posterior tibial pulses are 2+ on the right side and 2+ on the left side.      Heart sounds: Normal heart sounds. No murmur heard.     No friction rub. No gallop.   Pulmonary:      Effort: Pulmonary effort is normal. No respiratory distress.      Breath sounds: Normal breath sounds. No stridor. No wheezing or rales.      Comments: Clear Anterior and Posterior BBS  Abdominal:      General: Abdomen is protuberant. Bowel sounds are normal. There is no distension.      Palpations: Abdomen is soft.      Tenderness: There is no abdominal tenderness.  There is no guarding.   Musculoskeletal:         General: No tenderness or deformity. Normal range of motion.      Cervical back: Normal range of motion and neck supple. No rigidity.      Right lower leg: No edema.      Left lower leg: No edema.   Lymphadenopathy:      Head:      Right side of head: No submental, submandibular, tonsillar, preauricular, posterior auricular or occipital adenopathy.      Left side of head: No submental, submandibular, tonsillar, preauricular, posterior auricular or occipital adenopathy.      Cervical: No cervical adenopathy.      Right cervical: No superficial cervical adenopathy.     Left cervical: No superficial cervical adenopathy.   Skin:     General: Skin is warm and dry.      Capillary Refill: Capillary refill takes 2 to 3 seconds.      Coloration: Skin is not pale.      Findings: No erythema or rash.   Neurological:      Mental Status: She is alert and oriented to person, place, and time. Mental status is at baseline.      GCS: GCS eye subscore is 4. GCS verbal subscore is 5. GCS motor subscore is 6.      Motor: No abnormal muscle tone.      Coordination: Coordination normal.   Psychiatric:         Attention and Perception: Attention and perception normal.         Mood and Affect: Mood and affect normal.         Speech: Speech normal.         Behavior: Behavior normal. Behavior is cooperative.         Thought Content: Thought content normal.         Cognition and Memory: Cognition and memory normal.         Judgment: Judgment normal.          Significant Labs:  Lab Results   Component Value Date    WBC 6.12 08/30/2023    HGB 12.9 08/30/2023    HCT 37.2 08/30/2023     08/30/2023    CHOL 205 (H) 08/30/2023    TRIG 66 08/30/2023    HDL 75 08/30/2023    ALT 18 08/30/2023    AST 23 08/30/2023     (L) 09/20/2023    K 4.5 09/20/2023    CL 99 09/20/2023    CREATININE 0.8 09/20/2023    BUN 13 09/20/2023    CO2 28 09/20/2023    TSH 1.288 01/08/2019    HGBA1C 5.4 08/30/2023        Diagnostic Studies: No relevant studies.    EKG:   No results found. However, due to the size of the patient record, not all encounters were searched. Please check Results Review for a complete set of results.    2D ECHO:  TTE:  No results found. However, due to the size of the patient record, not all encounters were searched. Please check Results Review for a complete set of results.    LOIS:  No results found. However, due to the size of the patient record, not all encounters were searched. Please check Results Review for a complete set of results.     Imaging       Active Cardiac Conditions: None      Revised Cardiac Risk Index   High -Risk Surgery  Intraperitoneal; Intrathoracic; suprainguinal vascular Yes- + 1 No- 0   History of Ischemic Heart Disease   (Hx of MI/positive exercise test/current chest pain due to ischemia/use of nitrate therapy/EKG with pathological Q waves) Yes- + 1 No- 0   History of CHF  (Pulmonary edema/bilateral rales or S3 gallop/PND/CXR showing pulmonary vascular redistribution) Yes- + 1 No- 0   History of CVA   (Prior stroke or TIA) Yes- + 1 No- 0   Pre-operative treatment with insulin Yes- + 1 No- 0   Pre-operative creatinine > 2mg/dl Yes- + 1 No- 0   Total:      Risk Status:  Estimated risk of cardiac complications after non-cardiac surgery using the Revised Cardiac Risk Index for Preoperative risk is 3.9 %      ARISCAT (Canet) risk index: Low: 1.6% risk of post-op pulmonary complications.    American Society of Anesthesiologists Physical Status classification (ASA): 2           No further cardiac workup needed prior to surgery.        Preoperative cardiac risk assessment-  The patient does not have any active cardiac conditions . Revised cardiac risk index predictors- ---.Functional capacity is more than 4 Mets. She will be undergoing a Orthopedic procedure that carries a Moderate Risk risk     The estimated risk of the rate of adverse cardiac outcomes  3.9    No further cardiac  work up is indicated prior to proceeding with the surgery     Orders Placed This Encounter    Comprehensive Metabolic Panel    Comprehensive Metabolic Panel    CBC Auto Differential    Protime-INR    EKG 12-lead       American Society of Anesthesiologists Physical status classification ( ASA ) class: 3     Postoperative pulmonary complication risk assessment: 1.6   ARISCAT ( Canet) risk index- risk class -  Low, if duration of surgery is under 3 hours, intermediate, if duration of surgery is over 3 hours        Assessment/Plan:     Hypertension  Current /67  today.    Taking: Metoprolol Dyazide  In digital HTN  Patient reports home BP readings of:118-130/60-70    Lifestyle changes to reduce systolic BP:   exercise 30 minutes per day,  5 days per week or 150 minutes weekly; sodium reduction and avoidance of high salt foods such as processed meats, frozen meals and  fast foods.   Keeping a healthy weight/BMI can help with better BP control    BP acceptable for surgery. I recommend monitoring BP during perioperative period as uncontrolled pain can elevate blood pressure.           Hyperlipidemia  Zocor    Anxiety  Lexapro      Other spondylosis, lumbosacral region  Epidural injections performed  No pain at this time    Osteoarthritis of knee  Right knee arthritis- surgery scheduled 10/23/23    Hyponatremia  Na 133    Renal insufficiency  GFR 59.9  Encouraged hydration and heat avoidance prior to scheduled surgery    Anemia  Mild anemia Hgb 12  HCT  35.8        Preventive perioperative care    Thromboembolic prophylaxis:  Her risk factors for thrombosis include obesity, surgical procedure, age, and reduced mobility.I suggest  thromboembolic prophylaxis ( mechanical/pharmacological, weighing the risk benefits of pharmacological agent use considering mg procedural bleeding )  during the perioperative period.I suggested being active in the post operative period.      Postoperative pulmonary complication  prophylaxis-Risk factors for post operative pulmonary complications include age over 65 years and ASA class >2- I suggest incentive spirometry use, early ambulation, and pain control so as to avoid diaphragmatic splinting  Brush teeth twice per day, oral rinses, sleep with the head of the bed up 30 degrees     Renal complication prophylaxis-Risk factors for renal complications include age and hypertension . I suggest keeping her well hydrated and avoidance/ minimizing the use of  NSAID's,NICOLE 2 Inhibitors ,IV contrast if possible in the perioperative period.I suggested drinking 2 litre's of water a day      Surgical site Infection Prophylaxis-I  suggest appropriate antibiotic for Prophylaxis against Surgical site infections Shower with Hibiclens in the night before surgery and the morning of surgery        In view of Spine procedure the patient  is at risk of postoperative urinary retention.  I suggest avoidance / minimizing the of  Benzodiazepines,Anticholinergic medication,antihistamines ( Benadryl) , if possible in the perioperative period. I suggest using the minimum possible use of opioids for the minimum period of time in the perioperative period. Benadryl avoidance suggested      This visit was focused on Preoperative evaluation, Perioperative Medical management, complication reduction plans. I suggest that the patient follows up with primary care or relevant sub specialists for ongoing health care.    I appreciate the opportunity to be involved in this patients care. Please feel free to contact me if there were any questions about this consultation.    Patient is optimized        I spent a total of 36 minutes on the day of the visit.This includes face to face time and non-face to face time preparing to see the patient (eg, review of tests), obtaining and/or reviewing separately obtained history, documenting clinical information in the electronic or other health record, independently interpreting results and  communicating results to the patient/family/caregiver, or care coordinator.     Antony Cottrell NP  Perioperative Medicine  [facility]   Pager [phone]

## 2023-09-28 NOTE — OUTPATIENT SUBJECTIVE & OBJECTIVE
Outpatient Subjective & Objective      Chief Complaint: Preoperative evaulation, perioperative medical management, and complication reduction plan.     Functional Capacity:  Able to climb a flight of stairs without CP SOB or Syncope.  Able to meet 4 METs       Anesthesia issues: None    Difficulty mouth opening: no    Steroid use in the last 12 months: knee injection and epidural    Dental Issues:no    Family anesthesia difficulty: None     Family Hx of Thrombosis none    Past Medical History:   Diagnosis Date    Anxiety     Arthritis     Cataract     Hyperlipidemia     Hypertension     Macular degeneration     Mitral valve prolapse     Salzmann's nodular dystrophy of both eyes          Past Surgical History:   Procedure Laterality Date    CATARACT EXTRACTION W/  INTRAOCULAR LENS IMPLANT Left 5/8/2023    Procedure: EXTRACTION, CATARACT, WITH IOL INSERTION;  Surgeon: Flaca Diallo MD;  Location: [facility] OR;  Service: Ophthalmology;  Laterality: Left;    CATARACT EXTRACTION W/  INTRAOCULAR LENS IMPLANT Right 5/22/2023    Procedure: EXTRACTION, CATARACT, WITH IOL INSERTION;  Surgeon: Flaca Diallo MD;  Location: [facility] OR;  Service: Ophthalmology;  Laterality: Right;    COLONOSCOPY N/A 10/8/2020    Procedure: COLONOSCOPY;  Surgeon: Crispin Moon MD;  [facility]  Service: Endoscopy;  Laterality: N/A;  Family history of colon polyps  COVID test on 10/5/20 at 2nd floor - sm    INJECTION Right 5/17/2023    Procedure: INJECTION RIGHT KNEE SYNVISC;  Surgeon: Tej Cm MD;  Location: [facility] PAIN MGT;  Service: Pain Management;  Laterality: Right;    TONSILLECTOMY      TRANSFORAMINAL EPIDURAL INJECTION OF STEROID Right 6/29/2022    Procedure: INJECTION, STEROID, EPIDURAL, TRANSFORAMINAL APPROACH, RIGHT L3-L4 AND L4-L5 CONTRAST DIRECT REF;  Surgeon: Tej Cm MD;  Location: [facility] PAIN MGT;  Service: Pain Management;  Laterality: Right;    TRANSFORAMINAL EPIDURAL INJECTION OF STEROID Right  3/15/2023    Procedure: INJECTION, STEROID, EPIDURAL, TRANSFORAMINAL APPROACH RIGHT L3/4 AND L4/5;  Surgeon: Tej Cm MD;  Location: [facility];  Service: Pain Management;  Laterality: Right;       Review of Systems   Constitutional:  Negative for chills, fatigue and fever.   HENT:  Negative for trouble swallowing and voice change.    Eyes:  Negative for photophobia and visual disturbance.        No acute visual changes   Respiratory:  Negative for apnea, cough, chest tightness, shortness of breath and wheezing.         STOP bang  Score 2  Low risk CLARISSE     Cardiovascular:  Negative for chest pain, palpitations and leg swelling.   Gastrointestinal:  Negative for abdominal distention, abdominal pain, blood in stool, constipation, diarrhea, nausea and vomiting.        NO FLD, hepatitis, cirrhosis  No BRB or black tarry stool     Genitourinary:  Negative for difficulty urinating, dysuria, flank pain, frequency, hematuria and urgency.        Nocturia 2   Musculoskeletal:  Positive for arthralgias, gait problem and joint swelling. Negative for back pain, myalgias, neck pain and neck stiffness.   Neurological:  Negative for dizziness, tremors, seizures, syncope, weakness, light-headedness, numbness and headaches.   Psychiatric/Behavioral:  Negative for suicidal ideas.         VITALS  Visit Vitals  LMP 12/16/2006 (Approximate)        Physical Exam  Constitutional:       General: She is not in acute distress.     Appearance: Normal appearance. She is well-developed. She is not diaphoretic.   HENT:      Head: Normocephalic.      Right Ear: Hearing normal.      Left Ear: Hearing normal.      Nose: Nose normal.      Mouth/Throat:      Lips: Pink.      Mouth: Mucous membranes are moist.      Pharynx: No oropharyngeal exudate.   Eyes:      General: Lids are normal.         Right eye: No discharge.         Left eye: No discharge.      Conjunctiva/sclera: Conjunctivae normal.      Pupils: Pupils are equal, round, and  reactive to light.   Neck:      Thyroid: No thyromegaly.      Vascular: No carotid bruit or JVD.      Trachea: Trachea and phonation normal. No tracheal deviation.   Cardiovascular:      Rate and Rhythm: Normal rate and regular rhythm.      Pulses: Normal pulses.           Carotid pulses are 2+ on the right side and 2+ on the left side.       Radial pulses are 2+ on the right side and 2+ on the left side.        Posterior tibial pulses are 2+ on the right side and 2+ on the left side.      Heart sounds: Normal heart sounds. No murmur heard.     No friction rub. No gallop.   Pulmonary:      Effort: Pulmonary effort is normal. No respiratory distress.      Breath sounds: Normal breath sounds. No stridor. No wheezing or rales.      Comments: Clear Anterior and Posterior BBS  Abdominal:      General: Abdomen is protuberant. Bowel sounds are normal. There is no distension.      Palpations: Abdomen is soft.      Tenderness: There is no abdominal tenderness. There is no guarding.   Musculoskeletal:         General: No tenderness or deformity. Normal range of motion.      Cervical back: Normal range of motion and neck supple. No rigidity.      Right lower leg: No edema.      Left lower leg: No edema.   Lymphadenopathy:      Head:      Right side of head: No submental, submandibular, tonsillar, preauricular, posterior auricular or occipital adenopathy.      Left side of head: No submental, submandibular, tonsillar, preauricular, posterior auricular or occipital adenopathy.      Cervical: No cervical adenopathy.      Right cervical: No superficial cervical adenopathy.     Left cervical: No superficial cervical adenopathy.   Skin:     General: Skin is warm and dry.      Capillary Refill: Capillary refill takes 2 to 3 seconds.      Coloration: Skin is not pale.      Findings: No erythema or rash.   Neurological:      Mental Status: She is alert and oriented to person, place, and time. Mental status is at baseline.      GCS: GCS  eye subscore is 4. GCS verbal subscore is 5. GCS motor subscore is 6.      Motor: No abnormal muscle tone.      Coordination: Coordination normal.   Psychiatric:         Attention and Perception: Attention and perception normal.         Mood and Affect: Mood and affect normal.         Speech: Speech normal.         Behavior: Behavior normal. Behavior is cooperative.         Thought Content: Thought content normal.         Cognition and Memory: Cognition and memory normal.         Judgment: Judgment normal.          Significant Labs:  Lab Results   Component Value Date    WBC 6.12 08/30/2023    HGB 12.9 08/30/2023    HCT 37.2 08/30/2023     08/30/2023    CHOL 205 (H) 08/30/2023    TRIG 66 08/30/2023    HDL 75 08/30/2023    ALT 18 08/30/2023    AST 23 08/30/2023     (L) 09/20/2023    K 4.5 09/20/2023    CL 99 09/20/2023    CREATININE 0.8 09/20/2023    BUN 13 09/20/2023    CO2 28 09/20/2023    TSH 1.288 01/08/2019    HGBA1C 5.4 08/30/2023       Diagnostic Studies: No relevant studies.    EKG:   No results found. However, due to the size of the patient record, not all encounters were searched. Please check Results Review for a complete set of results.    2D ECHO:  TTE:  No results found. However, due to the size of the patient record, not all encounters were searched. Please check Results Review for a complete set of results.    LOIS:  No results found. However, due to the size of the patient record, not all encounters were searched. Please check Results Review for a complete set of results.     Imaging       Active Cardiac Conditions: None      Revised Cardiac Risk Index   High -Risk Surgery  Intraperitoneal; Intrathoracic; suprainguinal vascular Yes- + 1 No- 0   History of Ischemic Heart Disease   (Hx of MI/positive exercise test/current chest pain due to ischemia/use of nitrate therapy/EKG with pathological Q waves) Yes- + 1 No- 0   History of CHF  (Pulmonary edema/bilateral rales or S3 gallop/PND/CXR  showing pulmonary vascular redistribution) Yes- + 1 No- 0   History of CVA   (Prior stroke or TIA) Yes- + 1 No- 0   Pre-operative treatment with insulin Yes- + 1 No- 0   Pre-operative creatinine > 2mg/dl Yes- + 1 No- 0   Total:      Risk Status:  Estimated risk of cardiac complications after non-cardiac surgery using the Revised Cardiac Risk Index for Preoperative risk is 3.9 %      ARISCAT (Canet) risk index: Low: 1.6% risk of post-op pulmonary complications.    American Society of Anesthesiologists Physical Status classification (ASA): 2           No further cardiac workup needed prior to surgery.    Outpatient Subjective & Objective

## 2023-09-28 NOTE — DISCHARGE INSTRUCTIONS
Your surgery has been scheduled for:______10/23/23____________________________________    You should report to:  ____[facility], located on the [place] side of the first floor of the           [facility] ([phone])  ____[facility], located on the [facility] side of the            Second floor of the [facility] ([phone])  ____3rd Floor [facility] located on the [facility] side of the [facility] [phone]  __X__[facility]: located at [address] [facility] [place] [address]. [address]     Please Note   Tell your doctor if you take Aspirin, products containing Aspirin, herbal medications  or blood thinners, such as Coumadin, Ticlid, or Plavix.  (Consult your provider regarding holding or stopping before surgery).  Arrange for someone to drive you home following surgery.  You will not be allowed to leave the surgical facility alone or drive yourself home following sedation and anesthesia.    Before Surgery  Stop taking all herbal medications, vitamins, and supplements 7 days prior to surgery  No Motrin/Advil (Ibuprofen) 7 days before surgery  No Aleve (Naproxen) 7 days before surgery  Stop Taking Asprin, products containing Aspirin __7___days before surgery   No Goody's/BC Powder 7 days before surgery  Refrain from drinking alcoholic beverages for 24 hours before and after surgery  Stop or limit smoking at least 24 hours prior to surgery  You may take Tylenol for pain    Night before Surgery  Do not eat or drink after midnight  Take a shower or bath (shower is recommended).  Bathe with Hibiclens soap or an antibacterial soap from the neck down.  If not supplied by your surgeon, hibiclens soap will need to be purchased over the counter in pharmacy.  Rinse soap off thoroughly.  Shampoo your hair with your regular shampoo    The Day of Surgery  Take another bath or shower with hibiclens  or any antibacterial soap, to reduce the chance of infection.  Take heart and blood pressure medications with a small sip of water, as advised by the perioperative team.  Do not take fluid pills  You may brush your teeth and rinse your mouth, but do not swallow any additional water.   Do not apply perfumes, powder, body lotions or deodorant on the day of surgery.  Nail polish should be removed.  Do not wear makeup or moisturizer  Wear comfortable clothes, such as a button front shirt and loose fitting pants.  Leave all jewelry, including body piercings, and valuables at home.    Bring any devices you will neeed after surgery such as crutches or canes.  If you have sleep apnea, please bring your CPAP machine  In the event that your physical condition changes including the onset of a cold or respiratory illness, or if you have to delay or cancel your surgery, please notify your surgeon.

## 2023-09-28 NOTE — HPI
This is a 73 y.o. female  who presents today for a preoperative evaluation in preparation for right knee replacement  Surgery is indicated for right knee osteoarthritis.   Patient is new to me.    The history has been obtained by speaking with the patient and reviewing the electronic medical record and/or outside health information. Significant health conditions for the perioperative period are discussed below in assessment and plan.     Patient reports current health status to be Good.  Denies any new symptoms before surgery.

## 2023-10-02 ENCOUNTER — OFFICE VISIT (OUTPATIENT)
Dept: PODIATRY | Facility: CLINIC | Age: 73
End: 2023-10-02
Payer: COMMERCIAL

## 2023-10-02 VITALS
HEIGHT: 63 IN | HEART RATE: 72 BPM | WEIGHT: 165.38 LBS | BODY MASS INDEX: 29.3 KG/M2 | SYSTOLIC BLOOD PRESSURE: 151 MMHG | DIASTOLIC BLOOD PRESSURE: 81 MMHG

## 2023-10-02 DIAGNOSIS — M21.42 ACQUIRED PES PLANUS, LEFT: ICD-10-CM

## 2023-10-02 DIAGNOSIS — M20.42 HAMMER TOES OF BOTH FEET: ICD-10-CM

## 2023-10-02 DIAGNOSIS — M20.22 HALLUX RIGIDUS OF LEFT FOOT: Primary | ICD-10-CM

## 2023-10-02 DIAGNOSIS — M20.41 HAMMER TOES OF BOTH FEET: ICD-10-CM

## 2023-10-02 PROCEDURE — 3008F PR BODY MASS INDEX (BMI) DOCUMENTED: ICD-10-PCS | Mod: CPTII,S$GLB,, | Performed by: PODIATRIST

## 2023-10-02 PROCEDURE — 1160F PR REVIEW ALL MEDS BY PRESCRIBER/CLIN PHARMACIST DOCUMENTED: ICD-10-PCS | Mod: CPTII,S$GLB,, | Performed by: PODIATRIST

## 2023-10-02 PROCEDURE — 99203 PR OFFICE/OUTPT VISIT, NEW, LEVL III, 30-44 MIN: ICD-10-PCS | Mod: S$GLB,,, | Performed by: PODIATRIST

## 2023-10-02 PROCEDURE — 3077F SYST BP >= 140 MM HG: CPT | Mod: CPTII,S$GLB,, | Performed by: PODIATRIST

## 2023-10-02 PROCEDURE — 1126F AMNT PAIN NOTED NONE PRSNT: CPT | Mod: CPTII,S$GLB,, | Performed by: PODIATRIST

## 2023-10-02 PROCEDURE — 3079F PR MOST RECENT DIASTOLIC BLOOD PRESSURE 80-89 MM HG: ICD-10-PCS | Mod: CPTII,S$GLB,, | Performed by: PODIATRIST

## 2023-10-02 PROCEDURE — 1159F PR MEDICATION LIST DOCUMENTED IN MEDICAL RECORD: ICD-10-PCS | Mod: CPTII,S$GLB,, | Performed by: PODIATRIST

## 2023-10-02 PROCEDURE — 1159F MED LIST DOCD IN RCRD: CPT | Mod: CPTII,S$GLB,, | Performed by: PODIATRIST

## 2023-10-02 PROCEDURE — 3044F HG A1C LEVEL LT 7.0%: CPT | Mod: CPTII,S$GLB,, | Performed by: PODIATRIST

## 2023-10-02 PROCEDURE — 99999 PR PBB SHADOW E&M-EST. PATIENT-LVL V: ICD-10-PCS | Mod: PBBFAC,,, | Performed by: PODIATRIST

## 2023-10-02 PROCEDURE — 3077F PR MOST RECENT SYSTOLIC BLOOD PRESSURE >= 140 MM HG: ICD-10-PCS | Mod: CPTII,S$GLB,, | Performed by: PODIATRIST

## 2023-10-02 PROCEDURE — 3008F BODY MASS INDEX DOCD: CPT | Mod: CPTII,S$GLB,, | Performed by: PODIATRIST

## 2023-10-02 PROCEDURE — 99203 OFFICE O/P NEW LOW 30 MIN: CPT | Mod: S$GLB,,, | Performed by: PODIATRIST

## 2023-10-02 PROCEDURE — 99999 PR PBB SHADOW E&M-EST. PATIENT-LVL V: CPT | Mod: PBBFAC,,, | Performed by: PODIATRIST

## 2023-10-02 PROCEDURE — 1160F RVW MEDS BY RX/DR IN RCRD: CPT | Mod: CPTII,S$GLB,, | Performed by: PODIATRIST

## 2023-10-02 PROCEDURE — 3044F PR MOST RECENT HEMOGLOBIN A1C LEVEL <7.0%: ICD-10-PCS | Mod: CPTII,S$GLB,, | Performed by: PODIATRIST

## 2023-10-02 PROCEDURE — 3079F DIAST BP 80-89 MM HG: CPT | Mod: CPTII,S$GLB,, | Performed by: PODIATRIST

## 2023-10-02 PROCEDURE — 1126F PR PAIN SEVERITY QUANTIFIED, NO PAIN PRESENT: ICD-10-PCS | Mod: CPTII,S$GLB,, | Performed by: PODIATRIST

## 2023-10-02 NOTE — PATIENT INSTRUCTIONS
Over the counter pain creams: Voltaren Gel, Biofreeze, Bengay, tiger balm, two old goat, lidocaine gel,  Absorbine Veterinary Liniment Gel Topical Analgesic Sore Muscle and Joint Pain Relief    Recommend lotions: eucerin, eucerin for diabetics, aquaphor, A&D ointment, gold bond for diabetics, sween, [place]'s Bees all purpose baby ointment,  urea 40 with aloe or SkinIntegra rapid crack repair (found on amazon.com)    Shoe recommendations: (try 6pm.com, zappos.com , nordstromrack.Bluesocket, or shoes.com for discounted prices) you can visit varsity shoes in [place], DSW shoes in [place]  or viet rack in the [facility] (there are also several shoe brand outlets in the [facility])    ONLY purchase stability style tennis shoes NO flex, foam, free, yoga mat style shoes    Asics (GT 4000 or gel foundations), new balance stability type shoes (such as the 940 series), saucony (stabil c3),  Mcmillan (GTS or Beast or transcend), propet, HokaOne (tennis shoe) Chandu (tennis shoes and boots)    Sofft Brand (women) Sarai&Ronaldo (men), clarks, cropieter, aerosoles, naturalizers, SAS, ecco, born, ida suarez, rockports (dress shoes)    Vionic, burkenstocks, fitflops, propet, taos, baretraps (sandals)    HokaOne sandals, crocs, propet (house shoes)    Nail Home remedy:  Vicks Vapor rub or Emuaid to nails for easier manageability    Occasional soaks for 15-20 mins in luke warm water with 1/2 cup of listerine and 1 cup of apple cider vinegar are ok You may add several drops of oil of oregano or tea tree oil as well      What Is Arthritis in the Foot?  Degenerative arthritis is a condition that slowly wears away joints, the area where bones meet and move. In the beginning, you may notice that the affected joint seems stiff. It may even ache. As the joint lining (cartilage) breaks down, the bones rub against each other, causing pain and swelling. Over time, small pieces of rough or splintered bone (bone spurs) develop, and the joints range  of motion becomes limited. But movement doesnt have to cause pain. The effects of arthritis can be reduced.    The big-toe joint  When arthritis affects your big toe, your foot hurts when it pushes off the ground. Arthritis often appears in the big-toe joint along with a bunion (a bony bump at the side of the joint) or a bone spur on top of the joint.    Other joints  When arthritis affects the rear or midfoot joints, you feel pain when you put weight on your foot. Arthritis may affect the joint where the ankle and foot meet. It may also affect other joints nearby.  Date Last Reviewed: 7/1/2016 © 2000-2016 [employer]. [address] [place] [address], [place] [address]. All rights reserved. This information is not intended as a substitute for professional medical care. Always follow your healthcare professional's instructions.        Treating Arthritis in the Foot  If your symptoms are mild, medications may be enough to reduce pain and swelling. For more severe arthritis, surgery may be needed to improve the condition of the joint.    Medicine  Your doctor may prescribe medicine--pills or injections--to limit pain and swelling. Ice, aspirin, acetaminophen, or ibuprofen may help relieve mild symptoms that occur after activity.  Surgery and bone trimming  To ease movement and reduce pain, your doctor may trim damaged bone. If arthritis is severe, the joint may be fused or removed. If the bone is not damaged too badly, your doctor may simply shave away bone spurs. Any excess bone growth related to a bunion may also be trimmed.  Fusing joints  If damage is more severe, your doctor may fuse the joint to prevent the bones from rubbing. Afterward, staples, plates, or screws may hold the bones in place so they heal properly. In some cases, the joint may be removed and replaced with an implant.  After surgery  During the early stages of recovery, your foot is likely to be bandaged and immobilized for a while. For best  results, follow up with your doctor as scheduled. These visits help ensure that your foot heals properly.  As you heal  After surgery, youll be told how to care for your incision and how soon to begin walking on the foot. Until the foot can bear weight, you may need to walk with crutches or a cane.  For surgery on the big toe, your foot may be splinted to limit movement for several weeks. Despite this, you should be able to walk soon after surgery.  For surgery on rear or midfoot joints, you may need to wear a cast or surgical shoe. These joints are fairly large, so full recovery may take a few months. Once the bone has healed, any staples, plates, or screws may be removed.  Date Last Reviewed: 7/1/2016 © 2000-2016 [employer]. [address] [place] [address], [place] [address]. All rights reserved. This information is not intended as a substitute for professional medical care. Always follow your healthcare professional's instructions.        Foot Surgery: Degenerative Joint Disease    Degenerative joint disease (arthritis) often happens in the joint of a big toe. This bone growth may cause pain and stiffness in the joint. Left untreated, arthritis can break down the cartilage and destroy the joint. Your treatment choices depend on how damaged your joint is. There are many nonsurgical treatments, but if these are not helpful, surgery may be considered.    Cheilectomy  This is done when the arthritic joint and cartilage can be saved. A bone spur caused by arthritis may be symptomatic on the top of the big toe joint. The procedure involves removing this bone spur, usually with a small part of the top of the joint itself.  You will need to wear a surgical shoe for several weeks. Once the foot heals, joint movement is restored.    Fusion  In fusion, the cartilage and some bone on both sides of the joint are removed. Then, the big toe and metatarsal bones are held together with staples, screws, or a plate and  screws. Your foot may be placed in a cast. While you heal, you will be asked not to bear weight on this foot. You may also need crutches for several weeks. Because the joint has been removed, your toe will be less flexible.    Arthroplasty  During surgery, bone growth caused by the arthritis is trimmed, and part of the joint is removed. A pin can be used to align the bones and to keep them from touching. The pin is removed after several weeks. In some cases, the entire joint may be replaced with an implant. You may have to wear a splint or a surgical shoe for several weeks. When healed, the bones become connected with scar tissue.  Date Last Reviewed: 10/15/2015  © 5646-8231 [employer]. [address] [place] [address], [place] [address]. All rights reserved. This information is not intended as a substitute for professional medical care. Always follow your healthcare professional's instructions.            Wearing Proper Shoes                    You walk on your feet every day, forcing them to support the weight of your body. Repeated stress on your feet can cause damage over time. The right shoes can help protect your feet. The wrong shoes can cause more foot problems. Read the information below to help you find a shoe that fits your foot needs.     A good shoe fit will cover your foot outline.       A shoe that doesnt cover the outline is a bad fit.      Whats Your Foot Shape?  To get a good fit, you need to know the shape of your foot. Do this simple test: While standing, place your foot on a piece of paper and trace around it. Is your foot straight or curved? Do you have a foot problem, such as a bunion, that causes your foot outline to show a bulge on the side of your big toe?  Finding Your Fit  Bring your foot outline to the [employer] store to help you find the right shoe. Place a shoe you like on top of the outline to see if it matches the shape. The shoe should cover the outline. (If you have a bunion,  the shoe may not cover the bulge on the outline. Look for soft leather shoes to stretch over the bunion.) Once youve found a pair of proper shoes, put them on. Walk around. Be sure the shoes dont rub or pinch. If the shoes feel good, youve found your fit!  The Right Shoe for You  A good shoe has features that provide comfort and support. It must also be the right size and shape for your feet. Look for a shoe made of breathable fabric and lining, such as leather or canvas. Be sure that shoes have enough tread to prevent slipping. Go to a good shoe store for help finding the right shoe.  Good Shoe Features  An ideal shoe has the following:  Laces for support. If tying laces is a problem for you, try shoes with Velcro fasteners or jose.  A front of the shoe (toe box) with ½ inch space in front of your longest toes.  An arch shape that supports your foot.  No more than 1½ inches of heel.  A stiff, snug back of the shoe to keep your foot from sliding around.  A smooth lining with no rough seams.  Shoe Shopping Tips  Below are some dos and donts for when you go to the shoe store.  Do:  Select the shoes that feel right. Wear them around the house. Then bring them to your foot doctor to check for fit. If they dont fit well, return them.  Shop late in the day, when your feet will be slightly bigger.  Each time you buy shoes, have both your feet measured while you are standing. Foot size changes with time.  Pick shoes to suit their purpose. High heels are okay for an occasional night on the town. But for everyday wear, choose a more sensible shoe.  Try on shoes while wearing any inserts specially made for your feet (orthoses).  Try on both the right and left shoes. If your feet are different sizes, pick a pair that fits the larger foot.  Dont:  Dont buy shoes based on shoe size alone. Always try on shoes, as sizes differ from brand to brand and within brands.  Dont expect shoes to break in. If they dont fit at  the store, dont buy them.  Dont buy a shoe that doesnt match your foot shape.  What About Socks?  Always wear socks with shoes. Socks help absorb sweat and reduce friction and blistering. When shopping for shoes, choose soft, padded socks with seams that dont irritate your feet.  If You Have Foot Problems  Some foot problems cause deformities. This can make it hard to find a good fit. Look for shoes made of soft leather to stretch over the deformity. If you have bunions, buy shoes with a wider toe box. To fit hammertoes, look for shoes with a tall toe box. If you have arch problems, you may need inserts. In some cases, youll need to have custom footwear or orthoses made for your feet.  Suggested Footwear  Ask your healthcare provider what kind of footwear you need. He or she may recommend a certain brand or shoe store.  © 9539-2006 [employer]. [address] [place] [address]. All rights reserved. This information is not intended as a substitute for professional medical care. Always follow your healthcare professional's instructions.        Toe Extension (Flexibility)    These instructions are for your right foot. Switch sides for your left foot.  Sit in a chair. Rest your right ankle on your left knee.  Hold your toes with your right hand. Gently bend the toes backward. Feel a stretch in the undersides of the toes and ball of the foot. Hold for 30 to 60 seconds.  Then gently bend the toes in the other direction. Gently press on them until your foot is pointed. Hold for 30 to 60 seconds.  Repeat 5 times, or as instructed.  © 4267-5955 [employer]. [address] [place] [address]. All rights reserved. This information is not intended as a substitute for professional medical care. Always follow your healthcare professional's instructions.      Ankle Alphabet (Flexibility)    These instructions are for your right foot. Switch sides for your left foot.  Sit on the  floor with your legs straight in front of you.  Rest your right calf on a rolled-up towel. Use your foot to write the letters of the alphabet in mid-air.  Repeat this exercise 3 times a day, or as instructed.  Date Last Reviewed: 3/10/2016  © 2410-9975 Department of Health and Human Services. [address] [place] [address]. All rights reserved. This information is not intended as a substitute for professional medical care. Always follow your healthcare professional's instructions.        Calf Raise (Strength)    Stand up straight with both feet flat on the floor, slightly apart. Hold onto a sturdy chair, railing, counter, or table.  Raise both heels so youre standing on the balls of your feet. Dont lock your knees or arch your back. Hold for 5 seconds. Then slowly lower your heels back down to the floor.  Repeat 10 times, or as instructed.  Do this exercise 3 times a day, or as instructed.     Challenge yourself  As you become stronger, do this exercise on one foot at a time.   Date Last Reviewed: 3/10/2016  © 1921-6876 Department of Health and Human Services. [address] [place] [address]. All rights reserved. This information is not intended as a substitute for professional medical care. Always follow your healthcare professional's instructions.        Bent-Knee Calf Stretch  This exercise is designed to stretch and strengthen your feet and ankles. Before beginning the exercise, read through all the instructions. While exercising, breathe normally and dont bounce. If you feel any pain, stop the exercise. If pain persists, inform your healthcare provider:    Stand an arms length away from a wall. Place the palms of your hands on the wall. Step forward about 12 inches with your ______ foot.  Keeping toes pointed forward and both heels on the floor, bend both knees and lean forward. Hold for ______ seconds. Relax.  Repeat ______ times. Do ______ sets a day.  Date Last Reviewed: 8/16/2015  © 3919-0103 [employer]. [address] [place] [address]. All rights reserved. This information is not intended as a substitute for professional medical care. Always follow your healthcare professional's instructions.        Arch retraining    These exercises are for your right foot. Switch sides for your left foot.  Sit in a chair or stand with both feet flat on the floor. Press down with the ball of your right foot, but only on the left side of the foot, just under the big toe.  Then pull the bottom of your big toe back toward your heel. This should pull up the arch of your foot. Dont flex your toes while doing this. It is a subtle movement of the arch.  Hold for 5 seconds. Relax.  Date Last Reviewed: 3/10/2016  © 2637-9433 [employer]. [address] [place] [address]. All rights reserved. This information is not intended as a substitute for professional medical care. Always follow your healthcare professional's instructions.        Ankle Dorsiflexion/Plantarflexion (Flexibility)    Sit on the floor or in bed with your legs straight in front of you.  Point both feet. Then flex both feet.  Do this 10 to 30 times in a row.  Repeat this exercise 2 times a day, or as instructed.  Date Last Reviewed: 5/1/2016  © 1943-0894 [employer]. [address] [place] [address]. All rights reserved. This information is not intended as a substitute for professional medical care. Always follow your healthcare professional's instructions.

## 2023-10-02 NOTE — PROGRESS NOTES
Subjective:      Patient ID: Carmen Hawley is a 73 y.o. female.    Chief Complaint: Foot Pain (Left foot bunion)    Carmen Hawley is a 73 y.o. female who presents to the podiatry clinic  with complaint of  left foot pain, especially with palpation and ambulation. Description: mild and moderate Nature: aching, sharp, and throbbing Location: 1st [facility] Onset of the symptoms was several months ago. Precipitating event: none known.  History of injury: no Current symptoms include: stiffness and worsening symptoms after a period of activity.  Patient had this evaluated in 2017 by my colleague Dr. No.  Since that time she believes that some of the bony change has increased.    Patient Active Problem List   Diagnosis    Hypertension    Hyperlipidemia    Anxiety    Primary osteoarthritis of left foot    Age-related macular degeneration, dry, both eyes    Posterior vitreous detachment of both eyes    Nuclear sclerotic cataract of right eye    Osteoarthritis of knee    Family history of colonic polyps    Nonexudative age-related macular degeneration, bilateral, intermediate dry stage    Lumbar radiculopathy    Left foot pain    Encounter for colonoscopy in patient with family history of colon polyps    DDD (degenerative disc disease), lumbar    Other spondylosis, lumbosacral region    Pre-op evaluation    Hyponatremia    Renal insufficiency    Anemia       Current Outpatient Medications on File Prior to Visit   Medication Sig Dispense Refill    acetaminophen (TYLENOL) 500 MG tablet Take 500 mg by mouth every 6 (six) hours as needed for Pain.      aspirin (ECOTRIN) 81 MG EC tablet Take 81 mg by mouth once daily.        biotin 300 mcg Tab Take 1 tablet by mouth once daily.      cyanocobalamin 500 MCG tablet Take 500 mcg by mouth once daily.      diclofenac (VOLTAREN) 75 MG EC tablet Take 1 tablet (75 mg total) by mouth 2 (two) times daily as needed (pain). With food and water. 60 tablet 1    diclofenac sodium (VOLTAREN)  1 % Gel Apply 2 grams topically 4 (four) times daily. 350 g 6    EScitalopram oxalate (LEXAPRO) 10 MG tablet Take 1 tablet (10 mg total) by mouth once daily. 90 tablet 3    EScitalopram oxalate (LEXAPRO) 5 MG Tab Take 1 tablet (5 mg total) by mouth once daily. Combine with 10mg for total 15mg. (Patient taking differently: Take 5 mg by mouth daily as needed. Combine with 10mg for total 15mg.) 30 tablet 11    gabapentin (NEURONTIN) 300 MG capsule Take 1 capsule (300 mg total) by mouth 3 (three) times daily. 90 capsule 2    metFORMIN (GLUCOPHAGE-XR) 500 MG ER 24hr tablet Take 1 tablet (500 mg total) by mouth daily with breakfast. 90 tablet 1    metoprolol succinate (TOPROL-XL) 100 MG 24 hr tablet Take 1 tablet (100 mg total) by mouth once daily. 90 tablet 1    naltrexone-bupropion (CONTRAVE) 8-90 mg TbSR Take 2 tablets by mouth 2 (two) times daily. 120 tablet 5    simvastatin (ZOCOR) 40 MG tablet TAKE 1 TABLET BY MOUTH EVERY EVENING. 90 tablet 1    traMADoL (ULTRAM) 50 mg tablet Take 1 tablet (50 mg total) by mouth every 6 (six) hours as needed for Pain. 28 tablet 1    triamterene-hydrochlorothiazide 37.5-25 mg (DYAZIDE) 37.5-25 mg per capsule Take one capsule by mouth every day 90 capsule 1    vitamin D 1000 units Tab Take 2,000 mg by mouth once daily.       estradioL (ESTRACE) 0.01 % (0.1 mg/gram) vaginal cream Place 1 gram vaginally twice a week. 42.5 g 3     No current facility-administered medications on file prior to visit.       Review of patient's allergies indicates:  No Known Allergies    Past Surgical History:   Procedure Laterality Date    CATARACT EXTRACTION W/  INTRAOCULAR LENS IMPLANT Left 05/08/2023    Procedure: EXTRACTION, CATARACT, WITH IOL INSERTION;  Surgeon: Flaca Diallo MD;  Location: [facility];  Service: Ophthalmology;  Laterality: Left;    CATARACT EXTRACTION W/  INTRAOCULAR LENS IMPLANT Right 05/22/2023    Procedure: EXTRACTION, CATARACT, WITH IOL INSERTION;  Surgeon: Flaca Diallo MD;   Location: [facility] OR;  Service: Ophthalmology;  Laterality: Right;    COLONOSCOPY N/A 10/08/2020    Procedure: COLONOSCOPY;  Surgeon: Crispin Moon MD;  Location: [facility] ENDO (4TH FLR);  Service: Endoscopy;  Laterality: N/A;  Family history of colon polyps  COVID test on 10/5/20 at 2nd floor -     EYE SURGERY      INJECTION Right 05/17/2023    Procedure: INJECTION RIGHT KNEE SYNVISC;  Surgeon: Tej Cm MD;  Location: [facility] PAIN MGT;  Service: Pain Management;  Laterality: Right;    TONSILLECTOMY      TRANSFORAMINAL EPIDURAL INJECTION OF STEROID Right 06/29/2022    Procedure: INJECTION, STEROID, EPIDURAL, TRANSFORAMINAL APPROACH, RIGHT L3-L4 AND L4-L5 CONTRAST DIRECT REF;  Surgeon: Tej Cm MD;  Location: [facility] PAIN MGT;  Service: Pain Management;  Laterality: Right;    TRANSFORAMINAL EPIDURAL INJECTION OF STEROID Right 03/15/2023    Procedure: INJECTION, STEROID, EPIDURAL, TRANSFORAMINAL APPROACH RIGHT L3/4 AND L4/5;  Surgeon: Tej Cm MD;  Location: [facility] PAIN MGT;  Service: Pain Management;  Laterality: Right;       Family History   Problem Relation Age of Onset    Cancer Mother         lung    Stroke Mother     Heart disease Father     Diabetes Father     Diabetes Brother     Aortic aneurysm Brother         surgery 5/2012    Kidney disease Brother         on dialysis    Melanoma Neg Hx     Breast cancer Neg Hx     Colon cancer Neg Hx     Ovarian cancer Neg Hx        Social History     Socioeconomic History    Marital status:      Spouse name: Nicho   Occupational History     Employer: [employer]   Tobacco Use    Smoking status: Never    Smokeless tobacco: Never   Substance and Sexual Activity    Alcohol use: Yes     Comment: 1-2 glasses wine 5 daily    Drug use: No    Sexual activity: Yes     Partners: Male     Birth control/protection: Post-menopausal     Comment:  since 2000   Social History Narrative    Works in [employer] department at [employer] -  [employer] Working part-time.        Likes to play golf, walks for exercise.      Social Determinants of Health     Financial Resource Strain: Low Risk  (10/9/2019)    Overall Financial Resource Strain (CARDIA)     Difficulty of Paying Living Expenses: Not very hard   Food Insecurity: No Food Insecurity (10/9/2019)    Hunger Vital Sign     Worried About Running Out of Food in the Last Year: Never true     Ran Out of Food in the Last Year: Never true   Transportation Needs: No Transportation Needs (10/9/2019)    PRAPARE - Transportation     Lack of Transportation (Medical): No     Lack of Transportation (Non-Medical): No   Physical Activity: Insufficiently Active (10/9/2019)    Exercise Vital Sign     Days of Exercise per Week: 3 days     Minutes of Exercise per Session: 30 min   Stress: Stress Concern Present (10/9/2019)    Citizen of [place] of Occupational Health - Occupational Stress Questionnaire     Feeling of Stress : To some extent    Social Connection and Isolation Panel [NHANES]       Review of Systems   Constitutional: Negative for chills and fever.   Cardiovascular:  Negative for claudication and leg swelling.   Respiratory:  Negative for cough and shortness of breath.    Skin:  Positive for dry skin. Negative for itching, nail changes and rash.   Musculoskeletal:  Positive for arthritis, back pain, joint pain, myalgias and stiffness. Negative for falls, joint swelling and muscle weakness.   Gastrointestinal:  Negative for diarrhea, nausea and vomiting.   Neurological:  Negative for numbness, paresthesias, tremors and weakness.   Psychiatric/Behavioral:  Negative for altered mental status and hallucinations.            Objective:      Vitals:    10/02/23 0923   BP: (!) 151/81   Pulse: 72   Weight: 75 kg (165 lb 5.5 oz)   Height: 5' 3" (1.6 m)   PainSc: 0-No pain       Physical Exam  Vitals and nursing note reviewed.   Constitutional:       General: She is not in acute distress.     Appearance: She " is well-developed. She is not toxic-appearing or diaphoretic.      Comments: alert and oriented x 3.    Cardiovascular:      Pulses:           Dorsalis pedis pulses are 2+ on the right side and 2+ on the left side.        Posterior tibial pulses are 2+ on the right side and 2+ on the left side.      Comments:  Capillary refill time is within normal limits. Digital hair present.   Pulmonary:      Effort: No respiratory distress.   Musculoskeletal:         General: No deformity.      Right ankle: No tenderness. No lateral malleolus, medial malleolus, AITF ligament, CF ligament or posterior TF ligament tenderness.      Right Achilles Tendon: No defects. Downs's test negative.      Left ankle: No tenderness. No lateral malleolus, medial malleolus, AITF ligament, CF ligament or posterior TF ligament tenderness.      Left Achilles Tendon: No defects. Downs's test negative.      Right foot: Decreased range of motion. No bony tenderness.      Left foot: Decreased range of motion. No bony tenderness.      Comments: Muscle strength is 5/5 in all groups bilaterally.    There is equinus deformity bilateral with decreased dorsiflexion at the ankle joint bilateral. Gait analysis reveals excessive pronation through midstance and propulsion with early heel off. Shoes reveals lateral heel counter wear bilateral. Decreased arch height noted b/l. Negative too many toes sign.     Decreased first MPJ range of motion both weightbearing and nonweightbearing, significantly rigid to the LLE, + crepitus observed the first MP joint, + dorsal flag sign. Moderate bunion deformity is observed .    Patient has hammertoes of digits 2-5 bilateral partially reducible    Feet:      Right foot:      Protective Sensation: 5 sites tested.  5 sites sensed.      Skin integrity: Skin integrity normal.      Left foot:      Protective Sensation: 5 sites tested.  5 sites sensed.      Skin integrity: Skin integrity normal.   Lymphadenopathy:       Comments: No lymphatic streaking     Skin:     General: Skin is warm and dry.      Coloration: Skin is not pale.      Findings: No rash.      Nails: There is no clubbing.      Comments: Skin is of normal turgor.   Normal temperature gradient.  Examination of the skin reveals no evidence of significant rashes, open lesions, suspicious appearing nevi or other concerning lesions.    Neurological:      Sensory: No sensory deficit.      Motor: No atrophy.      Comments: Light touch present     Psychiatric:         Attention and Perception: She is attentive.         Mood and Affect: Mood is not anxious. Affect is not inappropriate.         Speech: She is communicative. Speech is not slurred.         Behavior: Behavior is not combative.           Assessment:       Encounter Diagnoses   Name Primary?    Hallux rigidus of left foot Yes    Acquired pes planus, left     Hammer toes of both feet          Plan:     Problem List Items Addressed This Visit    None  Visit Diagnoses       Hallux rigidus of left foot    -  Primary    Relevant Orders    X-Ray Foot Complete Left (Completed)    Acquired pes planus, left        Relevant Orders    X-Ray Foot Complete Left (Completed)    Hammer toes of both feet        Relevant Orders    X-Ray Foot Complete Left (Completed)             I counseled the patient on her conditions, their implications and medical management.      I did  the patient in detail regarding surgical and conservative treatment measures for hallux limitus. I informed the  patient that the majority of pain is secondary to an arthritic joint with decreased joint spaces. Informed patient that outside of surgical intervention the main goal of therapy is to decreased the  range of motion at the first MPJ joint. This can be done so by utilizing either and extremely hard soled nonflexible shoe or forefoot rocker    I gave written and verbal instructions on stretching exercises. Patient expressed understanding.  Discussed icing the affected area as needed and also wearing appropriate shoe gear and avoiding flats, slippers, sandals, and going barefoot. My recommendation for OTC supports is [employer]. Patient instructed on adequate icing techniques. Patient should ice the affected area at least once per day x 10 minutes for 10 days I advised the patient that extra icing would also be beneficial to ensure adequate anti inflammatory effect. We also discussed cortisone injections and NSAID therapy.     RTC in 6-9 weeks if no improvement, at this time patient will receive cortisone injections and referral to PT. Patient is amenable to plan.

## 2023-10-03 RX ORDER — NALOXONE HCL 0.4 MG/ML
0.02 VIAL (ML) INJECTION
Status: CANCELLED | OUTPATIENT
Start: 2023-10-03

## 2023-10-03 RX ORDER — AMOXICILLIN 250 MG
1 CAPSULE ORAL 2 TIMES DAILY
Status: CANCELLED | OUTPATIENT
Start: 2023-10-03

## 2023-10-03 RX ORDER — CELECOXIB 200 MG/1
400 CAPSULE ORAL ONCE
Status: CANCELLED | OUTPATIENT
Start: 2023-10-03 | End: 2023-10-03

## 2023-10-03 RX ORDER — FENTANYL CITRATE 50 UG/ML
25 INJECTION, SOLUTION INTRAMUSCULAR; INTRAVENOUS EVERY 5 MIN PRN
Status: CANCELLED | OUTPATIENT
Start: 2023-10-03

## 2023-10-03 RX ORDER — MUPIROCIN 20 MG/G
1 OINTMENT TOPICAL 2 TIMES DAILY
Status: CANCELLED | OUTPATIENT
Start: 2023-10-03 | End: 2023-10-08

## 2023-10-03 RX ORDER — OXYCODONE HYDROCHLORIDE 5 MG/1
5 TABLET ORAL
Status: CANCELLED | OUTPATIENT
Start: 2023-10-03

## 2023-10-03 RX ORDER — MIDAZOLAM HYDROCHLORIDE 1 MG/ML
4 INJECTION INTRAMUSCULAR; INTRAVENOUS
Status: CANCELLED | OUTPATIENT
Start: 2023-10-03 | End: 2023-10-04

## 2023-10-03 RX ORDER — METHOCARBAMOL 750 MG/1
750 TABLET, FILM COATED ORAL 3 TIMES DAILY
Status: CANCELLED | OUTPATIENT
Start: 2023-10-03

## 2023-10-03 RX ORDER — CEFAZOLIN SODIUM 2 G/50ML
2 SOLUTION INTRAVENOUS
Status: CANCELLED | OUTPATIENT
Start: 2023-10-03

## 2023-10-03 RX ORDER — FAMOTIDINE 20 MG/1
20 TABLET, FILM COATED ORAL 2 TIMES DAILY
Status: CANCELLED | OUTPATIENT
Start: 2023-10-03

## 2023-10-03 RX ORDER — CEFAZOLIN SODIUM 2 G/50ML
2 SOLUTION INTRAVENOUS
Status: CANCELLED | OUTPATIENT
Start: 2023-10-03 | End: 2023-10-04

## 2023-10-03 RX ORDER — MUPIROCIN 20 MG/G
1 OINTMENT TOPICAL
Status: CANCELLED | OUTPATIENT
Start: 2023-10-03

## 2023-10-03 RX ORDER — SODIUM CHLORIDE 9 MG/ML
INJECTION, SOLUTION INTRAVENOUS CONTINUOUS
Status: CANCELLED | OUTPATIENT
Start: 2023-10-03 | End: 2023-10-04

## 2023-10-03 RX ORDER — PREGABALIN 75 MG/1
75 CAPSULE ORAL NIGHTLY
Status: CANCELLED | OUTPATIENT
Start: 2023-10-03

## 2023-10-03 RX ORDER — CELECOXIB 200 MG/1
200 CAPSULE ORAL DAILY
Status: CANCELLED | OUTPATIENT
Start: 2023-10-04

## 2023-10-03 RX ORDER — TALC
6 POWDER (GRAM) TOPICAL NIGHTLY PRN
Status: CANCELLED | OUTPATIENT
Start: 2023-10-03

## 2023-10-03 RX ORDER — ASPIRIN 81 MG/1
81 TABLET ORAL 2 TIMES DAILY
Status: CANCELLED | OUTPATIENT
Start: 2023-10-03

## 2023-10-03 RX ORDER — ONDANSETRON 2 MG/ML
4 INJECTION INTRAMUSCULAR; INTRAVENOUS EVERY 8 HOURS PRN
Status: CANCELLED | OUTPATIENT
Start: 2023-10-03

## 2023-10-03 RX ORDER — BISACODYL 10 MG
10 SUPPOSITORY, RECTAL RECTAL EVERY 12 HOURS PRN
Status: CANCELLED | OUTPATIENT
Start: 2023-10-03

## 2023-10-03 RX ORDER — OXYCODONE HYDROCHLORIDE 5 MG/1
10 TABLET ORAL
Status: CANCELLED | OUTPATIENT
Start: 2023-10-03

## 2023-10-03 RX ORDER — SODIUM CHLORIDE 0.9 % (FLUSH) 0.9 %
10 SYRINGE (ML) INJECTION
Status: CANCELLED | OUTPATIENT
Start: 2023-10-03

## 2023-10-03 RX ORDER — SODIUM CHLORIDE 9 MG/ML
INJECTION, SOLUTION INTRAVENOUS
Status: CANCELLED | OUTPATIENT
Start: 2023-10-03

## 2023-10-03 RX ORDER — LIDOCAINE HYDROCHLORIDE 10 MG/ML
1 INJECTION, SOLUTION EPIDURAL; INFILTRATION; INTRACAUDAL; PERINEURAL
Status: CANCELLED | OUTPATIENT
Start: 2023-10-03

## 2023-10-03 RX ORDER — FENTANYL CITRATE 50 UG/ML
100 INJECTION, SOLUTION INTRAMUSCULAR; INTRAVENOUS
Status: CANCELLED | OUTPATIENT
Start: 2023-10-03 | End: 2023-10-04

## 2023-10-03 RX ORDER — PROCHLORPERAZINE EDISYLATE 5 MG/ML
5 INJECTION INTRAMUSCULAR; INTRAVENOUS EVERY 6 HOURS PRN
Status: CANCELLED | OUTPATIENT
Start: 2023-10-03

## 2023-10-03 RX ORDER — POLYETHYLENE GLYCOL 3350 17 G/17G
17 POWDER, FOR SOLUTION ORAL DAILY
Status: CANCELLED | OUTPATIENT
Start: 2023-10-04

## 2023-10-03 RX ORDER — PREGABALIN 75 MG/1
75 CAPSULE ORAL
Status: CANCELLED | OUTPATIENT
Start: 2023-10-03

## 2023-10-03 RX ORDER — MORPHINE SULFATE 2 MG/ML
2 INJECTION, SOLUTION INTRAMUSCULAR; INTRAVENOUS
Status: CANCELLED | OUTPATIENT
Start: 2023-10-03

## 2023-10-03 RX ORDER — ACETAMINOPHEN 500 MG
1000 TABLET ORAL
Status: CANCELLED | OUTPATIENT
Start: 2023-10-03

## 2023-10-03 RX ORDER — ACETAMINOPHEN 500 MG
1000 TABLET ORAL EVERY 6 HOURS
Status: CANCELLED | OUTPATIENT
Start: 2023-10-03

## 2023-10-03 NOTE — H&P
CC:  Right knee pain    Carmen Hawley is a 73 y.o. female with history of Right knee pain. Pain is worse with activity and weight bearing.  Patient has experienced interference of activities of daily living due to decreased range of motion and an increase in joint pain and swelling.  Patient has failed non-operative treatment including NSAIDs, corticosteroid injections, viscosupplement injections, and activity modification.  Carmen Hawley currently ambulates independently.     Relevant medical conditions of significance in perioperative period:  HTN- on medication managed by pcp  HLD- on medication managed by pcp  Lumbar radiculopathy- on medication managed by pcp  Anxiety- on medication managed by pcp    Past Medical History:   Diagnosis Date    Anxiety     Arthritis     Cataract     Hyperlipidemia     Hypertension     Macular degeneration     Mitral valve prolapse     Salzmann's nodular dystrophy of both eyes        Past Surgical History:   Procedure Laterality Date    CATARACT EXTRACTION W/  INTRAOCULAR LENS IMPLANT Left 05/08/2023    Procedure: EXTRACTION, CATARACT, WITH IOL INSERTION;  Surgeon: Flaca Diallo MD;  Location: [facility] OR;  Service: Ophthalmology;  Laterality: Left;    CATARACT EXTRACTION W/  INTRAOCULAR LENS IMPLANT Right 05/22/2023    Procedure: EXTRACTION, CATARACT, WITH IOL INSERTION;  Surgeon: Flaca Diallo MD;  Location: [facility] OR;  Service: Ophthalmology;  Laterality: Right;    COLONOSCOPY N/A 10/08/2020    Procedure: COLONOSCOPY;  Surgeon: Crispin Moon MD;  Location: [facility] ([address] [place] [address]);  Service: Endoscopy;  Laterality: N/A;  Family history of colon polyps  COVID test on 10/5/20 at 2nd floor -     EYE SURGERY      INJECTION Right 05/17/2023    Procedure: INJECTION RIGHT KNEE SYNVISC;  Surgeon: Tej Cm MD;  Location: [facility] PAIN MGT;  Service: Pain Management;  Laterality: Right;    TONSILLECTOMY      TRANSFORAMINAL EPIDURAL INJECTION OF STEROID Right 06/29/2022     Procedure: INJECTION, STEROID, EPIDURAL, TRANSFORAMINAL APPROACH, RIGHT L3-L4 AND L4-L5 CONTRAST DIRECT REF;  Surgeon: Tej Cm MD;  Location: [facility] PAIN MGT;  Service: Pain Management;  Laterality: Right;    TRANSFORAMINAL EPIDURAL INJECTION OF STEROID Right 03/15/2023    Procedure: INJECTION, STEROID, EPIDURAL, TRANSFORAMINAL APPROACH RIGHT L3/4 AND L4/5;  Surgeon: Tej Cm MD;  Location: [facility] PAIN MGT;  Service: Pain Management;  Laterality: Right;       Family History   Problem Relation Age of Onset    Cancer Mother         lung    Stroke Mother     Heart disease Father     Diabetes Father     Diabetes Brother     Aortic aneurysm Brother         surgery 5/2012    Kidney disease Brother         on dialysis    Melanoma Neg Hx     Breast cancer Neg Hx     Colon cancer Neg Hx     Ovarian cancer Neg Hx        Review of patient's allergies indicates:  No Known Allergies      Current Outpatient Medications:     acetaminophen (TYLENOL) 500 MG tablet, Take 500 mg by mouth every 6 (six) hours as needed for Pain., Disp: , Rfl:     aspirin (ECOTRIN) 81 MG EC tablet, Take 81 mg by mouth once daily.  , Disp: , Rfl:     biotin 300 mcg Tab, Take 1 tablet by mouth once daily., Disp: , Rfl:     cyanocobalamin 500 MCG tablet, Take 500 mcg by mouth once daily., Disp: , Rfl:     diclofenac (VOLTAREN) 75 MG EC tablet, Take 1 tablet (75 mg total) by mouth 2 (two) times daily as needed (pain). With food and water., Disp: 60 tablet, Rfl: 1    diclofenac sodium (VOLTAREN) 1 % Gel, Apply 2 grams topically 4 (four) times daily., Disp: 350 g, Rfl: 6    EScitalopram oxalate (LEXAPRO) 10 MG tablet, Take 1 tablet (10 mg total) by mouth once daily., Disp: 90 tablet, Rfl: 3    EScitalopram oxalate (LEXAPRO) 5 MG Tab, Take 1 tablet (5 mg total) by mouth once daily. Combine with 10mg for total 15mg. (Patient taking differently: Take 5 mg by mouth daily as needed. Combine with 10mg for total 15mg.), Disp: 30 tablet, Rfl: 11     "gabapentin (NEURONTIN) 300 MG capsule, Take 1 capsule (300 mg total) by mouth 3 (three) times daily., Disp: 90 capsule, Rfl: 2    metFORMIN (GLUCOPHAGE-XR) 500 MG ER 24hr tablet, Take 1 tablet (500 mg total) by mouth daily with breakfast., Disp: 90 tablet, Rfl: 1    metoprolol succinate (TOPROL-XL) 100 MG 24 hr tablet, Take 1 tablet (100 mg total) by mouth once daily., Disp: 90 tablet, Rfl: 1    naltrexone-bupropion (CONTRAVE) 8-90 mg TbSR, Take 2 tablets by mouth 2 (two) times daily., Disp: 120 tablet, Rfl: 5    simvastatin (ZOCOR) 40 MG tablet, TAKE 1 TABLET BY MOUTH EVERY EVENING., Disp: 90 tablet, Rfl: 1    traMADoL (ULTRAM) 50 mg tablet, Take 1 tablet (50 mg total) by mouth every 6 (six) hours as needed for Pain., Disp: 28 tablet, Rfl: 1    triamterene-hydrochlorothiazide 37.5-25 mg (DYAZIDE) 37.5-25 mg per capsule, Take one capsule by mouth every day, Disp: 90 capsule, Rfl: 1    vitamin D 1000 units Tab, Take 2,000 mg by mouth once daily. , Disp: , Rfl:     estradioL (ESTRACE) 0.01 % (0.1 mg/gram) vaginal cream, Place 1 gram vaginally twice a week., Disp: 42.5 g, Rfl: 3    Review of Systems:  Constitutional: no fever or chills  Eyes: no visual changes  ENT: no nasal congestion or sore throat  Respiratory: no cough or shortness of breath  Cardiovascular: no chest pain or palpitations  Gastrointestinal: no nausea or vomiting, tolerating diet  Genitourinary: no hematuria or dysuria  Integument/Breast: no rash or pruritis  Hematologic/Lymphatic: no easy bruising or lymphadenopathy  Musculoskeletal: positive for knee pain  Neurological: no seizures or tremors  Behavioral/Psych: no auditory or visual hallucinations  Endocrine: no heat or cold intolerance    PE:  Ht 5' 3" (1.6 m)   Wt 73.5 kg (162 lb 0.6 oz)   LMP 12/16/2006 (Approximate)   BMI 28.70 kg/m²   General: Pleasant, cooperative, NAD   Gait: antalgic  HEENT: NCAT, sclera nonicteric   Lungs: Respirations clear bilaterally; equal and unlabored.   CV: S1S2; " 2+ bilateral upper and lower extremity pulses.   Skin: Intact throughout with no rashes, erythema, or lesions  Extremities: No LE edema,  no erythema or warmth of the skin in either lower extremity.    Right knee exam:  Knee Range of Motion:normal, pain with passive range of motion  Effusion:none  Condition of skin:intact  Location of tenderness:Medial joint line   Strength:normal  Stability: stable to testing    Hip Examination: painless PROM of hip     Radiographs: Radiographs reveal advanced degenerative changes including subchondral cyst formation, subchondral sclerosis, osteophyte formation, joint space narrowing.     Knee Alignment: normal    Diagnosis: Primary osteoarthritis Right knee    Plan: Right total knee arthroplasty    Due to the serious nature of total joint infection and the high prevalence of community acquired MRSA, vancomycin will be used perioperatively.

## 2023-10-03 NOTE — PROGRESS NOTES
Carmen Hawley is a 73 y.o. year old here today for pre surgery optimization visit  in preparation for a Right total knee arthroplasty to be performed by Dr. Bennett  on 10/23/2023.  she was last seen and treated in the clinic on 8/10/2023. she will be medically optimized by the pre op center. There has been no significant change in medical status since last visit. No fever, chills, malaise, or unexplained weight change.      Allergies, Medications, past medical and surgical history reviewed.    Focused examination performed.    Patient declined to see surgeon today. All questions answered. Patient encouraged to call with questions. Contact information given.     Pre, mg, and post operative procedures and expectations discussed. Goals of successful surgery reviewed and include manageable pain levels, surgical site free of infection, medication management, and ambulation with PT/OT assistance. Healthy weight management discussed with patient and caregiver who were receptive to eduction of healthy diet and activity. No other necessary lifestyle changes identified. Educated patient about signs and symptoms of infection, medication management, anticoagulation therapy, risk of tobacco and alcohol use, and self-care to promote healing. Surgical guide given for future reference. Hibiclens given to patient with instructions. All questions were answered.     Carmen Hawley verbalized an understanding to the education and goals. Patient has displayed readiness to engage in care and is ready to proceed with surgery.  Patient reports her  is able and ready to provide assistance at home after discharge.    Surgical and blood consents signed.    Carmen Hawley will contact us if there are any questions, concerns, or changes in medical status prior to surgery.       Joint class: 10/12    Patient has discussed discharge planning with surgeon. Patient will be discharged to home following surgery.   patient will be  scheduled with [facility]. Scheduled 10/25 at the [place] location.    30 minutes of time was spent on patient education, review of records, templating, H&P, , appointment scheduling and optimizing patient for surgery.

## 2023-10-03 NOTE — H&P (VIEW-ONLY)
CC:  Right knee pain    Carmen Hawley is a 73 y.o. female with history of Right knee pain. Pain is worse with activity and weight bearing.  Patient has experienced interference of activities of daily living due to decreased range of motion and an increase in joint pain and swelling.  Patient has failed non-operative treatment including NSAIDs, corticosteroid injections, viscosupplement injections, and activity modification.  Carmen Hawley currently ambulates independently.     Relevant medical conditions of significance in perioperative period:  HTN- on medication managed by pcp  HLD- on medication managed by pcp  Lumbar radiculopathy- on medication managed by pcp  Anxiety- on medication managed by pcp    Past Medical History:   Diagnosis Date    Anxiety     Arthritis     Cataract     Hyperlipidemia     Hypertension     Macular degeneration     Mitral valve prolapse     Salzmann's nodular dystrophy of both eyes        Past Surgical History:   Procedure Laterality Date    CATARACT EXTRACTION W/  INTRAOCULAR LENS IMPLANT Left 05/08/2023    Procedure: EXTRACTION, CATARACT, WITH IOL INSERTION;  Surgeon: Flaca Diallo MD;  Location: [facility] OR;  Service: Ophthalmology;  Laterality: Left;    CATARACT EXTRACTION W/  INTRAOCULAR LENS IMPLANT Right 05/22/2023    Procedure: EXTRACTION, CATARACT, WITH IOL INSERTION;  Surgeon: Flaca Diallo MD;  Location: [facility] OR;  Service: Ophthalmology;  Laterality: Right;    COLONOSCOPY N/A 10/08/2020    Procedure: COLONOSCOPY;  Surgeon: Crispin Moon MD;  Location: [facility] ([address] [place] [address]);  Service: Endoscopy;  Laterality: N/A;  Family history of colon polyps  COVID test on 10/5/20 at 2nd floor -     EYE SURGERY      INJECTION Right 05/17/2023    Procedure: INJECTION RIGHT KNEE SYNVISC;  Surgeon: Tej Cm MD;  Location: [facility] PAIN MGT;  Service: Pain Management;  Laterality: Right;    TONSILLECTOMY      TRANSFORAMINAL EPIDURAL INJECTION OF STEROID Right 06/29/2022     Procedure: INJECTION, STEROID, EPIDURAL, TRANSFORAMINAL APPROACH, RIGHT L3-L4 AND L4-L5 CONTRAST DIRECT REF;  Surgeon: Tej Cm MD;  Location: [facility];  Service: Pain Management;  Laterality: Right;    TRANSFORAMINAL EPIDURAL INJECTION OF STEROID Right 03/15/2023    Procedure: INJECTION, STEROID, EPIDURAL, TRANSFORAMINAL APPROACH RIGHT L3/4 AND L4/5;  Surgeon: Tej Cm MD;  Location: [facility] PAIN [facility]GT;  Service: Pain Management;  Laterality: Right;       Family History   Problem Relation Age of Onset    Cancer Mother         lung    Stroke Mother     Heart disease Father     Diabetes Father     Diabetes Brother     Aortic aneurysm Brother         surgery 5/2012    Kidney disease Brother         on dialysis    Melanoma Neg Hx     Breast cancer Neg Hx     Colon cancer Neg Hx     Ovarian cancer Neg Hx        Review of patient's allergies indicates:  No Known Allergies      Current Outpatient Medications:     acetaminophen (TYLENOL) 500 MG tablet, Take 500 mg by mouth every 6 (six) hours as needed for Pain., Disp: , Rfl:     aspirin (ECOTRIN) 81 MG EC tablet, Take 81 mg by mouth once daily.  , Disp: , Rfl:     biotin 300 mcg Tab, Take 1 tablet by mouth once daily., Disp: , Rfl:     cyanocobalamin 500 MCG tablet, Take 500 mcg by mouth once daily., Disp: , Rfl:     diclofenac (VOLTAREN) 75 MG EC tablet, Take 1 tablet (75 mg total) by mouth 2 (two) times daily as needed (pain). With food and water., Disp: 60 tablet, Rfl: 1    diclofenac sodium (VOLTAREN) 1 % Gel, Apply 2 grams topically 4 (four) times daily., Disp: 350 g, Rfl: 6    EScitalopram oxalate (LEXAPRO) 10 MG tablet, Take 1 tablet (10 mg total) by mouth once daily., Disp: 90 tablet, Rfl: 3    EScitalopram oxalate (LEXAPRO) 5 MG Tab, Take 1 tablet (5 mg total) by mouth once daily. Combine with 10mg for total 15mg. (Patient taking differently: Take 5 mg by mouth daily as needed. Combine with 10mg for total 15mg.), Disp: 30 tablet, Rfl: 11     "gabapentin (NEURONTIN) 300 MG capsule, Take 1 capsule (300 mg total) by mouth 3 (three) times daily., Disp: 90 capsule, Rfl: 2    metFORMIN (GLUCOPHAGE-XR) 500 MG ER 24hr tablet, Take 1 tablet (500 mg total) by mouth daily with breakfast., Disp: 90 tablet, Rfl: 1    metoprolol succinate (TOPROL-XL) 100 MG 24 hr tablet, Take 1 tablet (100 mg total) by mouth once daily., Disp: 90 tablet, Rfl: 1    naltrexone-bupropion (CONTRAVE) 8-90 mg TbSR, Take 2 tablets by mouth 2 (two) times daily., Disp: 120 tablet, Rfl: 5    simvastatin (ZOCOR) 40 MG tablet, TAKE 1 TABLET BY MOUTH EVERY EVENING., Disp: 90 tablet, Rfl: 1    traMADoL (ULTRAM) 50 mg tablet, Take 1 tablet (50 mg total) by mouth every 6 (six) hours as needed for Pain., Disp: 28 tablet, Rfl: 1    triamterene-hydrochlorothiazide 37.5-25 mg (DYAZIDE) 37.5-25 mg per capsule, Take one capsule by mouth every day, Disp: 90 capsule, Rfl: 1    vitamin D 1000 units Tab, Take 2,000 mg by mouth once daily. , Disp: , Rfl:     estradioL (ESTRACE) 0.01 % (0.1 mg/gram) vaginal cream, Place 1 gram vaginally twice a week., Disp: 42.5 g, Rfl: 3    Review of Systems:  Constitutional: no fever or chills  Eyes: no visual changes  ENT: no nasal congestion or sore throat  Respiratory: no cough or shortness of breath  Cardiovascular: no chest pain or palpitations  Gastrointestinal: no nausea or vomiting, tolerating diet  Genitourinary: no hematuria or dysuria  Integument/Breast: no rash or pruritis  Hematologic/Lymphatic: no easy bruising or lymphadenopathy  Musculoskeletal: positive for knee pain  Neurological: no seizures or tremors  Behavioral/Psych: no auditory or visual hallucinations  Endocrine: no heat or cold intolerance    PE:  Ht 5' 3" (1.6 m)   Wt 73.5 kg (162 lb 0.6 oz)   LMP 12/16/2006 (Approximate)   BMI 28.70 kg/m²   General: Pleasant, cooperative, NAD   Gait: antalgic  HEENT: NCAT, sclera nonicteric   Lungs: Respirations clear bilaterally; equal and unlabored.   CV: S1S2; " 2+ bilateral upper and lower extremity pulses.   Skin: Intact throughout with no rashes, erythema, or lesions  Extremities: No LE edema,  no erythema or warmth of the skin in either lower extremity.    Right knee exam:  Knee Range of Motion:normal, pain with passive range of motion  Effusion:none  Condition of skin:intact  Location of tenderness:Medial joint line   Strength:normal  Stability: stable to testing    Hip Examination: painless PROM of hip     Radiographs: Radiographs reveal advanced degenerative changes including subchondral cyst formation, subchondral sclerosis, osteophyte formation, joint space narrowing.     Knee Alignment: normal    Diagnosis: Primary osteoarthritis Right knee    Plan: Right total knee arthroplasty    Due to the serious nature of total joint infection and the high prevalence of community acquired MRSA, vancomycin will be used perioperatively.

## 2023-10-04 ENCOUNTER — PATIENT MESSAGE (OUTPATIENT)
Dept: ORTHOPEDICS | Facility: CLINIC | Age: 73
End: 2023-10-04
Payer: COMMERCIAL

## 2023-10-04 ENCOUNTER — HOSPITAL ENCOUNTER (OUTPATIENT)
Dept: CARDIOLOGY | Facility: CLINIC | Age: 73
Discharge: HOME | End: 2023-10-04
Payer: COMMERCIAL

## 2023-10-04 ENCOUNTER — HOSPITAL ENCOUNTER (OUTPATIENT)
Dept: RADIOLOGY | Facility: HOSPITAL | Age: 73
Discharge: HOME | End: 2023-10-04
Attending: NURSE PRACTITIONER
Payer: COMMERCIAL

## 2023-10-04 ENCOUNTER — HOSPITAL ENCOUNTER (OUTPATIENT)
Dept: RADIOLOGY | Facility: HOSPITAL | Age: 73
Discharge: HOME | End: 2023-10-04
Attending: PODIATRIST
Payer: COMMERCIAL

## 2023-10-04 DIAGNOSIS — M17.11 PRIMARY OSTEOARTHRITIS OF RIGHT KNEE: ICD-10-CM

## 2023-10-04 DIAGNOSIS — M20.41 HAMMER TOES OF BOTH FEET: ICD-10-CM

## 2023-10-04 DIAGNOSIS — M20.22 HALLUX RIGIDUS OF LEFT FOOT: ICD-10-CM

## 2023-10-04 DIAGNOSIS — F41.9 ANXIETY: ICD-10-CM

## 2023-10-04 DIAGNOSIS — M20.42 HAMMER TOES OF BOTH FEET: ICD-10-CM

## 2023-10-04 DIAGNOSIS — M47.897 OTHER SPONDYLOSIS, LUMBOSACRAL REGION: ICD-10-CM

## 2023-10-04 DIAGNOSIS — E78.5 HYPERLIPIDEMIA, UNSPECIFIED HYPERLIPIDEMIA TYPE: ICD-10-CM

## 2023-10-04 DIAGNOSIS — I10 PRIMARY HYPERTENSION: ICD-10-CM

## 2023-10-04 DIAGNOSIS — M21.42 ACQUIRED PES PLANUS, LEFT: ICD-10-CM

## 2023-10-04 DIAGNOSIS — E87.1 HYPONATREMIA: ICD-10-CM

## 2023-10-04 DIAGNOSIS — Z01.818 PRE-OP EVALUATION: ICD-10-CM

## 2023-10-04 PROCEDURE — 93010 ELECTROCARDIOGRAM REPORT: CPT | Mod: S$GLB,,, | Performed by: INTERNAL MEDICINE

## 2023-10-04 PROCEDURE — 93005 EKG 12-LEAD: ICD-10-PCS | Mod: S$GLB,,, | Performed by: NURSE PRACTITIONER

## 2023-10-04 PROCEDURE — 93005 ELECTROCARDIOGRAM TRACING: CPT | Mod: S$GLB,,, | Performed by: NURSE PRACTITIONER

## 2023-10-04 PROCEDURE — 73630 X-RAY EXAM OF FOOT: CPT | Mod: 26,LT,, | Performed by: RADIOLOGY

## 2023-10-04 PROCEDURE — 93010 EKG 12-LEAD: ICD-10-PCS | Mod: S$GLB,,, | Performed by: INTERNAL MEDICINE

## 2023-10-04 PROCEDURE — 73630 X-RAY EXAM OF FOOT: CPT | Mod: TC,LT

## 2023-10-04 PROCEDURE — 73560 XR KNEE 1 OR 2 VIEW RIGHT: ICD-10-PCS | Mod: 26,RT,, | Performed by: RADIOLOGY

## 2023-10-04 PROCEDURE — 73560 X-RAY EXAM OF KNEE 1 OR 2: CPT | Mod: TC,RT

## 2023-10-04 PROCEDURE — 73560 X-RAY EXAM OF KNEE 1 OR 2: CPT | Mod: 26,RT,, | Performed by: RADIOLOGY

## 2023-10-04 PROCEDURE — 73630 XR FOOT COMPLETE 3 VIEW LEFT: ICD-10-PCS | Mod: 26,LT,, | Performed by: RADIOLOGY

## 2023-10-05 PROBLEM — E87.1 HYPONATREMIA: Status: ACTIVE | Noted: 2023-10-05

## 2023-10-05 PROBLEM — N28.9 RENAL INSUFFICIENCY: Status: ACTIVE | Noted: 2023-10-05

## 2023-10-05 PROBLEM — D64.9 ANEMIA: Status: ACTIVE | Noted: 2023-10-05

## 2023-10-12 ENCOUNTER — CLINICAL SUPPORT (OUTPATIENT)
Dept: ORTHOPEDICS | Facility: CLINIC | Age: 73
End: 2023-10-12
Payer: COMMERCIAL

## 2023-10-12 DIAGNOSIS — M17.11 PRIMARY OSTEOARTHRITIS OF RIGHT KNEE: Primary | ICD-10-CM

## 2023-10-12 PROCEDURE — 99499 UNLISTED E&M SERVICE: CPT | Mod: S$GLB,,, | Performed by: ORTHOPAEDIC SURGERY

## 2023-10-12 PROCEDURE — 99499 NO LOS: ICD-10-PCS | Mod: S$GLB,,, | Performed by: ORTHOPAEDIC SURGERY

## 2023-10-16 DIAGNOSIS — M17.11 PRIMARY OSTEOARTHRITIS OF RIGHT KNEE: ICD-10-CM

## 2023-10-16 RX ORDER — TRAMADOL HYDROCHLORIDE 50 MG/1
50 TABLET ORAL EVERY 6 HOURS PRN
Qty: 28 TABLET | Refills: 1 | Status: SHIPPED | OUTPATIENT
Start: 2023-10-16

## 2023-10-19 ENCOUNTER — TELEPHONE (OUTPATIENT)
Dept: ORTHOPEDICS | Facility: CLINIC | Age: 73
End: 2023-10-19
Payer: COMMERCIAL

## 2023-10-19 NOTE — TELEPHONE ENCOUNTER
I called the patient today regarding surgery on 10/23/2023 with Dr. Jean-Pierre Bennett. I informed the patient that her surgery will be at  [facility] ([facility] [place] [address]). I informed the patient they must arrive at 7:30am and their surgery will start at 9:30am.     Per the [facility] COVID-19 Pre-Procedural Testing Policy (administered 10/26/2022), patients do not need tested for COVID-19 regardless of vaccination status.    I reminded the patient that they cannot eat or drink after midnight, the night before surgery.      I reminded the patient to be careful of their skin over the next few days to make sure they do not get any cuts, scratches or scrapes.    The patient verbalized that they have received their walker, bedside commode from [facility]. The patient is going to call [facility] to get the shower chair.    The patient verbalized understanding and has no further questions.

## 2023-10-22 DIAGNOSIS — Z96.651 S/P TOTAL KNEE REPLACEMENT USING CEMENT, RIGHT: Primary | ICD-10-CM

## 2023-10-22 RX ORDER — ASPIRIN 81 MG/1
81 TABLET ORAL 2 TIMES DAILY
Qty: 60 TABLET | Refills: 0 | Status: SHIPPED | OUTPATIENT
Start: 2023-10-22 | End: 2024-03-14

## 2023-10-22 RX ORDER — METHOCARBAMOL 750 MG/1
750 TABLET, FILM COATED ORAL 4 TIMES DAILY PRN
Qty: 40 TABLET | Refills: 0 | Status: SHIPPED | OUTPATIENT
Start: 2023-10-22 | End: 2023-11-03

## 2023-10-22 RX ORDER — CEFADROXIL 500 MG/1
500 CAPSULE ORAL EVERY 12 HOURS
Qty: 14 CAPSULE | Refills: 0 | Status: SHIPPED | OUTPATIENT
Start: 2023-10-22

## 2023-10-22 RX ORDER — PANTOPRAZOLE SODIUM 40 MG/1
40 TABLET, DELAYED RELEASE ORAL DAILY
Qty: 30 TABLET | Refills: 0 | Status: SHIPPED | OUTPATIENT
Start: 2023-10-22 | End: 2023-11-21 | Stop reason: SDUPTHER

## 2023-10-22 RX ORDER — DEXTROMETHORPHAN HYDROBROMIDE, GUAIFENESIN 5; 100 MG/5ML; MG/5ML
650 LIQUID ORAL EVERY 8 HOURS
Qty: 120 TABLET | Refills: 0 | Status: SHIPPED | OUTPATIENT
Start: 2023-10-22 | End: 2023-11-21 | Stop reason: SDUPTHER

## 2023-10-22 RX ORDER — AMOXICILLIN 250 MG
1 CAPSULE ORAL DAILY
Qty: 30 TABLET | Refills: 0 | Status: SHIPPED | OUTPATIENT
Start: 2023-10-22 | End: 2024-03-14

## 2023-10-22 RX ORDER — OXYCODONE HYDROCHLORIDE 5 MG/1
TABLET ORAL
Qty: 50 TABLET | Refills: 0 | Status: SHIPPED | OUTPATIENT
Start: 2023-10-22

## 2023-10-23 ENCOUNTER — ANESTHESIA EVENT (OUTPATIENT)
Dept: SURGERY | Facility: HOSPITAL | Age: 73
End: 2023-10-23
Payer: COMMERCIAL

## 2023-10-23 ENCOUNTER — HOSPITAL ENCOUNTER (OUTPATIENT)
Facility: HOSPITAL | Age: 73
Discharge: HOME | End: 2023-10-24
Attending: ORTHOPAEDIC SURGERY | Admitting: ORTHOPAEDIC SURGERY
Payer: COMMERCIAL

## 2023-10-23 ENCOUNTER — ANESTHESIA (OUTPATIENT)
Dept: SURGERY | Facility: HOSPITAL | Age: 73
End: 2023-10-23
Payer: COMMERCIAL

## 2023-10-23 DIAGNOSIS — M17.11 PRIMARY OSTEOARTHRITIS OF RIGHT KNEE: ICD-10-CM

## 2023-10-23 PROCEDURE — 97116 GAIT TRAINING THERAPY: CPT

## 2023-10-23 PROCEDURE — 25000003 PHARM REV CODE 250: Performed by: NURSE PRACTITIONER

## 2023-10-23 PROCEDURE — 97535 SELF CARE MNGMENT TRAINING: CPT

## 2023-10-23 PROCEDURE — 25000003 PHARM REV CODE 250: Performed by: ORTHOPAEDIC SURGERY

## 2023-10-23 PROCEDURE — 36000712 HC OR TIME LEV V 1ST 15 MIN: Performed by: ORTHOPAEDIC SURGERY

## 2023-10-23 PROCEDURE — 0055T BONE SRGRY CMPTR CT/MRI IMAG: CPT | Mod: ,,, | Performed by: ORTHOPAEDIC SURGERY

## 2023-10-23 PROCEDURE — 27201423 OPTIME MED/SURG SUP & DEVICES STERILE SUPPLY: Performed by: ORTHOPAEDIC SURGERY

## 2023-10-23 PROCEDURE — 0055T PR COMPUTER-ASSIST MUSCSKEL NAVIG, ORTHO PROC, CT/MRI: ICD-10-PCS | Mod: ,,, | Performed by: ORTHOPAEDIC SURGERY

## 2023-10-23 PROCEDURE — 63600175 PHARM REV CODE 636 W HCPCS: Performed by: NURSE PRACTITIONER

## 2023-10-23 PROCEDURE — 97165 OT EVAL LOW COMPLEX 30 MIN: CPT

## 2023-10-23 PROCEDURE — D9220A PRA ANESTHESIA: ICD-10-PCS | Mod: ANES,,, | Performed by: ANESTHESIOLOGY

## 2023-10-23 PROCEDURE — 94761 N-INVAS EAR/PLS OXIMETRY MLT: CPT

## 2023-10-23 PROCEDURE — 63600175 PHARM REV CODE 636 W HCPCS: Performed by: NURSE ANESTHETIST, CERTIFIED REGISTERED

## 2023-10-23 PROCEDURE — 71000039 HC RECOVERY, EACH ADD'L HOUR: Performed by: ORTHOPAEDIC SURGERY

## 2023-10-23 PROCEDURE — D9220A PRA ANESTHESIA: ICD-10-PCS | Mod: CRNA,,, | Performed by: NURSE ANESTHETIST, CERTIFIED REGISTERED

## 2023-10-23 PROCEDURE — 99900035 HC TECH TIME PER 15 MIN (STAT)

## 2023-10-23 PROCEDURE — 71000033 HC RECOVERY, INTIAL HOUR: Performed by: ORTHOPAEDIC SURGERY

## 2023-10-23 PROCEDURE — D9220A PRA ANESTHESIA: Mod: CRNA,,, | Performed by: NURSE ANESTHETIST, CERTIFIED REGISTERED

## 2023-10-23 PROCEDURE — 27447 TOTAL KNEE ARTHROPLASTY: CPT | Mod: RT,,, | Performed by: ORTHOPAEDIC SURGERY

## 2023-10-23 PROCEDURE — 63600175 PHARM REV CODE 636 W HCPCS: Performed by: ANESTHESIOLOGY

## 2023-10-23 PROCEDURE — 25000003 PHARM REV CODE 250: Performed by: STUDENT IN AN ORGANIZED HEALTH CARE EDUCATION/TRAINING PROGRAM

## 2023-10-23 PROCEDURE — 37000009 HC ANESTHESIA EA ADD 15 MINS: Performed by: ORTHOPAEDIC SURGERY

## 2023-10-23 PROCEDURE — 37000008 HC ANESTHESIA 1ST 15 MINUTES: Performed by: ORTHOPAEDIC SURGERY

## 2023-10-23 PROCEDURE — 94799 UNLISTED PULMONARY SVC/PX: CPT

## 2023-10-23 PROCEDURE — 27447 PR TOTAL KNEE ARTHROPLASTY: ICD-10-PCS | Mod: RT,,, | Performed by: ORTHOPAEDIC SURGERY

## 2023-10-23 PROCEDURE — 63600175 PHARM REV CODE 636 W HCPCS: Performed by: ORTHOPAEDIC SURGERY

## 2023-10-23 PROCEDURE — C1713 ANCHOR/SCREW BN/BN,TIS/BN: HCPCS | Performed by: ORTHOPAEDIC SURGERY

## 2023-10-23 PROCEDURE — D9220A PRA ANESTHESIA: Mod: ANES,,, | Performed by: ANESTHESIOLOGY

## 2023-10-23 PROCEDURE — C1776 JOINT DEVICE (IMPLANTABLE): HCPCS | Performed by: ORTHOPAEDIC SURGERY

## 2023-10-23 PROCEDURE — 36000713 HC OR TIME LEV V EA ADD 15 MIN: Performed by: ORTHOPAEDIC SURGERY

## 2023-10-23 PROCEDURE — 25000003 PHARM REV CODE 250: Performed by: NURSE ANESTHETIST, CERTIFIED REGISTERED

## 2023-10-23 PROCEDURE — 97161 PT EVAL LOW COMPLEX 20 MIN: CPT

## 2023-10-23 DEVICE — INSERT TRIATH TIB PS SZ4 9MM: Type: IMPLANTABLE DEVICE | Site: KNEE | Status: FUNCTIONAL

## 2023-10-23 DEVICE — BASEPLATE TRIATHLON TS SZ4: Type: IMPLANTABLE DEVICE | Site: KNEE | Status: FUNCTIONAL

## 2023-10-23 DEVICE — PATELLA TRIATHLON 33X9 SYMTRC: Type: IMPLANTABLE DEVICE | Site: KNEE | Status: FUNCTIONAL

## 2023-10-23 DEVICE — COMP FEM POST STAB CEM SZ 5 RT: Type: IMPLANTABLE DEVICE | Site: KNEE | Status: FUNCTIONAL

## 2023-10-23 RX ORDER — LIDOCAINE HYDROCHLORIDE 10 MG/ML
1 INJECTION, SOLUTION EPIDURAL; INFILTRATION; INTRACAUDAL; PERINEURAL
Status: DISCONTINUED | OUTPATIENT
Start: 2023-10-23 | End: 2023-10-23 | Stop reason: HOSPADM

## 2023-10-23 RX ORDER — IBUPROFEN 200 MG
16 TABLET ORAL
Status: DISCONTINUED | OUTPATIENT
Start: 2023-10-23 | End: 2023-10-23

## 2023-10-23 RX ORDER — ACETAMINOPHEN 500 MG
1000 TABLET ORAL EVERY 6 HOURS
Status: DISCONTINUED | OUTPATIENT
Start: 2023-10-23 | End: 2023-10-24 | Stop reason: HOSPADM

## 2023-10-23 RX ORDER — PROPOFOL 10 MG/ML
VIAL (ML) INTRAVENOUS CONTINUOUS PRN
Status: DISCONTINUED | OUTPATIENT
Start: 2023-10-23 | End: 2023-10-23

## 2023-10-23 RX ORDER — ASPIRIN 81 MG/1
81 TABLET ORAL 2 TIMES DAILY
Status: DISCONTINUED | OUTPATIENT
Start: 2023-10-23 | End: 2023-10-24 | Stop reason: HOSPADM

## 2023-10-23 RX ORDER — OXYCODONE HYDROCHLORIDE 10 MG/1
10 TABLET ORAL
Status: DISCONTINUED | OUTPATIENT
Start: 2023-10-23 | End: 2023-10-24 | Stop reason: HOSPADM

## 2023-10-23 RX ORDER — FENTANYL CITRATE 50 UG/ML
100 INJECTION, SOLUTION INTRAMUSCULAR; INTRAVENOUS
Status: DISCONTINUED | OUTPATIENT
Start: 2023-10-23 | End: 2023-10-23 | Stop reason: HOSPADM

## 2023-10-23 RX ORDER — MORPHINE SULFATE 1 MG/ML
INJECTION, SOLUTION EPIDURAL; INTRATHECAL; INTRAVENOUS
Status: DISCONTINUED | OUTPATIENT
Start: 2023-10-23 | End: 2023-10-23 | Stop reason: HOSPADM

## 2023-10-23 RX ORDER — PREGABALIN 75 MG/1
75 CAPSULE ORAL NIGHTLY
Status: DISCONTINUED | OUTPATIENT
Start: 2023-10-23 | End: 2023-10-23

## 2023-10-23 RX ORDER — CELECOXIB 200 MG/1
400 CAPSULE ORAL ONCE
Status: COMPLETED | OUTPATIENT
Start: 2023-10-23 | End: 2023-10-23

## 2023-10-23 RX ORDER — MIDAZOLAM HYDROCHLORIDE 1 MG/ML
INJECTION, SOLUTION INTRAMUSCULAR; INTRAVENOUS
Status: DISCONTINUED | OUTPATIENT
Start: 2023-10-23 | End: 2023-10-23

## 2023-10-23 RX ORDER — POLYETHYLENE GLYCOL 3350 17 G/17G
17 POWDER, FOR SOLUTION ORAL DAILY
Status: DISCONTINUED | OUTPATIENT
Start: 2023-10-24 | End: 2023-10-24 | Stop reason: HOSPADM

## 2023-10-23 RX ORDER — SODIUM CHLORIDE 9 MG/ML
INJECTION, SOLUTION INTRAVENOUS CONTINUOUS
Status: DISCONTINUED | OUTPATIENT
Start: 2023-10-23 | End: 2023-10-24 | Stop reason: HOSPADM

## 2023-10-23 RX ORDER — ATORVASTATIN CALCIUM 20 MG/1
20 TABLET, FILM COATED ORAL NIGHTLY
Status: DISCONTINUED | OUTPATIENT
Start: 2023-10-23 | End: 2023-10-24 | Stop reason: HOSPADM

## 2023-10-23 RX ORDER — TRIAMTERENE AND HYDROCHLOROTHIAZIDE 37.5; 25 MG/1; MG/1
1 CAPSULE ORAL DAILY
Status: DISCONTINUED | OUTPATIENT
Start: 2023-10-24 | End: 2023-10-24 | Stop reason: HOSPADM

## 2023-10-23 RX ORDER — BISACODYL 10 MG
10 SUPPOSITORY, RECTAL RECTAL EVERY 12 HOURS PRN
Status: DISCONTINUED | OUTPATIENT
Start: 2023-10-23 | End: 2023-10-24 | Stop reason: HOSPADM

## 2023-10-23 RX ORDER — MIDAZOLAM HYDROCHLORIDE 1 MG/ML
4 INJECTION INTRAMUSCULAR; INTRAVENOUS
Status: DISCONTINUED | OUTPATIENT
Start: 2023-10-23 | End: 2023-10-23 | Stop reason: HOSPADM

## 2023-10-23 RX ORDER — DEXAMETHASONE SODIUM PHOSPHATE 4 MG/ML
INJECTION, SOLUTION INTRA-ARTICULAR; INTRALESIONAL; INTRAMUSCULAR; INTRAVENOUS; SOFT TISSUE
Status: DISCONTINUED | OUTPATIENT
Start: 2023-10-23 | End: 2023-10-23

## 2023-10-23 RX ORDER — AMOXICILLIN 250 MG
1 CAPSULE ORAL 2 TIMES DAILY
Status: DISCONTINUED | OUTPATIENT
Start: 2023-10-23 | End: 2023-10-24 | Stop reason: HOSPADM

## 2023-10-23 RX ORDER — METHYLPREDNISOLONE ACETATE 40 MG/ML
INJECTION, SUSPENSION INTRA-ARTICULAR; INTRALESIONAL; INTRAMUSCULAR; SOFT TISSUE
Status: DISCONTINUED | OUTPATIENT
Start: 2023-10-23 | End: 2023-10-23 | Stop reason: HOSPADM

## 2023-10-23 RX ORDER — IBUPROFEN 200 MG
24 TABLET ORAL
Status: DISCONTINUED | OUTPATIENT
Start: 2023-10-23 | End: 2023-10-23

## 2023-10-23 RX ORDER — FAMOTIDINE 20 MG/1
20 TABLET, FILM COATED ORAL 2 TIMES DAILY
Status: DISCONTINUED | OUTPATIENT
Start: 2023-10-23 | End: 2023-10-24 | Stop reason: HOSPADM

## 2023-10-23 RX ORDER — METOPROLOL SUCCINATE 50 MG/1
100 TABLET, EXTENDED RELEASE ORAL DAILY
Status: DISCONTINUED | OUTPATIENT
Start: 2023-10-24 | End: 2023-10-24 | Stop reason: HOSPADM

## 2023-10-23 RX ORDER — TALC
6 POWDER (GRAM) TOPICAL NIGHTLY PRN
Status: DISCONTINUED | OUTPATIENT
Start: 2023-10-23 | End: 2023-10-23 | Stop reason: HOSPADM

## 2023-10-23 RX ORDER — GLUCAGON 1 MG
1 KIT INJECTION
Status: DISCONTINUED | OUTPATIENT
Start: 2023-10-23 | End: 2023-10-23

## 2023-10-23 RX ORDER — INSULIN ASPART 100 [IU]/ML
0-5 INJECTION, SOLUTION INTRAVENOUS; SUBCUTANEOUS
Status: DISCONTINUED | OUTPATIENT
Start: 2023-10-23 | End: 2023-10-23

## 2023-10-23 RX ORDER — VANCOMYCIN HYDROCHLORIDE 1 G/20ML
INJECTION, POWDER, LYOPHILIZED, FOR SOLUTION INTRAVENOUS
Status: DISCONTINUED | OUTPATIENT
Start: 2023-10-23 | End: 2023-10-23 | Stop reason: HOSPADM

## 2023-10-23 RX ORDER — NICARDIPINE HYDROCHLORIDE 2.5 MG/ML
INJECTION INTRAVENOUS
Status: DISCONTINUED | OUTPATIENT
Start: 2023-10-23 | End: 2023-10-23

## 2023-10-23 RX ORDER — ESCITALOPRAM OXALATE 10 MG/1
10 TABLET ORAL DAILY
Status: DISCONTINUED | OUTPATIENT
Start: 2023-10-24 | End: 2023-10-24 | Stop reason: HOSPADM

## 2023-10-23 RX ORDER — PROCHLORPERAZINE EDISYLATE 5 MG/ML
5 INJECTION INTRAMUSCULAR; INTRAVENOUS EVERY 6 HOURS PRN
Status: DISCONTINUED | OUTPATIENT
Start: 2023-10-23 | End: 2023-10-24 | Stop reason: HOSPADM

## 2023-10-23 RX ORDER — ONDANSETRON 2 MG/ML
4 INJECTION INTRAMUSCULAR; INTRAVENOUS EVERY 8 HOURS PRN
Status: DISCONTINUED | OUTPATIENT
Start: 2023-10-23 | End: 2023-10-24 | Stop reason: HOSPADM

## 2023-10-23 RX ORDER — PROPOFOL 10 MG/ML
VIAL (ML) INTRAVENOUS
Status: DISCONTINUED | OUTPATIENT
Start: 2023-10-23 | End: 2023-10-23

## 2023-10-23 RX ORDER — MUPIROCIN 20 MG/G
1 OINTMENT TOPICAL
Status: COMPLETED | OUTPATIENT
Start: 2023-10-23 | End: 2023-10-23

## 2023-10-23 RX ORDER — PREGABALIN 75 MG/1
75 CAPSULE ORAL
Status: DISCONTINUED | OUTPATIENT
Start: 2023-10-23 | End: 2023-10-23 | Stop reason: HOSPADM

## 2023-10-23 RX ORDER — ACETAMINOPHEN 500 MG
1000 TABLET ORAL
Status: COMPLETED | OUTPATIENT
Start: 2023-10-23 | End: 2023-10-23

## 2023-10-23 RX ORDER — MORPHINE SULFATE 2 MG/ML
2 INJECTION, SOLUTION INTRAMUSCULAR; INTRAVENOUS
Status: DISCONTINUED | OUTPATIENT
Start: 2023-10-23 | End: 2023-10-24 | Stop reason: HOSPADM

## 2023-10-23 RX ORDER — OXYCODONE HYDROCHLORIDE 5 MG/1
5 TABLET ORAL
Status: DISCONTINUED | OUTPATIENT
Start: 2023-10-23 | End: 2023-10-24 | Stop reason: HOSPADM

## 2023-10-23 RX ORDER — NALOXONE HCL 0.4 MG/ML
0.02 VIAL (ML) INJECTION
Status: DISCONTINUED | OUTPATIENT
Start: 2023-10-23 | End: 2023-10-24 | Stop reason: HOSPADM

## 2023-10-23 RX ORDER — GABAPENTIN 300 MG/1
300 CAPSULE ORAL 3 TIMES DAILY
Status: DISCONTINUED | OUTPATIENT
Start: 2023-10-23 | End: 2023-10-24 | Stop reason: HOSPADM

## 2023-10-23 RX ORDER — TALC
6 POWDER (GRAM) TOPICAL NIGHTLY PRN
Status: DISCONTINUED | OUTPATIENT
Start: 2023-10-23 | End: 2023-10-24 | Stop reason: HOSPADM

## 2023-10-23 RX ORDER — METHOCARBAMOL 750 MG/1
750 TABLET, FILM COATED ORAL 3 TIMES DAILY
Status: DISCONTINUED | OUTPATIENT
Start: 2023-10-23 | End: 2023-10-24 | Stop reason: HOSPADM

## 2023-10-23 RX ORDER — TRANEXAMIC ACID 100 MG/ML
INJECTION, SOLUTION INTRAVENOUS
Status: DISCONTINUED | OUTPATIENT
Start: 2023-10-23 | End: 2023-10-23

## 2023-10-23 RX ORDER — FENTANYL CITRATE 50 UG/ML
25 INJECTION, SOLUTION INTRAMUSCULAR; INTRAVENOUS EVERY 5 MIN PRN
Status: COMPLETED | OUTPATIENT
Start: 2023-10-23 | End: 2023-10-23

## 2023-10-23 RX ORDER — ROPIVACAINE HYDROCHLORIDE 5 MG/ML
INJECTION, SOLUTION EPIDURAL; INFILTRATION; PERINEURAL
Status: DISCONTINUED | OUTPATIENT
Start: 2023-10-23 | End: 2023-10-23 | Stop reason: HOSPADM

## 2023-10-23 RX ORDER — METFORMIN HYDROCHLORIDE 500 MG/1
500 TABLET, EXTENDED RELEASE ORAL
Status: DISCONTINUED | OUTPATIENT
Start: 2023-10-24 | End: 2023-10-24 | Stop reason: HOSPADM

## 2023-10-23 RX ORDER — KETAMINE HCL IN 0.9 % NACL 50 MG/5 ML
SYRINGE (ML) INTRAVENOUS
Status: DISCONTINUED | OUTPATIENT
Start: 2023-10-23 | End: 2023-10-23

## 2023-10-23 RX ORDER — ONDANSETRON 2 MG/ML
INJECTION INTRAMUSCULAR; INTRAVENOUS
Status: DISCONTINUED | OUTPATIENT
Start: 2023-10-23 | End: 2023-10-23

## 2023-10-23 RX ORDER — SODIUM CHLORIDE 9 MG/ML
INJECTION, SOLUTION INTRAVENOUS
Status: COMPLETED | OUTPATIENT
Start: 2023-10-23 | End: 2023-10-23

## 2023-10-23 RX ORDER — MUPIROCIN 20 MG/G
1 OINTMENT TOPICAL 2 TIMES DAILY
Status: DISCONTINUED | OUTPATIENT
Start: 2023-10-23 | End: 2023-10-24 | Stop reason: HOSPADM

## 2023-10-23 RX ORDER — SODIUM CHLORIDE 0.9 % (FLUSH) 0.9 %
10 SYRINGE (ML) INJECTION
Status: DISCONTINUED | OUTPATIENT
Start: 2023-10-23 | End: 2023-10-23 | Stop reason: HOSPADM

## 2023-10-23 RX ORDER — TRANEXAMIC ACID 100 MG/ML
INJECTION, SOLUTION INTRAVENOUS
Status: DISCONTINUED | OUTPATIENT
Start: 2023-10-23 | End: 2023-10-23 | Stop reason: HOSPADM

## 2023-10-23 RX ADMIN — SODIUM CHLORIDE, SODIUM GLUCONATE, SODIUM ACETATE, POTASSIUM CHLORIDE, MAGNESIUM CHLORIDE, SODIUM PHOSPHATE, DIBASIC, AND POTASSIUM PHOSPHATE: .53; .5; .37; .037; .03; .012; .00082 INJECTION, SOLUTION INTRAVENOUS at 12:10

## 2023-10-23 RX ADMIN — FAMOTIDINE 20 MG: 20 TABLET, FILM COATED ORAL at 09:10

## 2023-10-23 RX ADMIN — MUPIROCIN 1 G: 20 OINTMENT TOPICAL at 08:10

## 2023-10-23 RX ADMIN — CEFAZOLIN 2 G: 2 INJECTION, POWDER, FOR SOLUTION INTRAMUSCULAR; INTRAVENOUS at 11:10

## 2023-10-23 RX ADMIN — FENTANYL CITRATE 25 MCG: 50 INJECTION INTRAMUSCULAR; INTRAVENOUS at 02:10

## 2023-10-23 RX ADMIN — ATORVASTATIN CALCIUM 20 MG: 20 TABLET, FILM COATED ORAL at 09:10

## 2023-10-23 RX ADMIN — ACETAMINOPHEN 1000 MG: 500 TABLET ORAL at 08:10

## 2023-10-23 RX ADMIN — SODIUM CHLORIDE: 9 INJECTION, SOLUTION INTRAVENOUS at 03:10

## 2023-10-23 RX ADMIN — SODIUM CHLORIDE: 0.9 INJECTION, SOLUTION INTRAVENOUS at 10:10

## 2023-10-23 RX ADMIN — NICARDIPINE HYDROCHLORIDE 0.6 MG: 25 INJECTION, SOLUTION INTRAVENOUS at 12:10

## 2023-10-23 RX ADMIN — MEPIVACAINE HYDROCHLORIDE 3 ML: 20 INJECTION, SOLUTION EPIDURAL; INFILTRATION at 11:10

## 2023-10-23 RX ADMIN — ONDANSETRON 4 MG: 2 INJECTION INTRAMUSCULAR; INTRAVENOUS at 11:10

## 2023-10-23 RX ADMIN — ASPIRIN 81 MG: 81 TABLET, COATED ORAL at 09:10

## 2023-10-23 RX ADMIN — SENNOSIDES AND DOCUSATE SODIUM 1 TABLET: 50; 8.6 TABLET ORAL at 09:10

## 2023-10-23 RX ADMIN — MUPIROCIN 1 G: 20 OINTMENT TOPICAL at 09:10

## 2023-10-23 RX ADMIN — FENTANYL CITRATE 25 MCG: 50 INJECTION INTRAMUSCULAR; INTRAVENOUS at 01:10

## 2023-10-23 RX ADMIN — DEXAMETHASONE SODIUM PHOSPHATE 8 MG: 4 INJECTION, SOLUTION INTRAMUSCULAR; INTRAVENOUS at 11:10

## 2023-10-23 RX ADMIN — VANCOMYCIN HYDROCHLORIDE 1000 MG: 1 INJECTION, POWDER, LYOPHILIZED, FOR SOLUTION INTRAVENOUS at 08:10

## 2023-10-23 RX ADMIN — TRANEXAMIC ACID 1000 MG: 100 INJECTION INTRAVENOUS at 12:10

## 2023-10-23 RX ADMIN — PROPOFOL 40 MG: 10 INJECTION, EMULSION INTRAVENOUS at 11:10

## 2023-10-23 RX ADMIN — MIDAZOLAM HYDROCHLORIDE 2 MG: 1 INJECTION, SOLUTION INTRAMUSCULAR; INTRAVENOUS at 10:10

## 2023-10-23 RX ADMIN — CEFAZOLIN 2 G: 2 INJECTION, POWDER, FOR SOLUTION INTRAMUSCULAR; INTRAVENOUS at 07:10

## 2023-10-23 RX ADMIN — METHOCARBAMOL 750 MG: 750 TABLET ORAL at 09:10

## 2023-10-23 RX ADMIN — SODIUM CHLORIDE, SODIUM GLUCONATE, SODIUM ACETATE, POTASSIUM CHLORIDE, MAGNESIUM CHLORIDE, SODIUM PHOSPHATE, DIBASIC, AND POTASSIUM PHOSPHATE: .53; .5; .37; .037; .03; .012; .00082 INJECTION, SOLUTION INTRAVENOUS at 11:10

## 2023-10-23 RX ADMIN — Medication 20 MG: at 11:10

## 2023-10-23 RX ADMIN — OXYCODONE HYDROCHLORIDE 10 MG: 5 TABLET ORAL at 01:10

## 2023-10-23 RX ADMIN — SODIUM CHLORIDE: 9 INJECTION, SOLUTION INTRAVENOUS at 09:10

## 2023-10-23 RX ADMIN — CELECOXIB 400 MG: 200 CAPSULE ORAL at 08:10

## 2023-10-23 RX ADMIN — TRANEXAMIC ACID 1000 MG: 100 INJECTION INTRAVENOUS at 11:10

## 2023-10-23 RX ADMIN — PROPOFOL 125 MCG/KG/MIN: 10 INJECTION, EMULSION INTRAVENOUS at 11:10

## 2023-10-23 RX ADMIN — ACETAMINOPHEN 1000 MG: 500 TABLET ORAL at 05:10

## 2023-10-23 RX ADMIN — GABAPENTIN 300 MG: 300 CAPSULE ORAL at 09:10

## 2023-10-23 NOTE — ANESTHESIA POSTPROCEDURE EVALUATION
Anesthesia Post Evaluation    Patient: Carmen Hawley    Procedure(s) Performed: Procedure(s) (LRB):  ARTHROPLASTY, KNEE, TOTAL, USING COMPUTER-ASSISTED NAVIGATION: JOSHUA: RIGHT: JILL DELGADO (Right)    Final Anesthesia Type: CSE      Patient location during evaluation: PACU  Patient participation: Yes- Able to Participate  Level of consciousness: awake and alert and oriented  Post-procedure vital signs: reviewed and stable  Pain management: adequate  Airway patency: patent    PONV status at discharge: No PONV  Anesthetic complications: no      Cardiovascular status: hemodynamically stable  Respiratory status: unassisted, spontaneous ventilation and room air  Hydration status: euvolemic  Follow-up not needed.          Vitals Value Taken Time   /71 10/23/23 1543   Temp 36.3 °C (97.4 °F) 10/23/23 1543   Pulse 57 10/23/23 1543   Resp 16 10/23/23 1543   SpO2 96 % 10/23/23 1543         Event Time   Out of Recovery 15:11:00         Pain/Juani Score: Pain Rating Prior to Med Admin: 8 (10/23/2023  2:51 PM)

## 2023-10-23 NOTE — OP NOTE
Donald Right TKA  OPERATIVE NOTE    Date of Operation:   10/23/2023    Providers Performing:   Surgeon(s):  Jean-Pierre Bennett III, MD  Assistant: Todd Monroy MD    Operative Procedure:   Right Total Knee Arthroplasty (MAKOplasty) CPT 00395  Computer assisted surgical navigation CPT 0055T    Preoperative Diagnosis:   Right Knee Osteoarthritis, ICD-10 M17.11    Postoperative Diagnosis:   SAME    Components Used:   Ezequiel  Femur: Triathlon PS Size 5  Tibia: Triathlon universal tibial baseplate Size 4  Patella: Triathlon conv Symmetric Patella 33 mm  Tibial Insert: Triathlon fixed bearing PS inset size 9 mm, conv poly  2 packs cement: Simplex    Implant Name Type Inv. Item Serial No.  Lot No. LRB No. Used Action   CEMENT BONE SURG SMPLX P RADPQ - [id number]  CEMENT BONE SURG SMPLX P RADPQ  EZEQUIEL [employer]. [id number] Right 2 Implanted   COMP FEM POST STAB DANAE SZ 5 RT - [id number]  COMP FEM POST STAB DANAE SZ 5 RT  EZEQUIEL [employer]. [id number] Right 1 Implanted   BASEPLATE TRIATHLON TS SZ4 - [id number]  BASEPLATE TRIATHLON TS SZ4  EZEQUIEL [employer]. [id number] Right 1 Implanted   PATELLA TRIATHLON 33X9 SYMTRC - [id number]  PATELLA TRIATHLON 33X9 SYMTRC  EZEQUIEL [employer]. [id number] Right 1 Implanted   INSERT TRIATH TIB PS SZ4 9MM - [id number]  INSERT TRIATH TIB PS SZ4 9MM  EZEQUIEL [employer]. [id number] Right 1 Implanted       Anesthesia:   Spinal+catheter    BRADFORD cocktail: Bennett Block, no toradol    Estimated Blood Loss:   450 cc    Specimens:   None    Complications:   None    Indications:   The patient has failed non-operative measures including medications, injections and activity modifications for their knee.  After an explanation of risks and benefits of knee arthroplasty surgery, the patient wishes to proceed with surgery.    Operative Notes:   The patient was greeted in the pre-op holding area.  The patients knee was marked with a surgical marker by the surgeon.  Spinal-Epidural anesthesia was administered by the  anesthesia team.  The patient was placed in the supine position on the OR table with all bony prominences padded.  A tourniquet was not used.  IV antibiotics were administered.  The leg was prepped and draped in the usual sterile fashion.  A timeout was performed and the correct patient, site and procedure were identified.    The femoral and tibial guide pins where placed with pre-drilling and the arrays were positioned and tightened to the pins.     A midline incision was made with a standard medial parapatellar arthrotomy.  The standard medial capsular release was performed along with the lateral gutter.  The patella was mobilized from the lateral parapatellar ligament and bony osteophytes were removed.  The intercondylar notch was cleared of the ACL.    The hip was then brought through a range of motion and the hip center was identified, medial and lateral malleolus were identified and then began the registration process femur was registered first. The tibia was then registered. We were satisfied with the registration about the femoral and tibial size, it corresponded well on CT scan. Osteophytes were removed. We checked our alignment.     The joint was tensioned in extension and at 90 degrees of flexion to define our gaps. Working with the Ezequiel Robot Tech, the implants were positioned virtually for appropriate size gaps and implant placement.     At this point, the [employer] robotic arm was brought in. It was registered. We cut the femur and tibia. Care was taken during the burring process to protect the soft tissue.  Meniscal remnants were removed following burring.  The box was positioned and cut. The knee was brought into flexion and posterior osteophytes were removed.      At this time the trial implants were placed and knee assessed for stability, and flexion arc. Additional medial release was performed. The patella was prepared using free-hand technique and the three-holed lug drill guide was sized and  appropriately assessed. Patella tracking was found to be acceptable. The tibial component rotation was marked. The trials were removed. The tibial component was positioned and the keel was drilled and punched.    The wound was copiously irrigated with pulsitile lavage and the bony surfaces dried.  Cement was mixed on the back table and applied to all components.  Cementation of the femoral, tibial and patellar components was performed sequentially with removal of all excess cement. A trial insert was placed to provide compression while the cement dried and a 0.35% betadine solution was left to soak in the surgical field.     After the cement was dry, the trial poly insert was removed. Hemostasis was obtained with bovie electrocautery.  The knee was again copiously irrigated with pulsatile lavage. The final polyethylene insert was placed and the knee reduced.      1 gram of vancomycin powder was placed in the knee. The arthrotomy was closed with interrupted #1 vicryl suture and #2 quill, the subcuticular layer was closed with 2-0 vicryl suture and a running 4-0 monocryl was used to closed the skin surface.  Pin sites were closed with 4-0 monocryl and skin glue. Surgicel sealant was placed over the top of the incision and sterile dressing placed over the wound. Appropriately sized TEDs hose were placed for edema control.     Sponge and needle counts were correct.    The first-assistant was critical to all steps of the operation, including retraction and leg stabilization during exposure and bone preparation, as well as the deep and superficial wound closure.  Dr. Bennett performed and/or supervised the key portions of this surgical procedure, including evaluation of the bone cuts, reshaping of the bony elements, and insertion of the provisional and final components.    Postoperative Plan:  The patient will be anticoagulated with 81mg aspirin twice daily for one month.   They will receive 24 hours of post-operative  antibiotics.    Activity will be weight bearing as tolerated.    Istat POD#1 - Rx cbrex if GFR >60    Due patient and or surgical factors the patient will receive an Rx for cefadroxil 500mg BID x7 days after discharge per [place] data:   Mika MM, Avila MAZARIEGOS, Raymon MOSS, Stacy CAMACHO. The [facility] Clinical Research Award: Extended Oral Antibiotics Prevent Periprosthetic Joint Infection in High-Risk Cases: 3855 Patients With 1-Year Follow-Up. J Arthroplasty. 2021 Jul;36(7S):S18-S25. doi: 10.1016/j.arth.[email or web address]. Epub 2021 Jan 23. PMID: [id number].      Signed by: Jean-Pierre Bennett III, MD

## 2023-10-23 NOTE — PLAN OF CARE
PT evaluation complete - see note for details. POC and goals established.    Problem: Physical Therapy  Goal: Physical Therapy Goal  Description: Goals to be met by: 10/27/23     Patient will increase functional independence with mobility by performin. Supine to sit with Stand-by Assistance  2. Sit to supine with Stand-by Assistance  3. Sit to stand transfer with Supervision  4. Bed to chair transfer with Supervision using Rolling Walker  5. Gait  x 150 feet with Supervision using Rolling Walker.   6. Ascend/Descend 6 inch curb step with Contact Guard Assistance using Rolling Walker.  7. Lower extremity exercise program x30 reps per handout, with supervision    Outcome: Ongoing, Progressing  10/23/2023

## 2023-10-23 NOTE — PLAN OF CARE
Poc reviewed with pt, verbalized understanding, questions answered, reassurance provided.     Walker at bedside

## 2023-10-23 NOTE — NURSING
Report received. Care assumed. Patient arrived to unit AAOx4 in hospital bed from PACU. VSS, IVF infusing. Dressing to right knee, CDI.  Pt lying supine in bed. Pt denies pain or any other concerns at this time. See assessment. Patient oriented to room. Bed in lowest position, side rails up x2, bed wheels locked and call light within reach.  Pt instructed to call for assistance, verbalized understanding. NADN. Will continue to monitor.

## 2023-10-23 NOTE — PT/OT/SLP EVAL
"Physical Therapy Evaluation and Treatment     Patient Name:  Carmen Hawley  MRN: [id number]    Admit Date: 10/23/2023  Admitting Diagnosis:  <principal problem not specified>  Length of Stay: 0 days  Recent Surgery: Procedure(s) (LRB):  ARTHROPLASTY, KNEE, TOTAL, USING COMPUTER-ASSISTED NAVIGATION: JOSHUA: RIGHT: JILL - TRIATHALON (Right) Day of Surgery    Recommendations:     Discharge Recommendations: Low intensity therapy  Equipment recommendations: none  Barriers to discharge: None Identified     Assessment:     Carmen Hawley is a 73 y.o. female [place] [facility] [place] with medical diagnosis of s/p R TKA. Pt presents with weakness, impaired endurance, impaired self care skills, impaired functional mobility, gait instability, impaired balance, decreased coordination, decreased lower extremity function, pain, decreased safety awareness, orthopedic precautions. These deficits effect their roles and responsibilities in which they were able to complete prior to admit.     Pt is agreeable to therapy evaluation and session.  and family friend at bedside. Prior to surgery, pt was (I) with ambulation. Pt is concerned with "leg feeling heavy"; provided reassurance that this is a common compliant after sx and it will get better. Pt requires increased time with all mobility. Assistance for moving R leg for bed mobility. Assistance also required for STS with difficulty noted during standing weight shifts. Pt with noted difficulty advancing RLE during gait. Pt returned to eob and handed off to OT. Will continue to progress pt as tolerated.    Carmen Hawley would benefit from acute PT intervention to improve quality of life, focus on recovery of impairments, provide patient/caregiver education, reduce fall risk, and maximize (I) and safety with functional mobility. Once medically stable, recommending pt discharge to Low intensity therapy.      Rehab Prognosis: Good    Plan:     During this " "hospitalization, patient to be seen daily to address the identified rehab impairments via gait training, therapeutic activities, therapeutic exercises, neuromuscular re-education and progress towards stated goals.     Plan of Care Expires:  10/27/23  Plan of Care reviewed with: patient, spouse, friend    This plan of care has been discussed with the patient/caregiver, who was included in its development and is in agreement with the identified goals and treatment plan.     Subjective     Communicated with RN prior to session.  Patient found supine upon PT entry to room, agreeable to evaluation. Pt's  and family friend present during session.    Chief Complaint: R leg pain and heaviness    Patient/Family Comments/goals: "Why is my leg so heavy"    Pain/Comfort:  Pain Rating 1: 7/10  Location - Side 1: Right  Location - Orientation 1: generalized  Location 1: knee  Pain Addressed 1: Reposition, Distraction, Cessation of Activity  Pain Rating Post-Intervention 1:  (pt did not rate)    Patients cultural, spiritual, Mandaen conflicts given the current situation: None identified     Patient History: information obtained from pt     Living Environment: Pt lives with  in single level home  with [facility]. 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    DME owned: rolling walker, single point cane, bedside commode, and shower chair  Support Available/Caregiver Assistance:      Objective:     Patient found with: peripheral IV, cryotherapy, FCD    Recent Surgery: Procedure(s) (LRB):  ARTHROPLASTY, KNEE, TOTAL, USING COMPUTER-ASSISTED NAVIGATION: JOSHUA: RIGHT: JILL DELGADO (Right) Day of Surgery  General Precautions: Standard, fall   Orthopedic Precautions:RLE weight bearing as tolerated   Braces:     Oxygen Device: room air      Exams:    Cognition:  Alert  Command following: Follows " multistep verbal commands  Communication: clear/fluent    Sensation:   Light touch sensation: Intact BLEs    Gross Motor Coordination: No deficits noted during functional mobility tasks     Edema/Skin Integrity: None noted; Visible skin intact    Postural examination/scapula alignment:  no deficits noted    Lower Extremity Range of Motion:  Right Lower Extremity: WFL  Left Lower Extremity: WFL    Lower Extremity Strength:  Right Lower Extremity: Deficits: knee ext: 3+/5, knee flex: 3+/5, hip flex: 3+/5 *limited d/t pain  Left Lower Extremity: WFL    Functional Mobility:    Bed Mobility:  Scooting with contact guard assistance  Supine > Sit with minimum assistance  Assist with lifting RLE    Transfers:   Sit <> Stand Transfer: Moderate Assistance x 1 trials from eob with RW AD   Increased time required to achieve upright standing; pt attempting to not place weight on RLE             Gait:  Distance: 20 ft  Assistance level: Minimal Assistance  Assistive Device: rolling walker  Gait Assessment: decreased step length with decreased titi; decreased RLE advancement; max v/c for sequencing of steps    Balance:  Dynamic Sitting: FAIR+: Maintains balance through MINIMAL excursions of active trunk motion  Standing:  Static: POOR+: Needs MINIMAL assist to maintain   Dynamic: POOR+: Needs MIN (minimal ) assist during gait  Performed standing marches for ~30 secs prior to gait trial    Outcome Measure: AM-PAC 6 CLICK MOBILITY  Total Score:16     Patient/Caregiver Education:     Therapist educated pt/caregiver regarding:   PT POC and goals for therapy   Safety with mobility and fall risk   Instruction on use of call button and importance of calling nursing staff for assistance with mobility   Time provided for therapeutic counseling and discussion of current health disposition. All questions answered to satisfaction, within scope of PT practice     Patient/caregiver able to verbalize understanding and expressed no further  questions this visit; will follow-up with pt/caregiver during current admit for additional questions/concerns within scope of practice.     White board updated.     Patient left sitting edge of bed with all lines intact, call button in reach, and OT present .    GOALS:   Multidisciplinary Problems       Physical Therapy Goals          Problem: Physical Therapy    Goal Priority Disciplines Outcome Goal Variances Interventions   Physical Therapy Goal     PT, PT/OT Ongoing, Progressing     Description: Goals to be met by: 10/27/23     Patient will increase functional independence with mobility by performin. Supine to sit with Stand-by Assistance  2. Sit to supine with Stand-by Assistance  3. Sit to stand transfer with Supervision  4. Bed to chair transfer with Supervision using Rolling Walker  5. Gait  x 150 feet with Supervision using Rolling Walker.   6. Ascend/Descend 6 inch curb step with Contact Guard Assistance using Rolling Walker.  7. Lower extremity exercise program x30 reps per handout, with supervision                           History:     Past Medical History:   Diagnosis Date    Anemia 10/5/2023    Anxiety     Arthritis     Cataract     Hyperlipidemia     Hypertension     Macular degeneration     Mitral valve prolapse     Renal insufficiency 10/5/2023    Salzmann's nodular dystrophy of both eyes        Past Surgical History:   Procedure Laterality Date    CATARACT EXTRACTION W/  INTRAOCULAR LENS IMPLANT Left 2023    Procedure: EXTRACTION, CATARACT, WITH IOL INSERTION;  Surgeon: Flaca Diallo MD;  Location: [facility] OR;  Service: Ophthalmology;  Laterality: Left;    CATARACT EXTRACTION W/  INTRAOCULAR LENS IMPLANT Right 2023    Procedure: EXTRACTION, CATARACT, WITH IOL INSERTION;  Surgeon: Flaca Diallo MD;  Location: [facility] OR;  Service: Ophthalmology;  Laterality: Right;    COLONOSCOPY N/A 10/08/2020    Procedure: COLONOSCOPY;  Surgeon: Crispin Moon MD;  Location: [address]  [facility]);  Service: Endoscopy;  Laterality: N/A;  Family history of colon polyps  COVID test on 10/5/20 at 2nd floor -     EYE SURGERY      INJECTION Right 05/17/2023    Procedure: INJECTION RIGHT KNEE SYNVISC;  Surgeon: Tej Cm MD;  Location: [facility] PAIN MGT;  Service: Pain Management;  Laterality: Right;    TONSILLECTOMY      TRANSFORAMINAL EPIDURAL INJECTION OF STEROID Right 06/29/2022    Procedure: INJECTION, STEROID, EPIDURAL, TRANSFORAMINAL APPROACH, RIGHT L3-L4 AND L4-L5 CONTRAST DIRECT REF;  Surgeon: Tej Cm MD;  Location: [facility] PAIN MGT;  Service: Pain Management;  Laterality: Right;    TRANSFORAMINAL EPIDURAL INJECTION OF STEROID Right 03/15/2023    Procedure: INJECTION, STEROID, EPIDURAL, TRANSFORAMINAL APPROACH RIGHT L3/4 AND L4/5;  Surgeon: Tej Cm MD;  Location: [facility] PAIN MGT;  Service: Pain Management;  Laterality: Right;       Time Tracking:     PT Received On: 10/23/23  PT Start Time: 1624     PT Stop Time: 1643  PT Total Time (min): 19 min     Billable Minutes: Evaluation 9 and Gait Training 10    10/23/2023

## 2023-10-23 NOTE — PT/OT/SLP EVAL
"Occupational Therapy Evaluation and Treatment    Name: Carmen Hawley  MRN: [id number]  Admitting Diagnosis: <principal problem not specified> Day of Surgery  Recent Surgery: Procedure(s) (LRB):  ARTHROPLASTY, KNEE, TOTAL, USING COMPUTER-ASSISTED NAVIGATION: JOSHUA: RIGHT: JILL - SANDY (Right) Day of Surgery    Recommendations:     Discharge Recommendations: Low Intensity Therapy  Level of Assistance Recommended: 24 hours supervision  Discharge Equipment Recommendations: none  Barriers to discharge: None    Assessment:     Carmen Hawley is a 73 y.o. female with a medical diagnosis of <principal problem not specified>. She presents with performance deficits affecting function including impaired self care skills, impaired functional mobility, orthopedic precautions. Pt was able to perform sit/stand, stand pivot to BSC, and stand pivot to chair c CGA and RW.  Able to walk from bed to bathroom and then from bathroom to chair c CGA and RW.  Able to perform grooming and toilet hygiene c set-up.    Rehab Prognosis: Good; patient would benefit from acute OT services to address these deficits and reach maximum level of function.    Plan:     Patient to be seen daily to address the above listed problems via self-care/home management, therapeutic activities, therapeutic exercises  Plan of Care Expires: 10/24/23  Plan of Care Reviewed with: patient, spouse, friend    Subjective     Chief Complaint: R TKA  Patient Comments/Goals: "My leg feels heavy".  Pain/Comfort:  Pain Rating 1: 7/10    Patients cultural, spiritual, Sabianist conflicts given the current situation: no    Social History:  Living Environment: Patient lives with their spouse in a single story home with number of outside stair(s): 1, tub-shower combo, and built-in shower chair  Prior Level of Function: Prior to admission, patient was independent.  Roles and Routines: Patient was driving and retired prior to admission.  Equipment Used at Home: bedside " commode, cane, straight, shower chair, walker, rolling  DME owned (not currently used): rolling walker, bedside commode, and shower chair  Assistance Upon Discharge: significant other    Objective:     Communicated with RN prior to session. Patient found sitting edge of bed with FCD, cryotherapy, peripheral IV upon OT entry to room.    General Precautions: Standard, fall   Orthopedic Precautions: RLE weight bearing as tolerated   Braces: N/A    Respiratory Status: Room air    Occupational Performance    Gait belt applied - Yes    Functional Mobility/Transfers:  Sit <> Stand Transfer with contact guard assistance with rolling walker  Bed <> Chair Transfer using Stand Pivot technique with contact guard assistance with rolling walker  Toilet Transfer Stand Pivot technique with contact guard assistance with rolling walker and bedside commode  Functional Mobility: Pt was able to walk to bathroom c CGA and RW.    Activities of Daily Living:  Grooming: stand by assistance to wash hands while standing at sink c RW.  Toileting: contact guard assistance for toilet hygiene.      Physical Exam:  Upper Extremity Range of Motion:     -       Right Upper Extremity: WFL  -       Left Upper Extremity: WFL  Upper Extremity Strength:    -       Right Upper Extremity: WFL  -       Left Upper Extremity: WFL    AMPAC 6 Click ADL:  AMPAC Total Score: 16    Treatment & Education:  Educated on the importance of mobility to maximize recovery  Educated on the importance of OOB mobility within safe range in order to decrease adverse effects of prolonged bedrest  Educated on safety with functional mobility; hand placement to ensure safe transfers to various surfaces in prep for ADLs  Educated on performing functional mobility and ADLs in adherence to orthopedic precautions  Educated on weight bearing status  Will continue to educate as needed      Patient clear to ambulate to/from bathroom with RN/PCT, assist x1 .    Patient left up in chair  with all lines intact, call button in reach, and RN notified.    GOALS:   Multidisciplinary Problems       Occupational Therapy Goals          Problem: Occupational Therapy    Goal Priority Disciplines Outcome Interventions   Occupational Therapy Goal     OT, PT/OT Ongoing, Progressing    Description: Goals to be met by: 10/24/23     Patient will increase functional independence with ADLs by performing:    UE Dressing with Modified Jarrell.  LE Dressing with Modified Jarrell and Assistive Devices as needed.  Grooming while standing at sink with Modified Jarrell.  Toileting from bedside commode with Modified Jarrell for hygiene and clothing management.   Bathing from  shower chair/bench with Modified Jarrell.  Toilet transfer to bedside commode with Modified Jarrell.                         History:     Past Medical History:   Diagnosis Date    Anemia 10/5/2023    Anxiety     Arthritis     Cataract     Hyperlipidemia     Hypertension     Macular degeneration     Mitral valve prolapse     Renal insufficiency 10/5/2023    Salzmann's nodular dystrophy of both eyes          Past Surgical History:   Procedure Laterality Date    CATARACT EXTRACTION W/  INTRAOCULAR LENS IMPLANT Left 05/08/2023    Procedure: EXTRACTION, CATARACT, WITH IOL INSERTION;  Surgeon: Flaca Diallo MD;  Location: [facility];  Service: Ophthalmology;  Laterality: Left;    CATARACT EXTRACTION W/  INTRAOCULAR LENS IMPLANT Right 05/22/2023    Procedure: EXTRACTION, CATARACT, WITH IOL INSERTION;  Surgeon: Flaca Diallo MD;  Location: [facility] OR;  Service: Ophthalmology;  Laterality: Right;    COLONOSCOPY N/A 10/08/2020    Procedure: COLONOSCOPY;  Surgeon: Crispin Moon MD;  Location: [address]);  Service: Endoscopy;  Laterality: N/A;  Family history of colon polyps  COVID test on 10/5/20 at 2nd floor -     EYE SURGERY      INJECTION Right 05/17/2023    Procedure: INJECTION RIGHT KNEE SYNVISC;  Surgeon: Tej YOUNG  MD Soila;  Location: [facility] PAIN MGT;  Service: Pain Management;  Laterality: Right;    TONSILLECTOMY      TRANSFORAMINAL EPIDURAL INJECTION OF STEROID Right 06/29/2022    Procedure: INJECTION, STEROID, EPIDURAL, TRANSFORAMINAL APPROACH, RIGHT L3-L4 AND L4-L5 CONTRAST DIRECT REF;  Surgeon: Tej Cm MD;  Location: [facility] PAIN MGT;  Service: Pain Management;  Laterality: Right;    TRANSFORAMINAL EPIDURAL INJECTION OF STEROID Right 03/15/2023    Procedure: INJECTION, STEROID, EPIDURAL, TRANSFORAMINAL APPROACH RIGHT L3/4 AND L4/5;  Surgeon: Tej Cm MD;  Location: [facility] PAIN MGT;  Service: Pain Management;  Laterality: Right;       Time Tracking:     OT Date of Treatment: 10/23/23  OT Start Time: 1645  OT Stop Time: 1715  OT Total Time (min): 30 min    Billable Minutes: Evaluation 15 and Self Care/Home Management 15    10/23/2023

## 2023-10-23 NOTE — ANESTHESIA PROCEDURE NOTES
CSE    Patient location during procedure: OR  Start time: 10/23/2023 11:01 AM  Timeout: 10/23/2023 11:00 AM  End time: 10/23/2023 11:06 AM      Staffing  Authorizing Provider: Elvin Fowler MD  Performing Provider: Bryan Guido MD    Staffing  Performed by: Bryan Guido MD  Authorized by: Elvin Fowler MD    Preanesthetic Checklist  Completed: patient identified, IV checked, site marked, risks and benefits discussed, surgical consent, monitors and equipment checked, pre-op evaluation and timeout performed  CSE  Patient position: sitting  Prep: ChloraPrep  Patient monitoring: heart rate, cardiac monitor, continuous pulse ox and frequent blood pressure checks  Approach: midline  Spinal Needle  Needle type: pencil-tip   Needle gauge: 25 G  Needle length: 3.5 in  Epidural Needle  Injection technique: [place] air  Needle type: Tuohy   Needle gauge: 17 G  Needle length: 3.5 in  Needle insertion depth: 6.5 cm  Location: L3-4  Needle localization: anatomical landmarks   Catheter  Catheter type: Gridstore  Catheter size: 18 G  Catheter at skin depth: 13 cm  Additional Documentation: incremental injection, negative aspiration for CSF, negative aspiration for heme, no paresthesia on injection and negative test dose  Assessment  Intrathecal Medications:   administered: primary anesthetic mcg of    Medications:    Medications: Intrathecal - mepivacaine 20 mg/mL (2 %) injection - Intrathecal   3 mL - 10/23/2023 11:05:00 AM

## 2023-10-23 NOTE — ANESTHESIA PREPROCEDURE EVALUATION
10/23/2023  Carmen Hawley is a 73 y.o., female.      Pre-op Assessment    I have reviewed the Patient Summary Reports.     I have reviewed the Nursing Notes. I have reviewed the NPO Status.   I have reviewed the Medications.     Review of Systems  Anesthesia Hx:  No problems with previous Anesthesia  History of prior surgery of interest to airway management or planning: Previous anesthesia: General Denies Family Hx of Anesthesia complications.   Denies Personal Hx of Anesthesia complications.   Social:  Non-Smoker    Hematology/Oncology:     Oncology Normal    -- Anemia:   EENT/Dental:EENT/Dental Normal   Cardiovascular:   Exercise tolerance: good Hypertension Valvular problems/Murmurs, MVP hyperlipidemia ECG has been reviewed. Childhood diagnosis MVP.  No symptoms.   Pulmonary:  Pulmonary Normal    Renal/:   Chronic Renal Disease, CKD    Hepatic/GI:  Hepatic/GI Normal    Musculoskeletal:   Arthritis     Neurological:   Neuromuscular Disease,    Endocrine:  Endocrine Normal  Obesity / BMI > 30  Dermatological:  Skin Normal    Psych:  Psychiatric Normal           Physical Exam  General: Well nourished, Cooperative, Alert and Oriented    Airway:  Mallampati: III / II  Mouth Opening: Normal  TM Distance: Normal  Tongue: Normal  Neck ROM: Normal ROM    Dental:  Intact, Caps / Implants    Chest/Lungs:  Clear to auscultation, Normal Respiratory Rate    Heart:  Rate: Normal  Rhythm: Regular Rhythm  Sounds: Normal    Abdomen:  Normal, Soft, Nontender        Anesthesia Plan  Type of Anesthesia, risks & benefits discussed:    Anesthesia Type: CSE  Intra-op Monitoring Plan: Standard ASA Monitors  Post Op Pain Control Plan: multimodal analgesia and peripheral nerve block  Informed Consent: Informed consent signed with the Patient and all parties understand the risks and agree with anesthesia plan.  All questions  answered.   ASA Score: 2    Ready For Surgery From Anesthesia Perspective.     .

## 2023-10-23 NOTE — PLAN OF CARE
Problem: Adult Inpatient Plan of Care  Goal: Plan of Care Review  Outcome: Ongoing, Progressing  Flowsheets (Taken 10/23/2023 1618)  Plan of Care Reviewed With:   patient   spouse     Problem: Adult Inpatient Plan of Care  Goal: Patient-Specific Goal (Individualized)  Outcome: Ongoing, Progressing  Flowsheets (Taken 10/23/2023 1618)  Individualized Care Needs: pain management     Problem: Pain Acute  Goal: Acceptable Pain Control and Functional Ability  Intervention: Develop Pain Management Plan  Flowsheets (Taken 10/23/2023 1618)  Pain Management Interventions:   pain management plan reviewed with patient/caregiver   pillow support provided   position adjusted   premedicated for activity   prescribed exercises encouraged   quiet environment facilitated   relaxation techniques promoted   warm blanket provided     Problem: Bleeding (Knee Arthroplasty)  Goal: Absence of Bleeding  Intervention: Monitor and Manage Bleeding  Flowsheets (Taken 10/23/2023 1618)  Bleeding Management: dressing monitored     Problem: Postoperative Nausea and Vomiting (Knee Arthroplasty)  Goal: Nausea and Vomiting Relief  Intervention: Prevent or Manage Nausea and Vomiting  Flowsheets (Taken 10/23/2023 1618)  Nausea/Vomiting Interventions: sips of clear liquids given     Problem: Postoperative Urinary Retention (Knee Arthroplasty)  Goal: Effective Urinary Elimination  Intervention: Monitor and Manage Urinary Retention  Flowsheets (Taken 10/23/2023 1618)  Urinary Elimination Promotion:   positioned for ease of voiding   toileting offered     Problem: Respiratory Compromise (Knee Arthroplasty)  Goal: Effective Oxygenation and Ventilation  Intervention: Optimize Oxygenation and Ventilation  Flowsheets (Taken 10/23/2023 1618)  Airway/Ventilation Management: airway patency maintained     Problem: Hypertension Comorbidity  Goal: Blood Pressure in Desired Range  Intervention: Maintain Blood Pressure Management  Flowsheets (Taken 10/23/2023  1618)  Syncope Management:   leg muscle contraction encouraged   position changed slowly  Medication Review/Management: medications reviewed   Plan of care reviewed with patient; verbalized understanding.   Medications reviewed and administered as ordered.  Rounding for safety and patient care per policy.   Safety precautions maintained. Patient working appropriately with PT/OT.  DME at bedside.  Call light within reach, bed wheels locked, bed in lowest position, side rails ^x2, safety maintained. NADN, Will continue monitor.

## 2023-10-23 NOTE — NURSING TRANSFER
Nursing Transfer Note      10/23/2023   3:27 PM    Nurse giving handoff:ADE Natarajan  Nurse receiving handoff:ADE Mir    Reason patient is being transferred: extended recovery    Transfer : PACU to Extended Recovery Suites    Transfer via hospital bed    Transfer with IV pole    Transported by ADE Natarajan and ADE Smith    Transfer Vital Signs:  Blood Pressure:157 68  Heart Rate:60  O2:100%  Temperature:97.5  Respirations:16    Telemetry: no  Order for Tele Monitor? no    Additional Lines:     4eyes on Skin:     Medicines sent: n/a    Any special needs or follow-up needed:     Patient belongings transferred with patient: yes    Chart send with patient: yes    Notified: ADE Mir and Phill O'Wishon ()    Patient reassessed at: 7772 10/23/2023 (date, time)  1  Upon arrival to floor:

## 2023-10-23 NOTE — TRANSFER OF CARE
"Anesthesia Transfer of Care Note    Patient: Carmen Hawley    Procedure(s) Performed: Procedure(s) (LRB):  ARTHROPLASTY, KNEE, TOTAL, USING COMPUTER-ASSISTED NAVIGATION: JOSHUA: RIGHT: JILL DELGADO (Right)    Patient location: PACU    Anesthesia Type: general    Transport from OR: Transported from OR on 6-10 L/min O2 by face mask with adequate spontaneous ventilation    Post pain: adequate analgesia    Post assessment: no apparent anesthetic complications and tolerated procedure well    Post vital signs: stable    Level of consciousness: awake and alert    Nausea/Vomiting: no nausea/vomiting    Complications: none    Transfer of care protocol was followed      Last vitals:   Visit Vitals  BP (!) 157/68 (BP Location: Left arm, Patient Position: Lying)   Pulse 64   Temp 36.4 °C (97.5 °F) (Temporal)   Resp 16   Ht 5' 3" (1.6 m)   Wt 74.8 kg (165 lb)   LMP 12/16/2006 (Approximate)   SpO2 100%   Breastfeeding No   BMI 29.23 kg/m²     "

## 2023-10-23 NOTE — HOSPITAL COURSE
The patient arrived to pre-op area for proper pre-operative management.  Upon completion of pre-operative preparation, the patient was taken back to the operative theatre.  A right primary TKA was performed without complication and the patient was transported to the post anesthesia care unit in stable condition. After appropriate recovery from the anaesthetic agents used during the surgery the patient was transferred to the floor where they received a multimodal pain regimen, observation and physical therapy. When the patient was deemed medically safe for DC, they were discharged to home on a multimodal pain regimen and ASA for DVT prophylaxis with a 2 week post-op clinic appointment.

## 2023-10-24 VITALS
HEART RATE: 66 BPM | SYSTOLIC BLOOD PRESSURE: 148 MMHG | HEIGHT: 63 IN | RESPIRATION RATE: 16 BRPM | BODY MASS INDEX: 29.23 KG/M2 | DIASTOLIC BLOOD PRESSURE: 67 MMHG | TEMPERATURE: 98 F | WEIGHT: 165 LBS | OXYGEN SATURATION: 100 %

## 2023-10-24 PROBLEM — M17.11 PRIMARY OSTEOARTHRITIS OF RIGHT KNEE: Status: ACTIVE | Noted: 2017-06-21

## 2023-10-24 LAB
BUN SERPL-MCNC: 10 MG/DL (ref 6–30)
CHLORIDE SERPL-SCNC: 98 MMOL/L (ref 95–110)
CREAT SERPL-MCNC: 0.6 MG/DL (ref 0.5–1.4)
GLUCOSE SERPL-MCNC: 142 MG/DL (ref 70–110)
HCT VFR BLD CALC: 26 %PCV (ref 36–54)
POC IONIZED CALCIUM: 1.18 MMOL/L (ref 1.06–1.42)
POC TCO2 (MEASURED): 24 MMOL/L (ref 23–29)
POTASSIUM BLD-SCNC: 4 MMOL/L (ref 3.5–5.1)
SAMPLE: ABNORMAL
SODIUM BLD-SCNC: 132 MMOL/L (ref 136–145)

## 2023-10-24 PROCEDURE — 82330 ASSAY OF CALCIUM: CPT

## 2023-10-24 PROCEDURE — 84295 ASSAY OF SERUM SODIUM: CPT

## 2023-10-24 PROCEDURE — 85014 HEMATOCRIT: CPT

## 2023-10-24 PROCEDURE — 82565 ASSAY OF CREATININE: CPT

## 2023-10-24 PROCEDURE — 94761 N-INVAS EAR/PLS OXIMETRY MLT: CPT

## 2023-10-24 PROCEDURE — 84132 ASSAY OF SERUM POTASSIUM: CPT

## 2023-10-24 PROCEDURE — 25000003 PHARM REV CODE 250: Performed by: NURSE PRACTITIONER

## 2023-10-24 PROCEDURE — 99231 SBSQ HOSP IP/OBS SF/LOW 25: CPT | Mod: ,,, | Performed by: ANESTHESIOLOGY

## 2023-10-24 PROCEDURE — 99231 PR SUBSEQUENT HOSPITAL CARE,LEVL I: ICD-10-PCS | Mod: ,,, | Performed by: ANESTHESIOLOGY

## 2023-10-24 PROCEDURE — 25000003 PHARM REV CODE 250: Performed by: STUDENT IN AN ORGANIZED HEALTH CARE EDUCATION/TRAINING PROGRAM

## 2023-10-24 PROCEDURE — 97110 THERAPEUTIC EXERCISES: CPT

## 2023-10-24 PROCEDURE — 99900035 HC TECH TIME PER 15 MIN (STAT)

## 2023-10-24 PROCEDURE — 97116 GAIT TRAINING THERAPY: CPT

## 2023-10-24 PROCEDURE — 97530 THERAPEUTIC ACTIVITIES: CPT

## 2023-10-24 PROCEDURE — 63600175 PHARM REV CODE 636 W HCPCS: Performed by: NURSE PRACTITIONER

## 2023-10-24 PROCEDURE — 97535 SELF CARE MNGMENT TRAINING: CPT

## 2023-10-24 PROCEDURE — 25000003 PHARM REV CODE 250: Performed by: ANESTHESIOLOGY

## 2023-10-24 RX ORDER — CELECOXIB 200 MG/1
200 CAPSULE ORAL DAILY
Qty: 30 CAPSULE | Refills: 0 | Status: SHIPPED | OUTPATIENT
Start: 2023-10-24 | End: 2023-11-21 | Stop reason: SDUPTHER

## 2023-10-24 RX ORDER — CELECOXIB 200 MG/1
200 CAPSULE ORAL DAILY
Status: DISCONTINUED | OUTPATIENT
Start: 2023-10-24 | End: 2023-10-24 | Stop reason: HOSPADM

## 2023-10-24 RX ADMIN — METOPROLOL SUCCINATE 100 MG: 50 TABLET, EXTENDED RELEASE ORAL at 09:10

## 2023-10-24 RX ADMIN — ACETAMINOPHEN 1000 MG: 500 TABLET ORAL at 12:10

## 2023-10-24 RX ADMIN — ESCITALOPRAM OXALATE 10 MG: 10 TABLET ORAL at 10:10

## 2023-10-24 RX ADMIN — CEFAZOLIN 2 G: 2 INJECTION, POWDER, FOR SOLUTION INTRAMUSCULAR; INTRAVENOUS at 03:10

## 2023-10-24 RX ADMIN — METFORMIN HYDROCHLORIDE 500 MG: 500 TABLET, EXTENDED RELEASE ORAL at 09:10

## 2023-10-24 RX ADMIN — POLYETHYLENE GLYCOL 3350 17 G: 17 POWDER, FOR SOLUTION ORAL at 09:10

## 2023-10-24 RX ADMIN — CELECOXIB 200 MG: 200 CAPSULE ORAL at 09:10

## 2023-10-24 RX ADMIN — METHOCARBAMOL 750 MG: 750 TABLET ORAL at 09:10

## 2023-10-24 RX ADMIN — FAMOTIDINE 20 MG: 20 TABLET, FILM COATED ORAL at 09:10

## 2023-10-24 RX ADMIN — ACETAMINOPHEN 1000 MG: 500 TABLET ORAL at 06:10

## 2023-10-24 RX ADMIN — SENNOSIDES AND DOCUSATE SODIUM 1 TABLET: 50; 8.6 TABLET ORAL at 09:10

## 2023-10-24 RX ADMIN — TRIAMTERENE AND HYDROCHLOROTHIAZIDE 1 CAPSULE: 37.5; 25 CAPSULE ORAL at 09:10

## 2023-10-24 RX ADMIN — MUPIROCIN 1 G: 20 OINTMENT TOPICAL at 09:10

## 2023-10-24 RX ADMIN — ASPIRIN 81 MG: 81 TABLET, COATED ORAL at 09:10

## 2023-10-24 RX ADMIN — GABAPENTIN 300 MG: 300 CAPSULE ORAL at 09:10

## 2023-10-24 NOTE — ASSESSMENT & PLAN NOTE
73 y.o. female POD1 s/p R TKA    Pain control: multimodals  PT/OT: WBAT RLE, nothing behind the knee  DVT PPx: ASA 81 BID, FCDs at all times when not ambulating  Abx: postop Ancef  Labs: Cr 0.6; Hct 26  Voiding independently   Tolerating diet     Dispo: Outpatient PT scheduled; encourage PT/OT

## 2023-10-24 NOTE — PT/OT/SLP PROGRESS
Occupational Therapy   Treatment and Discharge Note    Name: Carmen Hawley  MRN: [id number]  Admitting Diagnosis:  Primary osteoarthritis of right knee  1 Day Post-Op    Recommendations:     Discharge Recommendations: Low Intensity Therapy  Discharge Equipment Recommendations:  none  Barriers to discharge:  None    Assessment:     Carmen Hawley is a 73 y.o. female with a medical diagnosis of Primary osteoarthritis of right knee.  She presents with R TKA. Performance deficits affecting function are impaired self care skills, impaired functional mobility, orthopedic precautions. Pt was able to perform Sit <> Stand Transfer with supervision with rolling walker.  Able to perform UB/LB dressing c modified independence  Educated pt on bathing, car transfers, and cryotherapy.       Rehab Prognosis:  Good; patient would benefit from acute skilled OT services to address these deficits and reach maximum level of function.       Plan:     Patient to be seen daily to address the above listed problems via self-care/home management, therapeutic activities, therapeutic exercises  Plan of Care Expires: 10/24/23  Plan of Care Reviewed with: patient, spouse    Subjective     Chief Complaint: R TKA  Patient/Family Comments/goals: I feel good.  Pain/Comfort:  Pain Rating 1: 0/10    Objective:     Communicated with: RN prior to session.  Patient found up in chair with cryotherapy, FCD upon OT entry to room.    General Precautions: Standard, fall    Orthopedic Precautions:RLE weight bearing as tolerated  Braces: N/A  Respiratory Status: Room air     Occupational Performance:     Functional Mobility/Transfers:  Patient completed Sit <> Stand Transfer with supervision  with  rolling walker         Activities of Daily Living:  Upper Body Dressing: modified independence to don shirt.  Lower Body Dressing: modified independence to don underwear, shorts, socks, and shoes.      [facility] 6 Click ADL: 24    Patient left up in chair with all  lines intact, call button in reach, and RN notified    GOALS:   Multidisciplinary Problems       Occupational Therapy Goals          Problem: Occupational Therapy    Goal Priority Disciplines Outcome Interventions   Occupational Therapy Goal     OT, PT/OT Ongoing, Progressing    Description: Goals to be met by: 10/24/23     Patient will increase functional independence with ADLs by performing:    UE Dressing with Modified [place].  LE Dressing with Modified [place] and Assistive Devices as needed.  Grooming while standing at sink with Modified [place].  Toileting from bedside commode with Modified [place] for hygiene and clothing management.   Bathing from  shower chair/bench with Modified [place].  Toilet transfer to bedside commode with Modified [place].                         Time Tracking:     OT Date of Treatment: 10/24/23  OT Start Time: 0815  OT Stop Time: 0846  OT Total Time (min): 31 min    Billable Minutes:Self Care/Home Management 16  Therapeutic Activity 15               10/24/2023

## 2023-10-24 NOTE — PLAN OF CARE
Problem: Adult Inpatient Plan of Care  Goal: Plan of Care Review  Outcome: Adequate for Care Transition  Flowsheets (Taken 10/24/2023 1452)  Plan of Care Reviewed With:   patient   spouse     Problem: Adult Inpatient Plan of Care  Goal: Patient-Specific Goal (Individualized)  Outcome: Adequate for Care Transition  Flowsheets (Taken 10/24/2023 1452)  Individualized Care Needs: discharge teaching     Problem: Pain Acute  Goal: Acceptable Pain Control and Functional Ability  Intervention: Develop Pain Management Plan  Flowsheets (Taken 10/24/2023 1452)  Pain Management Interventions:   pain management plan reviewed with patient/caregiver   pillow support provided   position adjusted   premedicated for activity   prescribed exercises encouraged   quiet environment facilitated   relaxation techniques promoted     Problem: Bleeding (Knee Arthroplasty)  Goal: Absence of Bleeding  Intervention: Monitor and Manage Bleeding  Flowsheets (Taken 10/24/2023 1452)  Bleeding Management: dressing monitored     Problem: Pain (Knee Arthroplasty)  Goal: Acceptable Pain Control  Intervention: Prevent or Manage Pain  Flowsheets (Taken 10/24/2023 1452)  Pain Management Interventions:   pain management plan reviewed with patient/caregiver   pillow support provided   position adjusted   premedicated for activity   prescribed exercises encouraged   quiet environment facilitated   relaxation techniques promoted     Problem: Postoperative Urinary Retention (Knee Arthroplasty)  Goal: Effective Urinary Elimination  Intervention: Monitor and Manage Urinary Retention  Flowsheets (Taken 10/24/2023 1452)  Urinary Elimination Promotion:   frequent voiding encouraged   toileting offered     Problem: Respiratory Compromise (Knee Arthroplasty)  Goal: Effective Oxygenation and Ventilation  Intervention: Optimize Oxygenation and Ventilation  Flowsheets (Taken 10/24/2023 1452)  Administration (IS):   instruction provided, follow-up   proper technique  demonstrated     Problem: Hypertension Comorbidity  Goal: Blood Pressure in Desired Range  Intervention: Maintain Blood Pressure Management  Flowsheets (Taken 10/24/2023 6330)  Syncope Management: position changed slowly  Medication Review/Management: medications reviewed   Plan of care reviewed with patient; verbalized understanding.   Medications reviewed and administered as ordered.  Rounding for safety and patient care per policy.   Safety precautions maintained. Patient working appropriately with PT/OT.  DME at bedside.  Call light within reach, bed wheels locked, bed in lowest position, side rails ^x2, safety maintained. NADN, ready for discharge.

## 2023-10-24 NOTE — DISCHARGE SUMMARY
[facility])  Orthopedics  Discharge Summary      Patient Name: Carmen Hawley  MRN: [id number]  Admission Date: 10/23/2023  Hospital Length of Stay: 0 days  Discharge Date and Time: 10/24/2023 11:30 AM  Attending Physician: Jessica att. providers found   Discharging Provider: Todd Monroy MD  Primary Care Provider: Susanna Sprague MD    HPI:   Carmen Hawley is a 73 y.o. female with history of Right knee pain. Pain is worse with activity and weight bearing.  Patient has experienced interference of activities of daily living due to decreased range of motion and an increase in joint pain and swelling.  Patient has failed non-operative treatment including NSAIDs, corticosteroid injections, viscosupplement injections, and activity modification.  Carmen Hawley currently ambulates independently.     Procedure(s) (LRB):  ARTHROPLASTY, KNEE, TOTAL, USING COMPUTER-ASSISTED NAVIGATION: JOSHUA: RIGHT: JILL DELGADO (Right)      Hospital Course:  The patient arrived to pre-op area for proper pre-operative management.  Upon completion of pre-operative preparation, the patient was taken back to the operative theatre.  A right primary TKA was performed without complication and the patient was transported to the post anesthesia care unit in stable condition. After appropriate recovery from the anaesthetic agents used during the surgery the patient was transferred to the floor where they received a multimodal pain regimen, observation and physical therapy. When the patient was deemed medically safe for DC, they were discharged to home on a multimodal pain regimen and ASA for DVT prophylaxis with a 2 week post-op clinic appointment.          Significant Diagnostic Studies: Labs: All labs within the past 24 hours have been reviewed    Pending Diagnostic Studies:     None        Final Active Diagnoses:    Diagnosis Date Noted POA    PRINCIPAL PROBLEM:  Primary osteoarthritis of right knee [M17.11]  06/21/2017 Yes      Problems Resolved During this Admission:      Discharged Condition: stable    Disposition: Home or Self Care      Patient Instructions:      Activity as tolerated     Sponge bath only until clinic visit     Keep surgical extremity elevated     Lifting restrictions   Order Comments: No strenuous exercise or lifting of > 10 lbs     Weight bearing as tolerated     No driving, operating heavy equipment or signing legal documents while taking pain medication     Leave dressing on - Keep it clean, dry, and intact until clinic visit   Order Comments: Do not remove surgical dressing for 2 weeks post-op. This will be done only by MD at initial post-op visit. If dressing is completely saturated, replace with identical dressing - silver-impregnated hydrocolloid dressing.     Do not get dressings wet. Do not shower.     If dressing continues to be saturated or there are signs of infection, please call [facility] [phone] for further instructions and to make appt to be seen.     Call MD for:  temperature >100.4     Call MD for:  persistent nausea and vomiting     Call MD for:  severe uncontrolled pain     Call MD for:  difficulty breathing, headache or visual disturbances     Call MD for:  redness, tenderness, or signs of infection (pain, swelling, redness, odor or green/yellow discharge around incision site)     Call MD for:  hives     Call MD for:  persistent dizziness or light-headedness     Call MD for:  extreme fatigue     Medications:  Reconciled Home Medications:      Medication List      START taking these medications    celecoxib 200 MG capsule  Commonly known as: CeleBREX  Take 1 capsule (200 mg total) by mouth once daily.        CHANGE how you take these medications    ARTHRITIS PAIN RELIEF (ACETAM) 650 MG Tbsr  Generic drug: acetaminophen  Take 1 tablet (650 mg total) by mouth every 8 (eight) hours.  What changed: Another medication with the same name was removed. Continue taking this  medication, and follow the directions you see here.     aspirin 81 MG EC tablet  Commonly known as: ECOTRIN  Take 1 tablet (81 mg total) by mouth 2 (two) times a day.  What changed: Another medication with the same name was removed. Continue taking this medication, and follow the directions you see here.        CONTINUE taking these medications    biotin 300 mcg Tab  Take 1 tablet by mouth once daily.     cefadroxil 500 MG Cap  Commonly known as: DURICEF  Take 1 capsule (500 mg total) by mouth every 12 (twelve) hours.     cyanocobalamin 500 MCG tablet  Take 500 mcg by mouth once daily.     * EScitalopram oxalate 10 MG tablet  Commonly known as: LEXAPRO  Take 1 tablet (10 mg total) by mouth once daily.     * EScitalopram oxalate 5 MG Tab  Commonly known as: LEXAPRO  Take 1 tablet (5 mg total) by mouth once daily. Combine with 10mg for total 15mg.     estradioL 0.01 % (0.1 mg/gram) vaginal cream  Commonly known as: ESTRACE  Place 1 gram vaginally twice a week.     gabapentin 300 MG capsule  Commonly known as: NEURONTIN  Take 1 capsule (300 mg total) by mouth 3 (three) times daily.     metFORMIN 500 MG ER 24hr tablet  Commonly known as: GLUCOPHAGE-XR  Take 1 tablet (500 mg total) by mouth daily with breakfast.     methocarbamoL 750 MG Tab  Commonly known as: ROBAXIN  Take 1 tablet (750 mg total) by mouth 4 (four) times daily as needed (muscle spasms).     metoprolol succinate 100 MG 24 hr tablet  Commonly known as: TOPROL-XL  Take 1 tablet (100 mg total) by mouth once daily.     naltrexone-bupropion 8-90 mg Tbsr  Commonly known as: CONTRAVE  Take 2 tablets by mouth 2 (two) times daily.     oxyCODONE 5 MG immediate release tablet  Commonly known as: ROXICODONE  Take 1-2 tablets every 4-6 hours as needed for pain     pantoprazole 40 MG tablet  Commonly known as: PROTONIX  Take 1 tablet (40 mg total) by mouth once daily.     simvastatin 40 MG tablet  Commonly known as: ZOCOR  TAKE 1 TABLET BY MOUTH EVERY EVENING.      STOOL SOFTENER-LAXATIVE 8.6-50 mg per tablet  Generic drug: senna-docusate 8.6-50 mg  Take 1 tablet by mouth once daily.     traMADoL 50 mg tablet  Commonly known as: ULTRAM  Take 1 tablet (50 mg total) by mouth every 6 (six) hours as needed for Pain.     triamterene-hydrochlorothiazide 37.5-25 mg 37.5-25 mg per capsule  Commonly known as: DYAZIDE  Take one capsule by mouth every day     vitamin D 1000 units Tab  Commonly known as: VITAMIN D3  Take 2,000 mg by mouth once daily.        STOP taking these medications    diclofenac 75 MG EC tablet  Commonly known as: VOLTAREN     diclofenac sodium 1 % Gel  Commonly known as: TONE Monroy MD  Orthopedics  [place] [facility])

## 2023-10-24 NOTE — PLAN OF CARE
Problem: Pain Acute  Goal: Acceptable Pain Control and Functional Ability  Outcome: Ongoing, Progressing  Intervention: Develop Pain Management Plan  Flowsheets (Taken 10/24/2023 0810)  Pain Management Interventions:   care clustered   cold applied   diversional activity provided   medication offered but refused   pain management plan reviewed with patient/caregiver   position adjusted   quiet environment facilitated   relaxation techniques promoted  Intervention: Prevent or Manage Pain  Flowsheets (Taken 10/24/2023 0810)  Sleep/Rest Enhancement:   awakenings minimized   regular sleep/rest pattern promoted   relaxation techniques promoted   room darkened  Sensory Stimulation Regulation:   care clustered   auditory stimulation minimized   lighting decreased   quiet environment promoted   tactile stimulation provided   visual stimulation minimized  Medication Review/Management:   medications reviewed   high-risk medications identified     Problem: Adult Inpatient Plan of Care  Goal: Plan of Care Review  Outcome: Ongoing, Progressing  Flowsheets (Taken 10/24/2023 0810)  Plan of Care Reviewed With: patient  Goal: Patient-Specific Goal (Individualized)  Outcome: Ongoing, Progressing  Flowsheets (Taken 10/24/2023 0810)  Anxieties, Fears or Concerns: Generalized  Individualized Care Needs: Pain management     Problem: Bleeding (Knee Arthroplasty)  Goal: Absence of Bleeding  Outcome: Ongoing, Progressing  Intervention: Monitor and Manage Bleeding  Flowsheets (Taken 10/24/2023 0810)  Bleeding Management: dressing monitored     Problem: Fall Injury Risk  Goal: Absence of Fall and Fall-Related Injury  Outcome: Ongoing, Progressing  Intervention: Identify and Manage Contributors  Flowsheets (Taken 10/24/2023 0812)  Self-Care Promotion:   BADL personal objects within reach   safe use of adaptive equipment encouraged  Medication Review/Management:   medications reviewed   high-risk medications identified

## 2023-10-24 NOTE — PROGRESS NOTES
[facility])  Orthopedics  Progress Note    Patient Name: Carmen Hawley  MRN: [id number]  Admission Date: 10/23/2023  Hospital Length of Stay: 0 days  Attending Provider: Jean-Pierre Bennett III, MD  Primary Care Provider: Susanna Sprague MD  Follow-up For: Procedure(s) (LRB):  ARTHROPLASTY, KNEE, TOTAL, USING COMPUTER-ASSISTED NAVIGATION: JOSHUA: RIGHT: JILL - TRIATHALON (Right)    Post-Operative Day: 1 Day Post-Op  Subjective:     Principal Problem:Primary osteoarthritis of right knee    Principal Orthopedic Problem: R TKA 10/23    Interval History: Overnight events: no issues. Slept through night. Pain is minimal. Very pleasant on exam this morning. Hct 26. Cr 0.6.     Vitals: Mild hypertension overnight on RA. UOP 950cc.     PT/OT update: ambulated 20ft post op.     Need to know: Morning Cr 0.6; GFR 98, ok for celebrex rx. Duracef protocol post op.         Review of patient's allergies indicates:  No Known Allergies    Current Facility-Administered Medications   Medication    0.9%  NaCl infusion    acetaminophen tablet 1,000 mg    aspirin EC tablet 81 mg    atorvastatin tablet 20 mg    bisacodyL suppository 10 mg    celecoxib capsule 200 mg    EScitalopram oxalate tablet 10 mg    famotidine tablet 20 mg    gabapentin capsule 300 mg    melatonin tablet 6 mg    metFORMIN ER 24hr tablet 500 mg    methocarbamoL tablet 750 mg    metoprolol succinate (TOPROL-XL) 24 hr tablet 100 mg    morphine injection 2 mg    mupirocin 2 % ointment 1 g    naloxone 0.4 mg/mL injection 0.02 mg    ondansetron injection 4 mg    oxyCODONE immediate release tablet 5 mg    oxyCODONE immediate release tablet Tab 10 mg    polyethylene glycol packet 17 g    prochlorperazine injection Soln 5 mg    senna-docusate 8.6-50 mg per tablet 1 tablet    triamterene-hydrochlorothiazide 37.5-25 mg per capsule 1 capsule     Objective:     Vital Signs (Most Recent):  Temp: 97.6 °F (36.4 °C) (10/24/23 0415)  Pulse:  "66 (10/24/23 0415)  Resp: 16 (10/24/23 0415)  BP: (!) 141/68 (10/24/23 0415)  SpO2: 99 % (10/24/23 0415) Vital Signs (24h Range):  Temp:  [97.4 °F (36.3 °C)-97.7 °F (36.5 °C)] 97.6 °F (36.4 °C)  Pulse:  [54-71] 66  Resp:  [16-21] 16  SpO2:  [93 %-100 %] 99 %  BP: (115-183)/() 141/68     Weight: 74.8 kg (165 lb)  Height: 5' 3" (160 cm)  Body mass index is 29.23 kg/m².      Intake/Output Summary (Last 24 hours) at 10/24/2023 0602  Last data filed at 10/24/2023 0110  Gross per 24 hour   Intake 2200 ml   Output 1250 ml   Net 950 ml        Ortho/SPM Exam  AAOx4  NAD  Reg rate  No increased WOB    Dressing c/d/i  Polar ice in place  SILT T/SP/DP  Motor intact T/SP/DP  WWP extremities  FCDs in place and functioning       Significant Labs:   All pertinent labs within the past 24 hours have been reviewed.    Significant Imaging: I have reviewed and interpreted all pertinent imaging results/findings.    Assessment/Plan:     * Primary osteoarthritis of right knee  73 y.o. female POD1 s/p R TKA    Pain control: multimodals  PT/OT: WBAT RLE, nothing behind the knee  DVT PPx: ASA 81 BID, FCDs at all times when not ambulating  Abx: postop Ancef  Labs: Cr 0.6; Hct 26  Voiding independently   Tolerating diet     Dispo: Outpatient PT scheduled; encourage PT/OT             Todd Monroy MD  Orthopedics  [place] [facility] ([facility]  "

## 2023-10-24 NOTE — PT/OT/SLP PROGRESS
Physical Therapy Treatment    Patient Name:  Carmen Hawley   MRN:  [id number]  Admitting Diagnosis:  Primary osteoarthritis of right knee   Recent Surgery: Procedure(s) (LRB):  ARTHROPLASTY, KNEE, TOTAL, USING COMPUTER-ASSISTED NAVIGATION: JOSHUA: RIGHT: JILL - EDWIGEN (Right) 1 Day Post-Op  Admit Date: 10/23/2023  Length of Stay: 0 days    Recommendations:     Discharge Recommendations:  Low Intensity Therapy  Discharge Equipment Recommendations: none   Barriers to discharge: None    Assessment:     Carmen Hawley is a 73 y.o. female admitted with a medical diagnosis of Primary osteoarthritis of right knee.  She presents with the following impairments/functional limitations:  impaired endurance, decreased lower extremity function, pain, decreased ROM, orthopedic precautions, decreased safety awareness, impaired functional mobility, impaired self care skills, gait instability.     Pt agreeable to therapy, feeling better than yesterday. Pt stands from bedside chair and toilet with supervision, ambulates with RW and SBA, CGA for curb step navigation, therex performed at mat. Continue to progress ambulation.    Rehab Prognosis: Good; patient would benefit from acute skilled PT services to address these deficits and reach maximum level of function.    Recent Surgery: Procedure(s) (LRB):  ARTHROPLASTY, KNEE, TOTAL, USING COMPUTER-ASSISTED NAVIGATION: JOSHUA: RIGHT: JILL - TRIATHALON (Right) 1 Day Post-Op    Treatment Tolerated: Well    Highest level of mobility achieved this visit: ambulates 110ft x2 w/ RW and SBA    Activity with RN/PCT: ambulate with 1 person assist    Plan:     During this hospitalization, patient to be seen daily to address the identified rehab impairments via therapeutic activities, gait training, therapeutic exercises, neuromuscular re-education and progress toward the following goals:    Plan of Care Expires:  10/27/23    Subjective     RN notified prior to session. No family present upon  "PT entrance into room.    Chief Complaint: pain  Patient/Family Comments/goals: "I work in the [employer] department at [employer]."  Pain/Comfort:  Pain Rating 1:  ("better")  Location - Side 1: Right  Location - Orientation 1: generalized  Location 1: knee  Pain Addressed 1: Reposition, Distraction      Objective:     Additional staff present: none    Patient found up in chair with: FCD, cryotherapy   Cognition:   Alert and Cooperative  Command following: Follows multistep verbal commands  Fluency: clear/fluent  General Precautions: Standard, Cardiac fall   Orthopedic Precautions:RLE weight bearing as tolerated   Braces: N/A   Body mass index is 29.23 kg/m².  Oxygen Device: Room Air    Vitals: BP (!) 148/67 (BP Location: Right arm, Patient Position: Sitting)   Pulse 66   Temp 98.2 °F (36.8 °C) (Tympanic)   Resp 16   Ht 5' 3" (1.6 m)   Wt 74.8 kg (165 lb)   LMP 12/16/2006 (Approximate)   SpO2 100%   Breastfeeding No   BMI 29.23 kg/m²     Outcome Measures:  AM-PAC 6 CLICK MOBILITY  Turning over in bed (including adjusting bedclothes, sheets and blankets)?: 3  Sitting down on and standing up from a chair with arms (e.g., wheelchair, bedside commode, etc.): 3  Moving from lying on back to sitting on the side of the bed?: 3  Moving to and from a bed to a chair (including a wheelchair)?: 3  Need to walk in hospital room?: 3  Climbing 3-5 steps with a railing?: 2  Basic Mobility Total Score: 17     Functional Mobility:    Bed Mobility:   Pt found/returned to bedside chair    Sitting Balance at Edge of Mat:  Assistance Level Required: [place]    Transfers:   Sit <> Stand Transfer: supervision with rolling walker   Stand <> Sit Transfer: supervision with rolling walker   [id number] from bedside chair and [id number] from toliet    Standing Balance:  Assistance Level Required: Stand-by Assistance  Patient used: rolling walker   Time: 10 min x 2  Postural deviations noted: no deviations noted      Gait:  Patient " "ambulated: 110ft x 2   Patient required: standy by assistance  Patient used:  rolling walker   Gait Pattern observed: swing-through gait  Gait Deviation(s): decreased step length and decreased titi  Impairments due to: pain  Chair follow for patient safety    Stairs:  Pt ascended/descended 6" curb step with Rolling Walker with no handrails with Contact Guard Assistance.     Therapeutic Exercises:   Supine knee exercies per protocol with handout provided and demonstration and facilitation for technique  Quad sets   10 reps x 3 second holds  Glute sets  10 reps x 3 sec holds  Ankle Pumps  10 reps x 3 sec holds  Hip abduction  10 reps  SAQs  10 reps  LAQs  10 reps  SLRs  10 reps  Heel slides  10 reps    Education:  Time provided for education, counseling and discussion of health disposition in regards to patient's current status  All questions answered within PT scope of practice and to patient's satisfaction  PT role in POC to address current functional deficits  Pt educated on proper body mechanics, safety techniques, and energy conservation with PT facilitation and cueing throughout session    Patient left up in chair with call button in reach.    GOALS:   Multidisciplinary Problems       Physical Therapy Goals          Problem: Physical Therapy    Goal Priority Disciplines Outcome Goal Variances Interventions   Physical Therapy Goal     PT, PT/OT Ongoing, Progressing     Description: Goals to be met by: 10/27/23     Patient will increase functional independence with mobility by performin. Supine to sit with Stand-by Assistance  2. Sit to supine with Stand-by Assistance  3. Sit to stand transfer with Supervision  4. Bed to chair transfer with Supervision using Rolling Walker  5. Gait  x 150 feet with Supervision using Rolling Walker.   6. Ascend/Descend 6 inch curb step with Contact Guard Assistance using Rolling Walker.  7. Lower extremity exercise program x30 reps per handout, with supervision           "             Time Tracking:     PT Received On: 10/24/23  PT Start Time: 0848     PT Stop Time: 0926  PT Total Time (min): 38 min     Billable Minutes:   Gait Training 15 min, Therapeutic Activity 10 min, and Therapeutic Exercise 13 min    Treatment Type: Treatment  PT/PTA: PT       Thierno Beltran, PT, DPT  10/24/2023

## 2023-10-24 NOTE — SUBJECTIVE & OBJECTIVE
"Principal Problem:Primary osteoarthritis of right knee    Principal Orthopedic Problem: R TKA 10/23    Interval History: Overnight events: no issues. Slept through night. Pain is minimal. Very pleasant on exam this morning. Hct 26. Cr 0.6.     Vitals: Mild hypertension overnight on RA. UOP 950cc.     PT/OT update: ambulated 20ft post op.     Need to know: Morning Cr 0.6; GFR 98, ok for celebrex rx. Duracef protocol post op.         Review of patient's allergies indicates:  No Known Allergies    Current Facility-Administered Medications   Medication    0.9%  NaCl infusion    acetaminophen tablet 1,000 mg    aspirin EC tablet 81 mg    atorvastatin tablet 20 mg    bisacodyL suppository 10 mg    celecoxib capsule 200 mg    EScitalopram oxalate tablet 10 mg    famotidine tablet 20 mg    gabapentin capsule 300 mg    melatonin tablet 6 mg    metFORMIN ER 24hr tablet 500 mg    methocarbamoL tablet 750 mg    metoprolol succinate (TOPROL-XL) 24 hr tablet 100 mg    morphine injection 2 mg    mupirocin 2 % ointment 1 g    naloxone 0.4 mg/mL injection 0.02 mg    ondansetron injection 4 mg    oxyCODONE immediate release tablet 5 mg    oxyCODONE immediate release tablet Tab 10 mg    polyethylene glycol packet 17 g    prochlorperazine injection Soln 5 mg    senna-docusate 8.6-50 mg per tablet 1 tablet    triamterene-hydrochlorothiazide 37.5-25 mg per capsule 1 capsule     Objective:     Vital Signs (Most Recent):  Temp: 97.6 °F (36.4 °C) (10/24/23 0415)  Pulse: 66 (10/24/23 0415)  Resp: 16 (10/24/23 0415)  BP: (!) 141/68 (10/24/23 0415)  SpO2: 99 % (10/24/23 0415) Vital Signs (24h Range):  Temp:  [97.4 °F (36.3 °C)-97.7 °F (36.5 °C)] 97.6 °F (36.4 °C)  Pulse:  [54-71] 66  Resp:  [16-21] 16  SpO2:  [93 %-100 %] 99 %  BP: (115-183)/() 141/68     Weight: 74.8 kg (165 lb)  Height: 5' 3" (160 cm)  Body mass index is 29.23 kg/m².      Intake/Output Summary (Last 24 hours) at 10/24/2023 0602  Last data filed at 10/24/2023 " 0110  Gross per 24 hour   Intake 2200 ml   Output 1250 ml   Net 950 ml        Ortho/SPM Exam  AAOx4  NAD  Reg rate  No increased WOB    Dressing c/d/i  Polar ice in place  SILT T/SP/DP  Motor intact T/SP/DP  WWP extremities  FCDs in place and functioning       Significant Labs:   All pertinent labs within the past 24 hours have been reviewed.    Significant Imaging: I have reviewed and interpreted all pertinent imaging results/findings.

## 2023-10-24 NOTE — PROGRESS NOTES
Acute Pain Service and Perioperative Surgical Home Progress Note    Interval history  Carmen Hawley is a 73 y.o., female, status post arthroplasty of the right knee with no acute events overnight.  Patient's pain has remained well controlled.  Good range of motion and good sensation distal to the surgical site.  Tolerating p.o. and working with therapy.    Surgery:  Procedure(s) (LRB):  ARTHROPLASTY, KNEE, TOTAL, USING COMPUTER-ASSISTED NAVIGATION: JOSHUA: RIGHT: JILL - TRIMORGANJASEN (Right)    Post Op Day #: 1    Problem List:  There are no hospital problems to display for this patient.      Subjective:       General appearance of alert, oriented, no complaints   Pain with rest: 2    Numbers   Pain with movement: 2    Numbers   Side Effects    1. Pruritis No    2. Nausea No    3. Motor Blockade No, 0=Ability to raise lower extremities off bed    4. Sedation No, 1=awake and alert    Schedule Medications:    acetaminophen  1,000 mg Oral Q6H    aspirin  81 mg Oral BID    atorvastatin  20 mg Oral QHS    celecoxib  200 mg Oral Daily    EScitalopram oxalate  10 mg Oral Daily    famotidine  20 mg Oral BID    gabapentin  300 mg Oral TID    metFORMIN  500 mg Oral Daily with breakfast    methocarbamoL  750 mg Oral TID    metoprolol succinate  100 mg Oral Daily    mupirocin  1 g Nasal BID    polyethylene glycol  17 g Oral Daily    senna-docusate 8.6-50 mg  1 tablet Oral BID    triamterene-hydrochlorothiazide 37.5-25 mg  1 capsule Oral Daily        Continuous Infusions:   sodium chloride 0.9% 150 mL/hr at 10/23/23 2147        PRN Medications:  bisacodyL, melatonin, morphine, naloxone, ondansetron, oxyCODONE, oxyCODONE, prochlorperazine       Antibiotics:  Antibiotics (From admission, onward)      Start     Stop Route Frequency Ordered    10/23/23 2100  mupirocin 2 % ointment 1 g         10/28/23 2059 Nasl 2 times daily 10/23/23 9987               Objective:         Vital Signs (Most Recent):  Temp: 97.6 °F (36.4 °C)  "(10/24/23 0415)  Pulse: 66 (10/24/23 0415)  Resp: 16 (10/24/23 0415)  BP: (!) 141/68 (10/24/23 0415)  SpO2: 99 % (10/24/23 0415) Vital Signs Range (Last 24H):  Temp:  [97.4 °F (36.3 °C)-97.7 °F (36.5 °C)]   Pulse:  [54-71]   Resp:  [16-21]   BP: (115-183)/()   SpO2:  [93 %-100 %]          I & O (Last 24H):  Intake/Output Summary (Last 24 hours) at 10/24/2023 0510  Last data filed at 10/24/2023 0110  Gross per 24 hour   Intake 2200 ml   Output 1250 ml   Net 950 ml       Physical Exam:    GA: Alert, comfortable, no acute distress.   Pulmonary: Clear to auscultation . Normal respiratory ratei.  Cardiac: regular rate and rhythm.          Laboratory: reviewed in chart  CBC:   Recent Labs     10/24/23  0411   HCT 26*       BMP: No results for input(s): "NA", "K", "CO2", "CL", "BUN", "CREATININE", "GLU", "MG", "PHOS", "CALCIUM" in the last 72 hours.    No results for input(s): "PT", "INR", "PROTIME", "APTT" in the last 72 hours.          Assessment:         Pain control adequate    Plan:  1) Pain: Multimodal pain regimen ordered which includes acetaminophen, celecoxib, pregabalin, and prn oxycodone.  Well controlled on current regimen.  Will continue to monitor.    2) hypertension and hyperlipidemia, continue home regimen   3) mitral valve prolapse, no acute events overnight   4) FEN/GI: Tolerating regular diet.     5) Dispo: Pt working well with PT/OT. Case management and SW following along for setting up home health and physical therapy. Plan to discharge home this am.              Evaluator Elvin Arciniega PA-C                "

## 2023-10-25 ENCOUNTER — CLINICAL SUPPORT (OUTPATIENT)
Dept: REHABILITATION | Facility: HOSPITAL | Age: 73
End: 2023-10-25
Payer: COMMERCIAL

## 2023-10-25 ENCOUNTER — CLINICAL SUPPORT (OUTPATIENT)
Dept: ORTHOPEDICS | Facility: CLINIC | Age: 73
End: 2023-10-25
Payer: COMMERCIAL

## 2023-10-25 DIAGNOSIS — Z74.09 IMPAIRED FUNCTIONAL MOBILITY, BALANCE, GAIT, AND ENDURANCE: ICD-10-CM

## 2023-10-25 DIAGNOSIS — Z96.659 STATUS POST KNEE REPLACEMENT, UNSPECIFIED LATERALITY: Primary | ICD-10-CM

## 2023-10-25 DIAGNOSIS — M17.11 PRIMARY OSTEOARTHRITIS OF RIGHT KNEE: ICD-10-CM

## 2023-10-25 DIAGNOSIS — M25.661 KNEE STIFFNESS, RIGHT: ICD-10-CM

## 2023-10-25 PROCEDURE — 99499 UNLISTED E&M SERVICE: CPT | Mod: 95,,, | Performed by: ORTHOPAEDIC SURGERY

## 2023-10-25 PROCEDURE — 97161 PT EVAL LOW COMPLEX 20 MIN: CPT | Mod: PN

## 2023-10-25 PROCEDURE — 99499 NO LOS: ICD-10-PCS | Mod: 95,,, | Performed by: ORTHOPAEDIC SURGERY

## 2023-10-25 PROCEDURE — 97110 THERAPEUTIC EXERCISES: CPT | Mod: PN

## 2023-10-25 NOTE — PLAN OF CARE
[facility]   Physical Therapy Initial Evaluation      Name: Carmen Hawley  Clinic Number: [id number]    Therapy Diagnosis:   Encounter Diagnoses   Name Primary?    Primary osteoarthritis of right knee     Impaired functional mobility, balance, gait, and endurance     Knee stiffness, right         Physician: Jean-Pierre Bennett III, *    Physician Orders: PT Eval and Treat   Medical Diagnosis from Referral:      M17.11 (ICD-10-CM) - Primary osteoarthritis of right knee     Evaluation Date: 10/25/2023  Authorization Period Expiration: 10/24/2024  Plan of Care Expiration: 01/25/2024  Progress Note Due: 12/15/2024  Date of Surgery: 10/23/2023  Visit # / Visits authorized: 1/ 1   FOTO: 1/ 3    Precautions: Standard     Time In: 2:00 pm   Time Out: 3:15 pm   Total Billable Time: 75 minutes    Subjective     Date of onset: Chronic right knee pain - Right TKA on 10/23/2023    History of current condition - Carmen reports: She is doing really well 2 days s/p right TKA.  She was able to spend the night at hospital on Monday following the surgery secondary to her distance from hospital.  She came home yesterday.  She reports that she should have had her knee surgery months ago - she is amazed how good she feels.  Carmen is ambulating with use of RW, but finds that she is able to walk in the house - short distances - without it.  She was able to shower at home, has figured out a schedule for icing/elevating her leg to help prevent edema/swelling. She feels like the nerve block is still helping to curtail her pain;     Falls: no falls     Prior Therapy: Carmen was seen for Pre-Hab about 6 weeks ago.   Social History: lives with , single story home    Occupation: works at [employer];   Prior Level of Function: Independent, but modified activities as result of her knee pain and difficulty walking distances.   Current Level of Function: Mod I with use of RW; able to dress herself, did  need some help with compression stocking; not driving,  brought her to PT.     Pain:   Current 1/10, worst 4/10, best 1/10   Location: right  knee   Description: Aching  Aggravating Factors: nothing really, she doesn't have a lot of pain at this time.   Easing Factors: ice,elevation,     Patients goals: To fully recover from knee surgery and return to full activity without pain      Medical History:   Past Medical History:   Diagnosis Date    Anemia 10/5/2023    Anxiety     Arthritis     Cataract     Hyperlipidemia     Hypertension     Macular degeneration     Mitral valve prolapse     Renal insufficiency 10/5/2023    Salzmann's nodular dystrophy of both eyes        Surgical History:   Carmen Hawley  has a past surgical history that includes Tonsillectomy; Colonoscopy (N/A, 10/08/2020); Transforaminal epidural injection of steroid (Right, 06/29/2022); Transforaminal epidural injection of steroid (Right, 03/15/2023); Cataract extraction w/  intraocular lens implant (Left, 05/08/2023); injection (Right, 05/17/2023); Cataract extraction w/  intraocular lens implant (Right, 05/22/2023); Eye surgery; and arthroplasty, knee, total, using computer-assisted navigation (Right, 10/23/2023).    Medications:   Carmen has a current medication list which includes the following prescription(s): acetaminophen, aspirin, biotin, cefadroxil, celecoxib, cyanocobalamin, escitalopram oxalate, escitalopram oxalate, estradiol, gabapentin, metformin, methocarbamol, metoprolol succinate, naltrexone-bupropion, oxycodone, pantoprazole, senna-docusate 8.6-50 mg, simvastatin, tramadol, triamterene-hydrochlorothiazide 37.5-25 mg, and vitamin d.    Allergies:   Review of patient's allergies indicates:  No Known Allergies     Objective        Observation: Carmen ambulated into clinic with use of RW, walking well, good pace, good heel to toe movement on Right; She is alert/oriented x 4. In no acute distress     Posture: slight forward  head/kyphosis; anterior pelvic tilt;     Edema: mild edema in knee, EDSON hose stocking on leg; no lower leg edema noted at all.     Girth Measurement Joint line 5 cm below 10 cm above   Left  cm cm  cm   Right 38 cm 45 cm 47 cm     Range of Motion:   Knee Left active Left Passive Right Active R passive    0 - 130 0 - 130 3 - 115  2 - 116        Lower Extremity Strength   Left Right   Knee extension: 5/5 4/5   Knee flexion: 4-/5 4-/5   Hip flexion: 5/5 4+/5   Hip extension:  4-/5 4-/5   Hip abduction: 4/5 4/5   Hip adduction: 4-/5 4-/5   Hip IR 4-/5 4-/5   Hip ER 4-/5 4-/5   Ankle dorsiflexion: 5/5 5/5   Ankle plantarflexion: 4+/5 4-/5       Function:    - SLS R: able to lift opposite leg, maintain x 10 sec  - SLS L: able to lift opposite leg, maintain x 20 sec  - Squat: able to perform mini squat - cueing for improved technique and equal WB on each LE    - Sit <--> Stand:  Independent from mat - able to perform 6 reps in 30 sec      Joint Mobility: Patellar unrestricted,   Tibiofemoral unrestricted,     Palpation: minimal tenderness to palpation at distal quad and knee itself       Sensation: intact to light touch; still has some effects from nerve block in RLE at this point     Flexibility:    90/90 SLR = R moderate restriction, L moderate restriction   Ely's test: R moderate restriction, L minimal restriction   Chris's test: R minimal restriction, L minimal restriction   Nicho test: R minimal restriction, L minimal restriction    PT Evaluation Completed: Yes  Discussed Plan of Care with patient: Yes     Intake Outcome Measure for FOTO knee Survey    Therapist reviewed FOTO scores for Carmen Hawley on 10/25/2023.   FOTO report - see Media section or FOTO account episode details.    Intake Score:  52.5 Lower score = greater disability          Treatment     Total Treatment time (time-based codes) separate from Evaluation: 25 minutes     Carmen received the treatments listed below:      therapeutic exercises to  "develop strength, endurance, and ROM for 25 minutes including:  Nu-Step Level 1 x 12 mins   Quad Sets - towel roll under ankle - required visual and tactile cueing for improved mm activation   SLR 10 x 2  SAQ over gray roll x 2 mins   Ball squeezes x 2 mins   Step-ups : 4" step x 2 mins   Squats: mini squat x 10 - with cueing   Patient Education and Home Exercises     Education provided:   - progression of therapy for post-op knee     Written Home Exercises Provided: Patient instructed to cont prior HEP. Exercises were reviewed and Carmen was able to demonstrate them prior to the end of the session.  Carmen demonstrated good  understanding of the education provided. See EMR under Patient Instructions for exercises given to patient pre-op.     Assessment     Carmen is a 73 y.o. female referred to outpatient Physical Therapy with a medical diagnosis of primary OA right knee - 2 days post-op Right TKA. Patient presents with excellent post-operative active ROM in her right knee - currently at 115 - should be able to achieve 125 without issue.  Carmen demonstrates LE mm weakness with some decreased ability to active her distal quad with isometric contraction, better with standing activity.  She demonstrates gait, balance and endurance deficits with decreased activity tolerance.      Patient prognosis is Excellent.   Patient will benefit from skilled outpatient Physical Therapy to address the deficits stated above and in the chart below, provide patient /family education, and to maximize patientt's level of independence.     Plan of care discussed with patient: Yes  Patient's spiritual, cultural and educational needs considered and patient is agreeable to the plan of care and goals as stated below:     Anticipated Barriers for therapy: none     Medical Necessity is demonstrated by the following  History  Co-morbidities and personal factors that may impact the plan of care [x] LOW: no personal factors / " co-morbidities  [] MODERATE: 1-2 personal factors / co-morbidities  [] HIGH: 3+ personal factors / co-morbidities    Moderate / High Support Documentation:   Co-morbidities affecting plan of care: n/a     Personal Factors:   no deficits     Examination  Body Structures and Functions, activity limitations and participation restrictions that may impact the plan of care [x] LOW: addressing 1-2 elements  [] MODERATE: 3+ elements  [] HIGH: 4+ elements (please support below)    Moderate / High Support Documentation: n/a      Clinical Presentation [x] LOW: stable  [] MODERATE: Evolving  [] HIGH: Unstable     Decision Making/ Complexity Score: low       Goals:  Short Term Goals: 4 weeks   Reduction in pain levels to no more than 1-2/10 with increased level of daily activity   Improvement in right knee AROM - 0 - 125 degrees   Compliant with HEP     Long Term Goals: 8 weeks   Reduction in pain levels to 0/10 for improved daily activity performance and functional mobility   Right knee AROM WNL for TKA without any increase in symptoms   Able to ambulate 1 mile without AD, no increase in symptoms   Able to go up/down flight of steps without compensation   Able to perform usual household activities without increased symptoms   Able to carry grocery bags from car to house without increased symptoms   FOTO intake score improvement to > 75  Independent with HEP for continued improvement in functional mobility   Plan     Plan of care Certification: 10/25/2023 to 01/25/2025.    Outpatient Physical Therapy 2 -3 times weekly for 8 weeks to include the following interventions: Gait Training, Manual Therapy, Moist Heat/ Ice, Neuromuscular Re-ed, Patient Education, Therapeutic Activities, and Therapeutic Exercise.     Susanna Rondon, PT        Physician's Signature: _________________________________________ Date: ________________

## 2023-10-25 NOTE — PROGRESS NOTES
I called the patient today regarding her surgery with Dr. Jean-Pierre Bennett III. The patient had a right TKA on 10/23.     Pain Scale: 0 / 10    Any issues with Fever: No.    Any issues with medications (specifically DVT prophylaxis): No. ASA 81 BID    Any issues with bowel movements:  Passing katrina: No.                                                                 Urination: No.                                                                 Constipation: No. Last BM 10/25  Completing at home exercises: Yes:     Any concerns regarding their dressing/bandage:  No.    Patient confirmed first OP-PT appointment:  Triny arango on  10/25 at 1400.     Any other concerns: No.        The patient was informed that if they have any urgent issues with their bandage, medications or any other health concerns regarding their surgery to call the 24/7 Orthopedic Post-op Hot Line at [phone]. The patient was reminded that if they have any chest pain or shortness of breath to call 911 or go to the ER.    The patient verbalized understanding and does not have any other questions

## 2023-10-25 NOTE — PLAN OF CARE
[place] [facility]  Discharge Final Note    Primary Care Provider: Susanna Sprague MD    Expected Discharge Date: 10/24/2023    Final Discharge Note (most recent)       Final Note - 10/24/23 1130          Final Note    Assessment Type Final Discharge Note     Anticipated Discharge Disposition Home or Self Care     Hospital Resources/Appts/Education Provided Provided patient/caregiver with written discharge plan information;Provided education on problems/symptoms using teachback                   Future Appointments   Date Time Provider Department Center   10/25/2023  2:00 PM Susanna Rondon, PT [facility] REHABOP [facility]   11/6/2023 10:20 AM Santa Mendez NP [facility] ORTHO [address]   11/30/2023  1:10 PM Bri Wild MD [facility] [address]   12/5/2023 11:20 AM Jean-Peirre Bennett III, MD [facility] [address]THO [address] [place]   1/16/2024 11:20 AM Jean-Pierre Bennett III, MD [facility] [address]THO [address] [place]   1/18/2024  1:00 PM Maggi Graham MD [facility] [address]   2/28/2024  1:00 PM Susanna Sprague MD [facility] [address]

## 2023-10-30 ENCOUNTER — CLINICAL SUPPORT (OUTPATIENT)
Dept: REHABILITATION | Facility: HOSPITAL | Age: 73
End: 2023-10-30
Payer: COMMERCIAL

## 2023-10-30 DIAGNOSIS — Z74.09 IMPAIRED FUNCTIONAL MOBILITY, BALANCE, GAIT, AND ENDURANCE: Primary | ICD-10-CM

## 2023-10-30 DIAGNOSIS — M25.661 KNEE STIFFNESS, RIGHT: ICD-10-CM

## 2023-10-30 PROCEDURE — 97530 THERAPEUTIC ACTIVITIES: CPT | Mod: PN

## 2023-10-30 PROCEDURE — 97140 MANUAL THERAPY 1/> REGIONS: CPT | Mod: PN

## 2023-10-30 PROCEDURE — 97110 THERAPEUTIC EXERCISES: CPT | Mod: PN

## 2023-10-30 NOTE — PROGRESS NOTES
"[facility]   Physical Therapy Treatment Note      Name: Carmen Hawley  Clinic Number: [id number]    Therapy Diagnosis:   Encounter Diagnoses   Name Primary?    Impaired functional mobility, balance, gait, and endurance Yes    Knee stiffness, right      Physician: Jean-Pierre Bennett III, *    Visit Date: 10/30/2023    Evaluation Date: 10/25/2023  Authorization Period Expiration: 10/24/2024  Plan of Care Expiration: 01/25/2024  Progress Note Due: 12/15/2024  Date of Surgery: 10/23/2023  Visit # / Visits authorized: 1/ 12   FOTO: 1/ 3     Precautions: Standard      Time In: 12:30  pm   Time Out: 1:35  pm   Total Billable Time: 60 minutes    PTA Visit #: 0/5       Subjective     Patient reports: I'm doing ok, working on my exercises and using the ice and elevating several times per day.   She was compliant with home exercise program.  Response to previous treatment: good;   Functional change: walking with a cane now;     Pain: 4/10  Location: right knee      Objective      Objective Measures updated at progress report unless specified.     Treatment     Carmen received the treatments listed below:      therapeutic exercises to develop strength and ROM for 25 minutes including:  Nu-Step Level 1 x 15 mins   LAQ's > knee flexion x 15  Quad Sets in long sitting 5" hold x 2 mins   SAQ's over gray roll x 2 mins   SLR 10 x 2  Bridges x 10  Ball squeezes x 20   S/L hip abduction x 10  S/L clams x 10     manual therapy techniques: Soft tissue Mobilization were applied to the: Right knee  for 15 minutes, including:  Anterior to posterior mobilization of femur on tibia to maximize extension;  passive stretch into flexion with end range hold; massage from ankle to past knee for movement of fluid to reduce swelling. Instructed patient in need to avoid anything under her knee when relaxing at home with ice.  She has developed a little tightness in her knee extension - want to avoid any loss of motion " "secondary to tightness in her hamstrings (pre-existing problem) . Also encouraged ankle pumps with elevation to help reduce edema in her ankle.   Knee AROM:  1 - 118 deg; Passive to 0 - 119 deg.     neuromuscular re-education activities to improve:  for  minutes. The following activities were included:      therapeutic activities to improve functional performance for  20  minutes, including:  Step-Ups: 6" step - lead with Right leg x 2 mins   Lateral step-ups 4" step - right TKE emphasized x 1 min   Squats: x 10   Achilles stretch on wedge - 30 sec x 3 intermittent with heel lifts.   Supine: heel slides with use of strap x 3 mins     gait training to improve functional mobility and safety for 0  minutes, including:  Reminded patient of cane in left hand - heel to toe progression with right foot, knee extension on heel strike and knee flexion on swing through.       Patient Education and Home Exercises       Education provided:   - concentrate on knee extension; flexion coming along well     Written Home Exercises Provided: Patient instructed to cont prior HEP. Exercises were reviewed and Carmen was able to demonstrate them prior to the end of the session.  Carmen demonstrated good  understanding of the education provided. See Electronic Medical Record under Patient Instructions for exercises provided during therapy sessions    Assessment     Carmen continues to demonstrate good progress with TKA; She has great knee AROM for a week post-op; needs work on quad mm isolation with various positions.     Carmen Is progressing well towards her goals.   Patient prognosis is Good.     Patient will continue to benefit from skilled outpatient physical therapy to address the deficits listed in the problem list box on initial evaluation, provide pt/family education and to maximize pt's level of independence in the home and community environment.     Patient's spiritual, cultural and educational needs considered and pt " agreeable to plan of care and goals.     Anticipated barriers to physical therapy: none     Goals:     Short Term Goals: 4 weeks   Reduction in pain levels to no more than 1-2/10 with increased level of daily activity   Improvement in right knee AROM - 0 - 125 degrees   Compliant with HEP      Long Term Goals: 8 weeks   Reduction in pain levels to 0/10 for improved daily activity performance and functional mobility   Right knee AROM WNL for TKA without any increase in symptoms   Able to ambulate 1 mile without AD, no increase in symptoms   Able to go up/down flight of steps without compensation   Able to perform usual household activities without increased symptoms   Able to carry grocery bags from car to house without increased symptoms   FOTO intake score improvement to > 75  Independent with HEP for continued improvement in functional mobility       Plan     Plan of care Certification: 10/25/2023 to 01/25/2025.     Outpatient Physical Therapy 2 -3 times weekly for 8 weeks to include the following interventions: Gait Training, Manual Therapy, Moist Heat/ Ice, Neuromuscular Re-ed, Patient Education, Therapeutic Activities, and Therapeutic Exercise.      Susanna Rondon, PT       Susanna Rondon, PT

## 2023-11-01 ENCOUNTER — CLINICAL SUPPORT (OUTPATIENT)
Dept: REHABILITATION | Facility: HOSPITAL | Age: 73
End: 2023-11-01
Payer: COMMERCIAL

## 2023-11-01 DIAGNOSIS — Z74.09 IMPAIRED FUNCTIONAL MOBILITY, BALANCE, GAIT, AND ENDURANCE: Primary | ICD-10-CM

## 2023-11-01 DIAGNOSIS — M25.661 KNEE STIFFNESS, RIGHT: ICD-10-CM

## 2023-11-01 PROCEDURE — 97110 THERAPEUTIC EXERCISES: CPT | Mod: PN,CQ

## 2023-11-01 PROCEDURE — 97530 THERAPEUTIC ACTIVITIES: CPT | Mod: PN,CQ

## 2023-11-01 NOTE — PROGRESS NOTES
"[facility]   Physical Therapy Treatment Note      Name: Carmen Hawley  Clinic Number: [id number]    Therapy Diagnosis:   Encounter Diagnoses   Name Primary?    Impaired functional mobility, balance, gait, and endurance Yes    Knee stiffness, right      Physician: Jean-Pierre Bennett III, *    Visit Date: 11/1/2023    Evaluation Date: 10/25/2023  Authorization Period Expiration: 10/24/2024  Plan of Care Expiration: 01/25/2024  Progress Note Due: 12/15/2024  Date of Surgery: 10/23/2023  Visit # / Visits authorized: 2 / 12   FOTO: 1/ 3     Precautions: Standard      Time In: 1:00 PM  Time Out: 2:05 PM  Total Billable Time: 53 minutes    PTA Visit #: 1/5       Subjective     Patient reports: My knee is a little bit sore.   She was compliant with home exercise program.  Response to previous treatment: good;   Functional change: walking with a cane now;     Pain: 3-4/10  Location: right knee      Objective      Objective Measures updated at progress report unless specified.     Treatment     Carmen received the treatments listed below:      therapeutic exercises to develop strength and ROM for 30 minutes including:  Nu-Step Level 1 x 15 mins   LAQ's > knee flexion x 15  Seated H/S stretch 3 x 30 sec  Quad Sets in long sitting 5" hold x 3 mins   SAQ's over gray roll x 3 mins   SLR 10 x 2  Bridges x 10  Ball squeezes x 2 min  S/L hip abduction x 10  S/L clams x 15    manual therapy techniques: Soft tissue Mobilization were applied to the: Right knee  for 0 minutes, including:  Anterior to posterior mobilization of femur on tibia to maximize extension;  passive stretch into flexion with end range hold; massage from ankle to past knee for movement of fluid to reduce swelling. Instructed patient in need to avoid anything under her knee when relaxing at home with ice.  She has developed a little tightness in her knee extension - want to avoid any loss of motion secondary to tightness in her " "hamstrings (pre-existing problem) . Also encouraged ankle pumps with elevation to help reduce edema in her ankle.   Knee AROM:  1 - 118 deg; Passive to 0 - 119 deg.     neuromuscular re-education activities to improve:  for  minutes. The following activities were included:      therapeutic activities to improve functional performance for 23 minutes, including:  Heel Raises x 20  Toe Taps on 6" step x 2 min  Step-Ups: 6" step - lead with Right leg x 2 mins   Lateral step-ups 4" step - right TKE emphasized x 1 min   Squats: x 10   Achilles stretch on wedge - 30 sec x 3   Supine: heel slides with use of strap x 3 mins     gait training to improve functional mobility and safety for 0 minutes, including:  Reminded patient of cane in left hand - heel to toe progression with right foot, knee extension on heel strike and knee flexion on swing through.       Patient Education and Home Exercises       Education provided:   - concentrate on knee extension; flexion coming along well     Written Home Exercises Provided: Patient instructed to cont prior HEP. Exercises were reviewed and Carmen was able to demonstrate them prior to the end of the session.  Carmen demonstrated good  understanding of the education provided. See Electronic Medical Record under Patient Instructions for exercises provided during therapy sessions    Assessment     Carmen continues to demonstrate good progress with TKA; She has great knee AROM for a week post-op; needs work on quad mm isolation with various positions. Forgot cane today.    Carmen Is progressing well towards her goals.   Patient prognosis is Good.     Patient will continue to benefit from skilled outpatient physical therapy to address the deficits listed in the problem list box on initial evaluation, provide pt/family education and to maximize pt's level of independence in the home and community environment.     Patient's spiritual, cultural and educational needs considered and pt " agreeable to plan of care and goals.     Anticipated barriers to physical therapy: none     Goals:     Short Term Goals: 4 weeks   Reduction in pain levels to no more than 1-2/10 with increased level of daily activity   Improvement in right knee AROM - 0 - 125 degrees   Compliant with HEP      Long Term Goals: 8 weeks   Reduction in pain levels to 0/10 for improved daily activity performance and functional mobility   Right knee AROM WNL for TKA without any increase in symptoms   Able to ambulate 1 mile without AD, no increase in symptoms   Able to go up/down flight of steps without compensation   Able to perform usual household activities without increased symptoms   Able to carry grocery bags from car to house without increased symptoms   FOTO intake score improvement to > 75  Independent with HEP for continued improvement in functional mobility       Plan     Plan of care Certification: 10/25/2023 to 01/25/2025.     Outpatient Physical Therapy 2 -3 times weekly for 8 weeks to include the following interventions: Gait Training, Manual Therapy, Moist Heat/ Ice, Neuromuscular Re-ed, Patient Education, Therapeutic Activities, and Therapeutic Exercise.      Susanna Rondon, PT       Jonathan Favre, PTA

## 2023-11-03 ENCOUNTER — CLINICAL SUPPORT (OUTPATIENT)
Dept: REHABILITATION | Facility: HOSPITAL | Age: 73
End: 2023-11-03
Payer: COMMERCIAL

## 2023-11-03 DIAGNOSIS — Z74.09 IMPAIRED FUNCTIONAL MOBILITY, BALANCE, GAIT, AND ENDURANCE: Primary | ICD-10-CM

## 2023-11-03 DIAGNOSIS — M25.661 KNEE STIFFNESS, RIGHT: ICD-10-CM

## 2023-11-03 PROCEDURE — 97110 THERAPEUTIC EXERCISES: CPT | Mod: PN

## 2023-11-03 PROCEDURE — 97140 MANUAL THERAPY 1/> REGIONS: CPT | Mod: PN

## 2023-11-03 PROCEDURE — 97530 THERAPEUTIC ACTIVITIES: CPT | Mod: PN

## 2023-11-03 NOTE — PROGRESS NOTES
"[facility]   Physical Therapy Treatment Note      Name: Carmen Hawley  Clinic Number: [id number]    Therapy Diagnosis:   Encounter Diagnoses   Name Primary?    Impaired functional mobility, balance, gait, and endurance Yes    Knee stiffness, right      Physician: Jean-Pierre Bennett III, *    Visit Date: 11/3/2023    Evaluation Date: 10/25/2023  Authorization Period Expiration: 10/24/2024  Plan of Care Expiration: 01/25/2024  Progress Note Due: 12/15/2024  Date of Surgery: 10/23/2023  Visit # / Visits authorized: 4 / 12   FOTO: 1/ 3     Precautions: Standard      Time In: 12:30  PM  Time Out: 1:30   PM  Total Billable Time: 53 minutes    PTA Visit #: 0/5       Subjective     Patient reports: I'll be glad to get this bandage off Monday, I feel like it's holding me back   She was compliant with home exercise program.  Response to previous treatment: good;   Functional change: walking with a cane now;     Pain: 3-4/10  Location: right knee      Objective      Objective Measures updated at progress report unless specified.     Treatment     Carmen received the treatments listed below:      therapeutic exercises to develop strength and ROM for 30 minutes including:  Nu-Step Level 4  x 15 mins   LAQ's > knee flexion x 15  Seated H/S stretch 3 x 30 sec  Quad Sets in long sitting 5" hold x 3 mins  - has to have visual cues - able to contract, but has difficulty holding contraction for 5"  SAQ's over gray roll x 3 mins  - emphasis on knee extension on quad mm activation/hold   SLR 10 x 2  Bridges x 10 - toes up and foot flat - 10 each   Ball squeezes x 2 min  S/L hip abduction x 10  S/L clams x 15    manual therapy techniques: Soft tissue Mobilization were applied to the: Right knee  for 15  minutes, including:  Anterior to posterior mobilization of femur on tibia to maximize extension;  passive stretch into flexion with end range hold; massage from ankle to past knee for movement of fluid to " "reduce swelling. Focused on knee extension as patient is starting to lose some range here, emphasized the need to work on this at home - need to work on quad sets with push into extension as she is not able to activate her quad on a consistent basis.    Knee AROM:  3 - 115 deg; Passive to 1 - 117  deg.     neuromuscular re-education activities to improve:  for  minutes. The following activities were included:      therapeutic activities to improve functional performance for 15 minutes, including:  Heel Raises x 20  Toe Taps on 8" step x 2 min  Step-Ups: 6" step - lead with Right leg x 2 mins   Lateral step-ups 4" step - right TKE emphasized x 1 min   Squats: x 10   Achilles stretch on wedge - 30 sec x 3   Supine: heel slides with use of strap x 3 mins     gait training to improve functional mobility and safety for 0 minutes, including:  Reminded patient of cane in left hand - heel to toe progression with right foot, knee extension on heel strike and knee flexion on swing through.       Patient Education and Home Exercises       Education provided:   - concentrate on knee extension - needs to perform quad sets more often - spending more time than necessary on icing and elevating during the day; suggested that she work on her knee extension with the icing if that works for her.     Written Home Exercises Provided: Patient instructed to cont prior HEP. Exercises were reviewed and Carmen was able to demonstrate them prior to the end of the session.  Carmen demonstrated good  understanding of the education provided. See Electronic Medical Record under Patient Instructions for exercises provided during therapy sessions    Assessment     Carmen continues to demonstrate overall good progress for 2 weeks post-op; She is developing some tightness in hamstrings - losing some knee extension; Carmen is also having difficulty with quad mm activation - not consistent and unable to hold for more than a second.  Really " emphasized need to work on these two things over the weekend.     Carmen Is progressing well towards her goals.   Patient prognosis is Good.     Patient will continue to benefit from skilled outpatient physical therapy to address the deficits listed in the problem list box on initial evaluation, provide pt/family education and to maximize pt's level of independence in the home and community environment.     Patient's spiritual, cultural and educational needs considered and pt agreeable to plan of care and goals.     Anticipated barriers to physical therapy: none     Goals:     Short Term Goals: 4 weeks   Reduction in pain levels to no more than 1-2/10 with increased level of daily activity   Improvement in right knee AROM - 0 - 125 degrees   Compliant with HEP      Long Term Goals: 8 weeks   Reduction in pain levels to 0/10 for improved daily activity performance and functional mobility   Right knee AROM WNL for TKA without any increase in symptoms   Able to ambulate 1 mile without AD, no increase in symptoms   Able to go up/down flight of steps without compensation   Able to perform usual household activities without increased symptoms   Able to carry grocery bags from car to house without increased symptoms   FOTO intake score improvement to > 75  Independent with HEP for continued improvement in functional mobility       Plan   Continue with Skilled PT - emphasize knee extension activities, quad sets.  Returns to MD Monday.     Plan of care Certification: 10/25/2023 to 01/25/2025.     Outpatient Physical Therapy 2 -3 times weekly for 8 weeks to include the following interventions: Gait Training, Manual Therapy, Moist Heat/ Ice, Neuromuscular Re-ed, Patient Education, Therapeutic Activities, and Therapeutic Exercise.      Susanna Rondon, PT       Susanna Rondon, PT

## 2023-11-06 ENCOUNTER — CLINICAL SUPPORT (OUTPATIENT)
Dept: REHABILITATION | Facility: HOSPITAL | Age: 73
End: 2023-11-06
Payer: COMMERCIAL

## 2023-11-06 ENCOUNTER — OFFICE VISIT (OUTPATIENT)
Dept: ORTHOPEDICS | Facility: CLINIC | Age: 73
End: 2023-11-06
Payer: COMMERCIAL

## 2023-11-06 DIAGNOSIS — M25.661 KNEE STIFFNESS, RIGHT: ICD-10-CM

## 2023-11-06 DIAGNOSIS — Z96.651 S/P TOTAL KNEE REPLACEMENT USING CEMENT, RIGHT: Primary | ICD-10-CM

## 2023-11-06 DIAGNOSIS — Z74.09 IMPAIRED FUNCTIONAL MOBILITY, BALANCE, GAIT, AND ENDURANCE: Primary | ICD-10-CM

## 2023-11-06 PROCEDURE — 97110 THERAPEUTIC EXERCISES: CPT | Mod: PN,CQ

## 2023-11-06 PROCEDURE — 1160F PR REVIEW ALL MEDS BY PRESCRIBER/CLIN PHARMACIST DOCUMENTED: ICD-10-PCS | Mod: CPTII,S$GLB,, | Performed by: NURSE PRACTITIONER

## 2023-11-06 PROCEDURE — 1160F RVW MEDS BY RX/DR IN RCRD: CPT | Mod: CPTII,S$GLB,, | Performed by: NURSE PRACTITIONER

## 2023-11-06 PROCEDURE — 1159F PR MEDICATION LIST DOCUMENTED IN MEDICAL RECORD: ICD-10-PCS | Mod: CPTII,S$GLB,, | Performed by: NURSE PRACTITIONER

## 2023-11-06 PROCEDURE — 3044F PR MOST RECENT HEMOGLOBIN A1C LEVEL <7.0%: ICD-10-PCS | Mod: CPTII,S$GLB,, | Performed by: NURSE PRACTITIONER

## 2023-11-06 PROCEDURE — 97530 THERAPEUTIC ACTIVITIES: CPT | Mod: PN,CQ

## 2023-11-06 PROCEDURE — 3044F HG A1C LEVEL LT 7.0%: CPT | Mod: CPTII,S$GLB,, | Performed by: NURSE PRACTITIONER

## 2023-11-06 PROCEDURE — 1159F MED LIST DOCD IN RCRD: CPT | Mod: CPTII,S$GLB,, | Performed by: NURSE PRACTITIONER

## 2023-11-06 PROCEDURE — 99024 PR POST-OP FOLLOW-UP VISIT: ICD-10-PCS | Mod: S$GLB,,, | Performed by: NURSE PRACTITIONER

## 2023-11-06 PROCEDURE — 99999 PR PBB SHADOW E&M-EST. PATIENT-LVL III: CPT | Mod: PBBFAC,,, | Performed by: NURSE PRACTITIONER

## 2023-11-06 PROCEDURE — 99024 POSTOP FOLLOW-UP VISIT: CPT | Mod: S$GLB,,, | Performed by: NURSE PRACTITIONER

## 2023-11-06 PROCEDURE — 99999 PR PBB SHADOW E&M-EST. PATIENT-LVL III: ICD-10-PCS | Mod: PBBFAC,,, | Performed by: NURSE PRACTITIONER

## 2023-11-06 NOTE — PROGRESS NOTES
"[facility]   Physical Therapy Treatment Note      Name: Carmen Hawley  Clinic Number: [id number]    Therapy Diagnosis:   Encounter Diagnoses   Name Primary?    Impaired functional mobility, balance, gait, and endurance Yes    Knee stiffness, right      Physician: Jean-Pierre Bennett III, *    Visit Date: 11/6/2023    Evaluation Date: 10/25/2023  Authorization Period Expiration: 10/24/2024  Plan of Care Expiration: 01/25/2024  Progress Note Due: 12/15/2024  Date of Surgery: 10/23/2023  Visit # / Visits authorized: 5 / 12   FOTO: 1/ 3     Precautions: Standard      Time In: 1:00 PM  Time Out: 2:00 PM  Total Billable Time: 45 minutes    PTA Visit #: 1/5       Subjective     Patient reports: I went to the doctor's office today. Everything looks good.   She was compliant with home exercise program.  Response to previous treatment: good;   Functional change: walking with a cane now;     Pain: 3/10  Location: right knee      Objective      Objective Measures updated at progress report unless specified.     Treatment     Carmen received the treatments listed below:      therapeutic exercises to develop strength and ROM for 30 minutes including:  Nu-Step Level 4 x 10 mins   Recumbent Bike level 1 x 5 min  LAQ's > knee flexion x 15  Seated H/S stretch 3 x 30 sec  Quad Sets in long sitting 5" hold x 3 mins  - has to have visual cues - able to contract, but has difficulty holding contraction for 5"  SAQ's over gray roll x 3 mins  - emphasis on knee extension on quad mm activation/hold   SLR 10 x 2  Bridges x 10 foot flat   Ball squeezes x 2 min  S/L hip abduction x 10  S/L clams x 15    manual therapy techniques: Soft tissue Mobilization were applied to the: Right knee for 0 minutes, including:  Anterior to posterior mobilization of femur on tibia to maximize extension;  passive stretch into flexion with end range hold; massage from ankle to past knee for movement of fluid to reduce swelling. " "Focused on knee extension as patient is starting to lose some range here, emphasized the need to work on this at home - need to work on quad sets with push into extension as she is not able to activate her quad on a consistent basis.    Knee AROM:  3 - 115 deg; Passive to 1 - 117  deg.     neuromuscular re-education activities to improve:  for  minutes. The following activities were included:    therapeutic activities to improve functional performance for 15 minutes, including:  Heel Raises x 20  Toe Taps on 8" step x 2 min  Step-Ups: 6" step - lead with Right leg x 2 mins   Lateral step-ups 4" step - right TKE emphasized x 2 min   Squats: x 10   Achilles stretch on wedge - 30 sec x 3   Supine: heel slides with use of strap x 3 mins     gait training to improve functional mobility and safety for 0 minutes, including:  Reminded patient of cane in left hand - heel to toe progression with right foot, knee extension on heel strike and knee flexion on swing through.       Patient Education and Home Exercises       Education provided:   - concentrate on knee extension - needs to perform quad sets more often - spending more time than necessary on icing and elevating during the day; suggested that she work on her knee extension with the icing if that works for her.     Written Home Exercises Provided: Patient instructed to cont prior HEP. Exercises were reviewed and Carmen was able to demonstrate them prior to the end of the session.  Carmen demonstrated good  understanding of the education provided. See Electronic Medical Record under Patient Instructions for exercises provided during therapy sessions    Assessment     Carmen continues to demonstrate overall good progress for 2 weeks post-op; She is developing some tightness in hamstrings - losing some knee extension; Carmen is also having difficulty with quad mm activation - not consistent and unable to hold for more than a second.  Really emphasized need to work on " these two things over the weekend.     Carmen Is progressing well towards her goals.   Patient prognosis is Good.     Patient will continue to benefit from skilled outpatient physical therapy to address the deficits listed in the problem list box on initial evaluation, provide pt/family education and to maximize pt's level of independence in the home and community environment.     Patient's spiritual, cultural and educational needs considered and pt agreeable to plan of care and goals.     Anticipated barriers to physical therapy: none     Goals:     Short Term Goals: 4 weeks   Reduction in pain levels to no more than 1-2/10 with increased level of daily activity   Improvement in right knee AROM - 0 - 125 degrees   Compliant with HEP      Long Term Goals: 8 weeks   Reduction in pain levels to 0/10 for improved daily activity performance and functional mobility   Right knee AROM WNL for TKA without any increase in symptoms   Able to ambulate 1 mile without AD, no increase in symptoms   Able to go up/down flight of steps without compensation   Able to perform usual household activities without increased symptoms   Able to carry grocery bags from car to house without increased symptoms   FOTO intake score improvement to > 75  Independent with HEP for continued improvement in functional mobility       Plan   Continue with Skilled PT - emphasize knee extension activities, quad sets.      Plan of care Certification: 10/25/2023 to 01/25/2025.     Outpatient Physical Therapy 2 -3 times weekly for 8 weeks to include the following interventions: Gait Training, Manual Therapy, Moist Heat/ Ice, Neuromuscular Re-ed, Patient Education, Therapeutic Activities, and Therapeutic Exercise.      Susanna Rondon, PT       Jonathan Favre, PTA

## 2023-11-06 NOTE — PROGRESS NOTES
Carmen Hawley presents for initial post-operative visit following a right total knee arthroplasty performed by Dr. Bennett on 10/23/2023     Exam:     Ambulating well with assistive device.  Incision is clean and dry without drainage or erythema.   ROM:0-118    Initial post-operative radiographs reviewed today revealing a well fixed and aligned prosthesis.    A/P:  2 weeks s/p right total knee replacement    - The patient was advised to keep the incision clean and dry for the next 24 hours after which she may wash the area with antibacterial soap in the shower. Will not submerge incision until one month post op.  - Outpatient PT: ongoing at the [place] location  - Continue aspirin for 1 month post op.  - Pain medication refill not needed  - Follow up in 4 weeks with surgeon. Pt will call clinic with problems/concerns.

## 2023-11-08 ENCOUNTER — CLINICAL SUPPORT (OUTPATIENT)
Dept: REHABILITATION | Facility: HOSPITAL | Age: 73
End: 2023-11-08
Payer: COMMERCIAL

## 2023-11-08 DIAGNOSIS — M25.661 KNEE STIFFNESS, RIGHT: ICD-10-CM

## 2023-11-08 DIAGNOSIS — Z74.09 IMPAIRED FUNCTIONAL MOBILITY, BALANCE, GAIT, AND ENDURANCE: Primary | ICD-10-CM

## 2023-11-08 PROCEDURE — 97140 MANUAL THERAPY 1/> REGIONS: CPT | Mod: PN

## 2023-11-08 PROCEDURE — 97110 THERAPEUTIC EXERCISES: CPT | Mod: PN

## 2023-11-08 PROCEDURE — 97530 THERAPEUTIC ACTIVITIES: CPT | Mod: PN

## 2023-11-08 NOTE — PROGRESS NOTES
"[facility]   Physical Therapy Treatment Note      Name: Carmen Hawley  Clinic Number: [id number]    Therapy Diagnosis:   Encounter Diagnoses   Name Primary?    Impaired functional mobility, balance, gait, and endurance Yes    Knee stiffness, right      Physician: Jean-Pierre Bennett III, *    Visit Date: 11/8/2023    Evaluation Date: 10/25/2023  Authorization Period Expiration: 10/24/2024  Plan of Care Expiration: 01/25/2024  Progress Note Due: 12/15/2024  Date of Surgery: 10/23/2023  Visit # / Visits authorized: 5 / 12   FOTO: 1/ 3     Precautions: Standard      Time In: 12:15 PM  Time Out: 1:20  PM  Total Billable Time: 60 minutes    PTA Visit #: 0/5       Subjective     Patient reports:  I'm doing well today; I drove myself today. Knee less swollen, calf mm less swollen   She was compliant with home exercise program.  Response to previous treatment: good;   Functional change: walking with a cane now;     Pain: 3/10  Location: right knee      Objective      Objective Measures updated at progress report unless specified.     Treatment     Carmen received the treatments listed below:      therapeutic exercises to develop strength and ROM for 30 minutes including:  Nu-Step Level 4 x 10 mins   Recumbent Bike level 1 x 10 min  LAQ's > knee flexion x 15  Seated H/S stretch 3 x 30 sec  Quad Sets in long sitting 5" hold x 3 mins  - significant improvement in quad set noted; able to contract and hold for 5"   SAQ's over gray roll x 3 mins  - emphasis on knee extension on quad mm activation/hold   SLR 10 x 2  Bridges x 10 foot flat   Ball squeezes x 2 min  S/L hip abduction x 10  S/L clams x 15    manual therapy techniques: Soft tissue Mobilization were applied to the: Right knee for  10 minutes, including:\  STM lower leg to proximal quad mm; mobilization of patella in all planes; Anterior > posterior mobilization for knee extension with end range hold, passive stretching through full ROM with " "end range holds; Able to achieve 120 deg flexion today; Extension to 1 deg - improvement noted with reduction in swelling.     neuromuscular re-education activities to improve:  for  minutes. The following activities were included:    therapeutic activities to improve functional performance for 15 minutes, including:  Heel Raises x 20  Toe Taps on 8" step x 2 min  Step-Ups: 6" step - lead with Right leg x 2 mins   Lateral step-ups 4" step - right TKE emphasized x 2 min   Squats: x 10   Achilles stretch on wedge - 30 sec x 3   Supine: heel slides with use of strap x 3 mins     gait training to improve functional mobility and safety for 0 minutes, including:  Reminded patient of cane in left hand - heel to toe progression with right foot, knee extension on heel strike and knee flexion on swing through.       Patient Education and Home Exercises       Education provided:   - concentrate on knee extension - needs to perform quad sets more often - spending more time than necessary on icing and elevating during the day; suggested that she work on her knee extension with the icing if that works for her.     Written Home Exercises Provided: Patient instructed to cont prior HEP. Exercises were reviewed and Carmen was able to demonstrate them prior to the end of the session.  Carmen demonstrated good  understanding of the education provided. See Electronic Medical Record under Patient Instructions for exercises provided during therapy sessions    Assessment     Carmen continues to demonstrate improved AROM/PROM in knee; swelling diminished in calf and knee, still needs to elevate at home with ankle pumps to keep fluid out of leg; quad mm activation much improved today; minimal pain;     Carmen Is progressing well towards her goals.   Patient prognosis is Good.     Patient will continue to benefit from skilled outpatient physical therapy to address the deficits listed in the problem list box on initial evaluation, provide " pt/family education and to maximize pt's level of independence in the home and community environment.     Patient's spiritual, cultural and educational needs considered and pt agreeable to plan of care and goals.     Anticipated barriers to physical therapy: none     Goals:     Short Term Goals: 4 weeks   Reduction in pain levels to no more than 1-2/10 with increased level of daily activity   Improvement in right knee AROM - 0 - 125 degrees   Compliant with HEP      Long Term Goals: 8 weeks   Reduction in pain levels to 0/10 for improved daily activity performance and functional mobility   Right knee AROM WNL for TKA without any increase in symptoms   Able to ambulate 1 mile without AD, no increase in symptoms   Able to go up/down flight of steps without compensation   Able to perform usual household activities without increased symptoms   Able to carry grocery bags from car to house without increased symptoms   FOTO intake score improvement to > 75  Independent with HEP for continued improvement in functional mobility       Plan   Continue with Skilled PT - emphasize knee extension activities, quad sets.      Plan of care Certification: 10/25/2023 to 01/25/2025.     Outpatient Physical Therapy 2 -3 times weekly for 8 weeks to include the following interventions: Gait Training, Manual Therapy, Moist Heat/ Ice, Neuromuscular Re-ed, Patient Education, Therapeutic Activities, and Therapeutic Exercise.      Susanna Rondon, PT       Susanna Rondon, PT

## 2023-11-10 ENCOUNTER — CLINICAL SUPPORT (OUTPATIENT)
Dept: REHABILITATION | Facility: HOSPITAL | Age: 73
End: 2023-11-10
Payer: COMMERCIAL

## 2023-11-10 DIAGNOSIS — M25.661 KNEE STIFFNESS, RIGHT: ICD-10-CM

## 2023-11-10 DIAGNOSIS — Z74.09 IMPAIRED FUNCTIONAL MOBILITY, BALANCE, GAIT, AND ENDURANCE: Primary | ICD-10-CM

## 2023-11-10 PROCEDURE — 97140 MANUAL THERAPY 1/> REGIONS: CPT | Mod: PN

## 2023-11-10 PROCEDURE — 97110 THERAPEUTIC EXERCISES: CPT | Mod: PN

## 2023-11-10 PROCEDURE — 97530 THERAPEUTIC ACTIVITIES: CPT | Mod: PN

## 2023-11-10 NOTE — PROGRESS NOTES
In-Person Joint Class: Joint education provided, teaching and education individualized for patient and needs.  Physical and occupational therapy teaching  Caregiver and home support importance discussed  DME Equipment discussed, pt will bring walker to hospital.  Total knee precautions and exercises discussed and reviewed. Elevate leg above the heart, polar ice, dressing and Nimbus pump discussed.   Total hip precautions discussed and reviewed  Discussed home safety, area rugs, cords, tripping hazards.   Discussed pain management, multimodal methods and additional therapies, such as relaxation, guided imagery.   Pt will be seen by pre-op center for clearance.  Medications delivered to bedside prior to DC.   Post-op call or Virtual day after discharge.  Joint Hotline number discussed  OP PT and HH PT  discussed  Answered questions/concerns,  pt verbalized understanding, pt will call with questions concerns.

## 2023-11-10 NOTE — PROGRESS NOTES
"[facility]   Physical Therapy Treatment Note      Name: Carmen Hawley  Clinic Number: [id number]    Therapy Diagnosis:   Encounter Diagnoses   Name Primary?    Impaired functional mobility, balance, gait, and endurance Yes    Knee stiffness, right      Physician: Jean-Pierre Bennett III, *    Visit Date: 11/10/2023    Evaluation Date: 10/25/2023  Authorization Period Expiration: 10/24/2024  Plan of Care Expiration: 01/25/2024  Progress Note Due: 12/15/2024  Date of Surgery: 10/23/2023  Visit # / Visits authorized: 6 / 12   FOTO: 2/ 3     Precautions: Standard      Time In: 12:15 PM  Time Out:  1:20 PM  Total Billable Time: 45  minutes    PTA Visit #: 0/5       Subjective     Patient reports:  I'm doing well today; I drove myself today. Knee less swollen, calf mm less swollen   She was compliant with home exercise program.  Response to previous treatment: good;   Functional change: walking with a cane now;     Pain: 3/10  Location: right knee      Objective      Objective Measures updated at progress report unless specified.     Treatment     Carmen received the treatments listed below:      therapeutic exercises to develop strength and ROM for 30 minutes including:  Nu-Step Level 4 x 10 mins   Recumbent Bike level 1 x 10 min  LAQ's > knee flexion x 15  Seated H/S stretch 3 x 30 sec  Quad Sets in long sitting 5" hold x 3 mins  - significant improvement in quad set noted; able to contract and hold for 5"   SAQ's over gray roll x 3 mins  - emphasis on knee extension on quad mm activation/hold   SLR 10 x 2  Bridges x 10 foot flat   Ball squeezes x 2 min  S/L hip abduction x 10  S/L clams x 15    manual therapy techniques: Soft tissue Mobilization were applied to the: Right knee for  10 minutes, including:\  STM lower leg to proximal quad mm; mobilization of patella in all planes; Anterior > posterior mobilization for knee extension with end range hold, passive stretching through full ROM with " Is This A New Presentation, Or A Follow-Up?: Rash "end range holds; Able to achieve 120 deg flexion today; Extension to 1 deg - improvement noted with reduction in swelling.     neuromuscular re-education activities to improve:  for  minutes. The following activities were included:    therapeutic activities to improve functional performance for 15 minutes, including:  Heel Raises x 20  Toe Taps on 8" step x 2 min  Step-Ups: 6" step - lead with Right leg x 2 mins   Lateral step-ups 4" step - right TKE emphasized x 2 min   Squats: x 10   Achilles stretch on wedge - 30 sec x 3   Supine: heel slides with use of strap x 3 mins     gait training to improve functional mobility and safety for 0 minutes, including:  Reminded patient of cane in left hand - heel to toe progression with right foot, knee extension on heel strike and knee flexion on swing through.       Patient Education and Home Exercises       Education provided:   - concentrate on knee extension - needs to perform quad sets more often - spending more time than necessary on icing and elevating during the day; suggested that she work on her knee extension with the icing if that works for her.     Written Home Exercises Provided: Patient instructed to cont prior HEP. Exercises were reviewed and Carmen was able to demonstrate them prior to the end of the session.  Carmen demonstrated good  understanding of the education provided. See Electronic Medical Record under Patient Instructions for exercises provided during therapy sessions    Assessment     Carmen continues to demonstrate improved AROM/PROM in knee; swelling diminished in calf and knee, still needs to elevate at home with ankle pumps to keep fluid out of leg; quad mm activation much improved today; minimal pain;     Carmen Is progressing well towards her goals.   Patient prognosis is Good.     Patient will continue to benefit from skilled outpatient physical therapy to address the deficits listed in the problem list box on initial evaluation, provide " Additional History: Pt states that he had a rash that started on the tops of his feet that would fill up with clear fluid and would pop on its own. Then the rash moved up his calf and started to become very itchy. He had small bumps that looked like bug bites. The only thing he has put on it is lotion. \\n pt/family education and to maximize pt's level of independence in the home and community environment.     Patient's spiritual, cultural and educational needs considered and pt agreeable to plan of care and goals.     Anticipated barriers to physical therapy: none     Goals:     Short Term Goals: 4 weeks   Reduction in pain levels to no more than 1-2/10 with increased level of daily activity   Improvement in right knee AROM - 0 - 125 degrees   Compliant with HEP      Long Term Goals: 8 weeks   Reduction in pain levels to 0/10 for improved daily activity performance and functional mobility   Right knee AROM WNL for TKA without any increase in symptoms   Able to ambulate 1 mile without AD, no increase in symptoms   Able to go up/down flight of steps without compensation   Able to perform usual household activities without increased symptoms   Able to carry grocery bags from car to house without increased symptoms   FOTO intake score improvement to > 75  Independent with HEP for continued improvement in functional mobility       Plan   Continue with Skilled PT - emphasize knee extension activities, quad sets.      Plan of care Certification: 10/25/2023 to 01/25/2025.     Outpatient Physical Therapy 2 -3 times weekly for 8 weeks to include the following interventions: Gait Training, Manual Therapy, Moist Heat/ Ice, Neuromuscular Re-ed, Patient Education, Therapeutic Activities, and Therapeutic Exercise.      Susanna Rondon, PT       Susanna Rondon, PT

## 2023-11-13 ENCOUNTER — CLINICAL SUPPORT (OUTPATIENT)
Dept: REHABILITATION | Facility: HOSPITAL | Age: 73
End: 2023-11-13
Payer: COMMERCIAL

## 2023-11-13 DIAGNOSIS — Z74.09 IMPAIRED FUNCTIONAL MOBILITY, BALANCE, GAIT, AND ENDURANCE: Primary | ICD-10-CM

## 2023-11-13 DIAGNOSIS — M25.661 KNEE STIFFNESS, RIGHT: ICD-10-CM

## 2023-11-13 PROCEDURE — 97140 MANUAL THERAPY 1/> REGIONS: CPT | Mod: PN

## 2023-11-13 PROCEDURE — 97530 THERAPEUTIC ACTIVITIES: CPT | Mod: PN

## 2023-11-13 PROCEDURE — 97110 THERAPEUTIC EXERCISES: CPT | Mod: PN

## 2023-11-13 NOTE — PROGRESS NOTES
"[facility]   Physical Therapy Treatment Note      Name: Carmen Hawley  Clinic Number: [id number]    Therapy Diagnosis:   Encounter Diagnoses   Name Primary?    Impaired functional mobility, balance, gait, and endurance Yes    Knee stiffness, right      Physician: Jean-Pierre Bennett III, *    Visit Date: 11/13/2023    Evaluation Date: 10/25/2023  Authorization Period Expiration: 10/24/2024  Plan of Care Expiration: 01/25/2024  Progress Note Due: 12/15/2024  Date of Surgery: 10/23/2023  Visit # / Visits authorized: 7 / 12   FOTO: 2/ 3     Precautions: Standard      Time In: 12:15 PM  Time Out:  1:30  PM  Total Billable Time: 60  minutes    PTA Visit #: 0/5       Subjective     Patient reports:  My knee feels stiff today, I'm sure it's the weather that is contributing.   She was compliant with home exercise program.  Response to previous treatment: good;   Functional change: walking better     Pain: 3/10  Location: right knee      Objective      Objective Measures updated at progress report unless specified.     Treatment     Carmen received the treatments listed below:      therapeutic exercises to develop strength and ROM for 30 minutes including:  Nu-Step Level 4 x 10 mins   Recumbent Bike level 3 x 15 min  LAQ's > knee flexion x 15  Supine H/S stretch 3 x 30 sec - perform TKE into strap for work on knee extension   Quad Sets in long sitting 5" hold x 3 mins  - still inconsistent with ability to perform quad sets; needs visual cueing at all times.   SAQ's over 1/2 gray roll x 3 mins  - emphasis on knee extension on quad mm activation/hold   SLR 10 x 2  Bridges x 10 foot flat   Ball squeezes x 2 min  Prone knee flexion x 15   S/L hip abduction x 10  S/L clams x 15    manual therapy techniques: Soft tissue Mobilization were applied to the: Right knee for  10 minutes, including:  STM lower leg to proximal quad mm; mobilization of patella in all planes; Anterior > posterior mobilization for " "knee extension with end range hold, passive stretching through full ROM with end range holds; Able to achieve 122  deg flexion today; Extension to 1 deg - improvement noted with reduction in swelling.     neuromuscular re-education activities to improve:  for  minutes. The following activities were included:    therapeutic activities to improve functional performance for 15 minutes, including:  Heel Raises x 20  Toe Taps on 8" step x 2 min  Step-Ups: 6" step - lead with Right leg x 2 mins   Lateral step-ups 4" step - right TKE emphasized x 2 min   Squats: x 10   Achilles stretch on wedge - 30 sec x 3   Supine: heel slides with use of strap x 3 mins     gait training to improve functional mobility and safety for 0 minutes, including:  Reminded patient of cane in left hand - heel to toe progression with right foot, knee extension on heel strike and knee flexion on swing through.       Patient Education and Home Exercises       Education provided:   - concentrate on knee extension - needs to perform quad sets more often - spending more time than necessary on icing and elevating during the day; suggested that she work on her knee extension with the icing if that works for her.     Written Home Exercises Provided: Patient instructed to cont prior HEP. Exercises were reviewed and Carmen was able to demonstrate them prior to the end of the session.  Carmen demonstrated good  understanding of the education provided. See Electronic Medical Record under Patient Instructions for exercises provided during therapy sessions    Assessment     Carmen demonstrating inconsistent activation of her quad mm - aware of this and realizes that she needs to work on it more at home; Walking well - carry's cane, but doesn't really use; Knee Flexion to 122 deg; Extension still short at 1-2 deg actively, passively can mobilize to just about zero.  Needs to continue elevating, perform ankle pumps to move fluid.     Carmen Is progressing well " towards her goals.   Patient prognosis is Good.     Patient will continue to benefit from skilled outpatient physical therapy to address the deficits listed in the problem list box on initial evaluation, provide pt/family education and to maximize pt's level of independence in the home and community environment.     Patient's spiritual, cultural and educational needs considered and pt agreeable to plan of care and goals.     Anticipated barriers to physical therapy: none     Goals:     Short Term Goals: 4 weeks   Reduction in pain levels to no more than 1-2/10 with increased level of daily activity   Improvement in right knee AROM - 0 - 125 degrees   Compliant with HEP      Long Term Goals: 8 weeks   Reduction in pain levels to 0/10 for improved daily activity performance and functional mobility   Right knee AROM WNL for TKA without any increase in symptoms   Able to ambulate 1 mile without AD, no increase in symptoms   Able to go up/down flight of steps without compensation   Able to perform usual household activities without increased symptoms   Able to carry grocery bags from car to house without increased symptoms   FOTO intake score improvement to > 75  Independent with HEP for continued improvement in functional mobility       Plan   Continue with Skilled PT - emphasize knee extension activities, quad sets.      Plan of care Certification: 10/25/2023 to 01/25/2025.     Outpatient Physical Therapy 2 -3 times weekly for 8 weeks to include the following interventions: Gait Training, Manual Therapy, Moist Heat/ Ice, Neuromuscular Re-ed, Patient Education, Therapeutic Activities, and Therapeutic Exercise.      Susanna Rondon, PT       Susanna Rondon, PT

## 2023-11-14 DIAGNOSIS — M54.16 LUMBAR RADICULOPATHY: ICD-10-CM

## 2023-11-14 DIAGNOSIS — Z96.651 STATUS POST RIGHT KNEE REPLACEMENT: Primary | ICD-10-CM

## 2023-11-14 RX ORDER — GABAPENTIN 300 MG/1
300 CAPSULE ORAL 3 TIMES DAILY
Qty: 90 CAPSULE | Refills: 2 | Status: SHIPPED | OUTPATIENT
Start: 2023-11-14 | End: 2024-02-12

## 2023-11-14 NOTE — TELEPHONE ENCOUNTER
"The patient sent a teams message asking for gabapentin    "[Yesterday 4:17 PM] stas العراقي and I hope you are doing well.  This is Carmen Hawley in [facility].  I am doing great after my surgery with Dr. Bennett on October 23rd.  PT is going very well.  I need your help with getting my prescription for Gabapentin refilled.  It is for 300 Mg. Capsules 3 times a day.  I usually get 90.  I am out of refills and still on medical leave so I am home in [place] and cannot get to the [facility].  Please send or have Dr. Bennett send electronically to [facility] in [place], [place].  It was originally prescribed by Dr. Tej Raman when I was getting injections at [facility].  He is no longer treating me after I started seeing Dr. Bennett.  I will be in to see you and Dr. Bennett on December 5th.  Hope all is well with you and yours.  If you have any questions, you can reach me at [phone].  My MRN # is [id number].  Thank you very much and thanks for doing such a great job with my surgery!  All my friends and neighbors are amazed with my progress.  My knee measurement was 122 degrees today!  I still have a lot more work to do though so I keep going.  Thanks, Carmen   "      I told the patient that typically long term management of gabapentin will be form the pain management team or from her PCP.  I told the patient that since she is post-op I would send a message to yemi to see if he can send some in.   "

## 2023-11-15 ENCOUNTER — CLINICAL SUPPORT (OUTPATIENT)
Dept: REHABILITATION | Facility: HOSPITAL | Age: 73
End: 2023-11-15
Payer: COMMERCIAL

## 2023-11-15 DIAGNOSIS — Z74.09 IMPAIRED FUNCTIONAL MOBILITY, BALANCE, GAIT, AND ENDURANCE: Primary | ICD-10-CM

## 2023-11-15 DIAGNOSIS — M25.661 KNEE STIFFNESS, RIGHT: ICD-10-CM

## 2023-11-15 PROCEDURE — 97530 THERAPEUTIC ACTIVITIES: CPT | Mod: PN

## 2023-11-15 PROCEDURE — 97140 MANUAL THERAPY 1/> REGIONS: CPT | Mod: PN

## 2023-11-15 PROCEDURE — 97110 THERAPEUTIC EXERCISES: CPT | Mod: PN

## 2023-11-15 NOTE — PROGRESS NOTES
"[facility]   Physical Therapy Treatment Note      Name: Carmen Hawley  Clinic Number: [id number]    Therapy Diagnosis:   Encounter Diagnoses   Name Primary?    Impaired functional mobility, balance, gait, and endurance Yes    Knee stiffness, right      Physician: Jean-Pierre Bennett III, *    Visit Date: 11/15/2023    Evaluation Date: 10/25/2023  Authorization Period Expiration: 10/24/2024  Plan of Care Expiration: 01/25/2024  Progress Note Due: 12/15/2024  Date of Surgery: 10/23/2023  Visit # / Visits authorized: 8 / 12   FOTO: 2/ 3     Precautions: Standard      Time In: 12:15 PM  Time Out:  1:30  PM  Total Billable Time: 60  minutes    PTA Visit #: 0/5       Subjective     Patient reports:  I'm doing well today; .   She was compliant with home exercise program.  Response to previous treatment: good;   Functional change: walking better, doing more things around the house.      Pain: 3/10  Location: right knee      Objective      Objective Measures updated at progress report unless specified.     Treatment     Carmen received the treatments listed below:      therapeutic exercises to develop strength and ROM for 30 minutes including:  Nu-Step Level 4 x 10 mins   Recumbent Bike level 3 x 15 min  LAQ's > knee flexion x 15  Supine H/S stretch 3 x 30 sec - perform TKE into strap for work on knee extension   Quad Sets in long sitting 5" hold x 3 mins  - still inconsistent with ability to perform quad sets; needs visual cueing at all times.   SAQ's over 1/2 gray roll x 3 mins  - emphasis on knee extension on quad mm activation/hold   SLR 10 x 2  Bridges x 10 foot flat   Ball squeezes x 2 min  Prone knee flexion x 15   S/L hip abduction x 10  S/L clams x 15    manual therapy techniques: Soft tissue Mobilization were applied to the: Right knee for  10 minutes, including:  STM lower leg to proximal quad mm; mobilization of patella in all planes; Anterior > posterior mobilization for knee " "extension with end range hold, passive stretching through full ROM with end range holds; Able to achieve 122  deg flexion today; Extension to 1 deg - improvement noted with reduction in swelling.     neuromuscular re-education activities to improve:  for  minutes. The following activities were included:    therapeutic activities to improve functional performance for 15 minutes, including:  Heel Raises x 20  Toe Taps on 8" step x 2 min  Step-Ups: 6" step - lead with Right leg x 2 mins   Lateral step-ups 4" step - right TKE emphasized x 2 min   Heel Taps from 4" step x 10 - cueing for better technique.   Squats: x 10   Achilles stretch on wedge - 30 sec x 3   Supine: heel slides with use of strap x 3 mins     gait training to improve functional mobility and safety for 0 minutes, including:  Reminded patient of cane in left hand - heel to toe progression with right foot, knee extension on heel strike and knee flexion on swing through.       Patient Education and Home Exercises       Education provided:   - concentrate on knee extension - needs to perform quad sets more often - spending more time than necessary on icing and elevating during the day; suggested that she work on her knee extension with the icing if that works for her.     Written Home Exercises Provided: Patient instructed to cont prior HEP. Exercises were reviewed and Carmen was able to demonstrate them prior to the end of the session.  Carmen demonstrated good  understanding of the education provided. See Electronic Medical Record under Patient Instructions for exercises provided during therapy sessions    Assessment     Carmen demonstrating improvement in activation of quad mm;  Knee Flexion to 122 deg; Extension still short at 1-2 deg actively, passively can mobilize to just about zero.  Needs to continue elevating, perform ankle pumps to move fluid.     Carmen Is progressing well towards her goals.   Patient prognosis is Good.     Patient will " continue to benefit from skilled outpatient physical therapy to address the deficits listed in the problem list box on initial evaluation, provide pt/family education and to maximize pt's level of independence in the home and community environment.     Patient's spiritual, cultural and educational needs considered and pt agreeable to plan of care and goals.     Anticipated barriers to physical therapy: none     Goals:     Short Term Goals: 4 weeks   Reduction in pain levels to no more than 1-2/10 with increased level of daily activity > MET   Improvement in right knee AROM - 0 - 125 degrees  > Progressing   Compliant with HEP > MET      Long Term Goals: 8 weeks   Reduction in pain levels to 0/10 for improved daily activity performance and functional mobility   Right knee AROM WNL for TKA without any increase in symptoms   Able to ambulate 1 mile without AD, no increase in symptoms   Able to go up/down flight of steps without compensation   Able to perform usual household activities without increased symptoms   Able to carry grocery bags from car to house without increased symptoms   FOTO intake score improvement to > 75  Independent with HEP for continued improvement in functional mobility       Plan   Continue with Skilled PT - emphasize knee extension activities, quad sets.      Plan of care Certification: 10/25/2023 to 01/25/2025.     Outpatient Physical Therapy 2 -3 times weekly for 8 weeks to include the following interventions: Gait Training, Manual Therapy, Moist Heat/ Ice, Neuromuscular Re-ed, Patient Education, Therapeutic Activities, and Therapeutic Exercise.      Susanna Rondon, PT       Susanna Rondon, PT

## 2023-11-17 ENCOUNTER — CLINICAL SUPPORT (OUTPATIENT)
Dept: REHABILITATION | Facility: HOSPITAL | Age: 73
End: 2023-11-17
Payer: COMMERCIAL

## 2023-11-17 DIAGNOSIS — M25.661 KNEE STIFFNESS, RIGHT: ICD-10-CM

## 2023-11-17 DIAGNOSIS — Z96.651 S/P TOTAL KNEE REPLACEMENT USING CEMENT, RIGHT: Primary | ICD-10-CM

## 2023-11-17 DIAGNOSIS — Z74.09 IMPAIRED FUNCTIONAL MOBILITY, BALANCE, GAIT, AND ENDURANCE: Primary | ICD-10-CM

## 2023-11-17 PROCEDURE — 97530 THERAPEUTIC ACTIVITIES: CPT | Mod: PN

## 2023-11-17 PROCEDURE — 97140 MANUAL THERAPY 1/> REGIONS: CPT | Mod: PN

## 2023-11-17 PROCEDURE — 97110 THERAPEUTIC EXERCISES: CPT | Mod: PN

## 2023-11-19 NOTE — PROGRESS NOTES
"[facility]   Physical Therapy Treatment Note      Name: Carmen Hawley  Clinic Number: [id number]    Therapy Diagnosis:   Encounter Diagnoses   Name Primary?    Impaired functional mobility, balance, gait, and endurance Yes    Knee stiffness, right      Physician: Jean-Pierre Bennett III, *    Visit Date: 11/17/2023    Evaluation Date: 10/25/2023  Authorization Period Expiration: 10/24/2024  Plan of Care Expiration: 01/25/2024  Progress Note Due: 12/15/2024  Date of Surgery: 10/23/2023  Visit # / Visits authorized: 8 / 12   FOTO: 2/ 3     Precautions: Standard      Time In: 12:15 PM  Time Out:  1:15  PM  Total Billable Time: 45  minutes    PTA Visit #: 0/5       Subjective     Patient reports:  I'm doing well today; .   She was compliant with home exercise program.  Response to previous treatment: good;   Functional change: walking better, doing more things around the house.      Pain: 3/10  Location: right knee      Objective      Objective Measures updated at progress report unless specified.     Treatment     Carmen received the treatments listed below:      therapeutic exercises to develop strength and ROM for 30 minutes including:  Nu-Step Level 4 x 10 mins   Recumbent Bike level 3 x 15 min  LAQ's > knee flexion x 15  Supine H/S stretch 3 x 30 sec - perform TKE into strap for work on knee extension   Quad Sets in long sitting 5" hold x 3 mins  - still inconsistent with ability to perform quad sets; needs visual cueing at all times.   SAQ's over 1/2 gray roll x 3 mins  - emphasis on knee extension on quad mm activation/hold   SLR 10 x 2  Bridges x 10 foot flat   Ball squeezes x 2 min  Prone knee flexion x 15   S/L hip abduction x 10  S/L clams x 15    manual therapy techniques: Soft tissue Mobilization were applied to the: Right knee for  10 minutes, including:  STM lower leg to proximal quad mm; mobilization of patella in all planes; Anterior > posterior mobilization for knee " "extension with end range hold, passive stretching through full ROM with end range holds; Able to achieve 122  deg flexion today; Extension to 1 deg - improvement noted with reduction in swelling.     neuromuscular re-education activities to improve:  for  minutes. The following activities were included:    therapeutic activities to improve functional performance for 15 minutes, including:  Heel Raises x 20  Toe Taps on 8" step x 2 min  Step-Ups: 6" step - lead with Right leg x 2 mins   Lateral step-ups 4" step - right TKE emphasized x 2 min   Heel Taps from 4" step x 10 - cueing for better technique.   Squats: x 10   Achilles stretch on wedge - 30 sec x 3   Supine: heel slides with use of strap x 3 mins     gait training to improve functional mobility and safety for 0 minutes, including:  Reminded patient of cane in left hand - heel to toe progression with right foot, knee extension on heel strike and knee flexion on swing through.       Patient Education and Home Exercises       Education provided:   - concentrate on knee extension - needs to perform quad sets more often - spending more time than necessary on icing and elevating during the day; suggested that she work on her knee extension with the icing if that works for her.     Written Home Exercises Provided: Patient instructed to cont prior HEP. Exercises were reviewed and Carmen was able to demonstrate them prior to the end of the session.  Carmen demonstrated good  understanding of the education provided. See Electronic Medical Record under Patient Instructions for exercises provided during therapy sessions    Assessment     Carmen demonstrating improvement in activation of quad mm;  Knee Flexion to 122 deg; Extension still short at 1-2 deg actively, passively can mobilize to just about zero.  Needs to continue elevating, perform ankle pumps to move fluid.     Carmen Is progressing well towards her goals.   Patient prognosis is Good.     Patient will " continue to benefit from skilled outpatient physical therapy to address the deficits listed in the problem list box on initial evaluation, provide pt/family education and to maximize pt's level of independence in the home and community environment.     Patient's spiritual, cultural and educational needs considered and pt agreeable to plan of care and goals.     Anticipated barriers to physical therapy: none     Goals:     Short Term Goals: 4 weeks   Reduction in pain levels to no more than 1-2/10 with increased level of daily activity > MET   Improvement in right knee AROM - 0 - 125 degrees  > Progressing   Compliant with HEP > MET      Long Term Goals: 8 weeks   Reduction in pain levels to 0/10 for improved daily activity performance and functional mobility   Right knee AROM WNL for TKA without any increase in symptoms   Able to ambulate 1 mile without AD, no increase in symptoms   Able to go up/down flight of steps without compensation   Able to perform usual household activities without increased symptoms   Able to carry grocery bags from car to house without increased symptoms   FOTO intake score improvement to > 75  Independent with HEP for continued improvement in functional mobility       Plan   Continue with Skilled PT - emphasize knee extension activities, quad sets.      Plan of care Certification: 10/25/2023 to 01/25/2025.     Outpatient Physical Therapy 2 -3 times weekly for 8 weeks to include the following interventions: Gait Training, Manual Therapy, Moist Heat/ Ice, Neuromuscular Re-ed, Patient Education, Therapeutic Activities, and Therapeutic Exercise.      Susanna Rondon, PT       Susanna Ronodn, PT

## 2023-11-20 ENCOUNTER — CLINICAL SUPPORT (OUTPATIENT)
Dept: REHABILITATION | Facility: HOSPITAL | Age: 73
End: 2023-11-20
Payer: COMMERCIAL

## 2023-11-20 DIAGNOSIS — M25.661 KNEE STIFFNESS, RIGHT: ICD-10-CM

## 2023-11-20 DIAGNOSIS — Z74.09 IMPAIRED FUNCTIONAL MOBILITY, BALANCE, GAIT, AND ENDURANCE: Primary | ICD-10-CM

## 2023-11-20 PROCEDURE — 97110 THERAPEUTIC EXERCISES: CPT | Mod: PN

## 2023-11-21 ENCOUNTER — TELEPHONE (OUTPATIENT)
Dept: ORTHOPEDICS | Facility: CLINIC | Age: 73
End: 2023-11-21
Payer: COMMERCIAL

## 2023-11-21 DIAGNOSIS — Z96.651 S/P TOTAL KNEE REPLACEMENT USING CEMENT, RIGHT: ICD-10-CM

## 2023-11-21 DIAGNOSIS — Z96.659 STATUS POST KNEE REPLACEMENT, UNSPECIFIED LATERALITY: Primary | ICD-10-CM

## 2023-11-21 RX ORDER — METHOCARBAMOL 750 MG/1
750 TABLET, FILM COATED ORAL 4 TIMES DAILY
COMMUNITY
End: 2023-11-21 | Stop reason: SDUPTHER

## 2023-11-21 RX ORDER — DEXTROMETHORPHAN HYDROBROMIDE, GUAIFENESIN 5; 100 MG/5ML; MG/5ML
650 LIQUID ORAL EVERY 8 HOURS
Qty: 120 TABLET | Refills: 0 | Status: SHIPPED | OUTPATIENT
Start: 2023-11-21 | End: 2024-03-06 | Stop reason: SDUPTHER

## 2023-11-21 RX ORDER — CELECOXIB 200 MG/1
200 CAPSULE ORAL DAILY
Qty: 30 CAPSULE | Refills: 0 | Status: SHIPPED | OUTPATIENT
Start: 2023-11-21 | End: 2023-12-21

## 2023-11-21 RX ORDER — METHOCARBAMOL 750 MG/1
750 TABLET, FILM COATED ORAL 4 TIMES DAILY PRN
Qty: 40 TABLET | Refills: 0 | Status: SHIPPED | OUTPATIENT
Start: 2023-11-21 | End: 2023-11-28 | Stop reason: SDUPTHER

## 2023-11-21 RX ORDER — PANTOPRAZOLE SODIUM 40 MG/1
40 TABLET, DELAYED RELEASE ORAL DAILY
Qty: 30 TABLET | Refills: 0 | Status: SHIPPED | OUTPATIENT
Start: 2023-11-21 | End: 2024-03-14

## 2023-11-21 NOTE — PROGRESS NOTES
"[facility]   Physical Therapy Treatment Note      Name: Carmen Hawley  Clinic Number: [id number]    Therapy Diagnosis:   Encounter Diagnoses   Name Primary?    Impaired functional mobility, balance, gait, and endurance Yes    Knee stiffness, right      Physician: Jean-Pierre Bennett III, *    Visit Date: 11/20/2023    Evaluation Date: 10/25/2023  Authorization Period Expiration: 10/24/2024  Plan of Care Expiration: 01/25/2024  Progress Note Due: 12/15/2024  Date of Surgery: 10/23/2023  Visit # / Visits authorized: 10  / 12   FOTO: 2/ 3     Precautions: Standard      Time In: 1:00  PM  Time Out:  1:55  PM  Total Billable Time: 30   minutes    PTA Visit #: 0/5       Subjective     Patient reports:  I'm doing well today; .   She was compliant with home exercise program.  Response to previous treatment: good;   Functional change: walking better, doing more things around the house.  Wearing compression knee high - helping with calf swelling;      Pain: 3/10  Location: right knee      Objective      Objective Measures updated at progress report unless specified.     Treatment     Carmen received the treatments listed below:      therapeutic exercises to develop strength and ROM for 30 minutes including:  Nu-Step Level 4 x 10 mins   Recumbent Bike level 3 x 15 min  LAQ's > knee flexion x 15  Supine H/S stretch 3 x 30 sec - perform TKE into strap for work on knee extension   Quad Sets in long sitting 5" hold x 3 mins  - still inconsistent with ability to perform quad sets; needs visual cueing at all times - but improving   SAQ's over 1/2 gray roll x 3 mins  - emphasis on knee extension on quad mm activation/hold   SLR 10 x 2  Bridges x 10 foot flat   Ball squeezes x 2 min  Prone knee flexion x 15   S/L hip abduction x 10  S/L clams x 15    manual therapy techniques: Soft tissue Mobilization were applied to the: Right knee for  10 minutes, including:  STM lower leg to proximal quad mm; " "mobilization of patella in all planes; Anterior > posterior mobilization for knee extension with end range hold, passive stretching through full ROM with end range holds; Able to achieve 122  deg flexion today; Extension to 1 deg - improvement noted with reduction in swelling.     neuromuscular re-education activities to improve:  for  minutes. The following activities were included:    therapeutic activities to improve functional performance for 15 minutes, including:  Heel Raises x 20  Toe Taps on 8" step x 2 min  Step-Ups: 6" step - lead with Right leg x 2 mins   Lateral step-ups 4" step - right TKE emphasized x 2 min   Heel Taps from 4" step x 10 - cueing for better technique.   Squats: x 10   Achilles stretch on wedge - 30 sec x 3  > heel raises   Standing: knee flexion x 10   Supine: heel slides with use of strap x 3 mins     gait training to improve functional mobility and safety for 0 minutes, including:  Reminded patient of cane in left hand - heel to toe progression with right foot, knee extension on heel strike and knee flexion on swing through.       Patient Education and Home Exercises       Education provided:   - concentrate on knee extension - needs to perform quad sets more often - spending more time than necessary on icing and elevating during the day; suggested that she work on her knee extension with the icing if that works for her.     Written Home Exercises Provided: Patient instructed to cont prior HEP. Exercises were reviewed and Carmen was able to demonstrate them prior to the end of the session.  Carmen demonstrated good  understanding of the education provided. See Electronic Medical Record under Patient Instructions for exercises provided during therapy sessions    Assessment     Carmen demonstrating improvement in activation of quad mm;  Knee Flexion to 122 deg; Extension still short at 1-2 deg actively, passively can mobilize to just about zero.  Needs to continue elevating, perform " ankle pumps to move fluid. Still with swelling around knee - advised to take compression sock off when home to reduce the swelling in her knee; compression sock on when sitting/walking.     Carmen Is progressing well towards her goals.   Patient prognosis is Good.     Patient will continue to benefit from skilled outpatient physical therapy to address the deficits listed in the problem list box on initial evaluation, provide pt/family education and to maximize pt's level of independence in the home and community environment.     Patient's spiritual, cultural and educational needs considered and pt agreeable to plan of care and goals.     Anticipated barriers to physical therapy: none     Goals:     Short Term Goals: 4 weeks   Reduction in pain levels to no more than 1-2/10 with increased level of daily activity > MET   Improvement in right knee AROM - 0 - 125 degrees  > Progressing   Compliant with HEP > MET      Long Term Goals: 8 weeks   Reduction in pain levels to 0/10 for improved daily activity performance and functional mobility   Right knee AROM WNL for TKA without any increase in symptoms   Able to ambulate 1 mile without AD, no increase in symptoms   Able to go up/down flight of steps without compensation   Able to perform usual household activities without increased symptoms   Able to carry grocery bags from car to house without increased symptoms   FOTO intake score improvement to > 75  Independent with HEP for continued improvement in functional mobility       Plan   Continue with Skilled PT - emphasize knee extension activities, quad sets.      Plan of care Certification: 10/25/2023 to 01/25/2025.     Outpatient Physical Therapy 2 -3 times weekly for 8 weeks to include the following interventions: Gait Training, Manual Therapy, Moist Heat/ Ice, Neuromuscular Re-ed, Patient Education, Therapeutic Activities, and Therapeutic Exercise.      Susanna Rondon, PT       Susanna Rondon, PT

## 2023-11-21 NOTE — TELEPHONE ENCOUNTER
The patient called the [facility] Hotline.     The patient had right TKA with Dr. eBnnett on 10/23/2023.    The patient reporting symptoms of: refill request for celebrex, pantoprazole, tylenol and robaxin. She said that she is doing well without any issues.     The patient verbalized understanding and had no further questions. The patient will call our [facility] Hotline, [phone], back if they have any further questions/concerns

## 2023-11-22 ENCOUNTER — CLINICAL SUPPORT (OUTPATIENT)
Dept: REHABILITATION | Facility: HOSPITAL | Age: 73
End: 2023-11-22
Payer: COMMERCIAL

## 2023-11-22 DIAGNOSIS — M25.661 KNEE STIFFNESS, RIGHT: ICD-10-CM

## 2023-11-22 DIAGNOSIS — Z74.09 IMPAIRED FUNCTIONAL MOBILITY, BALANCE, GAIT, AND ENDURANCE: Primary | ICD-10-CM

## 2023-11-22 PROCEDURE — 97110 THERAPEUTIC EXERCISES: CPT | Mod: PN

## 2023-11-22 PROCEDURE — 97530 THERAPEUTIC ACTIVITIES: CPT | Mod: PN

## 2023-11-22 NOTE — PROGRESS NOTES
"[facility]   Physical Therapy Treatment Note      Name: Carmen Hawley  Clinic Number: [id number]    Therapy Diagnosis:   Encounter Diagnoses   Name Primary?    Impaired functional mobility, balance, gait, and endurance Yes    Knee stiffness, right      Physician: Jean-Pierre Bennett III, *    Visit Date: 11/22/2023    Evaluation Date: 10/25/2023  Authorization Period Expiration: 10/24/2024  Plan of Care Expiration: 01/25/2024  Progress Note Due: 12/15/2024  Date of Surgery: 10/23/2023  Visit # / Visits authorized: 10  / 12   FOTO: 2/ 3     Precautions: Standard      Time In: 10:00 am   Time Out:  11:00 am    Total Billable Time: 35    minutes    PTA Visit #: 0/5       Subjective     Patient reports:  I'm doing ok, we went shopping yesterday, so I'm still a little tired.   She was compliant with home exercise program.  Response to previous treatment: good;   Functional change: walking better, doing things around the house with less effort.     Pain: 3/10  Location: right knee      Objective      Objective Measures updated at progress report unless specified.     Treatment     Carmen received the treatments listed below:      therapeutic exercises to develop strength and ROM for 30 minutes including:  Nu-Step Level 4 x 10 mins   Recumbent Bike level 3 x 15 min  LAQ's > knee flexion x 15  Supine H/S stretch 3 x 30 sec - perform TKE into strap for work on knee extension   Quad Sets in long sitting 5" hold x 3 mins  - still inconsistent with ability to perform quad sets; needs visual cueing at all times - but improving   SAQ's over 1/2 gray roll x 3 mins  - emphasis on knee extension on quad mm activation/hold   SLR 10 x 2  Bridges x 10 foot flat   Ball squeezes x 2 min  Prone knee flexion x 15   S/L hip abduction x 10  S/L clams x 15    manual therapy techniques: Soft tissue Mobilization were applied to the: Right knee for  0 minutes, including:  STM lower leg to proximal quad mm; " "mobilization of patella in all planes; Anterior > posterior mobilization for knee extension with end range hold, passive stretching through full ROM with end range holds; Able to achieve 122  deg flexion today; Extension to 1 deg - improvement noted with reduction in swelling.     neuromuscular re-education activities to improve:  for  minutes. The following activities were included:    therapeutic activities to improve functional performance for 20 minutes, including:  Heel Raises x 20  Toe Taps on 8" step x 2 min  Step-Ups: 6" step - lead with Right leg x 2 mins   Lateral step-ups 4" step - right TKE emphasized x 2 min   Heel Taps from 4" step x 10 - had difficulty with this today    Squats: x 10   Achilles stretch on wedge - 30 sec x 3  > heel raises   Standing: knee flexion x 10   Supine: heel slides with use of strap x 3 mins     gait training to improve functional mobility and safety for 0 minutes, including:  Reminded patient of cane in left hand - heel to toe progression with right foot, knee extension on heel strike and knee flexion on swing through.       Patient Education and Home Exercises       Education provided:   - concentrate on knee extension - needs to perform quad sets more often - spending more time than necessary on icing and elevating during the day; suggested that she work on her knee extension with the icing if that works for her.     Written Home Exercises Provided: Patient instructed to cont prior HEP. Exercises were reviewed and Carmen was able to demonstrate them prior to the end of the session.  Carmen demonstrated good  understanding of the education provided. See Electronic Medical Record under Patient Instructions for exercises provided during therapy sessions    Assessment     Carmen demonstrating improvement in activation of quad mm with quad sets; Gait without any AD; still requires cueing for TKE with standing activities; Swelling in knee better today.   Carmen Is progressing " well towards her goals.   Patient prognosis is Good.     Patient will continue to benefit from skilled outpatient physical therapy to address the deficits listed in the problem list box on initial evaluation, provide pt/family education and to maximize pt's level of independence in the home and community environment.     Patient's spiritual, cultural and educational needs considered and pt agreeable to plan of care and goals.     Anticipated barriers to physical therapy: none     Goals:     Short Term Goals: 4 weeks   Reduction in pain levels to no more than 1-2/10 with increased level of daily activity > MET   Improvement in right knee AROM - 0 - 125 degrees  > Progressing   Compliant with HEP > MET      Long Term Goals: 8 weeks   Reduction in pain levels to 0/10 for improved daily activity performance and functional mobility   Right knee AROM WNL for TKA without any increase in symptoms >Progressing   Able to ambulate 1 mile without AD, no increase in symptoms  > Progressing   Able to go up/down flight of steps without compensation   Able to perform usual household activities without increased symptoms > Progressing   Able to carry grocery bags from car to house without increased symptoms   FOTO intake score improvement to > 75  Independent with HEP for continued improvement in functional mobility       Plan   Continue with Skilled PT - emphasize knee extension activities, quad sets.      Plan of care Certification: 10/25/2023 to 01/25/2025.     Outpatient Physical Therapy 2 -3 times weekly for 8 weeks to include the following interventions: Gait Training, Manual Therapy, Moist Heat/ Ice, Neuromuscular Re-ed, Patient Education, Therapeutic Activities, and Therapeutic Exercise.      Susanna Rondon, PT       Susanna Rondon, PT

## 2023-11-24 ENCOUNTER — CLINICAL SUPPORT (OUTPATIENT)
Dept: REHABILITATION | Facility: HOSPITAL | Age: 73
End: 2023-11-24
Payer: COMMERCIAL

## 2023-11-24 DIAGNOSIS — Z74.09 IMPAIRED FUNCTIONAL MOBILITY, BALANCE, GAIT, AND ENDURANCE: Primary | ICD-10-CM

## 2023-11-24 DIAGNOSIS — M25.661 KNEE STIFFNESS, RIGHT: ICD-10-CM

## 2023-11-24 PROCEDURE — 97530 THERAPEUTIC ACTIVITIES: CPT | Mod: PN

## 2023-11-24 PROCEDURE — 97110 THERAPEUTIC EXERCISES: CPT | Mod: PN

## 2023-11-24 PROCEDURE — 97140 MANUAL THERAPY 1/> REGIONS: CPT | Mod: PN

## 2023-11-24 NOTE — PROGRESS NOTES
"[facility]   Physical Therapy Treatment Note      Name: Carmen Hawley  Clinic Number: [id number]    Therapy Diagnosis:   Encounter Diagnoses   Name Primary?    Impaired functional mobility, balance, gait, and endurance Yes    Knee stiffness, right      Physician: Jean-Pierre Bennett III, *    Visit Date: 11/24/2023    Evaluation Date: 10/25/2023  Authorization Period Expiration: 10/24/2024  Plan of Care Expiration: 01/25/2024  Progress Note Due: 12/15/2024  Date of Surgery: 10/23/2023  Visit # / Visits authorized: 12 / 12   FOTO: 2/ 3     Precautions: Standard      Time In: 1:50 pm    Time Out:  2:45 pm   Total Billable Time: 55 minutes    PTA Visit #: 0/5       Subjective     Patient reports:  My knee feels a little stiff today; I haven't really had time to ice today   She was compliant with home exercise program.  Response to previous treatment: good;   Functional change: walking better, doing things around the house with less effort.     Pain: 3/10  Location: right knee      Objective      Objective Measures updated at progress report unless specified.     Treatment     Carmen received the treatments listed below:      therapeutic exercises to develop strength and ROM for 30 minutes including:  Nu-Step Level 4 x 10 mins   Recumbent Bike level 3 x 15 min  LAQ's > knee flexion x 15  Hamstring curls - black tband x 15  Supine H/S stretch 3 x 30 sec - perform TKE into strap for work on knee extension   Quad Sets in long sitting 5" hold x 2 mins  - improved activation of quad noted.   SAQ's over 1/2 gray roll x 3 mins  - emphasis on knee extension on quad mm activation/hold   SLR 10 x 2  Bridges x 10 foot flat   Ball squeezes x 2 min  Prone knee flexion x 15   S/L hip abduction x 10  S/L clams x 15    manual therapy techniques: Soft tissue Mobilization were applied to the: Right knee for  10  minutes, including:  STM lower leg to proximal quad mm; mobilization of patella in all planes; " "Anterior > posterior mobilization for knee extension with end range hold, passive stretching through full ROM with end range holds; Able to achieve 122  deg flexion today; Extension to 1 deg - improvement noted with reduction in swelling.     neuromuscular re-education activities to improve:  for  minutes. The following activities were included:    therapeutic activities to improve functional performance for 15 minutes, including:  Heel Raises x 20  Toe Taps on 8" step x 2 min  Step-Ups: 6" step - lead with Right leg x 2 mins   Lateral step-ups 4" step - right TKE emphasized x 2 min   Heel Taps from 4" step x 10 - had difficulty with this today    Squats: x 10   Achilles stretch on wedge - 30 sec x 3  > heel raises   Standing: knee flexion x 10   Supine: heel slides with use of strap x 3 mins     gait training to improve functional mobility and safety for 0 minutes, including:  Reminded patient of cane in left hand - heel to toe progression with right foot, knee extension on heel strike and knee flexion on swing through.       Patient Education and Home Exercises       Education provided:   - concentrate on knee extension - needs to perform quad sets more often - spending more time than necessary on icing and elevating during the day; suggested that she work on her knee extension with the icing if that works for her.     Written Home Exercises Provided: Patient instructed to cont prior HEP. Exercises were reviewed and Carmen was able to demonstrate them prior to the end of the session.  Carmen demonstrated good  understanding of the education provided. See Electronic Medical Record under Patient Instructions for exercises provided during therapy sessions    Assessment     Carmen demonstrating improvement in activation of quad mm with quad sets; Gait without any AD; still requires cueing for TKE with standing activities; Swelling in knee better today.   Carmen Is progressing well towards her goals.   Patient " prognosis is Good.     Patient will continue to benefit from skilled outpatient physical therapy to address the deficits listed in the problem list box on initial evaluation, provide pt/family education and to maximize pt's level of independence in the home and community environment.     Patient's spiritual, cultural and educational needs considered and pt agreeable to plan of care and goals.     Anticipated barriers to physical therapy: none     Goals:     Short Term Goals: 4 weeks   Reduction in pain levels to no more than 1-2/10 with increased level of daily activity > MET   Improvement in right knee AROM - 0 - 125 degrees  > Progressing   Compliant with HEP > MET      Long Term Goals: 8 weeks   Reduction in pain levels to 0/10 for improved daily activity performance and functional mobility   Right knee AROM WNL for TKA without any increase in symptoms >Progressing   Able to ambulate 1 mile without AD, no increase in symptoms  > Progressing   Able to go up/down flight of steps without compensation   Able to perform usual household activities without increased symptoms > Progressing   Able to carry grocery bags from car to house without increased symptoms   FOTO intake score improvement to > 75  Independent with HEP for continued improvement in functional mobility       Plan   Continue with Skilled PT - emphasize knee extension activities, quad sets.      Plan of care Certification: 10/25/2023 to 01/25/2025.     Outpatient Physical Therapy 2 -3 times weekly for 8 weeks to include the following interventions: Gait Training, Manual Therapy, Moist Heat/ Ice, Neuromuscular Re-ed, Patient Education, Therapeutic Activities, and Therapeutic Exercise.      Susanna Rondon, PT       Susanna Rondon, PT

## 2023-11-27 ENCOUNTER — CLINICAL SUPPORT (OUTPATIENT)
Dept: REHABILITATION | Facility: HOSPITAL | Age: 73
End: 2023-11-27
Payer: COMMERCIAL

## 2023-11-27 DIAGNOSIS — M25.661 KNEE STIFFNESS, RIGHT: ICD-10-CM

## 2023-11-27 DIAGNOSIS — Z74.09 IMPAIRED FUNCTIONAL MOBILITY, BALANCE, GAIT, AND ENDURANCE: Primary | ICD-10-CM

## 2023-11-27 PROCEDURE — 97110 THERAPEUTIC EXERCISES: CPT | Mod: PN

## 2023-11-27 PROCEDURE — 97530 THERAPEUTIC ACTIVITIES: CPT | Mod: PN

## 2023-11-27 PROCEDURE — 97140 MANUAL THERAPY 1/> REGIONS: CPT | Mod: PN

## 2023-11-27 NOTE — PROGRESS NOTES
"[facility]   Physical Therapy Treatment Note      Name: Carmen Hawley  Clinic Number: [id number]    Therapy Diagnosis:   Encounter Diagnoses   Name Primary?    Impaired functional mobility, balance, gait, and endurance Yes    Knee stiffness, right      Physician: Jean-Pierre Bennett III, *    Visit Date: 11/27/2023    Evaluation Date: 10/25/2023  Authorization Period Expiration: 10/24/2024  Plan of Care Expiration: 01/25/2024  Progress Note Due: 12/15/2024  Date of Surgery: 10/23/2023  Visit # / Visits authorized: 13   FOTO: 2/ 3     Precautions: Standard      Time In: 10:00 am    Time Out:  10:55  Total Billable Time:  45  minutes    PTA Visit #: 0/5       Subjective     Patient reports: I'm doing alright today; didn't wear my compression sock yesterday/today - my knee looks better.   She was compliant with home exercise program.  Response to previous treatment: good;   Functional change: walking better, doing things around the house with less effort.     Pain: 3/10  Location: right knee      Objective      Objective Measures updated at progress report unless specified.     Treatment     Carmen received the treatments listed below:      therapeutic exercises to develop strength and ROM for 30 minutes including:  Nu-Step Level 4 x 10 mins   Recumbent Bike level 3 x 11 min  LAQ's > knee flexion x 15  Hamstring curls - black tband x 15  Supine H/S stretch 3 x 30 sec - perform TKE into strap for work on knee extension   Quad Sets in long sitting 5" hold x 2 mins  - improved activation of quad noted.   SAQ's over 1/2 gray roll x 3 mins  - emphasis on knee extension on quad mm activation/hold  - added 3#   SLR 10 x 2  Bridges x 10 foot flat , 10 with toes up   Ball squeezes x 2 min  Prone knee flexion x 15   S/L hip abduction x 10  S/L clams x 15    manual therapy techniques: Soft tissue Mobilization were applied to the: Right knee for  10  minutes, including:  STM lower leg to proximal quad " "mm; mobilization of patella in all planes; Anterior > posterior mobilization for knee extension with end range hold, passive stretching through full ROM with end range holds; Able to achieve 122  deg flexion today; Extension to 1 deg - improvement noted with reduction in swelling.     neuromuscular re-education activities to improve:  for  minutes. The following activities were included:    therapeutic activities to improve functional performance for 15 minutes, including:  Heel Raises x 20  Toe Taps on 8" step x 2 min  Step-Ups: 6" step - lead with Right leg x 2 mins   Lateral step-ups 4" step - right TKE emphasized x 2 min   Heel Taps from 4" step x 10 - improved with this today, but still hard for her.   Squats: x 10   Achilles stretch on wedge - 30 sec x 3  > heel raises   Standing: knee flexion x 10   Supine: heel slides with use of strap x 3 mins     gait training to improve functional mobility and safety for 0 minutes, including:  Reminded patient of cane in left hand - heel to toe progression with right foot, knee extension on heel strike and knee flexion on swing through.       Patient Education and Home Exercises       Education provided:   - concentrate on knee extension - needs to perform quad sets more often - spending more time than necessary on icing and elevating during the day; suggested that she work on her knee extension with the icing if that works for her.     Written Home Exercises Provided: Patient instructed to cont prior HEP. Exercises were reviewed and Carmen was able to demonstrate them prior to the end of the session.  Carmen demonstrated good  understanding of the education provided. See Electronic Medical Record under Patient Instructions for exercises provided during therapy sessions    Assessment     Carmen continues to demonstrate extension deficit, but range is improving as is her ability to activate her quad mm for knee extension in standing/stepping; swelling diminishing, " incision looks good; walking well without use of AD.     Carmen Is progressing well towards her goals.   Patient prognosis is Good.     Patient will continue to benefit from skilled outpatient physical therapy to address the deficits listed in the problem list box on initial evaluation, provide pt/family education and to maximize pt's level of independence in the home and community environment.     Patient's spiritual, cultural and educational needs considered and pt agreeable to plan of care and goals.     Anticipated barriers to physical therapy: none     Goals:     Short Term Goals: 4 weeks   Reduction in pain levels to no more than 1-2/10 with increased level of daily activity > MET   Improvement in right knee AROM - 0 - 125 degrees  > Progressing   Compliant with HEP > MET      Long Term Goals: 8 weeks   Reduction in pain levels to 0/10 for improved daily activity performance and functional mobility   Right knee AROM WNL for TKA without any increase in symptoms >Progressing   Able to ambulate 1 mile without AD, no increase in symptoms  > Progressing   Able to go up/down flight of steps without compensation   Able to perform usual household activities without increased symptoms > Progressing   Able to carry grocery bags from car to house without increased symptoms   FOTO intake score improvement to > 75  Independent with HEP for continued improvement in functional mobility       Plan   Continue with Skilled PT - emphasize knee extension activities, quad sets.      Plan of care Certification: 10/25/2023 to 01/25/2025.     Outpatient Physical Therapy 2 -3 times weekly for 8 weeks to include the following interventions: Gait Training, Manual Therapy, Moist Heat/ Ice, Neuromuscular Re-ed, Patient Education, Therapeutic Activities, and Therapeutic Exercise.          Susanna Rondon, PT

## 2023-11-28 DIAGNOSIS — Z96.659 STATUS POST KNEE REPLACEMENT, UNSPECIFIED LATERALITY: ICD-10-CM

## 2023-11-28 RX ORDER — METHOCARBAMOL 750 MG/1
750 TABLET, FILM COATED ORAL 4 TIMES DAILY PRN
Qty: 40 TABLET | Refills: 0 | Status: SHIPPED | OUTPATIENT
Start: 2023-11-28 | End: 2023-12-05

## 2023-11-30 ENCOUNTER — OFFICE VISIT (OUTPATIENT)
Dept: ORTHOPEDICS | Facility: CLINIC | Age: 73
End: 2023-11-30
Payer: COMMERCIAL

## 2023-11-30 ENCOUNTER — HOSPITAL ENCOUNTER (OUTPATIENT)
Dept: RADIOLOGY | Facility: HOSPITAL | Age: 73
Discharge: HOME | End: 2023-11-30
Attending: ORTHOPAEDIC SURGERY
Payer: COMMERCIAL

## 2023-11-30 VITALS — WEIGHT: 168.56 LBS | HEIGHT: 63 IN | BODY MASS INDEX: 29.87 KG/M2

## 2023-11-30 DIAGNOSIS — Z96.651 S/P TOTAL KNEE REPLACEMENT USING CEMENT, RIGHT: ICD-10-CM

## 2023-11-30 DIAGNOSIS — Z96.651 PRESENCE OF RIGHT ARTIFICIAL KNEE JOINT: Primary | ICD-10-CM

## 2023-11-30 PROCEDURE — 99999 PR PBB SHADOW E&M-EST. PATIENT-LVL III: CPT | Mod: PBBFAC,,, | Performed by: ORTHOPAEDIC SURGERY

## 2023-11-30 PROCEDURE — 73562 X-RAY EXAM OF KNEE 3: CPT | Mod: 26,RT,, | Performed by: RADIOLOGY

## 2023-11-30 PROCEDURE — 73562 XR KNEE 3 VIEW RIGHT: ICD-10-PCS | Mod: 26,RT,, | Performed by: RADIOLOGY

## 2023-11-30 PROCEDURE — 3288F PR FALLS RISK ASSESSMENT DOCUMENTED: ICD-10-PCS | Mod: CPTII,S$GLB,, | Performed by: ORTHOPAEDIC SURGERY

## 2023-11-30 PROCEDURE — 3044F PR MOST RECENT HEMOGLOBIN A1C LEVEL <7.0%: ICD-10-PCS | Mod: CPTII,S$GLB,, | Performed by: ORTHOPAEDIC SURGERY

## 2023-11-30 PROCEDURE — 1125F AMNT PAIN NOTED PAIN PRSNT: CPT | Mod: CPTII,S$GLB,, | Performed by: ORTHOPAEDIC SURGERY

## 2023-11-30 PROCEDURE — 1159F PR MEDICATION LIST DOCUMENTED IN MEDICAL RECORD: ICD-10-PCS | Mod: CPTII,S$GLB,, | Performed by: ORTHOPAEDIC SURGERY

## 2023-11-30 PROCEDURE — 3288F FALL RISK ASSESSMENT DOCD: CPT | Mod: CPTII,S$GLB,, | Performed by: ORTHOPAEDIC SURGERY

## 2023-11-30 PROCEDURE — 73562 X-RAY EXAM OF KNEE 3: CPT | Mod: TC,RT

## 2023-11-30 PROCEDURE — 1101F PT FALLS ASSESS-DOCD LE1/YR: CPT | Mod: CPTII,S$GLB,, | Performed by: ORTHOPAEDIC SURGERY

## 2023-11-30 PROCEDURE — 1159F MED LIST DOCD IN RCRD: CPT | Mod: CPTII,S$GLB,, | Performed by: ORTHOPAEDIC SURGERY

## 2023-11-30 PROCEDURE — 1101F PR PT FALLS ASSESS DOC 0-1 FALLS W/OUT INJ PAST YR: ICD-10-PCS | Mod: CPTII,S$GLB,, | Performed by: ORTHOPAEDIC SURGERY

## 2023-11-30 PROCEDURE — 1125F PR PAIN SEVERITY QUANTIFIED, PAIN PRESENT: ICD-10-PCS | Mod: CPTII,S$GLB,, | Performed by: ORTHOPAEDIC SURGERY

## 2023-11-30 PROCEDURE — 99024 POSTOP FOLLOW-UP VISIT: CPT | Mod: S$GLB,,, | Performed by: ORTHOPAEDIC SURGERY

## 2023-11-30 PROCEDURE — 99024 PR POST-OP FOLLOW-UP VISIT: ICD-10-PCS | Mod: S$GLB,,, | Performed by: ORTHOPAEDIC SURGERY

## 2023-11-30 PROCEDURE — 99999 PR PBB SHADOW E&M-EST. PATIENT-LVL III: ICD-10-PCS | Mod: PBBFAC,,, | Performed by: ORTHOPAEDIC SURGERY

## 2023-11-30 PROCEDURE — 3044F HG A1C LEVEL LT 7.0%: CPT | Mod: CPTII,S$GLB,, | Performed by: ORTHOPAEDIC SURGERY

## 2023-11-30 RX ORDER — DOXYCYCLINE HYCLATE 100 MG
100 TABLET ORAL 2 TIMES DAILY
Qty: 28 TABLET | Refills: 0 | Status: SHIPPED | OUTPATIENT
Start: 2023-11-30 | End: 2024-03-14

## 2023-11-30 NOTE — PROGRESS NOTES
"  5 weeks s/p R JOSHUA TKA  Doing great, "25 years younger", no pain, not taking pain meds, PT doing well    6 days ago 11/24 - noticed suture tail at prox incision, did not mess with it, then skin sealed over and over past few days more erythema at that area  Has been putting neosporin on it for past 3 days    Walking well, no limp/antalgic gait  No pain with ROM  Prox incision 3x3mm area of induratoin with some surrounding erythema, no drainage, skin intact    Suture reaction:     Sterile prep, unroofed thin skin there, no gross purulence, did not probe deep, removed some fibrinous material but no obvious suture    Daily abx ointment, bandaid, wash soap/water  Start Doxy 100mg BID  Stop PT, do gentle ROM on own at home  Elevate TID at least 1 hr    F/u regularly scheduled 6 week appt 12/6 for wound check      "

## 2023-12-05 ENCOUNTER — OFFICE VISIT (OUTPATIENT)
Dept: ORTHOPEDICS | Facility: CLINIC | Age: 73
End: 2023-12-05
Payer: COMMERCIAL

## 2023-12-05 DIAGNOSIS — Z96.651 PRESENCE OF RIGHT ARTIFICIAL KNEE JOINT: Primary | ICD-10-CM

## 2023-12-05 PROCEDURE — 99024 POSTOP FOLLOW-UP VISIT: CPT | Mod: S$GLB,,, | Performed by: ORTHOPAEDIC SURGERY

## 2023-12-05 PROCEDURE — 3044F PR MOST RECENT HEMOGLOBIN A1C LEVEL <7.0%: ICD-10-PCS | Mod: CPTII,S$GLB,, | Performed by: ORTHOPAEDIC SURGERY

## 2023-12-05 PROCEDURE — 99999 PR PBB SHADOW E&M-EST. PATIENT-LVL I: ICD-10-PCS | Mod: PBBFAC,,, | Performed by: ORTHOPAEDIC SURGERY

## 2023-12-05 PROCEDURE — 99024 PR POST-OP FOLLOW-UP VISIT: ICD-10-PCS | Mod: S$GLB,,, | Performed by: ORTHOPAEDIC SURGERY

## 2023-12-05 PROCEDURE — 99999 PR PBB SHADOW E&M-EST. PATIENT-LVL I: CPT | Mod: PBBFAC,,, | Performed by: ORTHOPAEDIC SURGERY

## 2023-12-05 PROCEDURE — 3044F HG A1C LEVEL LT 7.0%: CPT | Mod: CPTII,S$GLB,, | Performed by: ORTHOPAEDIC SURGERY

## 2023-12-05 RX ORDER — METHOCARBAMOL 750 MG/1
750 TABLET, FILM COATED ORAL 3 TIMES DAILY PRN
Qty: 30 TABLET | Refills: 0 | Status: SHIPPED | OUTPATIENT
Start: 2023-12-05 | End: 2024-01-08

## 2023-12-05 NOTE — PROGRESS NOTES
"  Subjective:     HPI:   Carmen Hawley is a 73 y.o. female who presents 6 weeks out from right TKA    Date of surgery: R JOSHUA TKA 10/23/23    Medications:   aleve PRN, kervin TID, tylenol 1-2x/day  Finishing doxy Rx from 11/30/23    Assistive Devices: none    PT: held 11/30/23    Limitations: none    Doing better  Prox incision has sealed, no drainage    Has been wearing knee high comps socks which is trapping edema at knee       Objective:   There is no height or weight on file to calculate BMI.  Exam:    Gait: limp/antalgic none    Incision: healed, erythema improved, prox area sealed and healing    Stability:  Knee stable anterior-posterior varus and valgus stresses, no extensor lag    Extension: 5    Flexion: 125    Valgus angle: 5    Pre-op , 3 varus  PT "118" but not documented    Lower leg swelling resolved but diffuse edema proximal to skin compression from knee high comp socks      Imaging:  Indication:  Exam status post right total knee arthroplasty  Exam Ordered: Radiographs of the right knee include a standing anteroposterior view, a lateral view in full flexion, and a sunrise view  Details of Examination: Todays exam show a well fixed, well positioned total knee arthroplasty with no evidence of wear, osteolysis, or loosening.  Impression:  Status post right total knee arthroplasty, implant in good position with no abnormality      Assessment:       ICD-10-CM ICD-9-CM   1. Presence of right artificial knee joint  Z96.651 V43.65      Prox suture spitting - resolving       Plan:       Patient is doing very well with their total knee arthroplasty.  They will continue with their routine care of the knee replacement and see me back for their follow-up at the routine interval.  If there are problems in the interim they will see me back sooner.    Rx robaxin  Thigh high TEDS or tights, avoid knee high comp socks  Finish out doxy    Done with PT - do HEP  HS stretching    2 week follow up     RTW     No " orders of the defined types were placed in this encounter.      Medications Ordered This Encounter   Medications    methocarbamoL (ROBAXIN) 750 MG Tab     Sig: Take 1 tablet (750 mg total) by mouth 3 (three) times daily as needed (muscle spasm).     Dispense:  30 tablet     Refill:  0        Past Medical History:   Diagnosis Date    Anemia 10/5/2023    Anxiety     Arthritis     Cataract     Hyperlipidemia     Hypertension     Macular degeneration     Mitral valve prolapse     Renal insufficiency 10/5/2023    Salzmann's nodular dystrophy of both eyes        Past Surgical History:   Procedure Laterality Date    ARTHROPLASTY, KNEE, TOTAL, USING COMPUTER-ASSISTED NAVIGATION Right 10/23/2023    Procedure: ARTHROPLASTY, KNEE, TOTAL, USING COMPUTER-ASSISTED NAVIGATION: JOSHUA: RIGHT: JILL - TRIATHALON;  Surgeon: Jean-Pierre Bennett III, MD;  Location: [facility] OR;  Service: Orthopedics;  Laterality: Right;    CATARACT EXTRACTION W/  INTRAOCULAR LENS IMPLANT Left 05/08/2023    Procedure: EXTRACTION, CATARACT, WITH IOL INSERTION;  Surgeon: Flaca Diallo MD;  Location: [facility] OR;  Service: Ophthalmology;  Laterality: Left;    CATARACT EXTRACTION W/  INTRAOCULAR LENS IMPLANT Right 05/22/2023    Procedure: EXTRACTION, CATARACT, WITH IOL INSERTION;  Surgeon: Flaca Diallo MD;  Location: [facility] OR;  Service: Ophthalmology;  Laterality: Right;    COLONOSCOPY N/A 10/08/2020    Procedure: COLONOSCOPY;  Surgeon: Crispin Moon MD;  Location: [address]);  Service: Endoscopy;  Laterality: N/A;  Family history of colon polyps  COVID test on 10/5/20 at 2nd floor -     EYE SURGERY      INJECTION Right 05/17/2023    Procedure: INJECTION RIGHT KNEE SYNVISC;  Surgeon: Tej mC MD;  Location: [facility];  Service: Pain Management;  Laterality: Right;    TONSILLECTOMY      TRANSFORAMINAL EPIDURAL INJECTION OF STEROID Right 06/29/2022    Procedure: INJECTION, STEROID, EPIDURAL, TRANSFORAMINAL APPROACH, RIGHT L3-L4 AND  L4-L5 CONTRAST DIRECT REF;  Surgeon: Tej Cm MD;  Location: [facility];  Service: Pain Management;  Laterality: Right;    TRANSFORAMINAL EPIDURAL INJECTION OF STEROID Right 03/15/2023    Procedure: INJECTION, STEROID, EPIDURAL, TRANSFORAMINAL APPROACH RIGHT L3/4 AND L4/5;  Surgeon: Tej Cm MD;  Location: [facility];  Service: Pain Management;  Laterality: Right;       Family History   Problem Relation Age of Onset    Cancer Mother         lung    Stroke Mother     Heart disease Father     Diabetes Father     Diabetes Brother     Aortic aneurysm Brother         surgery 5/2012    Kidney disease Brother         on dialysis    Melanoma Neg Hx     Breast cancer Neg Hx     Colon cancer Neg Hx     Ovarian cancer Neg Hx        Social History     Socioeconomic History    Marital status:      Spouse name: Nicho   Occupational History     Employer: [employer]   Tobacco Use    Smoking status: Never    Smokeless tobacco: Never   Substance and Sexual Activity    Alcohol use: Yes     Comment: 1-2 glasses wine 5 daily    Drug use: No    Sexual activity: Yes     Partners: Male     Birth control/protection: Post-menopausal     Comment:  since 2000   Social History Narrative    Works in [employer] department at [employer]. Working part-time.        Likes to play golf, walks for exercise.      Social Determinants of Health     Financial Resource Strain: Low Risk  (10/9/2019)    Overall Financial Resource Strain (CARDIA)     Difficulty of Paying Living Expenses: Not very hard   Food Insecurity: No Food Insecurity (10/9/2019)    Hunger Vital Sign     Worried About Running Out of Food in the Last Year: Never true     Ran Out of Food in the Last Year: Never true   Transportation Needs: No Transportation Needs (10/9/2019)    PRAPARE - Transportation     Lack of Transportation (Medical): No     Lack of Transportation (Non-Medical): No   Physical Activity:  Insufficiently Active (10/9/2019)    Exercise Vital Sign     Days of Exercise per Week: 3 days     Minutes of Exercise per Session: 30 min   Stress: Stress Concern Present (10/9/2019)    [place] [employer] - Occupational Stress Questionnaire     Feeling of Stress : To some extent    Social Connection and Isolation Panel [NHANES]

## 2023-12-06 ENCOUNTER — TELEPHONE (OUTPATIENT)
Dept: ORTHOPEDICS | Facility: CLINIC | Age: 73
End: 2023-12-06
Payer: COMMERCIAL

## 2023-12-06 NOTE — TELEPHONE ENCOUNTER
I called [employer] back regarding their voice message. I left my name and contact information for them to call me back.       --------------  Ayaz calling from [employer] calling to speak with someone in provider office regarding STD letter [place] stated she faxed over form on 11/20  [phone] she would like the form to be expedited pt have been waiting 3 weeks she will send form over again    Please call Ayaz  back at  [phone]

## 2023-12-21 ENCOUNTER — OFFICE VISIT (OUTPATIENT)
Dept: ORTHOPEDICS | Facility: CLINIC | Age: 73
End: 2023-12-21
Payer: COMMERCIAL

## 2023-12-21 VITALS — BODY MASS INDEX: 28.69 KG/M2 | WEIGHT: 161.94 LBS | HEIGHT: 63 IN

## 2023-12-21 DIAGNOSIS — I89.0 LYMPHEDEMA OF RIGHT LOWER EXTREMITY: ICD-10-CM

## 2023-12-21 DIAGNOSIS — Z96.651 PRESENCE OF RIGHT ARTIFICIAL KNEE JOINT: Primary | ICD-10-CM

## 2023-12-21 PROCEDURE — 3044F HG A1C LEVEL LT 7.0%: CPT | Mod: CPTII,S$GLB,, | Performed by: ORTHOPAEDIC SURGERY

## 2023-12-21 PROCEDURE — 3288F FALL RISK ASSESSMENT DOCD: CPT | Mod: CPTII,S$GLB,, | Performed by: ORTHOPAEDIC SURGERY

## 2023-12-21 PROCEDURE — 3044F PR MOST RECENT HEMOGLOBIN A1C LEVEL <7.0%: ICD-10-PCS | Mod: CPTII,S$GLB,, | Performed by: ORTHOPAEDIC SURGERY

## 2023-12-21 PROCEDURE — 99024 PR POST-OP FOLLOW-UP VISIT: ICD-10-PCS | Mod: S$GLB,,, | Performed by: ORTHOPAEDIC SURGERY

## 2023-12-21 PROCEDURE — 1126F AMNT PAIN NOTED NONE PRSNT: CPT | Mod: CPTII,S$GLB,, | Performed by: ORTHOPAEDIC SURGERY

## 2023-12-21 PROCEDURE — 99999 PR PBB SHADOW E&M-EST. PATIENT-LVL IV: CPT | Mod: PBBFAC,,, | Performed by: ORTHOPAEDIC SURGERY

## 2023-12-21 PROCEDURE — 1101F PR PT FALLS ASSESS DOC 0-1 FALLS W/OUT INJ PAST YR: ICD-10-PCS | Mod: CPTII,S$GLB,, | Performed by: ORTHOPAEDIC SURGERY

## 2023-12-21 PROCEDURE — 1159F MED LIST DOCD IN RCRD: CPT | Mod: CPTII,S$GLB,, | Performed by: ORTHOPAEDIC SURGERY

## 2023-12-21 PROCEDURE — 99999 PR PBB SHADOW E&M-EST. PATIENT-LVL IV: ICD-10-PCS | Mod: PBBFAC,,, | Performed by: ORTHOPAEDIC SURGERY

## 2023-12-21 PROCEDURE — 1159F PR MEDICATION LIST DOCUMENTED IN MEDICAL RECORD: ICD-10-PCS | Mod: CPTII,S$GLB,, | Performed by: ORTHOPAEDIC SURGERY

## 2023-12-21 PROCEDURE — 99024 POSTOP FOLLOW-UP VISIT: CPT | Mod: S$GLB,,, | Performed by: ORTHOPAEDIC SURGERY

## 2023-12-21 PROCEDURE — 1101F PT FALLS ASSESS-DOCD LE1/YR: CPT | Mod: CPTII,S$GLB,, | Performed by: ORTHOPAEDIC SURGERY

## 2023-12-21 PROCEDURE — 3288F PR FALLS RISK ASSESSMENT DOCUMENTED: ICD-10-PCS | Mod: CPTII,S$GLB,, | Performed by: ORTHOPAEDIC SURGERY

## 2023-12-21 PROCEDURE — 1126F PR PAIN SEVERITY QUANTIFIED, NO PAIN PRESENT: ICD-10-PCS | Mod: CPTII,S$GLB,, | Performed by: ORTHOPAEDIC SURGERY

## 2023-12-21 NOTE — PROGRESS NOTES
8 week s/p R JOSHUA TKA 10/23/23  Doing better than her friend in [place]  Back at work full time    Proximal incision now healed, no sign of infection    ROM 3-125, no lag   Still diffuse swelling down leg, no calf pain, neg homans sign    -prox incision suture reaction now healed  -lymphedema    Stop OP PT - 1 more visit then HEP or has 7 visits left so can go and just do bike or use as gym  Rec Floor peddlar     Will Rx lymphedema PT for wrapping    F/u 1 month = 12 week visit

## 2024-01-02 ENCOUNTER — TELEPHONE (OUTPATIENT)
Dept: INTERNAL MEDICINE | Facility: CLINIC | Age: 74
End: 2024-01-02
Payer: COMMERCIAL

## 2024-01-02 VITALS — DIASTOLIC BLOOD PRESSURE: 81 MMHG | SYSTOLIC BLOOD PRESSURE: 138 MMHG

## 2024-01-02 DIAGNOSIS — Z96.651 PRESENCE OF RIGHT ARTIFICIAL KNEE JOINT: ICD-10-CM

## 2024-01-08 RX ORDER — METHOCARBAMOL 750 MG/1
TABLET, FILM COATED ORAL
Qty: 30 TABLET | Refills: 0 | Status: SHIPPED | OUTPATIENT
Start: 2024-01-08

## 2024-01-10 ENCOUNTER — TELEPHONE (OUTPATIENT)
Dept: ORTHOPEDICS | Facility: HOSPITAL | Age: 74
End: 2024-01-10
Payer: COMMERCIAL

## 2024-01-10 ENCOUNTER — CLINICAL SUPPORT (OUTPATIENT)
Dept: REHABILITATION | Facility: HOSPITAL | Age: 74
End: 2024-01-10
Attending: ORTHOPAEDIC SURGERY
Payer: COMMERCIAL

## 2024-01-10 DIAGNOSIS — I89.0 LYMPHEDEMA OF RIGHT LOWER EXTREMITY: ICD-10-CM

## 2024-01-10 DIAGNOSIS — M79.89 SWELLING OF RIGHT LOWER EXTREMITY: Primary | ICD-10-CM

## 2024-01-10 DIAGNOSIS — Z96.651 PRESENCE OF RIGHT ARTIFICIAL KNEE JOINT: ICD-10-CM

## 2024-01-10 DIAGNOSIS — I89.0 LYMPHEDEMA OF RIGHT LOWER EXTREMITY: Primary | ICD-10-CM

## 2024-01-10 PROCEDURE — 97110 THERAPEUTIC EXERCISES: CPT

## 2024-01-10 PROCEDURE — 97162 PT EVAL MOD COMPLEX 30 MIN: CPT

## 2024-01-10 PROCEDURE — 97140 MANUAL THERAPY 1/> REGIONS: CPT

## 2024-01-10 NOTE — PLAN OF CARE
[facility]  Physical Therapy Initial Evaluation    Name: Carmen Hawley  Clinic Number: [id number]    Therapy Diagnosis:   Encounter Diagnoses   Name Primary?    Presence of right artificial knee joint     Lymphedema of right lower extremity     Swelling of right lower extremity Yes     Physician: Jean-Pierre Bennett III, *    Physician Orders: PT Eval and Treat - lymphedema  Medical Diagnosis from Referral:   Diagnosis   Z96.651 (ICD-10-CM) - Presence of right artificial knee joint   I89.0 (ICD-10-CM) - Lymphedema of right lower extremity   Evaluation Date: 1/10/2024  Authorization Period Expiration: 12/21/24  Plan of Care Expiration: 4/3/24  Visit # / Visits authorized: 1/ 1    Time In: 800a  Time Out: 910a  Total Billable Time: 70 minutes    Precautions: Standard and R TKA    Subjective   Date of onset: pt notes some issues veins and mild swelling in past.  R knee post op progressing well with ROM and walking no device. Pt was issued more EDSON hose to use daily due to swelling, tried her 's KH socks but did not help knee.  Suture at top of incision was removed with bump remaining.   History of current condition - Carmen reports: swelling in R knee prior to TKA 10/23/23 but now having ongoing fullness. Dr. Bennett suggested to stop ortho PT and address the swelling.  Mostly in knee - some discomfort in calf, also taking muscle relaxer which seem to help.  Wearing EDSON hose now and in past used KH socks which did not help knee.  Using Gabapentin and Tylenol.    Per ortho MD 12/5/23:  Patient is doing very well with their total knee arthroplasty.  They will continue with their routine care of the knee replacement and see me back for their follow-up at the routine interval.  If there are problems in the interim they will see me back sooner.   Rx robaxin  Thigh high TEDS or tights, avoid knee high comp socks  Finish out doxy   Done with PT - do HEP  HS stretching    Pt denies CHF, KF, DM  and CA.   Fluid pill- with HTN daily  Blood thinner- no, baby aspirin    Imaging: US- no recent found,  MD visit 12/5/23   Xray  FINDINGS:  Postoperative changes are again noted relating to a recent right knee arthroplasty procedure, prostheses appearing unremarkable.  No unusual postoperative findings or significant detrimental interval change since the immediate postoperative examination of 10/23/2023 appreciated.   Impression:   As above   Electronically signed by: Jim Alves MD  Date:                                            11/30/2023:     Medical History:   Past Medical History:   Diagnosis Date    Anemia 10/5/2023    Anxiety     Arthritis     Cataract     Hyperlipidemia     Hypertension     Macular degeneration     Mitral valve prolapse     Renal insufficiency 10/5/2023    Salzmann's nodular dystrophy of both eyes      Surgical History:   Carmen Hawley  has a past surgical history that includes Tonsillectomy; Colonoscopy (N/A, 10/08/2020); Transforaminal epidural injection of steroid (Right, 06/29/2022); Transforaminal epidural injection of steroid (Right, 03/15/2023); Cataract extraction w/  intraocular lens implant (Left, 05/08/2023); injection (Right, 05/17/2023); Cataract extraction w/  intraocular lens implant (Right, 05/22/2023); Eye surgery; and arthroplasty, knee, total, using computer-assisted navigation (Right, 10/23/2023).    Medications:   Carmen has a current medication list which includes the following prescription(s): acetaminophen, aspirin, biotin, cefadroxil, cyanocobalamin, doxycycline, escitalopram oxalate, escitalopram oxalate, estradiol, gabapentin, metformin, methocarbamol, metoprolol succinate, naltrexone-bupropion, oxycodone, pantoprazole, senna-docusate 8.6-50 mg, simvastatin, tramadol, triamterene-hydrochlorothiazide 37.5-25 mg, and vitamin d.    Allergies:   Review of patient's allergies indicates:  No Known Allergies   Previous Lymphedema Treatment: no  Prior Therapy:  [place]- [facility] PT- now using stationary bike - presently [facility] for ortho  Social History: [place] in [place], [facility] [employer]    Occupation: sitting, walking, desk 3 days/week   Environmental barriers: level home, amb no device, able to reach feet   Abuse/Neglect: none noted    Nutritional status: wfl   Educational needs: met   Spiritual/Cultural: met   Fall risk: min to no     Prior Level of Function: recent TKA recovery  Current Level of Function: shower seat, 3 in 1 commode  Gait: amb no device, wants to resume walking   Transfers:I   Bed Mobility: I     Pain and Swelling:  Current 1/10, worst 3/10, best 1/10   Location: right knee and calf   Description: Aching, Grabbing, and stiff  Aggravating Factors: Sitting, Standing, Walking, and Night Time  Easing Factors: rest, elevation, and movement   ice and elevate, I comfort beds    Pts goals: reduce Right leg and knee swelling, continue TKA rehab, work tolerance    Objective           Female amb to dept no device, slip on loafers, R LE with thigh high EDSON hose and basic leggings to B LE  Amount of Swelling/Location of Swelling: mod to R LE, fullness along ant shin line R, R knee, mild R thigh as compared to L LE  Scattered spider veins, broken veins B LE ned med leg and feet, thighs   Skin Integrity: intact, healed incisional line R TKA - nodular bump superficial site   Palpation/Texture: dense firmness to lower leg and knee as compared to L, no pitting   - B Stemmer Sign  - B Louise's Sign  Circulation: intact     Posture: wfl     Range of Motion - LE  Arom knee, ankle sitting or supine  (R)  DF 0  (L)  DF 8    Strength: functional screen   Not formally assessed, shows AROM against gravity and sit to stand transfers    Sensation: intact to lt touch B LE    Girth Measurements (in centimeters)  LANDMARK LEFT LE  1/10/24 RIGHT LE  1/10/24 DIFF   at eval   SBP + 20 59.5 cm 61.0 cm 1.5 cm    SBP + 10  50.0 cm 52.5 cm 2.5 cm   SBP 41.5 cm 45.0 cm 3.5 cm   10 below SBP 34.5 cm 36.5 cm 2.0 cm   20 below SBP 34.0 cm 33.5 cm 0.5 cm   30 below SBP 26.0 cm 26.0 cm 0 cm   35 below SBP 21.0 cm 21.5 cm 0.5 cm   Ankle 23.0 cm 23.0 cm 0 cm   Forefoot 21.5 cm 21.0 cm 0.5 cm     CMS Impairment/Limitation/Restriction for FOTO Survey  Therapist reviewed FOTO scores for Carmen Hawley on 1/10/2024.   FOTO documents entered into [employer] - see Media section.     TREATMENT   Treatment Time In: 830a  Treatment Time Out: 910a  Total Treatment time separate from Evaluation: 40 minutes    Carmen received therapeutic exercises to develop strength, endurance, ROM, flexibility, and posture for 15 minutes including:  Continue TKA per protocol and Ortho MD advice - pt states hold on ortho PT visits and continues stationary bike at gym.  Pt relates prone hang for KE at home- continue per ortho recs  Pt was instructed in and performed Gastroc Soleus Stretching for ankle ROM, muscle pump support and ankle pump mobility.  Added cueing for HSS with same program.   Pt was given written/illustrated home exercise program to perform daily.  GSS with strap or towel in long sitting + erect trunk lean for HSS- 3 x 20 seconds 1x daily each leg   2.   GSS with strap or towel in chair with cueing for long spine and lean forward + erect trunk lean for HSS-- 3 x 20 seconds 1x daily each leg  3.   GSS in standing with hands on wall- back heel remains on floor with knee straight and lean towards wall- hold for a steady stretch - 3 x 20 seconds 1x daily each leg.    Aps, knee ROM, avoid dependency, avoid immobility, elevation, walking with compression, deep breathing, use of muscle pump to assist venous return    Carmen received the following manual therapy techniques: Manual Lymphatic Drainage as plan of care and compression recommendations were applied to the: R LE for 15 minutes, including:  Education and training in compression  needs.  Complete Decongestive Therapy components and management with goals and plan of care review.    Demo of Manual Lymphatic Drainage and short stretch compression bandaging.     Pt was provided with an option for Knee High Copper Compression socks 20-30mmHg size S/M to use with EdemaWear Med to support knee and distal thigh - advised will need TH option or KH + shorts/capris to have full leg coverage and knee support.  Pt is not recommended for KH only per PT and MD advice.  Pros/cons of basic compression choices were fully reviewed with noted limited size selection, one length choice, lessor tier of product, benefits of low cost, relative ease of application and comfort, and additional items available for use.  Pt was trained on don/doff and methods to apply with assistance of gloves or family member.  Position and fit with appropriate girth, length and support are required.   Daytime use with removal for sleep.   Wear schedules and wash schedules confirmed.   Provided information to secure additional pairs or use as temporary option until medical grade choices are available.     Self Care/Home Management / Functional Therapeutic Activity training for 10 minutes including:  Garrick CASTILLO recommendations and support of post TKA  Present compression: EDSON hose,  will start KH socks 20-30mmHg + Edema Wear.  Orders requested and DME list provided with cost/coverage considerations noted.    Pt was educated in potential compression needs.  Demo of products including socks, garments, and Inelastic Velcro wraps.   Discussed cost/coverage and authorization per insurance with Durable Medical Equipment(DME) provider.  Compression require orders from referring provider and coverage or purchase of products from DME or self order.      Discussed wear schedule, don/doff, wash and management of products.  Size and compression class and AM/PM needs.    Consideration for Edema Wear to knee and KH socks as form of temporary  compression use until securing compression needs.   Consideration for shorts/tights or capris style compression to compliment lower leg compression to support size/shape of LE or use of TH products.  Product information provided.   Vendor list provided.    Informed insurance coverage of compression is per DME provider and Medicare and Medicare group plans may not cover cost.   Commitment to attendance as well as commitment to securing compression needs is critical to edema management.      Home Exercises and Patient Education Provided  Education provided:   - GSS/HSS  Daily compression support  - Pt was educated in lymphedema etiology and management plans.  Pt was provided with written risk reductions and precautions for managing lymphedema.      This patient is in agreement to participate in Lymphedema treatment.    Written Home Exercises Provided: yes.  Exercises were reviewed and Carmen was able to demonstrate them prior to the end of the session.  Carmen demonstrated good  understanding of the education provided.     See EMR under Patient Instructions for exercises provided 1/10/2024.    Assessment   Carmen is a 73 y.o. female referred to outpatient Physical Therapy with a medical diagnosis of   Diagnosis   Z96.651 (ICD-10-CM) - Presence of right artificial knee joint   I89.0 (ICD-10-CM) - Lymphedema of right lower extremity   This patient presents s/p R TKA resulting in: post operative edema that has continued beyond orthopedic rehab with venous related R LE lymphedema of the R LE, R knee, R lower leg, increased pain, increased stiffness in the knee/lower leg, mild hamstring and gastroc tightness, skin discoloration and post op edema, as well as difficulty performing walking/work demands, compression needs, and placing the pt at higher risk of infection.   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 Therapy recommends elevation, exercise, regular use of compression,  and therapy for at least a month to reduce swelling.  Recommend full LE coverage of TH garments or KH + compression shorts/capris options.  Pt prognosis is Good.   Pt will benefit from skilled outpatient Physical Therapy to address the deficits stated above and in the chart below, provide pt/family education, and to maximize pt's level of independence.     Plan of care discussed with patient: Yes  Pt's spiritual, cultural and educational needs considered and patient is agreeable to the plan of care and goals as stated below:     Medical Necessity is demonstrated by the following  History  Co-morbidities and personal factors that may impact the plan of care [] LOW: no personal factors / co-morbidities  [x] MODERATE: 1-2 personal factors / co-morbidities  [] HIGH: 3+ personal factors / co-morbidities    Moderate / High Support Documentation: TKA, swelling, OA/DJD, HTN, renal insufficiency     Examination  Body Structures and Functions, activity limitations and participation restrictions that may impact the plan of care [] LOW: addressing 1-2 elements  [x] MODERATE: 3+ elements  [] HIGH: 4+ elements (please support below)    Moderate / High Support Documentation: ROM, walking tolerance, edema, vein changes, discoloration     Clinical Presentation [] LOW: stable  [x] MODERATE: Evolving  [] HIGH: Unstable     Decision Making/ Complexity Score: moderate       Anticipated Barriers for therapy: none     The following goals were discussed with the patient and patient is in agreement with them as to be addressed in the treatment plan.     Short Term Goals: (6 weeks)  1. Patient will show decreased girth in R LE by up to 1 cm to allow for LE symmetry, shoe and clothing choice, and ability to apply needed compression.  (progressing, not met)   2. Patient will demonstrate 100% knowledge of lymphedema precautions and signs of infection to allow for reduced lymphedema risk, infection risk, and/or exacerbation of condition.   (progressing, not met)  3. Patient or caregiver will perform self-bandaging techniques and/or wearing of compression garments to allow for lymphatic drainage support, skin elasticity, and reduction in shape and size of limb. (progressing, not met)  4. Patient will perform self lymph drainage techniques to areas within reach to enhance lymphatic drainage and skin condition.  (progressing, not met)  5. Patient will tolerate daily activities with multilayered bandaging to allow for lymphatic and venous support.  (progressing, not met)    Long Term Goals: (12  weeks)  1. Patient will show decreased girth in R LE by up to 2 cm  to allow for LE symmetry, shoe and clothing choice, and ability to apply needed compression daily.  (progressing, not met)  2. Patient will show reduction in density to mild or less with improved contour of limb to allow for cosmesis, LE symmetry, infection risk reduction, and clothing and compression choice.   (progressing, not met)  3. Patient to denisha/doff compression garment with daily compliance to assist in lymphedema management, skin elasticity, and tissue density.  (progressing, not met)  4. Pt to show improved postural awareness and alignment.  (progressing, not met)  5. Pt to be I and compliant with HEP to allow for increased function in affected limb.   (progressing, not met)  Plan   Plan of care Certification: 1/10/2024 to 4/3/24.    Outpatient Physical Therapy 2 times weekly for 10 weeks to include the following interventions: Patient Education, Self Care, Therapeutic Activities, and Therapeutic Exercise. Complete Decongestive Therapy- compression and home equipment needs to be addressed and assisted.    Pt may be seen by a PTA as part of the Rehab treatment team.  Plan of Care was discussed with Kellie Arreola, PTA    Isi Doran, PT

## 2024-01-10 NOTE — TELEPHONE ENCOUNTER
Order placed    ----- Message from Isi Doran PT sent at 1/10/2024 12:42 PM CST -----  Therapy recommends daily compression support- she will attend therapy 2x / week to address post op edema.   Please place orders in EPIC, therapy will print and provide to patient to visit DME to purchase.    B LE thigh high compression 20-30mmHg garments    Thanks for your attention to her care,   Isi Doran, PT

## 2024-01-22 ENCOUNTER — CLINICAL SUPPORT (OUTPATIENT)
Dept: REHABILITATION | Facility: HOSPITAL | Age: 74
End: 2024-01-22
Payer: COMMERCIAL

## 2024-01-22 DIAGNOSIS — I89.0 LYMPHEDEMA OF RIGHT LOWER EXTREMITY: Primary | ICD-10-CM

## 2024-01-22 DIAGNOSIS — M79.89 SWELLING OF RIGHT LOWER EXTREMITY: ICD-10-CM

## 2024-01-22 PROCEDURE — 97016 VASOPNEUMATIC DEVICE THERAPY: CPT

## 2024-01-22 PROCEDURE — 97140 MANUAL THERAPY 1/> REGIONS: CPT

## 2024-01-22 PROCEDURE — 97110 THERAPEUTIC EXERCISES: CPT
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
## 2024-01-24 ENCOUNTER — CLINICAL SUPPORT (OUTPATIENT)
Dept: REHABILITATION | Facility: HOSPITAL | Age: 74
End: 2024-01-24
Payer: COMMERCIAL

## 2024-01-24 DIAGNOSIS — M79.89 SWELLING OF RIGHT LOWER EXTREMITY: ICD-10-CM

## 2024-01-24 DIAGNOSIS — I89.0 LYMPHEDEMA OF RIGHT LOWER EXTREMITY: Primary | ICD-10-CM

## 2024-01-24 PROCEDURE — 97016 VASOPNEUMATIC DEVICE THERAPY: CPT

## 2024-01-24 PROCEDURE — 97140 MANUAL THERAPY 1/> REGIONS: CPT

## 2024-01-24 NOTE — PROGRESS NOTES
Physical Therapy Daily Treatment Note     Name: Carmen Hawley  [facility] Number: [id number]    Therapy Diagnosis:   Encounter Diagnoses   Name Primary?    Lymphedema of right lower extremity Yes    Swelling of right lower extremity      Physician: eJan-Pierre Bennett III, *    Visit Date: 1/24/2024    Physician Orders: PT Eval and Treat - lymphedema  Medical Diagnosis from Referral:   Diagnosis   Z96.651 (ICD-10-CM) - Presence of right artificial knee joint   I89.0 (ICD-10-CM) - Lymphedema of right lower extremity   Evaluation Date: 1/10/2024  Authorization Period Expiration: 12/31/24  Plan of Care Expiration: 4/3/24  Visit # / Visits authorized: 3/20     Time In: 1000a  Time Out: 1110a  Total Billable Time: 70 minutes  pump     Precautions: Standard and R TKA  Subjective     Pt reports: her leg is looking and feeling better.  Mild soreness with R knee.  No issues with compression.  Pt will visit DME for TH soon.  She was compliant with home compression/exercise program.  Response to previous treatment: showing good support with KH sock + EdemaWear to L knee as form of temporary compression- aware of TH orders and recommendations.  Reviewed knee stiffness and lack of TKE - presently on HOLD with ortho TKA PT- will discuss with Dr. Bennett Thursday 1/25/24.  Functional change: pt notes mild stiffness in knee with stairs.  Pt amb no device.   Pt has TKA protocol and stationary bike as HEP.  Pain: 0-1/10  Location: right knee stiffness with change of position and cold weather, not truly painful      Objective     Female amb to dept no device, slip on elastic lace tennis shoes, B LE with KH 20-30mmHg compression socks+ EdemaWear to R knee today  Amount of Swelling/Location of Swelling: mild to lower leg, mod to R knee, fullness along ant shin line R, mild R thigh as compared to L LE  Scattered spider veins, broken veins B LE ned med leg and feet, thighs   Skin Integrity: intact, healed incisional line R TKA - nodular bump  proximal edege   Palpation/Texture: dense firmness to lower leg and knee as compared to L, no pitting, shiny taut lower leg  - B Stemmer Sign  - B Louise's Sign  Circulation: intact      Range of Motion - LE  Arom knee, ankle sitting or supine  (R)  DF 0, lacking terminal knee extension ~ - 8 in supine  (L)  DF 8    Treatment:   Carmen received the following manual therapy techniques:- Manual Lymphatic Drainage and compression product choice were applied to the: R LE for 45 minutes, including: MLD and compression   Compression sock remained intact L LE    MANUAL LYMPHATIC DRAINAGE (MLD):    While supine with LEs elevated stimulation at terminus, along GI region, B inguinal regions, drainage of entire R LE ned knee to thigh and lower leg with return proximally,  Use of Aquaphor/Eucerin due to dryness.   Consider self massage to abdominal areas, B inguinal areas, thigh, and remaining LE within reach.    SEQUENTIAL COMPRESSION PUMP: full leg sleeve applied to R LE- removed incline wedge for positioning and comfort  VES 5200 with default setting with distal pressures starting at 45mmHg entire LE   Simultaneous to compression supplies, compression education and training, and self management.    MULTILAYERED BANDAGING:  bandaging not performed - will assess need  Applied segment of EdemaWear Medium to R knee + KH 20-30mmHg Copper Compression sock S/M - advised as temporary choice of compression until TH garments are obtained. Monitor position, fit and alignment.   Discontinue with any problems, return compression for each session. Wash and wear schedules confirmed.     Carmen received therapeutic exercises to develop strength, endurance, ROM, flexibility, and posture for 5 minutes  Verbal review and demo of need for TKE, reviewed GSS, HSS, and QS in extension with leg in various positons.  MD has placed HOLD on ortho TKA PT, pt was advised per MD for arom, biking, edema therapy, HEP of TKA protocol  TKE - MD  suggested prone gravity prop - advised on position, also MD recommended stationary bicycle - discussed seat height for flexion or extension movements.    Demo of HSS and GSS  Demo of seated Knee ext prop- chair to chair  Advised on gait with knee lacking extension  Prior visit provided GSS/HSS + HEP.  aps, walking, avoid dependency and immobility, support of muscle pump with compression and activity.  THERAPEUTIC EXERCISES:  Continue HEP of AROM, stretching, and postural correction.     Demonstration of prone leg hang for TKE stretch  Sitting chair prop for KE  Avoid aggressive activities with continued ROM and focus on strength.    Home Exercises Provided and Patient Education Provided:  Self Care Home Management Training/Functional Therapeutic Activity x 5 minutes  Pt has orders and DME list  orders for recommended compression- discussed TH options and methods to secure  DME list and basic process to proceed to DME vendor with potential for insurance coverage or OOP expense  Present compression:  KH sock + EdemaWear Med to knee R LE, sock to L LE  Orders and recommendations: TH 20-30mmHg with  top  PATIENT/FAMILY Education: bandaging/compression wear schedule,  HEP,  Beginning of self massage, compression options, and Risk reduction    Written Home Exercises Provided: Patient instructed to cont prior HEP.  Home exercise and compression plan of management was reviewed and Carmen was able to demonstrate understanding prior to the end of the session.  Carmen demonstrated good  understanding of the education provided.     Assessment     Carmen is a 73 y.o. female referred to outpatient Physical Therapy with a medical diagnosis of   Diagnosis   Z96.651 (ICD-10-CM) - Presence of right artificial knee joint   I89.0 (ICD-10-CM) - Lymphedema of right lower extremity   This patient presents s/p R TKA resulting in: post operative edema that has continued beyond orthopedic rehab with venous related R LE lymphedema of  the R LE, R knee, R lower leg, increased pain, increased stiffness in the knee/lower leg, mild hamstring and gastroc tightness, skin discoloration and post op edema, as well as difficulty performing walking/work demands, compression needs, and placing the pt at higher risk of infection.   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 Therapy recommends elevation, exercise, regular use of compression, and therapy for at least a month to reduce swelling.  Recommend full LE coverage of TH garments or KH + compression shorts/capris options.    Less fullness and tightness to R lower leg and knee post session of Manual Lymphatic Drainage, MLD, pump, and daily compression support.    Discussed lacking terminal knee extension R knee and amb, position, and stress to knee joint - pt will discuss further with Dr. Bennett 1/25/24 - potential to resume TKA ortho PT or continue HEP with recommendations.   Recommend knee coverage + LE for compression support - KH + knee or tights/leggings or move to TH coverage.   Carmen Is progressing well towards her goals.   Pt prognosis is Good.     Pt will continue to benefit from skilled outpatient physical therapy to address the deficits listed in the problem list box on initial evaluation, provide pt/family education and to maximize pt's level of independence in the home and community environment.     Pt's spiritual, cultural and educational needs considered and pt agreeable to plan of care and goals.    Anticipated Barriers for therapy: none      The following goals were discussed with the patient and patient is in agreement with them as to be addressed in the treatment plan.      Short Term Goals: (6 weeks)  1. Patient will show decreased girth in R LE by up to 1 cm to allow for LE symmetry, shoe and clothing choice, and ability to apply needed compression.  (progressing, not met)   2. Patient will demonstrate 100% knowledge of lymphedema  precautions and signs of infection to allow for reduced lymphedema risk, infection risk, and/or exacerbation of condition.  (progressing, not met)  3. Patient or caregiver will perform self-bandaging techniques and/or wearing of compression garments to allow for lymphatic drainage support, skin elasticity, and reduction in shape and size of limb. (progressing, not met)  4. Patient will perform self lymph drainage techniques to areas within reach to enhance lymphatic drainage and skin condition.  (progressing, not met)  5. Patient will tolerate daily activities with multilayered bandaging to allow for lymphatic and venous support.  (progressing, not met)     Long Term Goals: (12  weeks)  1. Patient will show decreased girth in R LE by up to 2 cm  to allow for LE symmetry, shoe and clothing choice, and ability to apply needed compression daily.  (progressing, not met)  2. Patient will show reduction in density to mild or less with improved contour of limb to allow for cosmesis, LE symmetry, infection risk reduction, and clothing and compression choice.   (progressing, not met)  3. Patient to denisha/doff compression garment with daily compliance to assist in lymphedema management, skin elasticity, and tissue density.  (progressing, not met)  4. Pt to show improved postural awareness and alignment.  (progressing, not met)  5. Pt to be I and compliant with HEP to allow for increased function in affected limb.   (progressing, not met)  Plan   Plan of care Certification: 1/10/2024 to 4/3/24.     Outpatient Physical Therapy 2 times weekly for 10 weeks to include the following interventions: Patient Education, Self Care, Therapeutic Activities, and Therapeutic Exercise. Complete Decongestive Therapy- compression and home equipment needs to be addressed and assisted.     Pt may be seen by a PTA as part of the Rehab treatment team.  Plan of Care was discussed with Kellie Arreola, PTA     Isi Doran, PT

## 2024-01-25 ENCOUNTER — OFFICE VISIT (OUTPATIENT)
Dept: ORTHOPEDICS | Facility: CLINIC | Age: 74
End: 2024-01-25
Payer: COMMERCIAL

## 2024-01-25 VITALS — WEIGHT: 164.13 LBS | BODY MASS INDEX: 29.07 KG/M2

## 2024-01-25 DIAGNOSIS — I89.0 LYMPHEDEMA OF RIGHT LOWER EXTREMITY: ICD-10-CM

## 2024-01-25 DIAGNOSIS — Z96.651 PRESENCE OF RIGHT ARTIFICIAL KNEE JOINT: Primary | ICD-10-CM

## 2024-01-25 PROCEDURE — 99999 PR PBB SHADOW E&M-EST. PATIENT-LVL III: CPT | Mod: PBBFAC,,, | Performed by: ORTHOPAEDIC SURGERY

## 2024-01-25 PROCEDURE — 1101F PT FALLS ASSESS-DOCD LE1/YR: CPT | Mod: CPTII,S$GLB,, | Performed by: ORTHOPAEDIC SURGERY

## 2024-01-25 PROCEDURE — 1126F AMNT PAIN NOTED NONE PRSNT: CPT | Mod: CPTII,S$GLB,, | Performed by: ORTHOPAEDIC SURGERY

## 2024-01-25 PROCEDURE — 99024 POSTOP FOLLOW-UP VISIT: CPT | Mod: S$GLB,,, | Performed by: ORTHOPAEDIC SURGERY

## 2024-01-25 PROCEDURE — 3288F FALL RISK ASSESSMENT DOCD: CPT | Mod: CPTII,S$GLB,, | Performed by: ORTHOPAEDIC SURGERY

## 2024-01-25 PROCEDURE — 1159F MED LIST DOCD IN RCRD: CPT | Mod: CPTII,S$GLB,, | Performed by: ORTHOPAEDIC SURGERY

## 2024-01-25 NOTE — PROGRESS NOTES
Subjective:     HPI:   Carmen Hawley is a 73 y.o. female who presents 12 weeks out from right TKA    Date of surgery: R JOSHUA TKA 10/23/23    Medications:   tylenol BID PRN  Yefri TID helping as well    Assistive Devices: none    PT: finished TKA PT, but does go and rides bike and doing HEP  lymphedema therapy ongoing, has been twice, seeing results, getting measured for comp socks    Overall doing well, making progress  No pain  Feels like lymph PT helping  Did stairs easier last night       Objective:   Body mass index is 29.07 kg/m².  Exam:    Gait: limp/antalgic none    Incision: healed    Stability:  Knee stable anterior-posterior varus and valgus stresses, no extensor lag    Extension: 2    Flexion: 125    Diffuse LE swelling dramatically improved    Pre-op   12/21/23 3-125    Imaging:  None today        Assessment:       ICD-10-CM ICD-9-CM   1. Presence of right artificial knee joint  Z96.651 V43.65   2. Lymphedema of right lower extremity  I89.0 457.1      Doing well     Plan:       Patient is doing very well with their total knee arthroplasty.  They will continue with their routine care of the knee replacement and see me back for their follow-up at the routine interval.  If there are problems in the interim they will see me back sooner. Prophylactic antibiotic protocol given and explained to patient.     Cont lymphedema therapy  Continue HS stretches    3 month follow up for 6 month f/u no XR      No orders of the defined types were placed in this encounter.            Past Medical History:   Diagnosis Date    Anemia 10/5/2023    Anxiety     Arthritis     Cataract     Hyperlipidemia     Hypertension     Macular degeneration     Mitral valve prolapse     Renal insufficiency 10/5/2023    Salzmann's nodular dystrophy of both eyes        Past Surgical History:   Procedure Laterality Date    ARTHROPLASTY, KNEE, TOTAL, USING COMPUTER-ASSISTED NAVIGATION Right 10/23/2023    Procedure: ARTHROPLASTY, KNEE,  TOTAL, USING COMPUTER-ASSISTED NAVIGATION: JOSHUA: RIGHT: JILL - TRIATHALON;  Surgeon: Jean-Pierre Bennett III, MD;  Location: [facility] OR;  Service: Orthopedics;  Laterality: Right;    CATARACT EXTRACTION W/  INTRAOCULAR LENS IMPLANT Left 05/08/2023    Procedure: EXTRACTION, CATARACT, WITH IOL INSERTION;  Surgeon: Flaca Diallo MD;  Location: [facility] OR;  Service: Ophthalmology;  Laterality: Left;    CATARACT EXTRACTION W/  INTRAOCULAR LENS IMPLANT Right 05/22/2023    Procedure: EXTRACTION, CATARACT, WITH IOL INSERTION;  Surgeon: Flaca Diallo MD;  Location: [facility] OR;  Service: Ophthalmology;  Laterality: Right;    COLONOSCOPY N/A 10/08/2020    Procedure: COLONOSCOPY;  Surgeon: Crispin Moon MD;  [facility]  Service: Endoscopy;  Laterality: N/A;  Family history of colon polyps  COVID test on 10/5/20 at 2nd floor -     EYE SURGERY      INJECTION Right 05/17/2023    Procedure: INJECTION RIGHT KNEE SYNVISC;  Surgeon: Tej Cm MD;  Location: [facility] PAIN MGT;  Service: Pain Management;  Laterality: Right;    TONSILLECTOMY      TRANSFORAMINAL EPIDURAL INJECTION OF STEROID Right 06/29/2022    Procedure: INJECTION, STEROID, EPIDURAL, TRANSFORAMINAL APPROACH, RIGHT L3-L4 AND L4-L5 CONTRAST DIRECT REF;  Surgeon: Tej Cm MD;  Location: [facility] PAIN [facility]GT;  Service: Pain Management;  Laterality: Right;    TRANSFORAMINAL EPIDURAL INJECTION OF STEROID Right 03/15/2023    Procedure: INJECTION, STEROID, EPIDURAL, TRANSFORAMINAL APPROACH RIGHT L3/4 AND L4/5;  Surgeon: Tej Cm MD;  Location: [facility] PAIN [facility]GT;  Service: Pain Management;  Laterality: Right;       Family History   Problem Relation Age of Onset    Cancer Mother         lung    Stroke Mother     Heart disease Father     Diabetes Father     Diabetes Brother     Aortic aneurysm Brother         surgery 5/2012    Kidney disease Brother         on dialysis    Melanoma Neg Hx     Breast cancer Neg Hx     Colon cancer Neg Hx      Ovarian cancer Neg Hx        Social History     Socioeconomic History    Marital status:      Spouse name: Nicho   Occupational History     Employer: [employer]   Tobacco Use    Smoking status: Never    Smokeless tobacco: Never   Substance and Sexual Activity    Alcohol use: Yes     Comment: 1-2 glasses wine 5 daily    Drug use: No    Sexual activity: Yes     Partners: Male     Birth control/protection: Post-menopausal     Comment:  since 2000   Social History Narrative    Works in [place] at [employer]. Working part-time.        Likes to play golf, walks for exercise.      Social Determinants of Health     Financial Resource Strain: Low Risk  (10/9/2019)    Overall Financial Resource Strain (CARDIA)     Difficulty of Paying Living Expenses: Not very hard   Food Insecurity: No Food Insecurity (10/9/2019)    Hunger Vital Sign     Worried About Running Out of Food in the Last Year: Never true     Ran Out of Food in the Last Year: Never true   Transportation Needs: No Transportation Needs (10/9/2019)    PRAPARE - Transportation     Lack of Transportation (Medical): No     Lack of Transportation (Non-Medical): No   Physical Activity: Insufficiently Active (10/9/2019)    Exercise Vital Sign     Days of Exercise per Week: 3 days     Minutes of Exercise per Session: 30 min   Stress: Stress Concern Present (10/9/2019)    [place] [employer] - Occupational Stress Questionnaire     Feeling of Stress : To some extent    Social Connection and Isolation Panel [NHANES]

## 2024-01-29 ENCOUNTER — CLINICAL SUPPORT (OUTPATIENT)
Dept: REHABILITATION | Facility: HOSPITAL | Age: 74
End: 2024-01-29
Payer: COMMERCIAL

## 2024-01-29 DIAGNOSIS — M79.89 SWELLING OF RIGHT LOWER EXTREMITY: ICD-10-CM

## 2024-01-29 DIAGNOSIS — I89.0 LYMPHEDEMA OF RIGHT LOWER EXTREMITY: Primary | ICD-10-CM

## 2024-01-29 PROCEDURE — 97016 VASOPNEUMATIC DEVICE THERAPY: CPT

## 2024-01-29 PROCEDURE — 97140 MANUAL THERAPY 1/> REGIONS: CPT

## 2024-01-29 NOTE — PROGRESS NOTES
Physical Therapy Daily Treatment Note     Name: Carmen Hawley  [facility] Number: [id number]    Therapy Diagnosis:   Encounter Diagnoses   Name Primary?    Lymphedema of right lower extremity Yes    Swelling of right lower extremity      Physician: Jean-Pierre Bennett III, *    Visit Date: 1/29/2024    Physician Orders: PT Eval and Treat - lymphedema  Medical Diagnosis from Referral:   Diagnosis   Z96.651 (ICD-10-CM) - Presence of right artificial knee joint   I89.0 (ICD-10-CM) - Lymphedema of right lower extremity   Evaluation Date: 1/10/2024  Authorization Period Expiration: 12/31/24  Plan of Care Expiration: 4/3/24  Visit # / Visits authorized: 4/20     Time In: 800a  Time Out: 900a  Total Billable Time: 60 minutes  pump     Precautions: Standard and R TKA  Subjective     Pt reports: seeing Dr. Bennett last Thursday, he is pleased with her progress - next f/up in April.  He feels she will get the knee extension.  She was compliant with home compression/exercise program.  Response to previous treatment: still working on shoe and compression options for work and daytime choice. Pt is wearing a form of compression daily: KH sock + EdemaWear to L knee.  No true pain complaints - some aches with cold weather - pt taking Tylenol and Gabapentin as recommended.   Functional change: amb no device, able to self apply compression choice.   Pt has orders and DME list for TH garments.   Pain: 0-1/10  Location: right knee stiffness at times, not truly painful      Objective     Female amb to dept no device, slip on elastic lace tennis shoes, B LE with KH 20-30mmHg compression socks+ EdemaWear to R knee  Amount of Swelling/Location of Swelling: mild to lower leg, mod to R knee, fullness along ant shin line R, mild R thigh as compared to L LE  Scattered spider veins, broken veins B LE ned med leg and feet, thighs   Skin Integrity: intact, healed incisional line R TKA - nodular bump proximal edege   Palpation/Texture: dense firmness to  lower leg and knee as compared to L, no pitting, shiny taut lower leg  - B Stemmer Sign  - B Louise's Sign  Circulation: intact      R LE  lacking terminal knee extension ~ - 5 in supine    Treatment:   Carmen received the following manual therapy techniques:- Manual Lymphatic Drainage and compression product choice were applied to the: R LE for 35 minutes, including: MLD and compression   Compression sock remained intact L LE    MANUAL LYMPHATIC DRAINAGE (MLD):    While supine with LEs elevated stimulation at terminus, along GI region, B inguinal regions, drainage of entire R LE ned knee to thigh and lower leg with return proximally,  Use of Aquaphor/Eucerin due to dryness.   Consider self massage to abdominal areas, B inguinal areas, thigh, and remaining LE within reach.    SEQUENTIAL COMPRESSION PUMP: full leg sleeve applied to R LE- removed incline wedge for positioning and comfort  VES 5200 with default setting with distal pressures starting at 45mmHg entire LE   Simultaneous to compression supplies, compression education and training, and self management.    MULTILAYERED BANDAGING:  bandaging not performed - monitoring need  Pt reapplied segment of EdemaWear Medium to R knee + KH 20-30mmHg Copper Compression sock S/M - advised as temporary choice of compression until TH garments are obtained. Monitor position, fit and alignment.   Discontinue with any problems, return compression for each session. Wash and wear schedules confirmed.     Carmen received therapeutic exercises to develop strength, endurance, ROM, flexibility, and posture for review x 5 min  TKE, GSS, HSS, and QS in extension with leg in various positons.  MD has placed HOLD on ortho TKA PT  Continue per his recommendations- TKE - MD suggested prone gravity prop, stationary bicycle, seat height for flexion or extension movements.    Prior visit provided GSS/HSS + HEP.  aps, walking, avoid dependency and immobility, support of muscle pump with  compression and activity.  THERAPEUTIC EXERCISES:  Continue HEP of AROM, stretching, and postural correction.     Demonstration of prone leg hang for TKE stretch  Sitting chair prop for KE  Avoid aggressive activities with continued ROM and focus on strength.    Home Exercises Provided and Patient Education Provided:  Self Care Home Management Training/Functional Therapeutic Activity x 5 minutes  Compression orders, DME list, discussed TH options and methods to secure basic process to proceed to DME vendor with potential for insurance coverage or OOP expense  Present compression:  KH sock + EdemaWear Med to knee R LE, sock to L LE  KH in various strength and considerations for knee coverage  Orders and recommendations: TH 20-30mmHg with  top  PATIENT/FAMILY Education: bandaging/compression wear schedule,  HEP,  Beginning of self massage, compression options, and Risk reduction    Written Home Exercises Provided: Patient instructed to cont prior HEP.  Home exercise and compression plan of management was reviewed and Carmen was able to demonstrate understanding prior to the end of the session.  Carmen demonstrated good  understanding of the education provided.     Assessment     Carmen is a 73 y.o. female referred to outpatient Physical Therapy with a medical diagnosis of   Diagnosis   Z96.651 (ICD-10-CM) - Presence of right artificial knee joint   I89.0 (ICD-10-CM) - Lymphedema of right lower extremity   This patient presents s/p R TKA resulting in: post operative edema that has continued beyond orthopedic rehab with venous related R LE lymphedema of the R LE, R knee, R lower leg, increased pain, increased stiffness in the knee/lower leg, mild hamstring and gastroc tightness, skin discoloration and post op edema, as well as difficulty performing walking/work demands, compression needs, and placing the pt at higher risk of infection.   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 Therapy recommends elevation, exercise, regular use of compression, and therapy for at least a month to reduce swelling.  Recommend full LE coverage of TH garments or KH + compression shorts/capris options.    Pt and MD note progress for her R LE with less swelling, less tightness and improved understanding of compression needs. Pt is supporting with KH + EdemaWear to R knee but has means to progress to TH compression garments for LE and knee coverage support.    Recommend AROM, posture and positioning to reach terminal knee extension R knee.   Carmen Is progressing well towards her goals.   Pt prognosis is Good.     Pt will continue to benefit from skilled outpatient physical therapy to address the deficits listed in the problem list box on initial evaluation, provide pt/family education and to maximize pt's level of independence in the home and community environment.   Pt's spiritual, cultural and educational needs considered and pt agreeable to plan of care and goals.    Anticipated Barriers for therapy: none      The following goals were discussed with the patient and patient is in agreement with them as to be addressed in the treatment plan.    Short Term Goals: (6 weeks)  1. Patient will show decreased girth in R LE by up to 1 cm to allow for LE symmetry, shoe and clothing choice, and ability to apply needed compression.  (progressing,   2. Patient will demonstrate 100% knowledge of lymphedema precautions and signs of infection to allow for reduced lymphedema risk, infection risk, and/or exacerbation of condition. met)  3. Patient or caregiver will perform self-bandaging techniques and/or wearing of compression garments to allow for lymphatic drainage support, skin elasticity, and reduction in shape and size of limb. (met)  4. Patient will perform self lymph drainage techniques to areas within reach to enhance lymphatic drainage and skin condition.  (progressing  5. Patient will tolerate daily  activities with multilayered bandaging to allow for lymphatic and venous support.  na     Long Term Goals: (12  weeks)  1. Patient will show decreased girth in R LE by up to 2 cm  to allow for LE symmetry, shoe and clothing choice, and ability to apply needed compression daily.  (progressing, not met)  2. Patient will show reduction in density to mild or less with improved contour of limb to allow for cosmesis, LE symmetry, infection risk reduction, and clothing and compression choice.   (progressing,   3. Patient to denisha/doff compression garment with daily compliance to assist in lymphedema management, skin elasticity, and tissue density. met)  4. Pt to show improved postural awareness and alignment.  (progressing,   5. Pt to be I and compliant with HEP to allow for increased function in affected limb.   (progressing,  Plan   Plan of care Certification: 1/10/2024 to 4/3/24.     Outpatient Physical Therapy 2 times weekly for 10 weeks to include the following interventions: Patient Education, Self Care, Therapeutic Activities, and Therapeutic Exercise. Complete Decongestive Therapy- compression and home equipment needs to be addressed and assisted.     Pt may be seen by a PTA as part of the Rehab treatment team.  Plan of Care was discussed with Kellie Arreola, PTA     Isi Doran, PT

## 2024-01-30 NOTE — PROGRESS NOTES
Physical Therapy Daily Treatment Note     Name: Carmen Hawley  [facility] Number: [id number]    Therapy Diagnosis:   Encounter Diagnoses   Name Primary?    Lymphedema of right lower extremity Yes    Swelling of right lower extremity        Physician: Jean-Pierre Bennett III, *    Visit Date: 1/31/2024    Physician Orders: PT Eval and Treat - lymphedema  Medical Diagnosis from Referral:   Diagnosis   Z96.651 (ICD-10-CM) - Presence of right artificial knee joint   I89.0 (ICD-10-CM) - Lymphedema of right lower extremity   Evaluation Date: 1/10/2024  Authorization Period Expiration: 12/31/24  Plan of Care Expiration: 4/3/24  Visit # / Visits authorized: 5/20     Time In: 10:08 am   Time Out: 11:08 am  Total Billable Time: 60 minutes  pump     Precautions: Standard and R TKA  Subjective     Pt reports: she feels like treatment is helping . She also noticed increased urination and feels things are moving and getting flushed out . She saw Doctor Donald two weeks ago who said he also noticed improvements. She has orders for compression but isn't sure exactly where she put them and requests if she can receive another copy. Plans to call [employer] to schedule a fitting .   She was compliant with home compression/exercise program.  Response to previous treatment: still working on shoe and compression options for work and daytime choice. Pt is wearing a form of compression daily: KH sock + EdemaWear to L knee.  No true pain complaints - some aches with cold weather - pt taking Tylenol and Gabapentin as recommended.   Functional change: amb no device, able to self apply compression choice.   Pt has orders and DME list for TH garments.   Pain: 0-1/10  Location: right knee stiffness at times, not truly painful      Objective     Female amb to dept no device, slip on elastic lace tennis shoes, B LE with KH 20-30mmHg compression socks+ EdemaWear to R knee  Amount of Swelling/Location of Swelling: mild to lower leg, mod to R  knee, fullness along ant shin line R, mild R thigh as compared to L LE  Scattered spider veins, broken veins B LE ned med leg and feet, thighs   Skin Integrity: intact, healed incisional line R TKA - nodular bump proximal edege   Palpation/Texture: dense firmness to lower leg and knee as compared to L, no pitting, shiny taut lower leg  - B Stemmer Sign  - B Louise's Sign  Circulation: intact      R LE  lacking terminal knee extension ~ - 5 in supine    Treatment:   Carmen received the following manual therapy techniques:- Manual Lymphatic Drainage and compression product choice were applied to the: R LE for 35 minutes, including: MLD and compression   Compression sock remained intact L LE    MANUAL LYMPHATIC DRAINAGE (MLD):    While supine with LEs elevated stimulation at terminus, along GI region, B inguinal regions, drainage of entire R LE ned knee to thigh and lower leg with return proximally,  Use of Aquaphor/Eucerin due to dryness.   Consider self massage to abdominal areas, B inguinal areas, thigh, and remaining LE within reach.    SEQUENTIAL COMPRESSION PUMP: full leg sleeve applied to R LE- removed incline wedge for positioning and comfort  VES 5200 with default setting with distal pressures starting at 45mmHg entire LE   Simultaneous to compression supplies, compression education and training, and self management.    MULTILAYERED BANDAGING:  bandaging not performed - monitoring need  Pt reapplied segment of EdemaWear Medium to R knee + KH 20-30mmHg Copper Compression sock S/M - advised as temporary choice of compression until TH garments are obtained. Monitor position, fit and alignment.   Discontinue with any problems, return compression for each session. Wash and wear schedules confirmed.     Carmen received therapeutic exercises to develop strength, endurance, ROM, flexibility, and posture for review x 5 min  TKE, GSS, HSS, and QS in extension with leg in various positons.  MD has placed HOLD on ortho  TKA PT  Continue per his recommendations- TKE - MD suggested prone gravity prop, stationary bicycle, seat height for flexion or extension movements.    Prior visit provided GSS/HSS + HEP.  aps, walking, avoid dependency and immobility, support of muscle pump with compression and activity.  THERAPEUTIC EXERCISES:  Continue HEP of AROM, stretching, and postural correction.     Demonstration of prone leg hang for TKE stretch  Sitting chair prop for KE  Avoid aggressive activities with continued ROM and focus on strength.    Home Exercises Provided and Patient Education Provided:  Self Care Home Management Training/Functional Therapeutic Activity x 5 minutes  Compression orders, DME list, discussed TH options and methods to secure basic process to proceed to DME vendor with potential for insurance coverage or OOP expense  Present compression:  KH sock + EdemaWear Med to knee R LE, sock to L LE  KH in various strength and considerations for knee coverage  Orders and recommendations: TH 20-30mmHg with  top  PATIENT/FAMILY Education: bandaging/compression wear schedule,  HEP,  Beginning of self massage, compression options, and Risk reduction    Written Home Exercises Provided: Patient instructed to cont prior HEP.  Home exercise and compression plan of management was reviewed and Carmen was able to demonstrate understanding prior to the end of the session.  Carmen demonstrated good  understanding of the education provided.     Assessment     Carmen is a 73 y.o. female referred to outpatient Physical Therapy with a medical diagnosis of   Diagnosis   Z96.651 (ICD-10-CM) - Presence of right artificial knee joint   I89.0 (ICD-10-CM) - Lymphedema of right lower extremity   This patient presents s/p R TKA resulting in: post operative edema that has continued beyond orthopedic rehab with venous related R LE lymphedema of the R LE, R knee, R lower leg, increased pain, increased stiffness in the knee/lower leg, mild  hamstring and gastroc tightness, skin discoloration and post op edema, as well as difficulty performing walking/work demands, compression needs, and placing the pt at higher risk of infection.   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 Therapy recommends elevation, exercise, regular use of compression, and therapy for at least a month to reduce swelling.  Recommend full LE coverage of TH garments or KH + compression shorts/capris options.    Pt is progressing well and reports feeling improvements with treatment. Pt is compliant with current use of compression - copper socks and edema wear to knee. Orders provided for 20-30mmHg thigh high compression garments which she plans to call to schedule and appointment for a fitting. Education on continued use of compression and elevation for swelling management as well as HEP for post op TKE rehab.  Will continue to progress .     Carmen Is progressing well towards her goals.   Pt prognosis is Good.     Pt will continue to benefit from skilled outpatient physical therapy to address the deficits listed in the problem list box on initial evaluation, provide pt/family education and to maximize pt's level of independence in the home and community environment.   Pt's spiritual, cultural and educational needs considered and pt agreeable to plan of care and goals.    Anticipated Barriers for therapy: none      The following goals were discussed with the patient and patient is in agreement with them as to be addressed in the treatment plan.    Short Term Goals: (6 weeks)  1. Patient will show decreased girth in R LE by up to 1 cm to allow for LE symmetry, shoe and clothing choice, and ability to apply needed compression.  (progressing,   2. Patient will demonstrate 100% knowledge of lymphedema precautions and signs of infection to allow for reduced lymphedema risk, infection risk, and/or exacerbation of condition. met)  3. Patient or  caregiver will perform self-bandaging techniques and/or wearing of compression garments to allow for lymphatic drainage support, skin elasticity, and reduction in shape and size of limb. (met)  4. Patient will perform self lymph drainage techniques to areas within reach to enhance lymphatic drainage and skin condition.  (progressing  5. Patient will tolerate daily activities with multilayered bandaging to allow for lymphatic and venous support.  na     Long Term Goals: (12  weeks)  1. Patient will show decreased girth in R LE by up to 2 cm  to allow for LE symmetry, shoe and clothing choice, and ability to apply needed compression daily.  (progressing, not met)  2. Patient will show reduction in density to mild or less with improved contour of limb to allow for cosmesis, LE symmetry, infection risk reduction, and clothing and compression choice.   (progressing,   3. Patient to denisha/doff compression garment with daily compliance to assist in lymphedema management, skin elasticity, and tissue density. met)  4. Pt to show improved postural awareness and alignment.  (progressing,   5. Pt to be I and compliant with HEP to allow for increased function in affected limb.   (progressing,  Plan   Plan of care Certification: 1/10/2024 to 4/3/24.     Outpatient Physical Therapy 2 times weekly for 10 weeks to include the following interventions: Patient Education, Self Care, Therapeutic Activities, and Therapeutic Exercise. Complete Decongestive Therapy- compression and home equipment needs to be addressed and assisted.       Kellie Arreola, PTA

## 2024-01-31 ENCOUNTER — CLINICAL SUPPORT (OUTPATIENT)
Dept: REHABILITATION | Facility: HOSPITAL | Age: 74
End: 2024-01-31
Payer: COMMERCIAL

## 2024-01-31 DIAGNOSIS — M79.89 SWELLING OF RIGHT LOWER EXTREMITY: ICD-10-CM

## 2024-01-31 DIAGNOSIS — I89.0 LYMPHEDEMA OF RIGHT LOWER EXTREMITY: Primary | ICD-10-CM

## 2024-01-31 PROCEDURE — 97110 THERAPEUTIC EXERCISES: CPT | Mod: CQ

## 2024-01-31 PROCEDURE — 97140 MANUAL THERAPY 1/> REGIONS: CPT | Mod: CQ

## 2024-02-05 ENCOUNTER — CLINICAL SUPPORT (OUTPATIENT)
Dept: REHABILITATION | Facility: HOSPITAL | Age: 74
End: 2024-02-05
Payer: COMMERCIAL

## 2024-02-05 DIAGNOSIS — I89.0 LYMPHEDEMA OF RIGHT LOWER EXTREMITY: Primary | ICD-10-CM

## 2024-02-05 DIAGNOSIS — M79.89 SWELLING OF RIGHT LOWER EXTREMITY: ICD-10-CM

## 2024-02-05 PROCEDURE — 97016 VASOPNEUMATIC DEVICE THERAPY: CPT

## 2024-02-05 PROCEDURE — 97140 MANUAL THERAPY 1/> REGIONS: CPT

## 2024-02-05 PROCEDURE — 97535 SELF CARE MNGMENT TRAINING: CPT

## 2024-02-05 NOTE — PROGRESS NOTES
Physical Therapy Daily Treatment Note     Name: Carmen Hawley  Clinic Number: [id number]    Therapy Diagnosis:   Encounter Diagnoses   Name Primary?    Lymphedema of right lower extremity Yes    Swelling of right lower extremity      Physician: Jean-Pierre Bennett III, *    Visit Date: 2/5/2024    Physician Orders: PT Eval and Treat - lymphedema  Medical Diagnosis from Referral:   Diagnosis   Z96.651 (ICD-10-CM) - Presence of right artificial knee joint   I89.0 (ICD-10-CM) - Lymphedema of right lower extremity   Evaluation Date: 1/10/2024  Authorization Period Expiration: 12/31/24  Plan of Care Expiration: 4/3/24  Visit # / Visits authorized: 6/20     Time In: 1000a  Time Out: 1115a  Total Billable Time: 75 minutes  pump     Precautions: Standard and R TKA  Subjective     Pt reports: she has attempted to contact [employer] to have fitting for TH garments.  She was compliant with home compression/exercise program.  Response to previous treatment: pt is wearing KH compression socks + EdemaWear daily.    Discussed process for garments and options for selection.  Functional change: admits some work / friend stressors last week with elevated BP - pt is monitored and followed with [facility].   Pain: 0-1/10  Location: right knee stiffness at times, not truly painful      Objective     Female amb to dept no device, slip on elastic lace tennis shoes, B LE with KH 20-30mmHg compression socks+ EdemaWear to R knee  Amount of Swelling/Location of Swelling: mild to lower leg, mild / mod to R knee, slight fullness along ant shin line R, mild R thigh as compared to L LE  Scattered spider veins, broken veins B LE ned med leg and feet, thighs   Skin Integrity: intact, healed incisional line R TKA - nodular bump proximal edege of scar line   Palpation/Texture: dense firmness to lower leg and knee as compared to L, no pitting  - B Stemmer Sign  - B Louise's Sign  Circulation: intact      R LE  lacking terminal knee  extension ~ - 5 in supine    Girth Measurements (in centimeters)  LANDMARK LEFT LE  1/10/24 RIGHT LE  1/10/24 R LE  2/5/24 Change  R LE DIFF   at eval   SBP + 20 59.5 cm 61.0 cm 61.0 0 1.5 cm   SBP + 10  50.0 cm 52.5 cm 52.0 0.5 2.5 cm   SBP 41.5 cm 45.0 cm 44.0 1.0 3.5 cm   10 below SBP 34.5 cm 36.5 cm 35.5 1.0 2.0 cm   20 below SBP 34.0 cm 33.5 cm 33.0 0.5 0.5 cm   30 below SBP 26.0 cm 26.0 cm 26.5 0.5 0 cm   35 below SBP 21.0 cm 21.5 cm 21.5 0 0.5 cm   Ankle 23.0 cm 23.0 cm 23.0 0 0 cm   Forefoot 21.5 cm 21.0 cm 21.0 0 0.5 cm     Treatment:   Carmen received the following manual therapy techniques:- Manual Lymphatic Drainage and compression product choice were applied to the: R LE for 35 minutes, including: MLD and compression   Compression sock remained intact L LE    MANUAL LYMPHATIC DRAINAGE (MLD):    While supine with LEs elevated stimulation at terminus, along GI region, B inguinal regions, drainage of entire R LE ned knee to thigh and lower leg with return proximally,  Use of Aquaphor/Eucerin due to dryness.   Consider self massage to abdominal areas, B inguinal areas, thigh, and remaining LE within reach.    SEQUENTIAL COMPRESSION PUMP: full leg sleeve applied to R LE  VES 5200 with default setting with distal pressures starting at 45mmHg entire LE   Simultaneous to compression supplies, compression education and training, and self management.    MULTILAYERED BANDAGING:  bandaging not performed - monitoring need  Pt reapplied segment of EdemaWear Medium to R knee + KH 20-30mmHg Copper Compression sock S/M - advised as temporary choice of compression until TH garments are obtained. Monitor position, fit and alignment.   Discontinue with any problems, return compression for each session. Wash and wear schedules confirmed.     Carmen received therapeutic exercises to develop strength, endurance, ROM, flexibility, and posture for review   Encouraged TKE, GSS, HSS, and QS in extension with leg in various  gracie.  MD has placed HOLD on ortho TKA PT  Continue per his recommendations- TKE - MD suggested prone gravity prop, stationary bicycle9 various seat height for flexion or extension movements).    GSS/HSS as HEP.  aps, walking, avoid dependency and immobility, support of muscle pump with compression and activity.  THERAPEUTIC EXERCISES:  Continue HEP of AROM, stretching, and postural correction.     Avoid aggressive activities with continued ROM and focus on strength.    Home Exercises Provided and Patient Education Provided:  Self Care Home Management Training/Functional Therapeutic Activity x15 minutes    Pt has contact information and process to use Compression orders, DME list, TH options and methods to secure basic process to proceed to DME vendor with potential for insurance coverage or OOP expense- pt is contacting [employer]  Provided girth of R LE ankle, calf and thigh- reviewed girth charts for size, length choice in class of 20-30mmHg  Suspect short or petite length choices - viewed several brands and methods to select product  Pt will visit [employer] for garment fitting and selection  Present compression:  KH sock + EdemaWear Med to knee R LE, sock to L LE  KH in various strength and considerations for knee coverage  Orders and recommendations: TH 20-30mmHg with  top  PATIENT/FAMILY Education: bandaging/compression wear schedule,  HEP,  Beginning of self massage, compression options, and Risk reduction    Written Home Exercises Provided: Patient instructed to cont prior HEP.  Home exercise and compression plan of management was reviewed and Carmen was able to demonstrate understanding prior to the end of the session.  Carmen demonstrated good  understanding of the education provided.     Assessment     Carmen is a 73 y.o. female referred to outpatient Physical Therapy with a medical diagnosis of   Diagnosis   Z96.651 (ICD-10-CM) - Presence of right artificial knee joint    I89.0 (ICD-10-CM) - Lymphedema of right lower extremity   This patient presents s/p R TKA resulting in: post operative edema that has continued beyond orthopedic rehab with venous related R LE lymphedema of the R LE, R knee, R lower leg, increased pain, increased stiffness in the knee/lower leg, mild hamstring and gastroc tightness, skin discoloration and post op edema, as well as difficulty performing walking/work demands, compression needs, and placing the pt at higher risk of infection.   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 Therapy recommends elevation, exercise, regular use of compression, and therapy for at least a month to reduce swelling.  Recommend full LE coverage of TH garments or KH + compression shorts/capris options.    Reduction in density and tautness to lower R leg, less fullness to R knee with min ROM limitations remaining mostly lack of TKE.  Pt reports mild stiffness to her R  knee with change of position but no true pain.  TH garments are recommended for R LE to have knee coverage.   Pt has less calf soreness and improved tolerance to activity reported.      Carmen Is progressing well towards her goals.   Pt prognosis is Good.     Pt will continue to benefit from skilled outpatient physical therapy to address the deficits listed in the problem list box on initial evaluation, provide pt/family education and to maximize pt's level of independence in the home and community environment.   Pt's spiritual, cultural and educational needs considered and pt agreeable to plan of care and goals.    Anticipated Barriers for therapy: none      The following goals were discussed with the patient and patient is in agreement with them as to be addressed in the treatment plan.    Short Term Goals: (6 weeks)  1. Patient will show decreased girth in R LE by up to 1 cm to allow for LE symmetry, shoe and clothing choice, and ability to apply needed compression.   met  2. Patient will demonstrate 100% knowledge of lymphedema precautions and signs of infection to allow for reduced lymphedema risk, infection risk, and/or exacerbation of condition. met)  3. Patient or caregiver will perform self-bandaging techniques and/or wearing of compression garments to allow for lymphatic drainage support, skin elasticity, and reduction in shape and size of limb. (met)  4. Patient will perform self lymph drainage techniques to areas within reach to enhance lymphatic drainage and skin condition.  (progressing  5. Patient will tolerate daily activities with multilayered bandaging to allow for lymphatic and venous support.  na     Long Term Goals: (12  weeks)  1. Patient will show decreased girth in R LE by up to 2 cm  to allow for LE symmetry, shoe and clothing choice, and ability to apply needed compression daily.  (progressing,  2. Patient will show reduction in density to mild or less with improved contour of limb to allow for cosmesis, LE symmetry, infection risk reduction, and clothing and compression choice.   (progressing,   3. Patient to denisha/doff compression garment with daily compliance to assist in lymphedema management, skin elasticity, and tissue density. Met- recommend TH)  4. Pt to show improved postural awareness and alignment.  (progressing, lacks TKE  5. Pt to be I and compliant with HEP to allow for increased function in affected limb.   (progressing,  Plan   Plan of care Certification: 1/10/2024 to 4/3/24.     Outpatient Physical Therapy 2 times weekly for 10 weeks to include the following interventions: Patient Education, Self Care, Therapeutic Activities, and Therapeutic Exercise. Complete Decongestive Therapy- compression and home equipment needs to be addressed and assisted.     Pt may be seen by a PTA as part of the Rehab treatment team.  Plan of Care was discussed with Kellie Arreola, PTA     Isi Doran, PT

## 2024-02-07 ENCOUNTER — CLINICAL SUPPORT (OUTPATIENT)
Dept: OTHER | Facility: CLINIC | Age: 74
End: 2024-02-07
Payer: COMMERCIAL

## 2024-02-07 ENCOUNTER — CLINICAL SUPPORT (OUTPATIENT)
Dept: REHABILITATION | Facility: HOSPITAL | Age: 74
End: 2024-02-07
Payer: COMMERCIAL

## 2024-02-07 DIAGNOSIS — I89.0 LYMPHEDEMA OF RIGHT LOWER EXTREMITY: Primary | ICD-10-CM

## 2024-02-07 DIAGNOSIS — Z00.8 ENCOUNTER FOR OTHER GENERAL EXAMINATION: ICD-10-CM

## 2024-02-07 DIAGNOSIS — M79.89 SWELLING OF RIGHT LOWER EXTREMITY: ICD-10-CM

## 2024-02-07 PROCEDURE — 97140 MANUAL THERAPY 1/> REGIONS: CPT

## 2024-02-07 PROCEDURE — 97016 VASOPNEUMATIC DEVICE THERAPY: CPT

## 2024-02-07 NOTE — PROGRESS NOTES
Physical Therapy Daily Treatment Note     Name: Carmen Hawley  Clinic Number: [id number]    Therapy Diagnosis:   Encounter Diagnoses   Name Primary?    Lymphedema of right lower extremity Yes    Swelling of right lower extremity      Physician: Jean-Pierre Bennett III, *    Visit Date: 2/7/2024    Physician Orders: PT Eval and Treat - lymphedema  Medical Diagnosis from Referral:   Diagnosis   Z96.651 (ICD-10-CM) - Presence of right artificial knee joint   I89.0 (ICD-10-CM) - Lymphedema of right lower extremity   Evaluation Date: 1/10/2024  Authorization Period Expiration: 12/31/24  Plan of Care Expiration: 4/3/24  Visit # / Visits authorized: 7/20     Time In: 1000a  Time Out: 1105a  Total Billable Time: 65 minutes  pump     Precautions: Standard and R TKA  Subjective     Pt reports: pt is scheduled for fitting with [facility] 2/14/24.  She was compliant with home compression/exercise program.  Response to previous treatment: pt is pleased with reductions in her R leg - admits some swelling for past 2 years that was increased post R TKA.    Pt remains compliant with compression socks + EdemaWear and will consider TH garments with vendor - pt has MD orders and is aware of process.  Functional change: managing slight elevation of BP with new medications, also admits some work / friend stressors.   Pain: 0-1/10  Location: right knee stiffness at times, not truly painful      Objective     Female amb to dept no device, slip on elastic lace tennis shoes, B LE with KH 20-30mmHg compression socks+ EdemaWear to R knee  Amount of Swelling/Location of Swelling: mild to lower leg, mild / mod to R knee, mild R thigh as compared to L LE  Scattered spider veins, broken veins B LE ned med leg and feet, thighs   Skin Integrity: intact, healed incisional line R TKA - nodular bump proximal edege of scar line   Palpation/Texture: less firmness to lower leg and knee as compared to L, no pitting  - B Stemmer Sign  - B  Louise's Sign  Circulation: intact      R LE  lacking terminal knee extension ~ 3- 5 in supine    Girth Measurements (in centimeters)  LANDMARK LEFT LE  1/10/24 RIGHT LE  1/10/24 R LE  2/5/24 Change  R LE DIFF   at eval   SBP + 20 59.5 cm 61.0 cm 61.0 0 1.5 cm   SBP + 10  50.0 cm 52.5 cm 52.0 0.5 2.5 cm   SBP 41.5 cm 45.0 cm 44.0 1.0 3.5 cm   10 below SBP 34.5 cm 36.5 cm 35.5 1.0 2.0 cm   20 below SBP 34.0 cm 33.5 cm 33.0 0.5 0.5 cm   30 below SBP 26.0 cm 26.0 cm 26.5 0.5 0 cm   35 below SBP 21.0 cm 21.5 cm 21.5 0 0.5 cm   Ankle 23.0 cm 23.0 cm 23.0 0 0 cm   Forefoot 21.5 cm 21.0 cm 21.0 0 0.5 cm     Treatment:   Carmen received the following manual therapy techniques:- Manual Lymphatic Drainage and compression product choice were applied to the: R LE for 35 minutes, including: MLD and compression   Compression sock remained intact L LE    MANUAL LYMPHATIC DRAINAGE (MLD):    While supine with LEs elevated stimulation at terminus, along GI region, B inguinal regions, drainage of entire R LE ned knee to thigh and lower leg with return proximally,  Use of Aquaphor/Eucerin due to dryness.   Consider self massage to abdominal areas, B inguinal areas, thigh, and remaining LE within reach.    SEQUENTIAL COMPRESSION PUMP: full leg sleeve applied to R LE  VES 5200 with default setting with distal pressures starting at 45mmHg entire LE   Simultaneous to compression supplies, compression education and training, and self management.    MULTILAYERED BANDAGING:  bandaging not performed - monitoring need  Reapplied segment of EdemaWear Medium to R knee + KH 20-30mmHg Copper Compression sock S/M - advised as temporary choice of compression until TH garments are obtained. Monitor position, fit and alignment.   Discontinue with any problems, return compression for each session. Wash and wear schedules confirmed.     Carmen received therapeutic exercises to develop strength, endurance, ROM, flexibility, and posture for review    Encouraged TKE, GSS, HSS, and QS in extension with leg in various positons.  MD has placed HOLD on ortho TKA PT  Continue per his recommendations- TKE - MD suggested prone gravity prop, stationary bicycle9 various seat height for flexion or extension movements).    GSS/HSS as HEP.  aps, walking, avoid dependency and immobility, support of muscle pump with compression and activity.  THERAPEUTIC EXERCISES:  Continue HEP of AROM, stretching, and postural correction.     Avoid aggressive activities with continued ROM and focus on strength.    Home Exercises Provided and Patient Education Provided:  Self Care Home Management Training/Functional Therapeutic Activity x 5 minutes    [employer] for TH 20-30mmHg garments  Reviewed style, color, fabric per choice with recommended class and fitting for size/length.  Options due to mild nature in conjunction with Medical grade products recommended.  Present compression:  KH sock + EdemaWear Med to knee R LE, sock to L LE  KH in various strength and considerations for knee coverage  Orders and recommendations: TH 20-30mmHg with  top  PATIENT/FAMILY Education: bandaging/compression wear schedule,  HEP,  Beginning of self massage, compression options, and Risk reduction    Written Home Exercises Provided: Patient instructed to cont prior HEP.  Home exercise and compression plan of management was reviewed and Carmen was able to demonstrate understanding prior to the end of the session.  Carmen demonstrated good  understanding of the education provided.     Assessment     Carmen is a 73 y.o. female referred to outpatient Physical Therapy with a medical diagnosis of   Diagnosis   Z96.651 (ICD-10-CM) - Presence of right artificial knee joint   I89.0 (ICD-10-CM) - Lymphedema of right lower extremity   This patient presents s/p R TKA resulting in: post operative edema that has continued beyond orthopedic rehab with venous related R LE lymphedema of the R LE, R knee,  R lower leg, increased pain, increased stiffness in the knee/lower leg, mild hamstring and gastroc tightness, skin discoloration and post op edema, as well as difficulty performing walking/work demands, compression needs, and placing the pt at higher risk of infection.   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 Therapy recommends elevation, exercise, regular use of compression, and therapy for at least a month to reduce swelling.  Recommend full LE coverage of TH garments or KH + compression shorts/capris options.    R lower leg with improved size/shape and contour with less density and tautness.  Mild fullness to R knee remains with TKA and min stiffness for TKE.   Pt will proceed to vendor for TH garment and understands options for B LE and daily choice of compression support.  DC planning in place.     Carmen Is progressing well towards her goals.   Pt prognosis is Good.     Pt will continue to benefit from skilled outpatient physical therapy to address the deficits listed in the problem list box on initial evaluation, provide pt/family education and to maximize pt's level of independence in the home and community environment.   Pt's spiritual, cultural and educational needs considered and pt agreeable to plan of care and goals.    Anticipated Barriers for therapy: none      The following goals were discussed with the patient and patient is in agreement with them as to be addressed in the treatment plan.    Short Term Goals: (6 weeks)  1. Patient will show decreased girth in R LE by up to 1 cm to allow for LE symmetry, shoe and clothing choice, and ability to apply needed compression.  met  2. Patient will demonstrate 100% knowledge of lymphedema precautions and signs of infection to allow for reduced lymphedema risk, infection risk, and/or exacerbation of condition. met)  3. Patient or caregiver will perform self-bandaging techniques and/or wearing of compression  garments to allow for lymphatic drainage support, skin elasticity, and reduction in shape and size of limb. (met)  4. Patient will perform self lymph drainage techniques to areas within reach to enhance lymphatic drainage and skin condition.  (progressing  5. Patient will tolerate daily activities with multilayered bandaging to allow for lymphatic and venous support.  na     Long Term Goals: (12  weeks)  1. Patient will show decreased girth in R LE by up to 2 cm  to allow for LE symmetry, shoe and clothing choice, and ability to apply needed compression daily.  (progressing,  2. Patient will show reduction in density to mild or less with improved contour of limb to allow for cosmesis, LE symmetry, infection risk reduction, and clothing and compression choice.   (progressing,   3. Patient to denisha/doff compression garment with daily compliance to assist in lymphedema management, skin elasticity, and tissue density. Met- recommend TH)  4. Pt to show improved postural awareness and alignment.  (progressing, lacks TKE  5. Pt to be I and compliant with HEP to allow for increased function in affected limb.   (progressing,  Plan   Plan of care Certification: 1/10/2024 to 4/3/24.     Outpatient Physical Therapy 2 times weekly for 10 weeks to include the following interventions: Patient Education, Self Care, Therapeutic Activities, and Therapeutic Exercise. Complete Decongestive Therapy- compression and home equipment needs to be addressed and assisted.     Pt may be seen by a PTA as part of the Rehab treatment team.  Plan of Care was discussed with Kellie Arreola PTA     Isi Doran, PT

## 2024-02-08 VITALS
BODY MASS INDEX: 29.06 KG/M2 | HEIGHT: 63 IN | WEIGHT: 164 LBS | SYSTOLIC BLOOD PRESSURE: 146 MMHG | DIASTOLIC BLOOD PRESSURE: 74 MMHG

## 2024-02-08 LAB
HDLC SERPL-MCNC: 85 MG/DL
POC CHOLESTEROL, LDL (DOCK): 142 MG/DL
POC CHOLESTEROL, TOTAL: 241 MG/DL
POC GLUCOSE, FASTING: 103 MG/DL (ref 60–110)
TRIGL SERPL-MCNC: 80 MG/DL

## 2024-02-12 ENCOUNTER — CLINICAL SUPPORT (OUTPATIENT)
Dept: REHABILITATION | Facility: HOSPITAL | Age: 74
End: 2024-02-12
Payer: COMMERCIAL

## 2024-02-12 DIAGNOSIS — F41.9 ANXIETY: ICD-10-CM

## 2024-02-12 DIAGNOSIS — M79.89 SWELLING OF RIGHT LOWER EXTREMITY: ICD-10-CM

## 2024-02-12 DIAGNOSIS — I89.0 LYMPHEDEMA OF RIGHT LOWER EXTREMITY: Primary | ICD-10-CM

## 2024-02-12 PROCEDURE — 97140 MANUAL THERAPY 1/> REGIONS: CPT

## 2024-02-12 PROCEDURE — 97016 VASOPNEUMATIC DEVICE THERAPY: CPT

## 2024-02-12 RX ORDER — ESCITALOPRAM OXALATE 10 MG/1
10 TABLET ORAL DAILY
Qty: 90 TABLET | Refills: 1 | Status: SHIPPED | OUTPATIENT
Start: 2024-02-12

## 2024-02-12 NOTE — TELEPHONE ENCOUNTER
No care due was identified.  [facility] Embedded Care Due Messages. Reference number: [id number].   2/12/2024 1:53:42 PM CST

## 2024-02-12 NOTE — PROGRESS NOTES
Physical Therapy Daily Treatment Note     Name: Carmen Hawley  [facility] Number: [id number]    Therapy Diagnosis:   Encounter Diagnoses   Name Primary?    Lymphedema of right lower extremity Yes    Swelling of right lower extremity      Physician: Jean-Pierre Bennett III, *    Visit Date: 2/12/2024    Physician Orders: PT Eval and Treat - lymphedema  Medical Diagnosis from Referral:   Diagnosis   Z96.651 (ICD-10-CM) - Presence of right artificial knee joint   I89.0 (ICD-10-CM) - Lymphedema of right lower extremity   Evaluation Date: 1/10/2024  Authorization Period Expiration: 12/31/24  Plan of Care Expiration: 4/3/24  Visit # / Visits authorized: 7/20  FOTO 2/12/24     Time In: 1000a  Time Out: 1100a  Total Billable Time: 60 minutes  pump     Precautions: Standard and R TKA  Subjective     Pt reports: plans to have fitting with [facility] 2/14/24- advised on size/style selection in class recommended.  She was compliant with home compression/exercise program.  Response to previous treatment: some stiffness in her knee but overall no true pain.  Notes her knee appears smaller than it has in years - prior joint swelling pre TKA.  Presently compliant with KH socks + EdemaWear. Orders per MD for TH garments.  Functional change: work schedule, travel/commute and BP, bone scan noted OP.  Pain: 0-1/10  Location: right knee stiffness at times, not truly painful      Objective     Female amb to dept no device, slip on elastic lace tennis shoes, B LE with KH 20-30mmHg compression socks+ EdemaWear to R knee  Amount of Swelling/Location of Swelling: mild to lower leg, mild / mod to R knee, mild R thigh as compared to L LE  Scattered spider veins, broken veins B LE ned med leg and feet, thighs   Skin Integrity: intact, healed incisional line R TKA - nodular bump proximal edege of scar line   Palpation/Texture: less firmness to lower leg and knee as compared to L, no pitting  - B Stemmer Sign  - B Louise's  Sign  Circulation: intact      R LE  lacking terminal knee extension ~ 3- 5 in supine    Girth Measurements (in centimeters)  LANDMARK LEFT LE  1/10/24 RIGHT LE  1/10/24 R LE  2/5/24 Change  R LE DIFF   at eval   SBP + 20 59.5 cm 61.0 cm 61.0 0 1.5 cm   SBP + 10  50.0 cm 52.5 cm 52.0 0.5 2.5 cm   SBP 41.5 cm 45.0 cm 44.0 1.0 3.5 cm   10 below SBP 34.5 cm 36.5 cm 35.5 1.0 2.0 cm   20 below SBP 34.0 cm 33.5 cm 33.0 0.5 0.5 cm   30 below SBP 26.0 cm 26.0 cm 26.5 0.5 0 cm   35 below SBP 21.0 cm 21.5 cm 21.5 0 0.5 cm   Ankle 23.0 cm 23.0 cm 23.0 0 0 cm   Forefoot 21.5 cm 21.0 cm 21.0 0 0.5 cm     Treatment:   Carmen received the following manual therapy techniques:- Manual Lymphatic Drainage and compression product choice were applied to the: R LE for 40 minutes, including: MLD and compression   Compression sock remained intact L LE    MANUAL LYMPHATIC DRAINAGE (MLD):    While supine with LEs elevated stimulation at terminus, along GI region, B inguinal regions, drainage of entire R LE ned knee to thigh and lower leg with return proximally,  Use of Aquaphor/Eucerin due to dryness.   Consider self massage to abdominal areas, B inguinal areas, thigh, and remaining LE within reach.    SEQUENTIAL COMPRESSION PUMP: full leg sleeve applied to R LE  VES 5200 with default setting with distal pressures starting at 45mmHg entire LE   Simultaneous to compression supplies, compression education and training, and self management.    MULTILAYERED BANDAGING:  bandaging not performed - monitoring need  Reapplied segment of EdemaWear Medium to R knee + KH 20-30mmHg Copper Compression sock S/M - advised as temporary choice of compression until TH garments are obtained. Monitor position, fit and alignment.   Discontinue with any problems, return compression for each session. Wash and wear schedules confirmed.     Carmen received therapeutic exercises to develop strength, endurance, ROM, flexibility, and posture for review   Encouraged  TKE, GSS, HSS, and QS in extension with leg in various positons.  Aps, walking, avoid dependency and immobility, support of muscle pump with compression and activity.  THERAPEUTIC EXERCISES:  Continue HEP of AROM, stretching, and postural correction.     Avoid aggressive activities with continued ROM and focus on strength.    Home Exercises Provided and Patient Education Provided:  Self Care Home Management Training/Functional Therapeutic Activity x 5 minutes    [employer] for TH 20-30mmHg garments  style, color, fabric per choice with recommended class and fitting for size/length.  Options due to mild nature in conjunction with Medical grade products recommended.  Present compression:  KH sock + EdemaWear Med to knee R LE, sock to L LE  KH in various strength and considerations for knee coverage  Orders and recommendations: TH 20-30mmHg with  top  PATIENT/FAMILY Education: bandaging/compression wear schedule,  HEP,  Beginning of self massage, compression options, and Risk reduction  TKA protocol as HEP with Dr. Bennett's recommendations.    Written Home Exercises Provided: Patient instructed to cont prior HEP.  Home exercise and compression plan of management was reviewed and Carmen was able to demonstrate understanding prior to the end of the session.  Carmen demonstrated good  understanding of the education provided.     Assessment     Carmen is a 73 y.o. female referred to outpatient Physical Therapy with a medical diagnosis of   Diagnosis   Z96.651 (ICD-10-CM) - Presence of right artificial knee joint   I89.0 (ICD-10-CM) - Lymphedema of right lower extremity   This patient presents s/p R TKA resulting in: post operative edema that has continued beyond orthopedic rehab with venous related R LE lymphedema of the R LE, R knee, R lower leg, increased pain, increased stiffness in the knee/lower leg, mild hamstring and gastroc tightness, skin discoloration and post op edema, as well as difficulty  performing walking/work demands, compression needs, and placing the pt at higher risk of infection.   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 Therapy recommends elevation, exercise, regular use of compression, and therapy for at least a month to reduce swelling.  Recommend full LE coverage of TH garments or KH + compression shorts/capris options.    R knee and LE showing improved edema control, contour and appearance.  Pt has remained compliant with recommendations and will secure new TH compression garment choice 2/14/24. Pt will return in 3-5 weeks for reassessment, confirmation of self care plan, and compression product.  DC planning in place.     Carmen Is progressing well towards her goals.   Pt prognosis is Good.     Pt will continue to benefit from skilled outpatient physical therapy to address the deficits listed in the problem list box on initial evaluation, provide pt/family education and to maximize pt's level of independence in the home and community environment.   Pt's spiritual, cultural and educational needs considered and pt agreeable to plan of care and goals.    Anticipated Barriers for therapy: none      The following goals were discussed with the patient and patient is in agreement with them as to be addressed in the treatment plan.    Short Term Goals: (6 weeks)  1. Patient will show decreased girth in R LE by up to 1 cm to allow for LE symmetry, shoe and clothing choice, and ability to apply needed compression.  met  2. Patient will demonstrate 100% knowledge of lymphedema precautions and signs of infection to allow for reduced lymphedema risk, infection risk, and/or exacerbation of condition. met)  3. Patient or caregiver will perform self-bandaging techniques and/or wearing of compression garments to allow for lymphatic drainage support, skin elasticity, and reduction in shape and size of limb. (met)  4. Patient will perform self lymph drainage  techniques to areas within reach to enhance lymphatic drainage and skin condition.  (progressing  5. Patient will tolerate daily activities with multilayered bandaging to allow for lymphatic and venous support.  na     Long Term Goals: (12  weeks)  1. Patient will show decreased girth in R LE by up to 2 cm  to allow for LE symmetry, shoe and clothing choice, and ability to apply needed compression daily.  (progressing,  2. Patient will show reduction in density to mild or less with improved contour of limb to allow for cosmesis, LE symmetry, infection risk reduction, and clothing and compression choice.   met  3. Patient to denisha/doff compression garment with daily compliance to assist in lymphedema management, skin elasticity, and tissue density. Met- recommend TH)  4. Pt to show improved postural awareness and alignment.  (progressing, lacks TKE  5. Pt to be I and compliant with HEP to allow for increased function in affected limb.  met  Plan   Plan of care Certification: 1/10/2024 to 4/3/24.     Outpatient Physical Therapy 2 times weekly for 10 weeks to include the following interventions: Patient Education, Self Care, Therapeutic Activities, and Therapeutic Exercise. Complete Decongestive Therapy- compression and home equipment needs to be addressed and assisted.  Return in 3-5 weeks for reassessment and confirmation of self care plan.     Pt may be seen by a PTA as part of the Rehab treatment team.  Plan of Care was discussed with Kellie Arreola PTA     Isi Doran, PT

## 2024-03-06 ENCOUNTER — OFFICE VISIT (OUTPATIENT)
Dept: INTERNAL MEDICINE | Facility: CLINIC | Age: 74
End: 2024-03-06
Payer: COMMERCIAL

## 2024-03-06 ENCOUNTER — LAB VISIT (OUTPATIENT)
Dept: LAB | Facility: HOSPITAL | Age: 74
End: 2024-03-06
Attending: INTERNAL MEDICINE
Payer: COMMERCIAL

## 2024-03-06 DIAGNOSIS — I10 ESSENTIAL HYPERTENSION: ICD-10-CM

## 2024-03-06 DIAGNOSIS — Z00.00 PREVENTATIVE HEALTH CARE: ICD-10-CM

## 2024-03-06 DIAGNOSIS — R73.03 PREDIABETES: ICD-10-CM

## 2024-03-06 DIAGNOSIS — M81.0 OSTEOPOROSIS, UNSPECIFIED OSTEOPOROSIS TYPE, UNSPECIFIED PATHOLOGICAL FRACTURE PRESENCE: Primary | ICD-10-CM

## 2024-03-06 DIAGNOSIS — E78.5 HYPERLIPIDEMIA, UNSPECIFIED HYPERLIPIDEMIA TYPE: ICD-10-CM

## 2024-03-06 LAB
ANION GAP SERPL CALC-SCNC: 8 MMOL/L (ref 8–16)
BUN SERPL-MCNC: 11 MG/DL (ref 8–23)
CALCIUM SERPL-MCNC: 9.8 MG/DL (ref 8.7–10.5)
CHLORIDE SERPL-SCNC: 97 MMOL/L (ref 95–110)
CO2 SERPL-SCNC: 25 MMOL/L (ref 23–29)
CREAT SERPL-MCNC: 0.8 MG/DL (ref 0.5–1.4)
EST. GFR  (NO RACE VARIABLE): >60 ML/MIN/1.73 M^2
ESTIMATED AVG GLUCOSE: 120 MG/DL (ref 68–131)
GLUCOSE SERPL-MCNC: 102 MG/DL (ref 70–110)
HBA1C MFR BLD: 5.8 % (ref 4–5.6)
POTASSIUM SERPL-SCNC: 4.9 MMOL/L (ref 3.5–5.1)
SODIUM SERPL-SCNC: 130 MMOL/L (ref 136–145)

## 2024-03-06 PROCEDURE — 3044F HG A1C LEVEL LT 7.0%: CPT | Mod: CPTII,S$GLB,, | Performed by: INTERNAL MEDICINE

## 2024-03-06 PROCEDURE — 83036 HEMOGLOBIN GLYCOSYLATED A1C: CPT | Performed by: INTERNAL MEDICINE

## 2024-03-06 PROCEDURE — 3079F DIAST BP 80-89 MM HG: CPT | Mod: CPTII,S$GLB,, | Performed by: INTERNAL MEDICINE

## 2024-03-06 PROCEDURE — 1159F MED LIST DOCD IN RCRD: CPT | Mod: CPTII,S$GLB,, | Performed by: INTERNAL MEDICINE

## 2024-03-06 PROCEDURE — 3075F SYST BP GE 130 - 139MM HG: CPT | Mod: CPTII,S$GLB,, | Performed by: INTERNAL MEDICINE

## 2024-03-06 PROCEDURE — 36415 COLL VENOUS BLD VENIPUNCTURE: CPT | Performed by: INTERNAL MEDICINE

## 2024-03-06 PROCEDURE — 99999 PR PBB SHADOW E&M-EST. PATIENT-LVL V: CPT | Mod: PBBFAC,,, | Performed by: INTERNAL MEDICINE

## 2024-03-06 PROCEDURE — 4010F ACE/ARB THERAPY RXD/TAKEN: CPT | Mod: CPTII,S$GLB,, | Performed by: INTERNAL MEDICINE

## 2024-03-06 PROCEDURE — 99397 PER PM REEVAL EST PAT 65+ YR: CPT | Mod: S$GLB,,, | Performed by: INTERNAL MEDICINE

## 2024-03-06 PROCEDURE — 3288F FALL RISK ASSESSMENT DOCD: CPT | Mod: CPTII,S$GLB,, | Performed by: INTERNAL MEDICINE

## 2024-03-06 PROCEDURE — 1101F PT FALLS ASSESS-DOCD LE1/YR: CPT | Mod: CPTII,S$GLB,, | Performed by: INTERNAL MEDICINE

## 2024-03-06 PROCEDURE — 1126F AMNT PAIN NOTED NONE PRSNT: CPT | Mod: CPTII,S$GLB,, | Performed by: INTERNAL MEDICINE

## 2024-03-06 PROCEDURE — 3008F BODY MASS INDEX DOCD: CPT | Mod: CPTII,S$GLB,, | Performed by: INTERNAL MEDICINE

## 2024-03-06 PROCEDURE — 80048 BASIC METABOLIC PNL TOTAL CA: CPT | Performed by: INTERNAL MEDICINE

## 2024-03-06 RX ORDER — METOPROLOL SUCCINATE 100 MG/1
100 TABLET, EXTENDED RELEASE ORAL DAILY
Qty: 90 TABLET | Refills: 1 | Status: SHIPPED | OUTPATIENT
Start: 2024-03-06 | End: 2025-03-06

## 2024-03-06 RX ORDER — TRIAMTERENE AND HYDROCHLOROTHIAZIDE 37.5; 25 MG/1; MG/1
CAPSULE ORAL DAILY
Qty: 90 CAPSULE | Refills: 1 | Status: SHIPPED | OUTPATIENT
Start: 2024-03-06 | End: 2025-03-06

## 2024-03-06 RX ORDER — SIMVASTATIN 40 MG/1
TABLET, FILM COATED ORAL
Qty: 90 TABLET | Refills: 1 | Status: SHIPPED | OUTPATIENT
Start: 2024-03-06 | End: 2024-05-29

## 2024-03-10 VITALS
HEIGHT: 63 IN | DIASTOLIC BLOOD PRESSURE: 84 MMHG | OXYGEN SATURATION: 98 % | WEIGHT: 165.56 LBS | BODY MASS INDEX: 29.34 KG/M2 | HEART RATE: 70 BPM | SYSTOLIC BLOOD PRESSURE: 136 MMHG | TEMPERATURE: 99 F

## 2024-03-10 NOTE — PROGRESS NOTES
Subjective:       Patient ID: Carmen Hawley is a 73 y.o. female.    Chief Complaint: Annual Exam    HPI  She is here for annual exam     Past medical history: Hypertension, hyperlipidemia, pre diabetes, status post knee replacement, family history of colon polyp.  She had a colonoscopy October 2020      Medications:  Toprol, Dyazide, Zocor, diovan      No known drug allergies       Review of Systems   Constitutional:  Negative for chills, fatigue, fever and unexpected weight change.   Respiratory:  Negative for chest tightness and shortness of breath.    Cardiovascular:  Negative for chest pain and palpitations.   Gastrointestinal:  Negative for abdominal pain and blood in stool.   Neurological:  Negative for dizziness, syncope, numbness and headaches.       Objective:      Physical Exam  HENT:      Right Ear: External ear normal.      Left Ear: External ear normal.      Nose: Nose normal.      Mouth/Throat:      Mouth: Mucous membranes are moist.      Pharynx: Oropharynx is clear.   Eyes:      Pupils: Pupils are equal, round, and reactive to light.   Cardiovascular:      Rate and Rhythm: Normal rate and regular rhythm.      Heart sounds: No murmur heard.  Pulmonary:      Breath sounds: Normal breath sounds.   Abdominal:      General: There is no distension.      Palpations: There is no hepatomegaly or splenomegaly.      Tenderness: There is no abdominal tenderness.   Musculoskeletal:      Cervical back: Normal range of motion.   Lymphadenopathy:      Cervical: No cervical adenopathy.      Upper Body:      Right upper body: No axillary adenopathy.      Left upper body: No axillary adenopathy.   Neurological:      Cranial Nerves: No cranial nerve deficit.      Sensory: No sensory deficit.      Motor: Motor function is intact.      Deep Tendon Reflexes: Reflexes are normal and symmetric.         Assessment/Plan       Annual exam.  Check BMP and A1c

## 2024-03-13 ENCOUNTER — CLINICAL SUPPORT (OUTPATIENT)
Dept: REHABILITATION | Facility: HOSPITAL | Age: 74
End: 2024-03-13
Payer: COMMERCIAL

## 2024-03-13 DIAGNOSIS — I89.0 LYMPHEDEMA OF RIGHT LOWER EXTREMITY: Primary | ICD-10-CM

## 2024-03-13 DIAGNOSIS — M79.89 SWELLING OF RIGHT LOWER EXTREMITY: ICD-10-CM

## 2024-03-13 PROCEDURE — 97016 VASOPNEUMATIC DEVICE THERAPY: CPT

## 2024-03-13 PROCEDURE — 97140 MANUAL THERAPY 1/> REGIONS: CPT

## 2024-03-13 PROCEDURE — 97535 SELF CARE MNGMENT TRAINING: CPT

## 2024-03-13 NOTE — PROGRESS NOTES
Physical Therapy Daily Treatment Note/Discharge Summary      Name: Carmen Hawley  [facility] Number: [id number]    Therapy Diagnosis:   Encounter Diagnoses   Name Primary?    Lymphedema of right lower extremity Yes    Swelling of right lower extremity      Physician: Jean-Pierre Bennett III, *    Visit Date: 3/13/2024    Physician Orders: PT Eval and Treat - lymphedema  Medical Diagnosis from Referral:   Diagnosis   Z96.651 (ICD-10-CM) - Presence of right artificial knee joint   I89.0 (ICD-10-CM) - Lymphedema of right lower extremity   Evaluation Date: 1/10/2024  Authorization Period Expiration: 12/31/24  Plan of Care Expiration: 4/3/24  Visit # / Visits authorized: 9/20  FOTO 2/12/24    Date of Last visit: 3/13/24  Total Visits Received: 9     Time In: 1000a  Time Out: 1100a  Total Billable Time: 60 minutes  pump     Precautions: Standard and R TKA  Subjective     Pt reports: very pleased with service and garments from [employer].  She was compliant with home compression/exercise program.  Response to previous treatment: pt understands process of management and compression support.   Knee is managing well s/p TKA - continues TKA program on her own - may return to OP PT in [place] for final visits - she will discuss with Dr. Bennett.   TH garments 20-30mmHg daily with options for KH at times.   Functional change: amb no device, Independent with compression choice.   Pain: 0-1/10  Location: right knee stiffness at times, not truly painful      Objective   Female amb to dept no device, B LE with TH 20-30mmHg Sigvaris ML compression   Amount of Swelling/Location of Swelling: mild to no to lower leg, and ankle, mild to R knee joint and less in  R thigh as compared to L LE  Scattered spider veins, broken veins B LE ned med leg and feet, thighs   Skin Integrity: intact, healed incisional line R TKA - nodular bump proximal edge of scar line   Palpation/Texture: less firmness to lower leg, soft fullness to R knee  as compared to L, no pitting  - B Stemmer Sign  - B Louise's Sign  Circulation: intact      R LE  lacking terminal knee extension ~ 3- 5 in supine    Girth Measurements (in centimeters)  LANDMARK LEFT LE  1/10/24 RIGHT LE  1/10/24 R LE  2/5/24 R LE  3/13/24 Change  R LE DIFF   at eval   SBP + 20 59.5 cm 61.0 cm 61.0 61.0 0 1.5 cm   SBP + 10  50.0 cm 52.5 cm 52.0 50.0 2.5 2.5 cm   SBP 41.5 cm 45.0 cm 44.0 43.0 2.0 3.5 cm   10 below SBP 34.5 cm 36.5 cm 35.5 35.0 1.5 2.0 cm   20 below SBP 34.0 cm 33.5 cm 33.0 32.5 1.0 0.5 cm   30 below SBP 26.0 cm 26.0 cm 26.5 25.0 1.0 0 cm   35 below SBP 21.0 cm 21.5 cm 21.5 20.5 1.0 0.5 cm   Ankle 23.0 cm 23.0 cm 23.0 23.0 0 0 cm   Forefoot 21.5 cm 21.0 cm 21.0 21.0 0 0.5 cm     Treatment:   Carmen received the following manual therapy techniques:- Manual Lymphatic Drainage and compression product choice were applied to the: R LE for 35 minutes, including: MLD and compression   Compression stocking remained intact L LE  Girth assessment    MANUAL LYMPHATIC DRAINAGE (MLD):    While supine with LEs elevated stimulation at terminus, along GI region, B inguinal regions, drainage of entire R LE ned knee to thigh and lower leg with return proximally,  Use of Aquaphor/Eucerin due to dryness.   Consider self massage to abdominal areas, B inguinal areas, thigh, and remaining LE within reach.    SEQUENTIAL COMPRESSION PUMP: full leg sleeve applied to R LE  VES 5200 with default setting with distal pressures starting at 45mmHg entire LE   Simultaneous to compression supplies, compression education and training, and self management.    MULTILAYERED BANDAGING:  bandaging not performed - monitoring need  Reapplied Sigvaris TH garment to R  LE   Discussed position, fit and replacement time frames.  Wash and wear schedules confirmed.     Carmen received therapeutic exercises to develop strength, endurance, ROM, flexibility, and posture for review   Verbal review and demo of TKE. Pt will resume  biking and/or ortho PT as needed.  Encouraged TKE, GSS, HSS, and QS in extension with leg in various positons.  Aps, walking, avoid dependency and immobility, support of muscle pump with compression and activity.  THERAPEUTIC EXERCISES:  Continue HEP of AROM, stretching, and postural correction.     Avoid aggressive activities with continued ROM and focus on strength.    Home Exercises Provided and Patient Education Provided:  Self Care Home Management Training/Functional Therapeutic Activity x 15 minutes    [employer] - received Sigvaris TH 20-30mmHg garments  style, color, fabric with recommended class and fitting for size/length- admits able to secure 2 pair every 6 months  Discussed B LE support on daily basis, alternative options due to mild nature  Advised to follow ortho guidelines on TKA program, mild knee stiffness remaining and goals of TKE- pt has TKA exercises and recommendations. .  Options due to mild nature in conjunction with Medical grade products recommended.  Present compression:  TH 20-30mmhg, has KH sock + [employer]   KH in various strength and considerations for knee coverage  Orders and recommendations: TH 20-30mmHg with  top  PATIENT/FAMILY Education: bandaging/compression wear schedule,  HEP,  Beginning of self massage, compression options, and Risk reduction  TKA protocol as HEP with Dr. Bennett's recommendations.    Written Home Exercises Provided: Patient instructed to cont prior HEP.  Home exercise and compression plan of management was reviewed and Carmen was able to demonstrate understanding prior to the end of the session.  Carmen demonstrated good  understanding of the education provided.     Assessment     Carmen is a 73 y.o. female referred to outpatient Physical Therapy with a medical diagnosis of   Diagnosis   Z96.651 (ICD-10-CM) - Presence of right artificial knee joint   I89.0 (ICD-10-CM) - Lymphedema of right lower extremity   This patient presents s/p R  TKA resulting in: post operative edema that has continued beyond orthopedic rehab with venous related R LE lymphedema of the R LE, R knee, R lower leg, increased pain, increased stiffness in the knee/lower leg, mild hamstring and gastroc tightness, skin discoloration and post op edema, as well as difficulty performing walking/work demands, compression needs, and placing the pt at higher risk of infection.   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 Therapy recommends elevation, exercise, regular use of compression, and therapy for at least a month to reduce swelling.  Recommend full LE coverage of TH garments or KH + compression shorts/capris options.    Pt has shown progression in comfort, function, ROM and decreased edema to R knee and LE.  Pt now has appropriate compression support and plans of self management are confirmed. Pt should advance TKA protocol as allowed per ortho - may return to self exercise or Ortho OP PT as warranted.  Encouraged TKE and motions to improve flexibility and joint mobility.     Carmen Is progressing well towards her goals.   Pt prognosis is Good.     Pt's spiritual, cultural and educational needs considered and pt agreeable to plan of care and goals.    Anticipated Barriers for therapy: none      The following goals were discussed with the patient and patient is in agreement with them as to be addressed in the treatment plan.    Short Term Goals: (6 weeks)  1. Patient will show decreased girth in R LE by up to 1 cm to allow for LE symmetry, shoe and clothing choice, and ability to apply needed compression.  met  2. Patient will demonstrate 100% knowledge of lymphedema precautions and signs of infection to allow for reduced lymphedema risk, infection risk, and/or exacerbation of condition. met)  3. Patient or caregiver will perform self-bandaging techniques and/or wearing of compression garments to allow for lymphatic drainage support, skin  elasticity, and reduction in shape and size of limb. (met)  4. Patient will perform self lymph drainage techniques to areas within reach to enhance lymphatic drainage and skin condition. met  5. Patient will tolerate daily activities with multilayered bandaging to allow for lymphatic and venous support.  na     Long Term Goals: (12  weeks)  1. Patient will show decreased girth in R LE by up to 2 cm  to allow for LE symmetry, shoe and clothing choice, and ability to apply needed compression daily.  met as needed  2. Patient will show reduction in density to mild or less with improved contour of limb to allow for cosmesis, LE symmetry, infection risk reduction, and clothing and compression choice.   met  3. Patient to denisha/doff compression garment with daily compliance to assist in lymphedema management, skin elasticity, and tissue density. Met- recommend TH)  4. Pt to show improved postural awareness and alignment.  (progressing, lacks TKE  5. Pt to be I and compliant with HEP to allow for increased function in affected limb.  met     Discharge reason: Patient has met all of his/her goals and Patient has reached the maximum rehab potential for the present time    Discharge FOTO Score: see media    PLAN   This patient is discharged from Physical Therapy   Isi Doran, PT

## 2024-03-14 ENCOUNTER — OFFICE VISIT (OUTPATIENT)
Dept: OBSTETRICS AND GYNECOLOGY | Facility: CLINIC | Age: 74
End: 2024-03-14
Payer: COMMERCIAL

## 2024-03-14 VITALS
HEIGHT: 63 IN | BODY MASS INDEX: 29.49 KG/M2 | DIASTOLIC BLOOD PRESSURE: 62 MMHG | WEIGHT: 166.44 LBS | SYSTOLIC BLOOD PRESSURE: 126 MMHG

## 2024-03-14 DIAGNOSIS — Z01.419 WELL WOMAN EXAM WITH ROUTINE GYNECOLOGICAL EXAM: Primary | ICD-10-CM

## 2024-03-14 DIAGNOSIS — N95.2 POSTMENOPAUSAL ATROPHIC VAGINITIS: ICD-10-CM

## 2024-03-14 DIAGNOSIS — Z12.31 ENCOUNTER FOR SCREENING MAMMOGRAM FOR MALIGNANT NEOPLASM OF BREAST: ICD-10-CM

## 2024-03-14 PROCEDURE — 3008F BODY MASS INDEX DOCD: CPT | Mod: CPTII,S$GLB,, | Performed by: OBSTETRICS & GYNECOLOGY

## 2024-03-14 PROCEDURE — 99999 PR PBB SHADOW E&M-EST. PATIENT-LVL III: CPT | Mod: PBBFAC,,, | Performed by: OBSTETRICS & GYNECOLOGY

## 2024-03-14 PROCEDURE — 1101F PT FALLS ASSESS-DOCD LE1/YR: CPT | Mod: CPTII,S$GLB,, | Performed by: OBSTETRICS & GYNECOLOGY

## 2024-03-14 PROCEDURE — 3078F DIAST BP <80 MM HG: CPT | Mod: CPTII,S$GLB,, | Performed by: OBSTETRICS & GYNECOLOGY

## 2024-03-14 PROCEDURE — 1126F AMNT PAIN NOTED NONE PRSNT: CPT | Mod: CPTII,S$GLB,, | Performed by: OBSTETRICS & GYNECOLOGY

## 2024-03-14 PROCEDURE — 99397 PER PM REEVAL EST PAT 65+ YR: CPT | Mod: S$GLB,,, | Performed by: OBSTETRICS & GYNECOLOGY

## 2024-03-14 PROCEDURE — 3044F HG A1C LEVEL LT 7.0%: CPT | Mod: CPTII,S$GLB,, | Performed by: OBSTETRICS & GYNECOLOGY

## 2024-03-14 PROCEDURE — 4010F ACE/ARB THERAPY RXD/TAKEN: CPT | Mod: CPTII,S$GLB,, | Performed by: OBSTETRICS & GYNECOLOGY

## 2024-03-14 PROCEDURE — 1159F MED LIST DOCD IN RCRD: CPT | Mod: CPTII,S$GLB,, | Performed by: OBSTETRICS & GYNECOLOGY

## 2024-03-14 PROCEDURE — 3288F FALL RISK ASSESSMENT DOCD: CPT | Mod: CPTII,S$GLB,, | Performed by: OBSTETRICS & GYNECOLOGY

## 2024-03-14 PROCEDURE — 3074F SYST BP LT 130 MM HG: CPT | Mod: CPTII,S$GLB,, | Performed by: OBSTETRICS & GYNECOLOGY

## 2024-03-14 RX ORDER — ESTRADIOL 0.1 MG/G
1 CREAM VAGINAL
Qty: 42.5 G | Refills: 1 | Status: SHIPPED | OUTPATIENT
Start: 2024-03-14 | End: 2025-03-14

## 2024-03-14 NOTE — PROGRESS NOTES
History & Physical  Gynecology      SUBJECTIVE:     Chief Complaint: Well Woman       History of Present Illness:  Annual Exam-Postmenopausal  Patient presents for annual exam. The patient has no complaints today. Patient denies post-menopausal vaginal bleeding. The patient is not sexually active. The patient is not taking hormone replacement therapy.  The patient participates in regular exercise: yes.  She does not smoke.     GYN screening history: 2016 normal  Mammogram history: 9/2023  Colonoscopy history: 2020-> repeat 5 years  Dexa history: 9/2023    FH:   Breast cancer: neg  Colon cancer: neg  Ovarian cancer: neg    Review of patient's allergies indicates:  No Known Allergies    Past Medical History:   Diagnosis Date    Anemia 10/5/2023    Anxiety     Arthritis     Cataract     Hyperlipidemia     Hypertension     Macular degeneration     Mitral valve prolapse     Renal insufficiency 10/5/2023    Salzmann's nodular dystrophy of both eyes      Past Surgical History:   Procedure Laterality Date    ARTHROPLASTY, KNEE, TOTAL, USING COMPUTER-ASSISTED NAVIGATION Right 10/23/2023    Procedure: ARTHROPLASTY, KNEE, TOTAL, USING COMPUTER-ASSISTED NAVIGATION: JOSHUA: RIGHT: JILL - TRIATHALON;  Surgeon: Jean-Pierre Bennett III, MD;  Location: [facility];  Service: Orthopedics;  Laterality: Right;    CATARACT EXTRACTION W/  INTRAOCULAR LENS IMPLANT Left 05/08/2023    Procedure: EXTRACTION, CATARACT, WITH IOL INSERTION;  Surgeon: Flaca Diallo MD;  Location: [facility] OR;  Service: Ophthalmology;  Laterality: Left;    CATARACT EXTRACTION W/  INTRAOCULAR LENS IMPLANT Right 05/22/2023    Procedure: EXTRACTION, CATARACT, WITH IOL INSERTION;  Surgeon: Flaca Diallo MD;  Location: [facility] OR;  Service: Ophthalmology;  Laterality: Right;    COLONOSCOPY N/A 10/08/2020    Procedure: COLONOSCOPY;  Surgeon: Crispin Moon MD;  Location: [address]);  Service: Endoscopy;  Laterality: N/A;  Family history of colon polyps  COVID test  on 10/5/20 at 2nd floor [facility]    EYE SURGERY      INJECTION Right 2023    Procedure: INJECTION RIGHT KNEE SYNVISC;  Surgeon: Tej Cm MD;  Location: [facility] PAIN MGT;  Service: Pain Management;  Laterality: Right;    TONSILLECTOMY      TRANSFORAMINAL EPIDURAL INJECTION OF STEROID Right 2022    Procedure: INJECTION, STEROID, EPIDURAL, TRANSFORAMINAL APPROACH, RIGHT L3-L4 AND L4-L5 CONTRAST DIRECT REF;  Surgeon: Tej Cm MD;  Location: [facility] PAIN MGT;  Service: Pain Management;  Laterality: Right;    TRANSFORAMINAL EPIDURAL INJECTION OF STEROID Right 03/15/2023    Procedure: INJECTION, STEROID, EPIDURAL, TRANSFORAMINAL APPROACH RIGHT L3/4 AND L4/5;  Surgeon: Tej Cm MD;  Location: [facility] PAIN MGT;  Service: Pain Management;  Laterality: Right;     OB History          0    Para        Term   0            AB        Living             SAB        IAB        Ectopic        Multiple        Live Births                   Family History   Problem Relation Age of Onset    Cancer Mother         lung    Stroke Mother     Heart disease Father     Diabetes Father     Diabetes Brother     Aortic aneurysm Brother         surgery 2012    Kidney disease Brother         on dialysis    Melanoma Neg Hx     Breast cancer Neg Hx     Colon cancer Neg Hx     Ovarian cancer Neg Hx      Social History     Tobacco Use    Smoking status: Never    Smokeless tobacco: Never   Substance Use Topics    Alcohol use: Yes     Comment: 1-2 glasses wine 5 daily    Drug use: No       Current Outpatient Medications   Medication Sig    biotin 300 mcg Tab Take 1 tablet by mouth once daily.    cefadroxil (DURICEF) 500 MG Cap Take 1 capsule (500 mg total) by mouth every 12 (twelve) hours.    cyanocobalamin 500 MCG tablet Take 500 mcg by mouth once daily.    EScitalopram oxalate (LEXAPRO) 10 MG tablet Take 1 tablet (10 mg total) by mouth once daily.    EScitalopram oxalate (LEXAPRO) 5 MG Tab Take 1 tablet (5 mg  total) by mouth once daily. Combine with 10mg for total 15mg.    metoprolol succinate (TOPROL-XL) 100 MG 24 hr tablet Take 1 tablet (100 mg total) by mouth once daily.    naltrexone-bupropion (CONTRAVE) 8-90 mg TbSR Take 2 tablets by mouth 2 (two) times daily.    oxyCODONE (ROXICODONE) 5 MG immediate release tablet Take 1-2 tablets every 4-6 hours as needed for pain    simvastatin (ZOCOR) 40 MG tablet TAKE 1 TABLET BY MOUTH EVERY EVENING.    traMADoL (ULTRAM) 50 mg tablet Take 1 tablet (50 mg total) by mouth every 6 (six) hours as needed for Pain.    triamterene-hydrochlorothiazide 37.5-25 mg (DYAZIDE) 37.5-25 mg per capsule Take one capsule by mouth every day    valsartan (DIOVAN) 80 MG tablet Take 1 tablet (80 mg total) by mouth once daily.    vitamin D 1000 units Tab Take 2,000 mg by mouth once daily.     estradioL (ESTRACE) 0.01 % (0.1 mg/gram) vaginal cream Place 1 gram vaginally twice a week.    gabapentin (NEURONTIN) 300 MG capsule Take 1 capsule (300 mg total) by mouth 3 (three) times daily.    metFORMIN (GLUCOPHAGE-XR) 500 MG ER 24hr tablet Take 1 tablet (500 mg total) by mouth daily with breakfast.     No current facility-administered medications for this visit.       Review of Systems:  Review of Systems   Constitutional:  Negative for appetite change, fever and unexpected weight change.   Respiratory:  Negative for shortness of breath.    Cardiovascular:  Negative for chest pain.   Gastrointestinal:  Negative for nausea and vomiting.   Genitourinary:  Negative for menorrhagia, menstrual problem, pelvic pain, vaginal bleeding, vaginal discharge and vaginal pain.   Integumentary:  Negative for breast mass.   Breast: Negative for lump and mass       OBJECTIVE:     Physical Exam:  Physical Exam  Vitals and nursing note reviewed.   Constitutional:       Appearance: She is well-developed.   Neck:      Thyroid: No thyromegaly.      Trachea: No tracheal deviation.   Cardiovascular:      Rate and Rhythm: Normal  rate and regular rhythm.      Heart sounds: Normal heart sounds.   Pulmonary:      Effort: Pulmonary effort is normal.      Breath sounds: Normal breath sounds.   Chest:   Breasts:     Breasts are symmetrical.      Right: No inverted nipple, mass, nipple discharge, skin change or tenderness.      Left: No inverted nipple, mass, nipple discharge, skin change or tenderness.   Abdominal:      Palpations: Abdomen is soft.   Genitourinary:     General: Normal vulva.      Labia:         Right: No rash, tenderness, lesion or injury.         Left: No rash, tenderness, lesion or injury.       Urethra: No prolapse, urethral pain, urethral swelling or urethral lesion.      Vagina: Normal. No signs of injury and foreign body. No vaginal discharge, erythema, tenderness or bleeding.      Cervix: No cervical motion tenderness, discharge or friability.      Uterus: Not deviated, not enlarged, not fixed and not tender.       Adnexa:         Right: No mass, tenderness or fullness.          Left: No mass, tenderness or fullness.        Rectum: No anal fissure or external hemorrhoid.      Comments: Urethral meatus: normal size, anterior vaginal wall with no prolapse, no lesions  Bladder: no fullness, masses or tenderness  Musculoskeletal:      Cervical back: Normal range of motion and neck supple.   Neurological:      Mental Status: She is alert and oriented to person, place, and time.   Psychiatric:         Behavior: Behavior normal.         Thought Content: Thought content normal.         Judgment: Judgment normal.         Chaperoned by: Santa    ASSESSMENT:       ICD-10-CM ICD-9-CM    1. Well woman exam with routine gynecological exam  Z01.419 V72.31       2. Postmenopausal atrophic vaginitis  N95.2 627.3 estradioL (ESTRACE) 0.01 % (0.1 mg/gram) vaginal cream      3. Encounter for screening mammogram for malignant neoplasm of breast  Z12.31 V76.12 Mammo Digital Screening Bilat w/ [facility]               Plan:      Carmen was seen  today for well woman.    Diagnoses and all orders for this visit:    Well woman exam with routine gynecological exam    Postmenopausal atrophic vaginitis  -     estradioL (ESTRACE) 0.01 % (0.1 mg/gram) vaginal cream; Place 1 gram vaginally twice a week.    Encounter for screening mammogram for malignant neoplasm of breast  -     Mammo Digital Screening Bilat w/ [facility]; Future          Orders Placed This Encounter   Procedures    Mammo Digital Screening Bilat w/ [facility]       Well Woman:   - Pap smear: no longer required  - Mammogram: ordered  - Colonoscopy: up to date- due next year  - Dexa: up to date  - Immunizations: none required  - Labs: with pcp  - Exercise recommended  - estrace refilled    Follow up in one year for annual, or prn.    Maggi Graham

## 2024-04-11 ENCOUNTER — OFFICE VISIT (OUTPATIENT)
Dept: BARIATRICS | Facility: CLINIC | Age: 74
End: 2024-04-11
Payer: COMMERCIAL

## 2024-04-11 VITALS
HEART RATE: 67 BPM | BODY MASS INDEX: 29.25 KG/M2 | WEIGHT: 165.13 LBS | OXYGEN SATURATION: 99 % | DIASTOLIC BLOOD PRESSURE: 62 MMHG | SYSTOLIC BLOOD PRESSURE: 122 MMHG

## 2024-04-11 DIAGNOSIS — R73.01 IFG (IMPAIRED FASTING GLUCOSE): ICD-10-CM

## 2024-04-11 DIAGNOSIS — E66.3 OVERWEIGHT (BMI 25.0-29.9): ICD-10-CM

## 2024-04-11 PROCEDURE — 3074F SYST BP LT 130 MM HG: CPT | Mod: CPTII,S$GLB,, | Performed by: INTERNAL MEDICINE

## 2024-04-11 PROCEDURE — 1159F MED LIST DOCD IN RCRD: CPT | Mod: CPTII,S$GLB,, | Performed by: INTERNAL MEDICINE

## 2024-04-11 PROCEDURE — 4010F ACE/ARB THERAPY RXD/TAKEN: CPT | Mod: CPTII,S$GLB,, | Performed by: INTERNAL MEDICINE

## 2024-04-11 PROCEDURE — 1160F RVW MEDS BY RX/DR IN RCRD: CPT | Mod: CPTII,S$GLB,, | Performed by: INTERNAL MEDICINE

## 2024-04-11 PROCEDURE — 3044F HG A1C LEVEL LT 7.0%: CPT | Mod: CPTII,S$GLB,, | Performed by: INTERNAL MEDICINE

## 2024-04-11 PROCEDURE — 3288F FALL RISK ASSESSMENT DOCD: CPT | Mod: CPTII,S$GLB,, | Performed by: INTERNAL MEDICINE

## 2024-04-11 PROCEDURE — 1101F PT FALLS ASSESS-DOCD LE1/YR: CPT | Mod: CPTII,S$GLB,, | Performed by: INTERNAL MEDICINE

## 2024-04-11 PROCEDURE — 99999 PR PBB SHADOW E&M-EST. PATIENT-LVL IV: CPT | Mod: PBBFAC,,, | Performed by: INTERNAL MEDICINE

## 2024-04-11 PROCEDURE — 3078F DIAST BP <80 MM HG: CPT | Mod: CPTII,S$GLB,, | Performed by: INTERNAL MEDICINE

## 2024-04-11 PROCEDURE — 3008F BODY MASS INDEX DOCD: CPT | Mod: CPTII,S$GLB,, | Performed by: INTERNAL MEDICINE

## 2024-04-11 PROCEDURE — 1126F AMNT PAIN NOTED NONE PRSNT: CPT | Mod: CPTII,S$GLB,, | Performed by: INTERNAL MEDICINE

## 2024-04-11 PROCEDURE — 99214 OFFICE O/P EST MOD 30 MIN: CPT | Mod: S$GLB,,, | Performed by: INTERNAL MEDICINE

## 2024-04-11 RX ORDER — METFORMIN HYDROCHLORIDE 500 MG/1
500 TABLET, EXTENDED RELEASE ORAL
Qty: 90 TABLET | Refills: 1 | Status: SHIPPED | OUTPATIENT
Start: 2024-04-11

## 2024-04-11 NOTE — PROGRESS NOTES
Subjective:       Patient ID: Carmen Hawley is a 73 y.o. female.    Chief Complaint: No chief complaint on file.      Pt here today for follow up. Has gained 5 lbs from previous visit  net neg 4 lbs. Has been on Contrave  Increased to 2 pill in the morning and 2 pills in the evening last ov.. She was off contrave for surgery, and resumed it in Jan. Also on metformin. FBS was 100 so started metformin for impaired fasting glucose.   Has not been able to be as active 2/2 knee pain. Had right TKA in October. Has been to lymphedema clinic. She is back to regular activity without assistance.    She is enrolled in digital HTN.  BP  Good today.   Prev med hx:  Was on topiramate in the past.    diethylpropion, last filled 3/10/21  checked today. Not much progress on either.     Lab Results   Component Value Date    HGBA1C 5.8 (H) 03/06/2024    HGBA1C 5.4 08/30/2023    HGBA1C 5.6 11/09/2022     Lab Results   Component Value Date    LDLCALC 116.8 08/30/2023    CREATININE 0.8 03/06/2024             Follow-up  Associated symptoms include arthralgias. Pertinent negatives include no chest pain, chills or fever.     Review of Systems   Constitutional: Negative for chills and fever.   Respiratory: Negative for apnea and shortness of breath.         + snores   Cardiovascular: Negative for chest pain and leg swelling.   Gastrointestinal: Negative for constipation and diarrhea.        Denies HB   Genitourinary: Negative for dysuria.   Musculoskeletal: Positive for arthralgias. Negative for back pain.        Left foot OA   Neurological: Negative for dizziness and light-headedness.   Psychiatric/Behavioral: Negative for dysphoric mood. The patient is not nervous/anxious.         Doing well on Lexapro       Objective:       /62   Pulse 67   Wt 74.9 kg (165 lb 2 oz)   LMP 12/16/2006 (Approximate)   SpO2 99%   BMI 29.25 kg/m²     Physical Exam  Vitals reviewed.   Constitutional:       General: She is not in acute  distress.     Appearance: She is well-developed.      Comments: Obese     HENT:      Head: Normocephalic and atraumatic.   Eyes:      General: No scleral icterus.     Pupils: Pupils are equal, round, and reactive to light.   Cardiovascular:      Rate and Rhythm: Normal rate.   Pulmonary:      Effort: Pulmonary effort is normal.   Musculoskeletal:         General: Normal range of motion.      Cervical back: Normal range of motion and neck supple.   Skin:     General: Skin is warm and dry.      Findings: No erythema.   Neurological:      Mental Status: She is alert and oriented to person, place, and time.      Cranial Nerves: No cranial nerve deficit.   Psychiatric:         Behavior: Behavior normal.         Judgment: Judgment normal.         Assessment:       1. IFG (impaired fasting glucose)    2. Overweight (BMI 25.0-29.9)              Plan:            Diagnoses and all orders for this visit:    IFG (impaired fasting glucose)  -     metFORMIN (GLUCOPHAGE-XR) 500 MG ER 24hr tablet; Take 1 tablet (500 mg total) by mouth daily with breakfast.    Overweight (BMI 25.0-29.9)  -     naltrexone-bupropion (CONTRAVE) 8-90 mg TbSR; Take 2 tablets by mouth 2 (two) times daily.      Did advise pt that she may want to look in to [employer] weight loss program when it starts this summer.          Continue metformin with dinner. Always take with food.  No Alcohol.     Contrave is long term weight loss medication. Side effects may include insomnia, nausea, headache, constipation, depression or change in thinking.  These side effects will improve with stopping the medication.        [email or web address]    Continue with  Contrave 2 pills twice a day.     Pre- and post-operative (not bariatric surgery) instructions for weight loss medications.     Contrave-  Presuming if on typical dose of 2 pills BID, 1 month before surgery decrease by one pill every 3 days until off 14 days before surgery.     1000 jeremiah pb meal planner given  previously     Add some type of resistance training 2-3 days a week. These can be body weight exercises, light weight or elastic bands. [employer] and [employer] are great sources for free work out plans and videos.     Spring recipes given.

## 2024-04-11 NOTE — PATIENT INSTRUCTIONS
Continue metformin with dinner. Always take with food.  No Alcohol.     Contrave is long term weight loss medication. Side effects may include insomnia, nausea, headache, constipation, depression or change in thinking.  These side effects will improve with stopping the medication.        [email or web address]    Continue with  Contrave 2 pills twice a day.     1000 jeremiah pb meal planner given previously     Add some type of resistance training 2-3 days a week. These can be body weight exercises, light weight or elastic bands. Tyba and [email or web address].com are great sources for free work out plans and videos.     Exercise should be some cardiovascular and some resistance training(see below).      STRENGTHENING  An adequate resistance training program could include the following types of exercise, focusing on the major muscle groups. One can use 5 to 10 pound dumbbells for those exercises that require the use of weight. At home, one can use a household item that provides a comfortable weight, such as a milk carton or beverage container. These exercises can also be performed without weights. Add some type of resistance training 2-3 days a week. These can be body weight exercises, light weight or elastic bands. Youtube and pinterest are great sources for free work out plans and videos.       Chest (Bench Press): Hold the weights with your hands. Lying on a flat surface, with knees bent and feet flat, slowly bring the weights to the chest area with palms facing upward. Begin to exhale and press the weights up, fully extending the arms, and keeping them above your eyes. Inhale as you lower them to the starting position and repeat the movement.  Back (Bent-Over Row): Start by placing your feet shoulder-width apart.  a weight with each hand just outside the knees. Keeping the back straight and the knees flexed, slightly bend forward at the waist. Let the arms hang naturally while holding the weights. From this starting position,  pull the weights to the lower abdomen, keeping the elbows close to the body. Exhale as you pull. Return to the starting position, inhaling as you go.  Arms (Bicep Curls): Hold a weight in each hand, with elbows at your sides, palms facing forward. The back should be straight, the chest flared outward. Begin to bend your right arm up first while exhaling, keeping the elbow totally stationary. Wait until the right arm goes completely down to the fully extended position, and then begin to curl the left arm. Each arm curled completes one full repetition.  Shoulders (Lateral Raises): Place the feet a few inches apart with the knees bent slightly. Keep the back erect as you lean forward slightly. With the weights in front of your thighs and palms facing together, begin to slowly raise them up to the side until parallel with the floor. Lower the weights to your thighs, and repeat.  Legs (Stiff Leg Dead Lift): Start by standing straight, holding the weights close to your sides, nearly touching your thighs. Keep the weights close to the body--this protects the back. The back must stay straight. Bend at the waist as far forward as you comfortably can while keeping your legs straight, and begin to feel the pull in your hamstrings as you lower the weights toward the ground. Slowly return to the starting position, keeping the back straight.  Legs (Dumbbell Lunge): Hold a weight in each hand, arms hanging at your sides. Step out with one leg, keeping the back straight. It is important to step out far enough so that the knee does not extend over the toe. This puts too much stress on the knee. Go down far enough so that the opposite knee nearly touches the ground. Keep this stance and repeat the lunging motion for several repetitions.  Abdominals: Do not perform standard sit-ups as these could hurt the lower back. Rather, focus on the following 2 exercises: (1) Obliques--Lie on your back with the knees flexed in the bent sit-up  "position. From this position, bring both knees down to the ground. With the back remaining flat, begin to flex your body toward your toes ("crunch"). Bring your shoulders up off the ground, but go slowly, controlling your momentum. Repeat this for 10 to 15 times. (2) Seated bench kicks/joe knives--Sit on the end of a bench or chair with the hands placed behind the buttocks. Begin to kick the legs outward with the knees bent slightly--at the same time, lean back to extend the torso. Come back to the beginning position and repeat this motion 10 to 15 times.      Spring Recipes:    Tr[place]ell Shake:     Ingredients  ½ cup low fat cottage cheese  ¼ cup vanilla protein powder  1/8 tsp mint extract  2-3 packets of stevia or artificial sweetener of choice  5-10 ice cubes (more or less depending on how thick you would like it)  4 oz of water  2-3 drops of green food coloring OR a small handful of spinach to make it green    Put all ingredients in  and  until desired consistency.      "Unstuffed" Cabbage Rolls:    Ingredients:  1 1/2 to 2 pounds lean ground beef or turkey  1 large onion, chopped  1 clove garlic, minced  1 small cabbage, chopped  2 cans (14.5 ounces each) diced tomatoes  1 can (8 ounces) tomato sauce  ¼ - ½ cup water depending on desired consistency  1 teaspoon ground black pepper, salt to taste    Spray large skillet with nonstick cookspray. Heat pan on medium heat. Sauté the onions until tender, and then add the ground beef (or turkey) and cook until the meat is browned.    Add the garlic, cook an additional minute before adding the remaining ingredients. Cover, reduce the heat and simmer about 25 minutes (or until the cabbage is  fork tender)        Not so Christiansen's Pie:    Ingredients  2 (or more) pounds of lean ground beef, or turkey  2 heads of cauliflower or 3 bags of frozen cauliflower, chopped  1 bag of frozen mixed veggies          (NO Corn, Peas or Potatoes!)  1-2 onions  1 egg  1 " teaspoon each of basil, garlic powder, pepper, oregano and a little cayenne  4-5 sprays of I cant believe its not butter spray  4 ounces of fat free cream cheese (optional)  Low fat Cheese to top (optional)    Roast cauliflower in oven on 350* F for about 15-20 minutes, until browned and fork tender. (begin boykin meat while cauliflower is cooking). Place cooked cauliflower in . Add 4 ounces of fat free cream cheese, 4-5 sprays of I cant believe its not butter SPRAY, and spices and puree until creamy.     While cauliflower is roasting, brown meat in large skillet and season to taste. Set aside. Sauté diced onion in skillet until somewhat soft. Set aside with meat. Pour mixed veggies in the skillet to heat on low heat.     Mix the meat, onions, mixed veggies, raw egg and any additional seasonings and put in bottom of 9x13 baking dish. Spread mashed cauliflower mixture over it until smooth.    Bake at 350 for approximately 30 minutes. Add cheese and bake 5 additional minutes (optional). Serve warm (or reheat later).    Crock Pot Balsamic Pork Tenderloin:    Ingredients:  1 tsp dried thyme  1 tsp ground pepper  ½ tsp salt  3 tbsp dried minced onion  2 tsp dried basil  ¼ cup low sodium broth  ½ cup balsamic vinegar  2 cloves garlic, minced  2 lbs Pork Tenderloin    Mix first 5 ingredients together. Rub pork tenderloin with dry seasoning mixture.  In a large sauté pan, heat ¼ cup balsamic vinegar and garlic on medium heat. Add pork to sauté hylton and sear, boykin all sides. Add low sodium broth, 1 tbsp at a time to keep tenderloin from sticking or use nonstick cookspray.     Transfer meat to crock pot. Pour remaining balsamic vinegar into sauté pan and continue  to stir for a few minutes to deglaze the pan. Pour juices over the top of the tenderloin in crockpot. Cook on high for 3 hours or until meat thermometer says 170*. Let rest 5-10 minutes and then slice.       Side Dish: Steamed carrots w/ garlic and  phan    Ingredients:  2 garlic cloves, minced  1 lb baby carrots  5-6 sprays of I cant believe its not butter SPRAY  1 tsp minced peeled fresh phan  1 tbsp chopped fresh cilantro  ½ tsp grated lime rind  1 tbsp fresh lime juice   ¼ tsp salt    Prepare garlic; let stand 10 minutes. Steam carrots, covered in pot, about 10 minutes or until tender.     In a nonstick skillet over medium heat, spray pan w/ I cant believe its not butter SPRAY. Add garlic and phan and cook 1 minute, stirring constantly. Remove from  heat; stir in carrots, cilantro and remaining ingredients.         Side Dish: Green Bean Almondine    Ingredients:  1 pound fresh green beans, trimmed  1 tbsp sydney vinegar  1 ½ tsp water  1 tsp Mazin Mustard  ¼ tsp salt  ¼ tsp freshly ground black pepper  1 tbsp sliced almonds, toasted    Cook green beans in boiling water for 4 minutes or until crisp-tender. Drain and plunge beans into ice water. Drain well. Pat beans dry w/ paper towel.     Combine vinegar, water, mustard, salt and pepper in a medium bowl. Add beans to vinegar mixture; toss to coat well. Sprinkle with toasted almonds.      How to Cook the Perfect Hard Boiled Egg:    Steam in water: Fill a large pot with 1 inch of water. Place steamer insert inside, cover, and bring to a boil over high heat. Add eggs to steamer basket, cover, and continue cooking 12 minute. If serving cold, immediately place eggs in a bowl of ice water and allow to cool for at least 15 minutes before peeling under cool running water. Store in the refrigerator for up to 5 days.    OR    Purchase Dash Go Rapid Egg Boiler: available @ [employer], [employer], [employer], [employer] for ~$15-$20      Classic Egg Salad Recipe:    Ingredients:  8 hard cooked eggs, peeled and coarsely chopped  4-6 tbsp of low fat or fat free gottlieb  2 tbsp celery, chopped  2 tsp mazin mustard  Dash of cayenne pepper (for spice)  Black pepper, salt to taste    In a medium bowl, combine gottlieb,  celery, mustard and cayenne pepper with chopped eggs. Season w/ pepper and salt. Serve over Lettuce leaves.     Get Creative! Add chopped turkey giordano and tomato and serve on lettuce leaves for an egg-cellent BLT egg salad or mix lean diced ham, low fat cheddar and tomatoes for  egg salad    Tuna Deviled Eggs:    Ingredients:  12 hard boiled eggs  1 can of tuna packed in water  1 rib celery, diced  ¼ cup low fat gottlieb  1 tsp mustard  Garlic powder, black pepper to taste  Dash of paprika (for finish)    Cut eggs in half lengthwise. Remove yolk and mash in bowl. In separate bowl, mix tuna, celery, low fat gottlieb, mustard and seasonings.  Stir in egg yolks until blended. Stuff eggs and sprinkle dash of paprika      Giordano Jalapeno Deviled Eggs:    Ingredients:  12 hard boiled eggs, peeled  ½ cup low fat gottlieb  1 ½ tsp rice vinegar  ¾ tsp ground mustard  ½ packet splenda  2 jalapenos, seeded and chopped, 6 pieces turkey giordano, cooked crisp and crumbled  Dash of paprika (for finish)    Cut eggs in half lengthwise. Remove yolk and mash in bowl. Add gottlieb, vinegar, spices and Splenda until well combined. Mix in jalapenos and giordano. Stuff eggs and sprinkle w/ dash of paprika      Sugar-Free High Protein Brownie Recipe:    Ingredients:  2 cups of pureed zucchini  4 oz fat free cream cheese  1 large egg  2 scoops chocolate protein powder  1 tbsp pure vanilla extract  1/3 cup unsweetened cocoa powder    ¼ tsp salt  2 tbsp chopped nuts  *8-9 packets of splenda or artificial sweetener of choice    Preheat oven to 350* F.     Line an 8x8 pan w/ parchment paper or spray with nonstick cookspray.     In a medium bowl, blend the fat free cream cheese with egg until creamy. Add chocolate protein powder and cocoa powder until creamy. Add vanilla extract. Stir in pureed zucchini and salt.     The batter will be thick, but not dry. If batter seems dry, add 1-2 tbsp water. Fold in Nuts. Pour batter in prepared pan.     Bake for 30  minutes. Allow to cool completely. Cut into squares and chill to semi-set.     *best if eaten within 2 days but may last 3-4 days in fridge.         Lemon Whip:    Ingredients:  Small box of sugar-free vanilla instant pudding mix  1 small packet of crystal light lemonade mix  2 cups skim milk or fat free fairlife milk  Sugar-free, fat free cool whip     Make sugar-free pudding using 2 cups skim milk according to package. Stir in 1 small packet of crystal light lemonade mix.  Fold in a dollop of sugar-free, fat free cool whip and stir to combine.     Home-made Sugar-free Pudding Pops:    Ingredients:  1 small pkg of sugar-free instant pudding mix (flavor of choice!)  2 cups fat free milk     Beat ingredients with whisk for 2 minutes. Pour into 6 paper or plastic cups or into a popsicle mold. Insert wooden pop stick in center of each cup.     Freeze for 5 hours or until firm. Peel off paper or pull out of popsicle mold.     Variations: add sugar-free syrups to change up the flavor, such as sugar-free chocolate pudding + sugar free raspberry syrup = chocolate raspberry fudgesicle.

## 2024-04-18 ENCOUNTER — OFFICE VISIT (OUTPATIENT)
Dept: ORTHOPEDICS | Facility: CLINIC | Age: 74
End: 2024-04-18
Payer: COMMERCIAL

## 2024-04-18 VITALS — BODY MASS INDEX: 28.35 KG/M2 | WEIGHT: 160 LBS | HEIGHT: 63 IN

## 2024-04-18 DIAGNOSIS — Z96.651 PRESENCE OF RIGHT ARTIFICIAL KNEE JOINT: Primary | ICD-10-CM

## 2024-04-18 PROCEDURE — 3044F HG A1C LEVEL LT 7.0%: CPT | Mod: CPTII,S$GLB,, | Performed by: ORTHOPAEDIC SURGERY

## 2024-04-18 PROCEDURE — 99999 PR PBB SHADOW E&M-EST. PATIENT-LVL III: CPT | Mod: PBBFAC,,, | Performed by: ORTHOPAEDIC SURGERY

## 2024-04-18 PROCEDURE — 4010F ACE/ARB THERAPY RXD/TAKEN: CPT | Mod: CPTII,S$GLB,, | Performed by: ORTHOPAEDIC SURGERY

## 2024-04-18 PROCEDURE — 1101F PT FALLS ASSESS-DOCD LE1/YR: CPT | Mod: CPTII,S$GLB,, | Performed by: ORTHOPAEDIC SURGERY

## 2024-04-18 PROCEDURE — 3288F FALL RISK ASSESSMENT DOCD: CPT | Mod: CPTII,S$GLB,, | Performed by: ORTHOPAEDIC SURGERY

## 2024-04-18 PROCEDURE — 1125F AMNT PAIN NOTED PAIN PRSNT: CPT | Mod: CPTII,S$GLB,, | Performed by: ORTHOPAEDIC SURGERY

## 2024-04-18 PROCEDURE — 3008F BODY MASS INDEX DOCD: CPT | Mod: CPTII,S$GLB,, | Performed by: ORTHOPAEDIC SURGERY

## 2024-04-18 PROCEDURE — 1159F MED LIST DOCD IN RCRD: CPT | Mod: CPTII,S$GLB,, | Performed by: ORTHOPAEDIC SURGERY

## 2024-04-18 PROCEDURE — 99213 OFFICE O/P EST LOW 20 MIN: CPT | Mod: S$GLB,,, | Performed by: ORTHOPAEDIC SURGERY

## 2024-04-18 NOTE — PROGRESS NOTES
Subjective:     HPI:   Carmen Hawley is a 73 y.o. female who presents R JOSHUA TKA 10/23/23, Rx cbrex - 6 months out     Doing well, making progress, swelling better  No pain/limping    History of Present Illness  The patient presents for follow-up on his knee. He is accompanied by his presence from [place].    The patient reports a significant improvement in his knee condition, with no pain or limping. He expresses satisfaction with his therapist, Cindy Doran, regarding his progress. He recalls a prolonged period of swelling prior to the surgery, which has since improved. No radiographic imaging has been conducted today. He acknowledges occasional minor discomfort, particularly when rushing to dresses, which have been alleviated by the use of compression. 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       Objective:   Body mass index is 28.34 kg/m².  Exam:    Physical Exam  The patient's range of motion is from 0 to 130.    Gait: limp/antalgic none     Incision: healed     Stability:  Knee stable anterior-posterior varus and valgus stresses, no extensor lag     Extension: 0     Flexion: 130     Diffuse LE swelling dramatically improved even more than prior     Pre-op   12/21/23 3-125  3 month 2-125    Imaging:    Results            Assessment/Plan:       ICD-10-CM ICD-9-CM   1. Presence of right artificial knee joint  Z96.651 V43.65        Doing great    Assessment/Plan:     Assessment & Plan  1. Post-operative status of the knee.  The patient has demonstrated significant progress, with a notable reduction in swelling.  He is permitted to perform all desired activities, albeit with caution to avoid falls. However, he is encouraged to moderate his activities to avoid exacerbating his muscles, tendon, soft tissues, and nerves around the knee.    Patient is doing very well with their total knee arthroplasty. They will continue with their routine care of the knee replacement  and see me back for their follow-up at the routine interval. If there are problems in the interim they will see me back sooner. Prophylactic antibiotic protocol given and explained to patient.     6 month f/u = 1 year    No orders of the defined types were placed in this encounter.      This note was generated with the assistance of ambient listening technology. Verbal consent was obtained by the patient and accompanying visitor(s) for the recording of patient appointment to facilitate this note. I attest to having reviewed and edited the generated note for accuracy, though some syntax or spelling errors may persist. Please contact the author of this note for any clarification.            Past Medical History:   Diagnosis Date    Anemia 10/5/2023    Anxiety     Arthritis     Cataract     Hyperlipidemia     Hypertension     Macular degeneration     Mitral valve prolapse     Renal insufficiency 10/5/2023    Salzmann's nodular dystrophy of both eyes        Past Surgical History:   Procedure Laterality Date    ARTHROPLASTY, KNEE, TOTAL, USING COMPUTER-ASSISTED NAVIGATION Right 10/23/2023    Procedure: ARTHROPLASTY, KNEE, TOTAL, USING COMPUTER-ASSISTED NAVIGATION: JOSHUA: RIGHT: JILL - TRIATHALON;  Surgeon: Jean-Pierre Bennett III, MD;  Location: [facility] OR;  Service: Orthopedics;  Laterality: Right;    CATARACT EXTRACTION W/  INTRAOCULAR LENS IMPLANT Left 05/08/2023    Procedure: EXTRACTION, CATARACT, WITH IOL INSERTION;  Surgeon: Flaca Diallo MD;  Location: [facility] OR;  Service: Ophthalmology;  Laterality: Left;    CATARACT EXTRACTION W/  INTRAOCULAR LENS IMPLANT Right 05/22/2023    Procedure: EXTRACTION, CATARACT, WITH IOL INSERTION;  Surgeon: Flaca Diallo MD;  Location: [facility] OR;  Service: Ophthalmology;  Laterality: Right;    COLONOSCOPY N/A 10/08/2020    Procedure: COLONOSCOPY;  Surgeon: Crispin Moon MD;  Location: [facility] ENDO [address]);  Service: Endoscopy;  Laterality: N/A;  Family history of colon  polyps  COVID test on 10/5/20 at 2nd floor -     EYE SURGERY      INJECTION Right 05/17/2023    Procedure: INJECTION RIGHT KNEE SYNVISC;  Surgeon: Tej Cm MD;  Location: [facility] PAIN MGT;  Service: Pain Management;  Laterality: Right;    TONSILLECTOMY      TRANSFORAMINAL EPIDURAL INJECTION OF STEROID Right 06/29/2022    Procedure: INJECTION, STEROID, EPIDURAL, TRANSFORAMINAL APPROACH, RIGHT L3-L4 AND L4-L5 CONTRAST DIRECT REF;  Surgeon: Tej Cm MD;  Location: [facility] PAIN MGT;  Service: Pain Management;  Laterality: Right;    TRANSFORAMINAL EPIDURAL INJECTION OF STEROID Right 03/15/2023    Procedure: INJECTION, STEROID, EPIDURAL, TRANSFORAMINAL APPROACH RIGHT L3/4 AND L4/5;  Surgeon: Tej Cm MD;  Location: [facility] PAIN MGT;  Service: Pain Management;  Laterality: Right;       Family History   Problem Relation Name Age of Onset    Cancer Mother          lung    Stroke Mother      Heart disease Father      Diabetes Father      Diabetes Brother      Aortic aneurysm Brother          surgery 5/2012    Kidney disease Brother          on dialysis    Melanoma Neg Hx      Breast cancer Neg Hx      Colon cancer Neg Hx      Ovarian cancer Neg Hx         Social History     Socioeconomic History    Marital status:      Spouse name: Nicho   Occupational History     Employer: [employer]   Tobacco Use    Smoking status: Never    Smokeless tobacco: Never   Substance and Sexual Activity    Alcohol use: Yes     Comment: 1-2 glasses wine 5 daily    Drug use: No    Sexual activity: Yes     Partners: Male     Birth control/protection: Post-menopausal     Comment:  since 2000   Social History Narrative    Works in [employer] department at [employer]. Working part-time.        Likes to play golf, walks for exercise.      Social Determinants of Health     Financial Resource Strain: Low Risk  (3/14/2024)    Overall Financial Resource Strain (CARDIA)     Difficulty of  Paying Living Expenses: Not hard at all   Food Insecurity: No Food Insecurity (3/14/2024)    Hunger Vital Sign     Worried About Running Out of Food in the Last Year: Never true     Ran Out of Food in the Last Year: Never true   Transportation Needs: No Transportation Needs (3/14/2024)    PRAPARE - Transportation     Lack of Transportation (Medical): No     Lack of Transportation (Non-Medical): No   Physical Activity: Insufficiently Active (3/14/2024)    Exercise Vital Sign     Days of Exercise per Week: 2 days     Minutes of Exercise per Session: 30 min   Stress: No Stress Concern Present (3/14/2024)    [place] [employer] - Occupational Stress Questionnaire     Feeling of Stress : Only a little   Social Connections: Unknown (3/14/2024)    Social Connection and Isolation Panel [NHANES]     Frequency of Communication with Friends and Family: Three times a week     Frequency of Social Gatherings with Friends and Family: Twice a week     Active Member of Clubs or Organizations: Yes     Attends Club or Organization Meetings: More than 4 times per year     Marital Status:    Housing Stability: Low Risk  (3/14/2024)    Housing Stability Vital Sign     Unable to Pay for Housing in the Last Year: No     Number of Places Lived in the Last Year: 1     Unstable Housing in the Last Year: No

## 2024-05-09 ENCOUNTER — PATIENT MESSAGE (OUTPATIENT)
Dept: INTERNAL MEDICINE | Facility: CLINIC | Age: 74
End: 2024-05-09
Payer: COMMERCIAL

## 2024-05-09 NOTE — TELEPHONE ENCOUNTER
Please inform pt that Dr. Sprague is out.   I recommend an appt for further evaluation  If she prefers to see Dr. Manuel in Cardiology instead that's okay but I think it best to be seen prior to ordering EKG.

## 2024-05-13 ENCOUNTER — HOSPITAL ENCOUNTER (OUTPATIENT)
Dept: CARDIOLOGY | Facility: CLINIC | Age: 74
Discharge: HOME | End: 2024-05-13
Payer: COMMERCIAL

## 2024-05-13 ENCOUNTER — OFFICE VISIT (OUTPATIENT)
Dept: INTERNAL MEDICINE | Facility: CLINIC | Age: 74
End: 2024-05-13
Payer: COMMERCIAL

## 2024-05-13 VITALS
BODY MASS INDEX: 29.61 KG/M2 | HEART RATE: 67 BPM | OXYGEN SATURATION: 99 % | HEIGHT: 63 IN | SYSTOLIC BLOOD PRESSURE: 136 MMHG | DIASTOLIC BLOOD PRESSURE: 70 MMHG | WEIGHT: 167.13 LBS

## 2024-05-13 DIAGNOSIS — F41.9 ANXIETY: ICD-10-CM

## 2024-05-13 DIAGNOSIS — R06.09 DYSPNEA ON EXERTION: Primary | ICD-10-CM

## 2024-05-13 DIAGNOSIS — R73.03 PREDIABETES: ICD-10-CM

## 2024-05-13 DIAGNOSIS — E78.5 HYPERLIPIDEMIA, UNSPECIFIED HYPERLIPIDEMIA TYPE: ICD-10-CM

## 2024-05-13 DIAGNOSIS — K30 INDIGESTION: ICD-10-CM

## 2024-05-13 DIAGNOSIS — I10 ESSENTIAL HYPERTENSION: ICD-10-CM

## 2024-05-13 LAB
OHS QRS DURATION: 92 MS
OHS QTC CALCULATION: 424 MS

## 2024-05-13 PROCEDURE — 3008F BODY MASS INDEX DOCD: CPT | Mod: CPTII,S$GLB,, | Performed by: PHYSICIAN ASSISTANT

## 2024-05-13 PROCEDURE — 4010F ACE/ARB THERAPY RXD/TAKEN: CPT | Mod: CPTII,S$GLB,, | Performed by: PHYSICIAN ASSISTANT

## 2024-05-13 PROCEDURE — 3078F DIAST BP <80 MM HG: CPT | Mod: CPTII,S$GLB,, | Performed by: PHYSICIAN ASSISTANT

## 2024-05-13 PROCEDURE — 3288F FALL RISK ASSESSMENT DOCD: CPT | Mod: CPTII,S$GLB,, | Performed by: PHYSICIAN ASSISTANT

## 2024-05-13 PROCEDURE — 93010 ELECTROCARDIOGRAM REPORT: CPT | Mod: S$GLB,,, | Performed by: INTERNAL MEDICINE

## 2024-05-13 PROCEDURE — 93005 ELECTROCARDIOGRAM TRACING: CPT | Mod: S$GLB,,, | Performed by: PHYSICIAN ASSISTANT

## 2024-05-13 PROCEDURE — 3075F SYST BP GE 130 - 139MM HG: CPT | Mod: CPTII,S$GLB,, | Performed by: PHYSICIAN ASSISTANT

## 2024-05-13 PROCEDURE — 3044F HG A1C LEVEL LT 7.0%: CPT | Mod: CPTII,S$GLB,, | Performed by: PHYSICIAN ASSISTANT

## 2024-05-13 PROCEDURE — 1159F MED LIST DOCD IN RCRD: CPT | Mod: CPTII,S$GLB,, | Performed by: PHYSICIAN ASSISTANT

## 2024-05-13 PROCEDURE — 99214 OFFICE O/P EST MOD 30 MIN: CPT | Mod: S$GLB,,, | Performed by: PHYSICIAN ASSISTANT

## 2024-05-13 PROCEDURE — 1101F PT FALLS ASSESS-DOCD LE1/YR: CPT | Mod: CPTII,S$GLB,, | Performed by: PHYSICIAN ASSISTANT

## 2024-05-13 PROCEDURE — 99999 PR PBB SHADOW E&M-EST. PATIENT-LVL V: CPT | Mod: PBBFAC,,, | Performed by: PHYSICIAN ASSISTANT

## 2024-05-13 NOTE — PROGRESS NOTES
"INTERNAL MEDICINE URGENT VISIT NOTE    CHIEF COMPLAINT     Chief Complaint   Patient presents with    Anxiety       HPI     Carmen Hawley is a 73 y.o. female who presents for an urgent visit today.    PCP is Susanna Sprague MD, patient is new to me.     Patient presents with complaints of recent indigestion and feeling "different' when rushing at work or carrying something a little heavy. She does not describe this as chest pain, she does however describe this as mild dyspnea on exertion. She denies lower extremity swelling. No palpitations or fluttering I her chest. No URI symptoms. NO GI symptoms. She is interested in getting a referral to meet with Dr. Jimbo Manuel in cardiology. She has never seen cardiology before.     She has HTN that is managed with metoprolol 100 daily, triamterene-HCTZ 37.5-25, valsartan 80. She is active with the D-HTN program. She reports that her BP is well controlled at home. She takes her medications regularly. She used to exercise more regularly but had a knee replacement in October and reports that she has not yet gotten back into her pre-surgery exercise routine.     She has HLD that she manages with simvastatin 40mg daily -she tolerates this well. Her last FLP was 8 months ago and was in good range.     The 10-year ASCVD risk score (Pantera DK, et al., 2019) is: 13.8%    Values used to calculate the score:      Age: 73 years      Sex: Female      Is Non- : No      Diabetic: No      Tobacco smoker: No      Systolic Blood Pressure: 122 mmHg      Is BP treated: Yes      HDL Cholesterol: 75 mg/dL      Total Cholesterol: 205 mg/dL    Last EKG was 10/2023 and showed NSR left axis deviation - though no change when compared to EKG in 2009.    She has obesity and impaired fasting glucose - she see Dr. INÉS Wild for bariatric medicine. She does admit in the past year she has gained about 10 lbs when she suspects it not helping and could be causing her " symptoms.     Wt Readings from Last 20 Encounters:   05/13/24 75.8 kg (167 lb 1.7 oz)   04/18/24 72.6 kg (160 lb)   04/11/24 74.9 kg (165 lb 2 oz)   03/14/24 75.5 kg (166 lb 7.2 oz)   03/06/24 75.1 kg (165 lb 9.1 oz)   02/07/24 74.4 kg (164 lb)   01/25/24 74.5 kg (164 lb 2.1 oz)   12/21/23 73.5 kg (161 lb 14.9 oz)   11/30/23 76.5 kg (168 lb 8.7 oz)   10/23/23 74.8 kg (165 lb)   10/02/23 75 kg (165 lb 5.5 oz)   09/28/23 73.5 kg (162 lb 0.6 oz)   09/28/23 73.5 kg (162 lb 2.4 oz)   08/23/23 74.3 kg (163 lb 12.8 oz)   08/17/23 73 kg (160 lb 15 oz)   08/10/23 72.4 kg (159 lb 11.2 oz)   06/15/23 71.7 kg (158 lb 1.1 oz)   05/22/23 71.7 kg (158 lb)   05/17/23 71.7 kg (158 lb)   05/08/23 71.7 kg (158 lb)          Past Medical History:  Past Medical History:   Diagnosis Date    Anemia 10/5/2023    Anxiety     Arthritis     Cataract     Hyperlipidemia     Hypertension     Macular degeneration     Mitral valve prolapse     Renal insufficiency 10/5/2023    Salzmann's nodular dystrophy of both eyes        Home Medications:  Prior to Admission medications    Medication Sig Start Date End Date Taking? Authorizing Provider   biotin 300 mcg Tab Take 1 tablet by mouth once daily.    Provider, Historical   cyanocobalamin 500 MCG tablet Take 500 mcg by mouth once daily.    Provider, Historical   EScitalopram oxalate (LEXAPRO) 10 MG tablet Take 1 tablet (10 mg total) by mouth once daily. 2/12/24   Susanna Sprague MD   EScitalopram oxalate (LEXAPRO) 5 MG Tab Take 1 tablet (5 mg total) by mouth once daily. Combine with 10mg for total 15mg. 12/28/22   Tessie Leon MD   estradioL (ESTRACE) 0.01 % (0.1 mg/gram)vaginal cream Place 1 gram vaginally twice a week. 3/14/24 3/14/25  Maggi Graham MD   gabapentin (NEURONTIN) 300 MG capsule Take 1 capsule (300 mg total) by mouth 3 (three) times daily. 11/14/23 4/11/24  Balta Hodges PA-C   metFORMIN (GLUCOPHAGE-XR) 500 MG ER 24hr tablet Take 1 tablet (500 mg total) by mouth  daily with breakfast. 4/11/24   Bri Wild MD   metoprolol succinate (TOPROL-XL) 100 MG 24 hr tablet Take 1 tablet (100 mg total) by mouth once daily. 3/6/24 3/6/25  Susanna Sprague MD   naltrexone-bupropion (CONTRAVE) 8-90 mg TbSR Take 2 tablets by mouth 2 (two) times daily. 4/11/24   Bri Wild MD   simvastatin (ZOCOR) 40 MG tablet TAKE 1 TABLET BY MOUTH EVERY EVENING. 3/6/24   Susanna Sprague MD   triamterene-hydrochlorothiazide 37.5-25 mg (DYAZIDE) 37.5-25 mg per capsule Take one capsule by mouth every day 3/6/24 3/6/25  Susanna Sprague MD   valsartan (DIOVAN) 80 MG tablet Take 1 tablet (80 mg total) by mouth once daily. 1/26/24 1/25/25  Susanna Sprague MD   vitamin D 1000 units Tab Take 2,000 mg by mouth once daily.     Provider, Historical       Review of Systems:  Review of Systems   Constitutional:  Negative for chills and fever.   HENT:  Negative for sore throat and trouble swallowing.    Eyes:  Negative for visual disturbance.   Respiratory:  Negative for cough and shortness of breath.    Cardiovascular:  Negative for chest pain.        MILLS    Gastrointestinal:  Negative for abdominal pain, constipation, diarrhea, nausea and vomiting.   Genitourinary:  Negative for dysuria and flank pain.   Musculoskeletal:  Negative for back pain, neck pain and neck stiffness.   Skin:  Negative for rash.   Neurological:  Negative for dizziness, syncope, weakness and headaches.   Psychiatric/Behavioral:  Negative for confusion.        Health Maintainence:   Immunizations:  Health Maintenance         Date Due Completion Date    RSV Vaccine (Age 60+ and Pregnant patients) (1 - 1-dose 60+ series) Never done ---    COVID-19 Vaccine (6 - 2023-24 season) 09/01/2023 10/27/2022    Mammogram 09/14/2024 9/14/2023    Lipid Panel 02/07/2025 2/7/2024    Hemoglobin A1c (Prediabetes) 03/06/2025 3/6/2024    DEXA Scan 09/14/2025 9/14/2023    Colorectal Cancer Screening 10/08/2025 10/8/2020    TETANUS VACCINE  "10/24/2028 10/24/2018             PHYSICAL EXAM     /70   Pulse 67   Ht 5' 3" (1.6 m)   Wt 75.8 kg (167 lb 1.7 oz)   LMP 12/16/2006 (Approximate)   SpO2 99%   BMI 29.60 kg/m²     Physical Exam  Vitals and nursing note reviewed.   Constitutional:       Appearance: Normal appearance.      Comments: Healthy appearing female in NAD or apparent pain. She makes good eye contact, speaks in clear full sentences and ambulates with ease.          HENT:      Head: Normocephalic and atraumatic.      Nose: Nose normal.      Mouth/Throat:      Pharynx: Oropharynx is clear.   Eyes:      Conjunctiva/sclera: Conjunctivae normal.   Cardiovascular:      Rate and Rhythm: Normal rate and regular rhythm.      Pulses: Normal pulses.   Pulmonary:      Effort: No respiratory distress.   Abdominal:      Tenderness: There is no abdominal tenderness.   Musculoskeletal:         General: Normal range of motion.      Cervical back: No rigidity.   Skin:     General: Skin is warm and dry.      Capillary Refill: Capillary refill takes less than 2 seconds.      Findings: No rash.   Neurological:      General: No focal deficit present.      Mental Status: She is alert.      Gait: Gait normal.   Psychiatric:         Mood and Affect: Mood normal.         LABS     Lab Results   Component Value Date    HGBA1C 5.8 (H) 03/06/2024     CMP  Sodium   Date Value Ref Range Status   03/06/2024 130 (L) 136 - 145 mmol/L Final     Potassium   Date Value Ref Range Status   03/06/2024 4.9 3.5 - 5.1 mmol/L Final     Chloride   Date Value Ref Range Status   03/06/2024 97 95 - 110 mmol/L Final     CO2   Date Value Ref Range Status   03/06/2024 25 23 - 29 mmol/L Final     Glucose   Date Value Ref Range Status   03/06/2024 102 70 - 110 mg/dL Final     BUN   Date Value Ref Range Status   03/06/2024 11 8 - 23 mg/dL Final     Creatinine   Date Value Ref Range Status   03/06/2024 0.8 0.5 - 1.4 mg/dL Final     Calcium   Date Value Ref Range Status   03/06/2024 9.8 " 8.7 - 10.5 mg/dL Final     Total Protein   Date Value Ref Range Status   10/04/2023 7.2 6.0 - 8.4 g/dL Final   10/04/2023 7.2 6.0 - 8.4 g/dL Final     Albumin   Date Value Ref Range Status   10/04/2023 3.9 3.5 - 5.2 g/dL Final   10/04/2023 3.9 3.5 - 5.2 g/dL Final     Total Bilirubin   Date Value Ref Range Status   10/04/2023 0.4 0.1 - 1.0 mg/dL Final     Comment:     For infants and newborns, interpretation of results should be based  on gestational age, weight and in agreement with clinical  observations.    Premature Infant recommended reference ranges:  Up to 24 hours.............<8.0 mg/dL  Up to 48 hours............<12.0 mg/dL  3-5 days..................<15.0 mg/dL  6-29 days.................<15.0 mg/dL     10/04/2023 0.4 0.1 - 1.0 mg/dL Final     Comment:     For infants and newborns, interpretation of results should be based  on gestational age, weight and in agreement with clinical  observations.    Premature Infant recommended reference ranges:  Up to 24 hours.............<8.0 mg/dL  Up to 48 hours............<12.0 mg/dL  3-5 days..................<15.0 mg/dL  6-29 days.................<15.0 mg/dL       Alkaline Phosphatase   Date Value Ref Range Status   10/04/2023 85 55 - 135 U/L Final   10/04/2023 85 55 - 135 U/L Final     AST   Date Value Ref Range Status   10/04/2023 17 10 - 40 U/L Final   10/04/2023 17 10 - 40 U/L Final     ALT   Date Value Ref Range Status   10/04/2023 13 10 - 44 U/L Final   10/04/2023 13 10 - 44 U/L Final     Anion Gap   Date Value Ref Range Status   03/06/2024 8 8 - 16 mmol/L Final     eGFR if    Date Value Ref Range Status   07/14/2021 >60.0 >60 mL/min/1.73 m^2 Final     eGFR if non    Date Value Ref Range Status   07/14/2021 >60.0 >60 mL/min/1.73 m^2 Final     Comment:     Calculation used to obtain the estimated glomerular filtration  rate (eGFR) is the CKD-EPI equation.        Lab Results   Component Value Date    WBC 7.25 10/04/2023    HGB 12.0  10/04/2023    HCT 26 (L) 10/24/2023    MCV 93 10/04/2023     10/04/2023     Lab Results   Component Value Date    CHOL 205 (H) 08/30/2023    CHOL 217 (H) 11/09/2022    CHOL 192 07/14/2021     Lab Results   Component Value Date    HDL 75 08/30/2023    HDL 78 (H) 11/09/2022    HDL 69 07/14/2021     Lab Results   Component Value Date    LDLCALC 116.8 08/30/2023    LDLCALC 128.0 11/09/2022    LDLCALC 108.6 07/14/2021     Lab Results   Component Value Date    TRIG 66 08/30/2023    TRIG 55 11/09/2022    TRIG 72 07/14/2021     Lab Results   Component Value Date    CHOLHDL 36.6 08/30/2023    CHOLHDL 35.9 11/09/2022    CHOLHDL 35.9 07/14/2021     Lab Results   Component Value Date    TSH 1.288 01/08/2019       ASSESSMENT/PLAN     Carmen Hawley is a 73 y.o. female     Carmen was seen today for mild MILLS that has been present for > than 6 months. She has normal exam and is asx here in the office today. Will check EKG and will place referral to Cardiology. Her ASCVD risk score is above goal. If there is any delay in getting patient evaluated by cardiology, outpatient then I will order exercise stress echo for further reassurance. She is aware of and amenable to plan.     Diagnoses and all orders for this visit:    Dyspnea on exertion    Indigestion  -     EKG 12-lead; Future  -     Ambulatory referral/consult to Cardiology; Future    Essential hypertension   -well controlled, continue regimen     Hyperlipidemia, unspecified hyperlipidemia type   -continue statin at this time, may need to increase dose to optimize ASCVD risk sore.    -defer to cardiology     Prediabetes   -continue metformin, well controlled at this time   Anxiety   -well controlled, continue SSRI       Patient was counseled on when and how to seek emergent care.       Bri Beltran PA-C   Department of Internal Medicine - [facility]   8:22 AM

## 2024-05-29 ENCOUNTER — OFFICE VISIT (OUTPATIENT)
Dept: CARDIOLOGY | Facility: CLINIC | Age: 74
End: 2024-05-29
Payer: COMMERCIAL

## 2024-05-29 VITALS
BODY MASS INDEX: 29.95 KG/M2 | SYSTOLIC BLOOD PRESSURE: 159 MMHG | DIASTOLIC BLOOD PRESSURE: 82 MMHG | WEIGHT: 169.06 LBS | HEART RATE: 69 BPM

## 2024-05-29 DIAGNOSIS — I10 PRIMARY HYPERTENSION: ICD-10-CM

## 2024-05-29 DIAGNOSIS — E78.00 PURE HYPERCHOLESTEROLEMIA: ICD-10-CM

## 2024-05-29 DIAGNOSIS — R07.89 ATYPICAL CHEST PAIN: Primary | ICD-10-CM

## 2024-05-29 PROCEDURE — 4010F ACE/ARB THERAPY RXD/TAKEN: CPT | Mod: CPTII,S$GLB,, | Performed by: INTERNAL MEDICINE

## 2024-05-29 PROCEDURE — 1101F PT FALLS ASSESS-DOCD LE1/YR: CPT | Mod: CPTII,S$GLB,, | Performed by: INTERNAL MEDICINE

## 2024-05-29 PROCEDURE — 3044F HG A1C LEVEL LT 7.0%: CPT | Mod: CPTII,S$GLB,, | Performed by: INTERNAL MEDICINE

## 2024-05-29 PROCEDURE — 1159F MED LIST DOCD IN RCRD: CPT | Mod: CPTII,S$GLB,, | Performed by: INTERNAL MEDICINE

## 2024-05-29 PROCEDURE — 99203 OFFICE O/P NEW LOW 30 MIN: CPT | Mod: S$GLB,,, | Performed by: INTERNAL MEDICINE

## 2024-05-29 PROCEDURE — 3008F BODY MASS INDEX DOCD: CPT | Mod: CPTII,S$GLB,, | Performed by: INTERNAL MEDICINE

## 2024-05-29 PROCEDURE — 3288F FALL RISK ASSESSMENT DOCD: CPT | Mod: CPTII,S$GLB,, | Performed by: INTERNAL MEDICINE

## 2024-05-29 PROCEDURE — 3077F SYST BP >= 140 MM HG: CPT | Mod: CPTII,S$GLB,, | Performed by: INTERNAL MEDICINE

## 2024-05-29 PROCEDURE — 3079F DIAST BP 80-89 MM HG: CPT | Mod: CPTII,S$GLB,, | Performed by: INTERNAL MEDICINE

## 2024-05-29 PROCEDURE — 1126F AMNT PAIN NOTED NONE PRSNT: CPT | Mod: CPTII,S$GLB,, | Performed by: INTERNAL MEDICINE

## 2024-05-29 PROCEDURE — 99999 PR PBB SHADOW E&M-EST. PATIENT-LVL IV: CPT | Mod: PBBFAC,,, | Performed by: INTERNAL MEDICINE

## 2024-05-29 RX ORDER — ROSUVASTATIN CALCIUM 20 MG/1
20 TABLET, COATED ORAL DAILY
Qty: 90 TABLET | Refills: 3 | Status: SHIPPED | OUTPATIENT
Start: 2024-05-29 | End: 2025-05-29

## 2024-05-29 RX ORDER — VALSARTAN 160 MG/1
160 TABLET ORAL DAILY
Qty: 90 TABLET | Refills: 3 | Status: SHIPPED | OUTPATIENT
Start: 2024-05-29 | End: 2025-05-29

## 2024-05-29 NOTE — PROGRESS NOTES
Subjective:   05/29/2024     Patient ID:  Carmen Hawley is a 74 y.o. female who presents for evaulation of Chest Pain, BRCA Mutation, Establish Care, and Hypertension        Patient here for follow-up of chest discomfort.  There was a grabbing sensation, mild, over left chest, can occur 2-3 times in a row, each lasting 2nd, possibly 1 time a day.  There was no associated nausea vomiting or diaphoresis.  She has not had exertional chest pains or tightness.  A recent EKG was normal.      Her family history is positive for heart disease, father with heart disease early in life.  A brother with heart surgery.    She does have hypertension, follows with digital Medicine.  Currently on metoprolol, triamterene HCT and valsartan.    Hypercholesterolemia is present, she takes simvastatin 40 mg day.  Her last LDL cholesterol was 117, triglycerides 66, HDL 75.  Does not take simvastatin regularly.        The 10-year ASCVD risk score (Pantera CARRERA, et al., 2019) is: 24.2%    Values used to calculate the score:      Age: 74 years      Sex: Female      Is Non- : No      Diabetic: No      Tobacco smoker: No      Systolic Blood Pressure: 156 mmHg      Is BP treated: Yes      HDL Cholesterol: 75 mg/dL      Total Cholesterol: 205 mg/dL          Past Medical History:   Diagnosis Date    Anemia 10/5/2023    Anxiety     Arthritis     Cataract     Hyperlipidemia     Hypertension     Macular degeneration     Mitral valve prolapse     Renal insufficiency 10/5/2023    Salzmann's nodular dystrophy of both eyes        Review of patient's allergies indicates:  No Known Allergies      Current Outpatient Medications:     biotin 300 mcg Tab, Take 1 tablet by mouth once daily., Disp: , Rfl:     cyanocobalamin 500 MCG tablet, Take 500 mcg by mouth once daily., Disp: , Rfl:     EScitalopram oxalate (LEXAPRO) 10 MG tablet, Take 1 tablet (10 mg total) by mouth once daily., Disp: 90 tablet, Rfl: 1    EScitalopram oxalate  (LEXAPRO) 5 MG Tab, Take 1 tablet (5 mg total) by mouth once daily. Combine with 10mg for total 15mg., Disp: 30 tablet, Rfl: 11    estradioL (ESTRACE) 0.01 % (0.1 mg/gram) vaginal cream, Place 1 gram vaginally twice a week., Disp: 42.5 g, Rfl: 1    metFORMIN (GLUCOPHAGE-XR) 500 MG ER 24hr tablet, Take 1 tablet (500 mg total) by mouth daily with breakfast., Disp: 90 tablet, Rfl: 1    metoprolol succinate (TOPROL-XL) 100 MG 24 hr tablet, Take 1 tablet (100 mg total) by mouth once daily., Disp: 90 tablet, Rfl: 1    naltrexone-bupropion (CONTRAVE) 8-90 mg TbSR, Take 2 tablets by mouth 2 (two) times daily., Disp: 120 tablet, Rfl: 5    triamterene-hydrochlorothiazide 37.5-25 mg (DYAZIDE) 37.5-25 mg per capsule, Take one capsule by mouth every day, Disp: 90 capsule, Rfl: 1    vitamin D 1000 units Tab, Take 2,000 mg by mouth once daily. , Disp: , Rfl:     gabapentin (NEURONTIN) 300 MG capsule, Take 1 capsule (300 mg total) by mouth 3 (three) times daily., Disp: 90 capsule, Rfl: 2    rosuvastatin (CRESTOR) 20 MG tablet, Take 1 tablet (20 mg total) by mouth once daily., Disp: 90 tablet, Rfl: 3    valsartan (DIOVAN) 160 MG tablet, Take 1 tablet (160 mg total) by mouth once daily., Disp: 90 tablet, Rfl: 3     Objective:   Review of Systems   Cardiovascular:  Positive for chest pain. Negative for claudication, cyanosis, dyspnea on exertion, irregular heartbeat, leg swelling, near-syncope, orthopnea, palpitations, paroxysmal nocturnal dyspnea and syncope.         Vitals:    05/29/24 1334   BP: (!) 159/82   Pulse:      Wt Readings from Last 3 Encounters:   05/29/24 76.7 kg (169 lb 1.5 oz)   05/13/24 75.8 kg (167 lb 1.7 oz)   04/18/24 72.6 kg (160 lb)     Temp Readings from Last 3 Encounters:   03/06/24 98.5 °F (36.9 °C)   10/24/23 98.2 °F (36.8 °C) (Tympanic)   09/28/23 98.3 °F (36.8 °C) (Oral)     BP Readings from Last 3 Encounters:   05/29/24 (!) 159/82   05/13/24 136/70   04/11/24 122/62     Pulse Readings from Last 3  Encounters:   05/29/24 69   05/13/24 67   04/11/24 67             Physical Exam  Vitals reviewed.   Constitutional:       General: She is not in acute distress.     Appearance: She is well-developed.   HENT:      Head: Normocephalic and atraumatic.      Nose: Nose normal.   Eyes:      Conjunctiva/sclera: Conjunctivae normal.      Pupils: Pupils are equal, round, and reactive to light.   Neck:      Vascular: No carotid bruit or JVD.   Cardiovascular:      Rate and Rhythm: Normal rate and regular rhythm.      Pulses: Normal pulses and intact distal pulses.      Heart sounds: Normal heart sounds. No murmur heard.     No friction rub. No gallop.   Pulmonary:      Effort: Pulmonary effort is normal. No respiratory distress.      Breath sounds: Normal breath sounds. No wheezing or rales.   Chest:      Chest wall: No tenderness.   Abdominal:      General: Bowel sounds are normal. There is no distension.      Palpations: Abdomen is soft.      Tenderness: There is no abdominal tenderness.   Musculoskeletal:         General: No tenderness or deformity. Normal range of motion.      Cervical back: Normal range of motion and neck supple.      Right lower leg: No edema.      Left lower leg: No edema.   Skin:     General: Skin is warm and dry.      Findings: No erythema or rash.   Neurological:      Mental Status: She is alert and oriented to person, place, and time.      Cranial Nerves: No cranial nerve deficit.      Motor: No abnormal muscle tone.      Coordination: Coordination normal.   Psychiatric:         Behavior: Behavior normal.         Thought Content: Thought content normal.         Judgment: Judgment normal.           Lab Results   Component Value Date    CHOL 205 (H) 08/30/2023    CHOL 217 (H) 11/09/2022    CHOL 192 07/14/2021     Lab Results   Component Value Date    HDL 75 08/30/2023    HDL 78 (H) 11/09/2022    HDL 69 07/14/2021     Lab Results   Component Value Date    LDLCALC 116.8 08/30/2023    LDLCALC 128.0  11/09/2022    LDLCALC 108.6 07/14/2021     Lab Results   Component Value Date    ALT 13 10/04/2023    ALT 13 10/04/2023    AST 17 10/04/2023    AST 17 10/04/2023    AST 23 08/30/2023     Lab Results   Component Value Date    CREATININE 0.8 03/06/2024    BUN 11 03/06/2024     (L) 03/06/2024    K 4.9 03/06/2024    CO2 25 03/06/2024    CO2 31 (H) 10/04/2023    CO2 31 (H) 10/04/2023     Lab Results   Component Value Date    HGB 12.0 10/04/2023    HCT 26 (L) 10/24/2023    HCT 35.8 (L) 10/04/2023    HCT 37.2 08/30/2023                   EKG independent review shows normal sinus rhythm, normal EKG        Assessment and Plan:     Atypical chest pain  -     Ambulatory referral/consult to Cardiology    Primary hypertension  Comments:  Continue follow-up with digital Medicine  Orders:  -     valsartan (DIOVAN) 160 MG tablet; Take 1 tablet (160 mg total) by mouth once daily.  Dispense: 90 tablet; Refill: 3    Pure hypercholesterolemia  Comments:  would recommend switching to rosuvastatin 20 mg daily, she can take in the morning.  Orders:  -     rosuvastatin (CRESTOR) 20 MG tablet; Take 1 tablet (20 mg total) by mouth once daily.  Dispense: 90 tablet; Refill: 3    Primary hypertension  -     valsartan (DIOVAN) 160 MG tablet; Take 1 tablet (160 mg total) by mouth once daily.  Dispense: 90 tablet; Refill: 3       Patient with chest pain, very atypical for that of myocardial ischemia.  Further cardiac evaluation not recommended.      She does have an elevated cardiac risk and family history of coronary artery disease.    I would recommend that sheswitch to rosuvastatin 20 mg daily.  LDL goal less than 100 mg/dL.      No follow-ups on file.          Future Appointments   Date Time Provider Department Center   7/31/2024  8:00 AM Rubi Valerio MD [facility] [place]   8/15/2024 11:30 AM Bri Wild MD [facility] [place]

## 2024-06-10 ENCOUNTER — PATIENT MESSAGE (OUTPATIENT)
Dept: INTERNAL MEDICINE | Facility: CLINIC | Age: 74
End: 2024-06-10
Payer: COMMERCIAL

## 2024-06-14 ENCOUNTER — PATIENT OUTREACH (OUTPATIENT)
Dept: ADMINISTRATIVE | Facility: HOSPITAL | Age: 74
End: 2024-06-14
Payer: COMMERCIAL

## 2024-06-14 VITALS — SYSTOLIC BLOOD PRESSURE: 114 MMHG | DIASTOLIC BLOOD PRESSURE: 67 MMHG

## 2024-06-14 NOTE — PROGRESS NOTES
Health Maintenance Due   Topic Date Due    RSV Vaccine (Age 60+ and Pregnant patients) (1 - 1-dose 60+ series) Never done    COVID-19 Vaccine (6 - 2023-24 season) 09/01/2023    Mammogram  09/14/2024     Triggered LINKS and reconciled immunizations. Updated Care Everywhere. BP reading of 114/67 was taken from pt's Hypertension Digital Medicine flow sheet on 6/7/24. Chart review completed.

## 2024-06-26 ENCOUNTER — PATIENT MESSAGE (OUTPATIENT)
Dept: BARIATRICS | Facility: CLINIC | Age: 74
End: 2024-06-26
Payer: COMMERCIAL

## 2024-06-27 NOTE — TELEPHONE ENCOUNTER
Pt stated she is interested in the digital medicine program discussed during her last ov with . Pt stated she was told she would be a great candidate. Pt requested to keep upcoming f/u in August with  until she learns more information about the digital medicine program.  Pt stated she will sign up on Monday.

## 2024-07-01 ENCOUNTER — PATIENT MESSAGE (OUTPATIENT)
Dept: ADMINISTRATIVE | Facility: HOSPITAL | Age: 74
End: 2024-07-01
Payer: COMMERCIAL

## 2024-07-01 ENCOUNTER — PATIENT OUTREACH (OUTPATIENT)
Dept: ADMINISTRATIVE | Facility: HOSPITAL | Age: 74
End: 2024-07-01
Payer: COMMERCIAL

## 2024-07-31 ENCOUNTER — LAB VISIT (OUTPATIENT)
Dept: LAB | Facility: HOSPITAL | Age: 74
End: 2024-07-31
Attending: INTERNAL MEDICINE
Payer: COMMERCIAL

## 2024-07-31 ENCOUNTER — OFFICE VISIT (OUTPATIENT)
Dept: ENDOCRINOLOGY | Facility: CLINIC | Age: 74
End: 2024-07-31
Payer: COMMERCIAL

## 2024-07-31 VITALS
HEIGHT: 63 IN | WEIGHT: 169.44 LBS | DIASTOLIC BLOOD PRESSURE: 78 MMHG | BODY MASS INDEX: 30.02 KG/M2 | HEART RATE: 69 BPM | SYSTOLIC BLOOD PRESSURE: 124 MMHG

## 2024-07-31 DIAGNOSIS — M81.0 OSTEOPOROSIS, UNSPECIFIED OSTEOPOROSIS TYPE, UNSPECIFIED PATHOLOGICAL FRACTURE PRESENCE: ICD-10-CM

## 2024-07-31 DIAGNOSIS — R73.03 PREDIABETES: ICD-10-CM

## 2024-07-31 DIAGNOSIS — E78.00 PURE HYPERCHOLESTEROLEMIA: ICD-10-CM

## 2024-07-31 DIAGNOSIS — E87.1 HYPONATREMIA: ICD-10-CM

## 2024-07-31 DIAGNOSIS — D64.9 ANEMIA, UNSPECIFIED TYPE: ICD-10-CM

## 2024-07-31 DIAGNOSIS — E55.9 VITAMIN D DEFICIENCY: ICD-10-CM

## 2024-07-31 DIAGNOSIS — M81.0 AGE-RELATED OSTEOPOROSIS WITHOUT CURRENT PATHOLOGICAL FRACTURE: ICD-10-CM

## 2024-07-31 DIAGNOSIS — M81.0 AGE-RELATED OSTEOPOROSIS WITHOUT CURRENT PATHOLOGICAL FRACTURE: Primary | ICD-10-CM

## 2024-07-31 LAB
25(OH)D3+25(OH)D2 SERPL-MCNC: 31 NG/ML (ref 30–96)
ALBUMIN SERPL BCP-MCNC: 4 G/DL (ref 3.5–5.2)
ALP SERPL-CCNC: 84 U/L (ref 55–135)
ALT SERPL W/O P-5'-P-CCNC: 15 U/L (ref 10–44)
ANION GAP SERPL CALC-SCNC: 9 MMOL/L (ref 8–16)
AST SERPL-CCNC: 18 U/L (ref 10–40)
BILIRUB SERPL-MCNC: 0.4 MG/DL (ref 0.1–1)
BUN SERPL-MCNC: 10 MG/DL (ref 8–23)
CALCIUM SERPL-MCNC: 10.2 MG/DL (ref 8.7–10.5)
CHLORIDE SERPL-SCNC: 96 MMOL/L (ref 95–110)
CO2 SERPL-SCNC: 25 MMOL/L (ref 23–29)
CREAT SERPL-MCNC: 0.7 MG/DL (ref 0.5–1.4)
ERYTHROCYTE [DISTWIDTH] IN BLOOD BY AUTOMATED COUNT: 11.9 % (ref 11.5–14.5)
EST. GFR  (NO RACE VARIABLE): >60 ML/MIN/1.73 M^2
ESTIMATED AVG GLUCOSE: 120 MG/DL (ref 68–131)
GLUCOSE SERPL-MCNC: 106 MG/DL (ref 70–110)
HBA1C MFR BLD: 5.8 % (ref 4–5.6)
HCT VFR BLD AUTO: 34.9 % (ref 37–48.5)
HGB BLD-MCNC: 11.6 G/DL (ref 12–16)
MCH RBC QN AUTO: 29.5 PG (ref 27–31)
MCHC RBC AUTO-ENTMCNC: 33.2 G/DL (ref 32–36)
MCV RBC AUTO: 89 FL (ref 82–98)
PLATELET # BLD AUTO: 346 K/UL (ref 150–450)
PMV BLD AUTO: 9.1 FL (ref 9.2–12.9)
POTASSIUM SERPL-SCNC: 4 MMOL/L (ref 3.5–5.1)
PROT SERPL-MCNC: 7.4 G/DL (ref 6–8.4)
RBC # BLD AUTO: 3.93 M/UL (ref 4–5.4)
SODIUM SERPL-SCNC: 130 MMOL/L (ref 136–145)
TSH SERPL DL<=0.005 MIU/L-ACNC: 1.98 UIU/ML (ref 0.4–4)
WBC # BLD AUTO: 6.85 K/UL (ref 3.9–12.7)

## 2024-07-31 PROCEDURE — 82523 COLLAGEN CROSSLINKS: CPT | Performed by: INTERNAL MEDICINE

## 2024-07-31 PROCEDURE — 3078F DIAST BP <80 MM HG: CPT | Mod: CPTII,S$GLB,, | Performed by: INTERNAL MEDICINE

## 2024-07-31 PROCEDURE — 1126F AMNT PAIN NOTED NONE PRSNT: CPT | Mod: CPTII,S$GLB,, | Performed by: INTERNAL MEDICINE

## 2024-07-31 PROCEDURE — 85027 COMPLETE CBC AUTOMATED: CPT | Performed by: INTERNAL MEDICINE

## 2024-07-31 PROCEDURE — G2211 COMPLEX E/M VISIT ADD ON: HCPCS | Mod: S$GLB,,, | Performed by: INTERNAL MEDICINE

## 2024-07-31 PROCEDURE — 1159F MED LIST DOCD IN RCRD: CPT | Mod: CPTII,S$GLB,, | Performed by: INTERNAL MEDICINE

## 2024-07-31 PROCEDURE — 1101F PT FALLS ASSESS-DOCD LE1/YR: CPT | Mod: CPTII,S$GLB,, | Performed by: INTERNAL MEDICINE

## 2024-07-31 PROCEDURE — 83036 HEMOGLOBIN GLYCOSYLATED A1C: CPT | Performed by: INTERNAL MEDICINE

## 2024-07-31 PROCEDURE — 86364 TISS TRNSGLTMNASE EA IG CLAS: CPT | Performed by: INTERNAL MEDICINE

## 2024-07-31 PROCEDURE — 80053 COMPREHEN METABOLIC PANEL: CPT | Performed by: INTERNAL MEDICINE

## 2024-07-31 PROCEDURE — 3288F FALL RISK ASSESSMENT DOCD: CPT | Mod: CPTII,S$GLB,, | Performed by: INTERNAL MEDICINE

## 2024-07-31 PROCEDURE — 36415 COLL VENOUS BLD VENIPUNCTURE: CPT | Performed by: INTERNAL MEDICINE

## 2024-07-31 PROCEDURE — 3044F HG A1C LEVEL LT 7.0%: CPT | Mod: CPTII,S$GLB,, | Performed by: INTERNAL MEDICINE

## 2024-07-31 PROCEDURE — 99204 OFFICE O/P NEW MOD 45 MIN: CPT | Mod: S$GLB,,, | Performed by: INTERNAL MEDICINE

## 2024-07-31 PROCEDURE — 84443 ASSAY THYROID STIM HORMONE: CPT | Performed by: INTERNAL MEDICINE

## 2024-07-31 PROCEDURE — 84165 PROTEIN E-PHORESIS SERUM: CPT | Mod: 26,,, | Performed by: PATHOLOGY

## 2024-07-31 PROCEDURE — 3008F BODY MASS INDEX DOCD: CPT | Mod: CPTII,S$GLB,, | Performed by: INTERNAL MEDICINE

## 2024-07-31 PROCEDURE — 84165 PROTEIN E-PHORESIS SERUM: CPT | Performed by: INTERNAL MEDICINE

## 2024-07-31 PROCEDURE — 99999 PR PBB SHADOW E&M-EST. PATIENT-LVL IV: CPT | Mod: PBBFAC,,, | Performed by: INTERNAL MEDICINE

## 2024-07-31 PROCEDURE — 82306 VITAMIN D 25 HYDROXY: CPT | Performed by: INTERNAL MEDICINE

## 2024-07-31 PROCEDURE — 3074F SYST BP LT 130 MM HG: CPT | Mod: CPTII,S$GLB,, | Performed by: INTERNAL MEDICINE

## 2024-07-31 PROCEDURE — 4010F ACE/ARB THERAPY RXD/TAKEN: CPT | Mod: CPTII,S$GLB,, | Performed by: INTERNAL MEDICINE

## 2024-07-31 RX ORDER — CHOLECALCIFEROL (VITAMIN D3) 50 MCG
2000 TABLET ORAL DAILY
Start: 2024-07-31

## 2024-07-31 NOTE — ASSESSMENT & PLAN NOTE
Previous vitamin-D level was 27 on November of 2022  Patient endorses taking 2000 IU of Vitamin-D    -placed an order for vitamin-D supplementation, 2000 IU, educated patient on importance of compliance  -tissue transglutaminase IgA level to rule out malabsorption seen in celiac

## 2024-07-31 NOTE — PROGRESS NOTES
"Subjective:      Patient ID: Carmen Hawley is a 74 y.o.    Chief Complaint:  Osteoporosis      History of Present Illness  With regards to osteoporosis:     Diagnosed with osteoporosis on bone density scan from: 9/2023  Fractures: Denies  First bone density was: 6/15/2016  Last bone density: 9/2023  Osteoporosis medications used in the past: None      FH hip fracture, scoliosis or osteoporosis? Mother had hip fracture.    current medication: Calcium and Vitamin D  Start date: "a couple years ago"    Calcium intake?  1000 mg a day   Vit D intake?  2000 iu a day          They have made fall precautions in house     No recent fractures   No significant height loss (>2 inches)    tob use ?  None   etoh use? Social      No current diarrhea or h/o malabsorption    Denies chronic exposure to steroid therapy, anticoagulants, proton pump inhibitors or antiseizure medications.     Denies history of thyroid disease, anemia, kidney stones or kidney disease.     Denies active malignancy, history of malignancy the involved the bone, prior radiation treatment, or unexplained elevations of alk phos on labs      ROS:   As above    Objective:     /78 (BP Location: Right arm, Patient Position: Sitting, BP Method: Medium (Automatic))   Pulse 69   Ht 5' 3" (1.6 m)   Wt 76.9 kg (169 lb 6.8 oz)   LMP 12/16/2006 (Approximate)   BMI 30.01 kg/m²     Body mass index is 30.01 kg/m².      Physical Exam  Constitutional:       Appearance: Normal appearance.   HENT:      Head: Normocephalic and atraumatic.   Neurological:      Mental Status: She is alert.   Psychiatric:         Mood and Affect: Mood normal.         Behavior: Behavior normal.                Lab Review:   Lab Results   Component Value Date    HGBA1C 5.8 (H) 03/06/2024     Lab Results   Component Value Date    CHOL 205 (H) 08/30/2023    HDL 75 08/30/2023    LDLCALC 116.8 08/30/2023    TRIG 66 08/30/2023    CHOLHDL 36.6 08/30/2023     Lab Results   Component Value Date "     (L) 03/06/2024    K 4.9 03/06/2024    CL 97 03/06/2024    CO2 25 03/06/2024     03/06/2024    BUN 11 03/06/2024    CREATININE 0.8 03/06/2024    CALCIUM 9.8 03/06/2024    PROT 7.2 10/04/2023    PROT 7.2 10/04/2023    ALBUMIN 3.9 10/04/2023    ALBUMIN 3.9 10/04/2023    BILITOT 0.4 10/04/2023    BILITOT 0.4 10/04/2023    ALKPHOS 85 10/04/2023    ALKPHOS 85 10/04/2023    AST 17 10/04/2023    AST 17 10/04/2023    ALT 13 10/04/2023    ALT 13 10/04/2023    ANIONGAP 8 03/06/2024    ESTGFRAFRICA >60.0 07/14/2021    EGFRNONAA >60.0 07/14/2021    TSH 1.288 01/08/2019     Vit D, 25-Hydroxy   Date Value Ref Range Status   11/09/2022 27 (L) 30 - 96 ng/mL Final     Comment:     Vitamin D deficiency.........<10 ng/mL                              Vitamin D insufficiency......10-29 ng/mL       Vitamin D sufficiency........> or equal to 30 ng/mL  Vitamin D toxicity............>100 ng/mL       Lab Results   Component Value Date    WBC 7.25 10/04/2023    HGB 12.0 10/04/2023    HCT 26 (L) 10/24/2023    MCV 93 10/04/2023     10/04/2023           Assessment and Plan     Age-related osteoporosis without current pathological fracture  Risks include  race, menopause, +FH  Reviewed basics of quantity versus quality  Reassuring not fracturing   Plan work up of accelerated bone loss to include 24 urine calcium, TSH, CTX, vit D, CMP      RDA of calcium (9799-6923 mg /day in divided doses) and vitamin D 2000iu a day  discussed  Calcium from food sources preferred  Fall precautions emphasized  +weight bearing exercise    Will provide bone health and osteoporosis Foundation information  [email or web address]      Treatment options and potential side effects discussed for potential agents including PO bisphosphonates, reclast, prolia     Discussed the risk of osteonecrosis of the jaw  with incidence estimated from 1/10,000 to -10/100,000 treated patients (0.001 to 0.01%. Discussed that the incidence of  ONJ is higher in patients undergoing invasive dental surgery (excludes routine cleaning, fillings or root canals)  and receiving higher/more frequent doses for malignancy. Dental work should ideally be completed prior to initiation of treatment; I told her to let her dentist know at the upcoming appointment that we are planning on treating with alendronate to prevent fractures. Preventative measures such as good oral hygiene encouraged.     Discussed the risk of atypical femur fractures. The exact incidence is unknown, but has been estimated at approximately 1-2 cases for each 10,000 patients treated with oral bisphosphonates and increasing incidence was correlated with increased duration of bisphosphonate use. Despite this risk, the significant benefit on fracture reduction far outweighs the potential for this rare event.   DXA done on 9/2023  Femoral neck-1.6, calculated FRAX score was 16% overall and 5% at the hip indicating diagnosis of osteoporosis with  high-risk features    -Discussed potential agents that we may use for osteoporosis treatment, all risks and benefits were discussed with patient, will require further evaluation before determination of starting these agents  -Will obtain labs for bone turnover and stability including 24 hour urine creatinine, CTX, CMP, and vitamin-D  -will evaluate for possible malabsorption in the setting of anemia and Vitamin D deficiency with a CBC and a tissue transglutaminase IgA to rule out celiac dx  -will obtain a SPEP in the setting of anemia and high risk bone FRAX    Vitamin D deficiency  Previous vitamin-D level was 27 on November of 2022  Patient endorses taking 2000 IU of Vitamin-D    -placed an order for vitamin-D supplementation, 2000 IU, educated patient on importance of compliance  -tissue transglutaminase IgA level to rule out malabsorption seen in celiac    Hyponatremia  Most recent level 130  May predispose patient to falls and potential  fractures    -Obtain CMP on this visit    Hyperlipidemia  Continue Crestor 20mg    Anemia  -Repeat CBC  -Rule out malabsorption conditions  -SPEP to rule out MM    Prediabetes  Previous A1c 5.8% indicating prediabetes, will need another abnormal A1C for diagnosis    -Obtain A1C    Romario Cervantes MD  [facility]

## 2024-07-31 NOTE — ASSESSMENT & PLAN NOTE
Most recent level 130  May predispose patient to falls and potential fractures    -Obtain CMP on this visit

## 2024-07-31 NOTE — ASSESSMENT & PLAN NOTE
Previous A1c 5.8% indicating prediabetes, will need another abnormal A1C for diagnosis    -Obtain A1C

## 2024-07-31 NOTE — PATIENT INSTRUCTIONS
Based on our conversation please look at Bone Health and Osteoporosis Foundation website.  They have very valuable information for patients regarding calcium and vitamin-D intake, preventing fractures, medication, and safe exercises.      [email or web address]      Specifically look into the medic since we discussed which were Prolia and Reclast.  Let me know if you have any questions

## 2024-07-31 NOTE — ASSESSMENT & PLAN NOTE
Risks include  race, menopause, +FH  Reviewed basics of quantity versus quality  Reassuring not fracturing   Plan work up of accelerated bone loss to include 24 urine calcium, TSH, CTX, vit D, CMP      RDA of calcium (0015-2030 mg /day in divided doses) and vitamin D 2000iu a day  discussed  Calcium from food sources preferred  Fall precautions emphasized  +weight bearing exercise    Will provide bone health and osteoporosis Foundation information  [email or web address]      Treatment options and potential side effects discussed for potential agents including PO bisphosphonates, reclast, prolia     Discussed the risk of osteonecrosis of the jaw  with incidence estimated from 1/10,000 to -10/100,000 treated patients (0.001 to 0.01%. Discussed that the incidence of ONJ is higher in patients undergoing invasive dental surgery (excludes routine cleaning, fillings or root canals)  and receiving higher/more frequent doses for malignancy. Dental work should ideally be completed prior to initiation of treatment; I told her to let her dentist know at the upcoming appointment that we are planning on treating with alendronate to prevent fractures. Preventative measures such as good oral hygiene encouraged.     Discussed the risk of atypical femur fractures. The exact incidence is unknown, but has been estimated at approximately 1-2 cases for each 10,000 patients treated with oral bisphosphonates and increasing incidence was correlated with increased duration of bisphosphonate use. Despite this risk, the significant benefit on fracture reduction far outweighs the potential for this rare event.   DXA done on 9/2023  Femoral neck-1.6, calculated FRAX score was 16% overall and 5% at the hip indicating diagnosis of osteoporosis with  high-risk features    -Discussed potential agents that we may use for osteoporosis treatment, all risks and benefits were discussed with patient, will require further evaluation  before determination of starting these agents  -Will obtain labs for bone turnover and stability including 24 hour urine creatinine, CTX, CMP, and vitamin-D  -will evaluate for possible malabsorption in the setting of anemia and Vitamin D deficiency with a CBC and a tissue transglutaminase IgA to rule out celiac dx  -will obtain a SPEP in the setting of anemia and high risk bone FRAX

## 2024-08-01 LAB
ALBUMIN SERPL ELPH-MCNC: 4.28 G/DL (ref 3.35–5.55)
ALPHA1 GLOB SERPL ELPH-MCNC: 0.31 G/DL (ref 0.17–0.41)
ALPHA2 GLOB SERPL ELPH-MCNC: 0.67 G/DL (ref 0.43–0.99)
B-GLOBULIN SERPL ELPH-MCNC: 0.85 G/DL (ref 0.5–1.1)
COLLAGEN CTX SERPL-MCNC: 350 PG/ML
GAMMA GLOB SERPL ELPH-MCNC: 0.99 G/DL (ref 0.67–1.58)
PATHOLOGIST INTERPRETATION SPE: NORMAL
PROT SERPL-MCNC: 7.1 G/DL (ref 6–8.4)

## 2024-08-06 LAB — TTG IGA SER-ACNC: 0.8 U/ML

## 2024-08-11 DIAGNOSIS — F41.9 ANXIETY: ICD-10-CM

## 2024-08-11 NOTE — TELEPHONE ENCOUNTER
No care due was identified.  [facility] Embedded Care Due Messages. Reference number: [id number].   8/11/2024 4:22:44 PM CDT

## 2024-08-12 ENCOUNTER — TELEPHONE (OUTPATIENT)
Dept: INTERNAL MEDICINE | Facility: CLINIC | Age: 74
End: 2024-08-12

## 2024-08-12 RX ORDER — CHOLECALCIFEROL (VITAMIN D3) 50 MCG
2000 TABLET ORAL DAILY
Start: 2024-08-12

## 2024-08-12 RX ORDER — ESCITALOPRAM OXALATE 10 MG/1
10 TABLET ORAL DAILY
Qty: 90 TABLET | Refills: 1 | Status: SHIPPED | OUTPATIENT
Start: 2024-08-12

## 2024-08-12 NOTE — TELEPHONE ENCOUNTER
Refill Decision Note   Carmen Hawley  is requesting a refill authorization.  Brief Assessment and Rationale for Refill:  Approve     Medication Therapy Plan:         Pharmacist review requested: Yes   Comments:     Note composed:12:41 PM 08/12/2024

## 2024-08-12 NOTE — TELEPHONE ENCOUNTER
Refill Routing Note   Medication(s) are not appropriate for processing by [facility] for the following reason(s):        Drug-drug interaction  Drug-disease interaction:     [facility] action(s):  Defer      Medication Therapy Plan:       Pharmacist review requested: Yes     Appointments  past 12m or future 3m with PCP    Date Provider   Last Visit   3/6/2024 Susanna Sprague MD   Next Visit   Visit date not found Susanna Sprague MD   ED visits in past 90 days: 0        Note composed:12:07 PM 08/12/2024

## 2024-08-12 NOTE — TELEPHONE ENCOUNTER
----- Message from Romario Cervantes MD sent at 8/12/2024 10:31 AM CDT -----  [facility] just wanted to update you on Ms. Carmen Hawley. We recently saw her in Endocrinology, got some labs and noticed hyponatremia so recommended she hold off on her diuretics and will recheck labs in a week or so.    In regards to osteoporosis, we discussed with her that we would like for her to start treatment, she was unsure of it and said she will think about it. If you can maybe advise her if possible.    We will also obtain a 24hr urine calcium after treating her vitamin d deficiency.    If you have any questions or concerns please reach out to me.    -Romario Cervantes MD

## 2024-08-14 ENCOUNTER — PATIENT MESSAGE (OUTPATIENT)
Dept: ENDOCRINOLOGY | Facility: HOSPITAL | Age: 74
End: 2024-08-14
Payer: COMMERCIAL

## 2024-08-14 DIAGNOSIS — E87.1 HYPONATREMIA: Primary | ICD-10-CM

## 2024-08-15 ENCOUNTER — HOSPITAL ENCOUNTER (OUTPATIENT)
Dept: RADIOLOGY | Facility: HOSPITAL | Age: 74
Discharge: HOME | End: 2024-08-15
Attending: OBSTETRICS & GYNECOLOGY
Payer: COMMERCIAL

## 2024-08-15 ENCOUNTER — OFFICE VISIT (OUTPATIENT)
Dept: BARIATRICS | Facility: CLINIC | Age: 74
End: 2024-08-15
Payer: COMMERCIAL

## 2024-08-15 VITALS
HEART RATE: 69 BPM | WEIGHT: 173.06 LBS | OXYGEN SATURATION: 100 % | DIASTOLIC BLOOD PRESSURE: 84 MMHG | HEIGHT: 63 IN | BODY MASS INDEX: 30.66 KG/M2 | SYSTOLIC BLOOD PRESSURE: 148 MMHG

## 2024-08-15 VITALS — WEIGHT: 168 LBS | BODY MASS INDEX: 29.76 KG/M2

## 2024-08-15 DIAGNOSIS — R73.01 IFG (IMPAIRED FASTING GLUCOSE): ICD-10-CM

## 2024-08-15 DIAGNOSIS — Z12.31 ENCOUNTER FOR SCREENING MAMMOGRAM FOR MALIGNANT NEOPLASM OF BREAST: ICD-10-CM

## 2024-08-15 DIAGNOSIS — E66.9 OBESITY, CLASS I, BMI 30.0-34.9 (SEE ACTUAL BMI): Primary | ICD-10-CM

## 2024-08-15 PROCEDURE — 4010F ACE/ARB THERAPY RXD/TAKEN: CPT | Mod: CPTII,S$GLB,, | Performed by: INTERNAL MEDICINE

## 2024-08-15 PROCEDURE — 99999 PR PBB SHADOW E&M-EST. PATIENT-LVL V: CPT | Mod: PBBFAC,,, | Performed by: INTERNAL MEDICINE

## 2024-08-15 PROCEDURE — 1101F PT FALLS ASSESS-DOCD LE1/YR: CPT | Mod: CPTII,S$GLB,, | Performed by: INTERNAL MEDICINE

## 2024-08-15 PROCEDURE — 3008F BODY MASS INDEX DOCD: CPT | Mod: CPTII,S$GLB,, | Performed by: INTERNAL MEDICINE

## 2024-08-15 PROCEDURE — 3079F DIAST BP 80-89 MM HG: CPT | Mod: CPTII,S$GLB,, | Performed by: INTERNAL MEDICINE

## 2024-08-15 PROCEDURE — 77067 SCR MAMMO BI INCL CAD: CPT | Mod: TC

## 2024-08-15 PROCEDURE — 3288F FALL RISK ASSESSMENT DOCD: CPT | Mod: CPTII,S$GLB,, | Performed by: INTERNAL MEDICINE

## 2024-08-15 PROCEDURE — 1159F MED LIST DOCD IN RCRD: CPT | Mod: CPTII,S$GLB,, | Performed by: INTERNAL MEDICINE

## 2024-08-15 PROCEDURE — 3077F SYST BP >= 140 MM HG: CPT | Mod: CPTII,S$GLB,, | Performed by: INTERNAL MEDICINE

## 2024-08-15 PROCEDURE — 99213 OFFICE O/P EST LOW 20 MIN: CPT | Mod: S$GLB,,, | Performed by: INTERNAL MEDICINE

## 2024-08-15 PROCEDURE — 3044F HG A1C LEVEL LT 7.0%: CPT | Mod: CPTII,S$GLB,, | Performed by: INTERNAL MEDICINE

## 2024-08-15 PROCEDURE — 1126F AMNT PAIN NOTED NONE PRSNT: CPT | Mod: CPTII,S$GLB,, | Performed by: INTERNAL MEDICINE

## 2024-08-15 NOTE — PATIENT INSTRUCTIONS
To taper contrave: decrease to 2 pills in the morning , and 1 pill in the evening for 3 days, then 1 pill twice a day for 3 days, then 1 pill in the morning for 3 days, then stop.      Stop metformin when you start wegovy or zepbound.     1000 jeremiah pb meal planner given previously     Add some type of resistance training 2-3 days a week. These can be body weight exercises, light weight or elastic bands. [employer] and [employer] are great sources for free work out plans and videos.     Discussed strategies for developing new coping mechanisms and becoming more self aware of eating behavior.        Have a plan in place for coping with emotions if you get the urge to make poor food choices- Set a timer for 10-15 minutes and find a way to distract yourself. Take a walk, drink water, write down how you feel.     Eliminate temptation where possible. Limit foods available that you know you tend to overeat. Eat proteins first.       Consider keeping a food log.       Tips for preparing for hurricane season:    If you are on weight loss medications, please take your medication with you in case of evacuation. Injectable medications should be transported with an ice pack if unopened or room temperature if you have started to use them.   Hurricane supplies do not necessarily need to be junk food or high in carbs or sugar. Shelf stable and healthy choices to include in your supplies could include:  Canned/packets of tuna or chicken  Apples, oranges, banana, pears  Beef or turkey jerky  Sugar free pudding  Pickle, olives, dill relish (mix with the tuna or chicken!)  Low sodium or no salt added canned vegetables  If you get bread, get lite bread (40 calories a slice)  0 sugar sports drinks  Water  String cheese will be okay for a few days without refrigeration or in an ice chest.   Peanut or other nut butter.   Parmesan crisps  Pork skins  Protein shakes (20-30g protein, less than 5 g sugar)  Protein bars (15 or more g protein, less  than 5 g sugar)  Don't forget disposable forks, spoons, plates in your supplies  If evacuated, manage stress by taking walks, reading or meditating.   If eating out make better choices when possible.   Salads with a lean protein and limited dressing, cheese or other toppings  Grilled chicken sandwich or burger without the bun.   Skip the fries!  Order water or unsweetened tea instead of soda  Grocery stores with a deli, salad/food bar can be a good quick and affordable option to replace fast food    In June 2024, [facility] launched a Digital Medicine Weight Management Program.  This program offers access to educational modules, health coaches, and dieticians to assist patients with behavioral change, nutrition, and physical activity.  In addition, eligible patients may be prescribed injection medications approved for weight loss and the treatment of obesity (Wegovy, Zepbound).  These medications will be available for a $400 copay (additional savings may be available with savings card) to those employees with tier 1 or tier 2 [facility] [employer] Insurance.  Employees with tier 3 must first meet their deductible.      Please be aware that Wegovy and Zepbound are only available to employees through the digital medicine program.  These medications will not be covered by [facility] [employer] if prescribed through the [facility].    For additional information about the Digital Medicine Weight Management Program, please call [phone].    Digital Medicine Signup ([email or web address])

## 2024-08-15 NOTE — PROGRESS NOTES
"Subjective:       Patient ID: Carmen Hawley is a 74 y.o. female.    Chief Complaint: Follow-up      Pt here today for follow up. Has gained 8 more lbs from previous visit  net pos 4 lbs. Has been on Contrave  Increased to 2 pill in the morning and 2 pills in the evening last ov.. She was off contrave for surgery, and resumed it in Jan. Also on metformin. FBS was 100 so started metformin for impaired fasting glucose.   Has not been able to be as active 2/2 knee pain. Had right TKA in October. Has been to lymphedema clinic. She is back to regular activity without assistance.    She is enrolled in digital HTN.  BP  Good today.   Prev med hx:  Was on topiramate in the past.    diethylpropion, last filled 3/10/21  checked today. Not much progress on either.     Lab Results   Component Value Date    HGBA1C 5.8 (H) 07/31/2024    HGBA1C 5.8 (H) 03/06/2024    HGBA1C 5.4 08/30/2023     Lab Results   Component Value Date    LDLCALC 116.8 08/30/2023    CREATININE 0.7 07/31/2024             Follow-up  Associated symptoms include arthralgias. Pertinent negatives include no chest pain, chills or fever.     Review of Systems   Constitutional: Negative for chills and fever.   Respiratory: Negative for apnea and shortness of breath.         + snores   Cardiovascular: Negative for chest pain and leg swelling.   Gastrointestinal: Negative for constipation and diarrhea.        Denies HB   Genitourinary: Negative for dysuria.   Musculoskeletal: Positive for arthralgias. Negative for back pain.        Left foot OA   Neurological: Negative for dizziness and light-headedness.   Psychiatric/Behavioral: Negative for dysphoric mood. The patient is not nervous/anxious.         Doing well on Lexapro       Objective:       BP (!) 148/84 Comment: BP medicine was taken at 11:00 am today.  Pulse 69   Ht 5' 3" (1.6 m)   Wt 78.5 kg (173 lb 1 oz)   LMP 12/16/2006 (Approximate)   SpO2 100%   BMI 30.66 kg/m²     Physical Exam  Vitals " reviewed.   Constitutional:       General: She is not in acute distress.     Appearance: She is well-developed.      Comments: Obese     HENT:      Head: Normocephalic and atraumatic.   Eyes:      General: No scleral icterus.     Pupils: Pupils are equal, round, and reactive to light.   Cardiovascular:      Rate and Rhythm: Normal rate.   Pulmonary:      Effort: Pulmonary effort is normal.   Musculoskeletal:         General: Normal range of motion.      Cervical back: Normal range of motion and neck supple.   Skin:     General: Skin is warm and dry.      Findings: No erythema.   Neurological:      Mental Status: She is alert and oriented to person, place, and time.      Cranial Nerves: No cranial nerve deficit.   Psychiatric:         Behavior: Behavior normal.         Judgment: Judgment normal.         Assessment:       1. Obesity, Class I, BMI 30.0-34.9 (see actual BMI)    2. IFG (impaired fasting glucose)                Plan:            Carmen was seen today for follow-up.    Diagnoses and all orders for this visit:    Obesity, Class I, BMI 30.0-34.9 (see actual BMI)    IFG (impaired fasting glucose)        To taper contrave: decrease to 2 pills in the morning , and 1 pill in the evening for 3 days, then 1 pill twice a day for 3 days, then 1 pill in the morning for 3 days, then stop.      Stop metformin when you start wegovy or zepbound.     1000 jeremiah pb meal planner given previously     Add some type of resistance training 2-3 days a week. These can be body weight exercises, light weight or elastic bands. [employer] and [employer] are great sources for free work out plans and videos.     Discussed strategies for developing new coping mechanisms and becoming more self aware of eating behavior.        Have a plan in place for coping with emotions if you get the urge to make poor food choices- Set a timer for 10-15 minutes and find a way to distract yourself. Take a walk, drink water, write down how you feel.      Eliminate temptation where possible. Limit foods available that you know you tend to overeat. Eat proteins first.       Consider keeping a food log.       Tips for preparing for hurricane season and dig med info given

## 2024-08-28 ENCOUNTER — LAB VISIT (OUTPATIENT)
Dept: LAB | Facility: HOSPITAL | Age: 74
End: 2024-08-28
Attending: INTERNAL MEDICINE
Payer: COMMERCIAL

## 2024-08-28 DIAGNOSIS — E87.1 HYPONATREMIA: ICD-10-CM

## 2024-08-28 LAB
ALBUMIN SERPL BCP-MCNC: 4.1 G/DL (ref 3.5–5.2)
ANION GAP SERPL CALC-SCNC: 9 MMOL/L (ref 8–16)
BUN SERPL-MCNC: 16 MG/DL (ref 8–23)
CALCIUM SERPL-MCNC: 10 MG/DL (ref 8.7–10.5)
CHLORIDE SERPL-SCNC: 97 MMOL/L (ref 95–110)
CO2 SERPL-SCNC: 24 MMOL/L (ref 23–29)
CREAT SERPL-MCNC: 0.9 MG/DL (ref 0.5–1.4)
EST. GFR  (NO RACE VARIABLE): >60 ML/MIN/1.73 M^2
GLUCOSE SERPL-MCNC: 110 MG/DL (ref 70–110)
PHOSPHATE SERPL-MCNC: 3.8 MG/DL (ref 2.7–4.5)
POTASSIUM SERPL-SCNC: 4 MMOL/L (ref 3.5–5.1)
SODIUM SERPL-SCNC: 130 MMOL/L (ref 136–145)

## 2024-08-28 PROCEDURE — 36415 COLL VENOUS BLD VENIPUNCTURE: CPT | Performed by: STUDENT IN AN ORGANIZED HEALTH CARE EDUCATION/TRAINING PROGRAM

## 2024-08-28 PROCEDURE — 80069 RENAL FUNCTION PANEL: CPT | Performed by: STUDENT IN AN ORGANIZED HEALTH CARE EDUCATION/TRAINING PROGRAM

## 2024-09-03 ENCOUNTER — PATIENT MESSAGE (OUTPATIENT)
Dept: ENDOCRINOLOGY | Facility: CLINIC | Age: 74
End: 2024-09-03
Payer: COMMERCIAL

## 2024-09-19 ENCOUNTER — LAB VISIT (OUTPATIENT)
Dept: LAB | Facility: HOSPITAL | Age: 74
End: 2024-09-19
Payer: COMMERCIAL

## 2024-09-19 DIAGNOSIS — E87.1 HYPONATREMIA: ICD-10-CM

## 2024-09-19 LAB
ALBUMIN SERPL BCP-MCNC: 4.2 G/DL (ref 3.5–5.2)
ALP SERPL-CCNC: 77 U/L (ref 55–135)
ALT SERPL W/O P-5'-P-CCNC: 18 U/L (ref 10–44)
ANION GAP SERPL CALC-SCNC: 9 MMOL/L (ref 8–16)
AST SERPL-CCNC: 21 U/L (ref 10–40)
BILIRUB SERPL-MCNC: 0.6 MG/DL (ref 0.1–1)
BUN SERPL-MCNC: 12 MG/DL (ref 8–23)
CALCIUM SERPL-MCNC: 10.1 MG/DL (ref 8.7–10.5)
CHLORIDE SERPL-SCNC: 98 MMOL/L (ref 95–110)
CO2 SERPL-SCNC: 25 MMOL/L (ref 23–29)
CREAT SERPL-MCNC: 0.7 MG/DL (ref 0.5–1.4)
EST. GFR  (NO RACE VARIABLE): >60 ML/MIN/1.73 M^2
GLUCOSE SERPL-MCNC: 103 MG/DL (ref 70–110)
OSMOLALITY SERPL: 284 MOSM/KG (ref 275–295)
POTASSIUM SERPL-SCNC: 4.2 MMOL/L (ref 3.5–5.1)
PROT SERPL-MCNC: 7.3 G/DL (ref 6–8.4)
SODIUM SERPL-SCNC: 132 MMOL/L (ref 136–145)

## 2024-09-19 PROCEDURE — 83930 ASSAY OF BLOOD OSMOLALITY: CPT | Performed by: STUDENT IN AN ORGANIZED HEALTH CARE EDUCATION/TRAINING PROGRAM

## 2024-09-19 PROCEDURE — 80053 COMPREHEN METABOLIC PANEL: CPT | Performed by: STUDENT IN AN ORGANIZED HEALTH CARE EDUCATION/TRAINING PROGRAM

## 2024-09-19 PROCEDURE — 36415 COLL VENOUS BLD VENIPUNCTURE: CPT | Performed by: STUDENT IN AN ORGANIZED HEALTH CARE EDUCATION/TRAINING PROGRAM

## 2024-10-01 DIAGNOSIS — M17.11 PRIMARY OSTEOARTHRITIS OF RIGHT KNEE: Primary | ICD-10-CM

## 2024-10-07 RX ORDER — CHOLECALCIFEROL (VITAMIN D3) 50 MCG
2000 TABLET ORAL DAILY
Qty: 30 TABLET | Refills: 0 | Status: SHIPPED | OUTPATIENT
Start: 2024-10-07

## 2024-10-10 ENCOUNTER — OFFICE VISIT (OUTPATIENT)
Dept: INTERNAL MEDICINE | Facility: CLINIC | Age: 74
End: 2024-10-10
Payer: COMMERCIAL

## 2024-10-10 DIAGNOSIS — I10 HYPERTENSION, UNSPECIFIED TYPE: ICD-10-CM

## 2024-10-10 DIAGNOSIS — M81.0 OSTEOPOROSIS, UNSPECIFIED OSTEOPOROSIS TYPE, UNSPECIFIED PATHOLOGICAL FRACTURE PRESENCE: Primary | ICD-10-CM

## 2024-10-10 PROCEDURE — 1126F AMNT PAIN NOTED NONE PRSNT: CPT | Mod: CPTII,S$GLB,, | Performed by: INTERNAL MEDICINE

## 2024-10-10 PROCEDURE — 99999 PR PBB SHADOW E&M-EST. PATIENT-LVL V: CPT | Mod: PBBFAC,,, | Performed by: INTERNAL MEDICINE

## 2024-10-10 PROCEDURE — 3008F BODY MASS INDEX DOCD: CPT | Mod: CPTII,S$GLB,, | Performed by: INTERNAL MEDICINE

## 2024-10-10 PROCEDURE — G2211 COMPLEX E/M VISIT ADD ON: HCPCS | Mod: S$GLB,,, | Performed by: INTERNAL MEDICINE

## 2024-10-10 PROCEDURE — 3044F HG A1C LEVEL LT 7.0%: CPT | Mod: CPTII,S$GLB,, | Performed by: INTERNAL MEDICINE

## 2024-10-10 PROCEDURE — 99214 OFFICE O/P EST MOD 30 MIN: CPT | Mod: S$GLB,,, | Performed by: INTERNAL MEDICINE

## 2024-10-10 PROCEDURE — 3078F DIAST BP <80 MM HG: CPT | Mod: CPTII,S$GLB,, | Performed by: INTERNAL MEDICINE

## 2024-10-10 PROCEDURE — 3075F SYST BP GE 130 - 139MM HG: CPT | Mod: CPTII,S$GLB,, | Performed by: INTERNAL MEDICINE

## 2024-10-10 PROCEDURE — 1159F MED LIST DOCD IN RCRD: CPT | Mod: CPTII,S$GLB,, | Performed by: INTERNAL MEDICINE

## 2024-10-10 PROCEDURE — 1101F PT FALLS ASSESS-DOCD LE1/YR: CPT | Mod: CPTII,S$GLB,, | Performed by: INTERNAL MEDICINE

## 2024-10-10 PROCEDURE — 4010F ACE/ARB THERAPY RXD/TAKEN: CPT | Mod: CPTII,S$GLB,, | Performed by: INTERNAL MEDICINE

## 2024-10-10 PROCEDURE — 3288F FALL RISK ASSESSMENT DOCD: CPT | Mod: CPTII,S$GLB,, | Performed by: INTERNAL MEDICINE

## 2024-10-13 VITALS
HEIGHT: 63 IN | WEIGHT: 168.44 LBS | SYSTOLIC BLOOD PRESSURE: 138 MMHG | TEMPERATURE: 99 F | BODY MASS INDEX: 29.84 KG/M2 | OXYGEN SATURATION: 98 % | DIASTOLIC BLOOD PRESSURE: 72 MMHG | HEART RATE: 74 BPM

## 2024-10-13 NOTE — PROGRESS NOTES
Subjective:       Patient ID: Carmen Hawley is a 74 y.o. female.    Chief Complaint: Hypertension    HPI  She returns for management of hypertension.  She has had hypertension for over a year.  Current treatment has included medications outlined in medication list.  She denies chest pain or shortness of breath.  No palpitations.  Denies left arm or neck pain.  She has osteoporosis.  Currently on calcium and vitamin-D supplement     Past medical history: Hypertension, hyperlipidemia, pre diabetes, osteoporosis, status post knee replacement, family history of colon polyp.  She had a colonoscopy October 2020      Medications:  Toprol, Dyazide, Crestor, diovan      No known drug allergies         Review of Systems   Constitutional:  Negative for chills, fatigue, fever and unexpected weight change.   Respiratory:  Negative for chest tightness and shortness of breath.    Cardiovascular:  Negative for chest pain and palpitations.   Gastrointestinal:  Negative for abdominal pain and blood in stool.   Neurological:  Negative for dizziness, syncope, numbness and headaches.       Objective:      Physical Exam  HENT:      Right Ear: External ear normal.      Left Ear: External ear normal.      Nose: Nose normal.      Mouth/Throat:      Mouth: Mucous membranes are moist.      Pharynx: Oropharynx is clear.   Eyes:      Pupils: Pupils are equal, round, and reactive to light.   Cardiovascular:      Rate and Rhythm: Normal rate and regular rhythm.      Heart sounds: No murmur heard.  Pulmonary:      Breath sounds: Normal breath sounds.   Abdominal:      General: There is no distension.      Palpations: There is no hepatomegaly or splenomegaly.      Tenderness: There is no abdominal tenderness.   Musculoskeletal:      Cervical back: Normal range of motion.   Lymphadenopathy:      Cervical: No cervical adenopathy.      Upper Body:      Right upper body: No axillary adenopathy.      Left upper body: No axillary adenopathy.    Neurological:      Cranial Nerves: No cranial nerve deficit.      Sensory: No sensory deficit.      Motor: Motor function is intact.      Deep Tendon Reflexes: Reflexes are normal and symmetric.         Assessment/Plan       Assessment and plan: 1.  Hypertension: Controlled.  Continue Diovan.  Check lipid panel   2.  Osteoporosis: Follow-up with endocrinology    Visit today included increased complexity associated with the care of the episodic problem hypertension addressed and managing the longitudinal care of the patient due to the serious and/or complex managed problem(s) hypertension, osteoporosis.

## 2024-10-17 ENCOUNTER — LAB VISIT (OUTPATIENT)
Dept: LAB | Facility: HOSPITAL | Age: 74
End: 2024-10-17
Attending: INTERNAL MEDICINE
Payer: COMMERCIAL

## 2024-10-17 DIAGNOSIS — I10 HYPERTENSION, UNSPECIFIED TYPE: ICD-10-CM

## 2024-10-17 LAB
CHOLEST SERPL-MCNC: 179 MG/DL (ref 120–199)
CHOLEST/HDLC SERPL: 2.1 {RATIO} (ref 2–5)
HDLC SERPL-MCNC: 86 MG/DL (ref 40–75)
HDLC SERPL: 48 % (ref 20–50)
LDLC SERPL CALC-MCNC: 80.2 MG/DL (ref 63–159)
NONHDLC SERPL-MCNC: 93 MG/DL
TRIGL SERPL-MCNC: 64 MG/DL (ref 30–150)

## 2024-10-17 PROCEDURE — 80061 LIPID PANEL: CPT | Performed by: INTERNAL MEDICINE

## 2024-10-17 PROCEDURE — 36415 COLL VENOUS BLD VENIPUNCTURE: CPT | Performed by: INTERNAL MEDICINE

## 2024-11-06 RX ORDER — CHOLECALCIFEROL (VITAMIN D3) 50 MCG
2000 TABLET ORAL DAILY
Qty: 30 TABLET | Refills: 0 | Status: SHIPPED | OUTPATIENT
Start: 2024-11-06

## 2024-12-18 RX ORDER — CHOLECALCIFEROL (VITAMIN D3) 50 MCG
2000 TABLET ORAL DAILY
Qty: 30 TABLET | Refills: 0 | Status: CANCELLED | OUTPATIENT
Start: 2024-12-18

## 2024-12-19 NOTE — TELEPHONE ENCOUNTER
No care due was identified.  [facility] Embedded Care Due Messages. Reference number: [id number].   12/19/2024 2:54:55 PM CST

## 2024-12-20 RX ORDER — CHOLECALCIFEROL (VITAMIN D3) 50 MCG
2000 TABLET ORAL DAILY
Qty: 30 TABLET | Refills: 0 | Status: SHIPPED | OUTPATIENT
Start: 2024-12-20

## 2024-12-20 RX ORDER — METOPROLOL SUCCINATE 100 MG/1
100 TABLET, EXTENDED RELEASE ORAL DAILY
Qty: 90 TABLET | Refills: 1 | Status: SHIPPED | OUTPATIENT
Start: 2024-12-20 | End: 2025-12-20

## 2025-01-24 NOTE — DISCHARGE SUMMARY
1930: Assumed care of patient. Patient is laying in bed. A/OX3. All falls precautions are in place. Call light is within reach   Discharge Note  Short Stay      SUMMARY     Admit Date: 5/17/2023    Attending Physician: Tej Cm    Discharge Physician: Rad Jernigan      Discharge Date: 5/17/2023 2:25 PM    Procedure(s) (LRB):  INJECTION RIGHT KNEE SYNVISC (Right)    Final Diagnosis: Primary osteoarthritis of right knee [M17.11]    Disposition: Home or self care    Patient Instructions:   Current Discharge Medication List        CONTINUE these medications which have NOT CHANGED    Details   aspirin (ECOTRIN) 81 MG EC tablet Take 81 mg by mouth once daily.        biotin 300 mcg Tab Take 1 tablet by mouth once daily.      cyanocobalamin 500 MCG tablet Take 500 mcg by mouth once daily.      diclofenac (VOLTAREN) 75 MG EC tablet Take 1 tablet (75 mg total) by mouth 2 (two) times daily as needed (pain). With food and water.  Qty: 60 tablet, Refills: 1    Associated Diagnoses: DDD (degenerative disc disease), lumbar      diclofenac sodium (VOLTAREN) 1 % Gel Apply 2 grams topically 4 (four) times daily.  Qty: 350 g, Refills: 6    Associated Diagnoses: Primary osteoarthritis of right knee      !! EScitalopram oxalate (LEXAPRO) 10 MG tablet Take 1 tablet (10 mg total) by mouth once daily.  Qty: 90 tablet, Refills: 3    Associated Diagnoses: Anxiety      !! EScitalopram oxalate (LEXAPRO) 5 MG Tab Take 1 tablet (5 mg total) by mouth once daily. Combine with 10mg for total 15mg.  Qty: 30 tablet, Refills: 11    Associated Diagnoses: Anxiety      estradioL (ESTRACE) 0.01 % (0.1 mg/gram) vaginal cream Place 1 gram vaginally twice a week.  Qty: 42.5 g, Refills: 3    Associated Diagnoses: Postmenopausal atrophic vaginitis      gabapentin (NEURONTIN) 300 MG capsule Take 1 capsule (300 mg total) by mouth 3 (three) times daily.  Qty: 90 capsule, Refills: 2    Associated Diagnoses: Lumbar radiculopathy      metFORMIN (GLUCOPHAGE-XR) 500 MG ER 24hr tablet Take 1 tablet (500 mg total) by mouth daily with breakfast.  Qty: 90 tablet, Refills: 1    Associated  Diagnoses: Overweight (BMI 25.0-29.9)      metoprolol succinate (TOPROL-XL) 100 MG 24 hr tablet Take 1 tablet (100 mg total) by mouth once daily.  Qty: 90 tablet, Refills: 3    Comments: .  Associated Diagnoses: Essential hypertension      naltrexone-bupropion (CONTRAVE) 8-90 mg TbSR Take 2 tablets by mouth 2 (two) times daily.  Qty: 120 tablet, Refills: 3    Associated Diagnoses: Overweight (BMI 25.0-29.9)      prednisolon/gatiflox/bromfenac (PREDNISOL ACE-GATIFLOX-BROMFEN) 1-0.5-0.075 % DrpS Apply 1 drop to eye 3 (three) times daily. in operative eye for 1 month after surgery  Qty: 5 mL, Refills: 3    Comments: Allergies reviewed by physician, and ok to dispense as written.      simvastatin (ZOCOR) 40 MG tablet TAKE 1 TABLET BY MOUTH EVERY EVENING.  Qty: 90 tablet, Refills: 3    Associated Diagnoses: Hyperlipidemia, unspecified hyperlipidemia type      traMADoL (ULTRAM) 50 mg tablet Take 1 tablet (50 mg total) by mouth every 6 (six) hours as needed for Pain.  Qty: 28 tablet, Refills: 1    Comments: Quantity prescribed more than 7 day supply? No  Associated Diagnoses: Primary osteoarthritis of right knee      tretinoin (RETIN-A) 0.1 % cream APPLY A THIN FILM TO AFFECTED AREA EVERY EVENING AFTER MOISTURIZING.  Qty: 45 g, Refills: 3    Associated Diagnoses: Lentigo      triamterene-hydrochlorothiazide 37.5-25 mg (DYAZIDE) 37.5-25 mg per capsule Take one capsule by mouth every day  Qty: 90 capsule, Refills: 3    Comments: .  Associated Diagnoses: Essential hypertension      vitamin D 1000 units Tab Take 2,000 mg by mouth once daily.        !! - Potential duplicate medications found. Please discuss with provider.              Discharge Diagnosis: Primary osteoarthritis of right knee [M17.11]  Condition on Discharge: Stable with no complications to procedure   Diet on Discharge: Same as before.  Activity: as per instruction sheet.  Discharge to: Home with a responsible adult.  Follow up: 2-4 weeks       Please call my  office or pager at [phone] if experienced any weakness or loss of sensation, fever > 101.5, pain uncontrolled with oral medications, persistent nausea/vomiting/or diarrhea, redness or drainage from the incisions, or any other worrisome concerns. If physician on call was not reached or could not communicate with our office for any reason please go to the nearest emergency department        Tej Cm

## 2025-02-23 DIAGNOSIS — I10 ESSENTIAL HYPERTENSION: ICD-10-CM

## 2025-02-23 RX ORDER — TRIAMTERENE AND HYDROCHLOROTHIAZIDE 37.5; 25 MG/1; MG/1
CAPSULE ORAL DAILY
Qty: 90 CAPSULE | Refills: 1 | Status: SHIPPED | OUTPATIENT
Start: 2025-02-23 | End: 2026-02-23

## 2025-02-23 NOTE — TELEPHONE ENCOUNTER
No care due was identified.  [facility] Embedded Care Due Messages. Reference number: [id number].   2/23/2025 3:56:37 PM CST

## 2025-02-24 NOTE — TELEPHONE ENCOUNTER
Refill Decision Note   Carmen Hawley  is requesting a refill authorization.  Brief Assessment and Rationale for Refill:  Approve     Medication Therapy Plan:         Comments:     Note composed:10:11 PM 02/23/2025

## 2025-02-26 ENCOUNTER — CLINICAL SUPPORT (OUTPATIENT)
Dept: OTHER | Facility: CLINIC | Age: 75
End: 2025-02-26

## 2025-02-26 DIAGNOSIS — Z00.8 ENCOUNTER FOR OTHER GENERAL EXAMINATION: ICD-10-CM

## 2025-02-26 DIAGNOSIS — E87.1 HYPONATREMIA: Primary | ICD-10-CM

## 2025-02-26 RX ORDER — CHOLECALCIFEROL (VITAMIN D3) 50 MCG
2000 TABLET ORAL DAILY
Qty: 30 TABLET | Refills: 0 | Status: CANCELLED | OUTPATIENT
Start: 2025-02-26

## 2025-02-26 RX ORDER — CHOLECALCIFEROL (VITAMIN D3) 50 MCG
2000 TABLET ORAL DAILY
Qty: 90 TABLET | Refills: 3 | Status: SHIPPED | OUTPATIENT
Start: 2025-02-26

## 2025-02-27 VITALS
DIASTOLIC BLOOD PRESSURE: 64 MMHG | HEIGHT: 63 IN | BODY MASS INDEX: 29.27 KG/M2 | SYSTOLIC BLOOD PRESSURE: 122 MMHG | WEIGHT: 165.19 LBS

## 2025-02-27 LAB
GLUCOSE SERPL-MCNC: 103 MG/DL (ref 60–140)
HDLC SERPL-MCNC: 81 MG/DL
POC CHOLESTEROL, LDL (DOCK): 146 MG/DL
POC CHOLESTEROL, TOTAL: 237 MG/DL
TRIGL SERPL-MCNC: 58 MG/DL

## 2025-03-27 ENCOUNTER — TELEPHONE (OUTPATIENT)
Dept: OBSTETRICS AND GYNECOLOGY | Facility: CLINIC | Age: 75
End: 2025-03-27
Payer: COMMERCIAL

## 2025-03-28 ENCOUNTER — TELEPHONE (OUTPATIENT)
Dept: DERMATOLOGY | Facility: CLINIC | Age: 75
End: 2025-03-28
Payer: COMMERCIAL

## 2025-03-28 NOTE — TELEPHONE ENCOUNTER
----- Message from Nurse Knight sent at 3/27/2025  1:45 PM CDT -----  Regarding: FW: appt access    ----- Message -----  From: Chepe Samuels  Sent: 3/27/2025  11:48 AM CDT  To: Ginna SEGURA Staff  Subject: appt access                                      PATIENT CALLPt called to schedule EP skin check, missed scheduling annual appt last year due to her 's unexpected passing but would like to be seen on a Wednesday afternoon if possible.. Please call back at [phone]

## 2025-04-09 DIAGNOSIS — F41.9 ANXIETY: ICD-10-CM

## 2025-04-09 RX ORDER — ESCITALOPRAM OXALATE 10 MG/1
10 TABLET ORAL DAILY
Qty: 90 TABLET | Refills: 1 | Status: SHIPPED | OUTPATIENT
Start: 2025-04-09

## 2025-04-09 NOTE — TELEPHONE ENCOUNTER
No care due was identified.  [facility] Embedded Care Due Messages. Reference number: [id number].   4/09/2025 5:07:38 AM CDT

## 2025-04-09 NOTE — TELEPHONE ENCOUNTER
Carmen Hawley  is requesting a refill authorization.  Brief Assessment and Rationale for Refill:  Approve     Medication Therapy Plan:         Comments:     Note composed:6:32 AM 04/09/2025

## 2025-04-10 ENCOUNTER — LAB VISIT (OUTPATIENT)
Dept: LAB | Facility: HOSPITAL | Age: 75
End: 2025-04-10
Attending: INTERNAL MEDICINE
Payer: COMMERCIAL

## 2025-04-10 ENCOUNTER — OFFICE VISIT (OUTPATIENT)
Dept: INTERNAL MEDICINE | Facility: CLINIC | Age: 75
End: 2025-04-10
Payer: COMMERCIAL

## 2025-04-10 DIAGNOSIS — I10 PRIMARY HYPERTENSION: ICD-10-CM

## 2025-04-10 DIAGNOSIS — R73.03 PREDIABETES: Primary | ICD-10-CM

## 2025-04-10 DIAGNOSIS — E78.00 PURE HYPERCHOLESTEROLEMIA: ICD-10-CM

## 2025-04-10 DIAGNOSIS — R73.03 PREDIABETES: ICD-10-CM

## 2025-04-10 DIAGNOSIS — Z00.00 PREVENTATIVE HEALTH CARE: ICD-10-CM

## 2025-04-10 LAB
ABSOLUTE EOSINOPHIL (OHS): 0.16 K/UL
ABSOLUTE MONOCYTE (OHS): 0.53 K/UL (ref 0.3–1)
ABSOLUTE NEUTROPHIL COUNT (OHS): 4.15 K/UL (ref 1.8–7.7)
ANION GAP (OHS): 8 MMOL/L (ref 8–16)
BASOPHILS # BLD AUTO: 0.05 K/UL
BASOPHILS NFR BLD AUTO: 0.8 %
BUN SERPL-MCNC: 14 MG/DL (ref 8–23)
CALCIUM SERPL-MCNC: 9.9 MG/DL (ref 8.7–10.5)
CHLORIDE SERPL-SCNC: 96 MMOL/L (ref 95–110)
CO2 SERPL-SCNC: 27 MMOL/L (ref 23–29)
CREAT SERPL-MCNC: 0.8 MG/DL (ref 0.5–1.4)
EAG (OHS): 117 MG/DL (ref 68–131)
ERYTHROCYTE [DISTWIDTH] IN BLOOD BY AUTOMATED COUNT: 12.2 % (ref 11.5–14.5)
GFR SERPLBLD CREATININE-BSD FMLA CKD-EPI: >60 ML/MIN/1.73/M2
GLUCOSE SERPL-MCNC: 115 MG/DL (ref 70–110)
HBA1C MFR BLD: 5.7 % (ref 4–5.6)
HCT VFR BLD AUTO: 37.4 % (ref 37–48.5)
HGB BLD-MCNC: 12.2 GM/DL (ref 12–16)
IMM GRANULOCYTES # BLD AUTO: 0.02 K/UL (ref 0–0.04)
IMM GRANULOCYTES NFR BLD AUTO: 0.3 % (ref 0–0.5)
LYMPHOCYTES # BLD AUTO: 1.39 K/UL (ref 1–4.8)
MCH RBC QN AUTO: 29.3 PG (ref 27–31)
MCHC RBC AUTO-ENTMCNC: 32.6 G/DL (ref 32–36)
MCV RBC AUTO: 90 FL (ref 82–98)
NUCLEATED RBC (/100WBC) (OHS): 0 /100 WBC
PLATELET # BLD AUTO: 344 K/UL (ref 150–450)
PMV BLD AUTO: 9.6 FL (ref 9.2–12.9)
POTASSIUM SERPL-SCNC: 4.6 MMOL/L (ref 3.5–5.1)
RBC # BLD AUTO: 4.17 M/UL (ref 4–5.4)
RELATIVE EOSINOPHIL (OHS): 2.5 %
RELATIVE LYMPHOCYTE (OHS): 22.1 % (ref 18–48)
RELATIVE MONOCYTE (OHS): 8.4 % (ref 4–15)
RELATIVE NEUTROPHIL (OHS): 65.9 % (ref 38–73)
SODIUM SERPL-SCNC: 131 MMOL/L (ref 136–145)
WBC # BLD AUTO: 6.3 K/UL (ref 3.9–12.7)

## 2025-04-10 PROCEDURE — 36415 COLL VENOUS BLD VENIPUNCTURE: CPT

## 2025-04-10 PROCEDURE — 80048 BASIC METABOLIC PNL TOTAL CA: CPT

## 2025-04-10 PROCEDURE — 85025 COMPLETE CBC W/AUTO DIFF WBC: CPT

## 2025-04-10 PROCEDURE — 99999 PR PBB SHADOW E&M-EST. PATIENT-LVL IV: CPT | Mod: PBBFAC,,, | Performed by: INTERNAL MEDICINE

## 2025-04-10 PROCEDURE — 83036 HEMOGLOBIN GLYCOSYLATED A1C: CPT

## 2025-04-10 RX ORDER — METOPROLOL SUCCINATE 100 MG/1
100 TABLET, EXTENDED RELEASE ORAL DAILY
Qty: 90 TABLET | Refills: 1 | Status: SHIPPED | OUTPATIENT
Start: 2025-04-10 | End: 2026-04-10

## 2025-04-10 RX ORDER — VALSARTAN 320 MG/1
320 TABLET ORAL DAILY
Qty: 90 TABLET | Refills: 1 | Status: SHIPPED | OUTPATIENT
Start: 2025-04-10 | End: 2026-04-10

## 2025-04-12 ENCOUNTER — RESULTS FOLLOW-UP (OUTPATIENT)
Dept: INTERNAL MEDICINE | Facility: CLINIC | Age: 75
End: 2025-04-12

## 2025-04-13 VITALS
BODY MASS INDEX: 29.46 KG/M2 | HEIGHT: 63 IN | SYSTOLIC BLOOD PRESSURE: 132 MMHG | TEMPERATURE: 99 F | DIASTOLIC BLOOD PRESSURE: 68 MMHG | OXYGEN SATURATION: 98 % | HEART RATE: 73 BPM | WEIGHT: 166.25 LBS

## 2025-04-13 NOTE — PROGRESS NOTES
Subjective:       Patient ID: Carmen Hawley is a 74 y.o. female.    Chief Complaint: Annual Exam    HPI  She is here for annual exam  Review of Systems   Constitutional:  Negative for chills, fatigue, fever and unexpected weight change.   Respiratory:  Negative for chest tightness and shortness of breath.    Cardiovascular:  Negative for chest pain and palpitations.   Gastrointestinal:  Negative for abdominal pain and blood in stool.   Neurological:  Negative for dizziness, syncope, numbness and headaches.       Objective:      Physical Exam  HENT:      Right Ear: External ear normal.      Left Ear: External ear normal.      Nose: Nose normal.      Mouth/Throat:      Mouth: Mucous membranes are moist.      Pharynx: Oropharynx is clear.   Eyes:      Pupils: Pupils are equal, round, and reactive to light.   Cardiovascular:      Rate and Rhythm: Normal rate and regular rhythm.      Heart sounds: No murmur heard.  Pulmonary:      Breath sounds: Normal breath sounds.   Abdominal:      General: There is no distension.      Palpations: There is no hepatomegaly or splenomegaly.      Tenderness: There is no abdominal tenderness.   Musculoskeletal:      Cervical back: Normal range of motion.   Lymphadenopathy:      Cervical: No cervical adenopathy.      Upper Body:      Right upper body: No axillary adenopathy.      Left upper body: No axillary adenopathy.   Neurological:      Cranial Nerves: No cranial nerve deficit.      Sensory: No sensory deficit.      Motor: Motor function is intact.      Deep Tendon Reflexes: Reflexes are normal and symmetric.         Assessment/Plan       Annual exam:  Check A1c and CBC, BMP

## 2025-05-28 DIAGNOSIS — Z96.651 STATUS POST RIGHT KNEE REPLACEMENT: ICD-10-CM

## 2025-05-28 RX ORDER — GABAPENTIN 300 MG/1
300 CAPSULE ORAL 3 TIMES DAILY
Qty: 90 CAPSULE | Refills: 2 | Status: SHIPPED | OUTPATIENT
Start: 2025-05-28 | End: 2025-08-26

## 2025-06-27 ENCOUNTER — PATIENT MESSAGE (OUTPATIENT)
Dept: DERMATOLOGY | Facility: CLINIC | Age: 75
End: 2025-06-27
Payer: COMMERCIAL

## 2025-06-30 ENCOUNTER — OFFICE VISIT (OUTPATIENT)
Dept: ENDOCRINOLOGY | Facility: CLINIC | Age: 75
End: 2025-06-30
Payer: COMMERCIAL

## 2025-06-30 VITALS
HEIGHT: 63 IN | SYSTOLIC BLOOD PRESSURE: 124 MMHG | WEIGHT: 172.94 LBS | DIASTOLIC BLOOD PRESSURE: 72 MMHG | BODY MASS INDEX: 30.64 KG/M2

## 2025-06-30 DIAGNOSIS — M81.0 AGE-RELATED OSTEOPOROSIS WITHOUT CURRENT PATHOLOGICAL FRACTURE: Primary | ICD-10-CM

## 2025-06-30 DIAGNOSIS — R73.02 IGT (IMPAIRED GLUCOSE TOLERANCE): ICD-10-CM

## 2025-06-30 DIAGNOSIS — E55.9 VITAMIN D DEFICIENCY: ICD-10-CM

## 2025-06-30 DIAGNOSIS — E87.1 HYPONATREMIA: ICD-10-CM

## 2025-06-30 PROCEDURE — 99214 OFFICE O/P EST MOD 30 MIN: CPT | Mod: S$GLB,,, | Performed by: INTERNAL MEDICINE

## 2025-06-30 PROCEDURE — 4010F ACE/ARB THERAPY RXD/TAKEN: CPT | Mod: CPTII,S$GLB,, | Performed by: INTERNAL MEDICINE

## 2025-06-30 PROCEDURE — 3288F FALL RISK ASSESSMENT DOCD: CPT | Mod: CPTII,S$GLB,, | Performed by: INTERNAL MEDICINE

## 2025-06-30 PROCEDURE — 1160F RVW MEDS BY RX/DR IN RCRD: CPT | Mod: CPTII,S$GLB,, | Performed by: INTERNAL MEDICINE

## 2025-06-30 PROCEDURE — 1159F MED LIST DOCD IN RCRD: CPT | Mod: CPTII,S$GLB,, | Performed by: INTERNAL MEDICINE

## 2025-06-30 PROCEDURE — 1101F PT FALLS ASSESS-DOCD LE1/YR: CPT | Mod: CPTII,S$GLB,, | Performed by: INTERNAL MEDICINE

## 2025-06-30 PROCEDURE — 3008F BODY MASS INDEX DOCD: CPT | Mod: CPTII,S$GLB,, | Performed by: INTERNAL MEDICINE

## 2025-06-30 PROCEDURE — G2211 COMPLEX E/M VISIT ADD ON: HCPCS | Mod: S$GLB,,, | Performed by: INTERNAL MEDICINE

## 2025-06-30 PROCEDURE — 3044F HG A1C LEVEL LT 7.0%: CPT | Mod: CPTII,S$GLB,, | Performed by: INTERNAL MEDICINE

## 2025-06-30 PROCEDURE — 1126F AMNT PAIN NOTED NONE PRSNT: CPT | Mod: CPTII,S$GLB,, | Performed by: INTERNAL MEDICINE

## 2025-06-30 PROCEDURE — 3074F SYST BP LT 130 MM HG: CPT | Mod: CPTII,S$GLB,, | Performed by: INTERNAL MEDICINE

## 2025-06-30 PROCEDURE — 3078F DIAST BP <80 MM HG: CPT | Mod: CPTII,S$GLB,, | Performed by: INTERNAL MEDICINE

## 2025-06-30 PROCEDURE — 99999 PR PBB SHADOW E&M-EST. PATIENT-LVL IV: CPT | Mod: PBBFAC,,, | Performed by: INTERNAL MEDICINE

## 2025-06-30 NOTE — PROGRESS NOTES
"Subjective:      Patient ID: Carmen Hawley is a 75 y.o.    Chief Complaint:  osteoporosis    History of Present Illness  With regards to osteoporosis:     Diagnosed with osteoporosis on bone density scan from: 9/2023  Fractures: Denies  First bone density was: 6/15/2016  Last bone density: 9/2023  Osteoporosis medications used in the past: None    We offered her medication in 2024, she declined      FH hip fracture, scoliosis or osteoporosis? Mother had hip fracture.    current medication: Calcium and Vitamin D  Start date: "a couple years ago"         They have made fall precautions in house     No recent fractures   No significant height loss (>2 inches)    tob use ?  None   etoh use? Social      No current diarrhea or h/o malabsorption    Denies active malignancy, history of malignancy the involved the bone, prior radiation treatment, or unexplained elevations of alk phos on labs        Did have anemia but that resolved   Negative screen for celiac and normal SPEP    Her 24 hour urine calcium was low      Her hyponatremia is longstanding- is on hydrochlorothiazide- we advised her to stop it in the past but she is still on it.    With regards to obesity, Body mass index is 30.64 kg/m².,  igt         She was seen bariatric medicine for weight management.  She is currently on metformin     Off Contrave    Was on topiramate in the past      With regards to the vitamin-D deficiency she is on 2000 IU a day      ROS:   As above    Objective:     /72 (BP Location: Left arm, Patient Position: Sitting)   Ht 5' 3" (1.6 m)   Wt 78.4 kg (172 lb 15.2 oz)   LMP 12/16/2006 (Approximate)   BMI 30.64 kg/m²     Body mass index is 30.64 kg/m².      Physical Exam  Constitutional:       Appearance: Normal appearance.   HENT:      Head: Normocephalic and atraumatic.   Neurological:      Mental Status: She is alert.   Psychiatric:         Mood and Affect: Mood normal.         Behavior: Behavior normal.                Lab " Review:   Lab Results   Component Value Date    HGBA1C 5.7 (H) 04/10/2025     Lab Results   Component Value Date    CHOL 179 10/17/2024    HDL 86 (H) 10/17/2024    LDLCALC 80.2 10/17/2024    TRIG 64 10/17/2024    CHOLHDL 48.0 10/17/2024     Lab Results   Component Value Date     (L) 04/10/2025    K 4.6 04/10/2025    CL 96 04/10/2025    CO2 27 04/10/2025     (H) 04/10/2025    BUN 14 04/10/2025    CREATININE 0.8 04/10/2025    CALCIUM 9.9 04/10/2025    PROT 7.3 09/19/2024    ALBUMIN 4.2 09/19/2024    BILITOT 0.6 09/19/2024    ALKPHOS 77 09/19/2024    AST 21 09/19/2024    ALT 18 09/19/2024    ANIONGAP 8 04/10/2025    ESTGFRAFRICA >60.0 07/14/2021    EGFRNONAA >60.0 07/14/2021    TSH 1.981 07/31/2024     Vit D, 25-Hydroxy   Date Value Ref Range Status   07/31/2024 31 30 - 96 ng/mL Final     Comment:     Vitamin D deficiency.........<10 ng/mL                              Vitamin D insufficiency......10-29 ng/mL       Vitamin D sufficiency........> or equal to 30 ng/mL  Vitamin D toxicity............>100 ng/mL       Lab Results   Component Value Date    WBC 6.30 04/10/2025    HGB 12.2 04/10/2025    HCT 37.4 04/10/2025    MCV 90 04/10/2025     04/10/2025           Assessment and Plan     Age-related osteoporosis without current pathological fracture   Reviewed options and after discussion she opted to have her bone density repeated in September and she will reconsider treatment at that time    RDA calcium and vitamin-D      Fall precaution    Hyponatremia  Stop hydrochlorothiazide and recheck in 1-2 weeks    Anemia  Negative celiac screen and normal SPEP    Vitamin D deficiency  Continue over-the-counter vitamin-D    IGT (impaired glucose tolerance)  Urged her look into the digital weight program here at [facility]

## 2025-07-03 ENCOUNTER — TELEPHONE (OUTPATIENT)
Dept: DERMATOLOGY | Facility: CLINIC | Age: 75
End: 2025-07-03
Payer: COMMERCIAL

## 2025-07-03 PROBLEM — R73.02 IGT (IMPAIRED GLUCOSE TOLERANCE): Status: ACTIVE | Noted: 2025-07-03

## 2025-07-03 NOTE — ASSESSMENT & PLAN NOTE
Reviewed options and after discussion she opted to have her bone density repeated in September and she will reconsider treatment at that time    RDA calcium and vitamin-D      Fall precaution

## 2025-07-08 DIAGNOSIS — Z96.651 STATUS POST RIGHT KNEE REPLACEMENT: Primary | ICD-10-CM

## 2025-07-08 DIAGNOSIS — M25.562 ACUTE PAIN OF LEFT KNEE: ICD-10-CM

## 2025-07-09 ENCOUNTER — RESULTS FOLLOW-UP (OUTPATIENT)
Dept: ENDOCRINOLOGY | Facility: CLINIC | Age: 75
End: 2025-07-09
Payer: COMMERCIAL

## 2025-07-09 ENCOUNTER — LAB VISIT (OUTPATIENT)
Dept: LAB | Facility: HOSPITAL | Age: 75
End: 2025-07-09
Attending: INTERNAL MEDICINE
Payer: COMMERCIAL

## 2025-07-09 DIAGNOSIS — E87.1 HYPONATREMIA: ICD-10-CM

## 2025-07-09 LAB
ALBUMIN SERPL BCP-MCNC: 4.2 G/DL (ref 3.5–5.2)
ALP SERPL-CCNC: 72 UNIT/L (ref 40–150)
ALT SERPL W/O P-5'-P-CCNC: 11 UNIT/L (ref 10–44)
ANION GAP (OHS): 6 MMOL/L (ref 8–16)
AST SERPL-CCNC: 16 UNIT/L (ref 11–45)
BILIRUB SERPL-MCNC: 0.4 MG/DL (ref 0.1–1)
BUN SERPL-MCNC: 9 MG/DL (ref 8–23)
CALCIUM SERPL-MCNC: 9.5 MG/DL (ref 8.7–10.5)
CHLORIDE SERPL-SCNC: 97 MMOL/L (ref 95–110)
CO2 SERPL-SCNC: 28 MMOL/L (ref 23–29)
CREAT SERPL-MCNC: 0.6 MG/DL (ref 0.5–1.4)
GFR SERPLBLD CREATININE-BSD FMLA CKD-EPI: >60 ML/MIN/1.73/M2
GLUCOSE SERPL-MCNC: 93 MG/DL (ref 70–110)
POTASSIUM SERPL-SCNC: 4 MMOL/L (ref 3.5–5.1)
PROT SERPL-MCNC: 7.2 GM/DL (ref 6–8.4)
SODIUM SERPL-SCNC: 131 MMOL/L (ref 136–145)

## 2025-07-09 PROCEDURE — 80053 COMPREHEN METABOLIC PANEL: CPT

## 2025-07-09 PROCEDURE — 36415 COLL VENOUS BLD VENIPUNCTURE: CPT

## 2025-07-10 ENCOUNTER — OFFICE VISIT (OUTPATIENT)
Dept: ORTHOPEDICS | Facility: CLINIC | Age: 75
End: 2025-07-10
Payer: COMMERCIAL

## 2025-07-10 ENCOUNTER — HOSPITAL ENCOUNTER (OUTPATIENT)
Dept: RADIOLOGY | Facility: HOSPITAL | Age: 75
Discharge: HOME | End: 2025-07-10
Attending: ORTHOPAEDIC SURGERY
Payer: COMMERCIAL

## 2025-07-10 VITALS — WEIGHT: 171.94 LBS | BODY MASS INDEX: 30.46 KG/M2 | HEIGHT: 63 IN

## 2025-07-10 DIAGNOSIS — Z96.651 STATUS POST RIGHT KNEE REPLACEMENT: ICD-10-CM

## 2025-07-10 DIAGNOSIS — M25.562 ACUTE PAIN OF LEFT KNEE: ICD-10-CM

## 2025-07-10 DIAGNOSIS — M70.52 PES ANSERINUS BURSITIS OF LEFT KNEE: Primary | ICD-10-CM

## 2025-07-10 PROCEDURE — 73564 X-RAY EXAM KNEE 4 OR MORE: CPT | Mod: TC,50

## 2025-07-10 PROCEDURE — 3288F FALL RISK ASSESSMENT DOCD: CPT | Mod: CPTII,S$GLB,, | Performed by: ORTHOPAEDIC SURGERY

## 2025-07-10 PROCEDURE — 3044F HG A1C LEVEL LT 7.0%: CPT | Mod: CPTII,S$GLB,, | Performed by: ORTHOPAEDIC SURGERY

## 2025-07-10 PROCEDURE — 1159F MED LIST DOCD IN RCRD: CPT | Mod: CPTII,S$GLB,, | Performed by: ORTHOPAEDIC SURGERY

## 2025-07-10 PROCEDURE — 99213 OFFICE O/P EST LOW 20 MIN: CPT | Mod: 25,S$GLB,, | Performed by: ORTHOPAEDIC SURGERY

## 2025-07-10 PROCEDURE — 1125F AMNT PAIN NOTED PAIN PRSNT: CPT | Mod: CPTII,S$GLB,, | Performed by: ORTHOPAEDIC SURGERY

## 2025-07-10 PROCEDURE — 1101F PT FALLS ASSESS-DOCD LE1/YR: CPT | Mod: CPTII,S$GLB,, | Performed by: ORTHOPAEDIC SURGERY

## 2025-07-10 PROCEDURE — 99999 PR PBB SHADOW E&M-EST. PATIENT-LVL III: CPT | Mod: PBBFAC,,, | Performed by: ORTHOPAEDIC SURGERY

## 2025-07-10 PROCEDURE — 73564 X-RAY EXAM KNEE 4 OR MORE: CPT | Mod: 26,50,, | Performed by: RADIOLOGY

## 2025-07-10 PROCEDURE — 3008F BODY MASS INDEX DOCD: CPT | Mod: CPTII,S$GLB,, | Performed by: ORTHOPAEDIC SURGERY

## 2025-07-10 PROCEDURE — 20610 DRAIN/INJ JOINT/BURSA W/O US: CPT | Mod: LT,S$GLB,, | Performed by: ORTHOPAEDIC SURGERY

## 2025-07-10 PROCEDURE — 4010F ACE/ARB THERAPY RXD/TAKEN: CPT | Mod: CPTII,S$GLB,, | Performed by: ORTHOPAEDIC SURGERY

## 2025-07-10 RX ADMIN — TRIAMCINOLONE ACETONIDE 40 MG: 40 INJECTION, SUSPENSION INTRA-ARTICULAR; INTRAMUSCULAR at 01:07

## 2025-07-10 NOTE — PROGRESS NOTES
Subjective:     HPI:   Carmen Hawley is a 75 y.o. female who presents for annual follow up right TKA + L knee eval     Date of surgery: R JOSHUA TKA 10/23/23    History of Present Illness    CHIEF COMPLAINT:  - Left knee pain    HPI:  Ms. Hawley presents for follow-up of bilateral knee issues. The right knee, previously operated on, is doing very well with no pain, problems, or concerns. She denies using any pain medication, cane, walker, or crutches for the right knee.    Her left knee pain started in May while dancing extensively at a wedding at the [place]. She also mentions frequent walking at work. Prior to May, she denies any left knee issues. Pain is more pronounced on the medial aspect. She denies catching or locking in the knee. To manage left knee pain, she used gabapentin, which provided some relief along with Tylenol arthritis. She uses a compressive sleeve when doing housework. She expresses concern about the left knee due to her previous experience with the right knee.    She also reports hip soreness, describing it as discomfort when getting up to walk.    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  - Considering reducing patient visits and calling instead due to knee discomfort    SOCIAL HISTORY:  - Attends [employer]         R TKA: missed 1 year f/u (lost  around that time)  Medications: none    Assistive Devices: none    Limitations: none    Doing well, happy with knee, no issues    L knee:   No prior issues   [place], danced all night, irritated L knee  Walk around hospital all day long     Tylenol + kervin helped   Has not tried any nsaids     No inj's  Not PT  Tried comp sleeve at home with houswork     Vague medial   [facility]        Objective:   Body mass index is 30.46 kg/m².  Exam:    Gait: limp/antalgic none    Incision: healed    Stability:  Knee stable anterior-posterior varus and valgus stresses, no extensor lag    Extension: 0    Flexion: 130    Valgus angle: 5    Pre-op   12/21/23 3-125  3 month 2-125  6 month 0-130    L knee: 0-135, 5 valgus   Nttp MJL, neg McM  Ttp pes          Physical Exam    MSK: Knee - Right: Right knee: Well-healed incision. Right knee: ROM 0-130 degrees. Right knee: Good stability. Right knee: No extension lag. Right knee: No pain.  MSK: Knee - Left: Left knee: ROM 0-135 degrees. Left knee: 5 degrees valgus alignment. Left knee: Stable to anterior, posterior, varus and valgus stresses. Left knee: No flexion contraction or extension lag. Left knee: Non-tender at medial, lateral and patellofemoral joint line. Left knee: No significant effusion. Left knee: Negative Monroe sign. Left knee: Tender at pes anserinus.  MSK: Spine - Hip: Left hip: No groin pain with extra straight leg raise. Left hip: No pain with active and passive ROM.  Musculoskeletal: Walking well, no limp, no antalgia, negative Trendelenburg gait.  Neurological: Distally neurovascularly intact with good strength, sensation and pulses.  IMAGING:  - XR Left Knee: 2022, showed some space remaining  - XR Left Knee: Today, no changes from 2022, some space still present  - XR Right Knee: Today, shows well-positioned knee replacement           Imaging:  Indication:  Exam status post right total knee arthroplasty  Exam Ordered: Radiographs of the right knee include a standing anteroposterior view, a lateral view, and a sunrise view  Details of Examination: Todays exam show a well fixed, well positioned total  knee arthroplasty with no evidence of wear, osteolysis, or loosening.  Impression:  Status post right total knee arthroplasty, implant in good position with no abnormality     L knee mild degen changes preserved joint spaces - no change 2022    Results                  Assessment:       ICD-10-CM ICD-9-CM   1. Pes anserinus bursitis of left knee  M70.52 726.61   2. Status post right knee replacement  Z96.651 V43.65        R TKA Doing well     Plan:       Patient is doing very well with their total knee arthroplasty.  They will continue with their routine care of the knee replacement and see me back for their follow-up at the routine interval.  If there are problems in the interim they will see me back sooner.    Assessment & Plan    LEFT KNEE ENTHESOPATHY AND PAIN:   Left knee targeted steroid injection administered today at the tender spot where the hamstring tendons attach to the bone.   Perform hamstring stretches to improve flexibility and reduce tendinitis symptoms.   Use compression sleeve during housework for knee support.   Referred to PT for hamstring stretches and overall conditioning.   Started Aleve (naproxen) once or twice daily, as directed on the bottle.   Continue Tylenol (acetaminophen) 650mg arthritis formula.   Take Aleve and Tylenol in combination.    GENERAL MANAGEMENT:   Pace activities to avoid overexertion.   Refill gabapentin prescription (30 tablets).         R TKA 5 year follow up for standard xrays    L knee: pes bursitis  -add aleve to tylenol   -pes CSI   -PT     F/u PRN     This note was generated with the assistance of ambient listening technology. Verbal consent was obtained by the patient and accompanying visitor(s) for the recording of patient appointment to facilitate this note. I attest to having reviewed and edited the generated note for accuracy, though some syntax or spelling errors may persist. Please contact the author of this note for any clarification.      Orders Placed  This Encounter   Procedures    Large Joint Aspiration/Injection: L knee     This order was created via procedure documentation    Ambulatory Referral/Consult to Physical Therapy     Standing Status:   Future     Expected Date:   7/17/2025     Expiration Date:   8/10/2026     Referral Priority:   Routine     Referral Type:   Physical Therapy     Referral Reason:   Specialty Services Required     Number of Visits Requested:   1             Past Medical History:   Diagnosis Date    Anemia 10/05/2023    Anxiety     Arthritis     Cataract     Hyperlipidemia     Hypertension     Macular degeneration     Mitral valve prolapse     Renal insufficiency 10/05/2023    Salzmann's nodular dystrophy of both eyes        Past Surgical History:   Procedure Laterality Date    ARTHROPLASTY, KNEE, TOTAL, USING COMPUTER-ASSISTED NAVIGATION Right 10/23/2023    Procedure: ARTHROPLASTY, KNEE, TOTAL, USING COMPUTER-ASSISTED NAVIGATION: JOSHUA: RIGHT: JILL - TRIATHALON;  Surgeon: Jean-Pierre Bennett III, MD;  Location: [facility] OR;  Service: Orthopedics;  Laterality: Right;    CATARACT EXTRACTION W/  INTRAOCULAR LENS IMPLANT Left 05/08/2023    Procedure: EXTRACTION, CATARACT, WITH IOL INSERTION;  Surgeon: Flaca Diallo MD;  Location: [facility] OR;  Service: Ophthalmology;  Laterality: Left;    CATARACT EXTRACTION W/  INTRAOCULAR LENS IMPLANT Right 05/22/2023    Procedure: EXTRACTION, CATARACT, WITH IOL INSERTION;  Surgeon: Flaca Diallo MD;  Location: [facility] OR;  Service: Ophthalmology;  Laterality: Right;    COLONOSCOPY N/A 10/08/2020    Procedure: COLONOSCOPY;  Surgeon: Crispin Moon MD;  [facility]  Service: Endoscopy;  Laterality: N/A;  Family history of colon polyps  COVID test on 10/5/20 at 2nd floor -     EYE SURGERY      INJECTION Right 05/17/2023    Procedure: INJECTION RIGHT KNEE SYNVISC;  Surgeon: Tej Cm MD;  Location: [facility] PAIN MGT;  Service: Pain Management;  Laterality: Right;    TONSILLECTOMY       TRANSFORAMINAL EPIDURAL INJECTION OF STEROID Right 06/29/2022    Procedure: INJECTION, STEROID, EPIDURAL, TRANSFORAMINAL APPROACH, RIGHT L3-L4 AND L4-L5 CONTRAST DIRECT REF;  Surgeon: Tej Cm MD;  Location: [facility] PAIN MGT;  Service: Pain Management;  Laterality: Right;    TRANSFORAMINAL EPIDURAL INJECTION OF STEROID Right 03/15/2023    Procedure: INJECTION, STEROID, EPIDURAL, TRANSFORAMINAL APPROACH RIGHT L3/4 AND L4/5;  Surgeon: Tej Cm MD;  Location: [facility] PAIN MGT;  Service: Pain Management;  Laterality: Right;       Family History   Problem Relation Name Age of Onset    Cancer Mother          lung    Stroke Mother      Heart disease Father      Diabetes Father      Diabetes Brother      Aortic aneurysm Brother          surgery 5/2012    Kidney disease Brother          on dialysis    Melanoma Neg Hx      Breast cancer Neg Hx      Colon cancer Neg Hx      Ovarian cancer Neg Hx         Social History     Socioeconomic History    Marital status:      Spouse name: Nicho   Occupational History     Employer: [employer]   Tobacco Use    Smoking status: Never    Smokeless tobacco: Never   Substance and Sexual Activity    Alcohol use: Yes     Comment: 1-2 glasses wine 5 daily    Drug use: No    Sexual activity: Yes     Partners: Male     Birth control/protection: Post-menopausal     Comment:  since 2000   Social History Narrative    Works in [employer] department at [employer]. Working part-time.        Likes to play golf, walks for exercise.      Social Drivers of Health     Financial Resource Strain: Low Risk  (3/14/2024)    Overall Financial Resource Strain (CARDIA)     Difficulty of Paying Living Expenses: Not hard at all   Food Insecurity: No Food Insecurity (3/14/2024)    Hunger Vital Sign     Worried About Running Out of Food in the Last Year: Never true     Ran Out of Food in the Last Year: Never true   Transportation Needs: No Transportation  Needs (3/14/2024)    PRAPARE - Transportation     Lack of Transportation (Medical): No     Lack of Transportation (Non-Medical): No   Physical Activity: Insufficiently Active (3/14/2024)    Exercise Vital Sign     Days of Exercise per Week: 2 days     Minutes of Exercise per Session: 30 min   Stress: No Stress Concern Present (3/14/2024)    [employer] [place] [employer] - Occupational Stress Questionnaire     Feeling of Stress : Only a little   Housing Stability: Low Risk  (3/14/2024)    Housing Stability Vital Sign     Unable to Pay for Housing in the Last Year: No     Number of Places Lived in the Last Year: 1     Unstable Housing in the Last Year: No

## 2025-07-10 NOTE — PROCEDURES
Large Joint Aspiration/Injection: L knee    Date/Time: 7/10/2025 1:00 PM    Performed by: Jean-Pierre Bennett III, MD  Authorized by: Jean-Pierre Bennett III, MD    Consent Done?:  Yes (Verbal)  Indications:  Pain  Timeout: prior to procedure the correct patient, procedure, and site was verified    Prep: patient was prepped and draped in usual sterile fashion      Local anesthesia used?: Yes    Local anesthetic:  Lidocaine 1% without epinephrine  Anesthetic total (ml):  1    Location:  Knee  Site:  L knee  Medications:  40 mg triamcinolone acetonide 40 mg/mL  Patient tolerance:  Patient tolerated the procedure well with no immediate complications     Needle Size: 21 G

## 2025-07-12 RX ORDER — TRIAMCINOLONE ACETONIDE 40 MG/ML
40 INJECTION, SUSPENSION INTRA-ARTICULAR; INTRAMUSCULAR
Status: DISCONTINUED | OUTPATIENT
Start: 2025-07-10 | End: 2025-07-12 | Stop reason: HOSPADM

## 2025-07-17 DIAGNOSIS — R73.01 IFG (IMPAIRED FASTING GLUCOSE): ICD-10-CM

## 2025-07-17 RX ORDER — METFORMIN HYDROCHLORIDE 500 MG/1
500 TABLET, EXTENDED RELEASE ORAL
Qty: 90 TABLET | Refills: 1 | OUTPATIENT
Start: 2025-07-17

## 2025-07-22 ENCOUNTER — CLINICAL SUPPORT (OUTPATIENT)
Dept: REHABILITATION | Facility: HOSPITAL | Age: 75
End: 2025-07-22
Payer: COMMERCIAL

## 2025-07-22 DIAGNOSIS — M70.52 PES ANSERINUS BURSITIS OF LEFT KNEE: ICD-10-CM

## 2025-07-22 PROCEDURE — 97161 PT EVAL LOW COMPLEX 20 MIN: CPT | Mod: PN

## 2025-07-22 PROCEDURE — 97112 NEUROMUSCULAR REEDUCATION: CPT | Mod: PN

## 2025-07-22 PROCEDURE — 97530 THERAPEUTIC ACTIVITIES: CPT | Mod: PN

## 2025-07-24 NOTE — PROGRESS NOTES
Outpatient Rehab    Physical Therapy Evaluation    Patient Name: Carmen Hawley  MRN: [id number]  YOB: 1950  Encounter Date: 7/22/2025    Therapy Diagnosis:   Encounter Diagnosis   Name Primary?    Pes anserinus bursitis of left knee      Physician: Jean-Pierre Bennett III, *    Physician Orders: Eval and Treat  Medical Diagnosis: Pes anserinus bursitis of left knee  Surgical Diagnosis: Not applicable for this Episode   Surgical Date: Not applicable for this Episode  Days Since Last Surgery: Not applicable for this Episode    Visit # / Visits Authorized:  1 / 1  Insurance Authorization Period: 7/10/2025 to 7/10/2026  Date of Evaluation: 7/22/2025  Plan of Care Certification: 7/22/2025 to 10/22/2025     Time In: 0900   Time Out: 1000  Total Time (in minutes): 60   Total Billable Time (in minutes): 60    Intake Outcome Measure for FOTO Survey    Therapist reviewed FOTO scores for Carmen Hawley on 7/22/2025.   FOTO report - see Media section or FOTO account episode details.     Intake Score (%): 55    Precautions:       Subjective   History of Present Illness  Carmen is a 75 y.o. female who reports to physical therapy with a chief concern of My left knee was bothering me, but since I got the steroid injection, it is better..     The patient reports a medical diagnosis of M70.52 (ICD-10-CM) - Pes anserinus bursitis of left knee. The patient has experienced this issue since 07/10/25.   Diagnostic tests related to this condition: X-ray.   X-Ray Details: Right: There is a TKA in place with good alignment and no complication.     Left: No fracture dislocation bone destruction or OCD seen.  No acute trauma or joint effusion seen.   7/10/25    Dominant Hand: Right  History of Present Condition/Illness: About 6 weeks ago - slipped in the bathroom - Right knee down towards floor - left leg - lunge and knee flexed - noted pain in medial aspect of her left knee, Continued to have some discomfort in left knee,  worried about Right TKA which she had replaced in September 2023.  Patient went to ortho - steroid injection ( 7/10) and was referred for therapy.  Since this time, Carmen reports that her left knee pain has gone away, but she wanted to keep appointment as she just hasn't found time to start back any type of exercise.  Carmen's  passed away last October.  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  She has a history of lumbar DDD with stenosis at the lower levels that flared up recently after wearing a very low heel to a wedding.  She had back pain following this - usually wears flats while at work.   Carmen also has history of lymphedema in LE's - takes fluid pills occasionally, wears compression socks occasionally.     Activities of Daily Living  Social history was obtained from Patient.    General Prior Level of Function Comments: Independent, works 3x/week  General Current Level of Function Comments: Independent, continues to work 3x/week;  Patient Responsibilities: Community mobility, Driving, Financial management, Health management, Home management, Laundry, Meal prep, Personal ADL, Shopping    Previously independent with activities of daily living? Yes                Previously independent with instrumental activities of daily living? Yes     Currently independent with instrumental activities of daily living? Yes              Pain     Patient reports a current pain level of 0/10. Pain at best is reported as 0/10. Pain at worst is reported as 2/10.   Location: occasional 1-2/10 in lower back with more strenuous activities;  Has had no left knee pain since steroid injection  Clinical Progression (since onset): Improved  Pain Qualities:  Tightness  Pain-Relieving Factors: Activity modification, Rest, Medications - over-the-counter  Pain-Aggravating Factors: Stair climbing, Bending, Twisting         Living Arrangements  Living Situation  Housing: Home independently  Living Arrangements: Alone  Support Systems: Friends/neighbors, Other (Comment)  Other Support System Comment: work staff, medical personnel    Home Setup  Type of Structure: House  Home Access: Level entry  Number of Levels in Home: One level        Employment  Patient does not report that: Does the patient's condition impact their ability to work?  Employment Status: Employed part-time          Past Medical History/Physical Systems Review:   Carmen Hawley  has a past medical history of Anemia, Anxiety, Arthritis, Cataract, Hyperlipidemia, Hypertension, Macular degeneration, Mitral valve prolapse, Renal insufficiency, and Salzmann's nodular dystrophy of both eyes.    Carmen Hawley  has a past surgical history that includes Tonsillectomy; Colonoscopy (N/A, 10/08/2020); Transforaminal epidural injection of steroid (Right, 06/29/2022); Transforaminal epidural injection of steroid (Right, 03/15/2023); Cataract extraction w/  intraocular lens implant (Left, 05/08/2023); injection (Right, 05/17/2023); Cataract extraction w/  intraocular lens implant (Right, 05/22/2023); Eye surgery; and arthroplasty, knee, total, using computer-assisted navigation (Right, 10/23/2023).    Carmen has a current medication list which includes the following prescription(s): biotin, cholecalciferol (vitamin d3), cyanocobalamin, escitalopram oxalate, escitalopram oxalate, estradiol, gabapentin, metformin, metoprolol succinate, naltrexone-bupropion, rosuvastatin, and valsartan.    Review of patient's allergies indicates:  No Known Allergies     Objective   Posture                 Forward head, kyphosis, increased lumbar lordosis with anterior pelvic tilt; Knee alignment OK.     Knee Observations  Right Knee  Observations  Not Present: Straight Leg Raise Extensor Lag  Left Knee Observations  Not Present: Straight Leg Raise Extensor Lag             Hip Range of Motion    Functional AROM bilateral hips without pain;  Restricted lumbar flexion - hands to mid shins; extension 50%; Unable to reverse lumbar lordosis with flexion in standing, sitting or long sitting - reduce segmental flexion noted.     Knee Range of Motion   Right Knee   Active (deg) Passive (deg) Pain   Flexion 125       Extension 1 0         Left Knee   Active (deg) Passive (deg) Pain   Flexion 130       Extension 2 1         Demonstrates tightness in hamstrings bilaterally - all three aspects; also has hip flexor tightness bilaterally               Hip Strength - Planes of Motion   Right Strength Right Pain Left Strength Left  Pain   Flexion (L2) 4-   4-     Extension 3+   3+     ABduction 3+   3+     ADduction 3+   3+     Internal Rotation 4-   4-     External Rotation 4-   4-         Knee Strength   Right Strength Right Pain Left Strength Left  Pain   Flexion (S2) 4-   4-     Prone Flexion 3+   3+     Extension (L3) 4+   4+       Knee Extensor Lag  No Lag: Right and Left       Ankle/Foot Strength - Planes of Motion   Right Strength Right Pain Left Strength Left  Pain   Dorsiflexion (L4) 4+   4+     Plantar Flexion (S1) 4-   4-     Inversion 4-   4-     Eversion 4-   4-     Great Toe Flexion 4-   3+     Great Toe Extension (L5) 3+   3+     Lesser Toes Flexion 3+   3+     Lesser Toes Extension 3+   3+               Fall Risk  Functional mobility test results suggest the patient is not: At Risk for Falls  Four Stage Balance Test  Narrow Base of Support: 60 sec sec  Tandem Stand - Right Foot in Front: 10 sec  Tandem Stand - Left Foot in Front: 10 sec  Semi-Tandem Stand - Right Foot in Front: 30 sec  Semi-Tandem Stand - Left Foot in Front: 30 sec  Single Leg Stand - Right Foot: 5 sec  Single Leg Stand - Left Foot: 5 sec  Required UE assist to attain tandem  "position and hold; also with SLS - needed UE assist.     Sit to Stand Testing      The patient completed 10 repetitions of a sit to stand transfer in 30 seconds. no UE assist required         Ambulation Assistance Required  Surface With  Assistive Device Without Assistive Device Details   Level   Independent      Uneven   Independent     Curb   Independent       Stairs Assistance Required   Assistance Level Upper Extremity Support Pattern   Ascending Independent One rail Reciprocal   Descending Independent One rail Reciprocal             Treatment:  Balance/Neuromuscular Re-Education  NMR 1: Hamstring stretching with strap - minimum 30 sec in all three directions - each LE  NMR 2: Achilles stretch on wedge - minimum 30 sec x 3 - perform couple time per day  NMR 3: Supine: posterior pelvic tilt - activation of TrAbs with 5" hold x 10  NMR 4: Seated: hip hinging with neutral spine to focus on lower lumbar motion/stretch  - 10 reps  NMR 5: Seated: forward lumbar flexion with SB x 20  Therapeutic Activity  TA 1: Recumbent bike: Level 5 x 10 mins  TA 2: Step-ups to work distal quad on 4" step x 20 each LE    Time Entry(in minutes):  PT Evaluation (Low) Time Entry: 30  Neuromuscular Re-Education Time Entry: 15  Therapeutic Activity Time Entry: 15    Assessment & Plan   Assessment  Carmen presents with a condition of Low complexity.   Presentation of Symptoms: Stable  Will Comorbidities Impact Care: No       Functional Limitations: Activity tolerance, Maintaining balance, Functional mobility, Range of motion, Squatting  Impairments: Abnormal or restricted range of motion, Activity intolerance, Impaired balance, Impaired physical strength, Lack of appropriate home exercise program, Pain with functional activity    Patient Goal for Therapy (PT): To improve mobility, improve posture and overall strength  Prognosis: Good  Assessment Details: Carmen is 76 yo referred to PT for pes anserine bursitis left knee - patient " currently has no pain with daily activities or with movement of left knee; She does demonstrate hamstring, calf and hip flexor tightness that impacts posture, knee extension and lumbar spine.  She also has some balance deficits with SLS, general core, gluteal and LE mm weakness that impact her overall movement patterns and functional mobility.  Carmen will benefit from Skilled physical therapy intervention to address deficits noted above to get her back on track with exercise program to prevent any further injury or pain from activity.     Plan  From a physical therapy perspective, the patient would benefit from: Skilled Rehab Services    Planned therapy interventions include: Therapeutic activities, Therapeutic exercise, Neuromuscular re-education, Manual therapy, and Gait training.            Visit Frequency: 2 times Per Week for 6 Weeks.       This plan was discussed with Patient and Therapy assistant.   Discussion participants: Agreed Upon Plan of Care             The patient's spiritual, cultural, and educational needs were considered, and the patient is agreeable to the plan of care and goals.     Education  Education was done with Patient. The patient's learning style includes Demonstration, Listening, and Pictures/video. The patient Demonstrates understanding.         Benefit of physical therapy to address tissue tension/extensibility deficits, address balance, mobility, ROM and strength as well as establish at good HEP for future use to continue improvements.        Goals:   Active       LTG's        Able to perform all transfers without limitation        Start:  07/22/25    Expected End:  09/12/25            Ambulate community required distances without limitation        Start:  07/22/25    Expected End:  09/12/25            Demonstrate improvement in tissue extensibility in LE's to prevent injury/pain        Start:  07/22/25    Expected End:  09/12/25            Improve lumbar AROM to 75%        Start:   07/22/25    Expected End:  09/12/25            Independent with HEP for continued improvement in function and mobility        Start:  07/22/25    Expected End:  09/12/25               STG's        Demonstrate improvement in recent symptoms to progress toward LTG's        Start:  07/22/25    Expected End:  08/22/25            Correct postural deficits to reduce pain and prevent future injury        Start:  07/22/25    Expected End:  08/22/25            Demonstrate compliance with initial HEP       Start:  07/22/25    Expected End:  08/22/25                Susanna Rondon, PT

## 2025-07-25 ENCOUNTER — CLINICAL SUPPORT (OUTPATIENT)
Dept: REHABILITATION | Facility: HOSPITAL | Age: 75
End: 2025-07-25
Payer: COMMERCIAL

## 2025-07-25 DIAGNOSIS — M47.897 OTHER SPONDYLOSIS, LUMBOSACRAL REGION: ICD-10-CM

## 2025-07-25 DIAGNOSIS — Z74.09 IMPAIRED FUNCTIONAL MOBILITY, BALANCE, GAIT, AND ENDURANCE: Primary | ICD-10-CM

## 2025-07-25 PROCEDURE — 97530 THERAPEUTIC ACTIVITIES: CPT | Mod: PN

## 2025-07-25 PROCEDURE — 97112 NEUROMUSCULAR REEDUCATION: CPT | Mod: PN

## 2025-07-27 NOTE — PROGRESS NOTES
"  Outpatient Rehab    Physical Therapy Visit    Patient Name: Carmen Hawley  MRN: [id number]  YOB: 1950  Encounter Date: 7/25/2025    Therapy Diagnosis:   Encounter Diagnoses   Name Primary?    Impaired functional mobility, balance, gait, and endurance Yes    Other spondylosis, lumbosacral region      Physician: Jean-Pierre Bennett III, *    Physician Orders: Eval and Treat  Medical Diagnosis: Pes anserinus bursitis of left knee  Surgical Diagnosis: Not applicable for this Episode   Surgical Date: Not applicable for this Episode  Days Since Last Surgery: Not applicable for this Episode    Visit # / Visits Authorized:  1 / 15  Insurance Authorization Period: 7/23/2025 to 12/31/2025  Date of Evaluation: 7/22/2025  Plan of Care Certification: 7/22/2025 to 10/22/2025      PT/PTA: PT   Number of PTA visits since last PT visit:0  Time In: 1430   Time Out: 1545  Total Time (in minutes): 75   Total Billable Time (in minutes): 40 split billing; reflects 1:1 patient care     FOTO:  Intake Score (%): 55  Survey Score 2 (%): Not applicable for this Episode  Survey Score 3 (%): Not applicable for this Episode    Precautions:         Subjective   Patient stated that she went to Chair Yoga this morning at the library - really enjoyed it, plans of returning weekly for this.  No knee pain noted today;.  Pain reported as 1/10. lowre back a little tight    Objective            Treatment:  Balance/Neuromuscular Re-Education  NMR 1: Hamstring stretching with strap - minimum 30 sec in all three directions - each LE  NMR 2: Achilles stretch on wedge - minimum 30 sec x 3 - perform couple time per day  NMR 3: Supine: posterior pelvic tilt - activation of TrAbs with 5" hold x 10  NMR 4: Seated: hip hinging with neutral spine to focus on lower lumbar motion/stretch  - 10 reps  NMR 5: Seated: forward lumbar flexion with SB x 20  NMR 6: Toe-Taps 8" step - standing on AIr-Ex x 2 mins  Therapeutic Activity  TA 1: Recumbent bike: " "Level 5 x 15 mins  TA 2: Forward Step-ups to work distal quad on 4" step x 20 each LE  TA 3: Lateral step-ups 4" step x 20 BLE  TA 4: 3-way hip with oragne band x 15 each LE - each way  TA 5: S/L clams with orange band x 15 each side  TA 6: Supine : bridges 10 x 2 - cueing for increased gluteal mm activation - stretch through hips    Time Entry(in minutes):  Neuromuscular Re-Education Time Entry: 20  Therapeutic Activity Time Entry: 20    Assessment & Plan   Assessment: Good tolerance of initial exercise program today; Reduced knee extension secondary to tightness in H/S; tightness in anterior hips/quads, anterior pelvic tilt; will benefit from good exercise/stretching program to address these deficits.  Patient attending chair yoga on Friday's at [facility].    Evaluation/Treatment Tolerance: Patient tolerated treatment well    The patient will continue to benefit from skilled outpatient physical therapy in order to address the deficits listed in the problem list on the initial evaluation, provide patient and family education, and maximize the patients level of independence in the home and community environments.     The patient's spiritual, cultural, and educational needs were considered, and the patient is agreeable to the plan of care and goals.           Plan: COntinue with skilled physical therapy 2x/week x 6 weeks - Therapeutic ex, activities and NMR to address back/knee and general mobility/postural deficits.    Goals:   Active       LTG's        Able to perform all transfers without limitation        Start:  07/22/25    Expected End:  09/12/25            Ambulate community required distances without limitation        Start:  07/22/25    Expected End:  09/12/25            Demonstrate improvement in tissue extensibility in LE's to prevent injury/pain        Start:  07/22/25    Expected End:  09/12/25            Improve lumbar AROM to 75%        Start:  07/22/25    Expected End:  09/12/25            Independent " with HEP for continued improvement in function and mobility        Start:  07/22/25    Expected End:  09/12/25               STG's        Demonstrate improvement in recent symptoms to progress toward LTG's  (Progressing)       Start:  07/22/25    Expected End:  08/22/25            Correct postural deficits to reduce pain and prevent future injury  (Progressing)       Start:  07/22/25    Expected End:  08/22/25            Demonstrate compliance with initial HEP (Progressing)       Start:  07/22/25    Expected End:  08/22/25                Susanna Rondon, PT

## 2025-07-29 ENCOUNTER — CLINICAL SUPPORT (OUTPATIENT)
Dept: REHABILITATION | Facility: HOSPITAL | Age: 75
End: 2025-07-29
Payer: COMMERCIAL

## 2025-07-29 DIAGNOSIS — Z74.09 IMPAIRED FUNCTIONAL MOBILITY, BALANCE, GAIT, AND ENDURANCE: ICD-10-CM

## 2025-07-29 DIAGNOSIS — M47.897 OTHER SPONDYLOSIS, LUMBOSACRAL REGION: Primary | ICD-10-CM

## 2025-07-29 PROCEDURE — 97112 NEUROMUSCULAR REEDUCATION: CPT | Mod: PN

## 2025-07-29 PROCEDURE — 97530 THERAPEUTIC ACTIVITIES: CPT | Mod: PN

## 2025-07-30 NOTE — PROGRESS NOTES
"  Outpatient Rehab    Physical Therapy Visit    Patient Name: Carmen Hawley  MRN: [id number]  YOB: 1950  Encounter Date: 7/29/2025    Therapy Diagnosis:   Encounter Diagnoses   Name Primary?    Other spondylosis, lumbosacral region Yes    Impaired functional mobility, balance, gait, and endurance      Physician: Jean-Pierre Bennett III, *    Physician Orders: Eval and Treat  Medical Diagnosis: Pes anserinus bursitis of left knee  Surgical Diagnosis: Not applicable for this Episode   Surgical Date: Not applicable for this Episode  Days Since Last Surgery: Not applicable for this Episode    Visit # / Visits Authorized:  2 / 15  Insurance Authorization Period: 7/23/2025 to 12/31/2025  Date of Evaluation: 7/22/2025  Plan of Care Certification: 7/22/2025 to 10/22/2025      PT/PTA:     Number of PTA visits since last PT visit:   Time In: 1500   Time Out: 1600  Total Time (in minutes): 60   Total Billable Time (in minutes): 55    FOTO:  Intake Score (%): 55  Survey Score 2 (%): Not applicable for this Episode  Survey Score 3 (%): Not applicable for this Episode    Precautions:         Subjective   Patient without c/o pain; streeful day trying to get all on her list finished before returning to work tomorrow.  Wants to continue with chair yoga on Friday's so will change her PT time to accomodate - both are good for her to do..  Pain reported as 1/10. lower back tightness    Objective            Treatment:  Balance/Neuromuscular Re-Education  NMR 1: Hamstring stretching with strap - minimum 30 sec in all three directions - each LE  NMR 2: Achilles stretch on wedge - minimum 30 sec x 3  NMR 3: Supine: posterior pelvic tilt - activation of TrAbs with 5" hold x 10 - needs cueing with this to engage muscles properly  NMR 4: Seated: hip hinging with neutral spine to focus on lower lumbar motion/stretch  - 10 reps  NMR 5: Seated: forward lumbar flexion with SB x 20  NMR 6: Toe-Taps 8" step - standing on AIr-Ex x 2 " "mins  NMR 7: Supine: posterior pelvic tilt x 10 - cueing/demonstration with this ex  NMR 8: Bridging: focus on elevating anterior hips/gluteal mm activation x 15  NMR 9: Seated: lumbar extension into green PB x 10  Therapeutic Activity  TA 1: Recumbent bike: Level 5 x 15 mins  TA 2: Forward Step-ups to work distal quad on 4" step x 20 each LE  TA 3: Lateral step-ups 4" step x 20 BLE  TA 4: 3-way hip with oragne band x 15 each LE - each way  TA 5: S/L clams with orange band x 15 each side  TA 6: Supine : bridges 10 x 2 - cueing for increased gluteal mm activation - stretch through hips  TA 7: Standing: TKE with use of orange/red band for some resistance x 15 each LE  TA 8: Seated: UE push into green ball ( ball on floor) x 10    Time Entry(in minutes):  Neuromuscular Re-Education Time Entry: 25  Therapeutic Activity Time Entry: 30    Assessment & Plan   Assessment: Carmen is doing OK today; no specific pain; tightness in hamstrings/calf mm continues ( chronic).  Needs cueing to engage TrAb mm and perform posterior pelvic tilt - having increased difficulty engaging her abdominal mm.  Now going to participate in yoga class on Friday's which will be good for flexibility and small mm engagement.   Evaluation/Treatment Tolerance: Patient tolerated treatment well    The patient will continue to benefit from skilled outpatient physical therapy in order to address the deficits listed in the problem list on the initial evaluation, provide patient and family education, and maximize the patients level of independence in the home and community environments.     The patient's spiritual, cultural, and educational needs were considered, and the patient is agreeable to the plan of care and goals.           Plan: COntinue with skilled physical therapy 2x/week x 6 weeks - Therapeutic ex, activities and NMR to address back/knee and general mobility/postural deficits.    Goals:   Active       LTG's        Able to perform all transfers " without limitation        Start:  07/22/25    Expected End:  09/12/25            Ambulate community required distances without limitation        Start:  07/22/25    Expected End:  09/12/25            Demonstrate improvement in tissue extensibility in LE's to prevent injury/pain        Start:  07/22/25    Expected End:  09/12/25            Improve lumbar AROM to 75%        Start:  07/22/25    Expected End:  09/12/25            Independent with HEP for continued improvement in function and mobility        Start:  07/22/25    Expected End:  09/12/25               STG's        Demonstrate improvement in recent symptoms to progress toward LTG's  (Progressing)       Start:  07/22/25    Expected End:  08/22/25            Correct postural deficits to reduce pain and prevent future injury  (Progressing)       Start:  07/22/25    Expected End:  08/22/25            Demonstrate compliance with initial HEP (Progressing)       Start:  07/22/25    Expected End:  08/22/25                Susanna Rondon, PT

## 2025-07-31 DIAGNOSIS — I10 PRIMARY HYPERTENSION: ICD-10-CM

## 2025-07-31 RX ORDER — VALSARTAN 320 MG/1
320 TABLET ORAL DAILY
Qty: 90 TABLET | Refills: 2 | Status: SHIPPED | OUTPATIENT
Start: 2025-07-31

## 2025-07-31 NOTE — TELEPHONE ENCOUNTER
No care due was identified.  [facility] Embedded Care Due Messages. Reference number: [id number].   7/31/2025 2:28:17 PM CDT

## 2025-08-01 NOTE — TELEPHONE ENCOUNTER
Refill Decision Note   Carmen Hawley  is requesting a refill authorization.  Brief Assessment and Rationale for Refill:  Approve     Medication Therapy Plan:        Pharmacist review requested: Yes   Extended chart review required: Yes   Comments:     Note composed:10:29 PM 07/31/2025

## 2025-08-01 NOTE — TELEPHONE ENCOUNTER
Refill Routing Note   Medication(s) are not appropriate for processing by [facility] for the following reason(s):        Drug-disease interaction    [facility] action(s):  Defer        Medication Therapy Plan: Drug-Disease: valsartan and Hyponatremia    Pharmacist review requested: Yes     Appointments  past 12m or future 3m with PCP    Date Provider   Last Visit   4/10/2025 Susanna Sprague MD   Next Visit   10/15/2025 Susanna Sprague MD   ED visits in past 90 days: 0        Note composed:8:47 PM 07/31/2025

## 2025-08-04 ENCOUNTER — OFFICE VISIT (OUTPATIENT)
Dept: INTERNAL MEDICINE | Facility: CLINIC | Age: 75
End: 2025-08-04
Payer: COMMERCIAL

## 2025-08-04 ENCOUNTER — PATIENT MESSAGE (OUTPATIENT)
Dept: INTERNAL MEDICINE | Facility: CLINIC | Age: 75
End: 2025-08-04

## 2025-08-04 DIAGNOSIS — E66.811 OBESITY, CLASS I, BMI 30-34.9: Primary | ICD-10-CM

## 2025-08-04 PROCEDURE — 99499 UNLISTED E&M SERVICE: CPT | Mod: 95,,, | Performed by: PHARMACIST

## 2025-08-04 RX ORDER — SEMAGLUTIDE 0.25 MG/.5ML
0.25 INJECTION, SOLUTION SUBCUTANEOUS
Qty: 2 ML | Refills: 0 | Status: SHIPPED | OUTPATIENT
Start: 2025-08-04 | End: 2025-08-04 | Stop reason: SDUPTHER

## 2025-08-04 RX ORDER — SEMAGLUTIDE 0.25 MG/.5ML
0.25 INJECTION, SOLUTION SUBCUTANEOUS
Qty: 2 ML | Refills: 0 | Status: ACTIVE | OUTPATIENT
Start: 2025-08-04

## 2025-08-04 RX ORDER — SEMAGLUTIDE 1 MG/.5ML
1 INJECTION, SOLUTION SUBCUTANEOUS
Qty: 2 ML | Refills: 0 | Status: ACTIVE | OUTPATIENT
Start: 2025-08-04

## 2025-08-04 RX ORDER — SEMAGLUTIDE 0.5 MG/.5ML
0.5 INJECTION, SOLUTION SUBCUTANEOUS
Qty: 2 ML | Refills: 0 | Status: SHIPPED | OUTPATIENT
Start: 2025-08-04 | End: 2025-08-04 | Stop reason: SDUPTHER

## 2025-08-04 RX ORDER — SEMAGLUTIDE 1 MG/.5ML
1 INJECTION, SOLUTION SUBCUTANEOUS
Qty: 2 ML | Refills: 0 | Status: SHIPPED | OUTPATIENT
Start: 2025-08-04 | End: 2025-08-04 | Stop reason: SDUPTHER

## 2025-08-04 RX ORDER — SEMAGLUTIDE 0.5 MG/.5ML
0.5 INJECTION, SOLUTION SUBCUTANEOUS
Qty: 2 ML | Refills: 0 | Status: ACTIVE | OUTPATIENT
Start: 2025-08-04

## 2025-08-04 NOTE — PROGRESS NOTES
Patient ID: Carmen Hawley is a 75 y.o. White female    Subjective  Chief Complaint: patient presents for medical weight loss management.    Contraindications to GLP-1 receptor agonist therapy:   Denies personal or family history of MTC and personal history of MEN2     Co-morbidities: HTN, DLD, OA, prediabetes    History of weight loss therapy:  Pt reports history of use with Adipex and Contrave but denies use of any GLP-1 RA    Weight loss history:  Starting weight:    8/1/2025   Recent Readings    Weight (lbs) 172 lb    BMI 30.47 BMI        Objective  Lab Results   Component Value Date     (L) 07/09/2025     (L) 04/10/2025     (L) 09/19/2024     Lab Results   Component Value Date    K 4.0 07/09/2025    K 4.6 04/10/2025    K 4.2 09/19/2024     Lab Results   Component Value Date    CL 97 07/09/2025    CL 96 04/10/2025    CL 98 09/19/2024     Lab Results   Component Value Date    CO2 28 07/09/2025    CO2 27 04/10/2025    CO2 25 09/19/2024     Lab Results   Component Value Date    BUN 9 07/09/2025    BUN 14 04/10/2025    BUN 12 09/19/2024     Lab Results   Component Value Date    GLU 93 07/09/2025     (H) 04/10/2025     09/19/2024     Lab Results   Component Value Date    CALCIUM 9.5 07/09/2025    CALCIUM 9.9 04/10/2025    CALCIUM 10.1 09/19/2024     Lab Results   Component Value Date    PROT 7.2 07/09/2025    PROT 7.3 09/19/2024    PROT 7.4 07/31/2024     Lab Results   Component Value Date    ALBUMIN 4.2 07/09/2025    ALBUMIN 4.2 09/19/2024    ALBUMIN 4.1 08/28/2024     Lab Results   Component Value Date    BILITOT 0.4 07/09/2025    BILITOT 0.6 09/19/2024    BILITOT 0.4 07/31/2024     Lab Results   Component Value Date    AST 16 07/09/2025    AST 21 09/19/2024    AST 18 07/31/2024     Lab Results   Component Value Date    ALT 11 07/09/2025    ALT 18 09/19/2024    ALT 15 07/31/2024     Lab Results   Component Value Date    ANIONGAP 6 (L) 07/09/2025    ANIONGAP 8 04/10/2025     ANIONGAP 9 09/19/2024     Lab Results   Component Value Date    CREATININE 0.6 07/09/2025    CREATININE 0.8 04/10/2025    CREATININE 0.7 09/19/2024     Lab Results   Component Value Date    EGFRNORACEVR >60 07/09/2025    EGFRNORACEVR >60 04/10/2025    EGFRNORACEVR >60.0 09/19/2024     Assessment/Plan  -Pt qualifies for GLP-1 RA therapy based on BMI greater than or equal to 30 kg/m2  - Initiate Wegovy 0.25 mg once weekly for 1 month  - Then increase to Wegovy 0.5 mg once weekly for 1 month  - Then increase to Wegovy 1 mg once weekly  - RTC in 3 months for follow-up evaluation    Patient consented to pharmacist management via collaborative practice.

## 2025-08-05 ENCOUNTER — CLINICAL SUPPORT (OUTPATIENT)
Dept: REHABILITATION | Facility: HOSPITAL | Age: 75
End: 2025-08-05
Payer: COMMERCIAL

## 2025-08-05 DIAGNOSIS — M47.897 OTHER SPONDYLOSIS, LUMBOSACRAL REGION: Primary | ICD-10-CM

## 2025-08-05 DIAGNOSIS — Z74.09 IMPAIRED FUNCTIONAL MOBILITY, BALANCE, GAIT, AND ENDURANCE: ICD-10-CM

## 2025-08-05 PROBLEM — E66.811 OBESITY, CLASS I, BMI 30-34.9: Status: ACTIVE | Noted: 2025-08-05

## 2025-08-05 PROCEDURE — 97112 NEUROMUSCULAR REEDUCATION: CPT | Mod: PN

## 2025-08-05 PROCEDURE — 97140 MANUAL THERAPY 1/> REGIONS: CPT | Mod: PN

## 2025-08-05 PROCEDURE — 97530 THERAPEUTIC ACTIVITIES: CPT | Mod: PN

## 2025-08-05 NOTE — PROGRESS NOTES
"  Outpatient Rehab    Physical Therapy Visit    Patient Name: Carmen Hawley  MRN: [id number]  YOB: 1950  Encounter Date: 8/5/2025    Therapy Diagnosis:   Encounter Diagnoses   Name Primary?    Other spondylosis, lumbosacral region Yes    Impaired functional mobility, balance, gait, and endurance      Physician: Jean-Pierre Bennett III, *    Physician Orders: Eval and Treat  Medical Diagnosis: Pes anserinus bursitis of left knee  Surgical Diagnosis: Not applicable for this Episode   Surgical Date: Not applicable for this Episode  Days Since Last Surgery: Not applicable for this Episode    Visit # / Visits Authorized:  3 / 15  Insurance Authorization Period: 7/23/2025 to 12/31/2025  Date of Evaluation: 7/22/2025  Plan of Care Certification: 7/22/2025 to 10/22/2025      PT/PTA: PT   Number of PTA visits since last PT visit:0  Time In: 1000   Time Out: 1100  Total Time (in minutes): 60   Total Billable Time (in minutes): 60    FOTO:  Intake Score (%): 55  Survey Score 2 (%): Not applicable for this Episode  Survey Score 3 (%): Not applicable for this Episode    Precautions:         Subjective   Doing well;  I know I need to start exercising more;  I had some calf pain/tightness after Friday's exercise class and then therapy, but I was able to work it out..  Pain reported as 1/10. lower back tightness    Objective            Treatment:  Manual Therapy  MT 1: S/L on right - left lumbar spine mobilization - Grade II > III for facet mobilization and tissue relaxation.  Repeated on right side as well to address general lumbar tightness, inability to reverse lumbar curve;  Balance/Neuromuscular Re-Education  NMR 1: Hamstring stretching in sitting with stool - lean over leg - 30 sec hold x 2 on each leg  NMR 2: Achilles stretch on wedge - minimum 30 sec x 3  NMR 3: Supine: posterior pelvic tilt - activation of TrAbs with 5" hold x 10 - needs cueing with this to engage muscles properly  NMR 4: Seated: hip hinging " with neutral spine to focus on lower lumbar motion/stretch  - 10 reps  NMR 8: Bridging: focus on elevating anterior hips/gluteal mm activation x 15  NMR 9: Seated: lumbar extension into green PB x 10  Therapeutic Activity  TA 1: Recumbent bike: Level 5 x 15 mins  TA 5: S/L clams with orange band x 15 each side  TA 8: Seated: UE push into green ball ( ball on floor) x 10    Time Entry(in minutes):  Manual Therapy Time Entry: 15  Neuromuscular Re-Education Time Entry: 20  Therapeutic Activity Time Entry: 25    Assessment & Plan   Assessment: Carmen is gradually getting back into exercise program, Demonstrates chronic lumbar pain with poor core mm activation/support - addressed today, cueing required for activation of TrAbs - still needs practice with this.  Tightness posterior chain from gluteals to feet - stretching of tissues posteriorly today to address.   Evaluation/Treatment Tolerance: Patient tolerated treatment well    The patient will continue to benefit from skilled outpatient physical therapy in order to address the deficits listed in the problem list on the initial evaluation, provide patient and family education, and maximize the patients level of independence in the home and community environments.     The patient's spiritual, cultural, and educational needs were considered, and the patient is agreeable to the plan of care and goals.           Plan: COntinue with skilled physical therapy 2x/week x 6 weeks - Therapeutic ex, activities and NMR to address back/knee and general mobility/postural deficits.    Goals:   Active       LTG's        Able to perform all transfers without limitation        Start:  07/22/25    Expected End:  09/12/25            Ambulate community required distances without limitation        Start:  07/22/25    Expected End:  09/12/25            Demonstrate improvement in tissue extensibility in LE's to prevent injury/pain        Start:  07/22/25    Expected End:  09/12/25             Improve lumbar AROM to 75%        Start:  07/22/25    Expected End:  09/12/25            Independent with HEP for continued improvement in function and mobility        Start:  07/22/25    Expected End:  09/12/25               STG's        Demonstrate improvement in recent symptoms to progress toward LTG's  (Progressing)       Start:  07/22/25    Expected End:  08/22/25            Correct postural deficits to reduce pain and prevent future injury  (Progressing)       Start:  07/22/25    Expected End:  08/22/25            Demonstrate compliance with initial HEP (Progressing)       Start:  07/22/25    Expected End:  08/22/25                Susanna Rondon, PT

## 2025-08-08 ENCOUNTER — CLINICAL SUPPORT (OUTPATIENT)
Dept: REHABILITATION | Facility: HOSPITAL | Age: 75
End: 2025-08-08
Payer: COMMERCIAL

## 2025-08-08 DIAGNOSIS — M47.897 OTHER SPONDYLOSIS, LUMBOSACRAL REGION: Primary | ICD-10-CM

## 2025-08-08 DIAGNOSIS — Z74.09 IMPAIRED FUNCTIONAL MOBILITY, BALANCE, GAIT, AND ENDURANCE: ICD-10-CM

## 2025-08-08 PROCEDURE — 97140 MANUAL THERAPY 1/> REGIONS: CPT | Mod: PN

## 2025-08-08 PROCEDURE — 97530 THERAPEUTIC ACTIVITIES: CPT | Mod: PN

## 2025-08-09 NOTE — PROGRESS NOTES
"  Outpatient Rehab    Physical Therapy Visit    Patient Name: Carmen Hawley  MRN: [id number]  YOB: 1950  Encounter Date: 8/8/2025    Therapy Diagnosis:   Encounter Diagnoses   Name Primary?    Other spondylosis, lumbosacral region Yes    Impaired functional mobility, balance, gait, and endurance      Physician: Jean-Pierre Bennett III, *    Physician Orders: Eval and Treat  Medical Diagnosis: Pes anserinus bursitis of left knee  Surgical Diagnosis: Not applicable for this Episode   Surgical Date: Not applicable for this Episode  Days Since Last Surgery: Not applicable for this Episode    Visit # / Visits Authorized:  4 / 15  Insurance Authorization Period: 7/23/2025 to 12/31/2025  Date of Evaluation: 7/22/2025  Plan of Care Certification: 7/22/2025 to 10/22/2025      PT/PTA: PT   Number of PTA visits since last PT visit:0  Time In: 1430   Time Out: 1540  Total Time (in minutes): 70   Total Billable Time (in minutes): 30    FOTO:  Intake Score (%): 55  Survey Score 2 (%): Not applicable for this Episode  Survey Score 3 (%): Not applicable for this Episode    Precautions:         Subjective   Carmen has been going to exercise classes at [employer], Still noted some left calf tightness with activity.  Pain reported as 2/10. lower back tightness    Objective            Treatment:  Manual Therapy  MT 1: S/L on right - left lumbar spine mobilization - Grade II > III for facet mobilization and tissue relaxation.  Repeated on right side as well to address general lumbar tightness, inability to reverse lumbar curve;  Balance/Neuromuscular Re-Education  NMR 1: Hamstring stretching in sitting with stool - lean over leg - 30 sec hold x 2 on each leg - also performed a few H/S stretches with strap in supine - LLE  NMR 2: Achilles stretch on wedge - minimum 30 sec x 3  NMR 3: Supine: posterior pelvic tilt - activation of TrAbs with 5" hold x 10 - needs cueing with this to engage muscles properly  NMR 4: Seated: hip " "hinging with neutral spine to focus on lower lumbar motion/stretch  - 10 reps  NMR 5: Seated: forward lumbar flexion with SB x 20  NMR 6: Toe-Taps 8" step - standing on AIr-Ex x 2 mins  NMR 7: Supine: posterior pelvic tilt x 10 - cueing/demonstration with this ex  NMR 8: Bridging: focus on elevating anterior hips/gluteal mm activation x 15  NMR 9: Seated: lumbar extension into green PB x 10  Therapeutic Activity  TA 1: Recumbent bike: Level 5 x 15 mins  TA 2: Forward Step-ups to work distal quad on 4" step x 20 each LE  TA 3: Lateral step-ups 4" step x 20 BLE  TA 4: 3-way hip with oragne band x 15 each LE - each way  TA 5: S/L clams with orange band x 15 each side  TA 6: Supine : bridges 10 x 2 - cueing for increased gluteal mm activation - stretch through hips  TA 7: Standing: TKE with use of orange/red band for some resistance x 15 each LE  TA 8: Seated: UE push into green ball ( ball on floor) x 10    Time Entry(in minutes):  Manual Therapy Time Entry: 10  Therapeutic Activity Time Entry: 25    Assessment & Plan   Assessment: Carmen is doing well with current exercise program, increased activity levels for past 2 weeks as she is participating in [employer] and [employer] Fit classes at [employer].  Still demonstrates tightness in posterior chain from gluteals to feet; Will continue to address mobility and strength deficits.   Evaluation/Treatment Tolerance: Patient tolerated treatment well    The patient will continue to benefit from skilled outpatient physical therapy in order to address the deficits listed in the problem list on the initial evaluation, provide patient and family education, and maximize the patients level of independence in the home and community environments.     The patient's spiritual, cultural, and educational needs were considered, and the patient is agreeable to the plan of care and goals.           Plan: COntinue with skilled physical therapy 2xweek - Therapeutic ex, activities and NMR to address " back/knee and general mobility/postural deficits.    Goals:   Active       LTG's        Able to perform all transfers without limitation        Start:  07/22/25    Expected End:  09/12/25            Ambulate community required distances without limitation        Start:  07/22/25    Expected End:  09/12/25            Demonstrate improvement in tissue extensibility in LE's to prevent injury/pain        Start:  07/22/25    Expected End:  09/12/25            Improve lumbar AROM to 75%        Start:  07/22/25    Expected End:  09/12/25            Independent with HEP for continued improvement in function and mobility        Start:  07/22/25    Expected End:  09/12/25               STG's        Demonstrate improvement in recent symptoms to progress toward LTG's  (Progressing)       Start:  07/22/25    Expected End:  08/22/25            Correct postural deficits to reduce pain and prevent future injury  (Progressing)       Start:  07/22/25    Expected End:  08/22/25            Demonstrate compliance with initial HEP (Progressing)       Start:  07/22/25    Expected End:  08/22/25                Susanna Rondon, PT

## 2025-08-11 ENCOUNTER — PATIENT MESSAGE (OUTPATIENT)
Dept: INTERNAL MEDICINE | Facility: CLINIC | Age: 75
End: 2025-08-11
Payer: COMMERCIAL

## 2025-08-11 RX ORDER — ONDANSETRON 4 MG/1
4 TABLET, ORALLY DISINTEGRATING ORAL EVERY 12 HOURS PRN
Qty: 20 TABLET | Refills: 2 | Status: SHIPPED | OUTPATIENT
Start: 2025-08-11

## 2025-08-11 RX ORDER — FAMOTIDINE 40 MG/1
40 TABLET, FILM COATED ORAL DAILY PRN
Qty: 30 TABLET | Refills: 3 | Status: SHIPPED | OUTPATIENT
Start: 2025-08-11 | End: 2026-08-11

## 2025-08-12 ENCOUNTER — CLINICAL SUPPORT (OUTPATIENT)
Dept: REHABILITATION | Facility: HOSPITAL | Age: 75
End: 2025-08-12
Payer: COMMERCIAL

## 2025-08-12 DIAGNOSIS — M47.897 OTHER SPONDYLOSIS, LUMBOSACRAL REGION: Primary | ICD-10-CM

## 2025-08-12 DIAGNOSIS — Z74.09 IMPAIRED FUNCTIONAL MOBILITY, BALANCE, GAIT, AND ENDURANCE: ICD-10-CM

## 2025-08-12 PROCEDURE — 97530 THERAPEUTIC ACTIVITIES: CPT | Mod: PN,CQ

## 2025-08-12 PROCEDURE — 97112 NEUROMUSCULAR REEDUCATION: CPT | Mod: PN,CQ

## 2025-08-15 ENCOUNTER — CLINICAL SUPPORT (OUTPATIENT)
Dept: REHABILITATION | Facility: HOSPITAL | Age: 75
End: 2025-08-15
Payer: COMMERCIAL

## 2025-08-15 DIAGNOSIS — M47.897 OTHER SPONDYLOSIS, LUMBOSACRAL REGION: Primary | ICD-10-CM

## 2025-08-15 DIAGNOSIS — Z74.09 IMPAIRED FUNCTIONAL MOBILITY, BALANCE, GAIT, AND ENDURANCE: ICD-10-CM

## 2025-08-15 PROCEDURE — 97112 NEUROMUSCULAR REEDUCATION: CPT | Mod: PN

## 2025-08-15 PROCEDURE — 97140 MANUAL THERAPY 1/> REGIONS: CPT | Mod: PN

## 2025-08-15 PROCEDURE — 97530 THERAPEUTIC ACTIVITIES: CPT | Mod: PN

## 2025-08-19 ENCOUNTER — CLINICAL SUPPORT (OUTPATIENT)
Dept: REHABILITATION | Facility: HOSPITAL | Age: 75
End: 2025-08-19
Payer: COMMERCIAL

## 2025-08-19 DIAGNOSIS — M47.897 OTHER SPONDYLOSIS, LUMBOSACRAL REGION: Primary | ICD-10-CM

## 2025-08-19 DIAGNOSIS — Z74.09 IMPAIRED FUNCTIONAL MOBILITY, BALANCE, GAIT, AND ENDURANCE: ICD-10-CM

## 2025-08-19 PROCEDURE — 97140 MANUAL THERAPY 1/> REGIONS: CPT | Mod: PN

## 2025-08-19 PROCEDURE — 97112 NEUROMUSCULAR REEDUCATION: CPT | Mod: PN

## 2025-08-22 ENCOUNTER — CLINICAL SUPPORT (OUTPATIENT)
Dept: REHABILITATION | Facility: HOSPITAL | Age: 75
End: 2025-08-22
Payer: COMMERCIAL

## 2025-08-22 DIAGNOSIS — Z74.09 IMPAIRED FUNCTIONAL MOBILITY, BALANCE, GAIT, AND ENDURANCE: ICD-10-CM

## 2025-08-22 DIAGNOSIS — M47.897 OTHER SPONDYLOSIS, LUMBOSACRAL REGION: Primary | ICD-10-CM

## 2025-08-22 PROCEDURE — 97112 NEUROMUSCULAR REEDUCATION: CPT | Mod: PN

## 2025-08-22 PROCEDURE — 97140 MANUAL THERAPY 1/> REGIONS: CPT | Mod: PN

## 2025-08-22 PROCEDURE — 97530 THERAPEUTIC ACTIVITIES: CPT | Mod: PN

## 2025-08-26 ENCOUNTER — CLINICAL SUPPORT (OUTPATIENT)
Dept: REHABILITATION | Facility: HOSPITAL | Age: 75
End: 2025-08-26
Payer: COMMERCIAL

## 2025-08-26 DIAGNOSIS — M47.897 OTHER SPONDYLOSIS, LUMBOSACRAL REGION: Primary | ICD-10-CM

## 2025-08-26 DIAGNOSIS — Z74.09 IMPAIRED FUNCTIONAL MOBILITY, BALANCE, GAIT, AND ENDURANCE: ICD-10-CM

## 2025-08-26 PROCEDURE — 97140 MANUAL THERAPY 1/> REGIONS: CPT | Mod: PN

## 2025-08-26 PROCEDURE — 97112 NEUROMUSCULAR REEDUCATION: CPT | Mod: PN

## 2025-08-26 PROCEDURE — 97530 THERAPEUTIC ACTIVITIES: CPT | Mod: PN

## 2025-08-29 ENCOUNTER — CLINICAL SUPPORT (OUTPATIENT)
Dept: REHABILITATION | Facility: HOSPITAL | Age: 75
End: 2025-08-29
Payer: COMMERCIAL

## 2025-09-02 ENCOUNTER — CLINICAL SUPPORT (OUTPATIENT)
Dept: REHABILITATION | Facility: HOSPITAL | Age: 75
End: 2025-09-02
Payer: COMMERCIAL

## 2025-09-02 DIAGNOSIS — M47.897 OTHER SPONDYLOSIS, LUMBOSACRAL REGION: Primary | ICD-10-CM

## 2025-09-02 DIAGNOSIS — Z74.09 IMPAIRED FUNCTIONAL MOBILITY, BALANCE, GAIT, AND ENDURANCE: ICD-10-CM

## 2025-09-02 PROCEDURE — 97112 NEUROMUSCULAR REEDUCATION: CPT | Mod: PN

## 2025-09-02 PROCEDURE — 97530 THERAPEUTIC ACTIVITIES: CPT | Mod: PN

## (undated) DEVICE — GLASSES EYE PROTECTIVE

## (undated) DEVICE — SUT MCRYL PLUS 4-0 PS2 27IN

## (undated) DEVICE — SYR SLIP TIP 1CC

## (undated) DEVICE — GLOVE BIOGEL SKINSENSE PI 7.5

## (undated) DEVICE — SYS REVOLUTION CEMENT MIXING

## (undated) DEVICE — SOL NACL IRR 1000ML BTL

## (undated) DEVICE — TAPE CURAD SILK ADH 3INX10YD

## (undated) DEVICE — DRESSING TELFA N ADH 3X8

## (undated) DEVICE — ALCOHOL 70% ANTISEPTIC ISO 4OZ

## (undated) DEVICE — SUT QUILL PDO VIOL CP 45CM 2

## (undated) DEVICE — HOOD T7 W/ PEEL AWAY LENS

## (undated) DEVICE — GOWN SMARTGOWN 3XL XLONG

## (undated) DEVICE — SPONGE GAUZE 16PLY 4X4

## (undated) DEVICE — KIT TOTAL KNEE TKOFG OMC

## (undated) DEVICE — COVER LIGHT HANDLE 80/CA

## (undated) DEVICE — SOL NACL IRR 3000ML

## (undated) DEVICE — DRESSING AQUACEL RIBBON 2X45CM

## (undated) DEVICE — BRUSH SCRUB HIBICLENS 4%

## (undated) DEVICE — Device

## (undated) DEVICE — UNDERGLOVES BIOGEL PI SIZE 7.5

## (undated) DEVICE — DRAPE INCISE IOBAN 2 23X33IN

## (undated) DEVICE — DRAPE SURG W/TWL 17 5/8X23

## (undated) DEVICE — SUT 1 36IN COATED VICRYL UN

## (undated) DEVICE — SYR 50CC LL

## (undated) DEVICE — KIT VIZADISC KNEE TRACKING

## (undated) DEVICE — PIN BONE 3.2X110MM
Type: IMPLANTABLE DEVICE | Site: KNEE | Status: NON-FUNCTIONAL
Removed: 2023-10-23

## (undated) DEVICE — UNDERGLOVES BIOGEL PI SIZE 8.5

## (undated) DEVICE — NDL 18GA X1 1/2 REG BEVEL

## (undated) DEVICE — GAUZE SPONGE 4X4 12PLY

## (undated) DEVICE — WRAP KNEE ACCU THERM GEL PACK

## (undated) DEVICE — SOL POVIDONE SCRUB IODINE 4 OZ

## (undated) DEVICE — SOL BETADINE 5%

## (undated) DEVICE — DRESSING AQUACEL AG RBBN 2X45

## (undated) DEVICE — MARKER SKIN RULER STERILE

## (undated) DEVICE — CEMENT BONE SURG SMPLX P RADPQ: Type: IMPLANTABLE DEVICE | Site: KNEE | Status: NON-FUNCTIONAL

## (undated) DEVICE — DRESSING LEUKOPLAST FLEX 1X3IN

## (undated) DEVICE — DRAPE T EXTRM SURG 121X128X90

## (undated) DEVICE — GLOVE BIOGEL PI MICRO SZ 7

## (undated) DEVICE — DRESSING TRANS 4X4 TEGADERM

## (undated) DEVICE — TOWEL OR XRAY WHITE 17X26IN

## (undated) DEVICE — BLADE DUAL CUT SAG 35X64X.89MM

## (undated) DEVICE — KIT CHECKPOINT MAKO

## (undated) DEVICE — KIT IRR SUCTION HND PIECE

## (undated) DEVICE — BLADE SURG #15 CARBON STEEL

## (undated) DEVICE — GLOVE BIOGEL SKINSENSE PI 8.0

## (undated) DEVICE — PAD KNEE POLAR XL

## (undated) DEVICE — DRAPE TOP 53X102IN

## (undated) DEVICE — COVER CAMERA OPERATING ROOM

## (undated) DEVICE — SUT 2/0 36IN COATED VICRYL

## (undated) DEVICE — BLADE MAKO STANDARD

## (undated) DEVICE — TUBE SUCTION FRAZIER VENT 10FR

## (undated) DEVICE — PIN FIXATION BONE 140X3.2MM
Type: IMPLANTABLE DEVICE | Site: KNEE | Status: NON-FUNCTIONAL
Removed: 2023-10-23

## (undated) DEVICE — ELECTRODE REM PLYHSV RETURN 9

## (undated) DEVICE — BLADE RECIP DS OFST 70X1X12.5

## (undated) DEVICE — KIT DRAPE RIO ONE PIECE W/POCK

## (undated) DEVICE — ADHESIVE DERMABOND ADVANCED

## (undated) DEVICE — DRAPE THREE-QTR REINF 53X77IN